# Patient Record
Sex: FEMALE | Race: WHITE | NOT HISPANIC OR LATINO | Employment: OTHER | URBAN - METROPOLITAN AREA
[De-identification: names, ages, dates, MRNs, and addresses within clinical notes are randomized per-mention and may not be internally consistent; named-entity substitution may affect disease eponyms.]

---

## 2017-01-01 ENCOUNTER — HOSPITAL ENCOUNTER (EMERGENCY)
Facility: HOSPITAL | Age: 67
Discharge: HOME | End: 2017-01-01
Attending: EMERGENCY MEDICINE
Payer: MEDICARE

## 2017-01-01 VITALS
TEMPERATURE: 98 F | RESPIRATION RATE: 16 BRPM | SYSTOLIC BLOOD PRESSURE: 121 MMHG | HEIGHT: 64 IN | OXYGEN SATURATION: 98 % | BODY MASS INDEX: 34.15 KG/M2 | HEART RATE: 58 BPM | DIASTOLIC BLOOD PRESSURE: 59 MMHG | WEIGHT: 200 LBS

## 2017-01-01 DIAGNOSIS — R05.8 PRODUCTIVE COUGH: Primary | ICD-10-CM

## 2017-01-01 PROCEDURE — 25000242 PHARM REV CODE 250 ALT 637 W/ HCPCS: Performed by: EMERGENCY MEDICINE

## 2017-01-01 PROCEDURE — 94640 AIRWAY INHALATION TREATMENT: CPT

## 2017-01-01 PROCEDURE — 99284 EMERGENCY DEPT VISIT MOD MDM: CPT

## 2017-01-01 PROCEDURE — 25000003 PHARM REV CODE 250: Performed by: EMERGENCY MEDICINE

## 2017-01-01 RX ORDER — CODEINE PHOSPHATE AND GUAIFENESIN 10; 100 MG/5ML; MG/5ML
5 SOLUTION ORAL
Status: DISCONTINUED | OUTPATIENT
Start: 2017-01-01 | End: 2017-01-01 | Stop reason: ALTCHOICE

## 2017-01-01 RX ORDER — BENZONATATE 100 MG/1
100 CAPSULE ORAL ONCE
Status: COMPLETED | OUTPATIENT
Start: 2017-01-01 | End: 2017-01-01

## 2017-01-01 RX ORDER — BENZONATATE 100 MG/1
100 CAPSULE ORAL 3 TIMES DAILY PRN
Qty: 20 CAPSULE | Refills: 0 | Status: SHIPPED | OUTPATIENT
Start: 2017-01-01 | End: 2017-01-11

## 2017-01-01 RX ORDER — DOXYCYCLINE 100 MG/1
100 CAPSULE ORAL 2 TIMES DAILY
Qty: 20 CAPSULE | Refills: 0 | Status: SHIPPED | OUTPATIENT
Start: 2017-01-01 | End: 2017-01-06

## 2017-01-01 RX ORDER — HYDROCODONE POLISTIREX AND CHLORPHENIRAMINE POLISTIREX 10; 8 MG/5ML; MG/5ML
5 SUSPENSION, EXTENDED RELEASE ORAL
Status: COMPLETED | OUTPATIENT
Start: 2017-01-01 | End: 2017-01-01

## 2017-01-01 RX ORDER — HYDROCODONE POLISTIREX AND CHLORPHENIRAMINE POLISTIREX 10; 8 MG/5ML; MG/5ML
5 SUSPENSION, EXTENDED RELEASE ORAL EVERY 12 HOURS PRN
Qty: 115 ML | Refills: 0 | Status: SHIPPED | OUTPATIENT
Start: 2017-01-01 | End: 2017-01-06

## 2017-01-01 RX ORDER — IPRATROPIUM BROMIDE AND ALBUTEROL SULFATE 2.5; .5 MG/3ML; MG/3ML
3 SOLUTION RESPIRATORY (INHALATION)
Status: COMPLETED | OUTPATIENT
Start: 2017-01-01 | End: 2017-01-01

## 2017-01-01 RX ORDER — ALBUTEROL SULFATE 90 UG/1
1-2 AEROSOL, METERED RESPIRATORY (INHALATION) EVERY 6 HOURS PRN
Qty: 1 INHALER | Refills: 0 | Status: SHIPPED | OUTPATIENT
Start: 2017-01-01 | End: 2017-01-27

## 2017-01-01 RX ADMIN — IPRATROPIUM BROMIDE AND ALBUTEROL SULFATE 3 ML: .5; 3 SOLUTION RESPIRATORY (INHALATION) at 10:01

## 2017-01-01 RX ADMIN — HYDROCODONE POLISTIREX AND CHLORPHENIRAMINE POLISITREX 5 ML: 10; 8 SUSPENSION, EXTENDED RELEASE ORAL at 11:01

## 2017-01-01 RX ADMIN — BENZONATATE 100 MG: 100 CAPSULE ORAL at 11:01

## 2017-01-01 NOTE — ED PROVIDER NOTES
Encounter Date: 2017       History     Chief Complaint   Patient presents with    Chest Congestion     x 2 days  , no fever / green mucus     Review of patient's allergies indicates:   Allergen Reactions    Nsaids (non-steroidal anti-inflammatory drug) Anaphylaxis     HPI Comments: 66-year-old female presents to the emergency room with cough since .  Symptoms improved several days ago but have since worsened.  No runny nose no sneezing or sore throat.  She is only complaining of a cough and chest congestion.  No chest pain chest tightness or shortness of breath.  No leg swelling or edema.  No fevers or chills no myalgias.    The history is provided by the patient and the spouse.     Past Medical History   Diagnosis Date    Anxiety     Depression     Diabetes mellitus, type 2     Fatty liver disease, nonalcoholic     GERD (gastroesophageal reflux disease)     Hyperlipidemia     Hypertension     Plantar fasciitis of right foot      No past medical history pertinent negatives.  Past Surgical History   Procedure Laterality Date    Cholecystectomy      Tonsillectomy       section       x 2    Foot surgery       left bunionectomy    Liposuction      Breast reduction      Hysterectomy       one ovary intact, adhesions     Family History   Problem Relation Age of Onset    Hypertension Mother     Heart disease Mother      pacemaker    Heart disease Father     Psoriasis Father     Cancer Neg Hx      Social History   Substance Use Topics    Smoking status: Former Smoker    Smokeless tobacco: Never Used      Comment: quit 1993    Alcohol use No     Review of Systems   Constitutional: Negative for chills and fever.   Respiratory: Positive for cough. Negative for shortness of breath.         Chest congestion and a productive cough   Gastrointestinal: Negative for abdominal pain.   Genitourinary: Negative for flank pain.   Musculoskeletal: Negative for myalgias.   Neurological:  Negative for headaches.   Psychiatric/Behavioral: Negative for confusion.   All other systems reviewed and are negative.      Physical Exam   Initial Vitals   BP Pulse Resp Temp SpO2   01/01/17 1000 01/01/17 1000 01/01/17 1000 01/01/17 1000 01/01/17 1000   121/59 62 20 97.9 °F (36.6 °C) 97 %     Physical Exam    Nursing note and vitals reviewed.  Constitutional: She appears well-developed and well-nourished. She is not diaphoretic.  Non-toxic appearance. She does not have a sickly appearance. She does not appear ill. No distress.   HENT:   Head: Normocephalic and atraumatic.   Eyes: EOM are normal.   Neck: Normal range of motion. Neck supple.   Cardiovascular: Normal rate, regular rhythm and normal heart sounds.   No murmur heard.  Pulmonary/Chest: Breath sounds normal. No respiratory distress. She has no wheezes. She has no rales.   Abdominal: Soft. She exhibits no distension. There is no tenderness. There is no rebound.   Musculoskeletal: Normal range of motion.   Neurological: She is alert and oriented to person, place, and time.   Skin: Skin is warm and dry.   Psychiatric: She has a normal mood and affect. Her behavior is normal. Judgment and thought content normal.         ED Course   Procedures  Labs Reviewed - No data to display          Medical Decision Making:   Clinical Tests:   Radiological Study: Ordered and Reviewed                   ED Course     Clinical Impression:   The encounter diagnosis was Productive cough.          66-year-old female presents with about 10 days of productive cough.  Briefly improved but has since returned.  Feels much better after breathing treatment with Tussionex and Tessalon Perles.  These medications will be prescribed on discharge and I will also give her a prescription for doxycycline along with her to wait several days before taking it.  Advised her that the symptoms are likely viral and that her symptoms should improve with symptomatic treatment.  She is amenable to  this plan.  No sign at this time of respiratory distress.  I do not suspect she has a pulmonary embolus or myocardial infarction or dissection.  Well-appearing on discharge patient feels much better.     Neo Rubalcava MD  01/01/17 5657

## 2017-01-01 NOTE — ED NOTES
C/o cough that started before kelly but has gotten worse in the past 2 days, even non labored respirations, alert calm spouse at bedside aware to notify nurse of needs or concerns.

## 2017-01-01 NOTE — ED AVS SNAPSHOT
[facility] [address] [place] [address]               Marce Hickey   2017 10:02 AM   ED    Description:  Female : 1950   Department:  [facility]           Your Care was Coordinated By:     Provider Role From To    Neo Rubalcava MD Attending Provider 17 1003 --      Reason for Visit     Chest Congestion           Diagnoses this Visit        Comments    Productive cough    -  Primary       ED Disposition     None           To Do List           Follow-up Information     Follow up with Savannah Garvey MD.    Specialty:  Family Medicine    Why:  As needed, If symptoms worsen    Contact information:    Frank ANDREW  [facility] [id number] [phone]          Follow up with [facility].    Specialty:  Emergency Medicine    Why:  As needed, If symptoms worsen    Contact information:    [address] [place] [id number] [phone]       These Medications        Disp Refills Start End    albuterol 90 mcg/actuation inhaler 1 Inhaler 0 2017     Inhale 1-2 puffs into the lungs every 6 (six) hours as needed for Wheezing. - Inhalation    Pharmacy: [facility] [place] [facility] Ph #: [phone]       doxycycline (VIBRAMYCIN) 100 MG Cap 20 capsule 0 2017    Take 1 capsule (100 mg total) by mouth 2 (two) times daily. - Oral    Pharmacy: [facility] [place] [facility] Ph #: [phone]       Notes to Pharmacy: Fill his prescription only after several days if still symptomatic    benzonatate (TESSALON) 100 MG capsule 20 capsule 0 2017    Take 1 capsule (100 mg total) by mouth 3 (three) times daily as needed for Cough. - Oral    Pharmacy: [facility] [place] [facility] Ph #: [phone]       hydrocodone-chlorpheniramine  (TUSSIONEX) 10-8 mg/5 mL suspension 115 mL 0 1/1/2017     Take 5 mLs by mouth every 12 (twelve) hours as needed for Cough. - Oral    Pharmacy: [facility] [address] DANIELA Salazar  [facility] #: [phone]         [facility] On Call     [facility] On Call Nurse Care Line - 24/7 Assistance  Registered nurses in the [facility] On Call Center provide clinical advisement, health education, appointment booking, and other advisory services.  Call for this free service at [phone].             Medications           Message regarding Medications     Verify the changes and/or additions to your medication regime listed below are the same as discussed with your clinician today.  If any of these changes or additions are incorrect, please notify your healthcare provider.        START taking these NEW medications        Refills    albuterol 90 mcg/actuation inhaler 0    Sig: Inhale 1-2 puffs into the lungs every 6 (six) hours as needed for Wheezing.    Class: Print    Route: Inhalation    doxycycline (VIBRAMYCIN) 100 MG Cap 0    Sig: Take 1 capsule (100 mg total) by mouth 2 (two) times daily.    Class: Print    Route: Oral    benzonatate (TESSALON) 100 MG capsule 0    Sig: Take 1 capsule (100 mg total) by mouth 3 (three) times daily as needed for Cough.    Class: Print    Route: Oral    hydrocodone-chlorpheniramine (TUSSIONEX) 10-8 mg/5 mL suspension 0    Sig: Take 5 mLs by mouth every 12 (twelve) hours as needed for Cough.    Class: Print    Route: Oral      These medications were administered today        Dose Freq    albuterol-ipratropium 2.5mg-0.5mg/3mL nebulizer solution 3 mL 3 mL ED 1 Time    Sig: Take 3 mLs by nebulization ED 1 Time.    Class: Normal    Route: Nebulization    benzonatate capsule 100 mg 100 mg Once    Sig: Take 1 capsule (100 mg total) by mouth once.    Class: Normal    Route: Oral    hydrocodone-chlorpheniramine 10-8 mg/5 mL suspension 5 mL 5 mL ED 1 Time    Sig: Take 5 mLs by  "mouth ED 1 Time.    Class: Normal    Route: Oral           Verify that the below list of medications is an accurate representation of the medications you are currently taking.  If none reported, the list may be blank. If incorrect, please contact your healthcare provider. Carry this list with you in case of emergency.           Current Medications     albuterol 90 mcg/actuation inhaler Inhale 1-2 puffs into the lungs every 6 (six) hours as needed for Wheezing.    atenolol-chlorthalidone (TENORETIC) 50-25 mg Tab Take 1 tablet by mouth once daily.    atorvastatin (LIPITOR) 40 MG tablet Take 1 tablet (40 mg total) by mouth once daily.    benzonatate (TESSALON) 100 MG capsule Take 1 capsule (100 mg total) by mouth 3 (three) times daily as needed for Cough.    doxycycline (VIBRAMYCIN) 100 MG Cap Take 1 capsule (100 mg total) by mouth 2 (two) times daily.    hydrocodone-chlorpheniramine (TUSSIONEX) 10-8 mg/5 mL suspension Take 5 mLs by mouth every 12 (twelve) hours as needed for Cough.    omeprazole-sodium bicarbonate (ZEGERID) 40-1.1 mg-gram per capsule Take 1 capsule by mouth before breakfast.    venlafaxine (EFFEXOR) 75 MG tablet Take 1 tablet (75 mg total) by mouth 2 (two) times daily.    vitamin D 1000 units Tab Take 185 mg by mouth once daily.           Clinical Reference Information           Your Vitals Were     BP Pulse Temp Resp Height Weight    121/59 58 97.9 °F (36.6 °C) (Oral) 16 5' 4" (1.626 m) 90.7 kg (200 lb)    SpO2 BMI             98% 34.33 kg/m2         Allergies as of 1/1/2017        Reactions    Nsaids (Non-steroidal Anti-inflammatory Drug) Anaphylaxis      Immunizations Administered on Date of Encounter - 1/1/2017     None      ED Micro, Lab, POCT     None      ED Imaging Orders     Start Ordered       Status Ordering Provider    01/01/17 1015 01/01/17 1014  X-Ray Chest PA And Lateral  1 time imaging      Final result         Discharge Instructions         What Is Acute Bronchitis?  Acute or " short-term bronchitis last for days or weeks. It occurs when the bronchial tubes (airways in the lungs) are irritated by a virus, bacteria, or allergen. This causes a cough that produces yellow or greenish mucus.  Inside healthy lungs    Air travels in and out of the lungs through the airways. The linings of these airways produce sticky mucus. This mucus traps particles that enter the lungs. Tiny structures called cilia then sweep the particles out of the airways.     Healthy airway: Airways are normally open. Air moves in and out easily.      Healthy cilia: Tiny, hairlike cilia sweep mucus and particles up and out of the airways.   Lungs with bronchitis  Bronchitis often occurs with a cold or the flu virus. The airways become inflamed (red and swollen). There is a deep hacking cough from the extra mucus. Other symptoms may include:  · Wheezing  · Chest discomfort  · Shortness of breath  · Mild fever  A second infection, this time due to bacteria, may then occur. And airways irritated by allergens or smoke are more likely to get infected.        Inflamed airway: Inflammation and extra mucus narrow the airway, causing shortness of breath.      Impaired cilia: Extra mucus impairs cilia, causing congestion and wheezing. Smoking makes the problem worse.   Making a diagnosis  A physical exam, health history, and certain tests help your healthcare provider make the diagnosis.  Health history  Your healthcare provider will ask you about your symptoms.  The exam  Your provider listens to your chest for signs of congestion. He or she may also check your ears, nose, and throat.  Possible tests  · A sputum test for bacteria. This requires a sample of mucus from the lungs.  · A nasal or throat swab for bacterial infection.  · A chest X-ray if your healthcare provider thinks you have pneumonia.  · Tests to check for an underlying condition, such as allergies, asthma, or COPD. You may need to see a specialist for more lung  function testing.  Treating a cough  The main treatment for bronchitis is easing symptoms. Avoiding smoke, allergens, and other things that trigger coughing can often help. If the infection is bacterial, you may be given antibiotics. During the illness, it's important to get plenty of sleep. To ease symptoms:  · Dont smoke, and avoid secondhand smoke.  · Use a humidifier, or breathe in steam from a hot shower. This may help loosen mucus.  · Drink a lot of water and juice. They can soothe the throat and may help thin mucus.  · Sit up or use extra pillows when in bed to help lessen coughing and congestion.  · Ask your provider about using cough medicine, pain and fever medicine, or a decongestant.  Antibiotics  Most cases of bronchitis are caused by cold or flu viruses. Antibiotics dont treat viral illness. Taking antibiotics when they are not needed increases your risk of getting an infection later that is antibiotic-resistant. Your provider will prescribe antibiotics if the infection is caused by bacteria. If they are prescribed:  · Take the medicine until it is used up, even if symptoms have improved. If you dont, the bronchitis may come back.  · Take them as directed. For instance, some medicines should be taken with food.  · Ask your provider or pharmacist what side effects are common, and what to do about them.  Follow-up care  You should see your provider again in 2 to 3 weeks. By this time, symptoms should have improved. An infection that lasts longer may mean you have a more serious problem.  Prevention  · Avoid tobacco smoke. If you smoke, quit. Stay away from smoky places. Ask friends and family not to smoke around you, or in your home or car.  · Get checked for allergies.  · Ask your provider about getting a yearly flu shot, and pneumococcal or pneumonia shots.  · Wash your hands often. This helps reduce the chance of picking up viruses that cause colds and flu.  Call your healthcare provider  if:  · Symptoms worsen, or new symptoms develop.  · Breathing problems worsen or  become severe.  · Symptoms dont get better within a week, or within 3 days of taking antibiotics.   © 5212-9956 [employer]. [address] [place] [address], [place] [address]. All rights reserved. This information is not intended as a substitute for professional medical care. Always follow your healthcare professional's instructions.          Your Scheduled Appointments     Jan 06, 2017  2:15 PM CST   Follow Up - Bariatric with MD [facility] - General [facility] ([place] [facility])    [address]  [place] [address]   [phone]            Feb 10, 2017  8:40 AM CST   Established Patient Visit with MD [facility] - Family Medicine ([place])    [address]  [place] [address]   [phone]            Mar 28, 2017 10:00 AM CDT   Established Patient Visit with MD [facility] - Family Medicine ([place])    [address]  [facility] [address]   [phone]              Smoking Cessation     If you would like to quit smoking:   You may be eligible for free services if you are a [place] resident and started smoking cigarettes before September 1, 1988.  Call the Smoking Cessation Trust (SCT) toll free at (324) 067-2808 or (274) 789-8389.   Call 1-800-QUIT-NOW if you do not meet the above criteria.             [facility] complies with applicable Federal civil rights laws and does not discriminate on the basis of race, color, national origin, age, disability, or sex.        Language Assistance Services     ATTENTION: Language assistance services are available, free of charge. Please call [phone].      ATENCIÓN: Si habla malinda, tiene a piper disposición servicios gratuitos de asistencia lingüística. Llame al [phone].     CHÚ Ý: N?u b?n nói Ti?ng Vi?t, có các d?ch v? h? tr? ngôn ng? mi?n phí dành cho b?n. G?i s?  [phone].

## 2017-01-01 NOTE — DISCHARGE INSTRUCTIONS
What Is Acute Bronchitis?  Acute or short-term bronchitis last for days or weeks. It occurs when the bronchial tubes (airways in the lungs) are irritated by a virus, bacteria, or allergen. This causes a cough that produces yellow or greenish mucus.  Inside healthy lungs    Air travels in and out of the lungs through the airways. The linings of these airways produce sticky mucus. This mucus traps particles that enter the lungs. Tiny structures called cilia then sweep the particles out of the airways.     Healthy airway: Airways are normally open. Air moves in and out easily.      Healthy cilia: Tiny, hairlike cilia sweep mucus and particles up and out of the airways.   Lungs with bronchitis  Bronchitis often occurs with a cold or the flu virus. The airways become inflamed (red and swollen). There is a deep hacking cough from the extra mucus. Other symptoms may include:  · Wheezing  · Chest discomfort  · Shortness of breath  · Mild fever  A second infection, this time due to bacteria, may then occur. And airways irritated by allergens or smoke are more likely to get infected.        Inflamed airway: Inflammation and extra mucus narrow the airway, causing shortness of breath.      Impaired cilia: Extra mucus impairs cilia, causing congestion and wheezing. Smoking makes the problem worse.   Making a diagnosis  A physical exam, health history, and certain tests help your healthcare provider make the diagnosis.  Health history  Your healthcare provider will ask you about your symptoms.  The exam  Your provider listens to your chest for signs of congestion. He or she may also check your ears, nose, and throat.  Possible tests  · A sputum test for bacteria. This requires a sample of mucus from the lungs.  · A nasal or throat swab for bacterial infection.  · A chest X-ray if your healthcare provider thinks you have pneumonia.  · Tests to check for an underlying condition, such as allergies, asthma, or COPD. You may need  to see a specialist for more lung function testing.  Treating a cough  The main treatment for bronchitis is easing symptoms. Avoiding smoke, allergens, and other things that trigger coughing can often help. If the infection is bacterial, you may be given antibiotics. During the illness, it's important to get plenty of sleep. To ease symptoms:  · Dont smoke, and avoid secondhand smoke.  · Use a humidifier, or breathe in steam from a hot shower. This may help loosen mucus.  · Drink a lot of water and juice. They can soothe the throat and may help thin mucus.  · Sit up or use extra pillows when in bed to help lessen coughing and congestion.  · Ask your provider about using cough medicine, pain and fever medicine, or a decongestant.  Antibiotics  Most cases of bronchitis are caused by cold or flu viruses. Antibiotics dont treat viral illness. Taking antibiotics when they are not needed increases your risk of getting an infection later that is antibiotic-resistant. Your provider will prescribe antibiotics if the infection is caused by bacteria. If they are prescribed:  · Take the medicine until it is used up, even if symptoms have improved. If you dont, the bronchitis may come back.  · Take them as directed. For instance, some medicines should be taken with food.  · Ask your provider or pharmacist what side effects are common, and what to do about them.  Follow-up care  You should see your provider again in 2 to 3 weeks. By this time, symptoms should have improved. An infection that lasts longer may mean you have a more serious problem.  Prevention  · Avoid tobacco smoke. If you smoke, quit. Stay away from smoky places. Ask friends and family not to smoke around you, or in your home or car.  · Get checked for allergies.  · Ask your provider about getting a yearly flu shot, and pneumococcal or pneumonia shots.  · Wash your hands often. This helps reduce the chance of picking up viruses that cause colds and flu.  Call  your healthcare provider if:  · Symptoms worsen, or new symptoms develop.  · Breathing problems worsen or  become severe.  · Symptoms dont get better within a week, or within 3 days of taking antibiotics.   © 3334-7933 [employer]. [address] [place] [address], [place] [address]. All rights reserved. This information is not intended as a substitute for professional medical care. Always follow your healthcare professional's instructions.

## 2017-01-01 NOTE — ED NOTES
Given written and verbal DC instructions questions answered per MD aware to follow up with PCP encouraged to return if needed. Given RX with teaching.

## 2017-01-06 ENCOUNTER — OFFICE VISIT (OUTPATIENT)
Dept: SURGERY | Facility: CLINIC | Age: 67
End: 2017-01-06
Payer: MEDICARE

## 2017-01-06 ENCOUNTER — TELEPHONE (OUTPATIENT)
Dept: SURGERY | Facility: CLINIC | Age: 67
End: 2017-01-06

## 2017-01-06 VITALS
DIASTOLIC BLOOD PRESSURE: 84 MMHG | SYSTOLIC BLOOD PRESSURE: 171 MMHG | WEIGHT: 215.38 LBS | HEART RATE: 93 BPM | BODY MASS INDEX: 36.77 KG/M2 | RESPIRATION RATE: 16 BRPM | HEIGHT: 64 IN | TEMPERATURE: 98 F

## 2017-01-06 DIAGNOSIS — I10 ESSENTIAL HYPERTENSION: ICD-10-CM

## 2017-01-06 DIAGNOSIS — R74.8 ELEVATED LIVER ENZYMES: ICD-10-CM

## 2017-01-06 DIAGNOSIS — E66.01 MORBID OBESITY DUE TO EXCESS CALORIES: Primary | ICD-10-CM

## 2017-01-06 DIAGNOSIS — E11.9 CONTROLLED TYPE 2 DIABETES MELLITUS WITHOUT COMPLICATION, WITHOUT LONG-TERM CURRENT USE OF INSULIN: ICD-10-CM

## 2017-01-06 PROCEDURE — 99214 OFFICE O/P EST MOD 30 MIN: CPT | Mod: S$PBB,,, | Performed by: SURGERY

## 2017-01-06 PROCEDURE — 99999 PR PBB SHADOW E&M-EST. PATIENT-LVL IV: CPT | Mod: PBBFAC,,, | Performed by: SURGERY

## 2017-01-06 PROCEDURE — 99214 OFFICE O/P EST MOD 30 MIN: CPT | Mod: PBBFAC,PO | Performed by: SURGERY

## 2017-01-06 RX ORDER — HEPARIN SODIUM 5000 [USP'U]/ML
5000 INJECTION, SOLUTION INTRAVENOUS; SUBCUTANEOUS
Status: CANCELLED | OUTPATIENT
Start: 2017-01-06

## 2017-01-06 RX ORDER — FAMOTIDINE 10 MG/ML
20 INJECTION INTRAVENOUS
Status: CANCELLED | OUTPATIENT
Start: 2017-01-06

## 2017-01-06 RX ORDER — MULTIVITAMIN
1 TABLET ORAL DAILY
COMMUNITY
End: 2017-11-01 | Stop reason: SDUPTHER

## 2017-01-06 NOTE — MR AVS SNAPSHOT
[facility]   [address]  [facility] [address]  Phone: [phone]                  Marce Hickey   2017 2:15 PM   Office Visit    Description:  Female : 1950   Provider:  Nelson Panchal MD   Department:  [facility]           Reason for Visit     Obesity           Diagnoses this Visit        Comments    Morbid obesity due to excess calories    -  Primary     Elevated liver enzymes         Obesity, Class II, BMI 35-39.9, with comorbidity         Controlled type 2 diabetes mellitus without complication, without long-term current use of insulin         Essential hypertension                To Do List           Future Appointments        Provider Department Dept Phone    2017 10:00 AM BARIATRIC, NURSE SCHEDULE [facility] [phone]    2/10/2017 8:40 AM Savannah Garvey MD [facility] [phone]    3/28/2017 10:00 AM Savannah Garvey MD [facility] [phone]      Your Future Surgeries/Procedures     2017   Surgery with Nelson Panchal MD   [facility] [address]  [facility] [id number] [phone]              Goals (5 Years of Data)     None      [facility]     [facility] - 24/7 Assistance  Registered nurses in the [facility] provide clinical advisement, health education, appointment booking, and other advisory services.  Call for this free service at [phone].             Medications           Message regarding Medications     Verify the changes and/or additions to your medication regime listed below are the same as discussed with your clinician today.  If any of these changes or additions are incorrect, please notify your healthcare provider.        STOP taking these medications     hydrocodone-chlorpheniramine (TUSSIONEX) 10-8 mg/5 mL suspension Take 5 mLs by mouth every  "12 (twelve) hours as needed for Cough.    doxycycline (VIBRAMYCIN) 100 MG Cap Take 1 capsule (100 mg total) by mouth 2 (two) times daily.           Verify that the below list of medications is an accurate representation of the medications you are currently taking.  If none reported, the list may be blank. If incorrect, please contact your healthcare provider. Carry this list with you in case of emergency.           Current Medications     multivitamin (THERAGRAN) per tablet Take 1 tablet by mouth once daily.    vitamin D 1000 units Tab Take 185 mg by mouth once daily.    albuterol 90 mcg/actuation inhaler Inhale 1-2 puffs into the lungs every 6 (six) hours as needed for Wheezing.    atenolol-chlorthalidone (TENORETIC) 50-25 mg Tab Take 1 tablet by mouth once daily.    atorvastatin (LIPITOR) 40 MG tablet Take 1 tablet (40 mg total) by mouth once daily.    benzonatate (TESSALON) 100 MG capsule Take 1 capsule (100 mg total) by mouth 3 (three) times daily as needed for Cough.    omeprazole-sodium bicarbonate (ZEGERID) 40-1.1 mg-gram per capsule Take 1 capsule by mouth before breakfast.    venlafaxine (EFFEXOR) 75 MG tablet Take 1 tablet (75 mg total) by mouth 2 (two) times daily.           Clinical Reference Information           Vital Signs - Last Recorded  Most recent update: 1/6/2017  3:17 PM by Maricel Camacho LPN    BP Pulse Temp Resp Ht Wt    (!) 171/84 93 97.8 °F (36.6 °C) (Oral) 16 5' 4" (1.626 m) 97.7 kg (215 lb 6.4 oz)    BMI                36.97 kg/m2          Blood Pressure          Most Recent Value    BP  (!)  171/84      Allergies as of 1/6/2017     Nsaids (Non-steroidal Anti-inflammatory Drug)      Immunizations Administered on Date of Encounter - 1/6/2017     None      Orders Placed During Today's Visit      Normal Orders This Visit    Case Request Operating Room: GASTRECTOMY-SLEEVE-LAPAROSCOPIC       Instructions    Pre op Diet January 16    Breakfast- protein shake  Lunch- protein shake  Dinner- " 3 ounces of meat and vegetables ( from list)    NO SNACKING  Only water to drink

## 2017-01-06 NOTE — H&P
Follow up    SUBJECTIVE:     Chief Complaint   Patient presents with    Obesity       History of Present Illness:    Patient is a 66 y.o. female who is referred for evaluation of surgical treatment of morbid obesity. Her Body mass index is 36.97 kg/(m^2).  She has known comorbidities of depression, diabetes mellitus, dyslipidemia, hypertension, osteoarthritis and urinary incontinence. She has attended the bariatric seminar and is most interested in gastric sleeve surgery.     Review of patient's allergies indicates:   Allergen Reactions    Nsaids (non-steroidal anti-inflammatory drug) Anaphylaxis       Current Outpatient Prescriptions   Medication Sig Dispense Refill    multivitamin (THERAGRAN) per tablet Take 1 tablet by mouth once daily.      vitamin D 1000 units Tab Take 185 mg by mouth once daily.      albuterol 90 mcg/actuation inhaler Inhale 1-2 puffs into the lungs every 6 (six) hours as needed for Wheezing. 1 Inhaler 0    atenolol-chlorthalidone (TENORETIC) 50-25 mg Tab Take 1 tablet by mouth once daily. 90 tablet 3    atorvastatin (LIPITOR) 40 MG tablet Take 1 tablet (40 mg total) by mouth once daily. 90 tablet 3    benzonatate (TESSALON) 100 MG capsule Take 1 capsule (100 mg total) by mouth 3 (three) times daily as needed for Cough. 20 capsule 0    omeprazole-sodium bicarbonate (ZEGERID) 40-1.1 mg-gram per capsule Take 1 capsule by mouth before breakfast. 90 capsule 3    venlafaxine (EFFEXOR) 75 MG tablet Take 1 tablet (75 mg total) by mouth 2 (two) times daily. 180 tablet 3     No current facility-administered medications for this visit.        Past Medical History   Diagnosis Date    Anxiety     Depression     Diabetes mellitus, type 2     Fatty liver disease, nonalcoholic     GERD (gastroesophageal reflux disease)     Hyperlipidemia     Hypertension     Plantar fasciitis of right foot      Past Surgical History   Procedure Laterality Date    Cholecystectomy      Tonsillectomy        section       x 2    Foot surgery       left bunionectomy    Liposuction      Breast reduction      Hysterectomy       one ovary intact, adhesions     Family History   Problem Relation Age of Onset    Hypertension Mother     Heart disease Mother      pacemaker    Heart disease Father     Psoriasis Father     Cancer Neg Hx      Social History   Substance Use Topics    Smoking status: Former Smoker    Smokeless tobacco: Never Used      Comment: quit 1993    Alcohol use No        Review of Systems:  Review of Systems   Constitutional: Negative for activity change, appetite change, fever and unexpected weight change.   HENT: Positive for sinus pressure. Negative for congestion, sneezing, sore throat, tinnitus, trouble swallowing and voice change.    Eyes: Negative for redness and visual disturbance.   Respiratory: Negative for apnea, cough, choking, chest tightness, shortness of breath, wheezing and stridor.    Cardiovascular: Positive for leg swelling. Negative for chest pain and palpitations.   Gastrointestinal: Positive for diarrhea. Negative for abdominal distention, abdominal pain, anal bleeding, blood in stool, constipation, nausea, rectal pain and vomiting.   Endocrine: Negative for cold intolerance and heat intolerance.   Genitourinary: Negative for difficulty urinating and dysuria.   Musculoskeletal: Positive for arthralgias. Negative for back pain, gait problem, joint swelling, myalgias, neck pain and neck stiffness.   Skin: Negative for rash and wound.   Allergic/Immunologic: Negative for environmental allergies and food allergies.   Neurological: Negative for dizziness, facial asymmetry, light-headedness and headaches.   Hematological: Negative for adenopathy. Does not bruise/bleed easily.   Psychiatric/Behavioral: Negative for agitation, confusion and decreased concentration.       OBJECTIVE:     Vital Signs (Most Recent)  Temp: 97.8 °F (36.6 °C) (17 1513)  Pulse: 93 (17  "1513)  Resp: 16 (01/06/17 1513)  BP: (!) 171/84 (01/06/17 1513)  5' 4" (1.626 m)  97.7 kg (215 lb 6.4 oz)     Body comp:  Fat Percent:  49 %  Fat Mass:  105.6 lb  FFM:  109.8 lb  TBW: 78.8 lb  TBW %:  36.6 %  BMR: 1558 kcal        Physical Exam:  Physical Exam   Constitutional: She is oriented to person, place, and time. She appears well-developed and well-nourished. No distress.   HENT:   Head: Normocephalic and atraumatic.   Mouth/Throat: No oropharyngeal exudate.   Eyes: Conjunctivae and EOM are normal. Pupils are equal, round, and reactive to light. No scleral icterus.   Neck: Normal range of motion. Neck supple. No JVD present. No tracheal deviation present. No thyromegaly present.   Cardiovascular: Normal rate, regular rhythm and normal heart sounds.  Exam reveals no gallop and no friction rub.    No murmur heard.  Pulmonary/Chest: Effort normal and breath sounds normal. No stridor. No respiratory distress. She has no wheezes. She has no rales. She exhibits no tenderness.   Abdominal: Soft. Bowel sounds are normal. She exhibits no distension and no mass. There is no tenderness. There is no rebound and no guarding.   Lower transverse scar   Musculoskeletal: Normal range of motion. She exhibits no edema or tenderness.   Lymphadenopathy:     She has no cervical adenopathy.   Neurological: She is alert and oriented to person, place, and time. No cranial nerve deficit.   Skin: Skin is warm and dry. No rash noted. She is not diaphoretic. No erythema.   Psychiatric: She has a normal mood and affect. Her behavior is normal.   Nursing note and vitals reviewed.      ASSESSMENT/PLAN:        Labs: elevated hgba1c, elevated fasting glucose, elevated liver enzymes, elevated triglycerides  US: fatty liver done in the past  Endoscopy: Dr. Ro  Psych: clear Dr. Mukherjee  Dietician: done  UGI: normal    Clearances: cardiology clear     1. Elevated liver enzymes  CANCELED: US Abdomen Limited   2. Morbid obesity due to excess " calories     3. Obesity, Class II, BMI 35-39.9, with comorbidity     4. Controlled type 2 diabetes mellitus without complication, without long-term current use of insulin     5. Essential hypertension         Plan:  Marce Hickey has morbid obesity as their Body mass index is 36.97 kg/(m^2). She would benefit from weight loss surgery and has chosen gastric sleeve surgery as the preferred procedure. She understands that this is a tool and lifestyle change will be necessary to keep weight off. I went over possible complications of the chosen surgery with the patient and she is agreeable to surgery.    She has been instructed to start the pre operative diet - January 16.    She surgical date is January 30.      The patient has been taught the post operative diet and understands that she will need to stay on this diet to prevent complication.  They are aware of the post operative follow up and return to see me after surgery to treat/prevent/identify any complications.

## 2017-01-06 NOTE — PATIENT INSTRUCTIONS
Pre op Diet January 16    Breakfast- protein shake  Lunch- protein shake  Dinner- 3 ounces of meat and vegetables ( from list)    NO SNACKING  Only water to drink

## 2017-01-06 NOTE — PROGRESS NOTES
Medically Supervised Weight Loss Documentation    Date of Visit: 01/06/2017    Patient: Marce Hickey    Current Weight:  215  Current BMI: Body mass index is 36.97 kg/(m^2).  Weight Change: -10 pounds    Last Weight: 225    Beginning Weight: 225      Vitals:   Vitals:    01/06/17 1513   BP: (!) 171/84   Pulse: 93   Resp: 16   Temp: 97.8 °F (36.6 °C)       Comorbidities:   Past Medical History   Diagnosis Date    Anxiety     Depression     Diabetes mellitus, type 2     Fatty liver disease, nonalcoholic     GERD (gastroesophageal reflux disease)     Hyperlipidemia     Hypertension     Plantar fasciitis of right foot        Medications:  Current Outpatient Prescriptions on File Prior to Visit   Medication Sig Dispense Refill    vitamin D 1000 units Tab Take 185 mg by mouth once daily.      albuterol 90 mcg/actuation inhaler Inhale 1-2 puffs into the lungs every 6 (six) hours as needed for Wheezing. 1 Inhaler 0    atenolol-chlorthalidone (TENORETIC) 50-25 mg Tab Take 1 tablet by mouth once daily. 90 tablet 3    atorvastatin (LIPITOR) 40 MG tablet Take 1 tablet (40 mg total) by mouth once daily. 90 tablet 3    benzonatate (TESSALON) 100 MG capsule Take 1 capsule (100 mg total) by mouth 3 (three) times daily as needed for Cough. 20 capsule 0    omeprazole-sodium bicarbonate (ZEGERID) 40-1.1 mg-gram per capsule Take 1 capsule by mouth before breakfast. 90 capsule 3    venlafaxine (EFFEXOR) 75 MG tablet Take 1 tablet (75 mg total) by mouth 2 (two) times daily. 180 tablet 3    [DISCONTINUED] doxycycline (VIBRAMYCIN) 100 MG Cap Take 1 capsule (100 mg total) by mouth 2 (two) times daily. 20 capsule 0    [DISCONTINUED] hydrocodone-chlorpheniramine (TUSSIONEX) 10-8 mg/5 mL suspension Take 5 mLs by mouth every 12 (twelve) hours as needed for Cough. 115 mL 0     No current facility-administered medications on file prior to visit.          Body comp:  Fat Percent:  49 %  Fat Mass:  105.6 lb  FFM:  109.8  lb  TBW: 78.8 lb  TBW %:  36.6 %  BMR: 1558 kcal      Diet Education Discussed:    Breakfast: protein shake  Lunch: salad piece of meat like chicken or deli chicken or roast beef with cheese  Snack: apple and string cheese  Dinner:  Meat and steamed vegetables or salad    Exercise/Activity Discussed:    none    Behavior or Diet Goals for this patient:    Needs to start exercising 20 minutes 3 times per week    Diet is perfect.    She has stopped her effexor and other meds- advised to restart    Endoscopy- request endoscopy from Dr. Jorgensen office  dietary consult- done  psych consult - Clear by Dr. Mukherjee  Seminar- done online  Labs- reviewed  UGI- normal    I will obtain the following clearances prior to surgery: cardiology- Dr. Zamorano- stephanie    : I met with the patient for 15 minutes and counseled her for over 50% of that time

## 2017-01-19 ENCOUNTER — TELEPHONE (OUTPATIENT)
Dept: BARIATRICS | Facility: CLINIC | Age: 67
End: 2017-01-19

## 2017-01-19 NOTE — TELEPHONE ENCOUNTER
----- Message from Anu Ordoñez sent at 1/19/2017  2:11 PM CST -----  Please call patient in reference to her medications.  Call [phone].

## 2017-01-26 ENCOUNTER — TELEPHONE (OUTPATIENT)
Dept: FAMILY MEDICINE | Facility: CLINIC | Age: 67
End: 2017-01-26

## 2017-01-26 NOTE — TELEPHONE ENCOUNTER
Called pt regarding if she has recently had an eye exam done. Pt stated that she will be having one done in mid Feb, and that when its done she will have them send it over.    Pt also wanted to let Dr. Garvey know that she will be going in for surgery Monday morning and that with the diet she was put on leading up to the surgery she has lost 20lbs.    I told pt that I would relay the message. Pt understanding verified

## 2017-01-27 ENCOUNTER — DOCUMENTATION ONLY (OUTPATIENT)
Dept: BARIATRICS | Facility: CLINIC | Age: 67
End: 2017-01-27

## 2017-01-27 ENCOUNTER — HOSPITAL ENCOUNTER (OUTPATIENT)
Dept: PREADMISSION TESTING | Facility: HOSPITAL | Age: 67
Discharge: HOME | End: 2017-01-27
Attending: SURGERY
Payer: MEDICARE

## 2017-01-27 VITALS — BODY MASS INDEX: 36.37 KG/M2 | WEIGHT: 213 LBS | HEIGHT: 64 IN

## 2017-01-27 PROCEDURE — 99900103 DSU ONLY-NO CHARGE-INITIAL HR (STAT)

## 2017-01-27 PROCEDURE — 99900104 DSU ONLY-NO CHARGE-EA ADD'L HR (STAT)

## 2017-01-27 NOTE — NURSING
Pt weigh in before surgery  tanita scale: 205.4lb wt           35.3bmi           48.9% fat           100.4lb fat mass           105lb ffm           75lb tbw    Pt successfully return demonstrated use of incentive spirometer.

## 2017-01-28 ENCOUNTER — ANESTHESIA EVENT (OUTPATIENT)
Dept: SURGERY | Facility: HOSPITAL | Age: 67
DRG: 621 | End: 2017-01-28
Payer: MEDICARE

## 2017-01-30 ENCOUNTER — SURGERY (OUTPATIENT)
Age: 67
End: 2017-01-30

## 2017-01-30 ENCOUNTER — ANESTHESIA (OUTPATIENT)
Dept: SURGERY | Facility: HOSPITAL | Age: 67
DRG: 621 | End: 2017-01-30
Payer: MEDICARE

## 2017-01-30 ENCOUNTER — HOSPITAL ENCOUNTER (INPATIENT)
Facility: HOSPITAL | Age: 67
LOS: 2 days | Discharge: HOME | DRG: 621 | End: 2017-02-01
Attending: SURGERY | Admitting: SURGERY
Payer: MEDICARE

## 2017-01-30 DIAGNOSIS — E66.01 MORBID OBESITY DUE TO EXCESS CALORIES: ICD-10-CM

## 2017-01-30 LAB
POCT GLUCOSE: 147 MG/DL (ref 70–110)
POCT GLUCOSE: 155 MG/DL (ref 70–110)
POCT GLUCOSE: 167 MG/DL (ref 70–110)

## 2017-01-30 PROCEDURE — 25000003 PHARM REV CODE 250: Performed by: SURGERY

## 2017-01-30 PROCEDURE — 27000221 HC OXYGEN, UP TO 24 HOURS

## 2017-01-30 PROCEDURE — 37000009 HC ANESTHESIA EA ADD 15 MINS: Performed by: SURGERY

## 2017-01-30 PROCEDURE — 94761 N-INVAS EAR/PLS OXIMETRY MLT: CPT

## 2017-01-30 PROCEDURE — D9220A PRA ANESTHESIA: Mod: CRNA,,, | Performed by: NURSE ANESTHETIST, CERTIFIED REGISTERED

## 2017-01-30 PROCEDURE — 71000039 HC RECOVERY, EACH ADD'L HOUR: Performed by: SURGERY

## 2017-01-30 PROCEDURE — 71000033 HC RECOVERY, INTIAL HOUR: Performed by: SURGERY

## 2017-01-30 PROCEDURE — 43775 LAP SLEEVE GASTRECTOMY: CPT | Mod: ,,, | Performed by: SURGERY

## 2017-01-30 PROCEDURE — 63600175 PHARM REV CODE 636 W HCPCS: Performed by: SURGERY

## 2017-01-30 PROCEDURE — 37000008 HC ANESTHESIA 1ST 15 MINUTES: Performed by: SURGERY

## 2017-01-30 PROCEDURE — 25000003 PHARM REV CODE 250

## 2017-01-30 PROCEDURE — 36000711: Performed by: SURGERY

## 2017-01-30 PROCEDURE — 25000003 PHARM REV CODE 250: Performed by: NURSE ANESTHETIST, CERTIFIED REGISTERED

## 2017-01-30 PROCEDURE — 88307 TISSUE EXAM BY PATHOLOGIST: CPT | Mod: 26,,, | Performed by: PATHOLOGY

## 2017-01-30 PROCEDURE — 43775 LAP SLEEVE GASTRECTOMY: CPT | Mod: 80,,, | Performed by: SURGERY

## 2017-01-30 PROCEDURE — 11000001 HC ACUTE MED/SURG PRIVATE ROOM

## 2017-01-30 PROCEDURE — 25000003 PHARM REV CODE 250: Performed by: ANESTHESIOLOGY

## 2017-01-30 PROCEDURE — 99900103 DSU ONLY-NO CHARGE-INITIAL HR (STAT): Performed by: SURGERY

## 2017-01-30 PROCEDURE — 27201423 OPTIME MED/SURG SUP & DEVICES STERILE SUPPLY: Performed by: SURGERY

## 2017-01-30 PROCEDURE — 94770 HC EXHALED C02 TEST: CPT

## 2017-01-30 PROCEDURE — 0DB64Z3 EXCISION OF STOMACH, PERCUTANEOUS ENDOSCOPIC APPROACH, VERTICAL: ICD-10-PCS | Performed by: SURGERY

## 2017-01-30 PROCEDURE — D9220A PRA ANESTHESIA: Mod: ANES,,, | Performed by: ANESTHESIOLOGY

## 2017-01-30 PROCEDURE — 36000710: Performed by: SURGERY

## 2017-01-30 PROCEDURE — 63600175 PHARM REV CODE 636 W HCPCS: Performed by: NURSE ANESTHETIST, CERTIFIED REGISTERED

## 2017-01-30 PROCEDURE — 88307 TISSUE EXAM BY PATHOLOGIST: CPT | Performed by: PATHOLOGY

## 2017-01-30 PROCEDURE — 94799 UNLISTED PULMONARY SVC/PX: CPT

## 2017-01-30 PROCEDURE — 99900104 DSU ONLY-NO CHARGE-EA ADD'L HR (STAT): Performed by: SURGERY

## 2017-01-30 DEVICE — PERISTRIPS DRY STAPLE LINE: Type: IMPLANTABLE DEVICE | Site: ABDOMEN | Status: FUNCTIONAL

## 2017-01-30 RX ORDER — ONDANSETRON 2 MG/ML
4 INJECTION INTRAMUSCULAR; INTRAVENOUS EVERY 8 HOURS PRN
Status: DISCONTINUED | OUTPATIENT
Start: 2017-01-30 | End: 2017-02-01 | Stop reason: HOSPADM

## 2017-01-30 RX ORDER — LIDOCAINE HCL/PF 100 MG/5ML
SYRINGE (ML) INTRAVENOUS
Status: DISCONTINUED | OUTPATIENT
Start: 2017-01-30 | End: 2017-01-30

## 2017-01-30 RX ORDER — CEFAZOLIN SODIUM 2 G/50ML
2 SOLUTION INTRAVENOUS
Status: COMPLETED | OUTPATIENT
Start: 2017-01-30 | End: 2017-01-30

## 2017-01-30 RX ORDER — VENLAFAXINE 37.5 MG/1
75 TABLET ORAL 2 TIMES DAILY
Status: DISCONTINUED | OUTPATIENT
Start: 2017-01-30 | End: 2017-02-01 | Stop reason: HOSPADM

## 2017-01-30 RX ORDER — ONDANSETRON HYDROCHLORIDE 2 MG/ML
INJECTION, SOLUTION INTRAMUSCULAR; INTRAVENOUS
Status: DISCONTINUED | OUTPATIENT
Start: 2017-01-30 | End: 2017-01-30

## 2017-01-30 RX ORDER — DIPHENHYDRAMINE HYDROCHLORIDE 50 MG/ML
12.5 INJECTION INTRAMUSCULAR; INTRAVENOUS EVERY 4 HOURS PRN
Status: DISCONTINUED | OUTPATIENT
Start: 2017-01-30 | End: 2017-02-01 | Stop reason: HOSPADM

## 2017-01-30 RX ORDER — HYDROMORPHONE HYDROCHLORIDE 2 MG/ML
0.2 INJECTION, SOLUTION INTRAMUSCULAR; INTRAVENOUS; SUBCUTANEOUS EVERY 5 MIN PRN
Status: DISCONTINUED | OUTPATIENT
Start: 2017-01-30 | End: 2017-01-30 | Stop reason: HOSPADM

## 2017-01-30 RX ORDER — GLUCAGON 1 MG
1 KIT INJECTION
Status: DISCONTINUED | OUTPATIENT
Start: 2017-01-30 | End: 2017-02-01 | Stop reason: HOSPADM

## 2017-01-30 RX ORDER — SCOLOPAMINE TRANSDERMAL SYSTEM 1 MG/1
1 PATCH, EXTENDED RELEASE TRANSDERMAL
Status: COMPLETED | OUTPATIENT
Start: 2017-01-30 | End: 2017-01-30

## 2017-01-30 RX ORDER — FAMOTIDINE 10 MG/ML
20 INJECTION INTRAVENOUS
Status: COMPLETED | OUTPATIENT
Start: 2017-01-30 | End: 2017-01-30

## 2017-01-30 RX ORDER — PROPOFOL 10 MG/ML
VIAL (ML) INTRAVENOUS
Status: DISCONTINUED | OUTPATIENT
Start: 2017-01-30 | End: 2017-01-30

## 2017-01-30 RX ORDER — SCOLOPAMINE TRANSDERMAL SYSTEM 1 MG/1
PATCH, EXTENDED RELEASE TRANSDERMAL
Status: COMPLETED
Start: 2017-01-30 | End: 2017-01-30

## 2017-01-30 RX ORDER — LORAZEPAM 2 MG/ML
0.25 INJECTION INTRAMUSCULAR
Status: DISCONTINUED | OUTPATIENT
Start: 2017-01-30 | End: 2017-01-30 | Stop reason: HOSPADM

## 2017-01-30 RX ORDER — HYDROCODONE BITARTRATE AND ACETAMINOPHEN 7.5; 325 MG/15ML; MG/15ML
15 SOLUTION ORAL EVERY 4 HOURS PRN
Status: DISCONTINUED | OUTPATIENT
Start: 2017-01-30 | End: 2017-01-31

## 2017-01-30 RX ORDER — MIDAZOLAM HYDROCHLORIDE 1 MG/ML
INJECTION, SOLUTION INTRAMUSCULAR; INTRAVENOUS
Status: DISCONTINUED | OUTPATIENT
Start: 2017-01-30 | End: 2017-01-30

## 2017-01-30 RX ORDER — FENTANYL CITRATE 50 UG/ML
INJECTION, SOLUTION INTRAMUSCULAR; INTRAVENOUS
Status: DISCONTINUED | OUTPATIENT
Start: 2017-01-30 | End: 2017-01-30

## 2017-01-30 RX ORDER — GLYCOPYRROLATE 0.2 MG/ML
INJECTION INTRAMUSCULAR; INTRAVENOUS
Status: DISCONTINUED | OUTPATIENT
Start: 2017-01-30 | End: 2017-01-30

## 2017-01-30 RX ORDER — KETAMINE HYDROCHLORIDE 100 MG/ML
INJECTION, SOLUTION INTRAMUSCULAR; INTRAVENOUS
Status: DISCONTINUED | OUTPATIENT
Start: 2017-01-30 | End: 2017-01-30

## 2017-01-30 RX ORDER — DIAZEPAM 10 MG/2ML
2 INJECTION INTRAMUSCULAR EVERY 4 HOURS PRN
Status: DISCONTINUED | OUTPATIENT
Start: 2017-01-30 | End: 2017-02-01 | Stop reason: HOSPADM

## 2017-01-30 RX ORDER — ACETAMINOPHEN 10 MG/ML
INJECTION, SOLUTION INTRAVENOUS
Status: DISCONTINUED | OUTPATIENT
Start: 2017-01-30 | End: 2017-01-30

## 2017-01-30 RX ORDER — SODIUM CHLORIDE, SODIUM LACTATE, POTASSIUM CHLORIDE, CALCIUM CHLORIDE 600; 310; 30; 20 MG/100ML; MG/100ML; MG/100ML; MG/100ML
INJECTION, SOLUTION INTRAVENOUS CONTINUOUS
Status: DISCONTINUED | OUTPATIENT
Start: 2017-01-30 | End: 2017-01-30

## 2017-01-30 RX ORDER — ROCURONIUM BROMIDE 10 MG/ML
INJECTION, SOLUTION INTRAVENOUS
Status: DISCONTINUED | OUTPATIENT
Start: 2017-01-30 | End: 2017-01-30

## 2017-01-30 RX ORDER — SODIUM CHLORIDE, SODIUM LACTATE, POTASSIUM CHLORIDE, CALCIUM CHLORIDE 600; 310; 30; 20 MG/100ML; MG/100ML; MG/100ML; MG/100ML
INJECTION, SOLUTION INTRAVENOUS CONTINUOUS
Status: DISCONTINUED | OUTPATIENT
Start: 2017-01-30 | End: 2017-01-31

## 2017-01-30 RX ORDER — HYDROMORPHONE HCL IN 0.9% NACL 6 MG/30 ML
PATIENT CONTROLLED ANALGESIA SYRINGE INTRAVENOUS CONTINUOUS
Status: DISCONTINUED | OUTPATIENT
Start: 2017-01-30 | End: 2017-01-31

## 2017-01-30 RX ORDER — CEFAZOLIN SODIUM 2 G/50ML
2 SOLUTION INTRAVENOUS
Status: COMPLETED | OUTPATIENT
Start: 2017-01-30 | End: 2017-01-31

## 2017-01-30 RX ORDER — SUCCINYLCHOLINE CHLORIDE 20 MG/ML
INJECTION INTRAMUSCULAR; INTRAVENOUS
Status: DISCONTINUED | OUTPATIENT
Start: 2017-01-30 | End: 2017-01-30

## 2017-01-30 RX ORDER — MEPERIDINE HYDROCHLORIDE 25 MG/ML
12.5 INJECTION INTRAMUSCULAR; INTRAVENOUS; SUBCUTANEOUS ONCE AS NEEDED
Status: DISCONTINUED | OUTPATIENT
Start: 2017-01-30 | End: 2017-01-30 | Stop reason: HOSPADM

## 2017-01-30 RX ORDER — LIDOCAINE HYDROCHLORIDE 10 MG/ML
1 INJECTION, SOLUTION EPIDURAL; INFILTRATION; INTRACAUDAL; PERINEURAL ONCE
Status: COMPLETED | OUTPATIENT
Start: 2017-01-30 | End: 2017-01-30

## 2017-01-30 RX ORDER — NALOXONE HCL 0.4 MG/ML
0.02 VIAL (ML) INJECTION
Status: DISCONTINUED | OUTPATIENT
Start: 2017-01-30 | End: 2017-01-31

## 2017-01-30 RX ORDER — HEPARIN SODIUM 5000 [USP'U]/ML
5000 INJECTION, SOLUTION INTRAVENOUS; SUBCUTANEOUS
Status: COMPLETED | OUTPATIENT
Start: 2017-01-30 | End: 2017-01-30

## 2017-01-30 RX ORDER — DEXAMETHASONE SODIUM PHOSPHATE 4 MG/ML
INJECTION, SOLUTION INTRA-ARTICULAR; INTRALESIONAL; INTRAMUSCULAR; INTRAVENOUS; SOFT TISSUE
Status: DISCONTINUED | OUTPATIENT
Start: 2017-01-30 | End: 2017-01-30

## 2017-01-30 RX ORDER — FAMOTIDINE 10 MG/ML
20 INJECTION INTRAVENOUS EVERY 12 HOURS
Status: DISCONTINUED | OUTPATIENT
Start: 2017-01-30 | End: 2017-02-01 | Stop reason: HOSPADM

## 2017-01-30 RX ORDER — NEOSTIGMINE METHYLSULFATE 1 MG/ML
INJECTION, SOLUTION INTRAVENOUS
Status: DISCONTINUED | OUTPATIENT
Start: 2017-01-30 | End: 2017-01-30

## 2017-01-30 RX ORDER — ONDANSETRON 2 MG/ML
4 INJECTION INTRAMUSCULAR; INTRAVENOUS DAILY PRN
Status: DISCONTINUED | OUTPATIENT
Start: 2017-01-30 | End: 2017-01-30 | Stop reason: HOSPADM

## 2017-01-30 RX ADMIN — VENLAFAXINE 75 MG: 37.5 TABLET ORAL at 08:01

## 2017-01-30 RX ADMIN — ONDANSETRON 4 MG: 2 INJECTION INTRAMUSCULAR; INTRAVENOUS at 05:01

## 2017-01-30 RX ADMIN — SUCCINYLCHOLINE CHLORIDE 120 MG: 20 INJECTION, SOLUTION INTRAMUSCULAR; INTRAVENOUS at 07:01

## 2017-01-30 RX ADMIN — GLYCOPYRROLATE 0.4 MG: 0.2 INJECTION, SOLUTION INTRAMUSCULAR; INTRAVENOUS at 08:01

## 2017-01-30 RX ADMIN — SODIUM CHLORIDE, SODIUM LACTATE, POTASSIUM CHLORIDE, AND CALCIUM CHLORIDE: .6; .31; .03; .02 INJECTION, SOLUTION INTRAVENOUS at 07:01

## 2017-01-30 RX ADMIN — HEPARIN SODIUM 5000 UNITS: 5000 INJECTION, SOLUTION INTRAVENOUS; SUBCUTANEOUS at 06:01

## 2017-01-30 RX ADMIN — EPHEDRINE SULFATE 10 MG: 50 INJECTION, SOLUTION INTRAMUSCULAR; INTRAVENOUS; SUBCUTANEOUS at 08:01

## 2017-01-30 RX ADMIN — DIAZEPAM 2 MG: 5 INJECTION, SOLUTION INTRAMUSCULAR; INTRAVENOUS at 10:01

## 2017-01-30 RX ADMIN — FENTANYL CITRATE 50 MCG: 50 INJECTION INTRAMUSCULAR; INTRAVENOUS at 08:01

## 2017-01-30 RX ADMIN — ROCURONIUM BROMIDE 5 MG: 10 INJECTION, SOLUTION INTRAVENOUS at 07:01

## 2017-01-30 RX ADMIN — ONDANSETRON 4 MG: 2 INJECTION, SOLUTION INTRAMUSCULAR; INTRAVENOUS at 07:01

## 2017-01-30 RX ADMIN — DEXAMETHASONE SODIUM PHOSPHATE 8 MG: 4 INJECTION, SOLUTION INTRAMUSCULAR; INTRAVENOUS at 07:01

## 2017-01-30 RX ADMIN — LIDOCAINE HYDROCHLORIDE 10 MG: 10 INJECTION, SOLUTION EPIDURAL; INFILTRATION; INTRACAUDAL; PERINEURAL at 06:01

## 2017-01-30 RX ADMIN — BUPIVACAINE 20 ML: 13.3 INJECTION, SUSPENSION, LIPOSOMAL INFILTRATION at 07:01

## 2017-01-30 RX ADMIN — SCOLOPAMINE TRANSDERMAL SYSTEM 1.5 MG: 1 PATCH, EXTENDED RELEASE TRANSDERMAL at 06:01

## 2017-01-30 RX ADMIN — FAMOTIDINE 20 MG: 10 INJECTION, SOLUTION INTRAVENOUS at 08:01

## 2017-01-30 RX ADMIN — FAMOTIDINE 20 MG: 10 INJECTION INTRAVENOUS at 06:01

## 2017-01-30 RX ADMIN — Medication: at 09:01

## 2017-01-30 RX ADMIN — NEOSTIGMINE METHYLSULFATE 3 MG: 1 INJECTION INTRAVENOUS at 08:01

## 2017-01-30 RX ADMIN — KETAMINE HYDROCHLORIDE 25 MG: 100 INJECTION, SOLUTION, CONCENTRATE INTRAMUSCULAR; INTRAVENOUS at 07:01

## 2017-01-30 RX ADMIN — LIDOCAINE HYDROCHLORIDE 100 MG: 20 INJECTION, SOLUTION INTRAVENOUS at 07:01

## 2017-01-30 RX ADMIN — EPHEDRINE SULFATE 10 MG: 50 INJECTION, SOLUTION INTRAMUSCULAR; INTRAVENOUS; SUBCUTANEOUS at 07:01

## 2017-01-30 RX ADMIN — PROPOFOL 150 MG: 10 INJECTION, EMULSION INTRAVENOUS at 07:01

## 2017-01-30 RX ADMIN — ACETAMINOPHEN 1000 MG: 10 INJECTION, SOLUTION INTRAVENOUS at 08:01

## 2017-01-30 RX ADMIN — SODIUM CHLORIDE, SODIUM LACTATE, POTASSIUM CHLORIDE, AND CALCIUM CHLORIDE: .6; .31; .03; .02 INJECTION, SOLUTION INTRAVENOUS at 09:01

## 2017-01-30 RX ADMIN — CEFAZOLIN SODIUM 2 G: 2 SOLUTION INTRAVENOUS at 08:01

## 2017-01-30 RX ADMIN — ROCURONIUM BROMIDE 45 MG: 10 INJECTION, SOLUTION INTRAVENOUS at 07:01

## 2017-01-30 RX ADMIN — CEFAZOLIN SODIUM 2 G: 2 SOLUTION INTRAVENOUS at 05:01

## 2017-01-30 RX ADMIN — CEFAZOLIN SODIUM 2 G: 2 SOLUTION INTRAVENOUS at 07:01

## 2017-01-30 RX ADMIN — MIDAZOLAM 2 MG: 1 INJECTION INTRAMUSCULAR; INTRAVENOUS at 07:01

## 2017-01-30 RX ADMIN — FENTANYL CITRATE 50 MCG: 50 INJECTION INTRAMUSCULAR; INTRAVENOUS at 07:01

## 2017-01-30 RX ADMIN — SCOPALAMINE 1.5 MG: 1 PATCH, EXTENDED RELEASE TRANSDERMAL at 06:01

## 2017-01-30 RX ADMIN — SODIUM CHLORIDE, SODIUM LACTATE, POTASSIUM CHLORIDE, AND CALCIUM CHLORIDE: .6; .31; .03; .02 INJECTION, SOLUTION INTRAVENOUS at 05:01

## 2017-01-30 NOTE — ANESTHESIA PREPROCEDURE EVALUATION
01/30/2017  Marce Hickey is a 66 y.o., female.    [facility] Anesthesia Evaluation    I have reviewed the Patient Summary Reports.    I have reviewed the Nursing Notes.      Review of Systems  Anesthesia Hx:  No problems with previous Anesthesia    Cardiovascular:   Hypertension, well controlled    Hepatic/GI:   GERD, well controlled    Endocrine:   Diabetes, well controlled, type 2        Physical Exam  General:  Obesity                 Anesthesia Plan  Type of Anesthesia, risks & benefits discussed:  Anesthesia Type:  general  Patient's Preference:   Intra-op Monitoring Plan:   Intra-op Monitoring Plan Comments:   Post Op Pain Control Plan:   Post Op Pain Control Plan Comments:   Induction:   IV  Beta Blocker:  Patient is not currently on a Beta-Blocker (No further documentation required).       Informed Consent: Patient understands risks and agrees with Anesthesia plan.  Questions answered. Anesthesia consent signed with patient.  ASA Score: 2     Day of Surgery Review of History & Physical:    H&P update referred to the surgeon.         Ready For Surgery From Anesthesia Perspective.

## 2017-01-30 NOTE — IP AVS SNAPSHOT
[address] [place] [address]  Phone: [phone]           Patient Discharge Instructions     Our goal is to set you up for success. This packet includes information on your condition, medications, and your home care. It will help you to care for yourself so you don't get sicker and need to go back to the hospital.     Please ask your nurse if you have any questions.        There are many details to remember when preparing to leave the hospital. Here is what you will need to do:    1. Take your medicine. If you are prescribed medications, review your Medication List in the following pages. You may have new medications to  at the pharmacy and others that you'll need to stop taking. Review the instructions for how and when to take your medications. Talk with your doctor or nurses if you are unsure of what to do.     2. Go to your follow-up appointments. Specific follow-up information is listed in the following pages. Your may be contacted by a transition nurse or clinical provider about future appointments. Be sure we have all of the phone numbers to reach you, if needed. Please contact your provider's office if you are unable to make an appointment.     3. Watch for warning signs. Your doctor or nurse will give you detailed warning signs to watch for and when to call for assistance. These instructions may also include educational information about your condition. If you experience any of warning signs to your health, call your doctor.               [facility]  Unless otherwise directed by your provider, please contact [facility], our nurse care line that is available for 24/7 assistance.     [phone] (toll-free)    Registered nurses in the [facility] provide clinical advisement, health education, appointment booking, and other advisory services.                    ** Verify the list of medication(s) below is accurate and up to date.  Carry this with you in case of emergency. If your medications have changed, please notify your healthcare provider.             Medication List      START taking these medications        Additional Info                      diazePAM 2 MG tablet   Commonly known as:  VALIUM   Quantity:  20 tablet   Refills:  0   Dose:  2 mg    Instructions:  Take 1 tablet (2 mg total) by mouth every 6 (six) hours as needed for Anxiety (muscle spasm).     Begin Date    AM    Noon    PM    Bedtime       ondansetron 4 MG Tbdl   Commonly known as:  ZOFRAN-ODT   Quantity:  30 tablet   Refills:  0   Dose:  4 mg    Instructions:  Take 1 tablet (4 mg total) by mouth every 6 (six) hours as needed.     Begin Date    AM    Noon    PM    Bedtime       oxycodone-acetaminophen 5-325 mg per tablet   Commonly known as:  PERCOCET   Quantity:  40 tablet   Refills:  0   Dose:  1 tablet    Instructions:  Take 1 tablet by mouth every 4 (four) hours as needed for Pain.     Begin Date    AM    Noon    PM    Bedtime       ursodiol 500 MG tablet   Commonly known as:  ACTIGALL   Quantity:  30 tablet   Refills:  3   Dose:  500 mg    Instructions:  Take 1 tablet (500 mg total) by mouth once daily.     Begin Date    AM    Noon    PM    Bedtime         CONTINUE taking these medications        Additional Info                      multivitamin per tablet   Commonly known as:  THERAGRAN   Refills:  0   Dose:  1 tablet    Instructions:  Take 1 tablet by mouth once daily.     Begin Date    AM    Noon    PM    Bedtime       omeprazole-sodium bicarbonate 40-1.1 mg-gram per capsule   Commonly known as:  ZEGERID   Quantity:  90 capsule   Refills:  3   Dose:  1 capsule    Instructions:  Take 1 capsule by mouth before breakfast.     Begin Date    AM    Noon    PM    Bedtime       venlafaxine 75 MG tablet   Commonly known as:  EFFEXOR   Quantity:  180 tablet   Refills:  3   Dose:  75 mg    Last time this was given:  75 mg on 2/1/2017  8:07 AM   Instructions:  Take 1 tablet  (75 mg total) by mouth 2 (two) times daily.     Begin Date    AM    Noon    PM    Bedtime       vitamin D 1000 units Tab   Refills:  0   Dose:  185 mg    Instructions:  Take 185 mg by mouth once daily.     Begin Date    AM    Noon    PM    Bedtime            Where to Get Your Medications      You can get these medications from any pharmacy     Bring a paper prescription for each of these medications     diazePAM 2 MG tablet    ondansetron 4 MG Tbdl    oxycodone-acetaminophen 5-325 mg per tablet    ursodiol 500 MG tablet                  Please bring to all follow up appointments:    1. A copy of your discharge instructions.  2. All medicines you are currently taking in their original bottles.  3. Identification and insurance card.    Please arrive 15 minutes ahead of scheduled appointment time.    Please call 24 hours in advance if you must reschedule your appointment and/or time.        Your Scheduled Appointments     Feb 10, 2017  8:40 AM CST   [facility]    [address] [facility] [address]   [phone]            Mar 28, 2017 10:00 AM CDT   [facility]    [address]  [facility] [address]   [phone]              Follow-up Information     Follow up with Nelson Panchal MD. Call in 2 weeks.    Specialties:  General Surgery, Bariatrics, Surgery    Why:  As needed, If symptoms worsen, For wound re-check    Contact information:    [address] [place] [address] [phone]          Discharge Instructions     Future Orders    Activity as tolerated     Call MD for:  difficulty breathing, headache or visual disturbances     Call MD for:  persistent nausea and vomiting     Call MD for:  redness, tenderness, or signs of infection (pain, swelling, redness, odor or green/yellow discharge around incision site)     Call MD for:  severe  "uncontrolled pain     Call MD for:  temperature >100.4     Diet general     Questions:    Total calories:      Fat restriction, if any:      Protein restriction, if any:      Na restriction, if any:      Fluid restriction:      Additional restrictions:      No dressing needed         Discharge Instructions       Remove band aids tonight  Keep white strips until they fall off  Shower over incisions daily with soap and water  Do not bath or get into a pool  Stage 1a of diet packet starts today  Use Incentive Spirometer at Home        Primary Diagnosis     Your primary diagnosis was:  Severe Obesity      Admission Information     Date & Time Provider Department CSN    1/30/2017  6:00 AM Nelson Panchal MD [facility] [id number]      Care Providers     Provider Role Specialty Primary office phone    Nelson Panchal MD Attending Provider General Surgery [phone]    Nelson Panchal MD Surgeon  General Surgery [phone]      Your Vitals Were     BP Pulse Temp Resp Height Weight    143/88 (BP Location: Left arm, Patient Position: Lying, BP Method: Automatic) 88 99.3 °F (37.4 °C) (Oral) 18 5' 4" (1.626 m) 92.1 kg (203 lb)    SpO2 BMI             94% 34.84 kg/m2         Recent Lab Values        5/4/2015 7/27/2015 9/28/2015 3/31/2016 9/27/2016 12/9/2016 1/31/2017         9:12 AM  3:20 PM  4:45 PM  8:34 AM  9:23 AM  9:22 AM  4:36 AM     A1C 6.0 6.0 5.7 5.9 6.2 7.0 (H) 5.7     Comment for A1C at  9:23 AM on 9/27/2016:  According to ADA guidelines, hemoglobin A1C <7.0% represents  optimal control in non-pregnant diabetic patients.  Different  metrics may apply to specific populations.   Standards of Medical Care in Diabetes - 2016.  For the purpose of screening for the presence of diabetes:  <5.7%     Consistent with the absence of diabetes  5.7-6.4%  Consistent with increasing risk for diabetes   (prediabetes)  >or=6.5%  Consistent with diabetes  Currently no consensus exists for use of " hemoglobin A1C  for diagnosis of diabetes for children.      Comment for A1C at  9:22 AM on 12/9/2016:  According to ADA guidelines, hemoglobin A1C <7.0% represents  optimal control in non-pregnant diabetic patients.  Different  metrics may apply to specific populations.   Standards of Medical Care in Diabetes - 2016.  For the purpose of screening for the presence of diabetes:  <5.7%     Consistent with the absence of diabetes  5.7-6.4%  Consistent with increasing risk for diabetes   (prediabetes)  >or=6.5%  Consistent with diabetes  Currently no consensus exists for use of hemoglobin A1C  for diagnosis of diabetes for children.      Comment for A1C at  4:36 AM on 1/31/2017:  According to ADA guidelines, hemoglobin A1C <7.0% represents  optimal control in non-pregnant diabetic patients.  Different  metrics may apply to specific populations.   Standards of Medical Care in Diabetes - 2016.  For the purpose of screening for the presence of diabetes:  <5.7%     Consistent with the absence of diabetes  5.7-6.4%  Consistent with increasing risk for diabetes   (prediabetes)  >or=6.5%  Consistent with diabetes  Currently no consensus exists for use of hemoglobin A1C  for diagnosis of diabetes for children.        Pending Labs     Order Current Status    Specimen to Pathology - Surgery In process      Allergies as of 2/1/2017        Reactions    Nsaids (Non-steroidal Anti-inflammatory Drug) Anaphylaxis      Advance Directives     An advance directive is a document which, in the event you are no longer able to make decisions for yourself, tells your healthcare team what kind of treatment you do or do not want to receive, or who you would like to make those decisions for you.  If you do not currently have an advance directive, [facility] encourages you to create one.  For more information call:  [phone]), [phone] ([phone]),  or log on to [email or web address].        Smoking Cessation     If you  would like to quit smoking:   You may be eligible for free services if you are a [place] resident and started smoking cigarettes before September 1, 1988.  Call the Smoking Cessation Trust (SCT) toll free at (664) 774-3953 or (295) 580-8972.   Call 1-800-QUIT-NOW if you do not meet the above criteria.            Language Assistance Services     ATTENTION: Language assistance services are available, free of charge. Please call [phone].      ATENCIÓN: Si habla español, tiene a piper disposición servicios gratuitos de asistencia lingüística. Llame al [phone].     CHÚ Ý: N?u b?n nói Ti?ng Vi?t, có các d?ch v? h? tr? ngôn ng? mi?n phí dành cho b?n. G?i s? [phone].        Diabetes Discharge Instructions                                    [facility] complies with applicable Federal civil rights laws and does not discriminate on the basis of race, color, national origin, age, disability, or sex.

## 2017-01-30 NOTE — ANESTHESIA POSTPROCEDURE EVALUATION
"Anesthesia Post Evaluation    Patient: Marce Hickey    Procedure(s) Performed: Procedure(s) (LRB):  GASTRECTOMY-SLEEVE-LAPAROSCOPIC (N/A)    Final Anesthesia Type: general  Patient location during evaluation: PACU  Patient participation: Yes- Able to Participate  Level of consciousness: awake and alert  Post-procedure vital signs: reviewed and stable  Pain management: adequate  Airway patency: patent  PONV status at discharge: No PONV  Anesthetic complications: no      Cardiovascular status: blood pressure returned to baseline and hemodynamically stable  Respiratory status: unassisted  Hydration status: euvolemic  Follow-up not needed.        Visit Vitals    BP (!) 144/70    Pulse 64    Temp 36.3 °C (97.3 °F) (Axillary)    Resp 19    Ht 5' 4" (1.626 m)    Wt 92.1 kg (203 lb)    SpO2 99%    Breastfeeding No    BMI 34.84 kg/m2       Pain/Shaina Score: Pain Assessment Performed: Yes (1/30/2017  8:58 AM)  Presence of Pain: non-verbal indicators absent (pt sedated, vss, no indicators of pain noted) (1/30/2017  8:58 AM)  Shaina Score: 4 (1/30/2017  8:58 AM)      "

## 2017-01-30 NOTE — OP NOTE
Laparoscopic sleeve gastrectomy Procedure Note    Date of procedure:   01/30/2017    Indications: Morbid obesity unresponsive to medical treatment.    Pre-operative Diagnosis:   1. Morbid obesity due to excess calories    2. Obesity, Class II, BMI 35-39.9, with comorbidity    3. Controlled type 2 diabetes mellitus without complication, without long-term current use of insulin    4. Hypertension associated with diabetes    5. Back pain, unspecified back location, unspecified back pain laterality, unspecified chronicity     Post-operative Diagnosis: Same    Surgeon: Nelson Panchal MD    Assistants: Felix Hernandes MD, General Surgery    No qualified resident was available to assist in this case    Anesthesia: General endotracheal anesthesia    ASA Class: 3    Procedure Details   The patient was seen in the Holding Room. The risks, benefits, complications, treatment options, and expected outcomes were discussed with the patient. The possibilities of reaction to medication, pulmonary aspiration, perforation of viscus, bleeding, recurrent infection, the need for additional procedures, failure to diagnose a condition, and creating a complication requiring transfusion or operation were discussed with the patient. The patient concurred with the proposed plan, giving informed consent. The site of surgery properly noted/marked. The patient was taken to Operating Room 6 identified as Marce Hickey and the procedure verified as Sleeve Gastrectomy. A Time Out was held and the above information confirmed.    Full general anesthesia was induced with orotracheal intubation. The patient was prepped and draped in a supine position. Appropriate antibiotics were given intravenously.    The optiview was used to enter into the abdominal cavity under direct vision in the right upper quadrant. The abdomen was insufflated.    There were no untoward findings on diagnostic laparoscopy. Trocars were placed under direct vision in the  following fashion: a 5mm trocar in the lateral right and left upper quadrant, a 15mm trocar in the left upper quadrant, a 12 mm trocar in the flakito umbilical area. The Fabiana liver retractor was then placed through a high epigastric incision site and positioned to hold the liver.    An EGD did not show any retained food in the stomach.    The procedure was started by identifying an area approx. 5 cm proximal to the pylorus. The Harmonic was then used to take down the short gastrics up to the left maurice which was identified and cleared.    The sleeve was started by using a green load without reinforcement to reduce the size of the antrum. The sleeve was then shaped using green loads with flakito strips up the the left maurice using the endoscope as sizing device.  The distal sleeve was stapled slightly further away from the scope than the proximal sleeve. Bleeding was controlled with clips.     A provacative leak test, looking for air bubbles while the sleeve is insufflated and clamped, was preformed and did not reveal any leaks, then evicell was placed on the staple line. The EGD done during the leak test revealed an appropriately sized sleeve without any narrowings or kinking.     Hemostasis was achieved.    The specimen was removed out of the left upper quadrant port and the site was closed with zero vicryls.     Exparel was injected at each port site.    The wounds were heavily irrigated. The skin was closed with 4-0 suture. Sterile dressings were applied.    Instrument, sponge, and needle counts were correct prior to wound closure and at the conclusion of the case.     Findings:  Normal anatomy    Estimated Blood Loss: 10 cc    Drains: none    Total IV Fluids: see anesthesia    Specimens: gastrectomy    Implants: none    Complications:  None; patient tolerated the procedure well.    Disposition: PACU - hemodynamically stable.    Condition: stable    Attending Attestation: I was present and scrubbed for the entire  procedure.

## 2017-01-30 NOTE — INTERVAL H&P NOTE
The patient has been examined and the H&P has been reviewed:    I concur with the findings and no changes have occurred since H&P was written.    Anesthesia/Surgery risks, benefits and alternative options discussed and understood by patient/family.          Active Hospital Problems    Diagnosis  POA    Morbid obesity due to excess calories [E66.01]  Yes      Resolved Hospital Problems    Diagnosis Date Resolved POA   No resolved problems to display.

## 2017-01-30 NOTE — TRANSFER OF CARE
"Anesthesia Transfer of Care Note    Patient: Marce Hickey    Procedure(s) Performed: Procedure(s) (LRB):  GASTRECTOMY-SLEEVE-LAPAROSCOPIC (N/A)    Patient location: PACU    Anesthesia Type: general    Transport from OR: Transported from OR on 2-3 L/min O2 by NC with adequate spontaneous ventilation    Post pain: adequate analgesia    Post assessment: no apparent anesthetic complications and tolerated procedure well    Post vital signs: stable    Level of consciousness: sedated    Nausea/Vomiting: no nausea/vomiting    Complications: none          Last vitals:   Visit Vitals    BP (!) 168/72    Pulse 87    Temp 36.9 °C (98.4 °F) (Oral)    Resp 20    Ht 5' 4" (1.626 m)    Wt 92.1 kg (203 lb)    SpO2 96%    Breastfeeding No    BMI 34.84 kg/m2     "

## 2017-01-30 NOTE — BRIEF OP NOTE
[facility]  Brief Operative Note    SUMMARY     Surgery Date: 1/30/2017     Surgeon(s) and Role:     * Felix Hernandes MD - Assisting     * Nelson Panchal MD - Primary        Pre-op Diagnosis:  Morbid obesity due to excess calories [E66.01]    Post-op Diagnosis:  Post-Op Diagnosis Codes:     * Morbid obesity due to excess calories [E66.01]    Procedure(s) (LRB):  GASTRECTOMY-SLEEVE-LAPAROSCOPIC (N/A)    Anesthesia: General    Description of Procedure: sleeve gastrectomy    Description of the findings of the procedure: normal anatomy    Estimated Blood Loss: 10 cc    Specimens:   Specimen (12h ago through future)    Start     Ordered    01/30/17 0803  Specimen to Pathology - Surgery  Once     Comments:  Pre-op Diagnosis: Morbid obesity due to excess calories [E66.01]    Post-op Diagnosis: SAME    Procedure(s):  GASTRECTOMY-SLEEVE-LAPAROSCOPIC     Number of specimens: 1    Name of specimens: GASTRIC RESECTION    01/30/17 0803

## 2017-01-30 NOTE — PLAN OF CARE
Pt oriented and easily aroused, vs stable, PCA pump in use and prn valium given for pain, LR infusing per order, SCDs in use and TEDs on, tele monitor [id number] on pt. Ok per Dr. Cadet for pt to be transferred to the floor. Pt transported from PACU to room 220 via bed. Pt's  at the bedside. Report given to JOSEPH Weiss.

## 2017-01-30 NOTE — PLAN OF CARE
Problem: Patient Care Overview  Goal: Plan of Care Review  Outcome: Ongoing (interventions implemented as appropriate)    01/30/17 1477   Coping/Psychosocial   Plan Of Care Reviewed With patient   S/p gastric sleeve.  Pain controlled with use of PCA dilaudid.  Has been ambulatory to bathroom with min assist.  Tolerating ice chips.  No nausea reported.  Vs stable.

## 2017-01-30 NOTE — PROGRESS NOTES
01/30/17 1206   Vitals   Pulse 60   Resp (!) 22   SpO2 97 %   Pulse Oximetry Type Intermittent   All Fields Breath Sounds clear   Positioning HOB elevated 30 degrees   Incentive Spirometer   Predicted Level (mL) (Incentive Spirometer) 2050   $ Administration (Incentive Spirometer) unable to perform  (patient sleeping at this time, will check on patient at a later time)       Patient resting comfortably at this time on 2 liter nasal cannula in place. End tidal CO2 monitor in use at this time.

## 2017-01-31 LAB
ANION GAP SERPL CALC-SCNC: 11 MMOL/L
BASOPHILS # BLD AUTO: 0 K/UL
BASOPHILS NFR BLD: 0.1 %
BUN SERPL-MCNC: 13 MG/DL
CALCIUM SERPL-MCNC: 9.5 MG/DL
CHLORIDE SERPL-SCNC: 106 MMOL/L
CO2 SERPL-SCNC: 23 MMOL/L
CREAT SERPL-MCNC: 0.8 MG/DL
DIFFERENTIAL METHOD: ABNORMAL
EOSINOPHIL # BLD AUTO: 0 K/UL
EOSINOPHIL NFR BLD: 0 %
ERYTHROCYTE [DISTWIDTH] IN BLOOD BY AUTOMATED COUNT: 14.1 %
EST. GFR  (AFRICAN AMERICAN): >60 ML/MIN/1.73 M^2
EST. GFR  (NON AFRICAN AMERICAN): >60 ML/MIN/1.73 M^2
ESTIMATED AVG GLUCOSE: 117 MG/DL
GLUCOSE SERPL-MCNC: 127 MG/DL
HBA1C MFR BLD HPLC: 5.7 %
HCT VFR BLD AUTO: 41.4 %
HGB BLD-MCNC: 13.6 G/DL
LYMPHOCYTES # BLD AUTO: 1.2 K/UL
LYMPHOCYTES NFR BLD: 10.3 %
MAGNESIUM SERPL-MCNC: 1.9 MG/DL
MCH RBC QN AUTO: 29.2 PG
MCHC RBC AUTO-ENTMCNC: 33 %
MCV RBC AUTO: 89 FL
MONOCYTES # BLD AUTO: 1 K/UL
MONOCYTES NFR BLD: 9 %
NEUTROPHILS # BLD AUTO: 9.1 K/UL
NEUTROPHILS NFR BLD: 80.6 %
PHOSPHATE SERPL-MCNC: 2.7 MG/DL
PLATELET # BLD AUTO: 169 K/UL
PMV BLD AUTO: 11.8 FL
POTASSIUM SERPL-SCNC: 4.6 MMOL/L
RBC # BLD AUTO: 4.66 M/UL
SODIUM SERPL-SCNC: 140 MMOL/L
WBC # BLD AUTO: 11.3 K/UL

## 2017-01-31 PROCEDURE — 27000221 HC OXYGEN, UP TO 24 HOURS

## 2017-01-31 PROCEDURE — 85025 COMPLETE CBC W/AUTO DIFF WBC: CPT

## 2017-01-31 PROCEDURE — 84100 ASSAY OF PHOSPHORUS: CPT

## 2017-01-31 PROCEDURE — 83036 HEMOGLOBIN GLYCOSYLATED A1C: CPT

## 2017-01-31 PROCEDURE — 83735 ASSAY OF MAGNESIUM: CPT

## 2017-01-31 PROCEDURE — 94770 HC EXHALED C02 TEST: CPT

## 2017-01-31 PROCEDURE — 63600175 PHARM REV CODE 636 W HCPCS: Performed by: SURGERY

## 2017-01-31 PROCEDURE — 11000001 HC ACUTE MED/SURG PRIVATE ROOM

## 2017-01-31 PROCEDURE — 80048 BASIC METABOLIC PNL TOTAL CA: CPT

## 2017-01-31 PROCEDURE — 36415 COLL VENOUS BLD VENIPUNCTURE: CPT

## 2017-01-31 PROCEDURE — 25000003 PHARM REV CODE 250: Performed by: SURGERY

## 2017-01-31 PROCEDURE — 97161 PT EVAL LOW COMPLEX 20 MIN: CPT

## 2017-01-31 PROCEDURE — 94799 UNLISTED PULMONARY SVC/PX: CPT

## 2017-01-31 PROCEDURE — 94761 N-INVAS EAR/PLS OXIMETRY MLT: CPT

## 2017-01-31 RX ORDER — HYDROMORPHONE HYDROCHLORIDE 2 MG/ML
1 INJECTION, SOLUTION INTRAMUSCULAR; INTRAVENOUS; SUBCUTANEOUS EVERY 6 HOURS PRN
Status: DISCONTINUED | OUTPATIENT
Start: 2017-01-31 | End: 2017-02-01 | Stop reason: HOSPADM

## 2017-01-31 RX ORDER — SODIUM CHLORIDE 450 MG/100ML
INJECTION, SOLUTION INTRAVENOUS CONTINUOUS
Status: DISCONTINUED | OUTPATIENT
Start: 2017-01-31 | End: 2017-02-01 | Stop reason: HOSPADM

## 2017-01-31 RX ORDER — HYDROCODONE BITARTRATE AND ACETAMINOPHEN 7.5; 325 MG/15ML; MG/15ML
15 SOLUTION ORAL EVERY 4 HOURS PRN
Status: DISCONTINUED | OUTPATIENT
Start: 2017-01-31 | End: 2017-02-01 | Stop reason: HOSPADM

## 2017-01-31 RX ADMIN — FAMOTIDINE 20 MG: 10 INJECTION, SOLUTION INTRAVENOUS at 08:01

## 2017-01-31 RX ADMIN — VENLAFAXINE 75 MG: 37.5 TABLET ORAL at 08:01

## 2017-01-31 RX ADMIN — SODIUM CHLORIDE: 0.45 INJECTION, SOLUTION INTRAVENOUS at 11:01

## 2017-01-31 RX ADMIN — CEFAZOLIN SODIUM 2 G: 2 SOLUTION INTRAVENOUS at 03:01

## 2017-01-31 RX ADMIN — ONDANSETRON 4 MG: 2 INJECTION INTRAMUSCULAR; INTRAVENOUS at 12:01

## 2017-01-31 NOTE — PROGRESS NOTES
01/31/17 0821   Vitals   Pulse 63   Resp 16   SpO2 97 %   Pulse Oximetry Type Intermittent   All Fields Breath Sounds clear   Positioning HOB elevated 45 degrees   Incentive Spirometer   Predicted Level (mL) (Incentive Spirometer) 2050   $ Administration (Incentive Spirometer) done with encouragement   Number of Repetitions (Incentive Spirometer) 10   Level (mL) (Incentive Spirometer) 1500   Post-Treatment   Post-treatment Heart Rate (beats/min) 66   Post-treatment Resp Rate (breaths/min) 16   All Fields Breath Sounds aeration increased       ETCO2 monitor still in use. Patient encouraged to do deep breathing exercises.

## 2017-01-31 NOTE — PROGRESS NOTES
01/30/17 1934   Vital Signs   SpO2 98 %   Pulse Oximetry Type Intermittent   Pulse (!) 58   Resp 16   All Fields Breath Sounds clear   Positioning Left side   O2 Device (Oxygen Therapy) nasal cannula   Flow (L/min) 2   Oxygen Concentration (%) 28   Probe Placed On (Pulse Ox) finger   ETCO2 (mmHg) 36 mmHg

## 2017-01-31 NOTE — PROGRESS NOTES
Progress Note  Gen Surg    Admit Date: 1/30/2017  Attending: Ansley  S/P: Procedure(s) (LRB):  GASTRECTOMY-SLEEVE-LAPAROSCOPIC (N/A)    Post-operative Day: 1 Day Post-Op    Hospital Day: 2    SUBJECTIVE:     Doing well, no pain, slight nausea, tolerating liquids     OBJECTIVE:     Vital Signs (Most Recent)  Temp: 98.5 °F (36.9 °C) (01/31/17 0740)  Pulse: 84 (01/31/17 1200)  Resp: 16 (01/31/17 0821)  BP: (!) 152/92 (01/31/17 0740)  SpO2: 97 % (01/31/17 0821)    Vital Signs Range (Last 24H):  Temp:  [97.9 °F (36.6 °C)-98.5 °F (36.9 °C)]   Pulse:  [58-84]   Resp:  [16-18]   BP: (137-163)/()   SpO2:  [97 %-100 %]     I & O (Last 24H):  Intake/Output Summary (Last 24 hours) at 01/31/17 1246  Last data filed at 01/31/17 1000   Gross per 24 hour   Intake           860.42 ml   Output              250 ml   Net           610.42 ml       Scheduled medications:   famotidine (PF)  20 mg Intravenous Q12H    venlafaxine  75 mg Oral BID       Physical Exam:  General: no distress  Lungs:  clear to auscultation bilaterally and normal respiratory effort  Heart: regular rate and rhythm, S1, S2 normal, no murmur, rub or gallop  Abdomen: soft, non-tender non-distented; bowel sounds normal; no masses,  no organomegaly    Wound/Incision:  clean, dry, intact    Laboratory:  Labs within the past 24 hours have been reviewed.    ASSESSMENT/PLAN:     Transition to oral medications  Plan discharge tomorrow  Ambulate  Incentive spirometer  Cl sugar free diet    Nelson Panchal MD

## 2017-01-31 NOTE — PLAN OF CARE
Problem: Patient Care Overview  Goal: Plan of Care Review  Outcome: Ongoing (interventions implemented as appropriate)  Patient remains free of injury/fall. Ambulates well independently. Denies pain and nausea. Incisions x5 on abd remains covered with band-aids. Sites are clean, dry, and intact. VSS. NAD noted.

## 2017-01-31 NOTE — PLAN OF CARE
PCP is Dr Garvey.  Pharmacy is [facility] on [address].       01/31/17 1152   Discharge Assessment   Assessment Type Discharge Planning Assessment   Confirmed/corrected address and phone number on facesheet? Yes   Assessment information obtained from? Patient   Prior to hospitilization cognitive status: Alert/Oriented   Prior to hospitalization functional status: Independent   Current cognitive status: Alert/Oriented   Current Functional Status: Independent   Arrived From home or self-care   Lives With spouse   Able to Return to Prior Arrangements yes   Is patient able to care for self after discharge? Yes   How many people do you have in your home that can help with your care after discharge? 1   Patient's perception of discharge disposition home or selfcare   Readmission Within The Last 30 Days no previous admission in last 30 days   Patient currently being followed by outpatient case management? No   Patient currently receives home health services? No   Does the patient currently use HME? Yes   Patient currently receives private duty nursing? No   Patient currently receives any other outside agency services? No   Equipment Currently Used at Home other (see comments)  (BP monitor)   Do you have any problems affording any of your prescribed medications? No   Is the patient taking medications as prescribed? yes   Do you have any financial concerns preventing you from receiving the healthcare you need? No   Does the patient have transportation to healthcare appointments? Yes   Transportation Available car;family or friend will provide   On Dialysis? No   Does the patient receive services at the Coumadin Clinic? No   Discharge Plan A Home   Patient/Family In Agreement With Plan yes

## 2017-01-31 NOTE — PT/OT/SLP EVAL
Physical Therapy  Evaluation    Marce Hickey   MRN: [id number]   Admitting Diagnosis: <principal problem not specified>    PT Received On: 17  PT Start Time: 0740     PT Stop Time: 0750    PT Total Time (min): 10 min       Billable Minutes:  Evaluation 10    Diagnosis: <principal problem not specified>      Past Medical History   Diagnosis Date    Anxiety     Depression     Diabetes mellitus, type 2      Borderline    Fatty liver disease, nonalcoholic     GERD (gastroesophageal reflux disease)     Hyperlipidemia     Hypertension     Plantar fasciitis of right foot       Past Surgical History   Procedure Laterality Date    Cholecystectomy      Tonsillectomy       section       x 2    Foot surgery       left bunionectomy    Liposuction      Breast reduction      Hysterectomy       one ovary intact, adhesions       Referring physician: Ansley  Date referred to PT: 2017    General Precautions: Standard, fall  Orthopedic Precautions: N/A   Braces:              Patient History:  Lives With: spouse  Equipment Currently Used at Home: none  DME owned (not currently used): none    Previous Level of Function:  Ambulation Skills: independent  Transfer Skills: independent  ADL Skills: independent  Work/Leisure Activity: independent    Subjective:  Communicated with nursePower prior to session.    Chief Complaint: lack of sleep      Pain Ratin/10         Location: abdomen  Pain Addressed: Pre-medicate for activity, Reposition, Distraction  Pain Rating Post-Intervention: 4/10    Objective:   Patient found with: peripheral IV, PCA     Cognitive Exam:  Oriented to: Person, Place, Time and Situation    Follows Commands/attention: Follows one-step commands  Communication: clear/fluent  Safety awareness/insight to disability: intact    Physical Exam:  Postural examination/scapula alignment: Rounded shoulder    Skin integrity: Visible skin intact  Edema: None noted     Sensation:    Intact      Lower Extremity Range of Motion:  Right Lower Extremity: WNL  Left Lower Extremity: WNL    Lower Extremity Strength:  Right Lower Extremity: WNL  Left Lower Extremity: WNL       Gross motor coordination: WFL    Functional Mobility:  Bed Mobility:  Sit to Supine: Independent    Transfers:  Sit <> Stand Assistance: Activity did not occur    Gait:   Gait Distance: 240'  Assistance 1: Stand by Assistance  Gait Assistive Device:  (pt pushed IV pole)  Gait Deviation(s): decreased dominick, decreased velocity of limb motion        Balance:   Static Sit: GOOD: Takes MODERATE challenges from all directions  Dynamic Sit: GOOD: Maintains balance through MODERATE excursions of active trunk movement  Static Stand: FAIR+: Takes MINIMAL challenges from all directions  Dynamic stand: FAIR+: Needs CLOSE SUPERVISION during gait and is able to right self with minor LOB    Patient left HOB elevated with all lines intact, call button in reach and nurse present.    Assessment:   Marce Hickey is a 66 y.o. female with a medical diagnosis of <principal problem not specified> and presents with no PT needs. Pt ambulating on her own when PT entered room. Will discharge PT.    Rehab identified problem list/impairments:      Rehab potential is good.    Activity tolerance: Good    Discharge recommendations: Discharge Facility/Level Of Care Needs: home     Barriers to discharge: Barriers to Discharge: None    Equipment recommendations: Equipment Needed After Discharge: none         PLAN:    Discharge PT  Plan of Care reviewed with: patient          Cici Lizarraga, PT  01/31/2017

## 2017-02-01 VITALS
DIASTOLIC BLOOD PRESSURE: 77 MMHG | OXYGEN SATURATION: 95 % | WEIGHT: 203 LBS | RESPIRATION RATE: 20 BRPM | SYSTOLIC BLOOD PRESSURE: 147 MMHG | BODY MASS INDEX: 34.66 KG/M2 | HEIGHT: 64 IN | HEART RATE: 75 BPM | TEMPERATURE: 98 F

## 2017-02-01 PROCEDURE — 94799 UNLISTED PULMONARY SVC/PX: CPT

## 2017-02-01 PROCEDURE — 25000003 PHARM REV CODE 250: Performed by: SURGERY

## 2017-02-01 PROCEDURE — 94761 N-INVAS EAR/PLS OXIMETRY MLT: CPT

## 2017-02-01 RX ORDER — DIAZEPAM 2 MG/1
2 TABLET ORAL EVERY 6 HOURS PRN
Qty: 20 TABLET | Refills: 0 | Status: SHIPPED | OUTPATIENT
Start: 2017-02-01 | End: 2017-02-10

## 2017-02-01 RX ORDER — ONDANSETRON 4 MG/1
4 TABLET, ORALLY DISINTEGRATING ORAL EVERY 6 HOURS PRN
Qty: 30 TABLET | Refills: 0 | Status: SHIPPED | OUTPATIENT
Start: 2017-02-01 | End: 2017-02-10

## 2017-02-01 RX ORDER — OXYCODONE AND ACETAMINOPHEN 5; 325 MG/1; MG/1
1 TABLET ORAL EVERY 4 HOURS PRN
Qty: 40 TABLET | Refills: 0 | Status: SHIPPED | OUTPATIENT
Start: 2017-02-01 | End: 2017-02-10

## 2017-02-01 RX ORDER — URSODIOL 500 MG/1
500 TABLET, FILM COATED ORAL DAILY
Qty: 30 TABLET | Refills: 3 | Status: SHIPPED | OUTPATIENT
Start: 2017-02-01 | End: 2017-02-10

## 2017-02-01 RX ADMIN — VENLAFAXINE 75 MG: 37.5 TABLET ORAL at 08:02

## 2017-02-01 RX ADMIN — FAMOTIDINE 20 MG: 10 INJECTION, SOLUTION INTRAVENOUS at 08:02

## 2017-02-01 NOTE — DISCHARGE INSTRUCTIONS
Remove band aids tonight  Keep white strips until they fall off  Shower over incisions daily with soap and water  Do not bath or get into a pool  Stage 1a of diet packet starts today  Use Incentive Spirometer at Home

## 2017-02-01 NOTE — PLAN OF CARE
Problem: Patient Care Overview  Goal: Plan of Care Review  Is at bedside, encouraged patient to use Q1 hrs

## 2017-02-01 NOTE — PROGRESS NOTES
01/31/17 1915   Vitals   Pulse 67   Resp 18   SpO2 96 %   Incentive Spirometer   $ Administration (Incentive Spirometer) done with encouragement   Number of Repetitions (Incentive Spirometer) 10   Level (mL) (Incentive Spirometer) 1500

## 2017-02-01 NOTE — DISCHARGE SUMMARY
[facility]  Discharge Summary  General Surgery      Admit Date: 1/30/2017    Discharge Date and Time: 2/1/2017 8:24 AM    Attending Physician: Nelson Panchal MD     Discharge Provider: Nelson Panchal    Reason for Admission: Surgery    Procedures Performed: Procedure(s) (LRB):  GASTRECTOMY-SLEEVE-LAPAROSCOPIC (N/A)    Hospital Course (synopsis of major diagnoses, care, treatment, and services provided during the course of the hospital stay):  Admitted after surgery for pain and nausea control.  Transitioned from IV to oral medications and tolerated well.       Consults: none    Significant Diagnostic Studies: Labs:   BMP:   Recent Labs  Lab 01/31/17  0436   *      K 4.6      CO2 23   BUN 13   CREATININE 0.8   CALCIUM 9.5   MG 1.9       Final Diagnoses:   Principal Problem: Morbid obesity due to excess calories   Secondary Diagnoses:   Active Hospital Problems    Diagnosis  POA    *Morbid obesity due to excess calories [E66.01]  Yes    Dyslipidemia [E78.5]  Yes    Type 2 diabetes mellitus, controlled [E11.9]  Yes    Essential hypertension [I10]  Yes    Fatty liver disease, nonalcoholic [K76.0]  Yes      Resolved Hospital Problems    Diagnosis Date Resolved POA   No resolved problems to display.       Discharged Condition: good    Disposition: Home or Self Care    Follow Up/Patient Instructions:     Medications:  Reconciled Home Medications:   Current Discharge Medication List      START taking these medications    Details   diazePAM (VALIUM) 2 MG tablet Take 1 tablet (2 mg total) by mouth every 6 (six) hours as needed for Anxiety (muscle spasm).  Qty: 20 tablet, Refills: 0      ondansetron (ZOFRAN-ODT) 4 MG TbDL Take 1 tablet (4 mg total) by mouth every 6 (six) hours as needed.  Qty: 30 tablet, Refills: 0      oxycodone-acetaminophen (PERCOCET) 5-325 mg per tablet Take 1 tablet by mouth every 4 (four) hours as needed for Pain.  Qty: 40 tablet, Refills: 0      ursodiol  (ACTIGALL) 500 MG tablet Take 1 tablet (500 mg total) by mouth once daily.  Qty: 30 tablet, Refills: 3         CONTINUE these medications which have NOT CHANGED    Details   multivitamin (THERAGRAN) per tablet Take 1 tablet by mouth once daily.      omeprazole-sodium bicarbonate (ZEGERID) 40-1.1 mg-gram per capsule Take 1 capsule by mouth before breakfast.  Qty: 90 capsule, Refills: 3      venlafaxine (EFFEXOR) 75 MG tablet Take 1 tablet (75 mg total) by mouth 2 (two) times daily.  Qty: 180 tablet, Refills: 3      vitamin D 1000 units Tab Take 185 mg by mouth once daily.             Discharge Procedure Orders  Diet general     Activity as tolerated     Call MD for:  temperature >100.4     Call MD for:  persistent nausea and vomiting     Call MD for:  severe uncontrolled pain     Call MD for:  difficulty breathing, headache or visual disturbances     Call MD for:  redness, tenderness, or signs of infection (pain, swelling, redness, odor or green/yellow discharge around incision site)     No dressing needed       Follow-up Information     Follow up with Nelson Panchal MD. Call in 2 weeks.    Specialties:  General Surgery, Bariatrics, Surgery    Why:  As needed, If symptoms worsen, For wound re-check    Contact information:    [address] [place] [address] [phone]

## 2017-02-01 NOTE — CONSULTS
Nutrition education provided s/p gastric sleeve.  Written materials given.  Pt reports she has the items needed for home care ready.  Provided contact info in case pt has questions.

## 2017-02-01 NOTE — NURSING
Discharge instructions given to patient.  Instructed on med regimen and f/u appoint.  She v/u. Discharged to home.  Escorted to car.  Family in attendance.

## 2017-02-02 ENCOUNTER — TELEPHONE (OUTPATIENT)
Dept: BARIATRICS | Facility: CLINIC | Age: 67
End: 2017-02-02

## 2017-02-02 ENCOUNTER — PATIENT OUTREACH (OUTPATIENT)
Dept: ADMINISTRATIVE | Facility: CLINIC | Age: 67
End: 2017-02-02
Payer: MEDICARE

## 2017-02-02 NOTE — PATIENT INSTRUCTIONS
Bariatric Surgery: Laparoscopic Adjustable Banding  Weight-loss (bariatric) surgery changes the size of your stomach to help you lose weight. The surgery may also keep your body from absorbing nutrients. The goal is to limit how much food can be eaten or absorbed at one time, or both. There are several types of weight-loss surgery. You are having laparoscopic adjustable gastric banding. This surgery closes off part of your stomach to make a very small pouch. This is the most common type of weight-loss surgery that restricts how much food you can eat.  The procedure  The surgeon puts an adjustable band around the top part of your stomach. The band is like a ring. It makes a small pouch in your upper stomach. This pouch holds only a few tablespoons of food. Food passes slowly through a narrow opening at the bottom of the pouch. This lets you feel full longer. The size of the band can be changed by using a port placed under the skin. The port gives your healthcare provider a way to put in a needle. He or she uses the needle to add or remove fluid from the band. This is done to make the size of the opening bigger or smaller. Changing the band changes how quickly food leaves the new pouch.       Special note: the gallbladder  Weight-loss surgery is meant to cause a large amount of weight loss. Weight loss can cause gallstones. These are collections of solid material in the gallbladder. These collections could potentially cause blockage, inflammation, and pain. To prevent this, the surgeon may remove the gallbladder during your surgery if you already have gallstones. Or you may need your gallbladder removed at a later date.  All types of weight-loss surgery have different advantages and disadvantages. Be sure to discuss the risks and complications of this surgery with your healthcare provider.   © 1642-8248 [employer]. [address] [place] [address], [place] [address]. All rights reserved. This information is not  intended as a substitute for professional medical care. Always follow your healthcare professional's instructions.

## 2017-02-03 ENCOUNTER — DOCUMENTATION ONLY (OUTPATIENT)
Dept: FAMILY MEDICINE | Facility: CLINIC | Age: 67
End: 2017-02-03

## 2017-02-03 NOTE — PROGRESS NOTES
Pre-Visit Chart Review  For Appointment Scheduled on 2/10/17.    Health Maintenance Due   Topic Date Due    Foot Exam  05/15/1961    Eye Exam  05/15/1961    Mammogram  05/15/1991    DEXA SCAN  05/15/1991    Urine Microalbumin  05/25/2016

## 2017-02-09 ENCOUNTER — TELEPHONE (OUTPATIENT)
Dept: SURGERY | Facility: CLINIC | Age: 67
End: 2017-02-09

## 2017-02-09 NOTE — TELEPHONE ENCOUNTER
----- Message from John Butler sent at 2/9/2017  8:52 AM CST -----  Contact: Patient  Patient states that she is having some issues. She isn't sure if it is her acid reflux or something else going on. Please call the patient back at [phone] to advise.

## 2017-02-09 NOTE — TELEPHONE ENCOUNTER
Returned pt call, pt stated she had contacted MD about reflux, told to start omeprozole, started yesterday afternoon. Is having some improvement, holding liquids down today. Advised pt that if it doesn't continue to improve or pain/relux increases, call back. Pt acknowledged.

## 2017-02-10 ENCOUNTER — OFFICE VISIT (OUTPATIENT)
Dept: FAMILY MEDICINE | Facility: CLINIC | Age: 67
End: 2017-02-10
Payer: MEDICARE

## 2017-02-10 VITALS
WEIGHT: 198.63 LBS | DIASTOLIC BLOOD PRESSURE: 88 MMHG | TEMPERATURE: 98 F | SYSTOLIC BLOOD PRESSURE: 130 MMHG | HEIGHT: 64 IN | BODY MASS INDEX: 33.91 KG/M2 | HEART RATE: 69 BPM

## 2017-02-10 DIAGNOSIS — Z86.39 HISTORY OF TYPE 2 DIABETES MELLITUS: ICD-10-CM

## 2017-02-10 DIAGNOSIS — Z98.890 STATUS POST GASTRIC SURGERY: Primary | ICD-10-CM

## 2017-02-10 DIAGNOSIS — E66.9 OBESITY, CLASS I, BMI 30-34.9: ICD-10-CM

## 2017-02-10 DIAGNOSIS — Z86.79 HISTORY OF HYPERTENSION: ICD-10-CM

## 2017-02-10 PROBLEM — Z98.84 HISTORY OF GASTRIC BYPASS: Status: ACTIVE | Noted: 2017-02-10

## 2017-02-10 PROBLEM — Z98.84 HISTORY OF GASTRIC BYPASS: Status: RESOLVED | Noted: 2017-02-10 | Resolved: 2017-02-10

## 2017-02-10 PROCEDURE — 99999 PR PBB SHADOW E&M-EST. PATIENT-LVL III: CPT | Mod: PBBFAC,,, | Performed by: FAMILY MEDICINE

## 2017-02-10 PROCEDURE — 99213 OFFICE O/P EST LOW 20 MIN: CPT | Mod: PBBFAC,PO | Performed by: FAMILY MEDICINE

## 2017-02-10 PROCEDURE — 99214 OFFICE O/P EST MOD 30 MIN: CPT | Mod: S$PBB,,, | Performed by: FAMILY MEDICINE

## 2017-02-10 NOTE — MR AVS SNAPSHOT
[facility]  [address]  [place] [address]  Phone: [phone]  Fax: [phone]                  Marce Hickey   2/10/2017 8:40 AM   Office Visit    Description:  Female : 1950   Provider:  Savannah Garvey MD   Department:  [facility]           Reason for Visit     Follow-up           Diagnoses this Visit        Comments    Status post gastric surgery    -  Primary     History of type 2 diabetes mellitus         History of hypertension         Obesity, Class I, BMI 30-34.9                To Do List           Future Appointments        Provider Department Dept Phone    2/15/2017 4:15 PM Nelson Panchal MD [facility] - [facility] [phone]    2017 9:40 AM Savannah Garvey MD [facility] [phone]      Goals (5 Years of Data)     None      Follow-Up and Disposition     Return in about 6 months (around 8/10/2017) for follow up.      [facility] On Call     [facility] On Call Nurse Care Line -  Assistance  Registered nurses in the [facility] On Call Center provide clinical advisement, health education, appointment booking, and other advisory services.  Call for this free service at [phone].             Medications           Message regarding Medications     Verify the changes and/or additions to your medication regime listed below are the same as discussed with your clinician today.  If any of these changes or additions are incorrect, please notify your healthcare provider.        STOP taking these medications     diazePAM (VALIUM) 2 MG tablet Take 1 tablet (2 mg total) by mouth every 6 (six) hours as needed for Anxiety (muscle spasm).    ondansetron (ZOFRAN-ODT) 4 MG TbDL Take 1 tablet (4 mg total) by mouth every 6 (six) hours as needed.    oxycodone-acetaminophen (PERCOCET) 5-325 mg per tablet Take 1 tablet by mouth every 4 (four) hours as needed for Pain.    ursodiol (ACTIGALL) 500 MG tablet Take 1 tablet (500 mg total) by  "mouth once daily.    venlafaxine (EFFEXOR) 75 MG tablet Take 1 tablet (75 mg total) by mouth 2 (two) times daily.           Verify that the below list of medications is an accurate representation of the medications you are currently taking.  If none reported, the list may be blank. If incorrect, please contact your healthcare provider. Carry this list with you in case of emergency.           Current Medications     multivitamin (THERAGRAN) per tablet Take 1 tablet by mouth once daily.    omeprazole-sodium bicarbonate (ZEGERID) 40-1.1 mg-gram per capsule Take 1 capsule by mouth before breakfast.    vitamin D 1000 units Tab Take 185 mg by mouth once daily.           Clinical Reference Information           Your Vitals Were     BP Pulse Temp Height Weight BMI    130/88 (BP Location: Left arm, Patient Position: Sitting, BP Method: Manual) 69 97.8 °F (36.6 °C) (Oral) 5' 4" (1.626 m) 90.1 kg (198 lb 10.2 oz) 34.1 kg/m2      Blood Pressure          Most Recent Value    BP  130/88      Allergies as of 2/10/2017     Nsaids (Non-steroidal Anti-inflammatory Drug)      Immunizations Administered on Date of Encounter - 2/10/2017     None      Language Assistance Services     ATTENTION: Language assistance services are available, free of charge. Please call [phone].      ATENCIÓN: Si ino petty, tiene a piper disposición servicios gratuitos de asistencia lingüística. Llame al [phone].     YEVGENIY Ý: N?u b?n nói Ti?ng Vi?t, có các d?ch v? h? tr? ngôn ng? mi?n phí dành cho b?n. G?i s? [phone].         [facility] complies with applicable Federal civil rights laws and does not discriminate on the basis of race, color, national origin, age, disability, or sex.        "

## 2017-02-10 NOTE — PROGRESS NOTES
CHIEF COMPLAINT: follow up     HISTORY OF PRESENT ILLNESS:  Marce Hickey is a 66 y.o. female who presents to clinic for follow up. She underwent gastric sleeve surgery last week with Dr. Panchal.  She states that she has discontinued all of her medications.  She stats that she tapered the effexor without any withdrawal symptoms.  She states that she does occasionally cry quicker but denies any symptoms of depression or anxiety.  She will be joining [employer] and hired a . She has a follow up appointment with Dr. Panchal next week      REVIEW OF SYSTEMS:  The patient denies any fever, chills, night sweats, headaches, vision changes, difficulty speaking or swallowing, decreased hearing,  chest pain, palpitations, shortness of breath, cough, nausea, vomiting, abdominal pain, dysuria, diarrhea, constipation, hematuria, hematochezia, melena, changes in her hair, skin, nails, numbness or weakness in her extremities, erythema,  swelling over any of her joints, myalgia, swollen glands, easy bruising, fatigue, edema,    MEDICATIONS:   Reviewed and/or reconciled in EPIC    ALLERGIES:  Reviewed and/or reconciled in [facility]    PAST MEDICAL/SURGICAL HISTORY:   Past Medical History   Diagnosis Date    Anxiety     Depression     Diabetes mellitus, type 2      Borderline    Fatty liver disease, nonalcoholic     GERD (gastroesophageal reflux disease)     Hyperlipidemia     Hypertension     Plantar fasciitis of right foot       Past Surgical History   Procedure Laterality Date    Cholecystectomy      Tonsillectomy       section       x 2    Foot surgery       left bunionectomy    Liposuction      Breast reduction      Hysterectomy       one ovary intact, adhesions       FAMILY HISTORY:    Family History   Problem Relation Age of Onset    Hypertension Mother     Heart disease Mother      pacemaker    Heart disease Father     Psoriasis Father     Cancer Neg Hx        SOCIAL HISTORY:     Social History     Social History    Marital status:      Spouse name: N/A    Number of children: N/A    Years of education: N/A     Occupational History    Not on file.     Social History Main Topics    Smoking status: Former Smoker     Quit date: 11/27/1993    Smokeless tobacco: Never Used      Comment: quit 1993    Alcohol use No    Drug use: No    Sexual activity: Yes     Birth control/ protection: Surgical     Other Topics Concern    Not on file     Social History Narrative       PHYSICAL EXAM:  VITAL SIGNS:   There were no vitals filed for this visit.  GENERAL:  Patient appears well nourished, sitting on exam table, in no acute distress.  HEENT:  Atraumatic, normocephalic, PERRLA, EOMI, no conjunctival injection, sclerae are anicteric, normal external auditory canals,TMs clear b/l, gross hearing intact to whisper, MMM, no oropharygneal erythema or exudate.  NECK:  Supple, normal ROM, trachea is midline , no supraclavicular or cervical LAD or masses palpated.    CARDIOVASCULAR:  RRR, normal S1 and S2, no m/r/g.  RESPIRATORY:  CTA b/l, no wheezes, rhonchi, rales.  No increased work of breathing, no  use of accessory muscles.  ABDOMEN:  Soft, nontender, nondistended, normoactive bowel sounds in all four quadrants, no rebound or guarding, no HSM or masses palpated.  Normal percussion.  EXTREMITIES:  2+ DP pulses b/l, no edema.  SKIN:  Warm, no lesions on exposed skin.  NEUROMUSCULAR:  Cranial nerves II-XII grossly intact.   No clubbing or cyanosis of digits/nails.  Steady gait.  PSYCH:  Patient is alert and oriented to person, time, place. They are appropriately dressed and groomed. There is normal eye contact. Rate and tone of speech is normal. Normal insight, judgement. Normal thought content and process. Patient describes her mood as good, affect is depressed, she is tearful throughout the encoutner    ASSESSMENT/PLAN: This is a 66 y.o. female who presents to clinic for evaluation of the  following concerns    1. S/p gastric surgery, obesity: see below  2. History of type 2 DM: resolved,continue to monitor  3. History of HTN: resolved, continue to monitor    Patient readiness: acceptance and barriers:none    During the course of the visit the patient was educated and counseled about the following:     Obesity:   Follow up with Dr. Panchal as scheduled.    Goals: Obesity: Reduce calorie intake and BMI    Did patient meet goals/outcomes: No    The following self management tools provided: declined    Patient Instructions (the written plan) was given to the patient/family.     Time spent with patient: 30 minutes    Savannah Garvey MD

## 2017-02-15 ENCOUNTER — OFFICE VISIT (OUTPATIENT)
Dept: BARIATRICS | Facility: CLINIC | Age: 67
End: 2017-02-15
Payer: MEDICARE

## 2017-02-15 VITALS
HEART RATE: 62 BPM | SYSTOLIC BLOOD PRESSURE: 148 MMHG | RESPIRATION RATE: 16 BRPM | DIASTOLIC BLOOD PRESSURE: 72 MMHG | WEIGHT: 194 LBS | HEIGHT: 64 IN | BODY MASS INDEX: 33.12 KG/M2 | TEMPERATURE: 98 F

## 2017-02-15 DIAGNOSIS — E11.9 TYPE II OR UNSPECIFIED TYPE DIABETES MELLITUS WITHOUT MENTION OF COMPLICATION, NOT STATED AS UNCONTROLLED: ICD-10-CM

## 2017-02-15 DIAGNOSIS — E78.5 DYSLIPIDEMIA: ICD-10-CM

## 2017-02-15 DIAGNOSIS — E66.01 MORBID OBESITY DUE TO EXCESS CALORIES: Primary | ICD-10-CM

## 2017-02-15 DIAGNOSIS — K21.9 GASTROESOPHAGEAL REFLUX DISEASE WITHOUT ESOPHAGITIS: ICD-10-CM

## 2017-02-15 PROCEDURE — 99213 OFFICE O/P EST LOW 20 MIN: CPT | Mod: PBBFAC,PN | Performed by: SURGERY

## 2017-02-15 PROCEDURE — 99024 POSTOP FOLLOW-UP VISIT: CPT | Mod: ,,, | Performed by: SURGERY

## 2017-02-15 PROCEDURE — 99999 PR PBB SHADOW E&M-EST. PATIENT-LVL III: CPT | Mod: PBBFAC,,, | Performed by: SURGERY

## 2017-02-15 RX ORDER — CIPROFLOXACIN 500 MG/1
TABLET ORAL
COMMUNITY
Start: 2017-02-12 | End: 2017-03-15

## 2017-02-15 NOTE — PROGRESS NOTES
Post Op Note    Surgery: gastric sleeve surgery  Date: 1/30/17  Initial weight: 225  Last weight: 215  Current weight: 194    Constipation: none  Reflux: heartburn   Vomiting: none    Diet:    Trying to get in 2, but having a hard time getting them down  Soups: bone broth, chicken and beef; sugar free jello and pudding  egg drop soup    Exercise:    - 12 visits worth- elliptical- weights for legs and upper body    MVI: centrum for women over 50- only 1    Vitals:    02/15/17 1609   BP: (!) 148/72   Pulse: 62   Resp: 16   Temp: 97.5 °F (36.4 °C)       Body comp:  Fat Percent:  44.5 %  Fat Mass:  86.4 lb  FFM:  104.6 lb  TBW: 76.4 lb  TBW %:  39.4 %  BMR: 1499 kcal    PE:  NAD  RRR  Soft/nt/nd  Incisions: well healed    Labs: none    A/P: s/p sleeve     Counseling for patient:    Diet: try to get at least 2 protein shakes per day  Exercise: ok for cardio, no heavy lifting for another month (nothing over 30 months)  Vitamins: 2 mvi daily, b complex, calcium    Co morbidities:     1. Morbid obesity due to excess calories     2. Type II or unspecified type diabetes mellitus without mention of complication, not stated as uncontrolled     3. Gastroesophageal reflux disease without esophagitis     4. Dyslipidemia         : I met with the patient for 15 minutes and counseled her for over 50% of that time

## 2017-02-15 NOTE — MR AVS SNAPSHOT
[facility]  [address]  [address]  [place] [address]  Phone: [phone]  Fax: [phone]                  Marce Hickey   2/15/2017 4:15 PM   Office Visit    Description:  Female : 1950   Provider:  Nelson Panchal MD   Department:  [place] [facility]           Reason for Visit     Obesity           Diagnoses this Visit        Comments    Morbid obesity due to excess calories    -  Primary     Type II or unspecified type diabetes mellitus without mention of complication, not stated as uncontrolled         Gastroesophageal reflux disease without esophagitis         Dyslipidemia                To Do List           Future Appointments        Provider Department Dept Phone    3/15/2017 3:30 PM MD [facility] [phone]    2017 9:40 AM MD [facility] [phone]      Goals (5 Years of Data)     None      Follow-Up and Disposition     Return in about 1 month (around 3/15/2017).    Follow-up and Disposition History      [facility]     [facility] - 24/7 Assistance  Registered nurses in the [facility] Center provide clinical advisement, health education, appointment booking, and other advisory services.  Call for this free service at [phone].             Medications           Message regarding Medications     Verify the changes and/or additions to your medication regime listed below are the same as discussed with your clinician today.  If any of these changes or additions are incorrect, please notify your healthcare provider.             Verify that the below list of medications is an accurate representation of the medications you are currently taking.  If none reported, the list may be blank. If incorrect, please contact your healthcare provider. Carry this list with you in case of emergency.           Current Medications     ciprofloxacin HCl (CIPRO) 500 MG  "tablet     multivitamin (THERAGRAN) per tablet Take 1 tablet by mouth once daily.    omeprazole-sodium bicarbonate (ZEGERID) 40-1.1 mg-gram per capsule Take 1 capsule by mouth before breakfast.    vitamin D 1000 units Tab Take 185 mg by mouth once daily.           Clinical Reference Information           Your Vitals Were     BP Pulse Temp Resp Height Weight    148/72 62 97.5 °F (36.4 °C) (Oral) 16 5' 4" (1.626 m) 88 kg (194 lb)    BMI                33.3 kg/m2          Blood Pressure          Most Recent Value    BP  (!)  148/72      Allergies as of 2/15/2017     Nsaids (Non-steroidal Anti-inflammatory Drug)      Immunizations Administered on Date of Encounter - 2/15/2017     None      Instructions    2 mvi, b complex, and calcium- tums       Language Assistance Services     ATTENTION: Language assistance services are available, free of charge. Please call [phone].      ATENCIÓN: Si habla malinda, tiene a piper disposición servicios gratuitos de asistencia lingüística. Llame al [phone].     YEVGENIY Ý: N?u b?n nói Ti?ng Vi?t, có các d?ch v? h? tr? ngôn ng? mi?n phí dành cho b?n. G?i s? [phone].         [facility] complies with applicable Federal civil rights laws and does not discriminate on the basis of race, color, national origin, age, disability, or sex.        "

## 2017-03-15 ENCOUNTER — OFFICE VISIT (OUTPATIENT)
Dept: BARIATRICS | Facility: CLINIC | Age: 67
End: 2017-03-15
Payer: MEDICARE

## 2017-03-15 VITALS
DIASTOLIC BLOOD PRESSURE: 74 MMHG | RESPIRATION RATE: 16 BRPM | WEIGHT: 182.81 LBS | HEART RATE: 67 BPM | HEIGHT: 64 IN | TEMPERATURE: 98 F | SYSTOLIC BLOOD PRESSURE: 156 MMHG | BODY MASS INDEX: 31.21 KG/M2

## 2017-03-15 DIAGNOSIS — E66.01 MORBID OBESITY DUE TO EXCESS CALORIES: Primary | ICD-10-CM

## 2017-03-15 DIAGNOSIS — E78.5 DYSLIPIDEMIA: ICD-10-CM

## 2017-03-15 DIAGNOSIS — E11.9 TYPE 2 DIABETES MELLITUS WITHOUT COMPLICATION, WITHOUT LONG-TERM CURRENT USE OF INSULIN: ICD-10-CM

## 2017-03-15 DIAGNOSIS — E46 PROTEIN CALORIE MALNUTRITION: ICD-10-CM

## 2017-03-15 PROCEDURE — 99024 POSTOP FOLLOW-UP VISIT: CPT | Mod: ,,, | Performed by: SURGERY

## 2017-03-15 PROCEDURE — 99213 OFFICE O/P EST LOW 20 MIN: CPT | Mod: PBBFAC,PN | Performed by: SURGERY

## 2017-03-15 PROCEDURE — 99999 PR PBB SHADOW E&M-EST. PATIENT-LVL III: CPT | Mod: PBBFAC,,, | Performed by: SURGERY

## 2017-03-15 NOTE — PATIENT INSTRUCTIONS
Constipation- colace or stool softener daily then every 3 -4 days take stimulant      Counseling for patient:     Diet: doing well, keep following low carb lifestyle and using protein shake  Exercise: clear to do any exercising including weight lifting  Vitamins: 2 mvi daily, calcium and b complex    [email or web address]

## 2017-03-15 NOTE — MR AVS SNAPSHOT
[facility]  [address]  [address]  [place] [address]  Phone: [phone]  Fax: [phone]                  Marce Hickey   3/15/2017 3:30 PM   Office Visit    Description:  Female : 1950   Provider:  Nelson Panchal MD   Department:  [facility]           Reason for Visit     Obesity           Diagnoses this Visit        Comments    Morbid obesity due to excess calories    -  Primary     Obesity, Class II, BMI 35-39.9, with comorbidity         Dyslipidemia         Type 2 diabetes mellitus without complication, without long-term current use of insulin         Protein calorie malnutrition                To Do List           Future Appointments        Provider Department Dept Phone    2017 8:40 AM [facility] [phone]    5/3/2017 3:30 PM MD [facility] [phone]    2017 9:40 AM Savannah Garvey MD [facility] [phone]      Goals (5 Years of Data)     None      Follow-Up and Disposition     Return in about 6 weeks (around 2017).    Follow-up and Disposition History      [facility]     [facility] -  Assistance  Registered nurses in the [facility] On Call Center provide clinical advisement, health education, appointment booking, and other advisory services.  Call for this free service at [phone].             Medications           Message regarding Medications     Verify the changes and/or additions to your medication regime listed below are the same as discussed with your clinician today.  If any of these changes or additions are incorrect, please notify your healthcare provider.        STOP taking these medications     ciprofloxacin HCl (CIPRO) 500 MG tablet            Verify that the below list of medications is an accurate representation of the medications you are currently taking.  If none reported, the list may be  "blank. If incorrect, please contact your healthcare provider. Carry this list with you in case of emergency.           Current Medications     multivitamin (THERAGRAN) per tablet Take 1 tablet by mouth once daily.    omeprazole-sodium bicarbonate (ZEGERID) 40-1.1 mg-gram per capsule Take 1 capsule by mouth before breakfast.    vitamin D 1000 units Tab Take 185 mg by mouth once daily.           Clinical Reference Information           Your Vitals Were     BP Pulse Temp Resp Height Weight    156/74 67 97.7 °F (36.5 °C) (Oral) 16 5' 4" (1.626 m) 82.9 kg (182 lb 12.8 oz)    BMI                31.38 kg/m2          Blood Pressure          Most Recent Value    BP  (!)  156/74      Allergies as of 3/15/2017     Nsaids (Non-steroidal Anti-inflammatory Drug)      Immunizations Administered on Date of Encounter - 3/15/2017     None      Orders Placed During Today's Visit     Future Labs/Procedures Expected by Expires    B12  3/15/2017 5/14/2018    B1  3/15/2017 5/14/2018    CBC w/ Auto Differential  3/15/2017 5/14/2018    CMP  3/15/2017 5/14/2018    Iron Studies  3/15/2017 5/14/2018    Lipid Panel  3/15/2017 5/14/2018      Instructions    Constipation- colace or stool softener daily then every 3 -4 days take stimulant      Counseling for patient:     Diet: doing well, keep following low carb lifestyle and using protein shake  Exercise: clear to do any exercising including weight lifting  Vitamins: 2 mvi daily, calcium and b complex    [email or web address]         Language Assistance Services     ATTENTION: Language assistance services are available, free of charge. Please call [phone].      ATENCIÓN: Si habla malinda, tiene a piper disposición servicios gratuitos de asistencia lingüística. Llame al [phone].     CHÚ Ý: N?u b?n nói Ti?ng Vi?t, có các d?ch v? h? tr? ngôn ng? mi?n phí dành cho b?n. G?i s? [phone].         [facility] complies with applicable Federal civil rights laws and " does not discriminate on the basis of race, color, national origin, age, disability, or sex.

## 2017-03-15 NOTE — PROGRESS NOTES
Post Op Note    Surgery: gastric sleeve surgery  Date: 1/30/17  Initial weight: 225  Last weight: 194  Current weight: 182.5    Constipation: takes laxative (dulcolax every 3-4 days)  Reflux: after foods  Vomiting: none    Diet:  Breakfast: protein shake, 2 eggs (boiled or scrambled), cottage cheese with blue berries  Lunch: protein shake, cheese stick (sharp cheddar) with meat wrap  Dinner: boiled shrimp (could only have 4), fish, chicken (albasha)  Snack: cheese  Protein shake: premier    Exercise:  Gym two times per week with ; walking at other times during the weight- doing 5 pound weights at home     MVI: 1 per day centrum for women, tums occasionally, vitamin d    Vitals:    03/15/17 1451   BP: (!) 156/74   Pulse: 67   Resp: 16   Temp: 97.7 °F (36.5 °C)       Body comp:  Fat Percent:  43 %  Fat Mass:  78.6 lb  FFM:  104.2 lb  TBW: 73.4 lb  TBW %:  40.2 %  BMR: 1445 kcal    PE:  NAD  RRR  Soft/nt/nd  Incisions: well healed     Labs: none- next visit    A/P: s/p sleeve    Constipation- colace or stool softener daily then every 3 -4 days take stimulant      Counseling for patient:     Diet: doing well, keep following low carb lifestyle and using protein shake  Exercise: clear to do any exercising including weight lifting  Vitamins: 2 mvi daily, calcium and b complex    Co morbidities:     1. Morbid obesity due to excess calories  CBC w/ Auto Differential    CMP    B12    B1    Lipid Panel    Iron Studies   2. Obesity, Class II, BMI 35-39.9, with comorbidity     3. Dyslipidemia     4. Type 2 diabetes mellitus without complication, without long-term current use of insulin     5. Protein calorie malnutrition  B12    B1    Iron Studies       : I met with the patient for 15 minutes and counseled her for over 50% of that time

## 2017-03-27 ENCOUNTER — TELEPHONE (OUTPATIENT)
Dept: SURGERY | Facility: CLINIC | Age: 67
End: 2017-03-27

## 2017-03-27 NOTE — TELEPHONE ENCOUNTER
----- Message from Zo Covarrubias RT sent at 3/27/2017  9:01 AM CDT -----  Contact: pt    pt , requesting to schedule a biopsy due to her abnormal US and Mammogram, Dr. Delacruz will be sending the referral today, thanks.

## 2017-03-29 ENCOUNTER — OFFICE VISIT (OUTPATIENT)
Dept: SURGERY | Facility: CLINIC | Age: 67
End: 2017-03-29
Payer: MEDICARE

## 2017-03-29 VITALS — WEIGHT: 179 LBS | BODY MASS INDEX: 30.73 KG/M2 | TEMPERATURE: 98 F

## 2017-03-29 DIAGNOSIS — R92.8 ABNORMAL MAMMOGRAM: Primary | ICD-10-CM

## 2017-03-29 PROCEDURE — 99213 OFFICE O/P EST LOW 20 MIN: CPT | Mod: PBBFAC,PO | Performed by: SURGERY

## 2017-03-29 PROCEDURE — 99999 PR PBB SHADOW E&M-EST. PATIENT-LVL III: CPT | Mod: PBBFAC,,, | Performed by: SURGERY

## 2017-03-29 PROCEDURE — 99214 OFFICE O/P EST MOD 30 MIN: CPT | Mod: 24,S$PBB,, | Performed by: SURGERY

## 2017-03-29 NOTE — PROGRESS NOTES
Subjective:       Patient ID: Marce Hickey is a 66 y.o. female.    Chief Complaint: Consult (abnormal  mammogram left)    HPI 66-year-old female presents with a new finding of a left breast mass on mammogram and ultrasound.  There are actually 2 lesions identified one at 12:00 and 1 at 1:00.  Biopsy was recommended by radiology for both lesions.  The patient has no previous history of breast problems.  She did have a bilateral reduction mammoplasty many years ago.  Family history is negative for breast or ovarian cancer.  Her last mammogram was in 2014.  She denies any pain or other symptoms.  There is been no nipple discharge.  There are no overlying skin changes.  Age of menarche was 14.  She had a hysterectomy in her early 30s.  She has never taken hormone replacement.  The upper breast has been sore since the mammogram and diagnostic mammogram.    Review of Systems   Constitutional: Negative for activity change, chills, fever and unexpected weight change.   HENT: Negative for congestion, hearing loss, sore throat and voice change.    Eyes: Negative.    Respiratory: Negative for cough, shortness of breath and wheezing.    Cardiovascular: Negative for chest pain and palpitations.   Gastrointestinal: Negative for abdominal pain, blood in stool, nausea and vomiting.   Genitourinary: Negative for dysuria, frequency and hematuria.   Musculoskeletal: Negative for arthralgias, back pain and neck pain.   Skin: Negative for color change and wound.   Allergic/Immunologic: Negative.    Neurological: Negative for dizziness, weakness and headaches.   Hematological: Negative for adenopathy. Does not bruise/bleed easily.   Psychiatric/Behavioral: Negative for confusion and dysphoric mood. The patient is not nervous/anxious.      Objective:     Physical Exam   Constitutional: She is oriented to person, place, and time. She appears well-developed and well-nourished. No distress.   HENT:   Head: Atraumatic.   Mouth/Throat:  No oropharyngeal exudate.   Eyes: Conjunctivae and EOM are normal. Pupils are equal, round, and reactive to light. No scleral icterus.   Neck: Normal range of motion. Neck supple.   Cardiovascular: Normal rate, regular rhythm, normal heart sounds and intact distal pulses.    No murmur heard.  Pulmonary/Chest: No respiratory distress. She has no wheezes. She has no rales.   Breast exam: There are well healed reduction mammoplasty scars.  There is typical nodularity beneath the scars.  At about 11:00 in the left breast there is a palpable nodule.  This roughly coincided with the site of the lesion on ultrasound.  It is minimally tender.  There is no nipple discharge.  Lesion is firm and movable.  There is no palpable axillary adenopathy.   Abdominal: Soft. Bowel sounds are normal. She exhibits no distension and no mass. There is no tenderness.   Musculoskeletal: Normal range of motion. She exhibits no edema.   Lymphadenopathy:     She has no cervical adenopathy.   Neurological: She is alert and oriented to person, place, and time. No cranial nerve deficit. She exhibits normal muscle tone.   Skin: Skin is warm and dry. No rash noted. No erythema.   Psychiatric: She has a normal mood and affect. Her behavior is normal. Judgment normal.       Mammogram reports and images were and ultrasound report and images from [facility] were reviewed.    Assessment:     Encounter Diagnosis   Name Primary?    Abnormal mammogram Yes       Plan:      1.  Plan ultrasound-guided biopsy of 2 lesions in the left breast.  2.  Patient will bring her films to the study.  3. Risks and benefits of the planned procedure were discussed at length with the patient.  Risks and benefits of not proceeding with the procedure were discussed as well. All questions were answered. The patient expressed clear understanding and would like to proceed with the procedure as discussed.

## 2017-03-30 ENCOUNTER — HOSPITAL ENCOUNTER (OUTPATIENT)
Dept: RADIOLOGY | Facility: HOSPITAL | Age: 67
Discharge: HOME | End: 2017-03-30
Attending: SURGERY
Payer: MEDICARE

## 2017-03-30 DIAGNOSIS — R92.8 ABNORMAL MAMMOGRAM: ICD-10-CM

## 2017-03-30 PROCEDURE — 88342 IMHCHEM/IMCYTCHM 1ST ANTB: CPT | Mod: 26,59,, | Performed by: PATHOLOGY

## 2017-03-30 PROCEDURE — 19083 BX BREAST 1ST LESION US IMAG: CPT | Mod: 79,LT,, | Performed by: SURGERY

## 2017-03-30 PROCEDURE — 88305 TISSUE EXAM BY PATHOLOGIST: CPT | Mod: 26,,, | Performed by: PATHOLOGY

## 2017-03-30 PROCEDURE — 19084 BX BREAST ADD LESION US IMAG: CPT | Mod: LT,,, | Performed by: SURGERY

## 2017-03-30 PROCEDURE — 88360 TUMOR IMMUNOHISTOCHEM/MANUAL: CPT | Mod: 26,,, | Performed by: PATHOLOGY

## 2017-03-30 PROCEDURE — A4648 IMPLANTABLE TISSUE MARKER: HCPCS

## 2017-03-30 PROCEDURE — 88305 TISSUE EXAM BY PATHOLOGIST: CPT | Performed by: PATHOLOGY

## 2017-03-30 RX ORDER — LIDOCAINE HYDROCHLORIDE AND EPINEPHRINE 10; 10 MG/ML; UG/ML
5 INJECTION, SOLUTION INFILTRATION; PERINEURAL ONCE
Status: DISCONTINUED | OUTPATIENT
Start: 2017-03-30 | End: 2017-03-31 | Stop reason: HOSPADM

## 2017-03-30 RX ORDER — LIDOCAINE HYDROCHLORIDE AND EPINEPHRINE 10; 10 MG/ML; UG/ML
INJECTION, SOLUTION INFILTRATION; PERINEURAL
Status: DISPENSED
Start: 2017-03-30 | End: 2017-03-30

## 2017-03-30 NOTE — DISCHARGE SUMMARY
Discharge Summary  General Surgery      Admit Date: 3/30/2017    Discharge Date :3/30/2017    Attending Physician: Damon Luis     Discharge Physician: Damon Luis    Discharged Condition: good    Discharge Diagnosis: Left breast mass X2    Treatments/Procedures: US guided left breast biopsy    Hospital Course: Uneventful; Discharged home.    Significant Diagnostic Studies: none    Disposition: Home or Self Care    Diet: Regular    Follow up: Office 10-14 days    Activity: No heavy lifting till seen in office.    Patient Instructions:   Patient's Medications   New Prescriptions    No medications on file   Previous Medications    MULTIVITAMIN (THERAGRAN) PER TABLET    Take 1 tablet by mouth once daily.    OMEPRAZOLE-SODIUM BICARBONATE (ZEGERID) 40-1.1 MG-GRAM PER CAPSULE    Take 1 capsule by mouth before breakfast.    VITAMIN D 1000 UNITS TAB    Take 185 mg by mouth once daily.   Modified Medications    No medications on file   Discontinued Medications    No medications on file       No discharge procedures on file.

## 2017-04-04 ENCOUNTER — TELEPHONE (OUTPATIENT)
Dept: SURGERY | Facility: CLINIC | Age: 67
End: 2017-04-04

## 2017-04-04 NOTE — TELEPHONE ENCOUNTER
----- Message from Sherrie Jolly sent at 4/4/2017  1:27 PM CDT -----  Contact: self  Would like results from biopsy.  Please call back at .

## 2017-04-05 NOTE — TELEPHONE ENCOUNTER
..Patient notified of breast biopsy results   FINAL PATHOLOGIC DIAGNOSIS  1. LEFT BREAST, 1:00, 6 CM FROM NIPPLE, BIOPSY:  -Invasive ductal carcinoma, combined grade1:  Tubule formation: score 1  Nuclear pleomorphism: score 1.  Mitotic rate: Score 1.  -Longest continuous tumor focus measures 4 mm on this biopsy.  -Immunohistochemical stains performed with adequate controls showed the following results:  Estrogen receptor: Positive, strong staining in more than 95% of tumor cell nuclei.  Progesterone receptor: Positive, strong staining in more than 95% of tumor cell nuclei.  Her2: Negative, score 1+.  -Immunohistochemical stain for P63 supports the diagnosis of invasive carcinoma. Controls stained appropriately.  2. LEFT BREAST, 12:00, 4 CM FROM NIPPLE, BIOPSY:  -Invasive ductal carcinoma, combined grade1:  Tubule formation: score 1  Nuclear pleomorphism: score 2.  Mitotic rate: Score 1.  -Longest continuous tumor focus measures 9 mm on this biopsy.  -Ductal carcinoma in situ, intermediate nuclear grade.  -Immunohistochemical stains performed with adequate controls showed the following results:  Estrogen receptor: Positive, strong staining in more than 95% of tumor cell nuclei.  Progesterone receptor: Positive, strong staining in more than 95% of tumor cell nuclei.  Her2: Negative, score 1+.  [id number],ER,PGR,[id number],ER,PGR  Report    Appointment scheduled on 4/6/2017

## 2017-04-06 ENCOUNTER — OFFICE VISIT (OUTPATIENT)
Dept: SURGERY | Facility: CLINIC | Age: 67
End: 2017-04-06
Payer: MEDICARE

## 2017-04-06 VITALS — TEMPERATURE: 98 F

## 2017-04-06 DIAGNOSIS — C50.112 MALIGNANT NEOPLASM OF CENTRAL PORTION OF LEFT FEMALE BREAST: Primary | ICD-10-CM

## 2017-04-06 PROCEDURE — 99024 POSTOP FOLLOW-UP VISIT: CPT | Mod: ,,, | Performed by: SURGERY

## 2017-04-06 PROCEDURE — 99213 OFFICE O/P EST LOW 20 MIN: CPT | Mod: PBBFAC,PO | Performed by: SURGERY

## 2017-04-06 PROCEDURE — 99999 PR PBB SHADOW E&M-EST. PATIENT-LVL III: CPT | Mod: PBBFAC,,, | Performed by: SURGERY

## 2017-04-09 NOTE — PROGRESS NOTES
Patient ID: Marce Hickey is a 66 y.o. female.     Chief Complaint: Consult (abnormal mammogram left)     HPI 66-year-old female presents with a new finding of a left breast mass on mammogram and ultrasound. There are actually 2 lesions identified one at 12:00 and 1 at 1:00. Biopsy was recommended by radiology for both lesions. The patient has no previous history of breast problems. She did have a bilateral reduction mammoplasty many years ago. Family history is negative for breast or ovarian cancer. Her last mammogram was in 2014. She denies any pain or other symptoms. There is been no nipple discharge. There are no overlying skin changes. Age of menarche was 14. She had a hysterectomy in her early 30s. She has never taken hormone replacement. The upper breast has been sore since the mammogram and diagnostic mammogram.  Biopsy of both lesions indicates invasive ductal carcinoma.  This is ER/OR positive and HER-2 negative.  Patient is 10 well since the biopsy.  She will need a mastectomy due to the multifocal nature of her cancer.     Review of Systems   Constitutional: Negative for activity change, chills, fever and unexpected weight change.   HENT: Negative for congestion, hearing loss, sore throat and voice change.   Eyes: Negative.   Respiratory: Negative for cough, shortness of breath and wheezing.   Cardiovascular: Negative for chest pain and palpitations.   Gastrointestinal: Negative for abdominal pain, blood in stool, nausea and vomiting.   Genitourinary: Negative for dysuria, frequency and hematuria.   Musculoskeletal: Negative for arthralgias, back pain and neck pain.   Skin: Negative for color change and wound.   Allergic/Immunologic: Negative.   Neurological: Negative for dizziness, weakness and headaches.   Hematological: Negative for adenopathy. Does not bruise/bleed easily.   Psychiatric/Behavioral: Negative for confusion and dysphoric mood. The patient is not nervous/anxious.      Objective:       Physical Exam   Constitutional: She is oriented to person, place, and time. She appears well-developed and well-nourished. No distress.   HENT:   Head: Atraumatic.   Mouth/Throat: No oropharyngeal exudate.   Eyes: Conjunctivae and EOM are normal. Pupils are equal, round, and reactive to light. No scleral icterus.   Neck: Normal range of motion. Neck supple.   Cardiovascular: Normal rate, regular rhythm, normal heart sounds and intact distal pulses.   No murmur heard.  Pulmonary/Chest: No respiratory distress. She has no wheezes. She has no rales.   Breast exam: There are well healed reduction mammoplasty scars. There is typical nodularity beneath the scars. At about 11:00 in the left breast there is a palpable nodule. This roughly coincided with the site of the lesion on ultrasound. It is minimally tender. There is no nipple discharge. Lesion is firm and movable. There is no palpable axillary adenopathy.  Biopsy site is healing well without evidence of infection.  Abdominal: Soft. Bowel sounds are normal. She exhibits no distension and no mass. There is no tenderness.   Musculoskeletal: Normal range of motion. She exhibits no edema.   Lymphadenopathy:   She has no cervical adenopathy.   Neurological: She is alert and oriented to person, place, and time. No cranial nerve deficit. She exhibits normal muscle tone.   Skin: Skin is warm and dry. No rash noted. No erythema.   Psychiatric: She has a normal mood and affect. Her behavior is normal. Judgment normal.         Pathology report was reviewed and discussed with the patient and her .    FINAL PATHOLOGIC DIAGNOSIS  1. LEFT BREAST, 1:00, 6 CM FROM NIPPLE, BIOPSY:  -Invasive ductal carcinoma, combined grade1:  Tubule formation: score 1  Nuclear pleomorphism: score 1.  Mitotic rate: Score 1.  -Longest continuous tumor focus measures 4 mm on this biopsy.  -Immunohistochemical stains performed with adequate controls showed the following results:  Estrogen  receptor: Positive, strong staining in more than 95% of tumor cell nuclei.  Progesterone receptor: Positive, strong staining in more than 95% of tumor cell nuclei.  Her2: Negative, score 1+.  -Immunohistochemical stain for P63 supports the diagnosis of invasive carcinoma. Controls stained appropriately.  2. LEFT BREAST, 12:00, 4 CM FROM NIPPLE, BIOPSY:  -Invasive ductal carcinoma, combined grade1:  Tubule formation: score 1  Nuclear pleomorphism: score 2.  Mitotic rate: Score 1.  -Longest continuous tumor focus measures 9 mm on this biopsy.  -Ductal carcinoma in situ, intermediate nuclear grade.  -Immunohistochemical stains performed with adequate controls showed the following results:  Estrogen receptor: Positive, strong staining in more than 95% of tumor cell nuclei.  Progesterone receptor: Positive, strong staining in more than 95% of tumor cell nuclei.  Her2: Negative, score 1+.     Assessment:           Encounter Diagnosis   Name Primary?    Abnormal mammogram Yes    Invasive multifocal left breast cancer.     Plan:       1.  Patient will need a mastectomy of the left breast.  She is interested in reconstruction.  2.  We'll refer to plastic surgery prior to proceeding with mastectomy.  3. Risks and benefits of the planned procedure were discussed at length with the patient.  Risks and benefits of not proceeding with the procedure were discussed as well. All questions were answered. The patient expressed clear understanding and would like to proceed with the procedure as discussed.

## 2017-04-19 ENCOUNTER — OFFICE VISIT (OUTPATIENT)
Dept: PLASTIC SURGERY | Facility: CLINIC | Age: 67
End: 2017-04-19
Payer: MEDICARE

## 2017-04-19 ENCOUNTER — TELEPHONE (OUTPATIENT)
Dept: SURGERY | Facility: CLINIC | Age: 67
End: 2017-04-19

## 2017-04-19 VITALS
SYSTOLIC BLOOD PRESSURE: 179 MMHG | HEART RATE: 70 BPM | HEIGHT: 64 IN | WEIGHT: 176.25 LBS | TEMPERATURE: 98 F | BODY MASS INDEX: 30.09 KG/M2 | DIASTOLIC BLOOD PRESSURE: 89 MMHG

## 2017-04-19 DIAGNOSIS — C50.912 MALIGNANT NEOPLASM OF LEFT FEMALE BREAST, UNSPECIFIED SITE OF BREAST: Primary | ICD-10-CM

## 2017-04-19 PROCEDURE — 99204 OFFICE O/P NEW MOD 45 MIN: CPT | Mod: S$PBB,,, | Performed by: SURGERY

## 2017-04-19 PROCEDURE — 99213 OFFICE O/P EST LOW 20 MIN: CPT | Mod: PBBFAC,PO | Performed by: SURGERY

## 2017-04-19 PROCEDURE — 99999 PR PBB SHADOW E&M-EST. PATIENT-LVL III: CPT | Mod: PBBFAC,,, | Performed by: SURGERY

## 2017-04-19 NOTE — PROGRESS NOTES
PLASTIC SURGERY CLINIC NOTE    Marce Hickey is a 66 y.o.  female with recntly diagnosed left breast cancer who presents to clinic for information on reconstruction. She has previously seen Dr. Rausch. She had some confussion regarding her reason for seeing us today and why she had not seen an oncologist yet. These concerns were addressed. She does have a history of bilateral breast reduction in  and a recent bariatric operation in January.      Past Medical History:   Diagnosis Date    Anxiety     Depression     Diabetes mellitus, type 2     Borderline    Fatty liver disease, nonalcoholic     GERD (gastroesophageal reflux disease)     Hyperlipidemia     Hypertension     Plantar fasciitis of right foot        Past Surgical History:   Procedure Laterality Date    breast reduction       SECTION      x 2    CHOLECYSTECTOMY      FOOT SURGERY      left bunionectomy    HYSTERECTOMY      one ovary intact, adhesions    LIPOSUCTION      TONSILLECTOMY         Social History     Social History    Marital status:      Spouse name: N/A    Number of children: N/A    Years of education: N/A     Occupational History    Not on file.     Social History Main Topics    Smoking status: Former Smoker     Quit date: 1993    Smokeless tobacco: Never Used      Comment: quit     Alcohol use No    Drug use: No    Sexual activity: Yes     Birth control/ protection: Surgical     Other Topics Concern    Not on file     Social History Narrative       Review of patient's allergies indicates:   Allergen Reactions    Nsaids (non-steroidal anti-inflammatory drug) Anaphylaxis         PHYSICAL EXAM:  Vitals:    17 1339   BP: (!) 179/89   Pulse: 70   Temp: 98 °F (36.7 °C)       Breast exam: There are well healed reduction mammoplasty scars.    Plan:  67 y/o female with recently diagnosed left breast cancer with plans for unilateral mastectomy.  Discussed options for  reconstruction including tissue expander. Risks benefits and alternatives of the procedure were explained to the patient. Specific risks including but not limited to bleeding, infection, asymmetry, skin necrosis, skin loss, wound healing problems, seroma, hematoma, need for drain placement, scarring, pain, and PE/DVT.

## 2017-04-19 NOTE — TELEPHONE ENCOUNTER
----- Message from Emi Murcia sent at 4/19/2017  2:57 PM CDT -----  Contact: self  Patient saw Dr Thomas and his nurse will be calling to set up surgery for double mastectomy. She need to know Dr Luis is ok with the double mastectomy. Please call patient at [phone]. Thanks!

## 2017-04-24 ENCOUNTER — TELEPHONE (OUTPATIENT)
Dept: FAMILY MEDICINE | Facility: CLINIC | Age: 67
End: 2017-04-24

## 2017-04-24 ENCOUNTER — TELEPHONE (OUTPATIENT)
Dept: SURGERY | Facility: CLINIC | Age: 67
End: 2017-04-24

## 2017-04-24 ENCOUNTER — TELEPHONE (OUTPATIENT)
Dept: HEMATOLOGY/ONCOLOGY | Facility: CLINIC | Age: 67
End: 2017-04-24

## 2017-04-24 NOTE — TELEPHONE ENCOUNTER
----- Message from Harriet Cm sent at 4/24/2017  3:38 PM CDT -----  Patient is requesting to cancel her appointment on 05/15/17, she states she is having surgery on this date, she states she does not want to reschedule. Contact patient at [phone] with any questions.     Thank you

## 2017-04-24 NOTE — TELEPHONE ENCOUNTER
----- Message from Porsche Michaud sent at 4/21/2017 10:12 AM CDT -----  Contact: self [phone]  She is still waiting to hear when her surgery will be.  Please call her.  Thank you!

## 2017-04-24 NOTE — TELEPHONE ENCOUNTER
----- Message from Harriet Cm sent at 4/24/2017  3:40 PM CDT -----  Patient would like doctor to know she is having  Double mastectomy and reconstruction on 05/15/17, contact patient at [phone] with any question.       Thank you .

## 2017-04-25 ENCOUNTER — TELEPHONE (OUTPATIENT)
Dept: HEMATOLOGY/ONCOLOGY | Facility: CLINIC | Age: 67
End: 2017-04-25

## 2017-04-25 ENCOUNTER — LAB VISIT (OUTPATIENT)
Dept: LAB | Facility: HOSPITAL | Age: 67
End: 2017-04-25
Attending: SURGERY
Payer: MEDICARE

## 2017-04-25 ENCOUNTER — PATIENT MESSAGE (OUTPATIENT)
Dept: FAMILY MEDICINE | Facility: CLINIC | Age: 67
End: 2017-04-25

## 2017-04-25 ENCOUNTER — PATIENT MESSAGE (OUTPATIENT)
Dept: PLASTIC SURGERY | Facility: CLINIC | Age: 67
End: 2017-04-25

## 2017-04-25 DIAGNOSIS — E46 PROTEIN CALORIE MALNUTRITION: ICD-10-CM

## 2017-04-25 DIAGNOSIS — E66.01 MORBID OBESITY DUE TO EXCESS CALORIES: ICD-10-CM

## 2017-04-25 LAB
ALBUMIN SERPL BCP-MCNC: 3.9 G/DL
ALP SERPL-CCNC: 102 U/L
ALT SERPL W/O P-5'-P-CCNC: 26 U/L
ANION GAP SERPL CALC-SCNC: 9 MMOL/L
ANISOCYTOSIS BLD QL SMEAR: SLIGHT
AST SERPL-CCNC: 25 U/L
BASOPHILS # BLD AUTO: 0.01 K/UL
BASOPHILS NFR BLD: 0.2 %
BILIRUB SERPL-MCNC: 0.5 MG/DL
BUN SERPL-MCNC: 19 MG/DL
CALCIUM SERPL-MCNC: 10.2 MG/DL
CHLORIDE SERPL-SCNC: 109 MMOL/L
CHOLEST/HDLC SERPL: 4.7 {RATIO}
CO2 SERPL-SCNC: 25 MMOL/L
CREAT SERPL-MCNC: 0.8 MG/DL
DIFFERENTIAL METHOD: ABNORMAL
EOSINOPHIL # BLD AUTO: 0.1 K/UL
EOSINOPHIL NFR BLD: 1.4 %
ERYTHROCYTE [DISTWIDTH] IN BLOOD BY AUTOMATED COUNT: 14.1 %
EST. GFR  (AFRICAN AMERICAN): >60 ML/MIN/1.73 M^2
EST. GFR  (NON AFRICAN AMERICAN): >60 ML/MIN/1.73 M^2
GLUCOSE SERPL-MCNC: 94 MG/DL
HCT VFR BLD AUTO: 41.5 %
HDL/CHOLESTEROL RATIO: 21.3 %
HDLC SERPL-MCNC: 244 MG/DL
HDLC SERPL-MCNC: 52 MG/DL
HGB BLD-MCNC: 13.4 G/DL
IRON SERPL-MCNC: 73 UG/DL
LDLC SERPL CALC-MCNC: 163.6 MG/DL
LYMPHOCYTES # BLD AUTO: 1.2 K/UL
LYMPHOCYTES NFR BLD: 26.5 %
MCH RBC QN AUTO: 28.8 PG
MCHC RBC AUTO-ENTMCNC: 32.3 %
MCV RBC AUTO: 89 FL
MONOCYTES # BLD AUTO: 0.5 K/UL
MONOCYTES NFR BLD: 10.8 %
NEUTROPHILS # BLD AUTO: 2.7 K/UL
NEUTROPHILS NFR BLD: 61.1 %
NONHDLC SERPL-MCNC: 192 MG/DL
PLATELET # BLD AUTO: 130 K/UL
PLATELET BLD QL SMEAR: ABNORMAL
PMV BLD AUTO: 13.1 FL
POTASSIUM SERPL-SCNC: 4 MMOL/L
PROT SERPL-MCNC: 7.1 G/DL
RBC # BLD AUTO: 4.65 M/UL
SATURATED IRON: 23 %
SODIUM SERPL-SCNC: 143 MMOL/L
TOTAL IRON BINDING CAPACITY: 323 UG/DL
TRANSFERRIN SERPL-MCNC: 218 MG/DL
TRIGL SERPL-MCNC: 142 MG/DL
VIT B12 SERPL-MCNC: 410 PG/ML
WBC # BLD AUTO: 4.37 K/UL

## 2017-04-25 PROCEDURE — 83540 ASSAY OF IRON: CPT

## 2017-04-25 PROCEDURE — 84425 ASSAY OF VITAMIN B-1: CPT

## 2017-04-25 PROCEDURE — 80061 LIPID PANEL: CPT

## 2017-04-25 PROCEDURE — 80053 COMPREHEN METABOLIC PANEL: CPT

## 2017-04-25 PROCEDURE — 85025 COMPLETE CBC W/AUTO DIFF WBC: CPT

## 2017-04-25 PROCEDURE — 36415 COLL VENOUS BLD VENIPUNCTURE: CPT | Mod: PO

## 2017-04-25 PROCEDURE — 82607 VITAMIN B-12: CPT

## 2017-04-25 NOTE — TELEPHONE ENCOUNTER
Returned pts call and scheduled her an appt with Dr. Ding in the [place] office.  Pt verbalizes understanding and appreciation.

## 2017-04-25 NOTE — TELEPHONE ENCOUNTER
----- Message from Bethany Mahoney sent at 4/24/2017  4:43 PM CDT -----  Patient is returning office call (or she thinks it came from office). Please call back with details at [phone].

## 2017-04-25 NOTE — TELEPHONE ENCOUNTER
Message left for patient to call back [phone] or [phone] to schedule a referral appointment.

## 2017-04-26 ENCOUNTER — PATIENT MESSAGE (OUTPATIENT)
Dept: PLASTIC SURGERY | Facility: CLINIC | Age: 67
End: 2017-04-26

## 2017-04-27 ENCOUNTER — OFFICE VISIT (OUTPATIENT)
Dept: HEMATOLOGY/ONCOLOGY | Facility: CLINIC | Age: 67
End: 2017-04-27
Payer: MEDICARE

## 2017-04-27 VITALS
DIASTOLIC BLOOD PRESSURE: 90 MMHG | RESPIRATION RATE: 17 BRPM | HEIGHT: 64 IN | WEIGHT: 169 LBS | SYSTOLIC BLOOD PRESSURE: 149 MMHG | BODY MASS INDEX: 28.85 KG/M2 | HEART RATE: 76 BPM

## 2017-04-27 DIAGNOSIS — C50.012 MALIGNANT NEOPLASM OF AREOLA OF LEFT BREAST IN FEMALE, UNSPECIFIED ESTROGEN RECEPTOR STATUS: Primary | ICD-10-CM

## 2017-04-27 DIAGNOSIS — Z86.39 HISTORY OF TYPE 2 DIABETES MELLITUS: ICD-10-CM

## 2017-04-27 DIAGNOSIS — Z86.79 HISTORY OF HYPERTENSION: ICD-10-CM

## 2017-04-27 PROCEDURE — 99213 OFFICE O/P EST LOW 20 MIN: CPT | Mod: PBBFAC,PN | Performed by: INTERNAL MEDICINE

## 2017-04-27 PROCEDURE — 99999 PR PBB SHADOW E&M-EST. PATIENT-LVL III: CPT | Mod: PBBFAC,,, | Performed by: INTERNAL MEDICINE

## 2017-04-27 PROCEDURE — 99204 OFFICE O/P NEW MOD 45 MIN: CPT | Mod: S$PBB,,, | Performed by: INTERNAL MEDICINE

## 2017-04-27 NOTE — LETTER
April 27, 2017      Damon Luis MD  [address]  [address]  [place] [address]           [facility]  [address]  [facility] [address]  Phone: [phone]  Fax: [phone]          Patient: Marce Hickey   MR Number: [id number]   YOB: 1950   Date of Visit: 4/27/2017       Dear Dr. Damon Luis:    Thank you for referring Marce Hickey to me for evaluation. Attached you will find relevant portions of my assessment and plan of care.    If you have questions, please do not hesitate to call me. I look forward to following Marce Hickey along with you.    Sincerely,    Jessica Ding MD    Enclosure  CC:  No Recipients    If you would like to receive this communication electronically, please contact [email or web address] or [phone] to request more information on [facility] access.    For providers and/or their staff who would like to refer a patient to [facility], please contact us through our one-stop-shop provider referral line, [facility], at [phone].    If you feel you have received this communication in error or would no longer like to receive these types of communications, please e-mail [email or web address]

## 2017-04-27 NOTE — PROGRESS NOTES
A 66-year-old woman coming in consultation for breast cancer.  Significant to   the patient's history, she has had biopsy and double mastectomy scheduled by Dr. Luis for May 15th.  The patient had left breast mass found on a mammogram   and ultrasound, two lesions at 12 o'clock and 1 o'clock position.  These were   both biopsied in March by Dr. Luis.  Several years ago, the patient had   reduction mammoplasty.  The patient's age of menarche was 14.  She actually has   had no other issues.  She was being worked up by Dr. Panchal for a gastric   sleeve procedure to help with weight loss.  Both sides of the masses in the left   breast both at 11 o'clock and 1 o'clock positions were biopsied.  She had   invasive ductal carcinoma in both locations and they were both ER positive, AK   positive and HER-2 negative.  Bilateral mastectomies with reconstructions at the   same time has been scheduled for May 15th.  The patient is here for discussion   of further therapy.  The patient otherwise has really no significant medical   issues.  She has no significant family history either of multiple malignancies   or history suggestive of mutational issues.  She takes omeprazole and   multivitamins.  She does have morbid obesity with the fatty liver disease   because of it.  She is working on getting this controlled by a gastric sleeve   and doing better with diet.  She does have a diet-controlled history of   diabetes, fatty liver disease, and hypertension, all diet-controlled based on   probably her obesity.    PHYSICAL EXAMINATION:  GENERAL:  Reveals a well-built, well-nourished woman, comfortable, obese, well   groomed, ambulating to clinic without any issues.  HEENT:  Showed no congestion.  NECK:  Supple, without JVD.  Trachea is central.  No masses or thyromegaly felt.  HEART:  S1 is normally heard without murmurs or gallops.  ABDOMEN:  Soft.  No rebound, guarding or rigidity.  BREASTS.  Biopsy sites appear well-healed.   She has a distant scar from the   previous mammoplasties and a little bit of nodularity underneath these.  No   obvious palpable axillary adenopathy on the right side or the left.  EXTREMITIES:  The patient has no generalized lymphadenopathy or supraclavicular   adenopathy.  MUSCULOSKELETAL:  Good range of motion.  NEUROLOGIC:  She seems appropriate.  SKIN:  Within normal range.  PSYCHIATRIC:  Within normal range.    DIAGNOSTIC IMPRESSION:  Breast cancer two separate locations on the breasts,   left, both ER/NH positive, HER-2 negative.    Planning on bilateral mastectomies with reconstruction at the same time.    Recommend the patient to proceed with the mastectomies as scheduled.  I would   like to see her after pathology is available, so that we could proceed with   further treatment planning.  At this point, she has an ER/NH positive, HER-2   negative tumor of the left breast.  We will follow.    Thank you for this consult.      SSS/PN  dd: 04/27/2017 10:08:34 (CDT)  td: 04/27/2017 11:06:55 (CDT)  Doc ID   #[id number]  Job ID #[id number]    CC:

## 2017-04-27 NOTE — MR AVS SNAPSHOT
[facility]  [address]  [place] [address]  Phone: [phone]  Fax: [phone]                  Marce Hickey   2017 10:00 AM   Office Visit    Description:  Female : 1950   Provider:  Jessica Ding MD   Department:  [facility]                To Do List           Future Appointments        Provider Department Dept Phone    5/3/2017 3:30 PM Nelson Panchal MD [facility] [phone]    2017 9:40 AM MD [facility] - Family Medicine [phone]      Goals (5 Years of Data)     None      [facility]     [facility] On Call Nurse Care Line -  Assistance  Unless otherwise directed by your provider, please contact [facility], our nurse care line that is available for  assistance.     Registered nurses in the [facility] On Call Center provide: appointment scheduling, clinical advisement, health education, and other advisory services.  Call: [phone] (toll free)               Medications           Message regarding Medications     Verify the changes and/or additions to your medication regime listed below are the same as discussed with your clinician today.  If any of these changes or additions are incorrect, please notify your healthcare provider.             Verify that the below list of medications is an accurate representation of the medications you are currently taking.  If none reported, the list may be blank. If incorrect, please contact your healthcare provider. Carry this list with you in case of emergency.           Current Medications     multivitamin (THERAGRAN) per tablet Take 1 tablet by mouth once daily.    omeprazole-sodium bicarbonate (ZEGERID) 40-1.1 mg-gram per capsule Take 1 capsule by mouth before breakfast.    vitamin D 1000 units Tab Take 185 mg by mouth daily as needed.            Clinical Reference Information           Your Vitals Were     Resp Height Weight BMI       17 5'  "4" (1.626 m) 76.7 kg (169 lb) 29.01 kg/m2       Allergies as of 4/27/2017     Nsaids (Non-steroidal Anti-inflammatory Drug)      Immunizations Administered on Date of Encounter - 4/27/2017     None      Language Assistance Services     ATTENTION: Language assistance services are available, free of charge. Please call [phone].      ATENCIÓN: Si habla alexañol, tiene a piper disposición servicios gratuitos de asistencia lingüística. Llame al [phone].     YEVGENIY Ý: N?u b?n nói Ti?ng Vi?t, có các d?ch v? h? tr? ngôn ng? mi?n phí dành cho b?n. G?i s? [phone].         [facility] complies with applicable Federal civil rights laws and does not discriminate on the basis of race, color, national origin, age, disability, or sex.        "

## 2017-04-28 LAB — VIT B1 SERPL-MCNC: 59 UG/L (ref 38–122)

## 2017-05-01 ENCOUNTER — PATIENT MESSAGE (OUTPATIENT)
Dept: HEMATOLOGY/ONCOLOGY | Facility: CLINIC | Age: 67
End: 2017-05-01

## 2017-05-02 ENCOUNTER — PATIENT MESSAGE (OUTPATIENT)
Dept: PLASTIC SURGERY | Facility: CLINIC | Age: 67
End: 2017-05-02

## 2017-05-02 DIAGNOSIS — C50.912 MALIGNANT NEOPLASM OF LEFT FEMALE BREAST, UNSPECIFIED SITE OF BREAST: Primary | ICD-10-CM

## 2017-05-02 DIAGNOSIS — C50.919 BREAST CANCER: ICD-10-CM

## 2017-05-02 RX ORDER — LORAZEPAM 0.5 MG/1
0.5 TABLET ORAL EVERY 6 HOURS PRN
Qty: 5 TABLET | Refills: 0 | Status: CANCELLED | OUTPATIENT
Start: 2017-05-02 | End: 2017-06-01

## 2017-05-02 RX ORDER — SODIUM CHLORIDE 9 MG/ML
INJECTION, SOLUTION INTRAVENOUS CONTINUOUS
Status: CANCELLED | OUTPATIENT
Start: 2017-05-02

## 2017-05-03 ENCOUNTER — ANESTHESIA EVENT (OUTPATIENT)
Dept: SURGERY | Facility: HOSPITAL | Age: 67
DRG: 583 | End: 2017-05-03
Payer: MEDICARE

## 2017-05-03 ENCOUNTER — OFFICE VISIT (OUTPATIENT)
Dept: BARIATRICS | Facility: CLINIC | Age: 67
End: 2017-05-03
Payer: MEDICARE

## 2017-05-03 VITALS
BODY MASS INDEX: 28.92 KG/M2 | TEMPERATURE: 98 F | WEIGHT: 169.38 LBS | HEIGHT: 64 IN | HEART RATE: 72 BPM | DIASTOLIC BLOOD PRESSURE: 100 MMHG | SYSTOLIC BLOOD PRESSURE: 185 MMHG | RESPIRATION RATE: 16 BRPM

## 2017-05-03 DIAGNOSIS — K76.0 FATTY LIVER DISEASE, NONALCOHOLIC: ICD-10-CM

## 2017-05-03 DIAGNOSIS — C50.912 MALIGNANT NEOPLASM OF LEFT FEMALE BREAST, UNSPECIFIED SITE OF BREAST: Primary | ICD-10-CM

## 2017-05-03 DIAGNOSIS — E66.01 MORBID OBESITY DUE TO EXCESS CALORIES: Primary | ICD-10-CM

## 2017-05-03 DIAGNOSIS — E11.9 TYPE 2 DIABETES MELLITUS WITHOUT COMPLICATION, WITHOUT LONG-TERM CURRENT USE OF INSULIN: ICD-10-CM

## 2017-05-03 DIAGNOSIS — E66.9 OBESITY (BMI 30-39.9): ICD-10-CM

## 2017-05-03 DIAGNOSIS — E78.5 DYSLIPIDEMIA: ICD-10-CM

## 2017-05-03 PROCEDURE — 99213 OFFICE O/P EST LOW 20 MIN: CPT | Mod: PBBFAC,PN | Performed by: SURGERY

## 2017-05-03 PROCEDURE — 99999 PR PBB SHADOW E&M-EST. PATIENT-LVL III: CPT | Mod: PBBFAC,,, | Performed by: SURGERY

## 2017-05-03 PROCEDURE — 99213 OFFICE O/P EST LOW 20 MIN: CPT | Mod: S$PBB,,, | Performed by: SURGERY

## 2017-05-03 RX ORDER — LORAZEPAM 0.5 MG/1
0.5 TABLET ORAL EVERY 6 HOURS PRN
Qty: 5 TABLET | Refills: 0 | Status: SHIPPED | OUTPATIENT
Start: 2017-05-03 | End: 2017-11-01

## 2017-05-03 NOTE — PROGRESS NOTES
Post Op Note    Surgery: gastric sleeve surgery  Date: 1/30/17  Initial weight: 225  Last weight: 182  Current weight: 169     Constipation: stool softener daily- not bothering her  Reflux: omeprazole daily prevent reflux  Vomiting: none    Diet:  Breakfast: protein shake  Lunch: meat and cheese  Dinner: meat - fish,shrimp,crawfish- veggies- broccoli, mushroom, onion, zucchini, peppers  Snack: string cheese, meat, sugar free ice pops  Protein shake: premier    Exercise:  Stopped since cancer diagnosis  Taking walks    MVI: none    Vitals:    05/03/17 1449   BP: (!) 185/100   Pulse: 72   Resp: 16   Temp: 97.6 °F (36.4 °C)       Body comp:  Fat Percent:  41.2 %  Fat Mass:  69.8 lb  FFM:  99.6 lb  TBW: 69.8 lb  TBW %:  41.2 %  BMR: 1375 kcal    PE:  NAD  RRR  Soft/nt/nd  Incisions: well heal    Labs: high cholesterol and high triglycerides    A/P: s/p sleeve    Recent diagnosis of left breast cancer, undergoing bilateral mastectomy with reconstruction tomorrow   Got pet scan today- wants me to look at it- I will oblige    Counseling for patient:    Diet: doing well with the diet, keep following the low carb diet, keep portion sizes small  Exercise: start exercising  Vitamins: 2 per day, calcium, and b complex  Co morbidities:     1. Morbid obesity due to excess calories     2. Obesity (BMI 30-39.9)     3. Dyslipidemia     4. Type 2 diabetes mellitus without complication, without long-term current use of insulin     5. Fatty liver disease, nonalcoholic         : I met with the patient for 15 minutes and counseled her for over 50% of that time

## 2017-05-03 NOTE — MR AVS SNAPSHOT
[facility]  [address], [address]  [place] [address]  Phone: [phone]  Fax: [phone]                  Marce Hickey   5/3/2017 2:45 PM   Office Visit    Description:  Female : 1950   Provider:  Nelson Panchal MD   Department:  [facility]           Reason for Visit     Obesity           Diagnoses this Visit        Comments    Morbid obesity due to excess calories    -  Primary     Obesity (BMI 30-39.9)         Dyslipidemia         Type 2 diabetes mellitus without complication, without long-term current use of insulin         Fatty liver disease, nonalcoholic                To Do List           Future Appointments        Provider Department Dept Phone    2017 9:00 AM [facility] [phone]    2017 11:00 AM Jessica Ding MD [facility] - H[facility]ology Oncology [phone]    2017 8:00 AM [facility] [phone]    8/3/2017 3:30 PM Nelson Panchal MD [facility] - Weight Loss [phone]    2017 9:40 AM Savannah Garvey MD [facility] [phone]      Your Future Surgeries/Procedures     May 04, 2017   Surgery with Damon Luis MD   [facility] ([facility])    [address]  [place] [facility] [address] [phone]              Goals (5 Years of Data)     None      Follow-Up and Disposition     Return in about 3 months (around 8/3/2017).    Follow-up and Disposition History      [facility]     [facility] Nurse Care Line -  Assistance  Unless otherwise directed by your provider, please contact [facility], our nurse care line that is available for  assistance.     Registered nurses in the [facility] Center provide: appointment scheduling, clinical advisement, health education, and other advisory services.  Call: [phone] (toll free)              "  Medications           Message regarding Medications     Verify the changes and/or additions to your medication regime listed below are the same as discussed with your clinician today.  If any of these changes or additions are incorrect, please notify your healthcare provider.             Verify that the below list of medications is an accurate representation of the medications you are currently taking.  If none reported, the list may be blank. If incorrect, please contact your healthcare provider. Carry this list with you in case of emergency.           Current Medications     docusate sodium (COLACE) 50 MG capsule Take by mouth 2 (two) times daily.    lorazepam (ATIVAN) 0.5 MG tablet Take 1 tablet (0.5 mg total) by mouth every 6 (six) hours as needed for Anxiety.    multivitamin (THERAGRAN) per tablet Take 1 tablet by mouth once daily.    omeprazole-sodium bicarbonate (ZEGERID) 40-1.1 mg-gram per capsule Take 1 capsule by mouth before breakfast.           Clinical Reference Information           Your Vitals Were     BP Pulse Temp Resp Height Weight    185/100 72 97.6 °F (36.4 °C) (Oral) 16 5' 4" (1.626 m) 76.8 kg (169 lb 6.4 oz)    BMI                29.08 kg/m2          Blood Pressure          Most Recent Value    BP  (!)  185/100      Allergies as of 5/3/2017     Nsaids (Non-steroidal Anti-inflammatory Drug)      Immunizations Administered on Date of Encounter - 5/3/2017     None      Orders Placed During Today's Visit     Future Labs/Procedures Expected by Expires    B1  5/3/2017 7/2/2018    CBC w/ Auto Differential  5/3/2017 7/2/2018    CMP  5/3/2017 7/2/2018    Lipid Panel  5/3/2017 7/2/2018      Language Assistance Services     ATTENTION: Language assistance services are available, free of charge. Please call [phone].      ATENCIÓN: Si habla malinda, tiene a piper disposición servicios gratuitos de asistencia lingüística. Llame al [phone].     CHÚ Ý: N?u b?n nói Ti?ng Vi?t, có các d?ch v? h? tr? " juanpablo solomon? mi?n phí dành cho b?n. G?i s? [phone].         [facility] complies with applicable Federal civil rights laws and does not discriminate on the basis of race, color, national origin, age, disability, or sex.

## 2017-05-04 ENCOUNTER — HOSPITAL ENCOUNTER (OUTPATIENT)
Dept: RADIOLOGY | Facility: HOSPITAL | Age: 67
Discharge: HOME | DRG: 583 | End: 2017-05-04
Attending: SURGERY | Admitting: SURGERY
Payer: MEDICARE

## 2017-05-04 ENCOUNTER — SURGERY (OUTPATIENT)
Age: 67
End: 2017-05-04

## 2017-05-04 ENCOUNTER — HOSPITAL ENCOUNTER (INPATIENT)
Facility: HOSPITAL | Age: 67
LOS: 1 days | Discharge: HOME | DRG: 583 | End: 2017-05-07
Attending: SURGERY | Admitting: SURGERY
Payer: MEDICARE

## 2017-05-04 ENCOUNTER — ANESTHESIA (OUTPATIENT)
Dept: SURGERY | Facility: HOSPITAL | Age: 67
DRG: 583 | End: 2017-05-04
Payer: MEDICARE

## 2017-05-04 DIAGNOSIS — Z86.39 HISTORY OF TYPE 2 DIABETES MELLITUS: ICD-10-CM

## 2017-05-04 DIAGNOSIS — E78.5 DYSLIPIDEMIA: ICD-10-CM

## 2017-05-04 DIAGNOSIS — M54.9 BACK PAIN, UNSPECIFIED BACK LOCATION, UNSPECIFIED BACK PAIN LATERALITY, UNSPECIFIED CHRONICITY: ICD-10-CM

## 2017-05-04 DIAGNOSIS — Z98.890 STATUS POST GASTRIC SURGERY: ICD-10-CM

## 2017-05-04 DIAGNOSIS — C50.919 BREAST CANCER: ICD-10-CM

## 2017-05-04 DIAGNOSIS — E66.01 MORBID OBESITY DUE TO EXCESS CALORIES: ICD-10-CM

## 2017-05-04 DIAGNOSIS — C50.912 MALIGNANT NEOPLASM OF LEFT FEMALE BREAST, UNSPECIFIED SITE OF BREAST: ICD-10-CM

## 2017-05-04 DIAGNOSIS — E88.810 DYSMETABOLIC SYNDROME: ICD-10-CM

## 2017-05-04 DIAGNOSIS — E66.9 OBESITY (BMI 30-39.9): ICD-10-CM

## 2017-05-04 DIAGNOSIS — K76.0 FATTY LIVER DISEASE, NONALCOHOLIC: ICD-10-CM

## 2017-05-04 DIAGNOSIS — K21.9 GASTROESOPHAGEAL REFLUX DISEASE WITHOUT ESOPHAGITIS: Primary | ICD-10-CM

## 2017-05-04 DIAGNOSIS — F41.1 GAD (GENERALIZED ANXIETY DISORDER): ICD-10-CM

## 2017-05-04 DIAGNOSIS — M72.2 PLANTAR FASCIITIS OF RIGHT FOOT: ICD-10-CM

## 2017-05-04 DIAGNOSIS — E55.9 HYPOVITAMINOSIS D: ICD-10-CM

## 2017-05-04 DIAGNOSIS — F33.1 MODERATE EPISODE OF RECURRENT MAJOR DEPRESSIVE DISORDER: ICD-10-CM

## 2017-05-04 DIAGNOSIS — Z86.79 HISTORY OF HYPERTENSION: ICD-10-CM

## 2017-05-04 PROCEDURE — 36000707: Performed by: SURGERY

## 2017-05-04 PROCEDURE — 27201423 OPTIME MED/SURG SUP & DEVICES STERILE SUPPLY: Performed by: SURGERY

## 2017-05-04 PROCEDURE — 19303 MAST SIMPLE COMPLETE: CPT | Mod: 50,,, | Performed by: SURGERY

## 2017-05-04 PROCEDURE — 25000003 PHARM REV CODE 250: Performed by: NURSE ANESTHETIST, CERTIFIED REGISTERED

## 2017-05-04 PROCEDURE — 88307 TISSUE EXAM BY PATHOLOGIST: CPT | Mod: 26,,, | Performed by: PATHOLOGY

## 2017-05-04 PROCEDURE — 37000009 HC ANESTHESIA EA ADD 15 MINS: Performed by: SURGERY

## 2017-05-04 PROCEDURE — 15777 ACELLULAR DERM MATRIX IMPLT: CPT | Mod: 50,,, | Performed by: SURGERY

## 2017-05-04 PROCEDURE — 38525 BIOPSY/REMOVAL LYMPH NODES: CPT | Mod: 51,LT,, | Performed by: SURGERY

## 2017-05-04 PROCEDURE — 99900103 DSU ONLY-NO CHARGE-INITIAL HR (STAT): Performed by: SURGERY

## 2017-05-04 PROCEDURE — 38792 RA TRACER ID OF SENTINL NODE: CPT | Mod: TC

## 2017-05-04 PROCEDURE — 63600175 PHARM REV CODE 636 W HCPCS: Performed by: SURGERY

## 2017-05-04 PROCEDURE — 71000039 HC RECOVERY, EACH ADD'L HOUR: Performed by: SURGERY

## 2017-05-04 PROCEDURE — 63600175 PHARM REV CODE 636 W HCPCS

## 2017-05-04 PROCEDURE — 19357 TISS XPNDR PLMT BRST RCNSTJ: CPT | Mod: 50,,, | Performed by: SURGERY

## 2017-05-04 PROCEDURE — 63600175 PHARM REV CODE 636 W HCPCS: Performed by: NURSE ANESTHETIST, CERTIFIED REGISTERED

## 2017-05-04 PROCEDURE — 99900104 DSU ONLY-NO CHARGE-EA ADD'L HR (STAT): Performed by: SURGERY

## 2017-05-04 PROCEDURE — 25000003 PHARM REV CODE 250: Performed by: SURGERY

## 2017-05-04 PROCEDURE — 19357 TISS XPNDR PLMT BRST RCNSTJ: CPT | Mod: 50,AS,, | Performed by: PHYSICIAN ASSISTANT

## 2017-05-04 PROCEDURE — C1729 CATH, DRAINAGE: HCPCS | Performed by: SURGERY

## 2017-05-04 PROCEDURE — 71000033 HC RECOVERY, INTIAL HOUR: Performed by: SURGERY

## 2017-05-04 PROCEDURE — D9220A PRA ANESTHESIA: Mod: ANES,,, | Performed by: ANESTHESIOLOGY

## 2017-05-04 PROCEDURE — 0HHV0NZ INSERTION OF TISSUE EXPANDER INTO BILATERAL BREAST, OPEN APPROACH: ICD-10-PCS | Performed by: SURGERY

## 2017-05-04 PROCEDURE — 36000706: Performed by: SURGERY

## 2017-05-04 PROCEDURE — 88342 IMHCHEM/IMCYTCHM 1ST ANTB: CPT | Mod: 26,,, | Performed by: PATHOLOGY

## 2017-05-04 PROCEDURE — 37000008 HC ANESTHESIA 1ST 15 MINUTES: Performed by: SURGERY

## 2017-05-04 PROCEDURE — 25000003 PHARM REV CODE 250: Performed by: PHYSICIAN ASSISTANT

## 2017-05-04 PROCEDURE — 0HTV0ZZ RESECTION OF BILATERAL BREAST, OPEN APPROACH: ICD-10-PCS | Performed by: SURGERY

## 2017-05-04 PROCEDURE — 25000003 PHARM REV CODE 250: Performed by: ANESTHESIOLOGY

## 2017-05-04 PROCEDURE — C1789 PROSTHESIS, BREAST, IMP: HCPCS | Performed by: SURGERY

## 2017-05-04 PROCEDURE — 63600175 PHARM REV CODE 636 W HCPCS: Performed by: ANESTHESIOLOGY

## 2017-05-04 PROCEDURE — D9220A PRA ANESTHESIA: Mod: CRNA,,, | Performed by: NURSE ANESTHETIST, CERTIFIED REGISTERED

## 2017-05-04 PROCEDURE — 88331 PATH CONSLTJ SURG 1 BLK 1SPC: CPT | Performed by: PATHOLOGY

## 2017-05-04 PROCEDURE — 38900 IO MAP OF SENT LYMPH NODE: CPT | Mod: LT,,, | Performed by: SURGERY

## 2017-05-04 PROCEDURE — 38792 RA TRACER ID OF SENTINL NODE: CPT | Mod: ,,, | Performed by: RADIOLOGY

## 2017-05-04 DEVICE — GRAFT PERF 9.6X19.3 THCK TISS: Type: IMPLANTABLE DEVICE | Site: BREAST | Status: FUNCTIONAL

## 2017-05-04 DEVICE — IMPLANTABLE DEVICE: Type: IMPLANTABLE DEVICE | Site: BREAST | Status: FUNCTIONAL

## 2017-05-04 RX ORDER — SODIUM CHLORIDE, SODIUM LACTATE, POTASSIUM CHLORIDE, CALCIUM CHLORIDE 600; 310; 30; 20 MG/100ML; MG/100ML; MG/100ML; MG/100ML
INJECTION, SOLUTION INTRAVENOUS CONTINUOUS
Status: DISCONTINUED | OUTPATIENT
Start: 2017-05-04 | End: 2017-05-04

## 2017-05-04 RX ORDER — FENTANYL CITRATE 50 UG/ML
INJECTION, SOLUTION INTRAMUSCULAR; INTRAVENOUS
Status: DISCONTINUED | OUTPATIENT
Start: 2017-05-04 | End: 2017-05-04

## 2017-05-04 RX ORDER — SODIUM CHLORIDE 9 MG/ML
INJECTION, SOLUTION INTRAVENOUS CONTINUOUS
Status: DISCONTINUED | OUTPATIENT
Start: 2017-05-04 | End: 2017-05-06

## 2017-05-04 RX ORDER — LIDOCAINE HCL/PF 100 MG/5ML
SYRINGE (ML) INTRAVENOUS
Status: DISCONTINUED | OUTPATIENT
Start: 2017-05-04 | End: 2017-05-04

## 2017-05-04 RX ORDER — PANTOPRAZOLE SODIUM 40 MG/10ML
40 INJECTION, POWDER, LYOPHILIZED, FOR SOLUTION INTRAVENOUS
Status: DISCONTINUED | OUTPATIENT
Start: 2017-05-05 | End: 2017-05-07

## 2017-05-04 RX ORDER — PHENYLEPHRINE HYDROCHLORIDE 10 MG/ML
INJECTION INTRAVENOUS
Status: DISCONTINUED | OUTPATIENT
Start: 2017-05-04 | End: 2017-05-04

## 2017-05-04 RX ORDER — HYDROMORPHONE HYDROCHLORIDE 2 MG/ML
1 INJECTION, SOLUTION INTRAMUSCULAR; INTRAVENOUS; SUBCUTANEOUS EVERY 4 HOURS PRN
Status: DISCONTINUED | OUTPATIENT
Start: 2017-05-04 | End: 2017-05-07 | Stop reason: HOSPADM

## 2017-05-04 RX ORDER — CEFAZOLIN SODIUM 2 G/50ML
2 SOLUTION INTRAVENOUS
Status: COMPLETED | OUTPATIENT
Start: 2017-05-04 | End: 2017-05-04

## 2017-05-04 RX ORDER — LORAZEPAM 2 MG/ML
0.25 INJECTION INTRAMUSCULAR ONCE AS NEEDED
Status: ACTIVE | OUTPATIENT
Start: 2017-05-04 | End: 2017-05-04

## 2017-05-04 RX ORDER — GLYCOPYRROLATE 0.2 MG/ML
INJECTION INTRAMUSCULAR; INTRAVENOUS
Status: DISCONTINUED | OUTPATIENT
Start: 2017-05-04 | End: 2017-05-04

## 2017-05-04 RX ORDER — LIDOCAINE HYDROCHLORIDE 10 MG/ML
1 INJECTION, SOLUTION EPIDURAL; INFILTRATION; INTRACAUDAL; PERINEURAL ONCE
Status: COMPLETED | OUTPATIENT
Start: 2017-05-04 | End: 2017-05-04

## 2017-05-04 RX ORDER — HYDROCODONE BITARTRATE AND ACETAMINOPHEN 5; 325 MG/1; MG/1
1 TABLET ORAL EVERY 4 HOURS PRN
Status: DISCONTINUED | OUTPATIENT
Start: 2017-05-04 | End: 2017-05-07 | Stop reason: HOSPADM

## 2017-05-04 RX ORDER — DEXAMETHASONE SODIUM PHOSPHATE 4 MG/ML
INJECTION, SOLUTION INTRA-ARTICULAR; INTRALESIONAL; INTRAMUSCULAR; INTRAVENOUS; SOFT TISSUE
Status: DISCONTINUED | OUTPATIENT
Start: 2017-05-04 | End: 2017-05-04

## 2017-05-04 RX ORDER — MIDAZOLAM HYDROCHLORIDE 1 MG/ML
1 INJECTION INTRAMUSCULAR; INTRAVENOUS EVERY 10 MIN PRN
Status: DISCONTINUED | OUTPATIENT
Start: 2017-05-04 | End: 2017-05-04

## 2017-05-04 RX ORDER — ONDANSETRON 2 MG/ML
4 INJECTION INTRAMUSCULAR; INTRAVENOUS DAILY PRN
Status: DISCONTINUED | OUTPATIENT
Start: 2017-05-04 | End: 2017-05-04 | Stop reason: HOSPADM

## 2017-05-04 RX ORDER — OXYCODONE HYDROCHLORIDE 5 MG/1
5 TABLET ORAL
Status: DISCONTINUED | OUTPATIENT
Start: 2017-05-04 | End: 2017-05-04 | Stop reason: HOSPADM

## 2017-05-04 RX ORDER — ONDANSETRON 2 MG/ML
4 INJECTION INTRAMUSCULAR; INTRAVENOUS EVERY 12 HOURS PRN
Status: DISCONTINUED | OUTPATIENT
Start: 2017-05-04 | End: 2017-05-07 | Stop reason: HOSPADM

## 2017-05-04 RX ORDER — MIDAZOLAM HYDROCHLORIDE 1 MG/ML
INJECTION INTRAMUSCULAR; INTRAVENOUS
Status: COMPLETED
Start: 2017-05-04 | End: 2017-05-04

## 2017-05-04 RX ORDER — NEOSTIGMINE METHYLSULFATE 1 MG/ML
INJECTION, SOLUTION INTRAVENOUS
Status: DISCONTINUED | OUTPATIENT
Start: 2017-05-04 | End: 2017-05-04

## 2017-05-04 RX ORDER — CLINDAMYCIN HYDROCHLORIDE 300 MG/1
300 CAPSULE ORAL 3 TIMES DAILY
Qty: 30 CAPSULE | Refills: 0 | OUTPATIENT
Start: 2017-05-04 | End: 2017-05-07 | Stop reason: HOSPADM

## 2017-05-04 RX ORDER — BACITRACIN 50000 [IU]/1
INJECTION, POWDER, FOR SOLUTION INTRAMUSCULAR
Status: DISCONTINUED | OUTPATIENT
Start: 2017-05-04 | End: 2017-05-04 | Stop reason: HOSPADM

## 2017-05-04 RX ORDER — ROCURONIUM BROMIDE 10 MG/ML
INJECTION, SOLUTION INTRAVENOUS
Status: DISCONTINUED | OUTPATIENT
Start: 2017-05-04 | End: 2017-05-04

## 2017-05-04 RX ORDER — ACETAMINOPHEN 10 MG/ML
INJECTION, SOLUTION INTRAVENOUS
Status: DISCONTINUED | OUTPATIENT
Start: 2017-05-04 | End: 2017-05-04

## 2017-05-04 RX ORDER — SODIUM CHLORIDE, SODIUM LACTATE, POTASSIUM CHLORIDE, CALCIUM CHLORIDE 600; 310; 30; 20 MG/100ML; MG/100ML; MG/100ML; MG/100ML
125 INJECTION, SOLUTION INTRAVENOUS CONTINUOUS
Status: DISCONTINUED | OUTPATIENT
Start: 2017-05-04 | End: 2017-05-06

## 2017-05-04 RX ORDER — SODIUM CHLORIDE 0.9 % (FLUSH) 0.9 %
3 SYRINGE (ML) INJECTION
Status: DISCONTINUED | OUTPATIENT
Start: 2017-05-04 | End: 2017-05-04

## 2017-05-04 RX ORDER — METHYLENE BLUE 10 MG/ML
INJECTION INTRAVENOUS
Status: DISCONTINUED | OUTPATIENT
Start: 2017-05-04 | End: 2017-05-04 | Stop reason: HOSPADM

## 2017-05-04 RX ORDER — SODIUM CHLORIDE 9 MG/ML
INJECTION, SOLUTION INTRAVENOUS CONTINUOUS
Status: DISCONTINUED | OUTPATIENT
Start: 2017-05-04 | End: 2017-05-04

## 2017-05-04 RX ORDER — CYCLOBENZAPRINE HCL 10 MG
10 TABLET ORAL 3 TIMES DAILY
Status: DISCONTINUED | OUTPATIENT
Start: 2017-05-04 | End: 2017-05-07 | Stop reason: HOSPADM

## 2017-05-04 RX ORDER — OXYCODONE AND ACETAMINOPHEN 5; 325 MG/1; MG/1
1 TABLET ORAL EVERY 4 HOURS PRN
Qty: 51 TABLET | Refills: 0 | Status: SHIPPED | OUTPATIENT
Start: 2017-05-04 | End: 2017-05-29

## 2017-05-04 RX ORDER — ISOSULFAN BLUE 50 MG/5ML
INJECTION, SOLUTION SUBCUTANEOUS
Status: DISCONTINUED | OUTPATIENT
Start: 2017-05-04 | End: 2017-05-04 | Stop reason: HOSPADM

## 2017-05-04 RX ORDER — CYCLOBENZAPRINE HCL 10 MG
10 TABLET ORAL 3 TIMES DAILY PRN
Qty: 41 TABLET | Refills: 2 | Status: SHIPPED | OUTPATIENT
Start: 2017-05-04 | End: 2017-05-14

## 2017-05-04 RX ORDER — CLINDAMYCIN HYDROCHLORIDE 150 MG/1
300 CAPSULE ORAL EVERY 8 HOURS
Status: DISCONTINUED | OUTPATIENT
Start: 2017-05-04 | End: 2017-05-07 | Stop reason: HOSPADM

## 2017-05-04 RX ORDER — HYDROMORPHONE HYDROCHLORIDE 2 MG/ML
0.2 INJECTION, SOLUTION INTRAMUSCULAR; INTRAVENOUS; SUBCUTANEOUS EVERY 10 MIN PRN
Status: DISCONTINUED | OUTPATIENT
Start: 2017-05-04 | End: 2017-05-04 | Stop reason: HOSPADM

## 2017-05-04 RX ORDER — LORAZEPAM 0.5 MG/1
0.5 TABLET ORAL EVERY 6 HOURS PRN
Status: DISCONTINUED | OUTPATIENT
Start: 2017-05-04 | End: 2017-05-07 | Stop reason: HOSPADM

## 2017-05-04 RX ORDER — FENTANYL CITRATE 50 UG/ML
25 INJECTION, SOLUTION INTRAMUSCULAR; INTRAVENOUS EVERY 5 MIN PRN
Status: DISCONTINUED | OUTPATIENT
Start: 2017-05-04 | End: 2017-05-04 | Stop reason: HOSPADM

## 2017-05-04 RX ORDER — ONDANSETRON 2 MG/ML
INJECTION INTRAMUSCULAR; INTRAVENOUS
Status: DISCONTINUED | OUTPATIENT
Start: 2017-05-04 | End: 2017-05-04

## 2017-05-04 RX ORDER — PROPOFOL 10 MG/ML
VIAL (ML) INTRAVENOUS
Status: DISCONTINUED | OUTPATIENT
Start: 2017-05-04 | End: 2017-05-04

## 2017-05-04 RX ORDER — ENOXAPARIN SODIUM 100 MG/ML
40 INJECTION SUBCUTANEOUS
Status: DISCONTINUED | OUTPATIENT
Start: 2017-05-05 | End: 2017-05-07 | Stop reason: HOSPADM

## 2017-05-04 RX ADMIN — ONDANSETRON 4 MG: 2 INJECTION, SOLUTION INTRAMUSCULAR; INTRAVENOUS at 05:05

## 2017-05-04 RX ADMIN — LIDOCAINE HYDROCHLORIDE 100 MG: 20 INJECTION, SOLUTION INTRAVENOUS at 02:05

## 2017-05-04 RX ADMIN — CEFAZOLIN SODIUM 2 G: 2 SOLUTION INTRAVENOUS at 02:05

## 2017-05-04 RX ADMIN — FENTANYL CITRATE 100 MCG: 50 INJECTION INTRAMUSCULAR; INTRAVENOUS at 02:05

## 2017-05-04 RX ADMIN — GLYCOPYRROLATE 0.4 MG: 0.2 INJECTION, SOLUTION INTRAMUSCULAR; INTRAVENOUS at 06:05

## 2017-05-04 RX ADMIN — LIDOCAINE HYDROCHLORIDE 10 MG: 10 INJECTION, SOLUTION EPIDURAL; INFILTRATION; INTRACAUDAL; PERINEURAL at 12:05

## 2017-05-04 RX ADMIN — METHYLENE BLUE 1 MG: 10 INJECTION, SOLUTION INTRAVENOUS at 06:05

## 2017-05-04 RX ADMIN — PHENYLEPHRINE HYDROCHLORIDE 100 MCG: 10 INJECTION INTRAVENOUS at 05:05

## 2017-05-04 RX ADMIN — ONDANSETRON 4 MG: 2 INJECTION INTRAMUSCULAR; INTRAVENOUS at 08:05

## 2017-05-04 RX ADMIN — SODIUM CHLORIDE: 0.9 INJECTION, SOLUTION INTRAVENOUS at 10:05

## 2017-05-04 RX ADMIN — ISOSULFAN BLUE 0.5 ML: 10 INJECTION, SOLUTION SUBCUTANEOUS at 06:05

## 2017-05-04 RX ADMIN — FENTANYL CITRATE 50 MCG: 50 INJECTION INTRAMUSCULAR; INTRAVENOUS at 06:05

## 2017-05-04 RX ADMIN — SODIUM CHLORIDE, SODIUM LACTATE, POTASSIUM CHLORIDE, AND CALCIUM CHLORIDE: .6; .31; .03; .02 INJECTION, SOLUTION INTRAVENOUS at 12:05

## 2017-05-04 RX ADMIN — MIDAZOLAM HYDROCHLORIDE 2 MG: 1 INJECTION, SOLUTION INTRAMUSCULAR; INTRAVENOUS at 02:05

## 2017-05-04 RX ADMIN — DEXAMETHASONE SODIUM PHOSPHATE 8 MG: 4 INJECTION, SOLUTION INTRAMUSCULAR; INTRAVENOUS at 05:05

## 2017-05-04 RX ADMIN — HYDROMORPHONE HYDROCHLORIDE 1 MG: 2 INJECTION INTRAMUSCULAR; INTRAVENOUS; SUBCUTANEOUS at 09:05

## 2017-05-04 RX ADMIN — CLINDAMYCIN HYDROCHLORIDE 300 MG: 150 CAPSULE ORAL at 09:05

## 2017-05-04 RX ADMIN — MIDAZOLAM HYDROCHLORIDE 2 MG: 1 INJECTION, SOLUTION INTRAMUSCULAR; INTRAVENOUS at 01:05

## 2017-05-04 RX ADMIN — PROPOFOL 200 MG: 10 INJECTION, EMULSION INTRAVENOUS at 02:05

## 2017-05-04 RX ADMIN — NEOSTIGMINE METHYLSULFATE 3 MG: 1 INJECTION INTRAVENOUS at 06:05

## 2017-05-04 RX ADMIN — EPHEDRINE SULFATE 15 MG: 50 INJECTION, SOLUTION INTRAMUSCULAR; INTRAVENOUS; SUBCUTANEOUS at 05:05

## 2017-05-04 RX ADMIN — HYDROMORPHONE HYDROCHLORIDE 0.2 MG: 2 INJECTION INTRAMUSCULAR; INTRAVENOUS; SUBCUTANEOUS at 08:05

## 2017-05-04 RX ADMIN — ACETAMINOPHEN 1000 MG: 10 INJECTION, SOLUTION INTRAVENOUS at 05:05

## 2017-05-04 RX ADMIN — CLINDAMYCIN PHOSPHATE 900 MG: 150 INJECTION, SOLUTION, CONCENTRATE INTRAVENOUS at 06:05

## 2017-05-04 RX ADMIN — CYCLOBENZAPRINE HYDROCHLORIDE 10 MG: 10 TABLET, FILM COATED ORAL at 09:05

## 2017-05-04 RX ADMIN — BACITRACIN 50000 UNITS: 5000 INJECTION, POWDER, FOR SOLUTION INTRAMUSCULAR at 06:05

## 2017-05-04 RX ADMIN — ROCURONIUM BROMIDE 40 MG: 10 INJECTION, SOLUTION INTRAVENOUS at 02:05

## 2017-05-04 RX ADMIN — FENTANYL CITRATE 50 MCG: 50 INJECTION INTRAMUSCULAR; INTRAVENOUS at 03:05

## 2017-05-04 RX ADMIN — ROCURONIUM BROMIDE 20 MG: 10 INJECTION, SOLUTION INTRAVENOUS at 04:05

## 2017-05-04 NOTE — BRIEF OP NOTE
Patient: Marce Hickey     Date of Procedure: 5/4/2017    Procedure: Left total mastectomy  Left sentinel node injection  Excision of deep left axillary sentinel node.  Right total mastectomy    Surgeon: Damon Matos MD    Assistant: None    Pre-op Diagnosis: Malignant neoplasm of left female breast, unspecified site of breast [C50.912]     Post-op Diagnosis: Malignant neoplasm of left female breast, unspecified site of breast [C50.912]    Findings: breast cancer    Specimen: left breast  Left axillary sentinel node  Right breast    EBL: 100 cc    Complications: None

## 2017-05-04 NOTE — INTERVAL H&P NOTE
The patient has been examined and the H&P has been reviewed:    I concur with the findings and no changes have occurred since H&P was written.    Anesthesia/Surgery risks, benefits and alternative options discussed and understood by patient/family.    Will also perform contralateral prophylactic mastectomy.      Active Hospital Problems    Diagnosis  POA    Breast cancer [C50.919]  Yes      Resolved Hospital Problems    Diagnosis Date Resolved POA   No resolved problems to display.

## 2017-05-04 NOTE — ANESTHESIA PREPROCEDURE EVALUATION
05/04/2017  Marce Hickey is a 66 y.o., female.    Anesthesia Evaluation    I have reviewed the Patient Summary Reports.    I have reviewed the Nursing Notes.   I have reviewed the Medications.     Review of Systems  Anesthesia Hx:  No problems with previous Anesthesia   Denies Personal Hx of Anesthesia complications.   Social:  Former Smoker    Hematology/Oncology:        Current/Recent Cancer. Breast bilateral   Cardiovascular:   Exercise tolerance: good Hypertension Denies CAD.    Denies CABG/stent.  Denies Dysrhythmias.   Denies Angina. hyperlipidemia ECG has been reviewed.    Pulmonary:  Pulmonary Normal    Hepatic/GI:   GERD, well controlled    Neurological:  Neurology Normal    Endocrine:  Endocrine Normal    Psych:   anxiety          Physical Exam  General:  Well nourished    Airway/Jaw/Neck:  Airway Findings:      Chest/Lungs:  Chest/Lungs Findings: Clear to auscultation, Normal Respiratory Rate     Heart/Vascular:  Heart Findings: Rate: Normal  Rhythm: Regular Rhythm  Sounds: Normal        Mental Status:  Mental Status Findings:  Cooperative, Alert and Oriented         Anesthesia Plan  Type of Anesthesia, risks & benefits discussed:  Anesthesia Type:  general  Patient's Preference:   Intra-op Monitoring Plan: standard ASA monitors  Intra-op Monitoring Plan Comments:   Post Op Pain Control Plan:   Post Op Pain Control Plan Comments:   Induction:   IV  Beta Blocker:  Patient is not currently on a Beta-Blocker (No further documentation required).       Informed Consent: Patient understands risks and agrees with Anesthesia plan.  Questions answered. Anesthesia consent signed with patient.  ASA Score: 2     Day of Surgery Review of History & Physical: I have interviewed and examined the patient. I have reviewed the patient's H&P dated:  There are no significant changes.          Ready For Surgery  From Anesthesia Perspective.

## 2017-05-04 NOTE — H&P (VIEW-ONLY)
Patient ID: Marce Hickey is a 66 y.o. female.     Chief Complaint: Consult (abnormal mammogram left)     HPI 66-year-old female presents with a new finding of a left breast mass on mammogram and ultrasound. There are actually 2 lesions identified one at 12:00 and 1 at 1:00. Biopsy was recommended by radiology for both lesions. The patient has no previous history of breast problems. She did have a bilateral reduction mammoplasty many years ago. Family history is negative for breast or ovarian cancer. Her last mammogram was in 2014. She denies any pain or other symptoms. There is been no nipple discharge. There are no overlying skin changes. Age of menarche was 14. She had a hysterectomy in her early 30s. She has never taken hormone replacement. The upper breast has been sore since the mammogram and diagnostic mammogram.  Biopsy of both lesions indicates invasive ductal carcinoma.  This is ER/TX positive and HER-2 negative.  Patient is 10 well since the biopsy.  She will need a mastectomy due to the multifocal nature of her cancer.     Review of Systems   Constitutional: Negative for activity change, chills, fever and unexpected weight change.   HENT: Negative for congestion, hearing loss, sore throat and voice change.   Eyes: Negative.   Respiratory: Negative for cough, shortness of breath and wheezing.   Cardiovascular: Negative for chest pain and palpitations.   Gastrointestinal: Negative for abdominal pain, blood in stool, nausea and vomiting.   Genitourinary: Negative for dysuria, frequency and hematuria.   Musculoskeletal: Negative for arthralgias, back pain and neck pain.   Skin: Negative for color change and wound.   Allergic/Immunologic: Negative.   Neurological: Negative for dizziness, weakness and headaches.   Hematological: Negative for adenopathy. Does not bruise/bleed easily.   Psychiatric/Behavioral: Negative for confusion and dysphoric mood. The patient is not nervous/anxious.      Objective:       Physical Exam   Constitutional: She is oriented to person, place, and time. She appears well-developed and well-nourished. No distress.   HENT:   Head: Atraumatic.   Mouth/Throat: No oropharyngeal exudate.   Eyes: Conjunctivae and EOM are normal. Pupils are equal, round, and reactive to light. No scleral icterus.   Neck: Normal range of motion. Neck supple.   Cardiovascular: Normal rate, regular rhythm, normal heart sounds and intact distal pulses.   No murmur heard.  Pulmonary/Chest: No respiratory distress. She has no wheezes. She has no rales.   Breast exam: There are well healed reduction mammoplasty scars. There is typical nodularity beneath the scars. At about 11:00 in the left breast there is a palpable nodule. This roughly coincided with the site of the lesion on ultrasound. It is minimally tender. There is no nipple discharge. Lesion is firm and movable. There is no palpable axillary adenopathy.  Biopsy site is healing well without evidence of infection.  Abdominal: Soft. Bowel sounds are normal. She exhibits no distension and no mass. There is no tenderness.   Musculoskeletal: Normal range of motion. She exhibits no edema.   Lymphadenopathy:   She has no cervical adenopathy.   Neurological: She is alert and oriented to person, place, and time. No cranial nerve deficit. She exhibits normal muscle tone.   Skin: Skin is warm and dry. No rash noted. No erythema.   Psychiatric: She has a normal mood and affect. Her behavior is normal. Judgment normal.         Pathology report was reviewed and discussed with the patient and her .    FINAL PATHOLOGIC DIAGNOSIS  1. LEFT BREAST, 1:00, 6 CM FROM NIPPLE, BIOPSY:  -Invasive ductal carcinoma, combined grade1:  Tubule formation: score 1  Nuclear pleomorphism: score 1.  Mitotic rate: Score 1.  -Longest continuous tumor focus measures 4 mm on this biopsy.  -Immunohistochemical stains performed with adequate controls showed the following results:  Estrogen  receptor: Positive, strong staining in more than 95% of tumor cell nuclei.  Progesterone receptor: Positive, strong staining in more than 95% of tumor cell nuclei.  Her2: Negative, score 1+.  -Immunohistochemical stain for P63 supports the diagnosis of invasive carcinoma. Controls stained appropriately.  2. LEFT BREAST, 12:00, 4 CM FROM NIPPLE, BIOPSY:  -Invasive ductal carcinoma, combined grade1:  Tubule formation: score 1  Nuclear pleomorphism: score 2.  Mitotic rate: Score 1.  -Longest continuous tumor focus measures 9 mm on this biopsy.  -Ductal carcinoma in situ, intermediate nuclear grade.  -Immunohistochemical stains performed with adequate controls showed the following results:  Estrogen receptor: Positive, strong staining in more than 95% of tumor cell nuclei.  Progesterone receptor: Positive, strong staining in more than 95% of tumor cell nuclei.  Her2: Negative, score 1+.     Assessment:           Encounter Diagnosis   Name Primary?    Abnormal mammogram Yes    Invasive multifocal left breast cancer.     Plan:       1.  Patient will need a mastectomy of the left breast.  She is interested in reconstruction.  2.  We'll refer to plastic surgery prior to proceeding with mastectomy.  3. Risks and benefits of the planned procedure were discussed at length with the patient.  Risks and benefits of not proceeding with the procedure were discussed as well. All questions were answered. The patient expressed clear understanding and would like to proceed with the procedure as discussed.

## 2017-05-04 NOTE — OP NOTE
Certification of Assistant at Surgery       Surgery Date: 5/4/2017     Participating Surgeons:  Surgeon(s) and Role:  Panel 1:     * Damon Luis MD - Primary    Panel 2:     * Montrell Thomas MD - Primary    Procedures:  Procedure(s) (LRB):  MASTECTOMY (Bilateral)  BIOPSY-SENTINEL NODE (Left)  DISSECTION-AXILLARY LYMPH NODE (Left)  RECONSTRUCTION-BREAST (Bilateral)    Assistant Surgeon's Certification of Necessity:  I understand that section 1842 (b) (6) (d) of the Social Security Act generally prohibits Medicare Part B reasonable charge payment for the services of assistants at surgery in teaching hospitals when qualified residents are available to furnish such services. I certify that the services for which payment is claimed were medically necessary, and that no qualified resident was available to perform the services. I further understand that these services are subject to post-payment review by the Medicare carrier.      OSVALDO Yeboah    05/04/2017  6:59 PM

## 2017-05-04 NOTE — IP AVS SNAPSHOT
[address] [place] [address]  Phone: [phone]           Patient Discharge Instructions   Our goal is to set you up for success. This packet includes information on your condition, medications, and your home care.  It will help you care for yourself to prevent having to return to the hospital.     Please ask your nurse if you have any questions.      There are many details to remember when preparing to leave the hospital. Here is what you will need to do:    1. Take your medicine. If you are prescribed medications, review your Medication List on the following pages. You may have new medications to  at the pharmacy and others that you'll need to stop taking. Review the instructions for how and when to take your medications. Talk with your doctor or nurses if you are unsure of what to do.     2. Go to your follow-up appointments. Specific follow-up information is listed in the following pages. Your may be contacted by a nurse or clinical provider about future appointments. Be sure we have all of the phone numbers to reach you. Please contact your provider's office if you are unable to make an appointment.     3. Watch for warning signs. Your doctor or nurse will give you detailed warning signs to watch for and when to call for assistance. These instructions may also include educational information about your condition. If you experience any of warning signs to your health, call your doctor.           [facility]  Unless otherwise directed by your provider, please   contact [facility], our nurse care line   that is available for 24/7 assistance.     [phone] (toll-free)     Registered nurses in the [facility]   provide: appointment scheduling, clinical advisement, health education, and other advisory services.                  ** Verify the list of medication(s) below is accurate and up to date. Carry this with you in case of  emergency. If your medications have changed, please notify your healthcare provider.             Medication List      START taking these medications        Additional Info    Begin Date AM Noon PM Bedtime    cyclobenzaprine 10 MG tablet   Commonly known as:  FLEXERIL   Quantity:  41 tablet   Refills:  2   Dose:  10 mg    Last time this was given:  10 mg on 5/7/2017  6:51 AM   Instructions:  Take 1 tablet (10 mg total) by mouth 3 (three) times daily as needed for Muscle spasms.                                     hydrocodone-acetaminophen 5-325mg 5-325 mg per tablet   Commonly known as:  NORCO   Quantity:  45 tablet   Refills:  0   Dose:  1 tablet    Last time this was given:  1 tablet on 5/7/2017 11:28 AM   Instructions:  Take 1 tablet by mouth every 4 (four) hours as needed.                               oxycodone-acetaminophen 5-325 mg per tablet   Commonly known as:  PERCOCET   Quantity:  51 tablet   Refills:  0   Dose:  1 tablet    Instructions:  Take 1 tablet by mouth every 4 (four) hours as needed for Pain.                              CHANGE how you take these medications        Additional Info    Begin Date AM Noon PM Bedtime    omeprazole-sodium bicarbonate 40-1.1 mg-gram per capsule   Commonly known as:  ZEGERID   Quantity:  90 capsule   Refills:  3   Dose:  1 capsule   What changed:  when to take this    Instructions:  Take 1 capsule by mouth before breakfast.                              CONTINUE taking these medications        Additional Info    Begin Date AM Noon PM Bedtime    docusate sodium 50 MG capsule   Commonly known as:  COLACE   Refills:  0   Dose:  50     Instructions:  Take by mouth 2 (two) times daily.                            lorazepam 0.5 MG tablet   Commonly known as:  ATIVAN   Quantity:  5 tablet   Refills:  0   Dose:  0.5 mg    Instructions:  Take 1 tablet (0.5 mg total) by mouth every 6 (six) hours as needed for Anxiety.                            multivitamin per tablet   Commonly  known as:  THERAGRAN   Refills:  0   Dose:  1 tablet    Instructions:  Take 1 tablet by mouth once daily.                                 Where to Get Your Medications      You can get these medications from any pharmacy     Bring a paper prescription for each of these medications     cyclobenzaprine 10 MG tablet    hydrocodone-acetaminophen 5-325mg 5-325 mg per tablet    oxycodone-acetaminophen 5-325 mg per tablet                  Please bring to all follow up appointments:    1. A copy of your discharge instructions.  2. All medicines you are currently taking in their original bottles.  3. Identification and insurance card.    Please arrive 15 minutes ahead of scheduled appointment time.    Please call 24 hours in advance if you must reschedule your appointment and/or time.        Your Scheduled Appointments     May 29, 2017 11:00 AM CDT   Established Patient Visit with Jessica Ding MD   [facility] ([facility])    [address], [address]  [place] [address]   [phone]            Jul 31, 2017  8:00 AM CDT   Fasting Lab with [facility] ([facility]) [address]  [place] [facility] [address]   [phone]            Aug 03, 2017  3:30 PM CDT   Follow Up - Bariatric with Nelson Panchal MD   [facility] - [facility] ([facility])    [address], [address]  [place] [address]   [phone]            Aug 18, 2017  9:40 AM CDT   Established Patient Visit with Savannah Garvey MD   [facility] ([facility])    [address]  [place] [address]   [phone]              Follow-up Information     Follow up with Damon Luis MD In 1 week.    Specialties:  General Surgery, Surgery    Contact information:    [address]  [address]  [place] [address]  [phone]          Discharge Instructions     Future Orders    Call MD for:   "difficulty breathing or increased cough     Call MD for:  increased confusion or weakness     Call MD for:  persistent dizziness, light-headedness, or visual disturbances     Call MD for:  persistent nausea and vomiting or diarrhea     Call MD for:  redness, tenderness, or signs of infection (pain, swelling, redness, odor or green/yellow discharge around incision site)     Call MD for:  severe persistent headache     Call MD for:  severe uncontrolled pain     Call MD for:  temperature >100.4     Call MD for:  worsening rash     Diet general     Questions:    Total calories:      Fat restriction, if any:      Protein restriction, if any:      Na restriction, if any:      Fluid restriction:      Additional restrictions:      Lifting restrictions       Discharge References/Attachments     BREAST CANCER, TYPES OF SURGERY FOR (ENGLISH)    BREAST RECONSTRUCTION WITH FLAP PROCEDURES (ENGLISH)    BREAST SURGERY, EXERCISES AFTER: BALL SQUEEZE, ARM CROSS, BROOM STRETCH   (ENGLISH)    BREAST SURGERY, EXERCISES AFTER: WALL CLIMB, CHICKEN WING   (ENGLISH)    MASTECTOMY (ENGLISH)    MASTECTOMY OR BREAST LUMPECTOMY, DISCHARGE INSTRUCTIONS FOR (ENGLISH)        Primary Diagnosis     Your primary diagnosis was:  Breast Cancer      Admission Information     Date & Time Provider Department CSN    5/4/2017  9:02 AM Damon Luis MD [facility] [id number]      Care Providers     Provider Role Specialty Primary office phone    Damon Luis MD Attending Provider General Surgery [phone]    Montrell Thomas MD Surgeon  Plastic Surgery [phone]    Damon Luis MD Surgeon  General Surgery [phone]      Your Vitals Were     BP Pulse Temp Resp Height Weight    151/86 76 98.6 °F (37 °C) (Oral) 16 5' 4" (1.626 m) 76.2 kg (168 lb)    SpO2 BMI             99% 28.84 kg/m2         Recent Lab Values        5/4/2015 7/27/2015 9/28/2015 3/31/2016 9/27/2016 12/9/2016 1/31/2017         9:12 AM  3:20 " PM  4:45 PM  8:34 AM  9:23 AM  9:22 AM  4:36 AM     A1C 6.0 6.0 5.7 5.9 6.2 7.0 (H) 5.7     Comment for A1C at  9:23 AM on 9/27/2016:  According to ADA guidelines, hemoglobin A1C <7.0% represents  optimal control in non-pregnant diabetic patients.  Different  metrics may apply to specific populations.   Standards of Medical Care in Diabetes - 2016.  For the purpose of screening for the presence of diabetes:  <5.7%     Consistent with the absence of diabetes  5.7-6.4%  Consistent with increasing risk for diabetes   (prediabetes)  >or=6.5%  Consistent with diabetes  Currently no consensus exists for use of hemoglobin A1C  for diagnosis of diabetes for children.      Comment for A1C at  9:22 AM on 12/9/2016:  According to ADA guidelines, hemoglobin A1C <7.0% represents  optimal control in non-pregnant diabetic patients.  Different  metrics may apply to specific populations.   Standards of Medical Care in Diabetes - 2016.  For the purpose of screening for the presence of diabetes:  <5.7%     Consistent with the absence of diabetes  5.7-6.4%  Consistent with increasing risk for diabetes   (prediabetes)  >or=6.5%  Consistent with diabetes  Currently no consensus exists for use of hemoglobin A1C  for diagnosis of diabetes for children.      Comment for A1C at  4:36 AM on 1/31/2017:  According to ADA guidelines, hemoglobin A1C <7.0% represents  optimal control in non-pregnant diabetic patients.  Different  metrics may apply to specific populations.   Standards of Medical Care in Diabetes - 2016.  For the purpose of screening for the presence of diabetes:  <5.7%     Consistent with the absence of diabetes  5.7-6.4%  Consistent with increasing risk for diabetes   (prediabetes)  >or=6.5%  Consistent with diabetes  Currently no consensus exists for use of hemoglobin A1C  for diagnosis of diabetes for children.        Pending Labs     Order Current Status    Specimen to Pathology - Surgery In process      Allergies as of  5/7/2017        Reactions    Nsaids (Non-steroidal Anti-inflammatory Drug) Anaphylaxis      Advance Directives     An advance directive is a document which, in the event you are no longer able to make decisions for yourself, tells your healthcare team what kind of treatment you do or do not want to receive, or who you would like to make those decisions for you.  If you do not currently have an advance directive, [facility] encourages you to create one.  For more information call:  [phone]), [phone] ([phone]),  or log on to [email or web address].        Smoking Cessation     If you would like to quit smoking:   You may be eligible for free services if you are a [place] resident and started smoking cigarettes before September 1, 1988.  Call the Smoking Cessation Trust (SCT) toll free at (816) 551-4207 or (196) 573-3306.   Call 8-228-QUIT-NOW if you do not meet the above criteria.   Contact us via email: [email or web address]   View our website for more information: [email or web address]        Language Assistance Services     ATTENTION: Language assistance services are available, free of charge. Please call [phone].      ATENCIÓN: Si ino petty, tiene a piper disposición servicios gratuitos de asistencia lingüística. Llame al [phone].     CHÚ Ý: N?u b?n nói Ti?ng Vi?t, có các d?ch v? h? tr? ngôn ng? mi?n phí dành cho b?n. G?i s? [phone].         [facility] complies with applicable Federal civil rights laws and does not discriminate on the basis of race, color, national origin, age, disability, or sex.

## 2017-05-05 LAB
ANION GAP SERPL CALC-SCNC: 10 MMOL/L
BASOPHILS # BLD AUTO: 0 K/UL
BASOPHILS NFR BLD: 0.1 %
BUN SERPL-MCNC: 14 MG/DL
CALCIUM SERPL-MCNC: 9.1 MG/DL
CHLORIDE SERPL-SCNC: 109 MMOL/L
CO2 SERPL-SCNC: 20 MMOL/L
CREAT SERPL-MCNC: 0.8 MG/DL
DIFFERENTIAL METHOD: ABNORMAL
EOSINOPHIL # BLD AUTO: 0 K/UL
EOSINOPHIL NFR BLD: 0 %
ERYTHROCYTE [DISTWIDTH] IN BLOOD BY AUTOMATED COUNT: 14.5 %
EST. GFR  (AFRICAN AMERICAN): >60 ML/MIN/1.73 M^2
EST. GFR  (NON AFRICAN AMERICAN): >60 ML/MIN/1.73 M^2
GLUCOSE SERPL-MCNC: 141 MG/DL
HCT VFR BLD AUTO: 36 %
HGB BLD-MCNC: 11.7 G/DL
LYMPHOCYTES # BLD AUTO: 0.7 K/UL
LYMPHOCYTES NFR BLD: 8.2 %
MCH RBC QN AUTO: 28.6 PG
MCHC RBC AUTO-ENTMCNC: 32.6 %
MCV RBC AUTO: 88 FL
MONOCYTES # BLD AUTO: 0.7 K/UL
MONOCYTES NFR BLD: 7.9 %
NEUTROPHILS # BLD AUTO: 7.6 K/UL
NEUTROPHILS NFR BLD: 83.8 %
PLATELET # BLD AUTO: 117 K/UL
PMV BLD AUTO: 10.9 FL
POTASSIUM SERPL-SCNC: 4.7 MMOL/L
RBC # BLD AUTO: 4.11 M/UL
SODIUM SERPL-SCNC: 139 MMOL/L
WBC # BLD AUTO: 9.1 K/UL

## 2017-05-05 PROCEDURE — 63600175 PHARM REV CODE 636 W HCPCS: Performed by: SURGERY

## 2017-05-05 PROCEDURE — 25000003 PHARM REV CODE 250: Performed by: SURGERY

## 2017-05-05 PROCEDURE — 85025 COMPLETE CBC W/AUTO DIFF WBC: CPT

## 2017-05-05 PROCEDURE — 25000003 PHARM REV CODE 250: Performed by: PHYSICIAN ASSISTANT

## 2017-05-05 PROCEDURE — 99900035 HC TECH TIME PER 15 MIN (STAT)

## 2017-05-05 PROCEDURE — 80048 BASIC METABOLIC PNL TOTAL CA: CPT

## 2017-05-05 PROCEDURE — 94761 N-INVAS EAR/PLS OXIMETRY MLT: CPT

## 2017-05-05 PROCEDURE — 94799 UNLISTED PULMONARY SVC/PX: CPT

## 2017-05-05 PROCEDURE — C9113 INJ PANTOPRAZOLE SODIUM, VIA: HCPCS | Performed by: SURGERY

## 2017-05-05 PROCEDURE — 36415 COLL VENOUS BLD VENIPUNCTURE: CPT

## 2017-05-05 RX ORDER — BACITRACIN ZINC 500 UNIT/G
OINTMENT (GRAM) TOPICAL 2 TIMES DAILY
Status: DISCONTINUED | OUTPATIENT
Start: 2017-05-05 | End: 2017-05-07 | Stop reason: HOSPADM

## 2017-05-05 RX ADMIN — HYDROCODONE BITARTRATE AND ACETAMINOPHEN 1 TABLET: 5; 325 TABLET ORAL at 12:05

## 2017-05-05 RX ADMIN — CLINDAMYCIN HYDROCHLORIDE 300 MG: 150 CAPSULE ORAL at 10:05

## 2017-05-05 RX ADMIN — CYCLOBENZAPRINE HYDROCHLORIDE 10 MG: 10 TABLET, FILM COATED ORAL at 05:05

## 2017-05-05 RX ADMIN — HYDROMORPHONE HYDROCHLORIDE 1 MG: 2 INJECTION INTRAMUSCULAR; INTRAVENOUS; SUBCUTANEOUS at 09:05

## 2017-05-05 RX ADMIN — PANTOPRAZOLE SODIUM 40 MG: 40 INJECTION, POWDER, FOR SOLUTION INTRAVENOUS at 05:05

## 2017-05-05 RX ADMIN — SODIUM CHLORIDE: 0.9 INJECTION, SOLUTION INTRAVENOUS at 11:05

## 2017-05-05 RX ADMIN — HYDROMORPHONE HYDROCHLORIDE 1 MG: 2 INJECTION INTRAMUSCULAR; INTRAVENOUS; SUBCUTANEOUS at 03:05

## 2017-05-05 RX ADMIN — SODIUM CHLORIDE: 0.9 INJECTION, SOLUTION INTRAVENOUS at 12:05

## 2017-05-05 RX ADMIN — HYDROCODONE BITARTRATE AND ACETAMINOPHEN 1 TABLET: 5; 325 TABLET ORAL at 07:05

## 2017-05-05 RX ADMIN — ENOXAPARIN SODIUM 40 MG: 100 INJECTION SUBCUTANEOUS at 12:05

## 2017-05-05 RX ADMIN — BACITRACIN ZINC: 500 OINTMENT TOPICAL at 10:05

## 2017-05-05 RX ADMIN — CYCLOBENZAPRINE HYDROCHLORIDE 10 MG: 10 TABLET, FILM COATED ORAL at 10:05

## 2017-05-05 RX ADMIN — CYCLOBENZAPRINE HYDROCHLORIDE 10 MG: 10 TABLET, FILM COATED ORAL at 02:05

## 2017-05-05 RX ADMIN — CLINDAMYCIN HYDROCHLORIDE 300 MG: 150 CAPSULE ORAL at 02:05

## 2017-05-05 RX ADMIN — CLINDAMYCIN HYDROCHLORIDE 300 MG: 150 CAPSULE ORAL at 05:05

## 2017-05-05 RX ADMIN — SODIUM CHLORIDE: 0.9 INJECTION, SOLUTION INTRAVENOUS at 03:05

## 2017-05-05 NOTE — PLAN OF CARE
Patient is stable at this time.  VSS. Anesthesiologist at patient's bedside and is ok for patient to transfer to the floor.  Dressing to chest remains  clean, dry and intact with drains intact with no problems noted.  Pain is a 0/10.  No complaints of nausea or vomiting.  Patient tolerating po intake well.

## 2017-05-05 NOTE — TRANSFER OF CARE
"Anesthesia Transfer of Care Note    Patient: Marce Hickey    Procedure(s) Performed: Procedure(s) (LRB):  MASTECTOMY (Bilateral)  BIOPSY-SENTINEL NODE (Left)  DISSECTION-AXILLARY LYMPH NODE (Left)  RECONSTRUCTION-BREAST (Bilateral)    Patient location: PACU    Anesthesia Type: general    Transport from OR: Continuous ECG monitoring in transport. Transported from OR on 2-3 L/min O2 by NC with adequate spontaneous ventilation    Post pain: adequate analgesia    Post assessment: no apparent anesthetic complications    Post vital signs: stable    Level of consciousness: responds to stimulation and sedated    Nausea/Vomiting: no nausea/vomiting    Complications: none    Transfer of care protocol was followed      Last vitals:   Visit Vitals    BP (!) 189/95    Pulse 62    Temp 36.8 °C (98.3 °F) (Oral)    Resp 18    Ht 5' 4" (1.626 m)    Wt 76.2 kg (168 lb)    SpO2 100%    Breastfeeding No    BMI 28.84 kg/m2     "

## 2017-05-05 NOTE — PLAN OF CARE
Problem: Patient Care Overview  Goal: Plan of Care Review  Outcome: Ongoing (interventions implemented as appropriate)  A&Ox4. Spring draining light blue urine. Up with one-person assist. IVF infusing per order. Drains draining without complication. VSS. Remains afebrile throughout shift. Remains fall-free throughout shift. Comfort level established. Good pain control with prn medications. Bed low, brakes locked, SR up x2, call light within reach. Verbalized understanding of poc. Open communication facilitated. Will continue to monitor.

## 2017-05-05 NOTE — PLAN OF CARE
05/05/17 0810   Patient Assessment/Suction   Level of Consciousness (AVPU) alert   Respiratory Effort Normal;Unlabored   All Lung Fields Breath Sounds clear;equal bilaterally   PRE-TX-O2-ETCO2   O2 Device (Oxygen Therapy) room air   SpO2 (!) 94 %   Pulse Oximetry Type Intermittent   $ Pulse Oximetry - Multiple Charge Pulse Oximetry - Multiple   Pulse 68   Resp 16   Incentive Spirometer   $ Incentive Spirometer Charges done with encouragement;postop instruction;proper technique demonstrated   Predicted Level (mL) (Incentive Spirometer) 2100   Number of Repetitions (Incentive Spirometer) 10   Level (mL) (Incentive Spirometer) 1250   Patient Tolerance good   Pt assessed, no distress noted, performs IS well.

## 2017-05-05 NOTE — ANESTHESIA POSTPROCEDURE EVALUATION
"Anesthesia Post Evaluation    Patient: Marce Hickey    Procedure(s) Performed: Procedure(s) (LRB):  MASTECTOMY (Bilateral)  BIOPSY-SENTINEL NODE (Left)  DISSECTION-AXILLARY LYMPH NODE (Left)  RECONSTRUCTION-BREAST (Bilateral)    Final Anesthesia Type: general  Patient location during evaluation: PACU  Patient participation: Yes- Able to Participate  Level of consciousness: awake and alert and oriented  Post-procedure vital signs: reviewed and stable  Pain management: adequate  Airway patency: patent  PONV status at discharge: No PONV  Anesthetic complications: no      Cardiovascular status: hemodynamically stable  Respiratory status: unassisted, spontaneous ventilation and room air  Hydration status: euvolemic  Follow-up not needed.        Visit Vitals    /67    Pulse 68    Temp 36.5 °C (97.7 °F) (Temporal)    Resp 18    Ht 5' 4" (1.626 m)    Wt 76.2 kg (168 lb)    SpO2 100%    Breastfeeding No    BMI 28.84 kg/m2       Pain/Shaina Score: Pain Assessment Performed: Yes (5/4/2017 12:13 PM)  Presence of Pain: denies (5/4/2017 12:13 PM)      "

## 2017-05-05 NOTE — OP NOTE
DATE OF PROCEDURE:  05/04/2017    PROCEDURES:  1.  Left mastectomy.  2.  Left sentinel node injection.  3.  Excision of deep left axillary sentinel node.  4.  Right total mastectomy.    SURGEON:  Damon Luis Jr., M.D.    ASSISTANT:  None.    SPECIMEN:  Left axillary sentinel node and right breast.    PROCEDURE IN DETAIL:  After appropriate consent was obtained, consent forms   signed and questions answered, the patient was taken to the Operating Room and   placed on the operating room table where general endotracheal anesthesia was   induced without difficulty.  Preop antibiotics were administered.  SCDs were in   place.  Time-out procedure was performed.  The patient previously had   radiolabeled colloid injected in the left breast in Radiology for identification   of the sentinel node and I injected 2 mL of Lymphazurin into the subareolar   tissue to aid in identifying the sentinel node.  Skin incisions were made in   elliptical fashion around the nipple and superior flap was raised up to the   level of the clavicle with electrocautery.  Dissection then performed medially   to the midline and laterally to the latissimus.  I dissected the axillary tissue   and a Neoprobe was used to identify a site of strong signal.  [place] node was   identified.  This was then removed.  Frozen section showed no evidence of   metastatic disease within that node.  The inferior flap was raised down to the   level of the inframammary ridge and the breast was removed from the chest wall   from medial to lateral with electrocautery.  Attention was then turned to the   right breast.  Elliptical incisions were made around the nipple.  The skin flap   was raised superiorly to the level of the clavicle, medially to the midline and   inferiorly to the inframammary ridge and then laterally to the latissimus.  The   breast was then removed from the chest wall including the pectoralis fascia from   medial to lateral.  Adequate hemostasis  was achieved with electrocautery.  The   area was thoroughly irrigated and suctioned dry.  Dr. Thomas performed the   reconstruction from that point and that portion of the procedure will be   dictated by him.  Estimated blood loss for my portion was approximately 100 mL.    Counts were correct at the end of the procedure.      ROSALIO/  dd: 05/04/2017 17:27:02 (CDT)  td: 05/04/2017 23:02:45 (CDT)  Doc ID   #[id number]  Job ID #[id number]    CC:

## 2017-05-05 NOTE — PROGRESS NOTES
Patient arrived on floor via bed with assist from JOSEPH Salinas. O2 intact. IV fluids infusing. Dressing clean, dry, and intact. Drains intact. Spring draining blue urine.  at bedside. No complaints of pain at this time. Bed low, brakes locked, SR up x2, call light within reach.

## 2017-05-05 NOTE — PLAN OF CARE
Problem: Patient Care Overview  Goal: Plan of Care Review  Outcome: Ongoing (interventions implemented as appropriate)  POC reviewed with pt, pt verbalized understanding. Safety maintained throughout shift, bed locked and in lowest position, call light in reach, Side rails up X 2. Non skid sock on when OOB. Pt remained free of fall/trauma. Pt self reports of pain treated with PO and IV pain medication. VSS, pt remained afebrile this shift. Pt walked around unit X 2 with assistance of a walker. Pt tolerating meals wells, no reports of nausea and vomiting.Diet modified due to diet restriction after pt had gastric sleeve in January of 2017. 4 drains in place, Dressing clean, dry, and intact.   IVF infused. PO antibiotic administered. Spring D/C. Pt voided 1X at 1800.   Will continue to monitor.

## 2017-05-05 NOTE — BRIEF OP NOTE
[facility]  Brief Operative Note    SUMMARY     Surgery Date: 5/4/2017     Surgeon(s) and Role:  Panel 1:     * Damon Luis MD - Primary    Panel 2:     * Montrell Thomas MD - Primary    Assisting Surgeon: None    Pre-op Diagnosis:  Malignant neoplasm of left female breast, unspecified site of breast [C50.912]    Post-op Diagnosis:  Post-Op Diagnosis Codes:     * Malignant neoplasm of left female breast, unspecified site of breast [C50.912]    Procedure(s) (LRB):  MASTECTOMY (Bilateral)  BIOPSY-SENTINEL NODE (Left)  DISSECTION-AXILLARY LYMPH NODE (Left)  RECONSTRUCTION-BREAST (Bilateral)    Anesthesia: General    Description of Procedure: Bilateral breast reconstruction with Tissue expander placement    Description of the findings of the procedure: No complications, please see dictated op note for detail    Estimated Blood Loss: * No values recorded between 5/4/2017  3:07 PM and 5/4/2017  7:03 PM *         Specimens:   Specimen (12h ago through future)    Start     Ordered    05/04/17 1855  Specimen to Pathology - Surgery  Once     Comments:  Pre-op Diagnosis: Malignant neoplasm of left female breast, unspecified site of breast [C50.912]    Post-op Diagnosis: SAME    Procedure(s):  MASTECTOMY  BIOPSY-SENTINEL NODE  DISSECTION-AXILLARY LYMPH NODE  RECONSTRUCTION-BREAST     Number of specimens: 2    Name of specimens: #3 MORE RIGHT BREAST TISSUE  4. MORE LEFT BREAST TISSUE    05/04/17 1856    05/04/17 1624  Specimen to Pathology - Surgery  Once     Comments:  Pre-op Diagnosis: Malignant neoplasm of left female breast, unspecified site of breast [C50.912]    Post-op Diagnosis: SAME    Procedure(s):  MASTECTOMY  BIOPSY-SENTINEL NODE  DISSECTION-AXILLARY LYMPH NODE  RECONSTRUCTION-BREAST     Number of specimens: 2    Name of specimens: 1. LEFT BREAST: STITCH TO AXILLARY TAIL--FRESH STATE  2.RIGHT BREAST-FRESH STATE    05/04/17 1627    05/04/17 1530  Specimen to Pathology - Surgery   Once     Comments:  Pre-op Diagnosis: Malignant neoplasm of left female breast, unspecified site of breast [C50.912]    Post-op Diagnosis: breast cancer left    Procedure(s):  MASTECTOMY  BIOPSY-SENTINEL NODE  DISSECTION-AXILLARY LYMPH NODE  RECONSTRUCTION-BREAST     Number of specimens: 1    Name of specimens: LEFT AXILLARY NODE: FROZEN SECTION    05/04/17 1531

## 2017-05-06 PROCEDURE — 12000002 HC ACUTE/MED SURGE SEMI-PRIVATE ROOM

## 2017-05-06 PROCEDURE — C9113 INJ PANTOPRAZOLE SODIUM, VIA: HCPCS | Performed by: SURGERY

## 2017-05-06 PROCEDURE — 25000003 PHARM REV CODE 250: Performed by: PHYSICIAN ASSISTANT

## 2017-05-06 PROCEDURE — 94761 N-INVAS EAR/PLS OXIMETRY MLT: CPT

## 2017-05-06 PROCEDURE — 25000003 PHARM REV CODE 250: Performed by: SURGERY

## 2017-05-06 PROCEDURE — 63600175 PHARM REV CODE 636 W HCPCS: Performed by: SURGERY

## 2017-05-06 PROCEDURE — 99900035 HC TECH TIME PER 15 MIN (STAT)

## 2017-05-06 PROCEDURE — 94799 UNLISTED PULMONARY SVC/PX: CPT

## 2017-05-06 RX ADMIN — HYDROCODONE BITARTRATE AND ACETAMINOPHEN 1 TABLET: 5; 325 TABLET ORAL at 03:05

## 2017-05-06 RX ADMIN — CLINDAMYCIN HYDROCHLORIDE 300 MG: 150 CAPSULE ORAL at 10:05

## 2017-05-06 RX ADMIN — BACITRACIN ZINC: 500 OINTMENT TOPICAL at 11:05

## 2017-05-06 RX ADMIN — CLINDAMYCIN HYDROCHLORIDE 300 MG: 150 CAPSULE ORAL at 06:05

## 2017-05-06 RX ADMIN — CYCLOBENZAPRINE HYDROCHLORIDE 10 MG: 10 TABLET, FILM COATED ORAL at 06:05

## 2017-05-06 RX ADMIN — HYDROMORPHONE HYDROCHLORIDE 1 MG: 2 INJECTION INTRAMUSCULAR; INTRAVENOUS; SUBCUTANEOUS at 02:05

## 2017-05-06 RX ADMIN — SODIUM CHLORIDE: 0.9 INJECTION, SOLUTION INTRAVENOUS at 06:05

## 2017-05-06 RX ADMIN — HYDROMORPHONE HYDROCHLORIDE 1 MG: 2 INJECTION INTRAMUSCULAR; INTRAVENOUS; SUBCUTANEOUS at 10:05

## 2017-05-06 RX ADMIN — PANTOPRAZOLE SODIUM 40 MG: 40 INJECTION, POWDER, FOR SOLUTION INTRAVENOUS at 06:05

## 2017-05-06 RX ADMIN — CYCLOBENZAPRINE HYDROCHLORIDE 10 MG: 10 TABLET, FILM COATED ORAL at 10:05

## 2017-05-06 RX ADMIN — ENOXAPARIN SODIUM 40 MG: 100 INJECTION SUBCUTANEOUS at 01:05

## 2017-05-06 RX ADMIN — CLINDAMYCIN HYDROCHLORIDE 300 MG: 150 CAPSULE ORAL at 03:05

## 2017-05-06 RX ADMIN — CYCLOBENZAPRINE HYDROCHLORIDE 10 MG: 10 TABLET, FILM COATED ORAL at 03:05

## 2017-05-06 RX ADMIN — BACITRACIN ZINC: 500 OINTMENT TOPICAL at 10:05

## 2017-05-06 NOTE — PROGRESS NOTES
Pt seen and examined.  POD 3 s/p bilateral mastectomy with reconstruction  Cont pain.  Poorly controlled.  Does not want to go home      Wt Readings from Last 3 Encounters:   05/04/17 76.2 kg (168 lb)   05/03/17 76.8 kg (169 lb 6.4 oz)   04/27/17 76.7 kg (169 lb)     Temp Readings from Last 3 Encounters:   05/06/17 98.9 °F (37.2 °C) (Oral)   05/03/17 97.6 °F (36.4 °C) (Oral)   04/19/17 98 °F (36.7 °C) (Oral)     BP Readings from Last 3 Encounters:   05/06/17 (!) 143/70   05/03/17 (!) 185/100   04/27/17 (!) 149/90     Pulse Readings from Last 3 Encounters:   05/06/17 77   05/03/17 72   04/27/17 76     AAOx3  CTA B  Sinus  Soft/nt/nd    A/P: s/p B mastectomy with reconstruction.  Cont current care h  Hopefully home tomorrow

## 2017-05-06 NOTE — PLAN OF CARE
05/06/17 0757   Patient Assessment/Suction   Level of Consciousness (AVPU) alert   Respiratory Effort Normal;Unlabored   Expansion/Accessory Muscles/Retractions no use of accessory muscles   All Lung Fields Breath Sounds clear   Rhythm/Pattern, Respiratory depth regular;pattern regular;unlabored   Cough Type good;nonproductive   PRE-TX-O2-ETCO2   O2 Device (Oxygen Therapy) room air   SpO2 98 %   Pulse Oximetry Type Intermittent   $ Pulse Oximetry - Multiple Charge Pulse Oximetry - Multiple   Pulse 74   Resp 16   Positioning HOB elevated 45 degrees   Incentive Spirometer   $ Incentive Spirometer Charges done independently per patient;done with encouragement   Predicted Level (mL) (Incentive Spirometer) 2100   Administration (Incentive Spirometer) done with encouragement   Number of Repetitions (Incentive Spirometer) 10   Level (mL) (Incentive Spirometer) 1750   Patient Tolerance good

## 2017-05-06 NOTE — PLAN OF CARE
Problem: Patient Care Overview  Goal: Plan of Care Review  Outcome: Ongoing (interventions implemented as appropriate)  Pt remains free from fall or injury. Ambulated the roberto several times today. Pain controlled with PO and one dose of IV meds. Wound care provided to sheron. chest. Pt resting well between care. Afebrile and VS stable. Call light in reach. Will continue to monitor.

## 2017-05-06 NOTE — PLAN OF CARE
05/05/17 2000   Patient Assessment/Suction   Level of Consciousness (AVPU) alert   Respiratory Effort Normal;Unlabored   Expansion/Accessory Muscles/Retractions expansion symmetric   All Lung Fields Breath Sounds clear   Rhythm/Pattern, Respiratory pattern regular   PRE-TX-O2-ETCO2   O2 Device (Oxygen Therapy) room air   SpO2 98 %   Pulse Oximetry Type Intermittent   $ Pulse Oximetry - Multiple Charge Pulse Oximetry - Multiple   Pulse 71   Resp 16   Positioning HOB elevated 45 degrees   Incentive Spirometer   $ Incentive Spirometer Charges done independently per patient   Administration (Incentive Spirometer) done independently per patient;proper technique demonstrated   Patient Tolerance good

## 2017-05-06 NOTE — PROGRESS NOTES
Mrs. Grissom requested not to be woken up during the night. Took vitals when she called to use the bathroom. Pain medication given at that time.

## 2017-05-06 NOTE — PLAN OF CARE
Problem: Patient Care Overview  Goal: Plan of Care Review  Outcome: Ongoing (interventions implemented as appropriate)  Resting quietly during two hour rounds. Resistant to taking pain medication. Did agree to take some early this AM. Pain controlled with PRN pain medication. BiLat mastectomy. NO needle sticks L arm.  Dsg. Changed. Ordered BID, 9AM & 9PM. ABX ointment in Ms. Burk room. Oral ABX therapy in progress. Escorted to rest room with stand-by assist. No falls or trauma this shift.Pt. Verbalizes understanding of their plan of care.

## 2017-05-06 NOTE — PROGRESS NOTES
Dr. Dale on pt rounds, new orders given for bacitracin topical ointment 2 times daily and dressing changes ordered 2 times daily, cleanse with cleanser, apply bacitracin ointment, xeroform and abd pain to bilateral breast.

## 2017-05-06 NOTE — UM SECONDARY REVIEW
Physician Advisor External    Level of Care Issue    Approved Inpatient for date of surgery 5/4/17 per dr weeks at [facility]..... Will contact attending...

## 2017-05-07 VITALS
SYSTOLIC BLOOD PRESSURE: 146 MMHG | RESPIRATION RATE: 16 BRPM | HEIGHT: 64 IN | DIASTOLIC BLOOD PRESSURE: 85 MMHG | BODY MASS INDEX: 28.68 KG/M2 | WEIGHT: 168 LBS | TEMPERATURE: 99 F | OXYGEN SATURATION: 98 % | HEART RATE: 69 BPM

## 2017-05-07 PROCEDURE — 25000003 PHARM REV CODE 250: Performed by: PHYSICIAN ASSISTANT

## 2017-05-07 PROCEDURE — 25000003 PHARM REV CODE 250: Performed by: SURGERY

## 2017-05-07 PROCEDURE — 94799 UNLISTED PULMONARY SVC/PX: CPT

## 2017-05-07 PROCEDURE — 63600175 PHARM REV CODE 636 W HCPCS: Performed by: SURGERY

## 2017-05-07 RX ORDER — PANTOPRAZOLE SODIUM 40 MG/1
40 TABLET, DELAYED RELEASE ORAL DAILY
Status: DISCONTINUED | OUTPATIENT
Start: 2017-05-07 | End: 2017-05-07 | Stop reason: HOSPADM

## 2017-05-07 RX ORDER — HYDROCODONE BITARTRATE AND ACETAMINOPHEN 5; 325 MG/1; MG/1
1 TABLET ORAL EVERY 4 HOURS PRN
Qty: 45 TABLET | Refills: 0 | Status: SHIPPED | OUTPATIENT
Start: 2017-05-07 | End: 2017-05-29

## 2017-05-07 RX ADMIN — HYDROCODONE BITARTRATE AND ACETAMINOPHEN 1 TABLET: 5; 325 TABLET ORAL at 02:05

## 2017-05-07 RX ADMIN — CLINDAMYCIN HYDROCHLORIDE 300 MG: 150 CAPSULE ORAL at 06:05

## 2017-05-07 RX ADMIN — PANTOPRAZOLE SODIUM 40 MG: 40 TABLET, DELAYED RELEASE ORAL at 10:05

## 2017-05-07 RX ADMIN — CYCLOBENZAPRINE HYDROCHLORIDE 10 MG: 10 TABLET, FILM COATED ORAL at 02:05

## 2017-05-07 RX ADMIN — HYDROCODONE BITARTRATE AND ACETAMINOPHEN 1 TABLET: 5; 325 TABLET ORAL at 11:05

## 2017-05-07 RX ADMIN — CLINDAMYCIN HYDROCHLORIDE 300 MG: 150 CAPSULE ORAL at 02:05

## 2017-05-07 RX ADMIN — HYDROCODONE BITARTRATE AND ACETAMINOPHEN 1 TABLET: 5; 325 TABLET ORAL at 07:05

## 2017-05-07 RX ADMIN — CYCLOBENZAPRINE HYDROCHLORIDE 10 MG: 10 TABLET, FILM COATED ORAL at 06:05

## 2017-05-07 RX ADMIN — BACITRACIN ZINC: 500 OINTMENT TOPICAL at 10:05

## 2017-05-07 RX ADMIN — ENOXAPARIN SODIUM 40 MG: 100 INJECTION SUBCUTANEOUS at 11:05

## 2017-05-07 NOTE — PROGRESS NOTES
Discharge instructions given. Pt verbalized understanding. New scripts given. Afebrile and VS stable. Pt ambulated to vehicle with nurse. All belongings sent.

## 2017-05-07 NOTE — PROGRESS NOTES
05/06/17 2123   Patient Assessment/Suction   Level of Consciousness (AVPU) alert   All Lung Fields Breath Sounds clear   PRE-TX-O2-ETCO2   O2 Device (Oxygen Therapy) room air   SpO2 99 %   Pulse Oximetry Type Intermittent   $ Pulse Oximetry - Multiple Charge Pulse Oximetry - Multiple   Incentive Spirometer   $ Incentive Spirometer Charges done independently per patient   Administration (Incentive Spirometer) ready for self-administration

## 2017-05-07 NOTE — PROGRESS NOTES
Pt resting comfortabl.  Pain better controlled.   Wants to go home    AFVSS  AAox3  CTA   Sinus  C/d/i    A/P: s/p bilateral mastectomy with implant    D/c home

## 2017-05-07 NOTE — DISCHARGE SUMMARY
[facility]  Discharge Summary      Admit Date: 5/4/2017    Discharge Date and Time:  05/07/2017 2:21 PM    Attending Physician: Damon Luis MD     Reason for Admission: s/p bilateral mastectomy with recnostruction    Procedures Performed: Procedure(s) (LRB):  MASTECTOMY (Bilateral)  BIOPSY-SENTINEL NODE (Left)  DISSECTION-AXILLARY LYMPH NODE (Left)  RECONSTRUCTION-BREAST (Bilateral)    Hospital Course (synopsis of major diagnoses, care, treatment, and services provided during the course of the hospital stay): Pt admitted following in surgery.  Pt remained in hospital until POD 3 secondar to pain control.  Pain controlled on POD 3 and pt d/c home.      Consults: none    Significant Diagnostic Studies: Labs: BMP: No results for input(s): GLU, NA, K, CL, CO2, BUN, CREATININE, CALCIUM, MG in the last 48 hours. and CBC No results for input(s): WBC, HGB, HCT, PLT in the last 48 hours.    Final Diagnoses:    Principal Problem: Breast cancer   Secondary Diagnoses:   Active Hospital Problems    Diagnosis  POA    *Breast cancer [C50.919]  Yes      Resolved Hospital Problems    Diagnosis Date Resolved POA   No resolved problems to display.       Discharged Condition: good    Disposition: Home or Self Care    Follow Up/Patient Instructions:     Medications:  Reconciled Home Medications:   Current Discharge Medication List      START taking these medications    Details   hydrocodone-acetaminophen 5-325mg (NORCO) 5-325 mg per tablet Take 1 tablet by mouth every 4 (four) hours as needed.  Qty: 45 tablet, Refills: 0         CONTINUE these medications which have NOT CHANGED    Details   docusate sodium (COLACE) 50 MG capsule Take by mouth 2 (two) times daily.      lorazepam (ATIVAN) 0.5 MG tablet Take 1 tablet (0.5 mg total) by mouth every 6 (six) hours as needed for Anxiety.  Qty: 5 tablet, Refills: 0      multivitamin (THERAGRAN) per tablet Take 1 tablet by mouth once daily.      omeprazole-sodium  bicarbonate (ZEGERID) 40-1.1 mg-gram per capsule Take 1 capsule by mouth before breakfast.  Qty: 90 capsule, Refills: 3      cyclobenzaprine (FLEXERIL) 10 MG tablet Take 1 tablet (10 mg total) by mouth 3 (three) times daily as needed for Muscle spasms.  Qty: 41 tablet, Refills: 2      oxycodone-acetaminophen (PERCOCET) 5-325 mg per tablet Take 1 tablet by mouth every 4 (four) hours as needed for Pain.  Qty: 51 tablet, Refills: 0             Discharge Procedure Orders  Diet general     Lifting restrictions     Call MD for:  increased confusion or weakness     Call MD for:  persistent dizziness, light-headedness, or visual disturbances     Call MD for:  worsening rash     Call MD for:  severe persistent headache     Call MD for:  difficulty breathing or increased cough     Call MD for:  redness, tenderness, or signs of infection (pain, swelling, redness, odor or green/yellow discharge around incision site)     Call MD for:  severe uncontrolled pain     Call MD for:  persistent nausea and vomiting or diarrhea     Call MD for:  temperature >100.4       Follow-up Information     Follow up with Damon Luis MD In 1 week.    Specialties:  General Surgery, Surgery    Contact information:    [address]  [address]  [facility] [id number] [phone]

## 2017-05-07 NOTE — PLAN OF CARE
Problem: Patient Care Overview  Goal: Plan of Care Review  Outcome: Ongoing (interventions implemented as appropriate)  Resting quietly during two hour rounds. No complaints of pain this shift. Dressing change performed to bilateral mastectomy. 4 drains intact and patent. States she is ready to go home today. Assisted to BR PRN.  socks in use. No falls or trauma this shift.Pt. Verbalizes understanding of their plan of care.

## 2017-05-07 NOTE — PLAN OF CARE
Problem: Patient Care Overview  Goal: Plan of Care Review  Outcome: Ongoing (interventions implemented as appropriate)  Encouraged patient to use IS Q1 hrs

## 2017-05-07 NOTE — DISCHARGE SUMMARY
[facility]  Discharge Summary      Patient Name: Marce Hickey  MRN: [id number]  Admission Date: 5/4/2017  Hospital Length of Stay: 1 days  Discharge Date and Time:  05/07/2017 2:20 PM  Attending Physician: Damon uLis MD   Discharging Provider: Shalom Batista MD  Primary Care Provider: Savannah Garvey MD    HPI:        Procedure(s) (LRB):  MASTECTOMY (Bilateral)  BIOPSY-SENTINEL NODE (Left)  DISSECTION-AXILLARY LYMPH NODE (Left)  RECONSTRUCTION-BREAST (Bilateral)      Indwelling Lines/Drains at time of discharge:   Lines/Drains/Airways     Drain                 Drain/Device  05/04/17 1732 Left breast  2 days         Drain/Device  05/04/17 1732 Left chest collapsible closed device 2 days         Drain/Device  05/04/17 1734 Right breast other (see comments) 2 days         Drain/Device  05/04/17 1734 Right chest collapsible closed device 2 days              Hospital Course: Pt admitted on 5/4/7 s/p bilateral mastectomy with implant.  Pt remained in hospital until adequate pain control which was on POD 3.  D/c home at that time    Consults:     Significant Diagnostic Studies: Labs: BMP: No results for input(s): GLU, NA, K, CL, CO2, BUN, CREATININE, CALCIUM, MG in the last 48 hours. and CBC No results for input(s): WBC, HGB, HCT, PLT in the last 48 hours.    Pending Diagnostic Studies:     None        Final Active Diagnoses:    Diagnosis Date Noted POA    PRINCIPAL PROBLEM:  Breast cancer [C50.919] 05/04/2017 Yes      Problems Resolved During this Admission:    Diagnosis Date Noted Date Resolved POA      Discharged Condition: good    Disposition: Home or Self Care    Follow Up:  Follow-up Information     Follow up with Damon Luis MD In 1 week.    Specialties:  General Surgery, Surgery    Contact information:    [facility]  [address]  [facility] [id number] [phone]          Patient Instructions:     Diet general     Lifting restrictions     Call MD for:   increased confusion or weakness     Call MD for:  persistent dizziness, light-headedness, or visual disturbances     Call MD for:  worsening rash     Call MD for:  severe persistent headache     Call MD for:  difficulty breathing or increased cough     Call MD for:  redness, tenderness, or signs of infection (pain, swelling, redness, odor or green/yellow discharge around incision site)     Call MD for:  severe uncontrolled pain     Call MD for:  persistent nausea and vomiting or diarrhea     Call MD for:  temperature >100.4       Medications:  Reconciled Home Medications:   Current Discharge Medication List      START taking these medications    Details   hydrocodone-acetaminophen 5-325mg (NORCO) 5-325 mg per tablet Take 1 tablet by mouth every 4 (four) hours as needed.  Qty: 45 tablet, Refills: 0         CONTINUE these medications which have NOT CHANGED    Details   docusate sodium (COLACE) 50 MG capsule Take by mouth 2 (two) times daily.      lorazepam (ATIVAN) 0.5 MG tablet Take 1 tablet (0.5 mg total) by mouth every 6 (six) hours as needed for Anxiety.  Qty: 5 tablet, Refills: 0      multivitamin (THERAGRAN) per tablet Take 1 tablet by mouth once daily.      omeprazole-sodium bicarbonate (ZEGERID) 40-1.1 mg-gram per capsule Take 1 capsule by mouth before breakfast.  Qty: 90 capsule, Refills: 3      cyclobenzaprine (FLEXERIL) 10 MG tablet Take 1 tablet (10 mg total) by mouth 3 (three) times daily as needed for Muscle spasms.  Qty: 41 tablet, Refills: 2      oxycodone-acetaminophen (PERCOCET) 5-325 mg per tablet Take 1 tablet by mouth every 4 (four) hours as needed for Pain.  Qty: 51 tablet, Refills: 0           Time spent on the discharge of patient: 15 minutes    Shalom Batista MD  General Surgery  [facility]

## 2017-05-08 NOTE — PLAN OF CARE
05/08/17 1152   Final Note   Assessment Type Final Discharge Note   Discharge Disposition Home   Discharge planning education complete? Yes

## 2017-05-09 ENCOUNTER — PROCEDURE VISIT (OUTPATIENT)
Dept: PLASTIC SURGERY | Facility: CLINIC | Age: 67
End: 2017-05-09
Payer: MEDICARE

## 2017-05-09 VITALS — HEART RATE: 83 BPM | TEMPERATURE: 98 F | DIASTOLIC BLOOD PRESSURE: 84 MMHG | SYSTOLIC BLOOD PRESSURE: 184 MMHG

## 2017-05-09 DIAGNOSIS — Z09 SURGERY FOLLOW-UP EXAMINATION: Primary | ICD-10-CM

## 2017-05-09 NOTE — PROCEDURES
DATE OF PROCEDURE:  05/09/2017    PREOPERATIVE DIAGNOSIS:  History of breast cancer.    SECONDARY DIAGNOSIS:  Nonviable mastectomy flap skin measuring approximately 12   cm x 5 mm.    POSTOPERATIVE DIAGNOSIS:  Nonviable mastectomy flap skin measuring approximately   12 cm x 5 mm.    PROCEDURES PERFORMED:  Resection of nonviable skin with primary closure.  Final   incision length is approximately 16 cm.    SURGEON:  Montrell Thomas M.D., [facility]    ANESTHESIA:  Local.    PROCEDURE IN DETAIL:  The patient recently had a mastectomy.  We evaluated in   clinic today.  She has some nonviable skin on the mastectomy flap at the suture   line.  This was excised.  This was approximately 5 mm along the whole edge for   about 16 cm.  This was excised.  The incision was then closed primarily using   interrupted 3-0 nylon sutures.  There were no complications with this procedure.      RAZ/CATRACHITO  dd: 05/09/2017 17:09:52 (CDT)  td: 05/09/2017 21:05:07 (CDT)  Doc ID   #[id number]  Job ID #[id number]    CC:

## 2017-05-12 ENCOUNTER — OFFICE VISIT (OUTPATIENT)
Dept: PLASTIC SURGERY | Facility: CLINIC | Age: 67
End: 2017-05-12
Payer: MEDICARE

## 2017-05-12 ENCOUNTER — TELEPHONE (OUTPATIENT)
Dept: PLASTIC SURGERY | Facility: CLINIC | Age: 67
End: 2017-05-12

## 2017-05-12 DIAGNOSIS — Z09 SURGERY FOLLOW-UP EXAMINATION: Primary | ICD-10-CM

## 2017-05-12 PROCEDURE — 99024 POSTOP FOLLOW-UP VISIT: CPT | Mod: ,,, | Performed by: SURGERY

## 2017-05-12 NOTE — TELEPHONE ENCOUNTER
----- Message from Jacob Gonzalez sent at 5/12/2017  1:53 PM CDT -----  Contact: pt  Pt is calling about on of the medications that she is taking is tearing her throat up and needs to know what to do  Call Back#[phone]  Thanks

## 2017-05-12 NOTE — PROGRESS NOTES
Ms. Hickey is status post mastectomy with reconstruction using tissue expander.    She has done great.  Two drains were removed.  She will return next week.      CRB/PN  dd: 05/12/2017 11:30:31 (CDT)  td: 05/12/2017 15:53:11 (CDT)  Doc ID   #[id number]  Job ID #[id number]    CC:

## 2017-05-12 NOTE — TELEPHONE ENCOUNTER
Returned pt's call re: c/o intolerable acid reflux and a burning sensation at her esophagus caused by Clindamycin. Pt instructed to stop taking the medication per .

## 2017-05-15 ENCOUNTER — OFFICE VISIT (OUTPATIENT)
Dept: SURGERY | Facility: CLINIC | Age: 67
End: 2017-05-15
Payer: MEDICARE

## 2017-05-15 ENCOUNTER — PATIENT MESSAGE (OUTPATIENT)
Dept: PLASTIC SURGERY | Facility: CLINIC | Age: 67
End: 2017-05-15

## 2017-05-15 DIAGNOSIS — C50.912 MALIGNANT NEOPLASM OF LEFT FEMALE BREAST, UNSPECIFIED SITE OF BREAST: Primary | ICD-10-CM

## 2017-05-15 PROCEDURE — 99024 POSTOP FOLLOW-UP VISIT: CPT | Mod: ,,, | Performed by: SURGERY

## 2017-05-15 PROCEDURE — 99211 OFF/OP EST MAY X REQ PHY/QHP: CPT | Mod: PBBFAC,PO | Performed by: SURGERY

## 2017-05-15 PROCEDURE — 99999 PR PBB SHADOW E&M-EST. PATIENT-LVL I: CPT | Mod: PBBFAC,,, | Performed by: SURGERY

## 2017-05-15 NOTE — OP NOTE
DATE OF PROCEDURE:  05/04/2017    PREOPERATIVE DIAGNOSIS:  Breast cancer.    POSTOPERATIVE DIAGNOSIS:  Breast cancer.    PROCEDURES PERFORMED:  Bilateral breast reconstruction using tissue expanders;   placement of AlloDerm, left breast; and place of AlloDerm, right breast.    SURGEON:  Montrell Thomas M.D., F.A.C.S.    ANESTHESIA:  General.    DESCRIPTION OF PROCEDURE:  After completion of the mastectomy, I entered the   room.  Looking at the incision, it was an oblique incision.  There was a   transverse incision across the center of the breast.  The patient did have   previous inferior pedicle breast reduction incisions as well which were not   opened.  Pectoralis major muscle was divided from its insertion site and was   elevated from the chest wall.  Next, a piece of perforated AlloDerm was soaked   in triple antibiotic solution.  It was sutured to the previous insertion site of   the pectoralis major muscle using interrupted 2-0 Vicryl, followed by a running   2-0 PDS mattress suture.  The AlloDerm was then sutured to the free edge of the   pectoralis major muscle using interrupted Vicryl, followed by a running 2-0 PDS   horizontal mattress suture.  Similar procedure was performed on the opposite   side.  Gloves were then changed.  Chest wall was then reprepped and draped.  Two   600 mL tissue expanders were opened on the back table, soaked in antibiotic   solution, and placed in the pockets.  They were not inflated at all.  Inspecting   the skin, the portion between the transverse incision of the mastectomy and the   inframammary fold was noted to be very ischemic.  This was completely excised.    Thus, it was necessary to elevate large superior flaps to bring it down to the   inframammary fold.  This was done bilaterally.  A similar procedure was   performed on the opposite side.  Thus, all the previous inferior pedicle breast   reduction incisions were excised.  The incisions on both sides were then  closed   using 3-0 Monocryl, followed by a running 4-0 Monocryl subcuticular suture.  A   drain had been placed on each side beneath the implant and one in the   subcutaneous space.  There were no complications.      RAZ/CATRACHITO  dd: 05/15/2017 12:31:52 (CDT)  td: 05/15/2017 17:30:25 (CDT)  Doc ID   #[id number]  Job ID #[id number]    CC:

## 2017-05-15 NOTE — PROGRESS NOTES
POST-OP NOTE    DATE OF PROCEDURE: 5/4/17    PROCEDURE: Left mastectomy with sentinel node biopsy and contralateral mastectomy.    COMPLAINTS: Still having some pain especially at the drain site.    EXAM: Incision well approximated. No drainage. No infection.          IMPRESSION: FINAL PATHOLOGIC DIAGNOSIS  1. [place] lymph node, left axillary, excision:  One lymph node NEGATIVE for metastatic carcinoma (0/1).  2. Breast, left, mastectomy:  Residual invasive ductal carcinoma at 1:00 position, low grade, 1.64 mm (pathologic staging: pT1a N0). SEE  SYNOPTIC REPORT.  Residual invasive ductal carcinoma at 12:00 position, low grade,10 mm (pathologic staging: pT1b N0). SEE  SYNOPTIC REPORT.  Negative surgical margins.  Previous biopsy sites.  3. Breast, right, mastectomy:  Benign nipple, skin, and breast parenchyma.  Negative for malignancy.    PLAN: FU as needed.  FU with Dr. Thomas.  Patient is seeing Dr. Ding.

## 2017-05-16 ENCOUNTER — TELEPHONE (OUTPATIENT)
Dept: PLASTIC SURGERY | Facility: CLINIC | Age: 67
End: 2017-05-16

## 2017-05-16 ENCOUNTER — PATIENT MESSAGE (OUTPATIENT)
Dept: PLASTIC SURGERY | Facility: CLINIC | Age: 67
End: 2017-05-16

## 2017-05-18 ENCOUNTER — PATIENT MESSAGE (OUTPATIENT)
Dept: PLASTIC SURGERY | Facility: CLINIC | Age: 67
End: 2017-05-18

## 2017-05-18 ENCOUNTER — CLINICAL SUPPORT (OUTPATIENT)
Dept: PLASTIC SURGERY | Facility: CLINIC | Age: 67
End: 2017-05-18
Payer: MEDICARE

## 2017-05-18 ENCOUNTER — TELEPHONE (OUTPATIENT)
Dept: PLASTIC SURGERY | Facility: CLINIC | Age: 67
End: 2017-05-18

## 2017-05-18 DIAGNOSIS — G89.18 POST-OPERATIVE PAIN: ICD-10-CM

## 2017-05-18 NOTE — PROGRESS NOTES
PLASTIC SURGERY CLINIC NOTE    Marce Hickey is a 67 y.o. female   DATE OF PROCEDURE: 05/04/2017     POSTOPERATIVE DIAGNOSIS: Breast cancer.     PROCEDURES PERFORMED: Bilateral breast reconstruction using tissue expanders;   placement of AlloDerm, left breast; and place of AlloDerm, right breast.    DATE OF PROCEDURE: 05/09/2017     POSTOPERATIVE DIAGNOSIS: Nonviable mastectomy flap skin measuring approximately  12 cm x 5 mm.     PROCEDURES PERFORMED: Resection of nonviable skin with primary closure. Final   incision length is approximately 16 cm.    Presents to clinic due to burning pain at right LUIS drain site. Pain is difficult to control. She would like this removed. Output in <5cc/day and will be able to remove.     PHYSICAL EXAM:  There were no vitals filed for this visit.    General: NAD, pain 2/2 right drain  Neuro: AAOx3  Resp: breathing even and unlabored  Breast: Left breast s/p incisional revision of nonviable inferior skin flap with nylon sutures in place, cdi, no erythema or drainage or necrosis, LUIS drain in place with SS output, 20-25cc/day  Right breast incision healing well, LUIS intact <5cc/day, ttp at drain site   Ext: Warm and well perfused      ASSESSMENT/PLAN:  67 y.o. female with bilateral breast reconstruction using tissue expanders; placement of AlloDerm, left breast; and place of AlloDerm, right breast 5/4/17 with inferior flap necrosis with resection of nonviable skin 5/9/17.    - removed right LUIS  - RTC in 1 week for left LUIS drain removal    Edilma Miles MD  General Surgery, PGY-I  Pager: [phone]

## 2017-05-23 ENCOUNTER — TELEPHONE (OUTPATIENT)
Dept: PLASTIC SURGERY | Facility: CLINIC | Age: 67
End: 2017-05-23

## 2017-05-23 NOTE — TELEPHONE ENCOUNTER
----- Message from Stephanie Young sent at 5/23/2017 10:50 AM CDT -----  Contact: self  Patient wants to speak with a nurse, states the liquid in the last drain is green in color. Please call back at [phone] (home)

## 2017-05-23 NOTE — TELEPHONE ENCOUNTER
Returned pt's call re: c/o small a small amount of green fluid noted in her LUIS drain.  Informed pt that it may be caused by the marking pen used during surgery and is nothing to be alarmed about per VIVI Clancy. Pt denies c/o redness or swelling. Pt  Is scheduled to be seen in clinic tomorrow for assessment.

## 2017-05-24 ENCOUNTER — OFFICE VISIT (OUTPATIENT)
Dept: PLASTIC SURGERY | Facility: CLINIC | Age: 67
End: 2017-05-24
Payer: MEDICARE

## 2017-05-24 VITALS
HEART RATE: 78 BPM | BODY MASS INDEX: 27.49 KG/M2 | DIASTOLIC BLOOD PRESSURE: 89 MMHG | WEIGHT: 160.19 LBS | TEMPERATURE: 98 F | SYSTOLIC BLOOD PRESSURE: 132 MMHG

## 2017-05-24 DIAGNOSIS — Z09 SURGERY FOLLOW-UP EXAMINATION: Primary | ICD-10-CM

## 2017-05-24 PROCEDURE — 99213 OFFICE O/P EST LOW 20 MIN: CPT | Mod: PBBFAC,PO | Performed by: PHYSICIAN ASSISTANT

## 2017-05-24 PROCEDURE — 99999 PR PBB SHADOW E&M-EST. PATIENT-LVL III: CPT | Mod: PBBFAC,,, | Performed by: PHYSICIAN ASSISTANT

## 2017-05-24 PROCEDURE — 99024 POSTOP FOLLOW-UP VISIT: CPT | Mod: ,,, | Performed by: PHYSICIAN ASSISTANT

## 2017-05-24 NOTE — PROGRESS NOTES
Marce Hickey presents to Plastic Surgery Clinic on 5/24/2017 for a follow up visit status post immediate B breast reconstruction with tissue expander placement on 05/04/2017 with subsequent excision of nonviable skin of L breast with closure. Doing well today, no issues since her last visit.     Review of patient's allergies indicates:   Allergen Reactions    Nsaids (non-steroidal anti-inflammatory drug) Anaphylaxis     Current Outpatient Prescriptions on File Prior to Visit   Medication Sig Dispense Refill    docusate sodium (COLACE) 50 MG capsule Take by mouth 2 (two) times daily.      hydrocodone-acetaminophen 5-325mg (NORCO) 5-325 mg per tablet Take 1 tablet by mouth every 4 (four) hours as needed. 45 tablet 0    lorazepam (ATIVAN) 0.5 MG tablet Take 1 tablet (0.5 mg total) by mouth every 6 (six) hours as needed for Anxiety. 5 tablet 0    multivitamin (THERAGRAN) per tablet Take 1 tablet by mouth once daily.      omeprazole-sodium bicarbonate (ZEGERID) 40-1.1 mg-gram per capsule Take 1 capsule by mouth before breakfast. (Patient taking differently: Take 1 capsule by mouth every evening. ) 90 capsule 3    oxycodone-acetaminophen (PERCOCET) 5-325 mg per tablet Take 1 tablet by mouth every 4 (four) hours as needed for Pain. 51 tablet 0     No current facility-administered medications on file prior to visit.      Patient Active Problem List   Diagnosis    GERD (gastroesophageal reflux disease)    Plantar fasciitis of right foot    Fatty liver disease, nonalcoholic    Dysmetabolic syndrome    Hypovitaminosis D    Moderate episode of recurrent major depressive disorder    CEASAR (generalized anxiety disorder)    Back pain    Dyslipidemia    Status post gastric surgery    History of type 2 diabetes mellitus    History of hypertension    History of hypertension    Morbid obesity due to excess calories    Obesity, Class II, BMI 35-39.9, with comorbidity    Obesity (BMI 30-39.9)    Breast  cancer    Post-operative pain     Past Surgical History:   Procedure Laterality Date    breast reduction       SECTION      x 2    CHOLECYSTECTOMY      FOOT SURGERY      left bunionectomy    gastric sleve      HYSTERECTOMY      one ovary intact, adhesions    LIPOSUCTION      TONSILLECTOMY       Vitals:    17 0939   BP: 132/89   Pulse: 78   Temp: 97.8 °F (36.6 °C)     PHYSICAL    WD WN NAD  VSS  Breast - incisions of B breast CDI, no erythema, mastectomy flaps viable, L breast drain to suction    A/P    67 y.o. F s/p B breast recon with TE placement on 2017  - doing well, no issues  - L breast drain removed as she reports <5cc/24 hours over past several days  - will not expand her today as to avoid tension on incisions  - will RTC next week with plans to expand    All questions were answered. The patient was advised to call the clinic with any questions or concerns prior to their next visit.

## 2017-05-29 ENCOUNTER — OFFICE VISIT (OUTPATIENT)
Dept: HEMATOLOGY/ONCOLOGY | Facility: CLINIC | Age: 67
End: 2017-05-29
Payer: MEDICARE

## 2017-05-29 VITALS
BODY MASS INDEX: 27.03 KG/M2 | RESPIRATION RATE: 18 BRPM | WEIGHT: 158.31 LBS | SYSTOLIC BLOOD PRESSURE: 134 MMHG | TEMPERATURE: 99 F | DIASTOLIC BLOOD PRESSURE: 74 MMHG | HEART RATE: 73 BPM | HEIGHT: 64 IN

## 2017-05-29 DIAGNOSIS — C50.412 MALIGNANT NEOPLASM OF UPPER-OUTER QUADRANT OF LEFT FEMALE BREAST, UNSPECIFIED ESTROGEN RECEPTOR STATUS: Primary | ICD-10-CM

## 2017-05-29 DIAGNOSIS — E78.5 DYSLIPIDEMIA: ICD-10-CM

## 2017-05-29 DIAGNOSIS — Z86.39 HISTORY OF TYPE 2 DIABETES MELLITUS: ICD-10-CM

## 2017-05-29 DIAGNOSIS — Z98.890 STATUS POST GASTRIC SURGERY: ICD-10-CM

## 2017-05-29 DIAGNOSIS — K76.0 FATTY LIVER DISEASE, NONALCOHOLIC: ICD-10-CM

## 2017-05-29 PROCEDURE — 99213 OFFICE O/P EST LOW 20 MIN: CPT | Mod: PBBFAC,PO | Performed by: INTERNAL MEDICINE

## 2017-05-29 PROCEDURE — 99999 PR PBB SHADOW E&M-EST. PATIENT-LVL III: CPT | Mod: PBBFAC,,, | Performed by: INTERNAL MEDICINE

## 2017-05-29 PROCEDURE — 99214 OFFICE O/P EST MOD 30 MIN: CPT | Mod: S$PBB,,, | Performed by: INTERNAL MEDICINE

## 2017-05-29 NOTE — PROGRESS NOTES
A 66-year-old woman coming in consultation for breast cancer. S/p double mastectomy. 5/15/2015  2 separate location on left breast both biopsied in March by Dr. Luis.  reduction mammoplasty.  The patient's age of menarche was 14.  She actually has .  She had invasive ductal carcinoma in both locations and they were both ER positive, [place]   positive and HER-2 negative.    The patient is here for discussion   of further therapy.  Significantly sore to site of sx, getting better slowly,. .  She takes omeprazole and   multivitamins.     .  She does have a diet-controlled history of   diabetes,and hypertension, all diet-controlled   PHYSICAL EXAMINATION:  Wt Readings from Last 3 Encounters:   05/29/17 71.8 kg (158 lb 4.6 oz)   05/24/17 72.7 kg (160 lb 2.6 oz)   05/04/17 76.2 kg (168 lb)     Temp Readings from Last 3 Encounters:   05/29/17 98.5 °F (36.9 °C)   05/24/17 97.8 °F (36.6 °C) (Oral)   05/09/17 97.8 °F (36.6 °C)     BP Readings from Last 3 Encounters:   05/29/17 134/74   05/24/17 132/89   05/09/17 (!) 184/84     Pulse Readings from Last 3 Encounters:   05/29/17 73   05/24/17 78   05/09/17 83     GENERAL:  Reveals a well-built, well-nourished woman, comfortable, obese, well   groomed, ambulating to clinic without any issues.  HEENT:  Showed no congestion.  NECK:  Supple, without JVD.  Trachea is central.  No masses or thyromegaly felt.  HEART:  S1 is normally heard without murmurs or gallops.  ABDOMEN:  Soft.  No rebound, guarding or rigidity.  BREASTS.  Stitches still in place . , going for saline this week  EXTREMITIES:  The patient has no generalized lymphadenopathy or supraclavicular   adenopathy.  MUSCULOSKELETAL:  Good range of motion.  NEUROLOGIC:  She seems appropriate.  SKIN:  Within normal range.  PSYCHIATRIC:  Within normal range.pt is anxious about results of path and need for chemo    PATHOLOGY  SPECIMEN  1) [place] Lymph Node, Breast Lt  2) Breast mastectomy/Lt  3) Breast mastectomy  /Rt  4) Breast lumpectomy More Rt  5) Breast lumpectomy, more Lt  FINAL PATHOLOGIC DIAGNOSIS  1. [place] lymph node, left axillary, excision:  One lymph node NEGATIVE for metastatic carcinoma (0/1).  2. Breast, left, mastectomy:  Residual invasive ductal carcinoma at 1:00 position, low grade, 1.64 mm (pathologic staging: pT1a N0). SEE  SYNOPTIC REPORT.  Residual invasive ductal carcinoma at 12:00 position, low grade,10 mm (pathologic staging: pT1b N0). SEE  SYNOPTIC REPORT.  Negative surgical margins.  Previous biopsy sites.  3. Breast, right, mastectomy:  Benign nipple, skin, and breast parenchyma.  DIAGNOSTIC IMPRESSION:    Breast cancer two separate locations on the breasts,   left, both ER/[place] positive, HER-2 negative. S/p double mastectomy, with sentinel LN x1 on left apth as above, will get oncoltype dx and make plans accordingly , no need for aromatase inhibitor due to double mastectomy , need to rule out need for chemo  rtc with above

## 2017-05-30 RX ORDER — OMEPRAZOLE AND SODIUM BICARBONATE 40; 1100 MG/1; MG/1
CAPSULE ORAL
Qty: 90 CAPSULE | Refills: 2 | Status: SHIPPED | OUTPATIENT
Start: 2017-05-30 | End: 2019-04-23 | Stop reason: SDUPTHER

## 2017-06-02 ENCOUNTER — PATIENT MESSAGE (OUTPATIENT)
Dept: PLASTIC SURGERY | Facility: CLINIC | Age: 67
End: 2017-06-02

## 2017-06-02 ENCOUNTER — OFFICE VISIT (OUTPATIENT)
Dept: PLASTIC SURGERY | Facility: CLINIC | Age: 67
End: 2017-06-02
Payer: MEDICARE

## 2017-06-02 VITALS — WEIGHT: 159.38 LBS | BODY MASS INDEX: 27.36 KG/M2

## 2017-06-02 DIAGNOSIS — Z09 SURGERY FOLLOW-UP EXAMINATION: Primary | ICD-10-CM

## 2017-06-02 PROCEDURE — 99211 OFF/OP EST MAY X REQ PHY/QHP: CPT | Mod: PBBFAC,PO | Performed by: PHYSICIAN ASSISTANT

## 2017-06-02 PROCEDURE — 99024 POSTOP FOLLOW-UP VISIT: CPT | Mod: ,,, | Performed by: PHYSICIAN ASSISTANT

## 2017-06-02 PROCEDURE — 99999 PR PBB SHADOW E&M-EST. PATIENT-LVL I: CPT | Mod: PBBFAC,,, | Performed by: PHYSICIAN ASSISTANT

## 2017-06-02 NOTE — PROGRESS NOTES
Marce Hickey presents to Plastic Surgery Clinic on 2017 for a follow up visit status post B breast recon with TE placement on 2017    Review of patient's allergies indicates:   Allergen Reactions    Nsaids (non-steroidal anti-inflammatory drug) Anaphylaxis     Current Outpatient Prescriptions on File Prior to Visit   Medication Sig Dispense Refill    lorazepam (ATIVAN) 0.5 MG tablet Take 1 tablet (0.5 mg total) by mouth every 6 (six) hours as needed for Anxiety. 5 tablet 0    multivitamin (THERAGRAN) per tablet Take 1 tablet by mouth once daily.      ZEGERID 40-1.1 mg-gram per capsule TAKE 1 CAPSULE BEFORE BREAKFAST 90 capsule 2     No current facility-administered medications on file prior to visit.      Patient Active Problem List   Diagnosis    GERD (gastroesophageal reflux disease)    Plantar fasciitis of right foot    Fatty liver disease, nonalcoholic    Dysmetabolic syndrome    Hypovitaminosis D    Moderate episode of recurrent major depressive disorder    CEASAR (generalized anxiety disorder)    Back pain    Dyslipidemia    Status post gastric surgery    History of type 2 diabetes mellitus    History of hypertension    History of hypertension    Morbid obesity due to excess calories    Obesity, Class II, BMI 35-39.9, with comorbidity    Obesity (BMI 30-39.9)    Breast cancer    Post-operative pain     Past Surgical History:   Procedure Laterality Date    breast reduction       SECTION      x 2    CHOLECYSTECTOMY      FOOT SURGERY      left bunionectomy    gastric sleve      HYSTERECTOMY      one ovary intact, adhesions    LIPOSUCTION      TONSILLECTOMY       PHYSICAL EXAMINATION    WD WN NAD  Lungs - normal resp effort  Breast - Incisions CDI, skin viable, sutures intact  Skin - warm/dry, no rash    ASSESSMENT/PLAN    67 y.o. F s/p B breast recon with TE placement.   - doing well, no issues  - 100ccNS to B TE for 100/100cc total. Tolerated well  - Will  return to clinic in 2 weeks for her next expansion  - Will return to clinic in 2 weeks for expansion    All questions were answered. The patient was advised to call the clinic with any questions or concerns prior to their next visit.

## 2017-06-05 ENCOUNTER — TELEPHONE (OUTPATIENT)
Dept: HEMATOLOGY/ONCOLOGY | Facility: CLINIC | Age: 67
End: 2017-06-05

## 2017-06-05 ENCOUNTER — PATIENT MESSAGE (OUTPATIENT)
Dept: PLASTIC SURGERY | Facility: CLINIC | Age: 67
End: 2017-06-05

## 2017-06-05 NOTE — TELEPHONE ENCOUNTER
Called [facility] and confirmed with Diana that she did receive my orders that I faxed on this pt for the oncotype dx.

## 2017-06-05 NOTE — TELEPHONE ENCOUNTER
Called pt and scheduled her a Dr. Ding appt for 6/26/17 @ 3689 to go over oncotype dx results.  Pt verbalizes understanding and appreciation.

## 2017-06-06 ENCOUNTER — PATIENT MESSAGE (OUTPATIENT)
Dept: PLASTIC SURGERY | Facility: CLINIC | Age: 67
End: 2017-06-06

## 2017-06-16 ENCOUNTER — OFFICE VISIT (OUTPATIENT)
Dept: PLASTIC SURGERY | Facility: CLINIC | Age: 67
End: 2017-06-16
Payer: MEDICARE

## 2017-06-16 VITALS — BODY MASS INDEX: 26.33 KG/M2 | WEIGHT: 158.06 LBS | HEIGHT: 65 IN

## 2017-06-16 DIAGNOSIS — Z09 SURGERY FOLLOW-UP EXAMINATION: Primary | ICD-10-CM

## 2017-06-16 PROCEDURE — 99999 PR PBB SHADOW E&M-EST. PATIENT-LVL II: CPT | Mod: PBBFAC,,, | Performed by: SURGERY

## 2017-06-16 PROCEDURE — 99212 OFFICE O/P EST SF 10 MIN: CPT | Mod: PBBFAC,PO | Performed by: SURGERY

## 2017-06-16 PROCEDURE — 99024 POSTOP FOLLOW-UP VISIT: CPT | Mod: ,,, | Performed by: SURGERY

## 2017-06-21 NOTE — PROGRESS NOTES
Ms. Hickey presents to the Plastic Surgery Clinic.  After having bilateral tissue   expanders placed, she was expanded today with 100 mL of saline into each   expander.  She did well with the expansion.  She will return in two weeks.      RAZ/MAKENZIE  dd: 06/21/2017 16:12:49 (CDT)  td: 06/22/2017 15:58:04 (CDT)  Doc ID   #[id number]  Job ID #[id number]    CC:

## 2017-06-22 ENCOUNTER — PATIENT MESSAGE (OUTPATIENT)
Dept: PLASTIC SURGERY | Facility: CLINIC | Age: 67
End: 2017-06-22

## 2017-06-26 ENCOUNTER — OFFICE VISIT (OUTPATIENT)
Dept: HEMATOLOGY/ONCOLOGY | Facility: CLINIC | Age: 67
End: 2017-06-26
Payer: MEDICARE

## 2017-06-26 VITALS
SYSTOLIC BLOOD PRESSURE: 134 MMHG | HEART RATE: 78 BPM | WEIGHT: 157.19 LBS | DIASTOLIC BLOOD PRESSURE: 75 MMHG | TEMPERATURE: 98 F | BODY MASS INDEX: 26.19 KG/M2 | RESPIRATION RATE: 19 BRPM | HEIGHT: 65 IN

## 2017-06-26 DIAGNOSIS — C50.512 MALIGNANT NEOPLASM OF LOWER-OUTER QUADRANT OF LEFT FEMALE BREAST, UNSPECIFIED ESTROGEN RECEPTOR STATUS: Primary | ICD-10-CM

## 2017-06-26 DIAGNOSIS — Z98.890 STATUS POST GASTRIC SURGERY: ICD-10-CM

## 2017-06-26 DIAGNOSIS — Z86.39 HISTORY OF TYPE 2 DIABETES MELLITUS: ICD-10-CM

## 2017-06-26 DIAGNOSIS — E78.5 DYSLIPIDEMIA: ICD-10-CM

## 2017-06-26 DIAGNOSIS — K76.0 FATTY LIVER DISEASE, NONALCOHOLIC: ICD-10-CM

## 2017-06-26 DIAGNOSIS — M80.00XA AGE-RELATED OSTEOPOROSIS WITH CURRENT PATHOLOGICAL FRACTURE, INITIAL ENCOUNTER: ICD-10-CM

## 2017-06-26 PROCEDURE — 1159F MED LIST DOCD IN RCRD: CPT | Mod: ,,, | Performed by: INTERNAL MEDICINE

## 2017-06-26 PROCEDURE — 99999 PR PBB SHADOW E&M-EST. PATIENT-LVL III: CPT | Mod: PBBFAC,,, | Performed by: INTERNAL MEDICINE

## 2017-06-26 PROCEDURE — 99213 OFFICE O/P EST LOW 20 MIN: CPT | Mod: PBBFAC,PO | Performed by: INTERNAL MEDICINE

## 2017-06-26 PROCEDURE — 1125F AMNT PAIN NOTED PAIN PRSNT: CPT | Mod: ,,, | Performed by: INTERNAL MEDICINE

## 2017-06-26 PROCEDURE — 99214 OFFICE O/P EST MOD 30 MIN: CPT | Mod: S$PBB,,, | Performed by: INTERNAL MEDICINE

## 2017-06-26 RX ORDER — ANASTROZOLE 1 MG/1
1 TABLET ORAL DAILY
Qty: 30 TABLET | Refills: 2 | Status: SHIPPED | OUTPATIENT
Start: 2017-06-26 | End: 2017-07-14 | Stop reason: SDUPTHER

## 2017-06-26 NOTE — PROGRESS NOTES
A 66-year-old woman coming in consultation for breast cancer. S/p double mastectomy. 5/15/2015  2 separate location on left breast both biopsied in March by Dr. Luis.  reduction mammoplasty.  The patient's age of menarche was 14.  She actually has .  She had invasive ductal carcinoma in both locations and they were both ER positive, [place]   positive and HER-2 negative.    The patient is here for discussion of oncotype dx, currently undergoing expanders and has extreme pain  of further therapy.  Significantly sore to site of sx, getting better slowly,. .  She takes omeprazole and   multivitamins.     .  She does have a diet-controlled history of   diabetes,and hypertension, all diet-controlled   PHYSICAL EXAMINATION:  Wt Readings from Last 3 Encounters:   06/26/17 71.3 kg (157 lb 3 oz)   06/16/17 71.7 kg (158 lb 1.1 oz)   06/02/17 72.3 kg (159 lb 6.3 oz)     Temp Readings from Last 3 Encounters:   06/26/17 98.2 °F (36.8 °C) (Oral)   05/29/17 98.5 °F (36.9 °C)   05/24/17 97.8 °F (36.6 °C) (Oral)     BP Readings from Last 3 Encounters:   06/26/17 134/75   05/29/17 134/74   05/24/17 132/89     Pulse Readings from Last 3 Encounters:   06/26/17 78   05/29/17 73   05/24/17 78     GENERAL:  Reveals a well-built, well-nourished woman, comfortable, obese, well   groomed, ambulating to clinic without any issues.  HEENT:  Showed no congestion.  NECK:  Supple, without JVD.  Trachea is central.  No masses or thyromegaly felt.  HEART:  S1 is normally heard without murmurs or gallops.  ABDOMEN:  Soft.  No rebound, guarding or rigidity.  BREASTS.  Stitches still in place . , going for saline this week  EXTREMITIES:  The patient has no generalized lymphadenopathy or supraclavicular   adenopathy.  MUSCULOSKELETAL:  Good range of motion.  NEUROLOGIC:  She seems appropriate.  SKIN:  Within normal range.  PSYCHIATRIC:  Within normal range.pt is anxious about results of path and need for chemo    PATHOLOGY  SPECIMEN  1)  [place] Lymph Node, Breast Lt  2) Breast mastectomy/Lt  3) Breast mastectomy /Rt  4) Breast lumpectomy More Rt  5) Breast lumpectomy, more Lt  FINAL PATHOLOGIC DIAGNOSIS  1. [place] lymph node, left axillary, excision:  One lymph node NEGATIVE for metastatic carcinoma (0/1).  2. Breast, left, mastectomy:  Residual invasive ductal carcinoma at 1:00 position, low grade, 1.64 mm (pathologic staging: pT1a N0). SEE  SYNOPTIC REPORT.  Residual invasive ductal carcinoma at 12:00 position, low grade,10 mm (pathologic staging: pT1b N0). SEE  SYNOPTIC REPORT.  Negative surgical margins.  Previous biopsy sites.  3. Breast, right, mastectomy:  Benign nipple, skin, and breast parenchyma.  DIAGNOSTIC IMPRESSION:    Breast cancer two separate locations on the breasts,   left, both ER/[place] positive, HER-2 negative. S/p double mastectomy, with sentinel LN x1 on left apth as above,   oncolty dx rec score of 18,   We have agreed to Prevention of distant recurrences with arimidex alone. rx sent to pharmacy   needs bone density   rtc 6 weeks on arimidex and with bone density

## 2017-06-28 ENCOUNTER — TELEPHONE (OUTPATIENT)
Dept: HEMATOLOGY/ONCOLOGY | Facility: CLINIC | Age: 67
End: 2017-06-28

## 2017-06-28 ENCOUNTER — PATIENT MESSAGE (OUTPATIENT)
Dept: HEMATOLOGY/ONCOLOGY | Facility: CLINIC | Age: 67
End: 2017-06-28

## 2017-06-28 DIAGNOSIS — M81.0 AGE-RELATED OSTEOPOROSIS WITHOUT CURRENT PATHOLOGICAL FRACTURE: ICD-10-CM

## 2017-06-28 DIAGNOSIS — Z79.811 USE OF ANASTROZOLE (ARIMIDEX): ICD-10-CM

## 2017-06-28 DIAGNOSIS — C50.919 HER2-NEGATIVE CARCINOMA OF BREAST: Primary | ICD-10-CM

## 2017-06-28 DIAGNOSIS — T50.905A MEDICATION ADVERSE EFFECT, INITIAL ENCOUNTER: ICD-10-CM

## 2017-06-28 DIAGNOSIS — Z86.39 HX OF TYPE 2 DIABETES MELLITUS: ICD-10-CM

## 2017-06-29 DIAGNOSIS — C50.011 PAGET'S DISEASE OF THE BREAST, RIGHT: Primary | ICD-10-CM

## 2017-06-30 ENCOUNTER — OFFICE VISIT (OUTPATIENT)
Dept: PLASTIC SURGERY | Facility: CLINIC | Age: 67
End: 2017-06-30
Payer: MEDICARE

## 2017-06-30 VITALS — BODY MASS INDEX: 26.75 KG/M2 | WEIGHT: 158.31 LBS

## 2017-06-30 DIAGNOSIS — Z09 SURGERY FOLLOW-UP EXAMINATION: Primary | ICD-10-CM

## 2017-06-30 PROCEDURE — 99212 OFFICE O/P EST SF 10 MIN: CPT | Mod: PBBFAC,PO | Performed by: PHYSICIAN ASSISTANT

## 2017-06-30 PROCEDURE — 99999 PR PBB SHADOW E&M-EST. PATIENT-LVL II: CPT | Mod: PBBFAC,,, | Performed by: PHYSICIAN ASSISTANT

## 2017-06-30 PROCEDURE — 99024 POSTOP FOLLOW-UP VISIT: CPT | Mod: ,,, | Performed by: PHYSICIAN ASSISTANT

## 2017-06-30 NOTE — PROGRESS NOTES
Marce Hickey presents to Plastic Surgery Clinic on 2017 for a follow up visit status post B breast recon with TE placement. Here today for expansion    Review of patient's allergies indicates:   Allergen Reactions    Nsaids (non-steroidal anti-inflammatory drug) Anaphylaxis     Current Outpatient Prescriptions on File Prior to Visit   Medication Sig Dispense Refill    anastrozole (ARIMIDEX) 1 mg Tab Take 1 tablet (1 mg total) by mouth once daily. 30 tablet 2    multivitamin (THERAGRAN) per tablet Take 1 tablet by mouth once daily.      ZEGERID 40-1.1 mg-gram per capsule TAKE 1 CAPSULE BEFORE BREAKFAST 90 capsule 2    lorazepam (ATIVAN) 0.5 MG tablet Take 1 tablet (0.5 mg total) by mouth every 6 (six) hours as needed for Anxiety. 5 tablet 0     No current facility-administered medications on file prior to visit.      Patient Active Problem List   Diagnosis    GERD (gastroesophageal reflux disease)    Plantar fasciitis of right foot    Fatty liver disease, nonalcoholic    Dysmetabolic syndrome    Hypovitaminosis D    Moderate episode of recurrent major depressive disorder    CEASAR (generalized anxiety disorder)    Back pain    Dyslipidemia    Status post gastric surgery    History of type 2 diabetes mellitus    History of hypertension    History of hypertension    Morbid obesity due to excess calories    Obesity, Class II, BMI 35-39.9, with comorbidity    Obesity (BMI 30-39.9)    Breast cancer    Post-operative pain       Past Surgical History:   Procedure Laterality Date    breast reduction       SECTION      x 2    CHOLECYSTECTOMY      FOOT SURGERY      left bunionectomy    gastric sleve      HYSTERECTOMY      one ovary intact, adhesions    LIPOSUCTION      TONSILLECTOMY       PHYSICAL EXAMINATION    WD WN NAD  Lungs - normal resp effort  Breast - incisions healed, [place] TE intact  Skin - warm, dry, no rash    ASSESSMENT/PLAN    67 y.o. F s/p B breast recon, here for  expansion  - 100ccNS to B TE for 100/300cc NS total  - Tolerated expansion well  - RTC x 2 weeks, appt scheduled    All questions were answered. The patient was advised to call the clinic with any questions or concerns prior to their next visit.

## 2017-07-10 ENCOUNTER — PATIENT MESSAGE (OUTPATIENT)
Dept: PLASTIC SURGERY | Facility: CLINIC | Age: 67
End: 2017-07-10

## 2017-07-11 ENCOUNTER — TELEPHONE (OUTPATIENT)
Dept: PLASTIC SURGERY | Facility: CLINIC | Age: 67
End: 2017-07-11

## 2017-07-11 NOTE — TELEPHONE ENCOUNTER
Pt states her employer is request her anticipated date of completion for all procedures. Pt asked to have the information sent to her via e-mail.

## 2017-07-11 NOTE — TELEPHONE ENCOUNTER
----- Message from Bry Cuevas sent at 7/11/2017  8:31 AM CDT -----  Contact: pt  She's calling in regards to the duration of her procedures, pt states she is needing an estiamated date of completion (present and future) with all procedures per her employer ([employer]) her employer is needing this information today Tuesday 7/11/17, please advise, [phone] (home)

## 2017-07-13 ENCOUNTER — PATIENT MESSAGE (OUTPATIENT)
Dept: HEMATOLOGY/ONCOLOGY | Facility: CLINIC | Age: 67
End: 2017-07-13

## 2017-07-14 ENCOUNTER — OFFICE VISIT (OUTPATIENT)
Dept: PLASTIC SURGERY | Facility: CLINIC | Age: 67
End: 2017-07-14
Payer: MEDICARE

## 2017-07-14 ENCOUNTER — PATIENT MESSAGE (OUTPATIENT)
Dept: PLASTIC SURGERY | Facility: CLINIC | Age: 67
End: 2017-07-14

## 2017-07-14 DIAGNOSIS — C50.011 MALIGNANT NEOPLASM INVOLVING BOTH NIPPLE AND AREOLA OF RIGHT BREAST IN FEMALE, UNSPECIFIED ESTROGEN RECEPTOR STATUS: ICD-10-CM

## 2017-07-14 DIAGNOSIS — Z09 SURGERY FOLLOW-UP EXAMINATION: Primary | ICD-10-CM

## 2017-07-14 PROCEDURE — 99024 POSTOP FOLLOW-UP VISIT: CPT | Mod: ,,, | Performed by: PHYSICIAN ASSISTANT

## 2017-07-14 PROCEDURE — 99999 PR PBB SHADOW E&M-EST. PATIENT-LVL II: CPT | Mod: PBBFAC,,, | Performed by: PHYSICIAN ASSISTANT

## 2017-07-14 PROCEDURE — 99212 OFFICE O/P EST SF 10 MIN: CPT | Mod: PBBFAC,PO | Performed by: PHYSICIAN ASSISTANT

## 2017-07-14 RX ORDER — CYCLOBENZAPRINE HCL 10 MG
10 TABLET ORAL 3 TIMES DAILY PRN
Qty: 21 TABLET | Refills: 0 | Status: SHIPPED | OUTPATIENT
Start: 2017-07-14 | End: 2017-07-24

## 2017-07-14 RX ORDER — ANASTROZOLE 1 MG/1
1 TABLET ORAL DAILY
Qty: 30 TABLET | Refills: 2 | Status: SHIPPED | OUTPATIENT
Start: 2017-07-14 | End: 2017-08-08 | Stop reason: SDUPTHER

## 2017-07-14 NOTE — PROGRESS NOTES
Marce Hickey presents to Plastic Surgery Clinic on 2017 for a follow up for B breast expansion. Doing well today with no issues since her last visit.     Review of patient's allergies indicates:   Allergen Reactions    Nsaids (non-steroidal anti-inflammatory drug) Anaphylaxis     Current Outpatient Prescriptions on File Prior to Visit   Medication Sig Dispense Refill    anastrozole (ARIMIDEX) 1 mg Tab Take 1 tablet (1 mg total) by mouth once daily. 30 tablet 2    lorazepam (ATIVAN) 0.5 MG tablet Take 1 tablet (0.5 mg total) by mouth every 6 (six) hours as needed for Anxiety. 5 tablet 0    multivitamin (THERAGRAN) per tablet Take 1 tablet by mouth once daily.      ZEGERID 40-1.1 mg-gram per capsule TAKE 1 CAPSULE BEFORE BREAKFAST 90 capsule 2     No current facility-administered medications on file prior to visit.      Patient Active Problem List   Diagnosis    GERD (gastroesophageal reflux disease)    Plantar fasciitis of right foot    Fatty liver disease, nonalcoholic    Dysmetabolic syndrome    Hypovitaminosis D    Moderate episode of recurrent major depressive disorder    CEASAR (generalized anxiety disorder)    Back pain    Dyslipidemia    Status post gastric surgery    History of type 2 diabetes mellitus    History of hypertension    History of hypertension    Morbid obesity due to excess calories    Obesity, Class II, BMI 35-39.9, with comorbidity    Obesity (BMI 30-39.9)    Breast cancer    Post-operative pain     Past Surgical History:   Procedure Laterality Date    breast reduction       SECTION      x 2    CHOLECYSTECTOMY      FOOT SURGERY      left bunionectomy    gastric sleve      HYSTERECTOMY      one ovary intact, adhesions    LIPOSUCTION      TONSILLECTOMY       PHYSICAL EXAMINATION  WD WN NAD  Lungs - normal resp effort  Breast - incisions well healed, B expanders intact    ASSESSMENT/PLAN  67 y.o. F s/p B breast recon  - doing well, no issues  -  100ccNS To B TE. 100/400cc total. Tolerated well  - RTC x 2 weeks    The patient was advised to call the clinic with any questions or concerns prior to their next visit.

## 2017-07-14 NOTE — TELEPHONE ENCOUNTER
----- Message from Chasity Coy sent at 7/13/2017  4:46 PM CDT -----  Contact: Patient  Patient called to speak with the nurse; she stated her original prescription for the Anastrovole was sent to her local pharmacy. She wants it to go to [facility]. [facility] sent a request about 2 weeks ago for some information; but they never received the information back. She stated it takes about 2 weeks for her to get her medication from [facility] and she only has about 1 week of her meds left.     She can be contacted at [phone].    Thanks,  Chasity

## 2017-07-18 ENCOUNTER — PATIENT MESSAGE (OUTPATIENT)
Dept: HEMATOLOGY/ONCOLOGY | Facility: CLINIC | Age: 67
End: 2017-07-18

## 2017-07-28 ENCOUNTER — OFFICE VISIT (OUTPATIENT)
Dept: PLASTIC SURGERY | Facility: CLINIC | Age: 67
End: 2017-07-28
Payer: MEDICARE

## 2017-07-28 VITALS — HEIGHT: 64 IN | BODY MASS INDEX: 26.63 KG/M2 | WEIGHT: 156 LBS

## 2017-07-28 DIAGNOSIS — Z09 SURGERY FOLLOW-UP EXAMINATION: Primary | ICD-10-CM

## 2017-07-28 PROCEDURE — 99999 PR PBB SHADOW E&M-EST. PATIENT-LVL II: CPT | Mod: PBBFAC,,, | Performed by: PHYSICIAN ASSISTANT

## 2017-07-28 PROCEDURE — 99024 POSTOP FOLLOW-UP VISIT: CPT | Mod: ,,, | Performed by: PHYSICIAN ASSISTANT

## 2017-07-28 PROCEDURE — 99212 OFFICE O/P EST SF 10 MIN: CPT | Mod: PBBFAC,PO | Performed by: PHYSICIAN ASSISTANT

## 2017-07-28 NOTE — PROGRESS NOTES
Marce Hickey presents to Plastic Surgery Clinic on 2017 for a follow up visit status post B breast recon with TE placement on 2017. Doing well today with no issues    Review of patient's allergies indicates:   Allergen Reactions    Nsaids (non-steroidal anti-inflammatory drug) Anaphylaxis     Current Outpatient Prescriptions on File Prior to Visit   Medication Sig Dispense Refill    anastrozole (ARIMIDEX) 1 mg Tab Take 1 tablet (1 mg total) by mouth once daily. 30 tablet 2    multivitamin (THERAGRAN) per tablet Take 1 tablet by mouth once daily.      ZEGERID 40-1.1 mg-gram per capsule TAKE 1 CAPSULE BEFORE BREAKFAST 90 capsule 2    lorazepam (ATIVAN) 0.5 MG tablet Take 1 tablet (0.5 mg total) by mouth every 6 (six) hours as needed for Anxiety. 5 tablet 0     No current facility-administered medications on file prior to visit.      Patient Active Problem List   Diagnosis    GERD (gastroesophageal reflux disease)    Plantar fasciitis of right foot    Fatty liver disease, nonalcoholic    Dysmetabolic syndrome    Hypovitaminosis D    Moderate episode of recurrent major depressive disorder    CEASAR (generalized anxiety disorder)    Back pain    Dyslipidemia    Status post gastric surgery    History of type 2 diabetes mellitus    History of hypertension    History of hypertension    Morbid obesity due to excess calories    Obesity, Class II, BMI 35-39.9, with comorbidity    Obesity (BMI 30-39.9)    Breast cancer    Post-operative pain     Past Surgical History:   Procedure Laterality Date    breast reduction       SECTION      x 2    CHOLECYSTECTOMY      FOOT SURGERY      left bunionectomy    gastric sleve      HYSTERECTOMY      one ovary intact, adhesions    LIPOSUCTION      TONSILLECTOMY       PHYSICAL EXAMINATION  WD WN NAD  VSS  Lungs - normal resp effort  Breast - incisions well heaeld, expanders intact  Skin - warm/dry, no rash    ASSESSMENT/PLAN  67 y.o. F s/p  B breast recon  - doing well, no issues  - 100ccNS to B TE for 100/500cc total, tolerated well  - will RTC x 2 weeks    All questions were answered. The patient was advised to call the clinic with any questions or concerns prior to their next visit.

## 2017-07-31 ENCOUNTER — PATIENT MESSAGE (OUTPATIENT)
Dept: PLASTIC SURGERY | Facility: CLINIC | Age: 67
End: 2017-07-31

## 2017-07-31 ENCOUNTER — HOSPITAL ENCOUNTER (OUTPATIENT)
Dept: RADIOLOGY | Facility: HOSPITAL | Age: 67
Discharge: HOME | End: 2017-07-31
Attending: INTERNAL MEDICINE
Payer: MEDICARE

## 2017-07-31 DIAGNOSIS — C50.919 HER2-NEGATIVE CARCINOMA OF BREAST: ICD-10-CM

## 2017-07-31 DIAGNOSIS — Z79.811 USE OF ANASTROZOLE (ARIMIDEX): ICD-10-CM

## 2017-07-31 DIAGNOSIS — Z86.39 HX OF TYPE 2 DIABETES MELLITUS: ICD-10-CM

## 2017-07-31 DIAGNOSIS — M81.0 AGE-RELATED OSTEOPOROSIS WITHOUT CURRENT PATHOLOGICAL FRACTURE: ICD-10-CM

## 2017-07-31 PROCEDURE — 77080 DXA BONE DENSITY AXIAL: CPT | Mod: 26,,, | Performed by: RADIOLOGY

## 2017-07-31 PROCEDURE — 77080 DXA BONE DENSITY AXIAL: CPT | Mod: TC

## 2017-08-03 ENCOUNTER — OFFICE VISIT (OUTPATIENT)
Dept: BARIATRICS | Facility: CLINIC | Age: 67
End: 2017-08-03
Payer: MEDICARE

## 2017-08-03 VITALS
SYSTOLIC BLOOD PRESSURE: 129 MMHG | DIASTOLIC BLOOD PRESSURE: 80 MMHG | RESPIRATION RATE: 16 BRPM | HEIGHT: 64 IN | HEART RATE: 75 BPM | BODY MASS INDEX: 25.67 KG/M2 | WEIGHT: 150.38 LBS | TEMPERATURE: 98 F

## 2017-08-03 DIAGNOSIS — E66.01 MORBID OBESITY DUE TO EXCESS CALORIES: Primary | ICD-10-CM

## 2017-08-03 DIAGNOSIS — E51.9 THIAMINE DEFICIENCY: ICD-10-CM

## 2017-08-03 DIAGNOSIS — E53.8 B12 DEFICIENCY: ICD-10-CM

## 2017-08-03 DIAGNOSIS — E55.9 VITAMIN D DEFICIENCY: ICD-10-CM

## 2017-08-03 DIAGNOSIS — E11.9 TYPE 2 DIABETES MELLITUS WITHOUT COMPLICATION, WITHOUT LONG-TERM CURRENT USE OF INSULIN: ICD-10-CM

## 2017-08-03 PROCEDURE — 3044F HG A1C LEVEL LT 7.0%: CPT | Mod: ,,, | Performed by: SURGERY

## 2017-08-03 PROCEDURE — 99213 OFFICE O/P EST LOW 20 MIN: CPT | Mod: S$PBB,,, | Performed by: SURGERY

## 2017-08-03 PROCEDURE — 99999 PR PBB SHADOW E&M-EST. PATIENT-LVL III: CPT | Mod: PBBFAC,,, | Performed by: SURGERY

## 2017-08-03 PROCEDURE — 1125F AMNT PAIN NOTED PAIN PRSNT: CPT | Mod: ,,, | Performed by: SURGERY

## 2017-08-03 PROCEDURE — 1159F MED LIST DOCD IN RCRD: CPT | Mod: ,,, | Performed by: SURGERY

## 2017-08-03 PROCEDURE — 3008F BODY MASS INDEX DOCD: CPT | Mod: ,,, | Performed by: SURGERY

## 2017-08-03 PROCEDURE — 99213 OFFICE O/P EST LOW 20 MIN: CPT | Mod: PBBFAC,PN | Performed by: SURGERY

## 2017-08-03 NOTE — PROGRESS NOTES
Post Op Note    Surgery: gastric sleeve surgery  Date: 1/30/17  Initial weight: 225  Last weight:169  Current weight: 150    Constipation: takes metamucial  Reflux: omeprazole helps  Vomiting: none    Diet:  Breakfast:  elsie breakfast burrito- whole burrito  Lunch: protein shake  Dinner: meat vegetable  Snack: cashews  Protein shake: premier    Exercise:  Monday, Tuesday, Friday- exercise bike for 1 hour  Doing weights at desk    MVI: patch last month but starting back on centrum    Vitals:    08/03/17 1524   BP: 129/80   Pulse: 75   Resp: 16   Temp: 97.8 °F (36.6 °C)       Body comp:  Fat Percent:  36.2 %  Fat Mass:  54.4 lb  FFM:  96 lb  TBW: 66.4 lb  TBW %:  44.1 %  BMR: 1307 kcal    PE:  NAD  RRR  Soft/nt/nd  Incisions: well healed    Labs: high total cholesterol    A/P: s/p sleeve     Counseling for patient:    Diet: limit how many cashews eating, we talked limiting to around 12-24 pieces per snack  Exercise: increase intensity over the next few weeks  Vitamins: add thiamine to patch  Co morbidities:     1. Morbid obesity due to excess calories  CBC w/ Auto Differential    CMP    Lipid Panel   2. B12 deficiency  B12   3. Thiamine deficiency  B1   4. Vitamin D deficiency  Vitamin D 25 Hydroxy   5. Type 2 diabetes mellitus without complication, without long-term current use of insulin  Hemoglobin A1c       : I met with the patient for 15 minutes and counseled her for over 50% of that time

## 2017-08-08 DIAGNOSIS — C50.011 MALIGNANT NEOPLASM INVOLVING BOTH NIPPLE AND AREOLA OF RIGHT BREAST IN FEMALE, UNSPECIFIED ESTROGEN RECEPTOR STATUS: ICD-10-CM

## 2017-08-09 ENCOUNTER — OFFICE VISIT (OUTPATIENT)
Dept: HEMATOLOGY/ONCOLOGY | Facility: CLINIC | Age: 67
End: 2017-08-09
Payer: MEDICARE

## 2017-08-09 ENCOUNTER — TELEPHONE (OUTPATIENT)
Dept: FAMILY MEDICINE | Facility: CLINIC | Age: 67
End: 2017-08-09

## 2017-08-09 VITALS
DIASTOLIC BLOOD PRESSURE: 73 MMHG | SYSTOLIC BLOOD PRESSURE: 140 MMHG | BODY MASS INDEX: 26.05 KG/M2 | TEMPERATURE: 99 F | HEIGHT: 64 IN | RESPIRATION RATE: 20 BRPM | HEART RATE: 86 BPM | WEIGHT: 152.56 LBS

## 2017-08-09 DIAGNOSIS — E78.5 DYSLIPIDEMIA: Primary | ICD-10-CM

## 2017-08-09 DIAGNOSIS — Z98.890 STATUS POST GASTRIC SURGERY: ICD-10-CM

## 2017-08-09 DIAGNOSIS — Z86.79 HISTORY OF HYPERTENSION: ICD-10-CM

## 2017-08-09 DIAGNOSIS — C50.011 MALIGNANT NEOPLASM OF NIPPLE OF RIGHT BREAST IN FEMALE, UNSPECIFIED ESTROGEN RECEPTOR STATUS: ICD-10-CM

## 2017-08-09 DIAGNOSIS — K76.0 FATTY LIVER DISEASE, NONALCOHOLIC: ICD-10-CM

## 2017-08-09 PROCEDURE — 99214 OFFICE O/P EST MOD 30 MIN: CPT | Mod: S$PBB,,, | Performed by: INTERNAL MEDICINE

## 2017-08-09 PROCEDURE — 3077F SYST BP >= 140 MM HG: CPT | Mod: ,,, | Performed by: INTERNAL MEDICINE

## 2017-08-09 PROCEDURE — 1159F MED LIST DOCD IN RCRD: CPT | Mod: ,,, | Performed by: INTERNAL MEDICINE

## 2017-08-09 PROCEDURE — 99999 PR PBB SHADOW E&M-EST. PATIENT-LVL III: CPT | Mod: PBBFAC,,, | Performed by: INTERNAL MEDICINE

## 2017-08-09 PROCEDURE — 99213 OFFICE O/P EST LOW 20 MIN: CPT | Mod: PBBFAC,PO | Performed by: INTERNAL MEDICINE

## 2017-08-09 PROCEDURE — 3078F DIAST BP <80 MM HG: CPT | Mod: ,,, | Performed by: INTERNAL MEDICINE

## 2017-08-09 PROCEDURE — 1125F AMNT PAIN NOTED PAIN PRSNT: CPT | Mod: ,,, | Performed by: INTERNAL MEDICINE

## 2017-08-09 RX ORDER — ANASTROZOLE 1 MG/1
1 TABLET ORAL DAILY
Qty: 30 TABLET | Refills: 2 | Status: SHIPPED | OUTPATIENT
Start: 2017-08-09 | End: 2017-11-07 | Stop reason: SDUPTHER

## 2017-08-09 RX ORDER — TRAMADOL HYDROCHLORIDE 50 MG/1
50 TABLET ORAL EVERY 6 HOURS PRN
Qty: 20 TABLET | Refills: 0 | Status: ON HOLD | OUTPATIENT
Start: 2017-08-09 | End: 2018-03-06

## 2017-08-09 NOTE — TELEPHONE ENCOUNTER
Left message on machine for pt to call back. We need to reschedule appt with Dr. Garvey on 8/18/17. Dr. Garvey is not in clinic that day. See if pt can come on 8/16/17 at 4 pm for 40 mins.  Thanks, Anum

## 2017-08-09 NOTE — PROGRESS NOTES
A 66-year-old woman coming in consultation for breast cancer. S/p double mastectomy. 5/15/2017  2 separate location on left breast both biopsied in March by Dr. Luis.  reduction mammoplasty.  The patient's age of menarche was 14.  She actually has .  She had invasive ductal carcinoma in both locations and they were both ER positive, ID   positive and HER-2 negative.    The patient is here for discussion of oncotype dx, currently undergoing expanders and has extreme pain  of further therapy.  Significantly painful expanders pt is tearful , she doesn't want pain meds because she has to work , she cant take days off. .  She takes omeprazole and   multivitamins.  Here for 6 week f/u post arimidx start   .  She does have a diet-controlled history of   diabetes,and hypertension, all diet-controlled hx of gastric by pass sx  PHYSICAL EXAMINATION:  Wt Readings from Last 3 Encounters:   08/09/17 69.2 kg (152 lb 8.9 oz)   08/03/17 68.2 kg (150 lb 6.4 oz)   07/28/17 70.8 kg (156 lb)     Temp Readings from Last 3 Encounters:   08/09/17 99.4 °F (37.4 °C) (Oral)   08/03/17 97.8 °F (36.6 °C) (Oral)   06/26/17 98.2 °F (36.8 °C) (Oral)     BP Readings from Last 3 Encounters:   08/09/17 (!) 140/73   08/03/17 129/80   06/26/17 134/75     Pulse Readings from Last 3 Encounters:   08/09/17 86   08/03/17 75   06/26/17 78     GENERAL:  Reveals a well-built, well-nourished woman, comfortable, obese, well   groomed, ambulating to clinic without any issues.  HEENT:  Showed no congestion.  NECK:  Supple, without JVD.  Trachea is central.  No masses or thyromegaly felt.  HEART:  S1 is normally heard without murmurs or gallops.  ABDOMEN:  Soft.  No rebound, guarding or rigidity.  BREASTS.  Expanders bilat+  EXTREMITIES:  The patient has no generalized lymphadenopathy or supraclavicular   adenopathy.  MUSCULOSKELETAL:  Good range of motion.  NEUROLOGIC:  She seems appropriate.  SKIN:  Within normal range.  PSYCHIATRIC:  Within normal range.pt  is anxious about results of path and need for chemo    PATHOLOGY  SPECIMEN  1) [place] Lymph Node, Breast Lt  2) Breast mastectomy/Lt  3) Breast mastectomy /Rt  4) Breast lumpectomy More Rt  5) Breast lumpectomy, more Lt  FINAL PATHOLOGIC DIAGNOSIS  1. [place] lymph node, left axillary, excision:  One lymph node NEGATIVE for metastatic carcinoma (0/1).  2. Breast, left, mastectomy:  Residual invasive ductal carcinoma at 1:00 position, low grade, 1.64 mm (pathologic staging: pT1a N0). SEE  SYNOPTIC REPORT.  Residual invasive ductal carcinoma at 12:00 position, low grade,10 mm (pathologic staging: pT1b N0). SEE  SYNOPTIC REPORT.  Negative surgical margins.  Previous biopsy sites.  3. Breast, right, mastectomy:  Benign nipple, skin, and breast parenchyma.    Bone density is normal.  DIAGNOSTIC IMPRESSION:    Breast cancer two separate locations on the breasts,   left, both ER/OH positive, HER-2 negative. S/p double mastectomy, with sentinel LN x1 on left apth as above,   oncolty dx rec score of 18, started arimidex, tolerating it well. continue  No need for prolia due to normal density   sent in ultram for better pain control

## 2017-08-10 ENCOUNTER — DOCUMENTATION ONLY (OUTPATIENT)
Dept: FAMILY MEDICINE | Facility: CLINIC | Age: 67
End: 2017-08-10

## 2017-08-10 NOTE — TELEPHONE ENCOUNTER
----- Message from Mckenzie Joyner sent at 8/9/2017  5:03 PM CDT -----  Contact: Pt can be reached at [phone]  Pt is returning the  Nurse call.      Thank you1

## 2017-08-10 NOTE — PROGRESS NOTES
Pre-Visit Chart Review  For Appointment Scheduled on 8/16/17.    Health Maintenance Due   Topic Date Due    Foot Exam  05/15/1960    Urine Microalbumin  05/25/2016    Hemoglobin A1c  07/31/2017    Influenza Vaccine  08/01/2017

## 2017-08-16 ENCOUNTER — OFFICE VISIT (OUTPATIENT)
Dept: PLASTIC SURGERY | Facility: CLINIC | Age: 67
End: 2017-08-16
Payer: MEDICARE

## 2017-08-16 ENCOUNTER — OFFICE VISIT (OUTPATIENT)
Dept: FAMILY MEDICINE | Facility: CLINIC | Age: 67
End: 2017-08-16
Payer: MEDICARE

## 2017-08-16 VITALS
WEIGHT: 150.56 LBS | HEIGHT: 64 IN | TEMPERATURE: 98 F | DIASTOLIC BLOOD PRESSURE: 73 MMHG | BODY MASS INDEX: 25.7 KG/M2 | SYSTOLIC BLOOD PRESSURE: 132 MMHG | HEART RATE: 67 BPM

## 2017-08-16 VITALS
WEIGHT: 151.25 LBS | BODY MASS INDEX: 25.82 KG/M2 | DIASTOLIC BLOOD PRESSURE: 73 MMHG | SYSTOLIC BLOOD PRESSURE: 132 MMHG | TEMPERATURE: 98 F | HEART RATE: 62 BPM | HEIGHT: 64 IN

## 2017-08-16 DIAGNOSIS — Z09 SURGERY FOLLOW-UP EXAMINATION: Primary | ICD-10-CM

## 2017-08-16 DIAGNOSIS — Z86.79 HISTORY OF HYPERTENSION: ICD-10-CM

## 2017-08-16 DIAGNOSIS — Z86.39 HISTORY OF TYPE 2 DIABETES MELLITUS: ICD-10-CM

## 2017-08-16 DIAGNOSIS — E78.5 DYSLIPIDEMIA: ICD-10-CM

## 2017-08-16 DIAGNOSIS — E55.9 HYPOVITAMINOSIS D: Primary | ICD-10-CM

## 2017-08-16 PROCEDURE — 99999 PR PBB SHADOW E&M-EST. PATIENT-LVL III: CPT | Mod: PBBFAC,,, | Performed by: SURGERY

## 2017-08-16 PROCEDURE — 99214 OFFICE O/P EST MOD 30 MIN: CPT | Mod: S$PBB,,, | Performed by: FAMILY MEDICINE

## 2017-08-16 PROCEDURE — 1125F AMNT PAIN NOTED PAIN PRSNT: CPT | Mod: ,,, | Performed by: FAMILY MEDICINE

## 2017-08-16 PROCEDURE — 99213 OFFICE O/P EST LOW 20 MIN: CPT | Mod: PBBFAC,PO | Performed by: SURGERY

## 2017-08-16 PROCEDURE — 99999 PR PBB SHADOW E&M-EST. PATIENT-LVL III: CPT | Mod: PBBFAC,,, | Performed by: FAMILY MEDICINE

## 2017-08-16 PROCEDURE — 3075F SYST BP GE 130 - 139MM HG: CPT | Mod: ,,, | Performed by: FAMILY MEDICINE

## 2017-08-16 PROCEDURE — 1159F MED LIST DOCD IN RCRD: CPT | Mod: ,,, | Performed by: FAMILY MEDICINE

## 2017-08-16 PROCEDURE — 3078F DIAST BP <80 MM HG: CPT | Mod: ,,, | Performed by: FAMILY MEDICINE

## 2017-08-16 PROCEDURE — 99213 OFFICE O/P EST LOW 20 MIN: CPT | Mod: PBBFAC,27,PO | Performed by: FAMILY MEDICINE

## 2017-08-16 PROCEDURE — 99024 POSTOP FOLLOW-UP VISIT: CPT | Mod: ,,, | Performed by: SURGERY

## 2017-08-16 NOTE — PROGRESS NOTES
CHIEF COMPLAINT: follow up     HISTORY OF PRESENT ILLNESS:  Marce Hickey is a 67 y.o. female who presents to clinic for follow up. She underwent gastric sleeve surgery  with Dr. Panchal.  She states that she has discontinued all of her medications. She has repeat lab work ordered by Dr. Panchal.   She was also recently diagnosed with breast cancer and underwent b/l mastectomy. She is on arimidex and she will be undergoing breast reconstruction.        REVIEW OF SYSTEMS:  The patient denies any fever, chills, night sweats, headaches, vision changes, difficulty speaking or swallowing, decreased hearing,  chest pain, palpitations, shortness of breath, cough, nausea, vomiting, abdominal pain, dysuria, diarrhea, constipation, hematuria, hematochezia, melena, changes in her hair, skin, nails, numbness or weakness in her extremities, erythema,  swelling over any of her joints, myalgia, swollen glands, easy bruising, fatigue, edema,    MEDICATIONS:   Reviewed and/or reconciled in EPIC    ALLERGIES:  Reviewed and/or reconciled in [facility]    PAST MEDICAL/SURGICAL HISTORY:   Past Medical History:   Diagnosis Date    Anxiety     Depression     Diabetes mellitus, type 2     Borderline    Fatty liver disease, nonalcoholic     GERD (gastroesophageal reflux disease)     Hyperlipidemia     Hypertension     Plantar fasciitis of right foot       Past Surgical History:   Procedure Laterality Date    breast reduction       SECTION      x 2    CHOLECYSTECTOMY      FOOT SURGERY      left bunionectomy    gastric sleve      HYSTERECTOMY      one ovary intact, adhesions    LIPOSUCTION      TONSILLECTOMY         FAMILY HISTORY:    Family History   Problem Relation Age of Onset    Hypertension Mother     Heart disease Mother      pacemaker    Heart disease Father     Psoriasis Father     Cancer Neg Hx        SOCIAL HISTORY:    Social History     Social History    Marital status:      Spouse name: N/A  "   Number of children: N/A    Years of education: N/A     Occupational History    Not on file.     Social History Main Topics    Smoking status: Former Smoker     Quit date: 11/27/1993    Smokeless tobacco: Never Used      Comment: quit 1993    Alcohol use No    Drug use: No    Sexual activity: Yes     Birth control/ protection: Surgical     Other Topics Concern    Not on file     Social History Narrative    No narrative on file       PHYSICAL EXAM:  VITAL SIGNS:   Vitals:    08/16/17 1552   BP: (!) 141/78   BP Location: Left arm   Patient Position: Sitting   BP Method: Small (Automatic)   Pulse: 67   Temp: 97.6 °F (36.4 °C)   TempSrc: Oral   Weight: 68.3 kg (150 lb 9.2 oz)   Height: 5' 4" (1.626 m)     GENERAL:  Patient appears well nourished, sitting on exam table, in no acute distress.  HEENT:  Atraumatic, normocephalic, PERRLA, EOMI, no conjunctival injection, sclerae are anicteric, normal external auditory canals,TMs clear b/l, gross hearing intact to whisper, MMM, no oropharygneal erythema or exudate.  NECK:  Supple, normal ROM, trachea is midline , no supraclavicular or cervical LAD or masses palpated.    CARDIOVASCULAR:  RRR, normal S1 and S2, no m/r/g.  RESPIRATORY:  CTA b/l, no wheezes, rhonchi, rales.  No increased work of breathing, no  use of accessory muscles.  ABDOMEN:  Soft, nontender, nondistended, normoactive bowel sounds in all four quadrants, no rebound or guarding, no HSM or masses palpated.  Normal percussion.  EXTREMITIES:  2+ DP pulses b/l, no edema.  SKIN:  Warm, no lesions on exposed skin.  NEUROMUSCULAR:  Cranial nerves II-XII grossly intact.   No clubbing or cyanosis of digits/nails.  Steady gait.  PSYCH:  Patient is alert and oriented to person, time, place. They are appropriately dressed and groomed. There is normal eye contact. Rate and tone of speech is normal. Normal insight, judgement. Normal thought content and process. Patient describes her mood as good, affect is " depressed, she is tearful throughout the encoutner    ASSESSMENT/PLAN: This is a 67 y.o. female who presents to clinic for evaluation of the following concerns    1. Vitamin D deficiency: vitamin D level ordered by Dr. Panchal  2. History of type 2 DM: resolved,continue to monitor  3. History of HTN: resolved, continue to monitor  4. Dyslipidemia: continue with low fat diet, exercise. Lipid panel ordered by Dr. Panchal.        Savannah Garvey MD

## 2017-08-16 NOTE — PROGRESS NOTES
Dictation #1  MRN:[id number]  CSN:[id number]    S/p bilateral TE placement    Patient having severe pain with expansions and does not want to be expanded anymore    Vitals:    08/16/17 0849   BP: 132/73   Pulse: 62   Temp: 98.1 °F (36.7 °C)     NAD  Bilateral breast incisions wide but healed, excess tissue medially and laterally, expanders in place without evidence of infection    A/P: s/p above  Plan for implant exchange with soft tissue reconstruction to bilateral breasts in 4-6 weeks

## 2017-08-17 ENCOUNTER — PATIENT MESSAGE (OUTPATIENT)
Dept: FAMILY MEDICINE | Facility: CLINIC | Age: 67
End: 2017-08-17

## 2017-08-17 ENCOUNTER — PATIENT MESSAGE (OUTPATIENT)
Dept: PLASTIC SURGERY | Facility: CLINIC | Age: 67
End: 2017-08-17

## 2017-08-21 ENCOUNTER — PATIENT MESSAGE (OUTPATIENT)
Dept: PLASTIC SURGERY | Facility: CLINIC | Age: 67
End: 2017-08-21

## 2017-08-22 ENCOUNTER — PATIENT MESSAGE (OUTPATIENT)
Dept: FAMILY MEDICINE | Facility: CLINIC | Age: 67
End: 2017-08-22

## 2017-08-22 DIAGNOSIS — Z85.3 PERSONAL HISTORY OF MALIGNANT NEOPLASM OF BREAST: Primary | ICD-10-CM

## 2017-08-29 ENCOUNTER — PATIENT MESSAGE (OUTPATIENT)
Dept: SURGERY | Facility: HOSPITAL | Age: 67
End: 2017-08-29

## 2017-08-30 ENCOUNTER — PATIENT MESSAGE (OUTPATIENT)
Dept: BARIATRICS | Facility: CLINIC | Age: 67
End: 2017-08-30

## 2017-09-12 ENCOUNTER — PATIENT MESSAGE (OUTPATIENT)
Dept: SURGERY | Facility: HOSPITAL | Age: 67
End: 2017-09-12

## 2017-09-13 ENCOUNTER — PATIENT MESSAGE (OUTPATIENT)
Dept: SURGERY | Facility: HOSPITAL | Age: 67
End: 2017-09-13

## 2017-09-21 ENCOUNTER — ANESTHESIA EVENT (OUTPATIENT)
Dept: SURGERY | Facility: HOSPITAL | Age: 67
End: 2017-09-21
Payer: MEDICARE

## 2017-09-21 NOTE — ANESTHESIA PREPROCEDURE EVALUATION
" Anesthesia Assessment: Preoperative EQUATION     Planned Procedure: Procedure(s) (LRB):  RECONSTRUCTION-BREAST/REVISION-FLAP Soft Tissue Revision (Bilateral)  EXCHANGE IMPLANT-BREAST (Bilateral)  Requested Anesthesia Type:General  Surgeon: Montrell Thomas MD  Service: Plastics  Known or anticipated Date of Surgery:9/25/2017     Surgeon notes: reviewed     Electronic QUestionnaire Assessment completed via nurse interview with patient.      NO AQ     Triage considerations:      The patient has no apparent active cardiac condition (No unstable coronary Syndrome such as severe unstable angina or recent [<1 month] myocardial infarction, decompensated CHF, severe valvular   disease or significant arrhythmia)     Previous anesthesia records:GETA  05/04/17; Placement Time: 1447; Method of Intubation: Direct laryngoscopy; Inserted by: CRNA; Airway Device: Endotracheal Tube; Mask Ventilation: Easy; Intubated: Postinduction; Blade: Amaya #2; Airway Device Size: 7.5; Style: Cuffed; Cuff Inflation: Minimal occlusive pressure; Inflation Amount: 3; Placement Verified By: Auscultation, Capnometry; Grade: Grade I; Complicating Factors: None; Intubation Findings: Positive EtCO2, Bilateral breath sounds, Atraumatic/Condition of teeth unchanged;  Depth of Insertion: 20; Securment: Lips; Complications: None; Breath Sounds: Equal Bilateral; Insertion Attempts: 1     Last PCP note: within 3 months , within [facility]  8/16  Subspecialty notes: Bariatrics 8/3     Other important co-morbidities: "Takes a long time to wake up from anesthesia" HX Gastric Sleeve-1/2017, HX Breat Cancer-4/2017, GERD, [id number],  HTN-Takes no meds, Fatty Liver     Tests already available:  Available tests,  within 3 months , within [facility] . 7/2017 CMP, CBC, Lipid Panel    12/2016 EKG                            Instructions given. (See in Nurse's note)     Optimization:  Anesthesia Preop Clinic Assessment  Indicated-Not required for this surgery     "            Plan:    Testing:  None needed                           Patient  has previously scheduled Medical Appointment:  9/22 [facility]     Navigation: Tests Scheduled. none                        Straight Line to surgery.                          No tests, anesthesia preop clinic visit, or consult required.      Tracey Landaverde RN                                                                                                                                        09/21/2017  Marce Hickey is a 67 y.o., female c PmHx GERD, GILBERT, CEASAR, MDD, DM2, htn, presents for evaluation for below procedure:    Pre-operative evaluation for Procedure(s) (LRB):  RECONSTRUCTION-BREAST/REVISION-FLAP Soft Tissue Revision (Bilateral)  EXCHANGE IMPLANT-BREAST (Bilateral)        Last Airway:    Placement Date: 05/04/17; Placement Time: 1447; Method of Intubation: Direct laryngoscopy; Inserted by: CRNA; Airway Device: Endotracheal Tube; Mask Ventilation: Easy; Intubated: Postinduction; Blade: Amaya #2; Airway Device Size: 7.5; Style: Cuffed; Cuff Inflation: Minimal occlusive pressure; Inflation Amount: 3; Placement Verified By: Auscultation, Capnometry; Grade: Grade I; Complicating Factors: None; Intubation Findings: Positive EtCO2, Bilateral breath sounds, Atraumatic/Condition of teeth unchanged;  Depth of Insertion: 20; Securment: Lips; Complications: None; Breath Sounds: Equal Bilateral; Insertion Attempts: 1; Removal Date: 05/04/17;  Removal Time: 1858    Patient Active Problem List   Diagnosis    GERD (gastroesophageal reflux disease)    Plantar fasciitis of right foot    Fatty liver disease, nonalcoholic    Dysmetabolic syndrome    Hypovitaminosis D    Moderate episode of recurrent major depressive disorder    CEASAR (generalized anxiety disorder)    Back pain    Dyslipidemia    Status post gastric surgery    History of type 2 diabetes mellitus    History of hypertension    Breast cancer    Personal history of  breast cancer       Review of patient's allergies indicates:   Allergen Reactions    Nsaids (non-steroidal anti-inflammatory drug) Anaphylaxis       No current facility-administered medications on file prior to encounter.      Current Outpatient Prescriptions on File Prior to Encounter   Medication Sig Dispense Refill    anastrozole (ARIMIDEX) 1 mg Tab Take 1 tablet (1 mg total) by mouth once daily. 30 tablet 2    ZEGERID 40-1.1 mg-gram per capsule TAKE 1 CAPSULE BEFORE BREAKFAST (Patient taking differently: TAKE 1 CAPSULE BEFORE BREAKFAST with bio carb) 90 capsule 2    lorazepam (ATIVAN) 0.5 MG tablet Take 1 tablet (0.5 mg total) by mouth every 6 (six) hours as needed for Anxiety. 5 tablet 0    multivitamin (THERAGRAN) per tablet Take 1 tablet by mouth once daily.      tramadol (ULTRAM) 50 mg tablet Take 1 tablet (50 mg total) by mouth every 6 (six) hours as needed for Pain. 20 tablet 0       Past Surgical History:   Procedure Laterality Date    breast reduction       SECTION      x 2    CHOLECYSTECTOMY      FOOT SURGERY      left bunionectomy    gastric sleve      HYSTERECTOMY      one ovary intact, adhesions    LIPOSUCTION      TONSILLECTOMY         Social History     Social History    Marital status:      Spouse name: N/A    Number of children: N/A    Years of education: N/A     Occupational History    Not on file.     Social History Main Topics    Smoking status: Former Smoker     Quit date: 1993    Smokeless tobacco: Never Used      Comment: quit     Alcohol use No    Drug use: No    Sexual activity: Yes     Birth control/ protection: Surgical     Other Topics Concern    Not on file     Social History Narrative    No narrative on file         Vital Signs Range (Last 24H):         CBC: No results for input(s): WBC, RBC, HGB, HCT, PLT, MCV, MCH, MCHC in the last 72 hours.    CMP: No results for input(s): NA, K, CL, CO2, BUN, CREATININE, GLU, MG, PHOS, CALCIUM,  ALBUMIN, PROT, ALKPHOS, ALT, AST, BILITOT in the last 72 hours.    INR  No results for input(s): INR, PROTIME, APTT in the last 72 hours.    Invalid input(s): PT        Diagnostic Studies:      EKG:  Normal sinus rhythm  Nonspecific T wave abnormality  Prolonged QT  Abnormal ECG  No previous ECGs available  Confirmed by DONA IVAN MD ([id number]) on 12/15/2016 4:05:59 PM    Referred By: MAGGY MENDES           Confirmed By:DONA IVAN MD    2D Echo:  CONCLUSIONS     1 - Normal left ventricular systolic function (EF 60-65%).     2 - Normal left ventricular diastolic function.     3 - Normal right ventricular systolic function .             This document has been electronically    SIGNED BY: Bebeto Zamorano MD On: 12/14/2016 13:12    Anesthesia Evaluation    I have reviewed the Patient Summary Reports.    I have reviewed the Nursing Notes.   I have reviewed the Medications.     Review of Systems  Anesthesia Hx:  No problems with previous Anesthesia  Personal Hx of Anesthesia complications Slow To Awaken/Delayed Emergence   Social:  Former Smoker    Hematology/Oncology:        Current/Recent Cancer. Breast bilateral   Cardiovascular:   Exercise tolerance: good Hypertension Denies CAD.    Denies CABG/stent.  Denies Dysrhythmias.   Denies Angina. hyperlipidemia ECG has been reviewed.  Hypertension  Denies hypertension since weight loss   Pulmonary:  Pulmonary Normal    Hepatic/GI:   GERD, well controlled Liver Disease,  Esophageal / Stomach Disorders Gerd Controlled by s/p Gastric Surgery.  Liver Disease, Fatty Liver    Neurological:  Neurology Normal    Endocrine:   Diabetes  Diabetes, Type 2 Diabetes , controlled by diet.  Prediabetic.    Psych:   Psychiatric History anxiety          Physical Exam  General:  Obesity, Well nourished    Airway/Jaw/Neck:  Airway Findings: Mouth Opening: Normal Tongue: Normal  General Airway Assessment: Adult  Mallampati: II  Improves to II with phonation.  TM Distance: Normal, at least  6 cm      Dental:  Dental Findings: In tact   Chest/Lungs:  Chest/Lungs Findings: Clear to auscultation     Heart/Vascular:  Heart Findings: Rate: Normal  Rhythm: Regular Rhythm  Sounds: Normal        Mental Status:  Mental Status Findings:  Cooperative, Alert and Oriented         Anesthesia Plan  Type of Anesthesia, risks & benefits discussed:  Anesthesia Type:  general  Patient's Preference:   Intra-op Monitoring Plan:   Intra-op Monitoring Plan Comments:   Post Op Pain Control Plan:   Post Op Pain Control Plan Comments:   Induction:   IV  Beta Blocker:         Informed Consent: Patient understands risks and agrees with Anesthesia plan.  Questions answered. Anesthesia consent signed with patient.  ASA Score: 2     Day of Surgery Review of History & Physical:    H&P update referred to the surgeon.         Ready For Surgery From Anesthesia Perspective.

## 2017-09-21 NOTE — PRE ADMISSION SCREENING
"Anesthesia Assessment: Preoperative EQUATION    Planned Procedure: Procedure(s) (LRB):  RECONSTRUCTION-BREAST/REVISION-FLAP Soft Tissue Revision (Bilateral)  EXCHANGE IMPLANT-BREAST (Bilateral)  Requested Anesthesia Type:General  Surgeon: Montrell Thomas MD  Service: Plastics  Known or anticipated Date of Surgery:9/25/2017    Surgeon notes: reviewed    Electronic QUestionnaire Assessment completed via nurse interview with patient.      NO AQ    Triage considerations:     The patient has no apparent active cardiac condition (No unstable coronary Syndrome such as severe unstable angina or recent [<1 month] myocardial infarction, decompensated CHF, severe valvular   disease or significant arrhythmia)    Previous anesthesia records:GETA  05/04/17; Placement Time: 1447; Method of Intubation: Direct laryngoscopy; Inserted by: CRNA; Airway Device: Endotracheal Tube; Mask Ventilation: Easy; Intubated: Postinduction; Blade: Amaya #2; Airway Device Size: 7.5; Style: Cuffed; Cuff Inflation: Minimal occlusive pressure; Inflation Amount: 3; Placement Verified By: Auscultation, Capnometry; Grade: Grade I; Complicating Factors: None; Intubation Findings: Positive EtCO2, Bilateral breath sounds, Atraumatic/Condition of teeth unchanged;  Depth of Insertion: 20; Securment: Lips; Complications: None; Breath Sounds: Equal Bilateral; Insertion Attempts: 1    Last PCP note: within 3 months , within [facility]  8/16  Subspecialty notes: Bariatrics 8/3    Other important co-morbidities: "Takes a long time to wake up from anesthesia" HX Gastric Sleeve-1/2017, HX Breat Cancer-4/2017, GERD, [id number],  HTN-Takes no meds, Fatty Liver     Tests already available:  Available tests,  within 3 months , within [facility] . 7/2017 CMP, CBC, Lipid Panel    12/2016 EKG            Instructions given. (See in Nurse's note)    Optimization:  Anesthesia Preop Clinic Assessment  Indicated-Not required for this surgery     Plan:    Testing:  None " needed      Patient  has previously scheduled Medical Appointment:  9/22 [facility]    Navigation: Tests Scheduled. none             Straight Line to surgery.               No tests, anesthesia preop clinic visit, or consult required.     Tracey Landaverde RN

## 2017-09-22 ENCOUNTER — OFFICE VISIT (OUTPATIENT)
Dept: PLASTIC SURGERY | Facility: CLINIC | Age: 67
End: 2017-09-22
Payer: MEDICARE

## 2017-09-22 ENCOUNTER — TELEPHONE (OUTPATIENT)
Dept: PLASTIC SURGERY | Facility: CLINIC | Age: 67
End: 2017-09-22

## 2017-09-22 VITALS
DIASTOLIC BLOOD PRESSURE: 71 MMHG | SYSTOLIC BLOOD PRESSURE: 151 MMHG | BODY MASS INDEX: 25.35 KG/M2 | WEIGHT: 147.69 LBS | HEART RATE: 67 BPM

## 2017-09-22 DIAGNOSIS — Z09 SURGERY FOLLOW-UP EXAMINATION: Primary | ICD-10-CM

## 2017-09-22 PROCEDURE — 99024 POSTOP FOLLOW-UP VISIT: CPT | Mod: S$GLB,,, | Performed by: SURGERY

## 2017-09-22 NOTE — PROGRESS NOTES
Marce Hickey presents to the Plastic Surgery Clinic for evaluation prior to   her implant exchange.  The patient had breast reconstruction using tissue   expanders.  She has also had a gastric bypass, which complicates things and she   has now lost 80 pounds.  She has some hanging skin below her incisions.  We will   try to gather this up and incorporate into a long inframammary incision, when   doing so this will force us to across the midline in a V fashion.  The implant   exchange should be relatively straightforward.  Her breasts are medial enough.    We will see her on Monday.      RAZ/CATRACHITO  dd: 09/22/2017 10:15:12 (CDT)  td: 09/22/2017 17:38:54 (CDT)  Doc ID   #[id number]  Job ID #[id number]    CC:

## 2017-09-25 ENCOUNTER — HOSPITAL ENCOUNTER (OUTPATIENT)
Facility: HOSPITAL | Age: 67
Discharge: HOME | End: 2017-09-25
Attending: SURGERY | Admitting: SURGERY
Payer: MEDICARE

## 2017-09-25 ENCOUNTER — ANESTHESIA (OUTPATIENT)
Dept: SURGERY | Facility: HOSPITAL | Age: 67
End: 2017-09-25
Payer: MEDICARE

## 2017-09-25 VITALS
TEMPERATURE: 97 F | OXYGEN SATURATION: 99 % | DIASTOLIC BLOOD PRESSURE: 63 MMHG | RESPIRATION RATE: 16 BRPM | HEART RATE: 54 BPM | BODY MASS INDEX: 25.1 KG/M2 | HEIGHT: 64 IN | WEIGHT: 147 LBS | SYSTOLIC BLOOD PRESSURE: 137 MMHG

## 2017-09-25 DIAGNOSIS — Z85.3 PERSONAL HISTORY OF BREAST CANCER: Primary | ICD-10-CM

## 2017-09-25 LAB — POCT GLUCOSE: 117 MG/DL (ref 70–110)

## 2017-09-25 PROCEDURE — 27000221 HC OXYGEN, UP TO 24 HOURS

## 2017-09-25 PROCEDURE — 63600175 PHARM REV CODE 636 W HCPCS

## 2017-09-25 PROCEDURE — 25000003 PHARM REV CODE 250: Performed by: ANESTHESIOLOGY

## 2017-09-25 PROCEDURE — 63600175 PHARM REV CODE 636 W HCPCS: Performed by: STUDENT IN AN ORGANIZED HEALTH CARE EDUCATION/TRAINING PROGRAM

## 2017-09-25 PROCEDURE — 63600175 PHARM REV CODE 636 W HCPCS: Performed by: ANESTHESIOLOGY

## 2017-09-25 PROCEDURE — S0077 INJECTION, CLINDAMYCIN PHOSP: HCPCS | Performed by: SURGERY

## 2017-09-25 PROCEDURE — 36000706: Performed by: SURGERY

## 2017-09-25 PROCEDURE — 63600175 PHARM REV CODE 636 W HCPCS: Performed by: SURGERY

## 2017-09-25 PROCEDURE — S0077 INJECTION, CLINDAMYCIN PHOSP: HCPCS | Performed by: STUDENT IN AN ORGANIZED HEALTH CARE EDUCATION/TRAINING PROGRAM

## 2017-09-25 PROCEDURE — 19370 REVJ PERI-IMPLT CAPSULE BRST: CPT | Mod: 50,51,, | Performed by: SURGERY

## 2017-09-25 PROCEDURE — C1729 CATH, DRAINAGE: HCPCS | Performed by: SURGERY

## 2017-09-25 PROCEDURE — 15734 MUSCLE-SKIN GRAFT TRUNK: CPT | Mod: ,,, | Performed by: SURGERY

## 2017-09-25 PROCEDURE — 11406 EXC TR-EXT B9+MARG >4.0 CM: CPT | Mod: 51,,, | Performed by: SURGERY

## 2017-09-25 PROCEDURE — 36000707: Performed by: SURGERY

## 2017-09-25 PROCEDURE — 25000003 PHARM REV CODE 250: Performed by: STUDENT IN AN ORGANIZED HEALTH CARE EDUCATION/TRAINING PROGRAM

## 2017-09-25 PROCEDURE — 12037 INTMD RPR S/TR/EXT >30.0 CM: CPT | Mod: 51,59,, | Performed by: SURGERY

## 2017-09-25 PROCEDURE — 82962 GLUCOSE BLOOD TEST: CPT | Performed by: SURGERY

## 2017-09-25 PROCEDURE — 37000008 HC ANESTHESIA 1ST 15 MINUTES: Performed by: SURGERY

## 2017-09-25 PROCEDURE — 94760 N-INVAS EAR/PLS OXIMETRY 1: CPT | Mod: GZ

## 2017-09-25 PROCEDURE — 71000015 HC POSTOP RECOV 1ST HR: Performed by: SURGERY

## 2017-09-25 PROCEDURE — D9220A PRA ANESTHESIA: Mod: ,,, | Performed by: ANESTHESIOLOGY

## 2017-09-25 PROCEDURE — 88307 TISSUE EXAM BY PATHOLOGIST: CPT | Mod: 26,,, | Performed by: PATHOLOGY

## 2017-09-25 PROCEDURE — 64520 N BLOCK LUMBAR/THORACIC: CPT | Performed by: ANESTHESIOLOGY

## 2017-09-25 PROCEDURE — 37000009 HC ANESTHESIA EA ADD 15 MINS: Performed by: SURGERY

## 2017-09-25 PROCEDURE — 88307 TISSUE EXAM BY PATHOLOGIST: CPT | Performed by: PATHOLOGY

## 2017-09-25 PROCEDURE — 71000016 HC POSTOP RECOV ADDL HR: Performed by: SURGERY

## 2017-09-25 PROCEDURE — 71000039 HC RECOVERY, EACH ADD'L HOUR: Performed by: SURGERY

## 2017-09-25 PROCEDURE — 11970 RPLCMT TISS XPNDR PERM IMPLT: CPT | Mod: 50,59,, | Performed by: SURGERY

## 2017-09-25 PROCEDURE — C1789 PROSTHESIS, BREAST, IMP: HCPCS | Performed by: SURGERY

## 2017-09-25 PROCEDURE — 64461 PVB THORACIC SINGLE INJ SITE: CPT | Mod: 50,59,GC, | Performed by: ANESTHESIOLOGY

## 2017-09-25 PROCEDURE — 25000003 PHARM REV CODE 250: Performed by: SURGERY

## 2017-09-25 PROCEDURE — 71000033 HC RECOVERY, INTIAL HOUR: Performed by: SURGERY

## 2017-09-25 PROCEDURE — 88300 SURGICAL PATH GROSS: CPT | Mod: 26,,, | Performed by: PATHOLOGY

## 2017-09-25 DEVICE — IMPLANTABLE DEVICE: Type: IMPLANTABLE DEVICE | Site: BREAST | Status: FUNCTIONAL

## 2017-09-25 RX ORDER — SODIUM CHLORIDE 0.9 % (FLUSH) 0.9 %
3 SYRINGE (ML) INJECTION
Status: DISCONTINUED | OUTPATIENT
Start: 2017-09-25 | End: 2017-09-25 | Stop reason: HOSPADM

## 2017-09-25 RX ORDER — ROCURONIUM BROMIDE 10 MG/ML
INJECTION, SOLUTION INTRAVENOUS
Status: DISCONTINUED | OUTPATIENT
Start: 2017-09-25 | End: 2017-09-25

## 2017-09-25 RX ORDER — PROPOFOL 10 MG/ML
VIAL (ML) INTRAVENOUS
Status: DISCONTINUED | OUTPATIENT
Start: 2017-09-25 | End: 2017-09-25

## 2017-09-25 RX ORDER — SUCCINYLCHOLINE CHLORIDE 20 MG/ML
INJECTION INTRAMUSCULAR; INTRAVENOUS
Status: DISCONTINUED | OUTPATIENT
Start: 2017-09-25 | End: 2017-09-25

## 2017-09-25 RX ORDER — HYDROMORPHONE HYDROCHLORIDE 1 MG/ML
0.2 INJECTION, SOLUTION INTRAMUSCULAR; INTRAVENOUS; SUBCUTANEOUS EVERY 5 MIN PRN
Status: DISCONTINUED | OUTPATIENT
Start: 2017-09-25 | End: 2017-09-25 | Stop reason: HOSPADM

## 2017-09-25 RX ORDER — CIPROFLOXACIN 2 MG/ML
INJECTION, SOLUTION INTRAVENOUS CONTINUOUS PRN
Status: DISCONTINUED | OUTPATIENT
Start: 2017-09-25 | End: 2017-09-25 | Stop reason: HOSPADM

## 2017-09-25 RX ORDER — HEPARIN SODIUM 5000 [USP'U]/ML
5000 INJECTION, SOLUTION INTRAVENOUS; SUBCUTANEOUS ONCE
Status: COMPLETED | OUTPATIENT
Start: 2017-09-25 | End: 2017-09-25

## 2017-09-25 RX ORDER — HYDROCODONE BITARTRATE AND ACETAMINOPHEN 5; 325 MG/1; MG/1
1 TABLET ORAL EVERY 4 HOURS PRN
Status: DISCONTINUED | OUTPATIENT
Start: 2017-09-25 | End: 2017-09-25 | Stop reason: HOSPADM

## 2017-09-25 RX ORDER — PHENYLEPHRINE HYDROCHLORIDE 10 MG/ML
INJECTION INTRAVENOUS
Status: DISCONTINUED | OUTPATIENT
Start: 2017-09-25 | End: 2017-09-25

## 2017-09-25 RX ORDER — MIDAZOLAM HYDROCHLORIDE 1 MG/ML
0.5 INJECTION INTRAMUSCULAR; INTRAVENOUS EVERY 5 MIN PRN
Status: DISCONTINUED | OUTPATIENT
Start: 2017-09-25 | End: 2017-09-25 | Stop reason: HOSPADM

## 2017-09-25 RX ORDER — CLINDAMYCIN PHOSPHATE 900 MG/50ML
900 INJECTION, SOLUTION INTRAVENOUS
Status: COMPLETED | OUTPATIENT
Start: 2017-09-25 | End: 2017-09-25

## 2017-09-25 RX ORDER — HYDROCODONE BITARTRATE AND ACETAMINOPHEN 5; 325 MG/1; MG/1
TABLET ORAL
Status: DISCONTINUED
Start: 2017-09-25 | End: 2017-09-25 | Stop reason: HOSPADM

## 2017-09-25 RX ORDER — HYDROMORPHONE HYDROCHLORIDE 1 MG/ML
INJECTION, SOLUTION INTRAMUSCULAR; INTRAVENOUS; SUBCUTANEOUS
Status: COMPLETED
Start: 2017-09-25 | End: 2017-09-25

## 2017-09-25 RX ORDER — LIDOCAINE HCL/PF 100 MG/5ML
SYRINGE (ML) INTRAVENOUS
Status: DISCONTINUED | OUTPATIENT
Start: 2017-09-25 | End: 2017-09-25

## 2017-09-25 RX ORDER — ONDANSETRON 2 MG/ML
4 INJECTION INTRAMUSCULAR; INTRAVENOUS DAILY PRN
Status: DISCONTINUED | OUTPATIENT
Start: 2017-09-25 | End: 2017-09-25 | Stop reason: HOSPADM

## 2017-09-25 RX ORDER — LIDOCAINE HYDROCHLORIDE 10 MG/ML
1 INJECTION, SOLUTION EPIDURAL; INFILTRATION; INTRACAUDAL; PERINEURAL ONCE
Status: COMPLETED | OUTPATIENT
Start: 2017-09-25 | End: 2017-09-25

## 2017-09-25 RX ORDER — FENTANYL CITRATE 50 UG/ML
25 INJECTION, SOLUTION INTRAMUSCULAR; INTRAVENOUS EVERY 5 MIN PRN
Status: DISCONTINUED | OUTPATIENT
Start: 2017-09-25 | End: 2017-09-25 | Stop reason: HOSPADM

## 2017-09-25 RX ORDER — CLINDAMYCIN HYDROCHLORIDE 150 MG/1
300 CAPSULE ORAL EVERY 8 HOURS
Qty: 42 CAPSULE | Refills: 0 | Status: SHIPPED | OUTPATIENT
Start: 2017-09-25 | End: 2017-10-02

## 2017-09-25 RX ORDER — BACITRACIN 50000 [IU]/1
INJECTION, POWDER, FOR SOLUTION INTRAMUSCULAR
Status: DISCONTINUED | OUTPATIENT
Start: 2017-09-25 | End: 2017-09-25 | Stop reason: HOSPADM

## 2017-09-25 RX ORDER — CYCLOBENZAPRINE HCL 5 MG
5 TABLET ORAL 3 TIMES DAILY PRN
Qty: 31 TABLET | Refills: 0 | Status: SHIPPED | OUTPATIENT
Start: 2017-09-25 | End: 2017-10-05

## 2017-09-25 RX ORDER — SODIUM CHLORIDE 9 MG/ML
INJECTION, SOLUTION INTRAVENOUS CONTINUOUS
Status: DISCONTINUED | OUTPATIENT
Start: 2017-09-25 | End: 2017-09-25 | Stop reason: HOSPADM

## 2017-09-25 RX ORDER — OXYCODONE AND ACETAMINOPHEN 5; 325 MG/1; MG/1
1 TABLET ORAL EVERY 4 HOURS PRN
Qty: 41 TABLET | Refills: 0 | Status: SHIPPED | OUTPATIENT
Start: 2017-09-25 | End: 2017-11-01

## 2017-09-25 RX ORDER — ONDANSETRON 2 MG/ML
4 INJECTION INTRAMUSCULAR; INTRAVENOUS EVERY 12 HOURS PRN
Status: DISCONTINUED | OUTPATIENT
Start: 2017-09-25 | End: 2017-09-25 | Stop reason: HOSPADM

## 2017-09-25 RX ADMIN — PHENYLEPHRINE HYDROCHLORIDE 200 MCG: 10 INJECTION INTRAVENOUS at 12:09

## 2017-09-25 RX ADMIN — SUCCINYLCHOLINE CHLORIDE 120 MG: 20 INJECTION, SOLUTION INTRAMUSCULAR; INTRAVENOUS at 11:09

## 2017-09-25 RX ADMIN — SODIUM CHLORIDE: 0.9 INJECTION, SOLUTION INTRAVENOUS at 09:09

## 2017-09-25 RX ADMIN — ROCURONIUM BROMIDE 10 MG: 10 INJECTION, SOLUTION INTRAVENOUS at 11:09

## 2017-09-25 RX ADMIN — CLINDAMYCIN PHOSPHATE 900 MG: 18 INJECTION, SOLUTION INTRAVENOUS at 11:09

## 2017-09-25 RX ADMIN — LIDOCAINE HYDROCHLORIDE 100 MG: 20 INJECTION, SOLUTION INTRAVENOUS at 11:09

## 2017-09-25 RX ADMIN — SODIUM CHLORIDE, SODIUM GLUCONATE, SODIUM ACETATE, POTASSIUM CHLORIDE, MAGNESIUM CHLORIDE, SODIUM PHOSPHATE, DIBASIC, AND POTASSIUM PHOSPHATE: .53; .5; .37; .037; .03; .012; .00082 INJECTION, SOLUTION INTRAVENOUS at 10:09

## 2017-09-25 RX ADMIN — EPHEDRINE SULFATE 10 MG: 50 INJECTION, SOLUTION INTRAMUSCULAR; INTRAVENOUS; SUBCUTANEOUS at 12:09

## 2017-09-25 RX ADMIN — HYDROMORPHONE HYDROCHLORIDE 0.2 MG: 1 INJECTION, SOLUTION INTRAMUSCULAR; INTRAVENOUS; SUBCUTANEOUS at 02:09

## 2017-09-25 RX ADMIN — FENTANYL CITRATE 50 MCG: 50 INJECTION INTRAMUSCULAR; INTRAVENOUS at 10:09

## 2017-09-25 RX ADMIN — HYDROMORPHONE HYDROCHLORIDE 0.2 MG: 1 INJECTION, SOLUTION INTRAMUSCULAR; INTRAVENOUS; SUBCUTANEOUS at 01:09

## 2017-09-25 RX ADMIN — HEPARIN SODIUM 5000 UNITS: 5000 INJECTION, SOLUTION INTRAVENOUS; SUBCUTANEOUS at 09:09

## 2017-09-25 RX ADMIN — ONDANSETRON 4 MG: 2 INJECTION INTRAMUSCULAR; INTRAVENOUS at 04:09

## 2017-09-25 RX ADMIN — FENTANYL CITRATE 100 MCG: 50 INJECTION INTRAMUSCULAR; INTRAVENOUS at 11:09

## 2017-09-25 RX ADMIN — LIDOCAINE HYDROCHLORIDE 1 MG: 10 INJECTION, SOLUTION EPIDURAL; INFILTRATION; INTRACAUDAL; PERINEURAL at 09:09

## 2017-09-25 RX ADMIN — HYDROCODONE BITARTRATE AND ACETAMINOPHEN 1 TABLET: 5; 325 TABLET ORAL at 01:09

## 2017-09-25 RX ADMIN — MIDAZOLAM HYDROCHLORIDE 1 MG: 1 INJECTION, SOLUTION INTRAMUSCULAR; INTRAVENOUS at 10:09

## 2017-09-25 RX ADMIN — SODIUM CHLORIDE: 0.9 INJECTION, SOLUTION INTRAVENOUS at 04:09

## 2017-09-25 RX ADMIN — PROPOFOL 150 MG: 10 INJECTION, EMULSION INTRAVENOUS at 11:09

## 2017-09-25 RX ADMIN — MIDAZOLAM HYDROCHLORIDE 2 MG: 1 INJECTION, SOLUTION INTRAMUSCULAR; INTRAVENOUS at 11:09

## 2017-09-25 NOTE — BRIEF OP NOTE
[facility]  Brief Operative Note     SUMMARY     Surgery Date: 9/25/2017     Surgeon(s) and Role:     * Tayo Cain MD - Fellow     * Montrell Thomas MD - Primary    Assisting Surgeon: None    Pre-op Diagnosis:  Personal history of malignant neoplasm of breast [Z85.3]    Post-op Diagnosis:  Post-Op Diagnosis Codes:     * Personal history of malignant neoplasm of breast [Z85.3]    Procedure(s) (LRB):  RECONSTRUCTION-BREAST/REVISION-FLAP Soft Tissue Revision (Bilateral)  EXCHANGE IMPLANT-BREAST (Bilateral)    Anesthesia: General    Description of the findings of the procedure: RECONSTRUCTION-BREAST/REVISION-FLAP Soft Tissue Revision (Bilateral)  EXCHANGE IMPLANT-BREAST (Bilateral) were performed    Findings/Key Components: 480cc implants placed    Estimated Blood Loss: 20cc          Specimens:   Specimen (12h ago through future)    Start     Ordered    09/25/17 1224  Specimen to Pathology - Surgery  Once     Comments:  1.) Right axillary tissue- Permanent.2.) Left axillary tissue- Permanent.3.) Bilateral tissue expanders- Gross only.      09/25/17 1247          Discharge Note    SUMMARY     Admit Date: 9/25/2017    Discharge Date and Time:  09/25/2017 1:45 PM    Hospital Course Patient admitted, consented,  and taken to surgery. Patient underwent the above described procedures under general anesthesia. Patient was then discharged.     Final Diagnosis: Post-Op Diagnosis Codes:     * Personal history of malignant neoplasm of breast [Z85.3]    Disposition: Home or Self Care    Follow Up/Patient Instructions:     Medications:  Reconciled Home Medications:   Current Discharge Medication List      START taking these medications    Details   clindamycin (CLEOCIN) 150 MG capsule Take 2 capsules (300 mg total) by mouth every 8 (eight) hours.  Qty: 42 capsule, Refills: 0      cyclobenzaprine (FLEXERIL) 5 MG tablet Take 1 tablet (5 mg total) by mouth 3 (three) times daily as needed for Muscle  spasms.  Qty: 31 tablet, Refills: 0      oxycodone-acetaminophen (PERCOCET) 5-325 mg per tablet Take 1 tablet by mouth every 4 (four) hours as needed.  Qty: 41 tablet, Refills: 0         CONTINUE these medications which have NOT CHANGED    Details   anastrozole (ARIMIDEX) 1 mg Tab Take 1 tablet (1 mg total) by mouth once daily.  Qty: 30 tablet, Refills: 2    Associated Diagnoses: Malignant neoplasm involving both nipple and areola of right breast in female, unspecified estrogen receptor status      multivitamin with minerals tablet Take 1 tablet by mouth once daily.      ZEGERID 40-1.1 mg-gram per capsule TAKE 1 CAPSULE BEFORE BREAKFAST  Qty: 90 capsule, Refills: 2      lorazepam (ATIVAN) 0.5 MG tablet Take 1 tablet (0.5 mg total) by mouth every 6 (six) hours as needed for Anxiety.  Qty: 5 tablet, Refills: 0      multivitamin (THERAGRAN) per tablet Take 1 tablet by mouth once daily.      tramadol (ULTRAM) 50 mg tablet Take 1 tablet (50 mg total) by mouth every 6 (six) hours as needed for Pain.  Qty: 20 tablet, Refills: 0    Associated Diagnoses: Malignant neoplasm of nipple of right breast in female, unspecified estrogen receptor status             Discharge Procedure Orders  Diet general     Activity as tolerated     Shower on day dressing removed (No bath)   Order Comments: In 48 hours   Scheduling Instructions: Drain care, measure output every 24 hours, empty drain as needed when full. Strip drain daily, so it does not clog.     Call MD for:  temperature >100.4     Lifting restrictions     Weight bearing restrictions (specify)     Call MD for:  persistent nausea and vomiting     Call MD for:  severe uncontrolled pain     Call MD for:  difficulty breathing, headache or visual disturbances     Call MD for:  redness, tenderness, or signs of infection (pain, swelling, redness, odor or green/yellow discharge around incision site)     Call MD for:  hives     Call MD for:  persistent dizziness or light-headedness      Call MD for:  extreme fatigue     Remove dressing in 48 hours       Follow-up Information     Montrell Thomas MD In 1 week.    Specialty:  Plastic Surgery  Why:  Post op  Contact information:  [address] Antoine [address]ine  [facility] [id number] [phone]

## 2017-09-25 NOTE — PROGRESS NOTES
"Patient states that she is still nauseated, states that nausea is tolerable and refused the offered and ordered Phenergan. Patient states that she just wants to "go home" and get into her "own bed". Patient also states that she has "something for nausea at home" that she will take when she gets home. Patient's last set of vital signs obtained, PIV discontinued and discharge instructions compiled.  "

## 2017-09-25 NOTE — PLAN OF CARE
Patient arrived from PACU with KARLA Wei RN.  Patient stable.  Report received at this time.  Assumed care of patient at this time.

## 2017-09-25 NOTE — ANESTHESIA PROCEDURE NOTES
Paravertebral Single Injection Block(s)    Patient location during procedure: pre-op   Block not for primary anesthetic.  Reason for block: at surgeon's request and post-op pain management   Post-op Pain Location: Bilateral breast  Start time: 9/25/2017 10:29 AM  Timeout: 9/25/2017 10:28 AM   End time: 9/25/2017 10:40 AM  Staffing  Anesthesiologist: KINGS INFANTE  Resident/CRNA: EROS RICE  Performed: resident/CRNA   Preanesthetic Checklist  Completed: patient identified, site marked, surgical consent, pre-op evaluation, timeout performed, IV checked, risks and benefits discussed and monitors and equipment checked  Peripheral Block  Patient position: sitting  Prep: ChloraPrep  Patient monitoring: heart rate, cardiac monitor, continuous pulse ox, continuous capnometry and frequent blood pressure checks  Block type: paravertebral - thoracic  Laterality: bilateral  Injection technique: single shot  Needle  Needle type: Tuohy   Needle gauge: 17 G  Needle length: 3.5 in  Needle localization: anatomical landmarks     Assessment  Injection assessment: negative aspiration and negative parasthesia  Paresthesia pain: none  Heart rate change: no  Slow fractionated injection: yes  Medications:  Bolus administered: 30 mL of 0.5 ropivacaine  Epinephrine added: 3.75 mcg/mL (1/300,000)  Additional Notes    VSS.  [facility] RN monitoring vitals throughout procedure.  Patient tolerated procedure well.

## 2017-09-25 NOTE — PLAN OF CARE
Problem: Patient Care Overview  Goal: Plan of Care Review  Outcome: Ongoing (interventions implemented as appropriate)  VSS. Patient states pain is 1/10 & tolerable. No N&V. Teds/SCD's in place throughout duration in PACU. Transferred to Phase II.

## 2017-09-25 NOTE — DISCHARGE INSTRUCTIONS
"Please see attached discharged instructions titled "Post-Op Instructions Breast Reconstruction" and "Aarti (LUIS) Drain Care Instructions".  "

## 2017-09-25 NOTE — TRANSFER OF CARE
"Anesthesia Transfer of Care Note    Patient: Marce Hickey    Procedure(s) Performed: Procedure(s) (LRB):  RECONSTRUCTION-BREAST/REVISION-FLAP Soft Tissue Revision (Bilateral)  EXCHANGE IMPLANT-BREAST (Bilateral)    Anesthesia Type: general    Transport from OR: Transported from OR on room air with adequate spontaneous ventilation    Post pain: adequate analgesia    Post assessment: no apparent anesthetic complications    Post vital signs: stable    Level of consciousness: awake and alert    Nausea/Vomiting: no nausea/vomiting    Complications: none    Transfer of care protocol was followed      Last vitals:   Visit Vitals  BP (!) 142/75   Pulse (!) 56   Temp 36.5 °C (97.7 °F) (Temporal)   Resp 18   Ht 5' 4" (1.626 m)   Wt 66.7 kg (147 lb)   SpO2 98%   BMI 25.23 kg/m²     "

## 2017-09-25 NOTE — PROGRESS NOTES
"As patient was sitting in the wheelchair waiting to be wheeled down to her meet her  downstairs, patient asked nurse if I was going to give her something for nausea. I explained to the patient that I had already removed her IV and that the medication I had offered earlier was and IV medication. Patient asked if there was an oral medication I could give her and I stated that there was no oral medication ordered. Patient said "okay" and asked for more emesis bags. I provided the patient with additional emesis bags and patient was wheeled down to the front to the hospital to meet her .  "

## 2017-09-25 NOTE — H&P
Plastic Surgery History and Physical    HPI:  Marce Hickey 67 y.o. female s/p tissue expander reconstruction.  Here today for second stage.       PMH:  Past Medical History:   Diagnosis Date    Anxiety     Depression     Diabetes mellitus, type 2     Borderline    Fatty liver disease, nonalcoholic     GERD (gastroesophageal reflux disease)     Hyperlipidemia     Hypertension     Plantar fasciitis of right foot        PSH:  Past Surgical History:   Procedure Laterality Date    breast reduction       SECTION      x 2    CHOLECYSTECTOMY      FOOT SURGERY      left bunionectomy    gastric sleve      HYSTERECTOMY      one ovary intact, adhesions    LIPOSUCTION      TONSILLECTOMY         SH:  Tobacco:   History   Smoking Status    Former Smoker    Quit date: 1993   Smokeless Tobacco    Never Used     Comment: quit      ETOH:   History   Alcohol Use No     Illicit Drugs:   History   Drug Use No       Medications    Current Facility-Administered Medications:     0.9%  NaCl infusion, , Intravenous, Continuous, Montrell Thomas MD, Last Rate: 10 mL/hr at 17 0915    clindamycin 900 MG/50 ML D5W 900 mg/50 mL IVPB 900 mg, 900 mg, Intravenous, On Call Procedure, Taylor Villasenor MD    fentaNYL injection 25 mcg, 25 mcg, Intravenous, Q5 Min PRN, Tristan Epps MD, 50 mcg at 17 1033    midazolam injection 0.5 mg, 0.5 mg, Intravenous, Q5 Min PRN, Tristan Epps MD, 1 mg at 17 1033    Physical Exam  General: NAD, Resting comfortably in bed  Chest: CTA Bl  Heart: RRR  Abd: Soft, NT, ND  Breast: well healed.     Diagnostics:  Lab Results   Component Value Date    WBC 5.70 2017    WBC 5.90 2017    HGB 14.0 2017    HGB 14.0 2017    HCT 41.5 2017    HCT 41.4 2017    MCV 86 2017    MCV 86 2017     (L) 2017     (L) 2017     Lab Results   Component Value Date    CREATININE 0.8  07/31/2017    CREATININE 0.8 07/31/2017    BUN 21 07/31/2017    BUN 20 07/31/2017     07/31/2017     07/31/2017    K 4.6 07/31/2017    K 4.6 07/31/2017     07/31/2017     07/31/2017    CO2 28 07/31/2017    CO2 28 07/31/2017     Lab Results   Component Value Date    ALT 19 07/31/2017    ALT 19 07/31/2017    AST 18 07/31/2017    AST 19 07/31/2017    ALKPHOS 102 07/31/2017    ALKPHOS 102 07/31/2017    BILITOT 0.5 07/31/2017    BILITOT 0.5 07/31/2017     Lab Results   Component Value Date    ALBUMIN 3.7 07/31/2017    ALBUMIN 3.8 07/31/2017         Assessment/Plan  Marce Hickey 67 y.o. female here today for second stage.     1. Or today for second stage.

## 2017-09-25 NOTE — PLAN OF CARE
Patient and patient's spouse received discharge instructions and prescriptions.  Patient and patient's spouse verbalized understanding of all instructions given and all questions were addressed prior to patient's discharge.  Patient's vital signs are stable and within patient's baseline.  Patient tolerated clear liquids PO.  Patient voided 400 mL of clear yellow urine without difficulty in post-op.  Patient denies pain.  Patient states nausea is tolerable at this time, has something at home for nausea, wants to go home and get in her bed and refused ordered Phenergan.  Patient meets all criteria for discharge at this time.  All required consents present in patient's chart upon patient's discharge.

## 2017-09-26 NOTE — OP NOTE
"DATE OF PROCEDURE:  09/25/2017    PREOPERATIVE DIAGNOSIS:  History of breast cancer.    POSTOPERATIVE DIAGNOSIS:  History of breast cancer.    PROCEDURES PERFORMED:  1.  Bilateral implant exchange.  2.  Bilateral breast open capsulotomies.  3.  Reverse abdominal fasciocutaneous advancement flap measuring 30 x 4 cm.  4.  Wide soft tissue excision, right axilla, measuring approximately 15 x 6 cm   and wide soft tissue excision on the left breast measuring 15 x 6 cm with   layered closure.  Final incision 15 cm on the right and 15 cm on the left.    SURGEON:  Montrell Thomas M.D., [facility]    ANESTHESIA:  General.    INDICATIONS:  The patient had a very complicated history.  This patient   underwent bilateral mastectomies in which the both inferior mastectomy flaps   completely necrosed.  Also, partial of the superior flaps were also nonviable.    This gave us a very tight pocket to place the tissue expander.  The patient was   expanded fully in the clinic, but also had had a gastric bypass.  The   combination of the two with excessive weight loss caused thinning of the skin up   by the implant as well as excess skin below the inframammary fold.  Thus,   markings were made for the bilateral implant exchange and also capsulotomies.    The patient had flattening laterally in the left breast and medially in the   right breast.    DESCRIPTION OF PROCEDURE:  The patient was taken to the Operative Room, placed   in the supine position after adequate general endotracheal anesthesia and was   prepped and draped in normal sterile fashion.  Markings were made for the   complete excision of skin underneath the inframammary fold.  This would extend   to the midline in a "V" fashion."  These incisions were then made.  A   superficial fascial dissection then proceeded inferiorly onto the abdominal   wall.  This entire abdominal wall flap was then elevated superiorly.  Anchoring   sutures of 0 Vicryl were then placed from fascia to " the inframammary fold   bilaterally.  All excess skin in the center was excised.  The thin attenuated   tissue bilaterally just above the inframammary fold was also removed.  Next, the   capsules were opened bilaterally at a level higher than the skin incision.    Tissue expanders were then removed.  Capsulotomy on the right breast was   performed medially, on the left breast laterally.  The patient was then   reprepped.  Blue towels were placed, gloves were changed.  Two 500 mL smooth   silicone implants were opened on the back table, soaked in triple antibiotic   solution, placed in the pockets.  The capsule was closed using running 2-0   Vicryl sutures bilaterally.  Next, drains were placed.  Next, wide soft tissue   excision in the right and left axilla were made.  After the excision of the soft   tissue in the right and left axillae, the incisions were all closed using   interrupted 3-0 Monocryl followed by running 4-0 Monocryl subcuticular suture.    Drains were placed in both axillae.  There were no complications.      LILIANB/CATRACHITO  dd: 09/26/2017 12:50:56 (CDT)  td: 09/26/2017 13:43:24 (CDT)  Doc ID   #[id number]  Job ID #[id number]    CC:

## 2017-09-26 NOTE — ANESTHESIA POSTPROCEDURE EVALUATION
"Anesthesia Post Evaluation    Patient: Marce Hickey    Procedure(s) Performed: Procedure(s) (LRB):  RECONSTRUCTION-BREAST/REVISION-FLAP Soft Tissue Revision (Bilateral)  EXCHANGE IMPLANT-BREAST (Bilateral)    Final Anesthesia Type: general  Patient location during evaluation: PACU  Patient participation: Yes- Able to Participate  Level of consciousness: awake  Post-procedure vital signs: reviewed and stable  Pain management: adequate  Airway patency: patent  PONV status at discharge: No PONV  Anesthetic complications: no      Cardiovascular status: stable  Respiratory status: unassisted  Hydration status: euvolemic  Follow-up not needed.        Visit Vitals  /63 (BP Location: Left arm, Patient Position: Lying)   Pulse (!) 54   Temp 36.3 °C (97.3 °F) (Temporal)   Resp 16   Ht 5' 4" (1.626 m)   Wt 66.7 kg (147 lb)   SpO2 99%   BMI 25.23 kg/m²       Pain/Shaina Score: Pain Assessment Performed: Yes (9/25/2017  5:20 PM)  Presence of Pain: denies (9/25/2017  5:20 PM)  Pain Rating Prior to Med Admin: 7 (9/25/2017  2:37 PM)  Pain Rating Post Med Admin: 1 (9/25/2017  3:00 PM)  Shaina Score: 9 (9/25/2017  5:20 PM)      "

## 2017-09-29 ENCOUNTER — OFFICE VISIT (OUTPATIENT)
Dept: PLASTIC SURGERY | Facility: CLINIC | Age: 67
End: 2017-09-29
Payer: MEDICARE

## 2017-09-29 VITALS — HEART RATE: 70 BPM | DIASTOLIC BLOOD PRESSURE: 72 MMHG | SYSTOLIC BLOOD PRESSURE: 136 MMHG

## 2017-09-29 DIAGNOSIS — Z09 SURGERY FOLLOW-UP EXAMINATION: Primary | ICD-10-CM

## 2017-09-29 PROCEDURE — 99024 POSTOP FOLLOW-UP VISIT: CPT | Mod: S$GLB,,, | Performed by: SURGERY

## 2017-09-29 NOTE — PROGRESS NOTES
Ms. Hickey presents today after her stage II breast reconstruction.  She has had   a long complicated course.  She had immediate bilateral breast reconstruction   with tissue expanders.  During the operation, the entire inferior mastectomy   flaps as well as portions of the inferior flap was nonviable.  So, these were   all debrided at surgery.  She had tissue expanders placed.  They were tight.    She was expanded.  She wanted to be small.  She also had a gastric bypass which   complicated the situation.  She is most recently one week status post bilateral   implant exchange, medial capsulotomies, lateral capsulorrhaphies. She had a   reverse abdominoplasty in order to get healthy skin along the inframammary fold.    She presents today.  She thinks that she is too small.  She told me that she   wanted to be small, but she is concerned that they are too small.  I informed   her that she still swollen.  She needs to wait several months to see what the   final result is.  I think that it is a very simple thing to slip in a bigger   implant after all the swelling has gone, so we will go ahead and wait.  Her   drains were removed today.  We will see her next week.      CRB/IN  dd: 09/29/2017 09:40:30 (CDT)  td: 09/30/2017 03:17:55 (CDT)  Doc ID   #[id number]  Job ID #[id number]    CC:

## 2017-10-06 ENCOUNTER — TELEPHONE (OUTPATIENT)
Dept: BARIATRICS | Facility: CLINIC | Age: 67
End: 2017-10-06

## 2017-10-06 ENCOUNTER — OFFICE VISIT (OUTPATIENT)
Dept: PLASTIC SURGERY | Facility: CLINIC | Age: 67
End: 2017-10-06
Payer: MEDICARE

## 2017-10-06 VITALS
SYSTOLIC BLOOD PRESSURE: 126 MMHG | WEIGHT: 146.19 LBS | BODY MASS INDEX: 25.09 KG/M2 | HEART RATE: 69 BPM | DIASTOLIC BLOOD PRESSURE: 68 MMHG

## 2017-10-06 DIAGNOSIS — Z09 SURGERY FOLLOW-UP EXAMINATION: Primary | ICD-10-CM

## 2017-10-06 PROCEDURE — 99024 POSTOP FOLLOW-UP VISIT: CPT | Mod: S$GLB,,, | Performed by: SURGERY

## 2017-10-06 NOTE — TELEPHONE ENCOUNTER
----- Message from Renny Sanchez sent at 10/6/2017  2:43 PM CDT -----  Contact: Pt  X_ 1st Request  _ 2nd Request  _ 3rd Request    Who: JAIDEN ERICKSON [[id number]]    Why: Patient would like to speak with staff in regards to moving her appointment per Dr. Panchal orders    What Number to Call Back: [phone]    When to Expect a call back: (Before the end of the day)  -- if call after 3:00 call back will be tomorrow.

## 2017-10-06 NOTE — PROGRESS NOTES
Doing well.  Staples removed today.   Will definitely need some fat grafting. We will wail til January.  RTC next week.

## 2017-10-09 ENCOUNTER — LAB VISIT (OUTPATIENT)
Dept: LAB | Facility: HOSPITAL | Age: 67
End: 2017-10-09
Attending: SURGERY
Payer: MEDICARE

## 2017-10-09 DIAGNOSIS — E66.01 MORBID OBESITY DUE TO EXCESS CALORIES: ICD-10-CM

## 2017-10-09 DIAGNOSIS — E11.9 TYPE 2 DIABETES MELLITUS WITHOUT COMPLICATION, WITHOUT LONG-TERM CURRENT USE OF INSULIN: ICD-10-CM

## 2017-10-09 DIAGNOSIS — E51.9 THIAMINE DEFICIENCY: ICD-10-CM

## 2017-10-09 DIAGNOSIS — E53.8 B12 DEFICIENCY: ICD-10-CM

## 2017-10-09 DIAGNOSIS — E55.9 VITAMIN D DEFICIENCY: ICD-10-CM

## 2017-10-09 LAB
25(OH)D3+25(OH)D2 SERPL-MCNC: 21 NG/ML
ALBUMIN SERPL BCP-MCNC: 3.6 G/DL
ALP SERPL-CCNC: 98 U/L
ALT SERPL W/O P-5'-P-CCNC: 13 U/L
ANION GAP SERPL CALC-SCNC: 7 MMOL/L
AST SERPL-CCNC: 13 U/L
BASOPHILS # BLD AUTO: 0 K/UL
BASOPHILS NFR BLD: 0.5 %
BILIRUB SERPL-MCNC: 0.5 MG/DL
BUN SERPL-MCNC: 22 MG/DL
CALCIUM SERPL-MCNC: 10.3 MG/DL
CHLORIDE SERPL-SCNC: 108 MMOL/L
CHOLEST SERPL-MCNC: 238 MG/DL
CHOLEST/HDLC SERPL: 4.1 {RATIO}
CO2 SERPL-SCNC: 30 MMOL/L
CREAT SERPL-MCNC: 0.8 MG/DL
DIFFERENTIAL METHOD: NORMAL
EOSINOPHIL # BLD AUTO: 0.1 K/UL
EOSINOPHIL NFR BLD: 1.7 %
ERYTHROCYTE [DISTWIDTH] IN BLOOD BY AUTOMATED COUNT: 14 %
EST. GFR  (AFRICAN AMERICAN): >60 ML/MIN/1.73 M^2
EST. GFR  (NON AFRICAN AMERICAN): >60 ML/MIN/1.73 M^2
ESTIMATED AVG GLUCOSE: 103 MG/DL
GLUCOSE SERPL-MCNC: 86 MG/DL
HBA1C MFR BLD HPLC: 5.2 %
HCT VFR BLD AUTO: 38.6 %
HDLC SERPL-MCNC: 58 MG/DL
HDLC SERPL: 24.4 %
HGB BLD-MCNC: 12.9 G/DL
LDLC SERPL CALC-MCNC: 152.8 MG/DL
LYMPHOCYTES # BLD AUTO: 1.5 K/UL
LYMPHOCYTES NFR BLD: 30.7 %
MCH RBC QN AUTO: 28.4 PG
MCHC RBC AUTO-ENTMCNC: 33.4 G/DL
MCV RBC AUTO: 85 FL
MONOCYTES # BLD AUTO: 0.5 K/UL
MONOCYTES NFR BLD: 9.5 %
NEUTROPHILS # BLD AUTO: 2.9 K/UL
NEUTROPHILS NFR BLD: 57.6 %
NONHDLC SERPL-MCNC: 180 MG/DL
PLATELET # BLD AUTO: 158 K/UL
PMV BLD AUTO: 10.3 FL
POTASSIUM SERPL-SCNC: 4.3 MMOL/L
PROT SERPL-MCNC: 6.8 G/DL
RBC # BLD AUTO: 4.54 M/UL
SODIUM SERPL-SCNC: 145 MMOL/L
TRIGL SERPL-MCNC: 136 MG/DL
VIT B12 SERPL-MCNC: 254 PG/ML
WBC # BLD AUTO: 5 K/UL

## 2017-10-09 PROCEDURE — 84425 ASSAY OF VITAMIN B-1: CPT

## 2017-10-09 PROCEDURE — 80053 COMPREHEN METABOLIC PANEL: CPT

## 2017-10-09 PROCEDURE — 82306 VITAMIN D 25 HYDROXY: CPT

## 2017-10-09 PROCEDURE — 82607 VITAMIN B-12: CPT

## 2017-10-09 PROCEDURE — 80061 LIPID PANEL: CPT

## 2017-10-09 PROCEDURE — 85025 COMPLETE CBC W/AUTO DIFF WBC: CPT

## 2017-10-09 PROCEDURE — 83036 HEMOGLOBIN GLYCOSYLATED A1C: CPT

## 2017-10-09 PROCEDURE — 36415 COLL VENOUS BLD VENIPUNCTURE: CPT

## 2017-10-10 ENCOUNTER — OFFICE VISIT (OUTPATIENT)
Dept: BARIATRICS | Facility: CLINIC | Age: 67
End: 2017-10-10
Payer: MEDICARE

## 2017-10-10 VITALS
BODY MASS INDEX: 24.04 KG/M2 | TEMPERATURE: 99 F | HEART RATE: 67 BPM | DIASTOLIC BLOOD PRESSURE: 72 MMHG | WEIGHT: 140.81 LBS | RESPIRATION RATE: 16 BRPM | SYSTOLIC BLOOD PRESSURE: 116 MMHG | HEIGHT: 64 IN

## 2017-10-10 DIAGNOSIS — E66.01 MORBID OBESITY: Primary | ICD-10-CM

## 2017-10-10 DIAGNOSIS — Z98.890 STATUS POST GASTRIC SURGERY: ICD-10-CM

## 2017-10-10 DIAGNOSIS — E78.5 DYSLIPIDEMIA: ICD-10-CM

## 2017-10-10 DIAGNOSIS — E55.9 VITAMIN D DEFICIENCY: ICD-10-CM

## 2017-10-10 DIAGNOSIS — E55.9 HYPOVITAMINOSIS D: ICD-10-CM

## 2017-10-10 DIAGNOSIS — E88.810 DYSMETABOLIC SYNDROME: ICD-10-CM

## 2017-10-10 PROCEDURE — 99999 PR PBB SHADOW E&M-EST. PATIENT-LVL III: CPT | Mod: PBBFAC,,, | Performed by: SURGERY

## 2017-10-10 PROCEDURE — 99213 OFFICE O/P EST LOW 20 MIN: CPT | Mod: S$PBB,,, | Performed by: SURGERY

## 2017-10-10 PROCEDURE — 99213 OFFICE O/P EST LOW 20 MIN: CPT | Mod: PBBFAC,PN | Performed by: SURGERY

## 2017-10-10 NOTE — PROGRESS NOTES
"Post Op Note    Surgery: gastric sleeve surgery  Date: 1/30/17  Initial weight: 225  Last weight: 150  Current weight: 140    Constipation: none  Reflux: occasionally but normal with omeprazole  Vomiting: none    Diet:  Diet journal reviewed- between 800-1400 calories per day, some carbs    Exercise:  none    MVI: patches    Vitals:    10/10/17 0856   BP: 116/72   Pulse: 67   Resp: 16   Temp: 98.5 °F (36.9 °C)       Body comp:  Fat Percent:  34.3 %  Fat Mass:  48.2 lb  FFM:  92.6 lb  TBW: 63.6 lb  TBW %:  45.2 %  BMR: 1257 kcal    PE:  NAD  RRR  Soft/nt/nd  Incisions: well healed    Labs:   Lab Results   Component Value Date    HGBA1C 5.2 10/09/2017     Vitamin d3-low  Elevated cholesterol    A/P: s/p sleeve doing well     Counseling for patient:    Diet: doing well keep goal around 1000 calories a day, ok for healthy carbs- can start doing breads, rices, fruits and other "healthy" carbs  Exercise: start adding weights  Vitamins: start vitamin d3, continue patch  Co morbidities:     1. Morbid obesity      resolved, doing great and keeping weight off   2. Dyslipidemia      total cholesterol still elevated, we talked about going to the gym and starting weight lifting at least 20 minutes 3 times per week   3. Status post gastric surgery      doing well   4. Dysmetabolic syndrome     5. Hypovitaminosis D      start taking vitamin d3       : I met with the patient for 15 minutes and counseled her for over 50% of that time  "

## 2017-10-11 LAB — VIT B1 SERPL-MCNC: 57 UG/L (ref 38–122)

## 2017-10-20 ENCOUNTER — OFFICE VISIT (OUTPATIENT)
Dept: PLASTIC SURGERY | Facility: CLINIC | Age: 67
End: 2017-10-20
Payer: MEDICARE

## 2017-10-20 VITALS
HEART RATE: 65 BPM | DIASTOLIC BLOOD PRESSURE: 85 MMHG | BODY MASS INDEX: 24.05 KG/M2 | SYSTOLIC BLOOD PRESSURE: 130 MMHG | WEIGHT: 140.88 LBS | HEIGHT: 64 IN

## 2017-10-20 DIAGNOSIS — Z09 SURGERY FOLLOW-UP EXAMINATION: Primary | ICD-10-CM

## 2017-10-20 PROCEDURE — 99024 POSTOP FOLLOW-UP VISIT: CPT | Mod: S$GLB,,, | Performed by: SURGERY

## 2017-10-20 NOTE — PROGRESS NOTES
Ms. Hickey is status post second stage breast reconstruction.  She has done very,   very well.  She has a nice result.  She will need fat grafting and also nipple   areolar reconstruction.  We will go ahead and see her back in December and plan   for her surgery to be in January.      RAZ/CATRACHITO  dd: 10/20/2017 10:06:53 (CDT)  td: 10/20/2017 19:10:21 (CDT)  Doc ID   #[id number]  Job ID #[id number]    CC:

## 2017-10-26 ENCOUNTER — PATIENT MESSAGE (OUTPATIENT)
Dept: PLASTIC SURGERY | Facility: CLINIC | Age: 67
End: 2017-10-26

## 2017-10-27 ENCOUNTER — OFFICE VISIT (OUTPATIENT)
Dept: PLASTIC SURGERY | Facility: CLINIC | Age: 67
End: 2017-10-27
Payer: MEDICARE

## 2017-10-27 VITALS
TEMPERATURE: 98 F | SYSTOLIC BLOOD PRESSURE: 132 MMHG | BODY MASS INDEX: 24.54 KG/M2 | HEART RATE: 69 BPM | WEIGHT: 143.75 LBS | HEIGHT: 64 IN | DIASTOLIC BLOOD PRESSURE: 74 MMHG

## 2017-10-27 DIAGNOSIS — Z09 SURGERY FOLLOW-UP EXAMINATION: Primary | ICD-10-CM

## 2017-10-27 PROCEDURE — 99024 POSTOP FOLLOW-UP VISIT: CPT | Mod: S$GLB,,, | Performed by: SURGERY

## 2017-11-01 ENCOUNTER — TELEPHONE (OUTPATIENT)
Dept: ENDOCRINOLOGY | Facility: CLINIC | Age: 67
End: 2017-11-01

## 2017-11-01 ENCOUNTER — DOCUMENTATION ONLY (OUTPATIENT)
Dept: FAMILY MEDICINE | Facility: CLINIC | Age: 67
End: 2017-11-01

## 2017-11-01 ENCOUNTER — OFFICE VISIT (OUTPATIENT)
Dept: FAMILY MEDICINE | Facility: CLINIC | Age: 67
End: 2017-11-01
Payer: MEDICARE

## 2017-11-01 VITALS
TEMPERATURE: 98 F | BODY MASS INDEX: 24.69 KG/M2 | DIASTOLIC BLOOD PRESSURE: 81 MMHG | HEIGHT: 64 IN | WEIGHT: 144.63 LBS | SYSTOLIC BLOOD PRESSURE: 131 MMHG | HEART RATE: 63 BPM

## 2017-11-01 DIAGNOSIS — R23.8 SKIN IRRITATION: Primary | ICD-10-CM

## 2017-11-01 DIAGNOSIS — R23.8 SKIN BREAKDOWN: ICD-10-CM

## 2017-11-01 DIAGNOSIS — Z98.890 STATUS POST GASTRIC SURGERY: ICD-10-CM

## 2017-11-01 PROCEDURE — 99214 OFFICE O/P EST MOD 30 MIN: CPT | Mod: S$PBB,,, | Performed by: FAMILY MEDICINE

## 2017-11-01 PROCEDURE — 99213 OFFICE O/P EST LOW 20 MIN: CPT | Mod: PBBFAC,PO | Performed by: FAMILY MEDICINE

## 2017-11-01 PROCEDURE — 99999 PR PBB SHADOW E&M-EST. PATIENT-LVL III: CPT | Mod: PBBFAC,,, | Performed by: FAMILY MEDICINE

## 2017-11-01 RX ORDER — TRIAMCINOLONE ACETONIDE 1 MG/G
OINTMENT TOPICAL 2 TIMES DAILY
Qty: 30 G | Refills: 3 | Status: SHIPPED | OUTPATIENT
Start: 2017-11-01 | End: 2018-08-02

## 2017-11-01 RX ORDER — CHOLECALCIFEROL (VITAMIN D3) 25 MCG
2000 TABLET ORAL DAILY
COMMUNITY
End: 2018-09-11

## 2017-11-01 NOTE — TELEPHONE ENCOUNTER
----- Message from Porsche Michaud sent at 11/1/2017 11:42 AM CDT -----  Contact: self [phone]  Please call her regarding the skin procedure.  Thank you!

## 2017-11-01 NOTE — PROGRESS NOTES
Pre-Visit Chart Review  For Appointment Scheduled on 11/1/17.    There are no preventive care reminders to display for this patient.

## 2017-11-01 NOTE — TELEPHONE ENCOUNTER
States her insurance( medicare ) states that it is up to the doctor to determine if the procedure is medically necessary or not.

## 2017-11-01 NOTE — PROGRESS NOTES
CHIEF COMPLAINT:  Skin irritation       HISTORY OF PRESENT ILLNESS:  Marce Hickey is a 67 y.o. female who presents to clinic for an UC visit for evaluation of skin irritation.  The patient underwent laparoscopic gastric sleeve surgery in 2017.  Since then she has had an approximately 80 lb weight loss. Since she has lost such an excessive amount of weight she has noticed loose skin under her b/l upper arms.  This skin has become irritated since it is rubbing against her axillary area especially at night.  She describes b/l areas of erythema, and skin breakdown, left greater than right, over her upper arms which is causing significant pain and tenderness.  Due to the pain she is not sleeping well at night and is fatigued during the day.  She is trying to keep the area dry and is trying to keep a barrier between her arms and axillae but finds this difficult to do.       REVIEW OF SYSTEMS:  The patient denies any fever, chills, night sweats, headaches, vision changes, difficulty speaking or swallowing, decreased hearing,chest pain, palpitations, shortness of breath, cough, nausea, vomiting, abdominal pain, dysuria, diarrhea, constipation, hematuria, hematochezia, melena, changes in her hair, nails, numbness or weakness in her extremities, erythema, pain or swelling over any of her joints, myalgia, swollen glands, easy bruising, fatigue, edema, symptoms of anxiety or depression.      MEDICATIONS:   Reviewed and/or reconciled in EPIC    ALLERGIES:  Reviewed and/or reconciled in [facility]    PAST MEDICAL/SURGICAL HISTORY:   Past Medical History:   Diagnosis Date    Anxiety     Depression     Diabetes mellitus, type 2     Borderline    Fatty liver disease, nonalcoholic     GERD (gastroesophageal reflux disease)     Hyperlipidemia     Hypertension     Plantar fasciitis of right foot       Past Surgical History:   Procedure Laterality Date    breast reduction       SECTION      x 2     CHOLECYSTECTOMY      FOOT SURGERY      left bunionectomy    gastric sleve      HYSTERECTOMY      one ovary intact, adhesions    LIPOSUCTION      TONSILLECTOMY         FAMILY HISTORY:    Family History   Problem Relation Age of Onset    Hypertension Mother     Heart disease Mother      pacemaker    Heart attack Mother     Heart disease Father     Psoriasis Father     Cancer Neg Hx        SOCIAL HISTORY:    Social History     Social History    Marital status:      Spouse name: N/A    Number of children: N/A    Years of education: N/A     Occupational History    Not on file.     Social History Main Topics    Smoking status: Former Smoker     Quit date: 11/27/1993    Smokeless tobacco: Never Used      Comment: quit 1993    Alcohol use No    Drug use: No    Sexual activity: Yes     Birth control/ protection: Surgical     Other Topics Concern    Not on file     Social History Narrative    No narrative on file       PHYSICAL EXAM:  VITAL SIGNS: There were no vitals filed for this visit.  GENERAL:  Patient appears well nourished, sitting on exam table, in no acute distress.  HEENT:  Atraumatic, normocephalic, PERRLA, EOMI, no conjunctival injection, sclerae are anicteric, normal external auditory canals,TMs clear b/l, gross hearing intact to whisper, MMM, no oropharygneal erythema or exudate.  NECK:  Supple, normal ROM, trachea is midline , no supraclavicular or cervical LAD or masses palpated.  Thyroid gland not palpable.  CARDIOVASCULAR:  RRR, normal S1 and S2, no m/r/g.  RESPIRATORY:  CTA b/l, no wheezes, rhonchi, rales.  No increased work of breathing, no  use of accessory muscles.  ABDOMEN:  Soft, nontender, nondistended, normoactive bowel sounds in all four quadrants, no rebound or guarding, no HSM or masses palpated.  Normal percussion.  EXTREMITIES:  2+ DP pulses b/l, no edema.  SKIN:  Warm, 6x10 cm area of erythema and maceration over medial/dorsal surface of b/l upper arms which is  tender.   NEUROMUSCULAR:  Cranial nerves II-XII grossly intact.  . No clubbing or cyanosis of digits/nails.  Steady gait.  PSYCH:  Patient is alert and oriented to person, time, place. They are appropriately dressed and groomed. There is normal eye contact. Rate and tone of speech is normal. Normal insight, judgement. Normal thought content and process.         ASSESSMENT/PLAN: This is a 67 y.o. female who presents to clinic for evaluation of skin irritation  1. Skin irritation ,history of gastric surgery: Discussed keeping area clean, dry, and making sure that there is a barrier between the skin of her upper arm and axillae. Will also place on triamcinolone ointment to help with the irritation. She would benefit from having this excess skin removed to prevent any further pain, tenderness, skin breakdown or infection. Will refer to Dr. Thomas, plastic surgery.               Savannah Garvey MD

## 2017-11-02 ENCOUNTER — PATIENT MESSAGE (OUTPATIENT)
Dept: FAMILY MEDICINE | Facility: CLINIC | Age: 67
End: 2017-11-02

## 2017-11-02 NOTE — TELEPHONE ENCOUNTER
I received her telephone message yesterday afternoon, can we please call the medicare provider line and get the required information to find out if I have to document it in the note or write a letter.

## 2017-11-07 DIAGNOSIS — C50.011 MALIGNANT NEOPLASM INVOLVING BOTH NIPPLE AND AREOLA OF RIGHT BREAST IN FEMALE, UNSPECIFIED ESTROGEN RECEPTOR STATUS: ICD-10-CM

## 2017-11-08 RX ORDER — ANASTROZOLE 1 MG/1
1 TABLET ORAL DAILY
Qty: 30 TABLET | Refills: 2 | Status: SHIPPED | OUTPATIENT
Start: 2017-11-08 | End: 2018-02-20 | Stop reason: SDUPTHER

## 2017-11-13 ENCOUNTER — OFFICE VISIT (OUTPATIENT)
Dept: HEMATOLOGY/ONCOLOGY | Facility: CLINIC | Age: 67
End: 2017-11-13
Payer: MEDICARE

## 2017-11-13 VITALS
BODY MASS INDEX: 24.72 KG/M2 | SYSTOLIC BLOOD PRESSURE: 165 MMHG | WEIGHT: 144.81 LBS | HEIGHT: 64 IN | RESPIRATION RATE: 20 BRPM | HEART RATE: 60 BPM | DIASTOLIC BLOOD PRESSURE: 81 MMHG

## 2017-11-13 DIAGNOSIS — E53.8 B12 DEFICIENCY: ICD-10-CM

## 2017-11-13 DIAGNOSIS — C50.512 MALIGNANT NEOPLASM OF LOWER-OUTER QUADRANT OF LEFT FEMALE BREAST, UNSPECIFIED ESTROGEN RECEPTOR STATUS: Primary | ICD-10-CM

## 2017-11-13 DIAGNOSIS — K76.0 FATTY LIVER DISEASE, NONALCOHOLIC: ICD-10-CM

## 2017-11-13 DIAGNOSIS — Z98.890 STATUS POST GASTRIC SURGERY: ICD-10-CM

## 2017-11-13 DIAGNOSIS — Z86.79 HISTORY OF HYPERTENSION: ICD-10-CM

## 2017-11-13 PROCEDURE — 99999 PR PBB SHADOW E&M-EST. PATIENT-LVL III: CPT | Mod: PBBFAC,,, | Performed by: INTERNAL MEDICINE

## 2017-11-13 PROCEDURE — 99214 OFFICE O/P EST MOD 30 MIN: CPT | Mod: S$PBB,,, | Performed by: INTERNAL MEDICINE

## 2017-11-13 PROCEDURE — 99213 OFFICE O/P EST LOW 20 MIN: CPT | Mod: PBBFAC,PO | Performed by: INTERNAL MEDICINE

## 2017-11-13 NOTE — PROGRESS NOTES
A 66-year-old woman coming in consultation for breast cancer. S/p double mastectomy. 5/15/2017  2 separate location on left breast both biopsied in March by Dr. Luis.  reduction mammoplasty.  .  She had invasive ductal carcinoma in both locations and they were both ER positive, ID   positive and HER-2 negative. Recurrence score of 18 .  .  She takes omeprazole and multivitamins.  Had bariatric sleeve in 1/2017, pt had her expanders removed due to discomfort. She may have fat transplant for breast reconstruction with DR Thomas in 1/2-18 .  She does have a diet-controlled    diabetes,and hypertension, all diet-controlled   PHYSICAL EXAMINATION:  Wt Readings from Last 3 Encounters:   11/13/17 65.7 kg (144 lb 13.5 oz)   11/01/17 65.6 kg (144 lb 10 oz)   10/27/17 65.2 kg (143 lb 11.8 oz)     Temp Readings from Last 3 Encounters:   11/01/17 98.2 °F (36.8 °C) (Oral)   10/27/17 98.2 °F (36.8 °C) (Oral)   10/10/17 98.5 °F (36.9 °C) (Oral)     BP Readings from Last 3 Encounters:   11/13/17 (!) 165/81   11/01/17 131/81   10/27/17 132/74     Pulse Readings from Last 3 Encounters:   11/13/17 60   11/01/17 63   10/27/17 69     GENERAL:  Reveals a well-built, well-nourished woman, comfortable, obese, well   groomed, ambulating to clinic without any issues.  HEENT:  Showed no congestion.  NECK:  Supple, without JVD.  Trachea is central.  No masses or thyromegaly felt.  HEART:  S1 is normally heard without murmurs or gallops.  ABDOMEN:  Soft.  No rebound, guarding or rigidity.  BREASTS.  Expanders bilat+  EXTREMITIES:  The patient has no generalized lymphadenopathy or supraclavicular   adenopathy.  MUSCULOSKELETAL:  Good range of motion.  NEUROLOGIC:  She seems appropriate.  SKIN:  Within normal range.  PSYCHIATRIC:  Within normal range.pt is anxious about results of path and need for chemo  Lab Results   Component Value Date    WBC 5.00 10/09/2017    HGB 12.9 10/09/2017    HCT 38.6 10/09/2017    MCV 85 10/09/2017      10/09/2017     CMP  Sodium   Date Value Ref Range Status   10/09/2017 145 136 - 145 mmol/L Final     Potassium   Date Value Ref Range Status   10/09/2017 4.3 3.5 - 5.1 mmol/L Final     Chloride   Date Value Ref Range Status   10/09/2017 108 95 - 110 mmol/L Final     CO2   Date Value Ref Range Status   10/09/2017 30 (H) 23 - 29 mmol/L Final     Glucose   Date Value Ref Range Status   10/09/2017 86 70 - 110 mg/dL Final     BUN, Bld   Date Value Ref Range Status   10/09/2017 22 8 - 23 mg/dL Final     Creatinine   Date Value Ref Range Status   10/09/2017 0.8 0.5 - 1.4 mg/dL Final     Calcium   Date Value Ref Range Status   10/09/2017 10.3 8.7 - 10.5 mg/dL Final     Total Protein   Date Value Ref Range Status   10/09/2017 6.8 6.0 - 8.4 g/dL Final     Albumin   Date Value Ref Range Status   10/09/2017 3.6 3.5 - 5.2 g/dL Final     Total Bilirubin   Date Value Ref Range Status   10/09/2017 0.5 0.1 - 1.0 mg/dL Final     Comment:     For infants and newborns, interpretation of results should be based  on gestational age, weight and in agreement with clinical  observations.  Premature Infant recommended reference ranges:  Up to 24 hours.............<8.0 mg/dL  Up to 48 hours............<12.0 mg/dL  3-5 days..................<15.0 mg/dL  6-29 days.................<15.0 mg/dL       Alkaline Phosphatase   Date Value Ref Range Status   10/09/2017 98 55 - 135 U/L Final     AST   Date Value Ref Range Status   10/09/2017 13 10 - 40 U/L Final     ALT   Date Value Ref Range Status   10/09/2017 13 10 - 44 U/L Final     Anion Gap   Date Value Ref Range Status   10/09/2017 7 (L) 8 - 16 mmol/L Final     eGFR if    Date Value Ref Range Status   10/09/2017 >60 >60 mL/min/1.73 m^2 Final     eGFR if non    Date Value Ref Range Status   10/09/2017 >60 >60 mL/min/1.73 m^2 Final     Comment:     Calculation used to obtain the estimated glomerular filtration  rate (eGFR) is the CKD-EPI equation. Since race is  unknown   in our information system, the eGFR values for   -American and Non--American patients are given   for each creatinine result.       PATHOLOGY  SPECIMEN  1) Cerulean Lymph Node, Breast Lt  2) Breast mastectomy/Lt  3) Breast mastectomy /Rt  4) Breast lumpectomy More Rt  5) Breast lumpectomy, more Lt  FINAL PATHOLOGIC DIAGNOSIS  1. Cerulean lymph node, left axillary, excision:  One lymph node NEGATIVE for metastatic carcinoma (0/1).  2. Breast, left, mastectomy:  Residual invasive ductal carcinoma at 1:00 position, low grade, 1.64 mm (pathologic staging: pT1a N0). SEE  SYNOPTIC REPORT.  Residual invasive ductal carcinoma at 12:00 position, low grade,10 mm (pathologic staging: pT1b N0). SEE  SYNOPTIC REPORT.  Negative surgical margins.  Previous biopsy sites.  3. Breast, right, mastectomy:  Benign nipple, skin, and breast parenchyma.    Bone density is normal.  DIAGNOSTIC IMPRESSION:    Breast cancer two separate locations on the breasts,   left, both ER/TN positive, HER-2 negative. S/p double mastectomy, with sentinel LN x1 on left apth as above,   oncolty dx rec score of 18,she is tolerating arimidex,  well. continue  No need for prolia due to normal density   sent in ultram for better pain control  b12 , iron,  pet scan cbc, cmp, [id number]

## 2017-11-14 NOTE — TELEPHONE ENCOUNTER
Did we ever find anything more about the coverage for surgery?  My note is finished. Do we need to place a plastic surgery referral?

## 2017-11-14 NOTE — TELEPHONE ENCOUNTER
I would go ahead and place the surgery consult. I will see if Ms. Oscar PA-C can assist in getting the photo put in her chart of her arm. Thanks, Anum

## 2017-11-15 ENCOUNTER — PATIENT MESSAGE (OUTPATIENT)
Dept: FAMILY MEDICINE | Facility: CLINIC | Age: 67
End: 2017-11-15

## 2017-11-17 ENCOUNTER — PATIENT MESSAGE (OUTPATIENT)
Dept: PLASTIC SURGERY | Facility: CLINIC | Age: 67
End: 2017-11-17

## 2017-11-20 ENCOUNTER — PATIENT MESSAGE (OUTPATIENT)
Dept: HEMATOLOGY/ONCOLOGY | Facility: CLINIC | Age: 67
End: 2017-11-20

## 2017-12-01 ENCOUNTER — PATIENT MESSAGE (OUTPATIENT)
Dept: PLASTIC SURGERY | Facility: CLINIC | Age: 67
End: 2017-12-01

## 2017-12-01 ENCOUNTER — INITIAL CONSULT (OUTPATIENT)
Dept: PLASTIC SURGERY | Facility: CLINIC | Age: 67
End: 2017-12-01
Payer: MEDICARE

## 2017-12-01 VITALS
SYSTOLIC BLOOD PRESSURE: 177 MMHG | TEMPERATURE: 98 F | DIASTOLIC BLOOD PRESSURE: 79 MMHG | BODY MASS INDEX: 24.99 KG/M2 | HEIGHT: 64 IN | HEART RATE: 60 BPM | WEIGHT: 146.38 LBS

## 2017-12-01 DIAGNOSIS — Z09 SURGERY FOLLOW-UP EXAMINATION: Primary | ICD-10-CM

## 2017-12-01 PROCEDURE — 99024 POSTOP FOLLOW-UP VISIT: CPT | Mod: S$GLB,,, | Performed by: SURGERY

## 2017-12-01 NOTE — PROGRESS NOTES
Ms. Hickey presents to the Plastic Surgery Clinic for two reasons:  1.  Followup of her breast reconstruction.  She has done well.  She will need to   have some fat grafting, which we will do in another stage of her breast   reconstruction.  She also complains now of chronic macerating rashes on her   inner arm secondary to the excess skin she developed after losing over 100   pounds secondary to gastric bypass surgery.  She went to see her primary care   physician where she presented with erythematous, swollen rash, left arm greater   than the right arm.  Her primary care physician prescribed her some cortisone   cream, which does not appear to be helping.  The patient states this is chronic   in nature.  The patient would benefit from having the excess skin of the upper   arm excised.  The plan is to do bilateral brachioplasty.  We will go ahead and   submit for insurance coverage and enclose the photos of the rash that she has.    She will also need fat grafting to the breast.  We can do this at the same time.      URBAN  dd: 12/01/2017 12:34:23 (CST)  td: 12/02/2017 00:43:15 (CST)  Doc ID   #[id number]  Job ID #[id number]    CC:

## 2017-12-01 NOTE — LETTER
December 1, 2017      Savannah Garvey MD  [address]  [place] [address]           [facility]  [address] [place] [address] , [address]  [place] [address]  Phone: [phone]  Fax: [phone]          Patient: Marce Hickey   MR Number: [id number]   YOB: 1950   Date of Visit: 12/1/2017       Dear Dr. Savannah Garvey:    Thank you for referring Marce Hickey to me for evaluation. Attached you will find relevant portions of my assessment and plan of care.    If you have questions, please do not hesitate to call me. I look forward to following Marce Hickey along with you.    Sincerely,    Montrell Thomas MD    Enclosure  CC:  No Recipients    If you would like to receive this communication electronically, please contact [email or web address] or [phone] to request more information on [facility] access.    For providers and/or their staff who would like to refer a patient to [facility], please contact us through our one-stop-shop provider referral line, [facility], at [phone].    If you feel you have received this communication in error or would no longer like to receive these types of communications, please e-mail [email or web address]

## 2017-12-22 ENCOUNTER — PATIENT MESSAGE (OUTPATIENT)
Dept: PLASTIC SURGERY | Facility: CLINIC | Age: 67
End: 2017-12-22

## 2017-12-23 DIAGNOSIS — C50.011 MALIGNANT NEOPLASM INVOLVING BOTH NIPPLE AND AREOLA OF RIGHT BREAST IN FEMALE, UNSPECIFIED ESTROGEN RECEPTOR STATUS: ICD-10-CM

## 2017-12-23 RX ORDER — ANASTROZOLE 1 MG/1
TABLET ORAL
Qty: 90 TABLET | Refills: 1 | Status: SHIPPED | OUTPATIENT
Start: 2017-12-23 | End: 2018-06-21 | Stop reason: SDUPTHER

## 2018-01-03 ENCOUNTER — PATIENT MESSAGE (OUTPATIENT)
Dept: PLASTIC SURGERY | Facility: CLINIC | Age: 68
End: 2018-01-03

## 2018-01-05 ENCOUNTER — LAB VISIT (OUTPATIENT)
Dept: LAB | Facility: HOSPITAL | Age: 68
End: 2018-01-05
Attending: SURGERY
Payer: MEDICARE

## 2018-01-05 DIAGNOSIS — E78.5 DYSLIPIDEMIA: ICD-10-CM

## 2018-01-05 DIAGNOSIS — E66.01 MORBID OBESITY: ICD-10-CM

## 2018-01-05 DIAGNOSIS — E55.9 VITAMIN D DEFICIENCY: ICD-10-CM

## 2018-01-05 LAB
25(OH)D3+25(OH)D2 SERPL-MCNC: 22 NG/ML
ALBUMIN SERPL BCP-MCNC: 3.7 G/DL
ALP SERPL-CCNC: 97 U/L
ALT SERPL W/O P-5'-P-CCNC: 13 U/L
ANION GAP SERPL CALC-SCNC: 7 MMOL/L
AST SERPL-CCNC: 14 U/L
BASOPHILS # BLD AUTO: 0 K/UL
BASOPHILS NFR BLD: 0.7 %
BILIRUB SERPL-MCNC: 0.6 MG/DL
BUN SERPL-MCNC: 19 MG/DL
CALCIUM SERPL-MCNC: 10.2 MG/DL
CHLORIDE SERPL-SCNC: 109 MMOL/L
CHOLEST SERPL-MCNC: 248 MG/DL
CHOLEST/HDLC SERPL: 3.6 {RATIO}
CO2 SERPL-SCNC: 28 MMOL/L
CREAT SERPL-MCNC: 0.8 MG/DL
DIFFERENTIAL METHOD: ABNORMAL
EOSINOPHIL # BLD AUTO: 0.1 K/UL
EOSINOPHIL NFR BLD: 2 %
ERYTHROCYTE [DISTWIDTH] IN BLOOD BY AUTOMATED COUNT: 13.1 %
EST. GFR  (AFRICAN AMERICAN): >60 ML/MIN/1.73 M^2
EST. GFR  (NON AFRICAN AMERICAN): >60 ML/MIN/1.73 M^2
GLUCOSE SERPL-MCNC: 83 MG/DL
HCT VFR BLD AUTO: 41.5 %
HDLC SERPL-MCNC: 68 MG/DL
HDLC SERPL: 27.4 %
HGB BLD-MCNC: 13.9 G/DL
LDLC SERPL CALC-MCNC: 152.8 MG/DL
LYMPHOCYTES # BLD AUTO: 1.3 K/UL
LYMPHOCYTES NFR BLD: 31.4 %
MCH RBC QN AUTO: 28.6 PG
MCHC RBC AUTO-ENTMCNC: 33.6 G/DL
MCV RBC AUTO: 85 FL
MONOCYTES # BLD AUTO: 0.4 K/UL
MONOCYTES NFR BLD: 10.2 %
NEUTROPHILS # BLD AUTO: 2.3 K/UL
NEUTROPHILS NFR BLD: 55.7 %
NONHDLC SERPL-MCNC: 180 MG/DL
PLATELET # BLD AUTO: 141 K/UL
PMV BLD AUTO: 10.5 FL
POTASSIUM SERPL-SCNC: 4.6 MMOL/L
PROT SERPL-MCNC: 6.8 G/DL
RBC # BLD AUTO: 4.88 M/UL
SODIUM SERPL-SCNC: 144 MMOL/L
TRIGL SERPL-MCNC: 136 MG/DL
WBC # BLD AUTO: 4.1 K/UL

## 2018-01-05 PROCEDURE — 84425 ASSAY OF VITAMIN B-1: CPT

## 2018-01-05 PROCEDURE — 82306 VITAMIN D 25 HYDROXY: CPT

## 2018-01-05 PROCEDURE — 85025 COMPLETE CBC W/AUTO DIFF WBC: CPT

## 2018-01-05 PROCEDURE — 80061 LIPID PANEL: CPT

## 2018-01-05 PROCEDURE — 80053 COMPREHEN METABOLIC PANEL: CPT

## 2018-01-05 PROCEDURE — 36415 COLL VENOUS BLD VENIPUNCTURE: CPT

## 2018-01-08 ENCOUNTER — PATIENT MESSAGE (OUTPATIENT)
Dept: PLASTIC SURGERY | Facility: CLINIC | Age: 68
End: 2018-01-08

## 2018-01-09 ENCOUNTER — OFFICE VISIT (OUTPATIENT)
Dept: BARIATRICS | Facility: CLINIC | Age: 68
End: 2018-01-09
Payer: MEDICARE

## 2018-01-09 VITALS
SYSTOLIC BLOOD PRESSURE: 176 MMHG | RESPIRATION RATE: 16 BRPM | HEIGHT: 64 IN | HEART RATE: 61 BPM | BODY MASS INDEX: 24.24 KG/M2 | WEIGHT: 142 LBS | DIASTOLIC BLOOD PRESSURE: 76 MMHG | TEMPERATURE: 98 F

## 2018-01-09 DIAGNOSIS — E55.9 VITAMIN D DEFICIENCY: ICD-10-CM

## 2018-01-09 DIAGNOSIS — Z98.890 STATUS POST GASTRIC SURGERY: Primary | ICD-10-CM

## 2018-01-09 DIAGNOSIS — E78.5 DYSLIPIDEMIA: ICD-10-CM

## 2018-01-09 DIAGNOSIS — I10 HYPERTENSION, UNSPECIFIED TYPE: ICD-10-CM

## 2018-01-09 LAB — VIT B1 SERPL-MCNC: 55 UG/L (ref 38–122)

## 2018-01-09 PROCEDURE — 99213 OFFICE O/P EST LOW 20 MIN: CPT | Mod: PBBFAC,PN | Performed by: SURGERY

## 2018-01-09 PROCEDURE — 99999 PR PBB SHADOW E&M-EST. PATIENT-LVL III: CPT | Mod: PBBFAC,,, | Performed by: SURGERY

## 2018-01-09 PROCEDURE — 99213 OFFICE O/P EST LOW 20 MIN: CPT | Mod: S$PBB,,, | Performed by: SURGERY

## 2018-01-09 RX ORDER — FERROUS SULFATE, DRIED 160(50) MG
TABLET, EXTENDED RELEASE ORAL DAILY
COMMUNITY
End: 2021-02-12

## 2018-01-09 NOTE — PROGRESS NOTES
Post Op Note    Surgery: gastric sleeve surgery  Date: 1/30/17  Initial weight: 225  Last weight: 140  Current weight: 142    Constipation: none- twice a day-monique's cracker- heavy grain and fiber   Reflux: heartburn/regurgitation after eating only, none while laying flat, omeprazole helps   Vomiting: vomited once    Diet:  Breakfast:  Occasional breakfast burrito, sometimes just coffee and crackers  Lunch: [employer]: 1/2 a sandwich (just part of it)  Dinner: stuffed salmon  Snack: grain crackers  Protein shake: none    Exercise:    Mostly treadmill and stair stepper- starting weights soon; 3 times per week at gym 45 minutes    MVI: patches, added calcium and vitamin d    Vitals:    01/09/18 0900   BP: (!) 176/76   Pulse: 61   Resp: 16   Temp: 98.1 °F (36.7 °C)       Body comp:  Fat Percent:  34.2 %  Fat Mass:  48.6 lb  FFM:  93.4 lb  TBW: 64.2 lb  TBW %:  45.2 %  BMR: 1267 kcal    PE:  NAD  RRR  Soft/nt/nd  Incisions: well healed    Labs: reviewed, high total cholesterol, low vitamin d    A/P: s/p sleeve doing well     Counseling for patient:    Diet: continue doing a healthy diet, keep portions small and chew well  Exercise: continue exercise regimen   Vitamins: continue regimen, keep doing vitamin d      Co morbidities:     1. Status post gastric surgery     2. Dyslipidemia      elevated total cholesterol - follow up labs, continue exercising, defer to pcp   3. Hypertension, unspecified type     4. Vitamin D deficiency      on vitamin patches continue recheck with pcp       : I met with the patient for 15 minutes and counseled her for over 50% of that time

## 2018-01-10 ENCOUNTER — TELEPHONE (OUTPATIENT)
Dept: BARIATRICS | Facility: CLINIC | Age: 68
End: 2018-01-10

## 2018-01-10 DIAGNOSIS — E55.9 VITAMIN D DEFICIENCY: ICD-10-CM

## 2018-01-10 DIAGNOSIS — Z98.890 STATUS POST GASTRIC SURGERY: Primary | ICD-10-CM

## 2018-01-10 DIAGNOSIS — E78.5 DYSLIPIDEMIA: ICD-10-CM

## 2018-01-10 DIAGNOSIS — I10 HYPERTENSION, UNSPECIFIED TYPE: ICD-10-CM

## 2018-01-11 ENCOUNTER — PATIENT MESSAGE (OUTPATIENT)
Dept: BARIATRICS | Facility: CLINIC | Age: 68
End: 2018-01-11

## 2018-01-29 ENCOUNTER — PATIENT MESSAGE (OUTPATIENT)
Dept: PLASTIC SURGERY | Facility: CLINIC | Age: 68
End: 2018-01-29

## 2018-02-09 DIAGNOSIS — Z85.3 PERSONAL HISTORY OF MALIGNANT NEOPLASM OF BREAST: Primary | ICD-10-CM

## 2018-02-15 ENCOUNTER — DOCUMENTATION ONLY (OUTPATIENT)
Dept: FAMILY MEDICINE | Facility: CLINIC | Age: 68
End: 2018-02-15

## 2018-02-15 NOTE — PROGRESS NOTES
Pre-Visit Chart Review  For Appointment Scheduled on 2/20/18.     Health Maintenance Due   Topic Date Due    Eye Exam  02/13/2018

## 2018-02-16 ENCOUNTER — OFFICE VISIT (OUTPATIENT)
Dept: PLASTIC SURGERY | Facility: CLINIC | Age: 68
End: 2018-02-16
Payer: MEDICARE

## 2018-02-16 VITALS
DIASTOLIC BLOOD PRESSURE: 89 MMHG | BODY MASS INDEX: 24.24 KG/M2 | HEIGHT: 64 IN | WEIGHT: 142 LBS | HEART RATE: 68 BPM | SYSTOLIC BLOOD PRESSURE: 129 MMHG | TEMPERATURE: 98 F

## 2018-02-16 DIAGNOSIS — Z98.890 S/P BREAST RECONSTRUCTION: Primary | ICD-10-CM

## 2018-02-16 PROCEDURE — 99213 OFFICE O/P EST LOW 20 MIN: CPT | Mod: PBBFAC,PO | Performed by: SURGERY

## 2018-02-16 PROCEDURE — 1126F AMNT PAIN NOTED NONE PRSNT: CPT | Mod: ,,, | Performed by: SURGERY

## 2018-02-16 PROCEDURE — 99213 OFFICE O/P EST LOW 20 MIN: CPT | Mod: S$PBB,,, | Performed by: SURGERY

## 2018-02-16 PROCEDURE — 99999 PR PBB SHADOW E&M-EST. PATIENT-LVL III: CPT | Mod: PBBFAC,,, | Performed by: SURGERY

## 2018-02-16 PROCEDURE — 1159F MED LIST DOCD IN RCRD: CPT | Mod: ,,, | Performed by: SURGERY

## 2018-02-16 NOTE — PROGRESS NOTES
Ms. Hickey presents after having bilateral breast reconstruction.  She looks   nice.  She does need a small plication inferiorly on the left breast medially.    This would have to be done through either in an internal or external approach.    She needs a lot of fat and I am hesitant to do it internally at the same time.    She just has a slight asymmetry in the implant in that area.  Nonetheless, she   is scheduled for free fat injections.      RAZ/CATRACHITO  dd: 02/16/2018 10:34:53 (CST)  td: 02/17/2018 00:50:12 (CST)  Doc ID   #[id number]  Job ID #[id number]    CC:

## 2018-02-20 ENCOUNTER — OFFICE VISIT (OUTPATIENT)
Dept: FAMILY MEDICINE | Facility: CLINIC | Age: 68
End: 2018-02-20
Payer: MEDICARE

## 2018-02-20 ENCOUNTER — ANESTHESIA EVENT (OUTPATIENT)
Dept: SURGERY | Facility: HOSPITAL | Age: 68
End: 2018-02-20
Payer: MEDICARE

## 2018-02-20 VITALS
WEIGHT: 144.38 LBS | TEMPERATURE: 98 F | HEIGHT: 64 IN | DIASTOLIC BLOOD PRESSURE: 80 MMHG | SYSTOLIC BLOOD PRESSURE: 120 MMHG | HEART RATE: 65 BPM | BODY MASS INDEX: 24.65 KG/M2

## 2018-02-20 DIAGNOSIS — Z86.39 HISTORY OF TYPE 2 DIABETES MELLITUS: ICD-10-CM

## 2018-02-20 DIAGNOSIS — M79.89 SOFT TISSUE MASS: ICD-10-CM

## 2018-02-20 DIAGNOSIS — Z86.79 HISTORY OF HYPERTENSION: Primary | ICD-10-CM

## 2018-02-20 PROCEDURE — 99999 PR PBB SHADOW E&M-EST. PATIENT-LVL IV: CPT | Mod: PBBFAC,,, | Performed by: FAMILY MEDICINE

## 2018-02-20 PROCEDURE — 99214 OFFICE O/P EST MOD 30 MIN: CPT | Mod: PBBFAC,PO | Performed by: FAMILY MEDICINE

## 2018-02-20 PROCEDURE — 99214 OFFICE O/P EST MOD 30 MIN: CPT | Mod: S$PBB,,, | Performed by: FAMILY MEDICINE

## 2018-02-20 NOTE — PRE ADMISSION SCREENING
"Anesthesia Assessment: Preoperative EQUATION    Planned Procedure: Procedure(s) (LRB):  INJECTION-FAT FAT TRANSFER (Bilateral)  Requested Anesthesia Type:General  Surgeon: Montrell Thomas MD  Service: Plastics  Known or anticipated Date of Surgery:3/6/2018    Surgeon notes: reviewed    Electronic QUestionnaire Assessment completed via nurse interview with patient.      NO AQ    Triage considerations:     The patient has no apparent active cardiac condition (No unstable coronary Syndrome such as severe unstable angina or recent [<1 month] myocardial infarction, decompensated CHF, severe valvular   disease or significant arrhythmia)    Previous anesthesia records:[facility]  09/25/17; Placement Time: 1109; Method of Intubation: Direct laryngoscopy; Inserted by: Anesthesia Resident; Airway Device: Endotracheal Tube; Mask Ventilation: Easy; Intubated: Postinduction; Blade: Amaya #2; Airway Device Size: 7.0; Style: Cuffed; Cuff Inflation: Minimal occlusive pressure; Placement Verified By: Auscultation, Capnometry; Grade: Grade II; Complicating Factors: None; Intubation Findings: Positive EtCO2, Bilateral breath sounds, Atraumatic/Condition of teeth unchanged;  Depth of Insertion: 22; Securment: Lips; Complications: None; Breath Sounds: Equal Bilateral; Insertion Attempts: 1  Anesthesia Hx:  No problems with previous Anesthesia  Personal Hx of Anesthesia complications Slow To Awaken/Delayed Emergence   Airway/Jaw/Neck:  Airway Findings: Mouth Opening: Normal Tongue: Normal  General Airway Assessment: Adult  Mallampati: II  Improves to II with phonation.  TM Distance: Normal, at least 6 cm       Last PCP note: within 1 month , within [facility]    Subspecialty notes: Bariatrics    Other important co-morbidities:  "Takes a long time to wake up from anesthesia" HX Gastric Sleeve-1/2017, HX Breat Cancer-4/2017, GERD, BS-0-Eihcwevtfz,  HTN-Takes no meds, Fatty Liver      Tests already available:  Available tests,  within 3 " months , within [facility] .  1/2018 CMP, CBC, B1, Vit D, Lipid Panel      12/2016 EKG            Instructions given. (See in Nurse's note)    Optimization:  Anesthesia Preop Clinic Assessment  Indicated-not required for this surgery      Plan:    Testing:  none needed     Patient  has previously scheduled Medical Appointment:  2/21     Navigation: Tests Scheduled.none              Straight Line to surgery.               No tests, anesthesia preop clinic visit, or consult required.     Tracey Landaverde RN

## 2018-02-20 NOTE — ANESTHESIA PREPROCEDURE EVALUATION
"Anesthesia Assessment: Preoperative EQUATION     Planned Procedure: Procedure(s) (LRB):  INJECTION-FAT FAT TRANSFER (Bilateral)  Requested Anesthesia Type:General  Surgeon: Montrell Thomas MD  Service: Plastics  Known or anticipated Date of Surgery:3/6/2018     Surgeon notes: reviewed     Electronic QUestionnaire Assessment completed via nurse interview with patient.      NO AQ     Triage considerations:      The patient has no apparent active cardiac condition (No unstable coronary Syndrome such as severe unstable angina or recent [<1 month] myocardial infarction, decompensated CHF, severe valvular   disease or significant arrhythmia)     Previous anesthesia records:[facility]  09/25/17; Placement Time: 1109; Method of Intubation: Direct laryngoscopy; Inserted by: Anesthesia Resident; Airway Device: Endotracheal Tube; Mask Ventilation: Easy; Intubated: Postinduction; Blade: Amaya #2; Airway Device Size: 7.0; Style: Cuffed; Cuff Inflation: Minimal occlusive pressure; Placement Verified By: Auscultation, Capnometry; Grade: Grade II; Complicating Factors: None; Intubation Findings: Positive EtCO2, Bilateral breath sounds, Atraumatic/Condition of teeth unchanged;  Depth of Insertion: 22; Securment: Lips; Complications: None; Breath Sounds: Equal Bilateral; Insertion Attempts: 1  Anesthesia Hx:  No problems with previous Anesthesia  Personal Hx of Anesthesia complications Slow To Awaken/Delayed Emergence   Airway/Jaw/Neck:  Airway Findings: Mouth Opening: Normal Tongue: Normal  General Airway Assessment: Adult  Mallampati: II  Improves to II with phonation.  TM Distance: Normal, at least 6 cm        Last PCP note: within 1 month , within [facility]    Subspecialty notes: Bariatrics     Other important co-morbidities:  "Takes a long time to wake up from anesthesia" HX Gastric Sleeve-1/2017, HX Breat Cancer-4/2017, GERD, YT-5-[place],  HTN-Takes no meds, Fatty Liver      Tests already available:  Available tests,  " within 3 months , within [facility] .  1/2018 CMP, CBC, B1, Vit D, Lipid Panel      12/2016 EKG                            Instructions given. (See in Nurse's note)     Optimization:  Anesthesia Preop Clinic Assessment  Indicated-not required for this surgery                Plan:    Testing:  none needed                           Patient  has previously scheduled Medical Appointment:  2/21      Navigation: Tests Scheduled.none                         Straight Line to surgery.                          No tests, anesthesia preop clinic visit, or consult required.      Tracey Landaverde RN                                                  Electronically signed by Tracey Landaverde RN at 2/20/2018  2:47 PM                                                                                                                   02/20/2018  Marce Hickey is a 67 y.o., female.    Anesthesia Evaluation    I have reviewed the Patient Summary Reports.     I have reviewed the Medications.     Review of Systems  Anesthesia Hx:  No problems with previous Anesthesia History of delayed emergence, not with recent anesthetics History of prior surgery of interest to airway management or planning: Previous anesthesia: General Airway issues documented on chart review include mask, easy, GETA, easy direct laryngoscopy , view on direct laryngoscopy Grade II  Personal Hx of Anesthesia complications Slow To Awaken/Delayed Emergence   Hematology/Oncology:  Hematology Normal       -- Cancer in past history: s/p surgery and s/p chemotherapy  Chemotherapy: s/p chemotherapy in past     EENT/Dental:EENT/Dental Normal   Cardiovascular:   Hypertension    Pulmonary:  Pulmonary Normal    Hepatic/GI:  Hepatic/GI Normal  Esophageal / Stomach Disorders Gerd Controlled by s/p Gastric Surgery, PRN antireflux medication.  Liver Disease, Fatty Liver    Endocrine:  Endocrine Normal  Diabetes, Type 2 Diabetes  Borderline.    Psych:  Psychiatric Normal            Physical Exam  General:  Well nourished    Airway/Jaw/Neck:  Airway Findings: Mouth Opening: Normal Tongue: Normal  General Airway Assessment: Adult  Mallampati: I  TM Distance: Normal, at least 6 cm      Dental:  Dental Findings: In tact   Chest/Lungs:  Chest/Lungs Findings: Normal Respiratory Rate     Heart/Vascular:  Heart Findings: Rate: Normal  Rhythm: Regular Rhythm  Vascular Findings:  Vascular Access: Peripheral IV(s)        Mental Status:  Mental Status Findings:  Cooperative, Alert and Oriented         Anesthesia Plan  Type of Anesthesia, risks & benefits discussed:  Anesthesia Type:  general  Patient's Preference:   Intra-op Monitoring Plan: standard ASA monitors  Intra-op Monitoring Plan Comments:   Post Op Pain Control Plan:   Post Op Pain Control Plan Comments:   Induction:   IV  Beta Blocker:  Patient is not currently on a Beta-Blocker (No further documentation required).       Informed Consent: Patient understands risks and agrees with Anesthesia plan.  Questions answered. Anesthesia consent signed with patient.  ASA Score: 2     Day of Surgery Review of History & Physical:    H&P update referred to the surgeon.         Ready For Surgery From Anesthesia Perspective.

## 2018-02-20 NOTE — PROGRESS NOTES
CHIEF COMPLAINT: follow up     HISTORY OF PRESENT ILLNESS:  Marce Hickey is a 67 y.o. female who presents to clinic for follow up. She underwent gastric sleeve surgery  with Dr. Panchal and since then has discontinued all of her hypertension, diabetes medications.  She has repeat lab work ordered by Dr. Panchal.   She was also recently diagnosed with breast cancer and underwent b/l mastectomy. She is on arimidex and she is in the process of undergoing breast reconstruction.  She has noticed a mass over her left clavicle. It is not tender or painful. It has not changed in size.    REVIEW OF SYSTEMS:  The patient denies any fever, chills, night sweats, headaches, vision changes, difficulty speaking or swallowing, decreased hearing,  chest pain, palpitations, shortness of breath, cough, nausea, vomiting, abdominal pain, dysuria, diarrhea, constipation, hematuria, hematochezia, melena, changes in her hair, skin, nails, numbness or weakness in her extremities, erythema,  swelling over any of her joints, myalgia, swollen glands, easy bruising, fatigue, edema,    MEDICATIONS:   Reviewed and/or reconciled in EPIC    ALLERGIES:  Reviewed and/or reconciled in [facility]    PAST MEDICAL/SURGICAL HISTORY:   Past Medical History:   Diagnosis Date    Anxiety     Depression     Diabetes mellitus, type 2     Borderline    Fatty liver disease, nonalcoholic     GERD (gastroesophageal reflux disease)     Hyperlipidemia     Hypertension     Plantar fasciitis of right foot       Past Surgical History:   Procedure Laterality Date    breast reduction       SECTION      x 2    CHOLECYSTECTOMY      FOOT SURGERY      left bunionectomy    gastric sleve      HYSTERECTOMY      one ovary intact, adhesions    LIPOSUCTION      TONSILLECTOMY         FAMILY HISTORY:    Family History   Problem Relation Age of Onset    Hypertension Mother     Heart disease Mother      pacemaker    Heart attack Mother     Heart disease  "Father     Psoriasis Father     Cancer Neg Hx        SOCIAL HISTORY:    Social History     Social History    Marital status:      Spouse name: N/A    Number of children: N/A    Years of education: N/A     Occupational History    Not on file.     Social History Main Topics    Smoking status: Former Smoker     Quit date: 11/27/1993    Smokeless tobacco: Never Used      Comment: quit 1993    Alcohol use No    Drug use: No    Sexual activity: Yes     Birth control/ protection: Surgical     Other Topics Concern    Not on file     Social History Narrative    No narrative on file       PHYSICAL EXAM:  VITAL SIGNS:   Vitals:    02/20/18 1042   BP: (!) 143/87   BP Location: Right arm   Patient Position: Sitting   BP Method: Medium (Automatic)   Pulse: 65   Temp: 98.2 °F (36.8 °C)   TempSrc: Oral   Weight: 65.5 kg (144 lb 6.4 oz)   Height: 5' 4" (1.626 m)     GENERAL:  Patient appears well nourished, sitting on exam table, in no acute distress.  HEENT:  Atraumatic, normocephalic, PERRLA, EOMI, no conjunctival injection, sclerae are anicteric, normal external auditory canals,TMs clear b/l, gross hearing intact to whisper, MMM, no oropharygneal erythema or exudate.  NECK:  Supple, normal ROM, trachea is midline , no supraclavicular or cervical LAD or masses palpated.    CARDIOVASCULAR:  RRR, normal S1 and S2, no m/r/g.  RESPIRATORY:  CTA b/l, no wheezes, rhonchi, rales.  No increased work of breathing, no  use of accessory muscles.  ABDOMEN:  Soft, nontender, nondistended, normoactive bowel sounds in all four quadrants, no rebound or guarding, no HSM or masses palpated.  Normal percussion.  EXTREMITIES:  2+ DP pulses b/l, no edema.  SKIN:  Warm, 1 cm firm, moveable mass superior to left clavicle, scapula.  NEUROMUSCULAR:  Cranial nerves II-XII grossly intact.   No clubbing or cyanosis of digits/nails.  Steady gait.  PSYCH:  Patient is alert and oriented to person, time, place. They are appropriately dressed " and groomed. There is normal eye contact. Rate and tone of speech is normal. Normal insight, judgement. Normal thought content and process. Patient describes her mood as good, affect is depressed, she is tearful throughout the encoutner    ASSESSMENT/PLAN: This is a 67 y.o. female who presents to clinic for evaluation of the following concerns    1. History of hypertension  Continue to monitor    2. History of type 2 diabetes mellitus  Continue to montitor    3. Soft tissue mass  Likely a cyst, will obtain ultrasound to further evaluate.      Savannah Garvey MD

## 2018-02-21 ENCOUNTER — PATIENT MESSAGE (OUTPATIENT)
Dept: FAMILY MEDICINE | Facility: CLINIC | Age: 68
End: 2018-02-21

## 2018-02-21 ENCOUNTER — HOSPITAL ENCOUNTER (OUTPATIENT)
Dept: RADIOLOGY | Facility: CLINIC | Age: 68
Discharge: HOME | End: 2018-02-21
Attending: FAMILY MEDICINE
Payer: MEDICARE

## 2018-02-21 DIAGNOSIS — M79.89 SOFT TISSUE MASS: ICD-10-CM

## 2018-02-21 PROCEDURE — 76604 US EXAM CHEST: CPT | Mod: 26,,, | Performed by: RADIOLOGY

## 2018-02-21 PROCEDURE — 76604 US EXAM CHEST: CPT | Mod: TC,PO

## 2018-02-22 ENCOUNTER — PATIENT MESSAGE (OUTPATIENT)
Dept: FAMILY MEDICINE | Facility: CLINIC | Age: 68
End: 2018-02-22

## 2018-02-24 ENCOUNTER — HOSPITAL ENCOUNTER (EMERGENCY)
Facility: HOSPITAL | Age: 68
Discharge: HOME | End: 2018-02-24
Attending: EMERGENCY MEDICINE
Payer: MEDICARE

## 2018-02-24 VITALS
OXYGEN SATURATION: 97 % | HEART RATE: 72 BPM | DIASTOLIC BLOOD PRESSURE: 65 MMHG | RESPIRATION RATE: 18 BRPM | WEIGHT: 140 LBS | BODY MASS INDEX: 23.9 KG/M2 | TEMPERATURE: 98 F | HEIGHT: 64 IN | SYSTOLIC BLOOD PRESSURE: 136 MMHG

## 2018-02-24 DIAGNOSIS — T14.8XXA PUNCTURE WOUND: Primary | ICD-10-CM

## 2018-02-24 DIAGNOSIS — Z23 TETANUS TOXOID VACCINATION ADMINISTERED AT CURRENT VISIT: ICD-10-CM

## 2018-02-24 PROCEDURE — 63600175 PHARM REV CODE 636 W HCPCS: Performed by: PHYSICIAN ASSISTANT

## 2018-02-24 PROCEDURE — 90715 TDAP VACCINE 7 YRS/> IM: CPT | Performed by: PHYSICIAN ASSISTANT

## 2018-02-24 PROCEDURE — 90471 IMMUNIZATION ADMIN: CPT | Performed by: PHYSICIAN ASSISTANT

## 2018-02-24 PROCEDURE — 99283 EMERGENCY DEPT VISIT LOW MDM: CPT

## 2018-02-24 RX ORDER — CEPHALEXIN 500 MG/1
500 CAPSULE ORAL EVERY 12 HOURS
Qty: 10 CAPSULE | Refills: 0 | Status: SHIPPED | OUTPATIENT
Start: 2018-02-24 | End: 2018-03-01

## 2018-02-24 RX ORDER — MUPIROCIN CALCIUM 20 MG/G
CREAM TOPICAL 2 TIMES DAILY
Qty: 30 G | Refills: 0 | Status: SHIPPED | OUTPATIENT
Start: 2018-02-24 | End: 2018-03-01

## 2018-02-24 RX ADMIN — CLOSTRIDIUM TETANI TOXOID ANTIGEN (FORMALDEHYDE INACTIVATED), CORYNEBACTERIUM DIPHTHERIAE TOXOID ANTIGEN (FORMALDEHYDE INACTIVATED), BORDETELLA PERTUSSIS TOXOID ANTIGEN (GLUTARALDEHYDE INACTIVATED), BORDETELLA PERTUSSIS FILAMENTOUS HEMAGGLUTININ ANTIGEN (FORMALDEHYDE INACTIVATED), BORDETELLA PERTUSSIS PERTACTIN ANTIGEN, AND BORDETELLA PERTUSSIS FIMBRIAE 2/3 ANTIGEN 0.5 ML: 5; 2; 2.5; 5; 3; 5 INJECTION, SUSPENSION INTRAMUSCULAR at 03:02

## 2018-02-24 NOTE — ED PROVIDER NOTES
"Encounter Date: 2018    SCRIBE #1 NOTE: IMayela, am scribing for, and in the presence of, Peg Luna PA-C.       History     Chief Complaint   Patient presents with    Puncture Wound     rt. great toe  / needs tetanus        2018 2:59 PM     Chief complaint: Puncture Wound      Marce Hickey is a 67 y.o. female with PMHx of HTN, HLD, and plantar fascitis of right foot who presents to the ED with complaints of a puncture wound to right great toe. Patient reports stepping on a roof nail immediately PTA and that the nail went through her shoe. She denies washing area, but endorses area feels like a "burning". The patient visited the ED to receive a tetanus shot and reports last tetanus was "years ago". Currently, she has no other medical complaints and denies onset of any other symptoms. NSAIDS allergy noted.       The history is provided by the patient.     Review of patient's allergies indicates:   Allergen Reactions    Nsaids (non-steroidal anti-inflammatory drug) Anaphylaxis     Past Medical History:   Diagnosis Date    Anxiety     Depression     Diabetes mellitus, type 2     Borderline    Fatty liver disease, nonalcoholic     GERD (gastroesophageal reflux disease)     Hyperlipidemia     Hypertension     Plantar fasciitis of right foot      Past Surgical History:   Procedure Laterality Date    breast reduction       SECTION      x 2    CHOLECYSTECTOMY      FOOT SURGERY      left bunionectomy    gastric sleve      HYSTERECTOMY      one ovary intact, adhesions    LIPOSUCTION      TONSILLECTOMY       Family History   Problem Relation Age of Onset    Hypertension Mother     Heart disease Mother      pacemaker    Heart attack Mother     Heart disease Father     Psoriasis Father     Cancer Neg Hx      Social History   Substance Use Topics    Smoking status: Former Smoker     Quit date: 1993    Smokeless tobacco: Never Used      Comment: quit " 1993    Alcohol use No     Review of Systems   Constitutional: Negative for fever.   Respiratory: Negative for shortness of breath.    Gastrointestinal: Negative for nausea.   Musculoskeletal: Negative for gait problem.   Skin: Positive for wound (puncture; right great toe).   Neurological: Negative for weakness.   Psychiatric/Behavioral: Negative for confusion.       Physical Exam     Initial Vitals [02/24/18 1455]   BP Pulse Resp Temp SpO2   136/65 72 18 97.6 °F (36.4 °C) 97 %      MAP       88.67         Physical Exam    Constitutional: Vital signs are normal. She appears well-developed and well-nourished. She is not diaphoretic. She is cooperative.  Non-toxic appearance. She does not have a sickly appearance. No distress.   HENT:   Head: Normocephalic and atraumatic.   Right Ear: External ear normal.   Left Ear: External ear normal.   Nose: Nose normal.   Mouth/Throat: Oropharynx is clear and moist.   Eyes: Conjunctivae, EOM and lids are normal.   Neck: Normal range of motion and full passive range of motion without pain.   Cardiovascular: Normal rate and regular rhythm.   Pulmonary/Chest: Breath sounds normal. She has no wheezes. She has no rales.   Abdominal: Normal appearance. There is no rigidity.   Musculoskeletal: Normal range of motion. She exhibits no edema.        Right foot: There is normal range of motion, no tenderness, no bony tenderness, no swelling and normal capillary refill.   Pinpoint wound to the right great toe. There is no erythema. No warmth. No tenderness to palpation.    Neurological: She is alert and oriented to person, place, and time.   Skin: Skin is warm, dry and intact. No rash noted.         ED Course   Procedures  Labs Reviewed - No data to display                APC / Resident Notes:   Urgent evaluation of a 67 year old female who presents with puncture wound to the right great toe. She is requesting a tetanus shot. She no tenderness or cellulitic changes. Tetanus updated. Will  give Bactroban for wound. Offered antibiotics, patient states she will start if she develops redness. Return precautions given. Based on my clinical evaluation, I do not appreciate any immediate, emergent, or life threatening condition or etiology that warrants additional workup today and feel that the patient can be discharged with close follow up care.  Patient is to follow up with their primary care provider. Case was discussed with Dr. Horvath who is in agreement with the plan of care. All questions answered.          Scribe Attestation:   Scribe #1: I performed the above scribed service and the documentation accurately describes the services I performed. I attest to the accuracy of the note.    Attending Attestation:     Physician Attestation Statement for NP/PA:   I discussed this assessment and plan of this patient with the NP/PA, but I did not personally examine the patient. The face to face encounter was performed by the NP/PA.    Other NP/PA Attestation Additions:    History of Present Illness: 67-year-old female presented with a chief complaint of a puncture wound to her foot.    Medical Decision Making:   I am in agreement with the physician assistant's  assessment, treatment, and plan of care.           I, Peg Luna PA-C, personally performed the services described in this documentation. All medical record entries made by the scribe were at my direction and in my presence.  I have reviewed the chart and agree that the record reflects my personal performance and is accurate and complete. Peg Luna PA-C.  3:05 PM 02/24/2018             Clinical Impression:   The primary encounter diagnosis was Puncture wound. A diagnosis of Tetanus toxoid vaccination administered at current visit was also pertinent to this visit.    Disposition:   Disposition: Discharged  Condition: Stable                        Peg Luna PA-C  02/24/18 1753       Bijan Horvath MD  02/25/18 2007

## 2018-03-02 ENCOUNTER — TELEPHONE (OUTPATIENT)
Dept: PLASTIC SURGERY | Facility: CLINIC | Age: 68
End: 2018-03-02

## 2018-03-02 NOTE — TELEPHONE ENCOUNTER
Mrs. Hickey was instructed to arrive at [facility] on the 2nd floor for 0530 with expected surgery start time of 0730. I discussed night before and morning of surgery instructions according to [facility] Surgery Guide. Mrs. Hickey verbalized understanding and had no questions.

## 2018-03-05 ENCOUNTER — TELEPHONE (OUTPATIENT)
Dept: PLASTIC SURGERY | Facility: CLINIC | Age: 68
End: 2018-03-05

## 2018-03-06 ENCOUNTER — SURGERY (OUTPATIENT)
Age: 68
End: 2018-03-06

## 2018-03-06 ENCOUNTER — HOSPITAL ENCOUNTER (OUTPATIENT)
Facility: HOSPITAL | Age: 68
Discharge: HOME | End: 2018-03-06
Attending: SURGERY | Admitting: SURGERY
Payer: MEDICARE

## 2018-03-06 ENCOUNTER — ANESTHESIA (OUTPATIENT)
Dept: SURGERY | Facility: HOSPITAL | Age: 68
End: 2018-03-06
Payer: MEDICARE

## 2018-03-06 VITALS
HEIGHT: 64 IN | SYSTOLIC BLOOD PRESSURE: 126 MMHG | OXYGEN SATURATION: 100 % | RESPIRATION RATE: 16 BRPM | WEIGHT: 144.38 LBS | HEART RATE: 61 BPM | BODY MASS INDEX: 24.65 KG/M2 | TEMPERATURE: 98 F | DIASTOLIC BLOOD PRESSURE: 63 MMHG

## 2018-03-06 DIAGNOSIS — N64.89 BREAST ASYMMETRY: ICD-10-CM

## 2018-03-06 DIAGNOSIS — C50.011 MALIGNANT NEOPLASM OF NIPPLE OF RIGHT BREAST IN FEMALE, UNSPECIFIED ESTROGEN RECEPTOR STATUS: ICD-10-CM

## 2018-03-06 LAB
POCT GLUCOSE: 100 MG/DL (ref 70–110)
POCT GLUCOSE: 82 MG/DL (ref 70–110)

## 2018-03-06 PROCEDURE — 71000039 HC RECOVERY, EACH ADD'L HOUR: Performed by: SURGERY

## 2018-03-06 PROCEDURE — D9220A PRA ANESTHESIA: Mod: ANES,,, | Performed by: ANESTHESIOLOGY

## 2018-03-06 PROCEDURE — 71000016 HC POSTOP RECOV ADDL HR: Performed by: SURGERY

## 2018-03-06 PROCEDURE — 20926 PR REMV TISSUE FOR GRAFT OTHR: CPT | Mod: LT,,, | Performed by: SURGERY

## 2018-03-06 PROCEDURE — 71000015 HC POSTOP RECOV 1ST HR: Performed by: SURGERY

## 2018-03-06 PROCEDURE — 27000221 HC OXYGEN, UP TO 24 HOURS

## 2018-03-06 PROCEDURE — 82962 GLUCOSE BLOOD TEST: CPT | Performed by: SURGERY

## 2018-03-06 PROCEDURE — 25000003 PHARM REV CODE 250

## 2018-03-06 PROCEDURE — 63600175 PHARM REV CODE 636 W HCPCS: Performed by: SURGERY

## 2018-03-06 PROCEDURE — 27201423 OPTIME MED/SURG SUP & DEVICES STERILE SUPPLY: Performed by: SURGERY

## 2018-03-06 PROCEDURE — 71000033 HC RECOVERY, INTIAL HOUR: Performed by: SURGERY

## 2018-03-06 PROCEDURE — 37000009 HC ANESTHESIA EA ADD 15 MINS: Performed by: SURGERY

## 2018-03-06 PROCEDURE — 63600175 PHARM REV CODE 636 W HCPCS: Performed by: ANESTHESIOLOGY

## 2018-03-06 PROCEDURE — 94761 N-INVAS EAR/PLS OXIMETRY MLT: CPT

## 2018-03-06 PROCEDURE — 25000003 PHARM REV CODE 250: Performed by: NURSE ANESTHETIST, CERTIFIED REGISTERED

## 2018-03-06 PROCEDURE — 25000003 PHARM REV CODE 250: Performed by: SURGERY

## 2018-03-06 PROCEDURE — 36000704 HC OR TIME LEV I 1ST 15 MIN: Performed by: SURGERY

## 2018-03-06 PROCEDURE — 63600175 PHARM REV CODE 636 W HCPCS: Performed by: NURSE ANESTHETIST, CERTIFIED REGISTERED

## 2018-03-06 PROCEDURE — 37000008 HC ANESTHESIA 1ST 15 MINUTES: Performed by: SURGERY

## 2018-03-06 PROCEDURE — 36000705 HC OR TIME LEV I EA ADD 15 MIN: Performed by: SURGERY

## 2018-03-06 PROCEDURE — D9220A PRA ANESTHESIA: Mod: CRNA,,, | Performed by: NURSE ANESTHETIST, CERTIFIED REGISTERED

## 2018-03-06 RX ORDER — LIDOCAINE HCL/PF 100 MG/5ML
SYRINGE (ML) INTRAVENOUS
Status: DISCONTINUED | OUTPATIENT
Start: 2018-03-06 | End: 2018-03-06

## 2018-03-06 RX ORDER — EPINEPHRINE 1 MG/ML
INJECTION INTRAMUSCULAR; INTRAVENOUS; SUBCUTANEOUS
Status: DISCONTINUED | OUTPATIENT
Start: 2018-03-06 | End: 2018-03-06 | Stop reason: HOSPADM

## 2018-03-06 RX ORDER — AMOXICILLIN 250 MG
1 CAPSULE ORAL 2 TIMES DAILY
COMMUNITY
Start: 2018-03-06 | End: 2018-08-02

## 2018-03-06 RX ORDER — LIDOCAINE HYDROCHLORIDE 10 MG/ML
INJECTION, SOLUTION EPIDURAL; INFILTRATION; INTRACAUDAL; PERINEURAL
Status: DISCONTINUED | OUTPATIENT
Start: 2018-03-06 | End: 2018-03-06 | Stop reason: HOSPADM

## 2018-03-06 RX ORDER — HEPARIN SODIUM 5000 [USP'U]/ML
5000 INJECTION, SOLUTION INTRAVENOUS; SUBCUTANEOUS ONCE
Status: DISCONTINUED | OUTPATIENT
Start: 2018-03-06 | End: 2018-03-06 | Stop reason: HOSPADM

## 2018-03-06 RX ORDER — ONDANSETRON 2 MG/ML
INJECTION INTRAMUSCULAR; INTRAVENOUS
Status: DISCONTINUED | OUTPATIENT
Start: 2018-03-06 | End: 2018-03-06

## 2018-03-06 RX ORDER — PHENYLEPHRINE HYDROCHLORIDE 10 MG/ML
INJECTION INTRAVENOUS
Status: DISCONTINUED | OUTPATIENT
Start: 2018-03-06 | End: 2018-03-06

## 2018-03-06 RX ORDER — PROPOFOL 10 MG/ML
VIAL (ML) INTRAVENOUS
Status: DISCONTINUED | OUTPATIENT
Start: 2018-03-06 | End: 2018-03-06

## 2018-03-06 RX ORDER — FENTANYL CITRATE 50 UG/ML
INJECTION, SOLUTION INTRAMUSCULAR; INTRAVENOUS
Status: DISCONTINUED | OUTPATIENT
Start: 2018-03-06 | End: 2018-03-06

## 2018-03-06 RX ORDER — OXYCODONE AND ACETAMINOPHEN 10; 325 MG/1; MG/1
1 TABLET ORAL EVERY 4 HOURS PRN
Qty: 28 TABLET | Refills: 0 | Status: SHIPPED | OUTPATIENT
Start: 2018-03-06 | End: 2018-08-02

## 2018-03-06 RX ORDER — SODIUM CHLORIDE 0.9 % (FLUSH) 0.9 %
3 SYRINGE (ML) INJECTION
Status: DISCONTINUED | OUTPATIENT
Start: 2018-03-06 | End: 2018-03-06 | Stop reason: HOSPADM

## 2018-03-06 RX ORDER — GLYCOPYRROLATE 0.2 MG/ML
INJECTION INTRAMUSCULAR; INTRAVENOUS
Status: DISCONTINUED | OUTPATIENT
Start: 2018-03-06 | End: 2018-03-06

## 2018-03-06 RX ORDER — MIDAZOLAM HYDROCHLORIDE 1 MG/ML
INJECTION, SOLUTION INTRAMUSCULAR; INTRAVENOUS
Status: DISCONTINUED | OUTPATIENT
Start: 2018-03-06 | End: 2018-03-06

## 2018-03-06 RX ORDER — ACETAMINOPHEN 10 MG/ML
INJECTION, SOLUTION INTRAVENOUS
Status: DISCONTINUED | OUTPATIENT
Start: 2018-03-06 | End: 2018-03-06

## 2018-03-06 RX ORDER — SODIUM CHLORIDE 9 MG/ML
INJECTION, SOLUTION INTRAVENOUS CONTINUOUS
Status: DISCONTINUED | OUTPATIENT
Start: 2018-03-06 | End: 2018-03-06 | Stop reason: HOSPADM

## 2018-03-06 RX ORDER — LIDOCAINE HYDROCHLORIDE 10 MG/ML
1 INJECTION, SOLUTION EPIDURAL; INFILTRATION; INTRACAUDAL; PERINEURAL ONCE
Status: COMPLETED | OUTPATIENT
Start: 2018-03-06 | End: 2018-03-06

## 2018-03-06 RX ORDER — ROCURONIUM BROMIDE 10 MG/ML
INJECTION, SOLUTION INTRAVENOUS
Status: DISCONTINUED | OUTPATIENT
Start: 2018-03-06 | End: 2018-03-06

## 2018-03-06 RX ORDER — NEOSTIGMINE METHYLSULFATE 1 MG/ML
INJECTION, SOLUTION INTRAVENOUS
Status: DISCONTINUED | OUTPATIENT
Start: 2018-03-06 | End: 2018-03-06

## 2018-03-06 RX ORDER — CEPHALEXIN 250 MG/1
250 CAPSULE ORAL EVERY 6 HOURS
Qty: 20 CAPSULE | Refills: 0 | Status: SHIPPED | OUTPATIENT
Start: 2018-03-06 | End: 2018-03-11

## 2018-03-06 RX ORDER — HYDROCODONE BITARTRATE AND ACETAMINOPHEN 5; 325 MG/1; MG/1
TABLET ORAL
Status: COMPLETED
Start: 2018-03-06 | End: 2018-03-06

## 2018-03-06 RX ORDER — FENTANYL CITRATE 50 UG/ML
25 INJECTION, SOLUTION INTRAMUSCULAR; INTRAVENOUS EVERY 5 MIN PRN
Status: DISCONTINUED | OUTPATIENT
Start: 2018-03-06 | End: 2018-03-06 | Stop reason: HOSPADM

## 2018-03-06 RX ORDER — TRIAMCINOLONE ACETONIDE 40 MG/ML
INJECTION, SUSPENSION INTRA-ARTICULAR; INTRAMUSCULAR
Status: DISCONTINUED | OUTPATIENT
Start: 2018-03-06 | End: 2018-03-06 | Stop reason: HOSPADM

## 2018-03-06 RX ORDER — TRAMADOL HYDROCHLORIDE 50 MG/1
50 TABLET ORAL EVERY 6 HOURS PRN
Qty: 28 TABLET | Refills: 0 | Status: SHIPPED | OUTPATIENT
Start: 2018-03-06 | End: 2018-08-02

## 2018-03-06 RX ORDER — HYDROCODONE BITARTRATE AND ACETAMINOPHEN 5; 325 MG/1; MG/1
1 TABLET ORAL EVERY 4 HOURS PRN
Status: DISCONTINUED | OUTPATIENT
Start: 2018-03-06 | End: 2018-03-06 | Stop reason: HOSPADM

## 2018-03-06 RX ORDER — CEFAZOLIN SODIUM 1 G/3ML
INJECTION, POWDER, FOR SOLUTION INTRAMUSCULAR; INTRAVENOUS
Status: DISCONTINUED | OUTPATIENT
Start: 2018-03-06 | End: 2018-03-06

## 2018-03-06 RX ORDER — SODIUM BICARBONATE 42 MG/ML
INJECTION, SOLUTION INTRAVENOUS
Status: DISCONTINUED | OUTPATIENT
Start: 2018-03-06 | End: 2018-03-06 | Stop reason: HOSPADM

## 2018-03-06 RX ADMIN — LIDOCAINE HYDROCHLORIDE 80 MG: 20 INJECTION, SOLUTION INTRAVENOUS at 07:03

## 2018-03-06 RX ADMIN — PROPOFOL 150 MG: 10 INJECTION, EMULSION INTRAVENOUS at 07:03

## 2018-03-06 RX ADMIN — PHENYLEPHRINE HYDROCHLORIDE 100 MCG: 10 INJECTION INTRAVENOUS at 08:03

## 2018-03-06 RX ADMIN — ACETAMINOPHEN 1000 MG: 10 INJECTION, SOLUTION INTRAVENOUS at 08:03

## 2018-03-06 RX ADMIN — FENTANYL CITRATE 25 MCG: 50 INJECTION, SOLUTION INTRAMUSCULAR; INTRAVENOUS at 09:03

## 2018-03-06 RX ADMIN — CEFAZOLIN 2 G: 330 INJECTION, POWDER, FOR SOLUTION INTRAMUSCULAR; INTRAVENOUS at 07:03

## 2018-03-06 RX ADMIN — SODIUM BICARBONATE 8.4 MEQ: 42 INJECTION, SOLUTION INTRAVENOUS at 08:03

## 2018-03-06 RX ADMIN — SODIUM CHLORIDE: 0.9 INJECTION, SOLUTION INTRAVENOUS at 06:03

## 2018-03-06 RX ADMIN — FENTANYL CITRATE 100 MCG: 50 INJECTION, SOLUTION INTRAMUSCULAR; INTRAVENOUS at 07:03

## 2018-03-06 RX ADMIN — GLYCOPYRROLATE 0.6 MG: 0.2 INJECTION, SOLUTION INTRAMUSCULAR; INTRAVENOUS at 09:03

## 2018-03-06 RX ADMIN — HYDROCODONE BITARTRATE AND ACETAMINOPHEN 1 TABLET: 5; 325 TABLET ORAL at 09:03

## 2018-03-06 RX ADMIN — MIDAZOLAM HYDROCHLORIDE 2 MG: 1 INJECTION, SOLUTION INTRAMUSCULAR; INTRAVENOUS at 07:03

## 2018-03-06 RX ADMIN — NEOSTIGMINE METHYLSULFATE 4 MG: 1 INJECTION INTRAVENOUS at 09:03

## 2018-03-06 RX ADMIN — SODIUM CHLORIDE, SODIUM GLUCONATE, SODIUM ACETATE, POTASSIUM CHLORIDE, MAGNESIUM CHLORIDE, SODIUM PHOSPHATE, DIBASIC, AND POTASSIUM PHOSPHATE: .53; .5; .37; .037; .03; .012; .00082 INJECTION, SOLUTION INTRAVENOUS at 08:03

## 2018-03-06 RX ADMIN — ROCURONIUM BROMIDE 50 MG: 10 INJECTION, SOLUTION INTRAVENOUS at 07:03

## 2018-03-06 RX ADMIN — LIDOCAINE HYDROCHLORIDE 0.1 MG: 10 INJECTION, SOLUTION EPIDURAL; INFILTRATION; INTRACAUDAL; PERINEURAL at 06:03

## 2018-03-06 RX ADMIN — TRIAMCINOLONE ACETONIDE 40 MG: 40 INJECTION, SUSPENSION INTRA-ARTICULAR; INTRAMUSCULAR at 08:03

## 2018-03-06 RX ADMIN — ONDANSETRON 8 MG: 2 INJECTION INTRAMUSCULAR; INTRAVENOUS at 09:03

## 2018-03-06 RX ADMIN — LIDOCAINE HYDROCHLORIDE 37.5 ML: 10 INJECTION, SOLUTION EPIDURAL; INFILTRATION; INTRACAUDAL; PERINEURAL at 08:03

## 2018-03-06 RX ADMIN — EPINEPHRINE 1 MG: 1 INJECTION INTRAMUSCULAR; INTRAVENOUS; SUBCUTANEOUS at 08:03

## 2018-03-06 NOTE — H&P
Patient ID: Marce Hickey is a 67 y.o. female.    Chief Complaint: Breast asymmetry    HPI   Ms. Hickey presents after having bilateral breast reconstruction.  She looks   nice.  She does need a small plication inferiorly on the left breast medially.    This would have to be done through either in an internal or external approach.    She needs a lot of fat and I am hesitant to do it internally at the same time.    She just has a slight asymmetry in the implant in that area.  Nonetheless, she   is scheduled for free fat injections.    Review of Systems   Constitutional: Negative for chills and fever.   HENT: Negative for sore throat and trouble swallowing.    Eyes: Negative for discharge and redness.   Respiratory: Negative for chest tightness and shortness of breath.    Cardiovascular: Negative for chest pain.   Gastrointestinal: Negative for abdominal pain, constipation, diarrhea, nausea and vomiting.   Genitourinary: Negative for dysuria.   Musculoskeletal: Negative for back pain and neck pain.   Skin: Negative for color change.   Allergic/Immunologic: Negative for immunocompromised state.   Neurological: Negative for dizziness and headaches.   Hematological: Negative for adenopathy.          Current Facility-Administered Medications   Medication Dose Route Frequency Provider Last Rate Last Dose    0.9%  NaCl infusion   Intravenous Continuous Montrell Thomas MD 10 mL/hr at 03/06/18 0619      0.9%  NaCl infusion   Intravenous Continuous Osmar Christian MD        heparin (porcine) injection 5,000 Units  5,000 Units Subcutaneous Once Osmar Christian MD           Review of patient's allergies indicates:   Allergen Reactions    Nsaids (non-steroidal anti-inflammatory drug) Anaphylaxis       Past Medical History:   Diagnosis Date    Anxiety     Depression     Diabetes mellitus, type 2     Borderline    Fatty liver disease, nonalcoholic     GERD (gastroesophageal reflux disease)      Hyperlipidemia     Hypertension     Plantar fasciitis of right foot        Past Surgical History:   Procedure Laterality Date    breast reduction       SECTION      x 2    CHOLECYSTECTOMY      FOOT SURGERY      left bunionectomy    gastric sleve      HYSTERECTOMY      one ovary intact, adhesions    LIPOSUCTION      TONSILLECTOMY         Family History   Problem Relation Age of Onset    Hypertension Mother     Heart disease Mother      pacemaker    Heart attack Mother     Heart disease Father     Psoriasis Father     Cancer Neg Hx        Social History     Social History    Marital status:      Spouse name: N/A    Number of children: N/A    Years of education: N/A     Occupational History    Not on file.     Social History Main Topics    Smoking status: Former Smoker     Quit date: 1993    Smokeless tobacco: Never Used      Comment: quit     Alcohol use No    Drug use: No    Sexual activity: Yes     Birth control/ protection: Surgical     Other Topics Concern    Not on file     Social History Narrative    No narrative on file       Vitals:    18 0557   BP: (!) 153/83   Pulse: 65   Resp: 16   Temp: 98.2 °F (36.8 °C)       Physical Exam   Constitutional: She is oriented to person, place, and time. She appears well-developed and well-nourished. No distress.   HENT:   Head: Normocephalic and atraumatic.   Eyes: EOM are normal. Pupils are equal, round, and reactive to light.   Neck: Normal range of motion. Neck supple.   Cardiovascular: Normal rate and regular rhythm.    Pulmonary/Chest: Effort normal. No respiratory distress.   Abdominal: Soft. She exhibits no distension. There is no tenderness. There is no guarding.   Musculoskeletal: Normal range of motion.   Neurological: She is alert and oriented to person, place, and time.   Skin: Skin is warm and dry. She is not diaphoretic.   Psychiatric: She has a normal mood and affect. Her behavior is normal. Judgment  and thought content normal.   Nursing note and vitals reviewed.      Assessment & Plan:  To OR today for free fat transfer

## 2018-03-06 NOTE — BRIEF OP NOTE
[facility]  Brief Operative Note     SUMMARY     Surgery Date: 3/6/2018     Surgeon(s) and Role:     * Montrell Thomas MD - Primary     * Tayo Cain MD - Fellow     * Osmar Christian MD - Resident - Assistant    Pre-op Diagnosis:  Personal history of malignant neoplasm of breast [Z85.3]    Post-op Diagnosis:  Post-Op Diagnosis Codes:     * Personal history of malignant neoplasm of breast [Z85.3]    Procedure(s) (LRB):  INJECTION-FAT FAT TRANSFER (Bilateral)    Anesthesia: General    Description of the findings of the procedure: Free fat transfer from bilateral thighs and abdominal walls to bilateral breasts. Also had small scar revision.    Findings/Key Components: Free fat transfer from bilateral thighs and abdominal walls to bilateral breasts. Also had small scar revision.    Estimated Blood Loss: * No values recorded between 3/6/2018  7:57 AM and 3/6/2018  9:13 AM *         Specimens:   Specimen (12h ago through future)    None          Discharge Note    SUMMARY     Admit Date: 3/6/2018    Discharge Date and Time:  03/06/2018 9:25 AM    Hospital Course (synopsis of major diagnoses, care, treatment, and services provided during the course of the hospital stay): Patient arrived for scheduled procedure. Patient tolerated procedure well. Patient was discharged after recovery.     Final Diagnosis: Post-Op Diagnosis Codes:     * Personal history of malignant neoplasm of breast [Z85.3]    Disposition: Home or Self Care    Follow Up/Patient Instructions:     Medications:  Reconciled Home Medications:   Current Discharge Medication List      START taking these medications    Details   cephALEXin (KEFLEX) 250 MG capsule Take 1 capsule (250 mg total) by mouth every 6 (six) hours.  Qty: 20 capsule, Refills: 0      oxyCODONE-acetaminophen (PERCOCET)  mg per tablet Take 1 tablet by mouth every 4 (four) hours as needed for Pain.  Qty: 28 tablet, Refills: 0      senna-docusate 8.6-50 mg  (PERICOLACE) 8.6-50 mg per tablet Take 1 tablet by mouth 2 (two) times daily.         CONTINUE these medications which have NOT CHANGED    Details   anastrozole (ARIMIDEX) 1 mg Tab TAKE 1 TABLET DAILY  Qty: 90 tablet, Refills: 1    Associated Diagnoses: Malignant neoplasm involving both nipple and areola of right breast in female, unspecified estrogen receptor status      calcium-vitamin D3 500 mg(1,250mg) -200 unit per tablet once daily. Patient uses a patch, not oral medication      multivitamin with minerals tablet Take 1 tablet by mouth once daily.      ZEGERID 40-1.1 mg-gram per capsule TAKE 1 CAPSULE BEFORE BREAKFAST  Qty: 90 capsule, Refills: 2      tramadol (ULTRAM) 50 mg tablet Take 1 tablet (50 mg total) by mouth every 6 (six) hours as needed for Pain.  Qty: 20 tablet, Refills: 0    Associated Diagnoses: Malignant neoplasm of nipple of right breast in female, unspecified estrogen receptor status      triamcinolone acetonide 0.1% (KENALOG) 0.1 % ointment Apply topically 2 (two) times daily.  Qty: 30 g, Refills: 3      vitamin D 1000 units Tab Take 2,000 Units by mouth once daily. Patient uses a patch, not oral medication             Discharge Procedure Orders  Diet general     Activity as tolerated     Wound care routine (specify)   Order Comments: Keep thigh and abdominal wall compression devices on when not showering.  OK to shower in 48 hours.       Follow-up Information     Montrell Thomas MD On 3/14/2018.    Specialty:  Plastic Surgery  Contact information:  [address]  [facility] [address]  [phone]

## 2018-03-06 NOTE — DISCHARGE INSTRUCTIONS

## 2018-03-06 NOTE — PLAN OF CARE
Problem: Patient Care Overview  Goal: Plan of Care Review  Outcome: Ongoing (interventions implemented as appropriate)  Vital signs stable. Afebrile. Drowsy but oriented and following commands. Denies nausea. Pain controlled with PRN pain meds. Surgical incisions remain CDI. POC reviewed and all questions/concerns addressed.

## 2018-03-06 NOTE — PLAN OF CARE
Discharge instructions given and explained to patient and family with verbalization of understanding all instructions. Patients v/s stable, denies n/v and tolerating po, rates pain level tolerable, IV removed, and family at bedside for patient discharge home.   Waiting on tramadol prescription from MD who is currently in surgery because patient says she cannot tolerate percocet

## 2018-03-06 NOTE — TRANSFER OF CARE
"Anesthesia Transfer of Care Note    Patient: Marce Hickey    Procedure(s) Performed: Procedure(s) (LRB):  INJECTION-FAT FAT TRANSFER (Bilateral)    Patient location: PACU    Anesthesia Type: general    Transport from OR: Transported from OR on 6-10 L/min O2 by face mask with adequate spontaneous ventilation    Post pain: adequate analgesia    Post assessment: no apparent anesthetic complications    Post vital signs: stable    Level of consciousness: responds to stimulation and sedated    Nausea/Vomiting: no nausea/vomiting    Complications: none    Transfer of care protocol was followed      Last vitals:   Visit Vitals  /71   Pulse 60   Temp 36.1 °C (97 °F) (Temporal)   Resp 17   Ht 5' 4" (1.626 m)   Wt 65.5 kg (144 lb 6.4 oz)   SpO2 100%   Breastfeeding? No   BMI 24.79 kg/m²     "

## 2018-03-07 ENCOUNTER — PATIENT MESSAGE (OUTPATIENT)
Dept: HEMATOLOGY/ONCOLOGY | Facility: CLINIC | Age: 68
End: 2018-03-07

## 2018-03-07 ENCOUNTER — PATIENT MESSAGE (OUTPATIENT)
Dept: PLASTIC SURGERY | Facility: CLINIC | Age: 68
End: 2018-03-07

## 2018-03-07 NOTE — ANESTHESIA POSTPROCEDURE EVALUATION
"Anesthesia Post Evaluation    Patient: Marce Hickey    Procedure(s) Performed: Procedure(s) (LRB):  INJECTION-FAT FAT TRANSFER (Bilateral)    Final Anesthesia Type: general  Patient location during evaluation: PACU  Patient participation: Yes- Able to Participate  Level of consciousness: awake and alert and oriented  Post-procedure vital signs: reviewed and stable  Pain management: adequate  Airway patency: patent  PONV status at discharge: No PONV  Anesthetic complications: no      Cardiovascular status: hemodynamically stable  Respiratory status: unassisted and spontaneous ventilation  Hydration status: euvolemic  Follow-up not needed.        Visit Vitals  /63   Pulse 61   Temp 36.7 °C (98.1 °F) (Temporal)   Resp 16   Ht 5' 4" (1.626 m)   Wt 65.5 kg (144 lb 6.4 oz)   SpO2 100%   Breastfeeding? No   BMI 24.79 kg/m²       Pain/Shaina Score: Pain Assessment Performed: Yes (3/6/2018 12:47 PM)  Presence of Pain: denies (3/6/2018 12:47 PM)  Pain Rating Prior to Med Admin: 8 (3/6/2018  9:54 AM)  Pain Rating Post Med Admin: 5 (3/6/2018 10:10 AM)  Shaina Score: 9 (3/6/2018  9:30 AM)      "

## 2018-03-08 ENCOUNTER — TELEPHONE (OUTPATIENT)
Dept: PLASTIC SURGERY | Facility: CLINIC | Age: 68
End: 2018-03-08

## 2018-03-08 NOTE — TELEPHONE ENCOUNTER
Called the Pt and spoke with her and informed her that she has to wait at least 48 hours to shower and change her gauze and can only remove  the compression suit when she is showering and washing the suit .. She said  ok thank you and I also informed her that her appt in  3/16/18 is her next f/u appt

## 2018-03-15 NOTE — OP NOTE
DATE OF PROCEDURE:  03/06/2018    PREOPERATIVE DIAGNOSIS:  History of breast cancer.    POSTOPERATIVE DIAGNOSIS:  History of breast cancer.    PROCEDURE PERFORMED:  Free fat transfer to right and left breast.    SURGEON:  Montrell Thomas M.D., [facility]    ANESTHESIA:  General.    DESCRIPTION OF PROCEDURE:  The patient was evaluated in the preoperative holding   area, markings for the free fat transfer were then marked.  The patient was   taken to the Operating Room, placed in a supine position.  After adequate   general anesthesia, she was prepped and draped in the normal sterile fashion.    Fat was then harvested from the thighs and abdomen using the Outcome Referrals system.  It   was washed and loaded into syringes.  The fat was then freely injected in small   aliquots into both breasts.  After completion of the procedure, the incisions   were closed using interrupted 6-0 nylon.  There were no complications.  A   compression garment was applied.      RAZ/CATRACHITO  dd: 03/14/2018 09:36:40 (CDT)  td: 03/14/2018 22:23:09 (CDT)  Doc ID   #[id number]  Job ID #[id number]    CC:

## 2018-03-16 ENCOUNTER — OFFICE VISIT (OUTPATIENT)
Dept: PLASTIC SURGERY | Facility: CLINIC | Age: 68
End: 2018-03-16
Payer: MEDICARE

## 2018-03-16 VITALS
SYSTOLIC BLOOD PRESSURE: 143 MMHG | HEART RATE: 59 BPM | TEMPERATURE: 98 F | BODY MASS INDEX: 24.59 KG/M2 | DIASTOLIC BLOOD PRESSURE: 79 MMHG | WEIGHT: 144 LBS | HEIGHT: 64 IN

## 2018-03-16 DIAGNOSIS — Z09 SURGERY FOLLOW-UP EXAMINATION: Primary | ICD-10-CM

## 2018-03-16 PROCEDURE — 99999 PR PBB SHADOW E&M-EST. PATIENT-LVL III: CPT | Mod: PBBFAC,,, | Performed by: SURGERY

## 2018-03-16 PROCEDURE — 99024 POSTOP FOLLOW-UP VISIT: CPT | Mod: POP,,, | Performed by: SURGERY

## 2018-03-16 PROCEDURE — 99213 OFFICE O/P EST LOW 20 MIN: CPT | Mod: PBBFAC,PO | Performed by: SURGERY

## 2018-03-20 ENCOUNTER — PATIENT MESSAGE (OUTPATIENT)
Dept: HEMATOLOGY/ONCOLOGY | Facility: CLINIC | Age: 68
End: 2018-03-20

## 2018-03-21 ENCOUNTER — OFFICE VISIT (OUTPATIENT)
Dept: PLASTIC SURGERY | Facility: CLINIC | Age: 68
End: 2018-03-21
Payer: MEDICARE

## 2018-03-21 VITALS
DIASTOLIC BLOOD PRESSURE: 87 MMHG | HEIGHT: 64 IN | SYSTOLIC BLOOD PRESSURE: 155 MMHG | HEART RATE: 62 BPM | BODY MASS INDEX: 24.59 KG/M2 | WEIGHT: 144 LBS | TEMPERATURE: 98 F

## 2018-03-21 DIAGNOSIS — Z09 SURGERY FOLLOW-UP EXAMINATION: Primary | ICD-10-CM

## 2018-03-21 PROCEDURE — 99999 PR PBB SHADOW E&M-EST. PATIENT-LVL III: CPT | Mod: PBBFAC,,, | Performed by: SURGERY

## 2018-03-21 PROCEDURE — 99024 POSTOP FOLLOW-UP VISIT: CPT | Mod: POP,,, | Performed by: SURGERY

## 2018-03-21 PROCEDURE — 99213 OFFICE O/P EST LOW 20 MIN: CPT | Mod: PBBFAC,PO | Performed by: SURGERY

## 2018-04-02 ENCOUNTER — PATIENT MESSAGE (OUTPATIENT)
Dept: HEMATOLOGY/ONCOLOGY | Facility: CLINIC | Age: 68
End: 2018-04-02

## 2018-04-06 ENCOUNTER — OFFICE VISIT (OUTPATIENT)
Dept: PLASTIC SURGERY | Facility: CLINIC | Age: 68
End: 2018-04-06
Payer: MEDICARE

## 2018-04-06 VITALS
HEART RATE: 61 BPM | WEIGHT: 150.31 LBS | DIASTOLIC BLOOD PRESSURE: 78 MMHG | HEIGHT: 64 IN | BODY MASS INDEX: 25.66 KG/M2 | SYSTOLIC BLOOD PRESSURE: 159 MMHG | TEMPERATURE: 99 F

## 2018-04-06 DIAGNOSIS — Z09 SURGERY FOLLOW-UP EXAMINATION: Primary | ICD-10-CM

## 2018-04-06 PROCEDURE — 99024 POSTOP FOLLOW-UP VISIT: CPT | Mod: POP,,, | Performed by: PHYSICIAN ASSISTANT

## 2018-04-06 PROCEDURE — 99999 PR PBB SHADOW E&M-EST. PATIENT-LVL III: CPT | Mod: PBBFAC,,, | Performed by: PHYSICIAN ASSISTANT

## 2018-04-06 PROCEDURE — 99213 OFFICE O/P EST LOW 20 MIN: CPT | Mod: PBBFAC,PO | Performed by: PHYSICIAN ASSISTANT

## 2018-04-06 NOTE — PROGRESS NOTES
Marce Hickey presents to [facility] on 4/6/2018 for a follow up visit status post free fat transfer to Bilateral breast on 03/06/2018 for second stage breast reconstruction. She is doing well today with no issues since her last visit.     Review of patient's allergies indicates:   Allergen Reactions    Nsaids (non-steroidal anti-inflammatory drug) Anaphylaxis     Current Outpatient Prescriptions on File Prior to Visit   Medication Sig Dispense Refill    anastrozole (ARIMIDEX) 1 mg Tab TAKE 1 TABLET DAILY 90 tablet 1    calcium-vitamin D3 500 mg(1,250mg) -200 unit per tablet once daily. Patient uses a patch, not oral medication      multivitamin with minerals tablet Take 1 tablet by mouth once daily.      oxyCODONE-acetaminophen (PERCOCET)  mg per tablet Take 1 tablet by mouth every 4 (four) hours as needed for Pain. 28 tablet 0    senna-docusate 8.6-50 mg (PERICOLACE) 8.6-50 mg per tablet Take 1 tablet by mouth 2 (two) times daily.      traMADol (ULTRAM) 50 mg tablet Take 1 tablet (50 mg total) by mouth every 6 (six) hours as needed for Pain. 28 tablet 0    triamcinolone acetonide 0.1% (KENALOG) 0.1 % ointment Apply topically 2 (two) times daily. 30 g 3    vitamin D 1000 units Tab Take 2,000 Units by mouth once daily. Patient uses a patch, not oral medication      ZEGERID 40-1.1 mg-gram per capsule TAKE 1 CAPSULE BEFORE BREAKFAST (Patient taking differently: TAKE 1 CAPSULE BEFORE BREAKFAST with bio carb) 90 capsule 2     No current facility-administered medications on file prior to visit.      Patient Active Problem List   Diagnosis    GERD (gastroesophageal reflux disease)    Plantar fasciitis of right foot    Fatty liver disease, nonalcoholic    Dysmetabolic syndrome    Hypovitaminosis D    Moderate episode of recurrent major depressive disorder    CEASAR (generalized anxiety disorder)    Back pain    Dyslipidemia    Status post gastric surgery    History of type 2  diabetes mellitus    History of hypertension    Breast cancer    Breast asymmetry     Past Surgical History:   Procedure Laterality Date    breast reduction       SECTION      x 2    CHOLECYSTECTOMY      FOOT SURGERY      left bunionectomy    gastric sleve      HYSTERECTOMY      one ovary intact, adhesions    LIPOSUCTION      TONSILLECTOMY         DATE OF PROCEDURE:  2018   PREOPERATIVE DIAGNOSIS:  History of breast cancer.   POSTOPERATIVE DIAGNOSIS:  History of breast cancer.   PROCEDURE PERFORMED:  Free fat transfer to right and left breast.   SURGEON:  Montrell Thomas M.D., FACS   ANESTHESIA:  General.    PHYSICAL EXAMINATION    Vitals:    18 0925   BP: (!) 159/78   Pulse: 61   Temp: 98.6 °F (37 °C)       WD WN NAD  VSS  Normal resp effort  R breast - incision CDI, no erythema/drainage  L breast - incision CDI, no erythema/drainage  Abdomen - incision CDI, no erythema/drainage    ASSESSMENT/PLAN  67 y.o. F s/p B Breast reconstruction  - Doing well, no issues. Pleased with outcome  - Will schedule for 3D nipple tattoo    All questions were answered. The patient was advised to call the clinic with any questions or concerns prior to their next visit.     -

## 2018-04-24 ENCOUNTER — HOSPITAL ENCOUNTER (OUTPATIENT)
Dept: RADIOLOGY | Facility: HOSPITAL | Age: 68
Discharge: HOME | End: 2018-04-24
Attending: INTERNAL MEDICINE
Payer: MEDICARE

## 2018-04-24 PROCEDURE — 78815 PET IMAGE W/CT SKULL-THIGH: CPT | Mod: 26,PI,, | Performed by: RADIOLOGY

## 2018-04-24 PROCEDURE — A9552 F18 FDG: HCPCS | Mod: PO

## 2018-04-24 PROCEDURE — 78815 PET IMAGE W/CT SKULL-THIGH: CPT | Mod: TC,PI,PO

## 2018-04-25 ENCOUNTER — TELEPHONE (OUTPATIENT)
Dept: HEMATOLOGY/ONCOLOGY | Facility: CLINIC | Age: 68
End: 2018-04-25

## 2018-04-25 NOTE — TELEPHONE ENCOUNTER
----- Message from Freya Martines sent at 4/25/2018 12:11 PM CDT -----  Contact: self  Calling to reschedule her appt. Please call [phone]

## 2018-04-25 NOTE — TELEPHONE ENCOUNTER
Spoke with pt she had PET scan done yesterday 4/24/18, she wanted to get in sooner to see Dr. Ding b/c her orgianlly appt date was her birthday and she was going to be out of town.     New appt4/30 to see Dr. Ding,  4/26 labs.    Pt voiced all understanding.

## 2018-04-26 ENCOUNTER — LAB VISIT (OUTPATIENT)
Dept: LAB | Facility: HOSPITAL | Age: 68
End: 2018-04-26
Attending: INTERNAL MEDICINE
Payer: MEDICARE

## 2018-04-26 DIAGNOSIS — C50.011 MALIGNANT NEOPLASM OF NIPPLE OF RIGHT BREAST IN FEMALE, UNSPECIFIED ESTROGEN RECEPTOR STATUS: ICD-10-CM

## 2018-04-26 DIAGNOSIS — E53.8 B12 DEFICIENCY: ICD-10-CM

## 2018-04-26 LAB
ALBUMIN SERPL BCP-MCNC: 3.9 G/DL
ALP SERPL-CCNC: 96 U/L
ALT SERPL W/O P-5'-P-CCNC: 11 U/L
ANION GAP SERPL CALC-SCNC: 9 MMOL/L
AST SERPL-CCNC: 14 U/L
BASOPHILS # BLD AUTO: 0 K/UL
BASOPHILS NFR BLD: 0.6 %
BILIRUB SERPL-MCNC: 0.5 MG/DL
BUN SERPL-MCNC: 21 MG/DL
CALCIUM SERPL-MCNC: 10 MG/DL
CHLORIDE SERPL-SCNC: 109 MMOL/L
CO2 SERPL-SCNC: 26 MMOL/L
CREAT SERPL-MCNC: 0.8 MG/DL
DIFFERENTIAL METHOD: ABNORMAL
EOSINOPHIL # BLD AUTO: 0.1 K/UL
EOSINOPHIL NFR BLD: 1.6 %
ERYTHROCYTE [DISTWIDTH] IN BLOOD BY AUTOMATED COUNT: 13.9 %
EST. GFR  (AFRICAN AMERICAN): >60 ML/MIN/1.73 M^2
EST. GFR  (NON AFRICAN AMERICAN): >60 ML/MIN/1.73 M^2
FERRITIN SERPL-MCNC: 131 NG/ML
GLUCOSE SERPL-MCNC: 105 MG/DL
HCT VFR BLD AUTO: 40 %
HGB BLD-MCNC: 13.5 G/DL
IRON SERPL-MCNC: 88 UG/DL
LYMPHOCYTES # BLD AUTO: 1.3 K/UL
LYMPHOCYTES NFR BLD: 33.8 %
MCH RBC QN AUTO: 28.5 PG
MCHC RBC AUTO-ENTMCNC: 33.6 G/DL
MCV RBC AUTO: 85 FL
MONOCYTES # BLD AUTO: 0.3 K/UL
MONOCYTES NFR BLD: 7.7 %
NEUTROPHILS # BLD AUTO: 2.2 K/UL
NEUTROPHILS NFR BLD: 56.3 %
PLATELET # BLD AUTO: 155 K/UL
PMV BLD AUTO: 10.5 FL
POTASSIUM SERPL-SCNC: 4.2 MMOL/L
PROT SERPL-MCNC: 7 G/DL
RBC # BLD AUTO: 4.72 M/UL
SATURATED IRON: 24 %
SODIUM SERPL-SCNC: 144 MMOL/L
TOTAL IRON BINDING CAPACITY: 363 UG/DL
TRANSFERRIN SERPL-MCNC: 245 MG/DL
VIT B12 SERPL-MCNC: 247 PG/ML
WBC # BLD AUTO: 3.8 K/UL

## 2018-04-26 PROCEDURE — 80053 COMPREHEN METABOLIC PANEL: CPT

## 2018-04-26 PROCEDURE — 83540 ASSAY OF IRON: CPT

## 2018-04-26 PROCEDURE — 82728 ASSAY OF FERRITIN: CPT

## 2018-04-26 PROCEDURE — 85025 COMPLETE CBC W/AUTO DIFF WBC: CPT

## 2018-04-26 PROCEDURE — 82607 VITAMIN B-12: CPT

## 2018-04-26 PROCEDURE — 36415 COLL VENOUS BLD VENIPUNCTURE: CPT

## 2018-04-28 LAB — CANCER AG27-29 SERPL-ACNC: 13.4 U/ML

## 2018-04-30 ENCOUNTER — OFFICE VISIT (OUTPATIENT)
Dept: HEMATOLOGY/ONCOLOGY | Facility: CLINIC | Age: 68
End: 2018-04-30
Payer: MEDICARE

## 2018-04-30 VITALS
HEIGHT: 64 IN | BODY MASS INDEX: 25.06 KG/M2 | TEMPERATURE: 98 F | DIASTOLIC BLOOD PRESSURE: 74 MMHG | WEIGHT: 146.81 LBS | RESPIRATION RATE: 20 BRPM | SYSTOLIC BLOOD PRESSURE: 160 MMHG | HEART RATE: 57 BPM

## 2018-04-30 DIAGNOSIS — Z86.39 HISTORY OF TYPE 2 DIABETES MELLITUS: ICD-10-CM

## 2018-04-30 DIAGNOSIS — C50.012 MALIGNANT NEOPLASM OF AREOLA OF LEFT BREAST IN FEMALE, UNSPECIFIED ESTROGEN RECEPTOR STATUS: Primary | ICD-10-CM

## 2018-04-30 DIAGNOSIS — Z98.890 STATUS POST GASTRIC SURGERY: ICD-10-CM

## 2018-04-30 DIAGNOSIS — Z86.79 HISTORY OF HYPERTENSION: ICD-10-CM

## 2018-04-30 PROCEDURE — 99999 PR PBB SHADOW E&M-EST. PATIENT-LVL III: CPT | Mod: PBBFAC,,, | Performed by: INTERNAL MEDICINE

## 2018-04-30 PROCEDURE — 99215 OFFICE O/P EST HI 40 MIN: CPT | Mod: S$PBB,,, | Performed by: INTERNAL MEDICINE

## 2018-04-30 PROCEDURE — 99213 OFFICE O/P EST LOW 20 MIN: CPT | Mod: PBBFAC,PO | Performed by: INTERNAL MEDICINE

## 2018-04-30 NOTE — PROGRESS NOTES
A 66-year-old woman coming in consultation for breast cancer. S/p double mastectomy. 5/15/2017  2 separate location on left breast both biopsied in March by Dr. Luis.  reduction mammoplasty.  .  She had invasive ductal carcinoma in both locations and they were both ER positive, NH   positive and HER-2 negative. Recurrence score of 18 .  .  She takes omeprazole and multivitamins.  Had bariatric sleeve in 1/2017, pt had her expanders removed due to discomfort. No has silicone and is feeling well  PHYSICAL EXAMINATION:  Wt Readings from Last 3 Encounters:   04/30/18 66.6 kg (146 lb 13.2 oz)   04/06/18 68.2 kg (150 lb 4.8 oz)   03/21/18 65.3 kg (144 lb)     Temp Readings from Last 3 Encounters:   04/30/18 97.9 °F (36.6 °C) (Oral)   04/06/18 98.6 °F (37 °C) (Oral)   03/21/18 98 °F (36.7 °C) (Oral)     BP Readings from Last 3 Encounters:   04/30/18 (!) 160/74   04/06/18 (!) 159/78   03/21/18 (!) 155/87     Pulse Readings from Last 3 Encounters:   04/30/18 (!) 57   04/06/18 61   03/21/18 62     GENERAL:  Reveals a well-built, well-nourished woman, comfortable, obese, well   groomed, ambulating to clinic without any issues.  HEENT:  Showed no congestion.  NECK:  Supple, without JVD.  Trachea is central.  No masses or thyromegaly felt.  HEART:  S1 is normally heard without murmurs or gallops.  ABDOMEN:  Soft.  No rebound, guarding or rigidity.  BREASTS.  Silicone to breast +  EXTREMITIES:  The patient has no generalized lymphadenopathy or supraclavicular   adenopathy.  MUSCULOSKELETAL:  Good range of motion.  NEUROLOGIC:  She seems appropriate.  SKIN:  Within normal range.  PSYCHIATRIC:  Within normal range.pt is anxious about results of path and need for chemo  Lab Results   Component Value Date    WBC 3.80 (L) 04/26/2018    HGB 13.5 04/26/2018    HCT 40.0 04/26/2018    MCV 85 04/26/2018     04/26/2018     CMP  Sodium   Date Value Ref Range Status   04/26/2018 144 136 - 145 mmol/L Final     Potassium   Date Value  Ref Range Status   04/26/2018 4.2 3.5 - 5.1 mmol/L Final     Chloride   Date Value Ref Range Status   04/26/2018 109 95 - 110 mmol/L Final     CO2   Date Value Ref Range Status   04/26/2018 26 23 - 29 mmol/L Final     Glucose   Date Value Ref Range Status   04/26/2018 105 70 - 110 mg/dL Final     BUN, Bld   Date Value Ref Range Status   04/26/2018 21 8 - 23 mg/dL Final     Creatinine   Date Value Ref Range Status   04/26/2018 0.8 0.5 - 1.4 mg/dL Final     Calcium   Date Value Ref Range Status   04/26/2018 10.0 8.7 - 10.5 mg/dL Final     Total Protein   Date Value Ref Range Status   04/26/2018 7.0 6.0 - 8.4 g/dL Final     Albumin   Date Value Ref Range Status   04/26/2018 3.9 3.5 - 5.2 g/dL Final     Total Bilirubin   Date Value Ref Range Status   04/26/2018 0.5 0.1 - 1.0 mg/dL Final     Comment:     For infants and newborns, interpretation of results should be based  on gestational age, weight and in agreement with clinical  observations.  Premature Infant recommended reference ranges:  Up to 24 hours.............<8.0 mg/dL  Up to 48 hours............<12.0 mg/dL  3-5 days..................<15.0 mg/dL  6-29 days.................<15.0 mg/dL       Alkaline Phosphatase   Date Value Ref Range Status   04/26/2018 96 55 - 135 U/L Final     AST   Date Value Ref Range Status   04/26/2018 14 10 - 40 U/L Final     ALT   Date Value Ref Range Status   04/26/2018 11 10 - 44 U/L Final     Anion Gap   Date Value Ref Range Status   04/26/2018 9 8 - 16 mmol/L Final     eGFR if    Date Value Ref Range Status   04/26/2018 >60 >60 mL/min/1.73 m^2 Final     eGFR if non    Date Value Ref Range Status   04/26/2018 >60 >60 mL/min/1.73 m^2 Final     Comment:     Calculation used to obtain the estimated glomerular filtration  rate (eGFR) is the CKD-EPI equation.        PATHOLOGY  SPECIMEN  1) [place] Lymph Node, Breast Lt  2) Breast mastectomy/Lt  3) Breast mastectomy /Rt  4) Breast lumpectomy More Rt  5)  Breast lumpectomy, more Lt  FINAL PATHOLOGIC DIAGNOSIS  1. [place] lymph node, left axillary, excision:  One lymph node NEGATIVE for metastatic carcinoma (0/1).  2. Breast, left, mastectomy:  Residual invasive ductal carcinoma at 1:00 position, low grade, 1.64 mm (pathologic staging: pT1a N0). SEE  SYNOPTIC REPORT.  Residual invasive ductal carcinoma at 12:00 position, low grade,10 mm (pathologic staging: pT1b N0). SEE  SYNOPTIC REPORT.  Negative surgical margins.  Previous biopsy sites.  3. Breast, right, mastectomy:  Benign nipple, skin, and breast parenchyma.  Impression 4/2018 PET      1. No worrisome hypermetabolic activity to suggest metastatic disease.  Evidence of mastectomy with bilateral breast augmentation is noted  2. Lumbar spine central canal stenosis, a dedicated examination could be performed if desired       Bone density is normal.  DIAGNOSTIC IMPRESSION:    Breast cancer two separate locations on the breasts, so would like to keep closer watch  left, both ER/IL positive, HER-2 negative. S/p double mastectomy, with sentinel LN negative   oncolty dx rec score of 18,she is tolerating arimidex,  well. continue  No need for prolia due to normal density   sent in ultram for better pain control  b12 , iron,  pet scan cbc, cmp, [id number] rtc 6 months

## 2018-05-01 ENCOUNTER — PATIENT MESSAGE (OUTPATIENT)
Dept: RESEARCH | Facility: HOSPITAL | Age: 68
End: 2018-05-01

## 2018-05-16 ENCOUNTER — OFFICE VISIT (OUTPATIENT)
Dept: PLASTIC SURGERY | Facility: CLINIC | Age: 68
End: 2018-05-16
Payer: MEDICARE

## 2018-05-16 VITALS
BODY MASS INDEX: 25.03 KG/M2 | SYSTOLIC BLOOD PRESSURE: 166 MMHG | HEIGHT: 64 IN | HEART RATE: 61 BPM | DIASTOLIC BLOOD PRESSURE: 79 MMHG | WEIGHT: 146.63 LBS

## 2018-05-16 DIAGNOSIS — Z09 SURGERY FOLLOW-UP EXAMINATION: Primary | ICD-10-CM

## 2018-05-16 PROCEDURE — 99213 OFFICE O/P EST LOW 20 MIN: CPT | Mod: PBBFAC,PO | Performed by: SURGERY

## 2018-05-16 PROCEDURE — 99999 PR PBB SHADOW E&M-EST. PATIENT-LVL III: CPT | Mod: PBBFAC,,, | Performed by: SURGERY

## 2018-05-16 PROCEDURE — 99024 POSTOP FOLLOW-UP VISIT: CPT | Mod: POP,,, | Performed by: SURGERY

## 2018-05-16 NOTE — PROGRESS NOTES
POST OP VISIT    SUBJECTIVE:  Marce Hickey is a 68 y.o. female now s/p status post free fat transfer to Bilateral breast on 03/06/2018 for second stage breast reconstruction. She has been doing well since her surgery and has no complaints aside from asymmetry of the left medial breast which she states looks alike a slight bulge compared to the contralateral side. She denies post op pain, infection, or other issues. States she feels better than she has in years.     PHYSICAL EXAM:  Vitals:    05/16/18 0837   BP: (!) 166/79   Pulse: 61       A&O, NAD  No Respiratory Distress  Incision well healing without erythema or drainage  Slightly lower IMF of the left medial breast     ASSESSMENT & PLAN:  Marce Hickey is a 68 y.o. female s/p bilateral breast reconstruction. Slightly lower Left IMF medial side    - We discussed the possibility of an alloderm sling to lift the IMF  - She will return to clinic in 6 weeks to discuss whether she wants surgery  - Planning for BL nipple tattoos in July        Reji Jernigan MD   [phone]  General Surgery   [facility]

## 2018-06-21 DIAGNOSIS — C50.011 MALIGNANT NEOPLASM INVOLVING BOTH NIPPLE AND AREOLA OF RIGHT BREAST IN FEMALE, UNSPECIFIED ESTROGEN RECEPTOR STATUS: ICD-10-CM

## 2018-06-21 RX ORDER — ANASTROZOLE 1 MG/1
TABLET ORAL
Qty: 90 TABLET | Refills: 1 | Status: SHIPPED | OUTPATIENT
Start: 2018-06-21 | End: 2018-12-10 | Stop reason: SDUPTHER

## 2018-06-29 ENCOUNTER — OFFICE VISIT (OUTPATIENT)
Dept: PLASTIC SURGERY | Facility: CLINIC | Age: 68
End: 2018-06-29
Payer: MEDICARE

## 2018-06-29 VITALS
BODY MASS INDEX: 25.42 KG/M2 | HEIGHT: 64 IN | DIASTOLIC BLOOD PRESSURE: 89 MMHG | WEIGHT: 148.88 LBS | HEART RATE: 86 BPM | SYSTOLIC BLOOD PRESSURE: 139 MMHG

## 2018-06-29 DIAGNOSIS — Z09 SURGERY FOLLOW-UP EXAMINATION: Primary | ICD-10-CM

## 2018-06-29 PROCEDURE — 99212 OFFICE O/P EST SF 10 MIN: CPT | Mod: PBBFAC,PO | Performed by: SURGERY

## 2018-06-29 PROCEDURE — 99212 OFFICE O/P EST SF 10 MIN: CPT | Mod: S$PBB,,, | Performed by: SURGERY

## 2018-06-29 PROCEDURE — 99999 PR PBB SHADOW E&M-EST. PATIENT-LVL II: CPT | Mod: PBBFAC,,, | Performed by: SURGERY

## 2018-07-06 ENCOUNTER — PATIENT MESSAGE (OUTPATIENT)
Dept: PLASTIC SURGERY | Facility: CLINIC | Age: 68
End: 2018-07-06

## 2018-07-09 NOTE — PROGRESS NOTES
Ms. Marce Hickey presents to the Plastic Surgery Clinic after having breast   reconstruction.  She looks good.  She is very happy with the result.  We will   see her back in approximately six weeks.      RAZ/CATRACHITO  dd: 07/09/2018 09:10:24 (CDT)  td: 07/10/2018 02:32:32 (CDT)  Doc ID   #[id number]  Job ID #[id number]    CC:

## 2018-07-13 NOTE — PROGRESS NOTES
Ms. Hickey presents to the Plastic Surgery Clinic after bilateral breast   reconstruction using tissue expanders followed by implants.  She looks good.    She does need to have a reconstruction of the left inframammary fold more than   likely using AlloDerm.  We will go ahead and arrange that.      URBAN  dd: 07/12/2018 14:03:32 (CDT)  td: 07/13/2018 04:04:37 (CDT)  Doc ID   #[id number]  Job ID #[id number]    CC:

## 2018-07-16 ENCOUNTER — PATIENT MESSAGE (OUTPATIENT)
Dept: PLASTIC SURGERY | Facility: CLINIC | Age: 68
End: 2018-07-16

## 2018-07-16 DIAGNOSIS — Z85.3 HX OF BREAST CANCER: Primary | ICD-10-CM

## 2018-07-20 DIAGNOSIS — M25.562 LEFT KNEE PAIN, UNSPECIFIED CHRONICITY: Primary | ICD-10-CM

## 2018-07-23 ENCOUNTER — OFFICE VISIT (OUTPATIENT)
Dept: ORTHOPEDICS | Facility: CLINIC | Age: 68
End: 2018-07-23
Payer: MEDICARE

## 2018-07-23 ENCOUNTER — HOSPITAL ENCOUNTER (OUTPATIENT)
Dept: RADIOLOGY | Facility: HOSPITAL | Age: 68
Discharge: HOME | End: 2018-07-23
Attending: ORTHOPAEDIC SURGERY
Payer: MEDICARE

## 2018-07-23 VITALS
SYSTOLIC BLOOD PRESSURE: 153 MMHG | WEIGHT: 148 LBS | HEART RATE: 87 BPM | HEIGHT: 64 IN | BODY MASS INDEX: 25.27 KG/M2 | DIASTOLIC BLOOD PRESSURE: 81 MMHG

## 2018-07-23 DIAGNOSIS — M17.12 ARTHRITIS OF KNEE, LEFT: Primary | ICD-10-CM

## 2018-07-23 DIAGNOSIS — M25.562 LEFT KNEE PAIN, UNSPECIFIED CHRONICITY: ICD-10-CM

## 2018-07-23 PROCEDURE — 73562 X-RAY EXAM OF KNEE 3: CPT | Mod: TC,59,PN,RT

## 2018-07-23 PROCEDURE — 73564 X-RAY EXAM KNEE 4 OR MORE: CPT | Mod: 26,LT,, | Performed by: RADIOLOGY

## 2018-07-23 PROCEDURE — 99999 PR PBB SHADOW E&M-EST. PATIENT-LVL III: CPT | Mod: PBBFAC,,, | Performed by: ORTHOPAEDIC SURGERY

## 2018-07-23 PROCEDURE — 99213 OFFICE O/P EST LOW 20 MIN: CPT | Mod: PBBFAC,25,PN | Performed by: ORTHOPAEDIC SURGERY

## 2018-07-23 PROCEDURE — 73562 X-RAY EXAM OF KNEE 3: CPT | Mod: 26,XS,RT, | Performed by: RADIOLOGY

## 2018-07-23 PROCEDURE — 99214 OFFICE O/P EST MOD 30 MIN: CPT | Mod: 57,S$PBB,, | Performed by: ORTHOPAEDIC SURGERY

## 2018-07-23 PROCEDURE — 73564 X-RAY EXAM KNEE 4 OR MORE: CPT | Mod: TC,PN,LT

## 2018-07-25 PROBLEM — M17.12 ARTHRITIS OF KNEE, LEFT: Status: ACTIVE | Noted: 2018-07-25

## 2018-07-25 NOTE — PROGRESS NOTES
Past Medical History:   Diagnosis Date    Anxiety     Depression     Diabetes mellitus, type 2     Borderline    Fatty liver disease, nonalcoholic     GERD (gastroesophageal reflux disease)     Hyperlipidemia     Hypertension     Plantar fasciitis of right foot        Past Surgical History:   Procedure Laterality Date    breast reduction       SECTION      x 2    CHOLECYSTECTOMY      FOOT SURGERY      left bunionectomy    gastric sleve      HYSTERECTOMY      one ovary intact, adhesions    LIPOSUCTION      TONSILLECTOMY         Current Outpatient Prescriptions   Medication Sig    anastrozole (ARIMIDEX) 1 mg Tab TAKE 1 TABLET DAILY    calcium-vitamin D3 500 mg(1,250mg) -200 unit per tablet once daily. Patient uses a patch, not oral medication    multivitamin with minerals tablet Take 1 tablet by mouth once daily.    oxyCODONE-acetaminophen (PERCOCET)  mg per tablet Take 1 tablet by mouth every 4 (four) hours as needed for Pain.    senna-docusate 8.6-50 mg (PERICOLACE) 8.6-50 mg per tablet Take 1 tablet by mouth 2 (two) times daily.    traMADol (ULTRAM) 50 mg tablet Take 1 tablet (50 mg total) by mouth every 6 (six) hours as needed for Pain.    vitamin D 1000 units Tab Take 2,000 Units by mouth once daily. Patient uses a patch, not oral medication    ZEGERID 40-1.1 mg-gram per capsule TAKE 1 CAPSULE BEFORE BREAKFAST (Patient taking differently: TAKE 1 CAPSULE BEFORE BREAKFAST with bio carb)    triamcinolone acetonide 0.1% (KENALOG) 0.1 % ointment Apply topically 2 (two) times daily.     No current facility-administered medications for this visit.        Review of patient's allergies indicates:   Allergen Reactions    Nsaids (non-steroidal anti-inflammatory drug) Anaphylaxis       Family History   Problem Relation Age of Onset    Hypertension Mother     Heart disease Mother         pacemaker    Heart attack Mother     Heart disease Father     Psoriasis Father     Cancer Neg  Hx        Social History     Social History    Marital status:      Spouse name: N/A    Number of children: N/A    Years of education: N/A     Occupational History    Not on file.     Social History Main Topics    Smoking status: Former Smoker     Quit date: 11/27/1993    Smokeless tobacco: Never Used      Comment: quit 1993    Alcohol use No    Drug use: No    Sexual activity: Yes     Birth control/ protection: Surgical     Other Topics Concern    Not on file     Social History Narrative    No narrative on file       Chief Complaint:   Chief Complaint   Patient presents with    Left Knee - Pain       History of present illness:  This is a 68-year-old female seen for left knee pain.  Patient has had pain for years.  She had an injection back in 2016.  Does not really recall the injection helped very much.  Patient notes more weakness and pain in her hip now.  Symptoms are worsening and moderate to severe.  Pain with walking.  She also had a prior knee scope on this knee as well. Pain is laterally located.  She rates the pain as a 4/10.      Answers for HPI/ROS submitted by the patient on 7/18/2018   Leg pain  unexpected weight change: No  appetite change : No  sleep disturbance: No  IMMUNOCOMPROMISED: No  nervous/ anxious: No  dysphoric mood: No  rash: No  visual disturbance: No  eye redness: No  eye pain: No  ear pain: No  tinnitus: No  hearing loss: No  sinus pressure : No  nosebleeds: No  enviro allergies: No  food allergies: No  cough: No  shortness of breath: No  sweating: No  dysuria: No  frequency: No  difficulty urinating: No  hematuria: No  painful intercourse: No  chest pain: No  palpitations: No  nausea: No  vomiting: No  diarrhea: No  blood in stool: No  constipation: No  headaches: No  dizziness: No  numbness: No  seizures: No  joint swelling: Yes  myalgia: Yes  weakness: No  back pain: Yes  Pain Chronicity: chronic  History of trauma: No  Onset: more than 1 year ago  Frequency:  daily  Progression since onset: gradually worsening  Injury mechanism: running  injury location: exercising  pain- numeric: 4/10  pain location: left knee  pain quality: dull  Radiating Pain: Yes  If your pain is radiating, to what part of the body?: left thigh, lower back  Aggravating factors: activity, bending, exercise, standing, twisting, walking, lying down, sitting  fever: No  inability to bear weight: Yes  itching: No  joint locking: No  limited range of motion: No  stiffness: Yes  tingling: No  Treatments tried: chiropractic manipulation, OTC ointments, rest  physical therapy: ineffective  Improvement on treatment: mild      Physical Examination:    Vital Signs:    Vitals:    07/23/18 1348   BP: (!) 153/81   Pulse: 87       Body mass index is 25.4 kg/m².    This a well-developed, well nourished patient in no acute distress.  They are alert and oriented and cooperative to examination.  Pt. walks without an antalgic gait.      Examination of the left knee shows no rashes or erythema. There are no masses ecchymosis or effusion. Patient has full range of motion from 0-130°. Patient is moderately tender to palpation over lateral joint line and nontender to palpation over the medial joint line. Patient has a - Lachman exam, - anterior drawer exam, and - posterior drawer exam. - Desiree's exam. Knee is stable to varus and valgus stress. 5 out of 5 motor strength. Palpable distal pulses. Intact light touch sensation. Negative Patellofemoral crepitus    Examination of the right knee shows no rashes or erythema. There are no masses ecchymosis or effusion. Patient has full range of motion from 0-130°. Patient is nontender to palpation over lateral joint line and nontender to palpation over the medial joint line. Patient has a - Lachman exam, - anterior drawer exam, and - posterior drawer exam. - Desiree's exam. Knee is stable to varus and valgus stress. 5 out of 5 motor strength. Palpable distal pulses. Intact light  touch sensation. Negative Patellofemoral crepitus    Heart is regular rate without obvious murmurs   Normal respiratory effort without audible wheezing  Abdomen is soft and nontender       X-rays:  X-rays left knee are ordered and reviewed which show severe lateral joint space narrowing of the left knee      Assessment::  Severe left knee arthritis with valgus aligned    Plan:  I reviewed the findings with her today.  We talked about knee replacement in great detail. We talked about the recovery and the risks of the surgery. Patient would like to go ahead and proceed.  We will schedule her for a left DJO total knee arthroplasty. Risks, benefits, and alternatives to the procedure were explained to the patient including but not limited to damage to nerves, arteries, blood vessels, bones, tendons, ligaments, stiffness, instability, infection, DVT, PE, as well as general anesthetic complications including seizure, stroke, heart attack and even death. The patient understood these risks and wished to proceed and signed the informed consent.       This note was created using [employer] voice recognition software that occasionally misinterpreted phrases or words.    Consult note is delivered via Epic messaging service.

## 2018-07-27 ENCOUNTER — PATIENT MESSAGE (OUTPATIENT)
Dept: SURGERY | Facility: HOSPITAL | Age: 68
End: 2018-07-27

## 2018-07-27 ENCOUNTER — TELEPHONE (OUTPATIENT)
Dept: FAMILY MEDICINE | Facility: CLINIC | Age: 68
End: 2018-07-27

## 2018-07-27 RX ORDER — SODIUM CHLORIDE 9 MG/ML
INJECTION, SOLUTION INTRAVENOUS CONTINUOUS
Status: CANCELLED | OUTPATIENT
Start: 2018-07-27

## 2018-07-27 RX ORDER — MUPIROCIN 20 MG/G
OINTMENT TOPICAL
Status: CANCELLED | OUTPATIENT
Start: 2018-07-27

## 2018-07-27 NOTE — TELEPHONE ENCOUNTER
----- Message from Freya Martines sent at 7/27/2018  2:47 PM CDT -----  Contact: patient  Returning missed call. Please call back [phone]

## 2018-07-27 NOTE — TELEPHONE ENCOUNTER
----- Message from Jenny Angulo LPN sent at 7/27/2018 12:38 PM CDT -----  Pt is scheduled for TKA with Dr. Montanez on 9/25/18. Please update chart with medical clearance prior to sx. Thanks!

## 2018-08-02 ENCOUNTER — TELEPHONE (OUTPATIENT)
Dept: PLASTIC SURGERY | Facility: CLINIC | Age: 68
End: 2018-08-02

## 2018-08-06 ENCOUNTER — ANESTHESIA EVENT (OUTPATIENT)
Dept: SURGERY | Facility: HOSPITAL | Age: 68
End: 2018-08-06
Payer: MEDICARE

## 2018-08-06 DIAGNOSIS — Z01.818 PREOP TESTING: Primary | ICD-10-CM

## 2018-08-06 NOTE — PRE ADMISSION SCREENING
Anesthesia Assessment: Preoperative EQUATION    Planned Procedure: Procedure(s) (LRB):  CREATION OR REVISION, INFRAMAMMARY FOLD (Left)  Requested Anesthesia Type:General  Surgeon: Montrell Thomas MD  Service: Plastics  Known or anticipated Date of Surgery:8/16/2018    Surgeon notes: reviewed    Electronic QUestionnaire Assessment completed via nurse interview with patient.      NO AQ    Triage considerations:     The patient has no apparent active cardiac condition (No unstable coronary Syndrome such as severe unstable angina or recent [<1 month] myocardial infarction, decompensated CHF, severe valvular   disease or significant arrhythmia)    Previous anesthesia records:GETA and No problems with last anesthesia   Anesthesia Hx:  No problems with previous Anesthesia History of delayed emergence, not with recent anesthetics History of prior surgery of interest to airway management or planning: Previous anesthesia: General Airway issues documented on chart review include mask, easy, GETA, easy direct laryngoscopy , view on direct laryngoscopy Grade II  Personal Hx of Anesthesia complications Slow To Awaken/Delayed Emergence   Airway/Jaw/Neck:  Airway Findings: Mouth Opening: Normal Tongue: Normal  General Airway Assessment: Adult  Mallampati: I  TM Distance: Normal, at least 6 cm       Placement Date: 03/06/18; Placement Time: 0737; Method of Intubation: Direct laryngoscopy; Inserted by: CRNA; Airway Device: Endotracheal Tube; Mask Ventilation: Easy; Intubated: Postinduction; Blade: Amaya #2; Airway Device Size: 7.0; Style: Cuffed; Cuff Inflation: Minimal occlusive pressure; Inflation Amount: 5; Placement Verified By: Auscultation, Capnometry; Grade: Grade II; Complicating Factors: None; Intubation Findings: Positive EtCO2, Bilateral breath sounds, Atraumatic/Condition of teeth unchanged;  Depth of Insertion: 21; Securment: Lips; Complications: None; Breath Sounds: Equal Bilateral; Insertion Attempts: 1;  "  Last PCP note: 3-6 months ago , within [facility]   Subspecialty notes: Hematology/Oncology, Bariatrics    Other important co-morbidities: "Takes a long time to wake up from anesthesia" HX Gastric Sleeve-1/2017, HX Breat Cancer-4/2017, GERD, OG-3-Mzraxgolxc,  HTN-Takes no meds, Fatty Liver     Tests already available:  Available tests,  6-12 months ago , > 1 year ago , within [facility] .   10/2017 A1C-5.2   12/2016 EKG             Instructions given. (See in Nurse's note)    Optimization:  Anesthesia Preop Clinic Assessment  Indicated-not required for this surgery. Previous anesthesia in EPIC        Plan:    Testing:  Hemoglobin      Patient  has previously scheduled Medical Appointment: none    Navigation: Tests Scheduled. 8/7             Results will be tracked by Preop Clinic.    Tracey Landaverde RN                  "

## 2018-08-06 NOTE — ANESTHESIA PREPROCEDURE EVALUATION
Anesthesia Assessment: Preoperative EQUATION     Planned Procedure: Procedure(s) (LRB):  CREATION OR REVISION, INFRAMAMMARY FOLD (Left)  Requested Anesthesia Type:General  Surgeon: Montrell Thomas MD  Service: Plastics  Known or anticipated Date of Surgery:8/16/2018     Surgeon notes: reviewed     Electronic QUestionnaire Assessment completed via nurse interview with patient.      NO AQ     Triage considerations:      The patient has no apparent active cardiac condition (No unstable coronary Syndrome such as severe unstable angina or recent [<1 month] myocardial infarction, decompensated CHF, severe valvular   disease or significant arrhythmia)     Previous anesthesia records:GETA and No problems with last anesthesia   Anesthesia Hx:  No problems with previous Anesthesia History of delayed emergence, not with recent anesthetics History of prior surgery of interest to airway management or planning: Previous anesthesia: General Airway issues documented on chart review include mask, easy, GETA, easy direct laryngoscopy , view on direct laryngoscopy Grade II  Personal Hx of Anesthesia complications Slow To Awaken/Delayed Emergence   Airway/Jaw/Neck:  Airway Findings: Mouth Opening: Normal Tongue: Normal  General Airway Assessment: Adult  Mallampati: I  TM Distance: Normal, at least 6 cm        Placement Date: 03/06/18; Placement Time: 0737; Method of Intubation: Direct laryngoscopy; Inserted by: CRNA; Airway Device: Endotracheal Tube; Mask Ventilation: Easy; Intubated: Postinduction; Blade: Amaya #2; Airway Device Size: 7.0; Style: Cuffed; Cuff Inflation: Minimal occlusive pressure; Inflation Amount: 5; Placement Verified By: Auscultation, Capnometry; Grade: Grade II; Complicating Factors: None; Intubation Findings: Positive EtCO2, Bilateral breath sounds, Atraumatic/Condition of teeth unchanged;  Depth of Insertion: 21; Securment: Lips; Complications: None; Breath Sounds: Equal Bilateral; Insertion  "Attempts: 1;   Last PCP note: 3-6 months ago , within [facility]   Subspecialty notes: Hematology/Oncology, Bariatrics     Other important co-morbidities: "Takes a long time to wake up from anesthesia" HX Gastric Sleeve-1/2017, HX Breat Cancer-4/2017, GERD, NQ-6-Yxplzxfuji,  HTN-Takes no meds, Fatty Liver     Tests already available:  Available tests,  6-12 months ago , > 1 year ago , within [facility] .   10/2017 A1C-5.2   12/2016 EKG                             Instructions given. (See in Nurse's note)     Optimization:  Anesthesia Preop Clinic Assessment  Indicated-not required for this surgery. Previous anesthesia in [facility]                Plan:    Testing:  Hemoglobin                           Patient  has previously scheduled Medical Appointment: none     Navigation: Tests Scheduled. 8/7                        Results will be tracked by Preop Clinic.  8/8 Lab results reviewed     Tracey Landaverde RN                       Electronically signed by Tracey Landaverde RN at 8/6/2018  1:15 PM                                                                                                                  08/06/2018  Marce Hickey is a 68 y.o., female.    Anesthesia Evaluation    I have reviewed the Patient Summary Reports.    I have reviewed the Nursing Notes.   I have reviewed the Medications.     Review of Systems  Anesthesia Hx:  No problems with previous Anesthesia Hx of Anesthetic complications  Personal Hx of Anesthesia complications Slow To Awaken/Delayed Emergence   Social:  Former Smoker    Hematology/Oncology:  Hematology Normal   Oncology Normal     EENT/Dental:EENT/Dental Normal   Cardiovascular:   Hypertension  Hypertension  Takes no meds   Pulmonary:  Pulmonary Normal    Renal/:  Renal/ Normal     Hepatic/GI:  Hepatic/GI Normal  Esophageal / Stomach Disorders Gerd Controlled by PRN antireflux medication, s/p Gastric Surgery.  Liver Disease, Fatty Liver    Musculoskeletal:  Musculoskeletal Normal  "   Neurological:  Neurology Normal    Endocrine:  Endocrine Normal  Diabetes , most recent HgA1c value was 5.2 on 10/9/17.  Patient denies having DM.    Dermatological:  Skin Normal    Psych:   Psychiatric History anxiety  Anxiety Disorder.  Depression.          Physical Exam  General:  Well nourished    Airway/Jaw/Neck:  Airway Findings: Mouth Opening: Normal Tongue: Normal  General Airway Assessment: Adult  Mallampati: II  Jaw/Neck Findings:  Neck ROM: Normal ROM     Eyes/Ears/Nose:  Eyes/Ears/Nose Findings:    Dental:  Dental Findings: In tact   Chest/Lungs:  Chest/Lungs Findings: Clear to auscultation, Normal Respiratory Rate     Heart/Vascular:  Heart Findings: Rate: Normal  Rhythm: Regular Rhythm  Sounds: Normal  Heart Murmur  Vascular Findings:        Mental Status:  Mental Status Findings:  Cooperative, Alert and Oriented         Anesthesia Plan  Type of Anesthesia, risks & benefits discussed:  Anesthesia Type:  general  Patient's Preference: General  Intra-op Monitoring Plan: standard ASA monitors  Intra-op Monitoring Plan Comments:   Post Op Pain Control Plan:   Post Op Pain Control Plan Comments:   Induction:   IV  Beta Blocker:         Informed Consent: Patient understands risks and agrees with Anesthesia plan.  Questions answered. Anesthesia consent signed with patient.  ASA Score: 2     Day of Surgery Review of History & Physical:    H&P update referred to the surgeon.     Anesthesia Plan Notes: Discussed plan for general endotracheal anesthesia, pt understands and agrees with plan        Ready For Surgery From Anesthesia Perspective.

## 2018-08-07 ENCOUNTER — LAB VISIT (OUTPATIENT)
Dept: LAB | Facility: HOSPITAL | Age: 68
End: 2018-08-07
Attending: ANESTHESIOLOGY
Payer: MEDICARE

## 2018-08-07 DIAGNOSIS — Z01.818 PREOP TESTING: ICD-10-CM

## 2018-08-07 LAB — HGB BLD-MCNC: 13.9 G/DL

## 2018-08-07 PROCEDURE — 85018 HEMOGLOBIN: CPT

## 2018-08-07 PROCEDURE — 36415 COLL VENOUS BLD VENIPUNCTURE: CPT

## 2018-08-10 ENCOUNTER — TELEPHONE (OUTPATIENT)
Dept: PLASTIC SURGERY | Facility: CLINIC | Age: 68
End: 2018-08-10

## 2018-08-10 NOTE — TELEPHONE ENCOUNTER
Spoke with pt regarding surgery, pt advised to arrive to [facility] at 0530 for 0730 surgery, pt verbalized understanding, pre-op education reinforced, all questions answered at this time, pt given reassurance

## 2018-08-13 ENCOUNTER — HOSPITAL ENCOUNTER (OUTPATIENT)
Facility: HOSPITAL | Age: 68
Discharge: HOME | End: 2018-08-13
Attending: SURGERY | Admitting: SURGERY
Payer: MEDICARE

## 2018-08-13 VITALS
HEART RATE: 62 BPM | TEMPERATURE: 98 F | HEIGHT: 64 IN | RESPIRATION RATE: 18 BRPM | WEIGHT: 143 LBS | DIASTOLIC BLOOD PRESSURE: 84 MMHG | SYSTOLIC BLOOD PRESSURE: 158 MMHG | BODY MASS INDEX: 24.41 KG/M2 | OXYGEN SATURATION: 99 %

## 2018-08-13 DIAGNOSIS — Z85.3 HISTORY OF BREAST CANCER: Primary | ICD-10-CM

## 2018-08-13 LAB — POCT GLUCOSE: 134 MG/DL (ref 70–110)

## 2018-08-13 PROCEDURE — 63600175 PHARM REV CODE 636 W HCPCS: Performed by: NURSE ANESTHETIST, CERTIFIED REGISTERED

## 2018-08-13 PROCEDURE — 71000039 HC RECOVERY, EACH ADD'L HOUR: Performed by: SURGERY

## 2018-08-13 PROCEDURE — 25000003 PHARM REV CODE 250: Performed by: STUDENT IN AN ORGANIZED HEALTH CARE EDUCATION/TRAINING PROGRAM

## 2018-08-13 PROCEDURE — S0077 INJECTION, CLINDAMYCIN PHOSP: HCPCS | Performed by: SURGERY

## 2018-08-13 PROCEDURE — 71000016 HC POSTOP RECOV ADDL HR: Performed by: SURGERY

## 2018-08-13 PROCEDURE — 36000707: Performed by: SURGERY

## 2018-08-13 PROCEDURE — 19380 REVJ RECONSTRUCTED BREAST: CPT | Mod: LT,,, | Performed by: SURGERY

## 2018-08-13 PROCEDURE — 94761 N-INVAS EAR/PLS OXIMETRY MLT: CPT

## 2018-08-13 PROCEDURE — 25000003 PHARM REV CODE 250: Performed by: NURSE ANESTHETIST, CERTIFIED REGISTERED

## 2018-08-13 PROCEDURE — 37000009 HC ANESTHESIA EA ADD 15 MINS: Performed by: SURGERY

## 2018-08-13 PROCEDURE — 63600175 PHARM REV CODE 636 W HCPCS: Performed by: ANESTHESIOLOGY

## 2018-08-13 PROCEDURE — 36000706: Performed by: SURGERY

## 2018-08-13 PROCEDURE — 71000015 HC POSTOP RECOV 1ST HR: Performed by: SURGERY

## 2018-08-13 PROCEDURE — C1729 CATH, DRAINAGE: HCPCS | Performed by: SURGERY

## 2018-08-13 PROCEDURE — 71000033 HC RECOVERY, INTIAL HOUR: Performed by: SURGERY

## 2018-08-13 PROCEDURE — 27000221 HC OXYGEN, UP TO 24 HOURS

## 2018-08-13 PROCEDURE — 63600175 PHARM REV CODE 636 W HCPCS: Performed by: SURGERY

## 2018-08-13 PROCEDURE — 37000008 HC ANESTHESIA 1ST 15 MINUTES: Performed by: SURGERY

## 2018-08-13 PROCEDURE — 25000003 PHARM REV CODE 250: Performed by: SURGERY

## 2018-08-13 PROCEDURE — S0077 INJECTION, CLINDAMYCIN PHOSP: HCPCS | Performed by: STUDENT IN AN ORGANIZED HEALTH CARE EDUCATION/TRAINING PROGRAM

## 2018-08-13 PROCEDURE — D9220A PRA ANESTHESIA: Mod: CRNA,,, | Performed by: NURSE ANESTHETIST, CERTIFIED REGISTERED

## 2018-08-13 PROCEDURE — 82962 GLUCOSE BLOOD TEST: CPT | Performed by: SURGERY

## 2018-08-13 PROCEDURE — D9220A PRA ANESTHESIA: Mod: ANES,,, | Performed by: ANESTHESIOLOGY

## 2018-08-13 RX ORDER — PHENYLEPHRINE HYDROCHLORIDE 10 MG/ML
INJECTION INTRAVENOUS
Status: DISCONTINUED | OUTPATIENT
Start: 2018-08-13 | End: 2018-08-13

## 2018-08-13 RX ORDER — OXYCODONE AND ACETAMINOPHEN 5; 325 MG/1; MG/1
1 TABLET ORAL EVERY 6 HOURS PRN
Qty: 21 TABLET | Refills: 0 | Status: SHIPPED | OUTPATIENT
Start: 2018-08-13 | End: 2018-08-13

## 2018-08-13 RX ORDER — LIDOCAINE HYDROCHLORIDE 10 MG/ML
1 INJECTION, SOLUTION EPIDURAL; INFILTRATION; INTRACAUDAL; PERINEURAL ONCE
Status: COMPLETED | OUTPATIENT
Start: 2018-08-13 | End: 2018-08-13

## 2018-08-13 RX ORDER — GLYCOPYRROLATE 0.2 MG/ML
INJECTION INTRAMUSCULAR; INTRAVENOUS
Status: DISCONTINUED | OUTPATIENT
Start: 2018-08-13 | End: 2018-08-13

## 2018-08-13 RX ORDER — SODIUM CHLORIDE 0.9 % (FLUSH) 0.9 %
3 SYRINGE (ML) INJECTION
Status: DISCONTINUED | OUTPATIENT
Start: 2018-08-13 | End: 2018-08-13 | Stop reason: HOSPADM

## 2018-08-13 RX ORDER — SODIUM CHLORIDE 9 MG/ML
INJECTION, SOLUTION INTRAVENOUS CONTINUOUS
Status: DISCONTINUED | OUTPATIENT
Start: 2018-08-13 | End: 2018-08-13 | Stop reason: HOSPADM

## 2018-08-13 RX ORDER — ROCURONIUM BROMIDE 10 MG/ML
INJECTION, SOLUTION INTRAVENOUS
Status: DISCONTINUED | OUTPATIENT
Start: 2018-08-13 | End: 2018-08-13

## 2018-08-13 RX ORDER — CLINDAMYCIN HYDROCHLORIDE 300 MG/1
300 CAPSULE ORAL 3 TIMES DAILY
Qty: 30 CAPSULE | Refills: 0 | Status: SHIPPED | OUTPATIENT
Start: 2018-08-13 | End: 2018-08-13 | Stop reason: SDUPTHER

## 2018-08-13 RX ORDER — ONDANSETRON 2 MG/ML
4 INJECTION INTRAMUSCULAR; INTRAVENOUS DAILY PRN
Status: DISCONTINUED | OUTPATIENT
Start: 2018-08-13 | End: 2018-08-13 | Stop reason: HOSPADM

## 2018-08-13 RX ORDER — BACITRACIN 50000 [IU]/1
INJECTION, POWDER, FOR SOLUTION INTRAMUSCULAR
Status: DISCONTINUED | OUTPATIENT
Start: 2018-08-13 | End: 2018-08-13 | Stop reason: HOSPADM

## 2018-08-13 RX ORDER — FENTANYL CITRATE 50 UG/ML
INJECTION, SOLUTION INTRAMUSCULAR; INTRAVENOUS
Status: DISCONTINUED | OUTPATIENT
Start: 2018-08-13 | End: 2018-08-13

## 2018-08-13 RX ORDER — PROPOFOL 10 MG/ML
VIAL (ML) INTRAVENOUS
Status: DISCONTINUED | OUTPATIENT
Start: 2018-08-13 | End: 2018-08-13

## 2018-08-13 RX ORDER — MIDAZOLAM HYDROCHLORIDE 1 MG/ML
INJECTION, SOLUTION INTRAMUSCULAR; INTRAVENOUS
Status: DISCONTINUED | OUTPATIENT
Start: 2018-08-13 | End: 2018-08-13

## 2018-08-13 RX ORDER — LIDOCAINE HCL/PF 100 MG/5ML
SYRINGE (ML) INTRAVENOUS
Status: DISCONTINUED | OUTPATIENT
Start: 2018-08-13 | End: 2018-08-13

## 2018-08-13 RX ORDER — PROMETHAZINE HYDROCHLORIDE 25 MG/ML
6.25 INJECTION, SOLUTION INTRAMUSCULAR; INTRAVENOUS EVERY 30 MIN PRN
Status: DISCONTINUED | OUTPATIENT
Start: 2018-08-13 | End: 2018-08-13 | Stop reason: HOSPADM

## 2018-08-13 RX ORDER — CLINDAMYCIN HYDROCHLORIDE 300 MG/1
300 CAPSULE ORAL 3 TIMES DAILY
Qty: 30 CAPSULE | Refills: 0 | OUTPATIENT
Start: 2018-08-13 | End: 2018-08-23

## 2018-08-13 RX ORDER — ONDANSETRON 2 MG/ML
INJECTION INTRAMUSCULAR; INTRAVENOUS
Status: DISCONTINUED | OUTPATIENT
Start: 2018-08-13 | End: 2018-08-13

## 2018-08-13 RX ORDER — LIDOCAINE HYDROCHLORIDE 10 MG/ML
1 INJECTION, SOLUTION EPIDURAL; INFILTRATION; INTRACAUDAL; PERINEURAL ONCE
Status: DISCONTINUED | OUTPATIENT
Start: 2018-08-13 | End: 2018-08-13 | Stop reason: HOSPADM

## 2018-08-13 RX ORDER — OXYCODONE AND ACETAMINOPHEN 5; 325 MG/1; MG/1
1 TABLET ORAL EVERY 6 HOURS PRN
Status: DISCONTINUED | OUTPATIENT
Start: 2018-08-13 | End: 2018-08-13 | Stop reason: HOSPADM

## 2018-08-13 RX ORDER — ACETAMINOPHEN 10 MG/ML
INJECTION, SOLUTION INTRAVENOUS
Status: DISCONTINUED | OUTPATIENT
Start: 2018-08-13 | End: 2018-08-13

## 2018-08-13 RX ORDER — CIPROFLOXACIN 2 MG/ML
INJECTION, SOLUTION INTRAVENOUS CONTINUOUS PRN
Status: COMPLETED | OUTPATIENT
Start: 2018-08-13 | End: 2018-08-13

## 2018-08-13 RX ORDER — NEOSTIGMINE METHYLSULFATE 1 MG/ML
INJECTION, SOLUTION INTRAVENOUS
Status: DISCONTINUED | OUTPATIENT
Start: 2018-08-13 | End: 2018-08-13

## 2018-08-13 RX ORDER — CLINDAMYCIN PHOSPHATE 900 MG/50ML
900 INJECTION, SOLUTION INTRAVENOUS
Status: COMPLETED | OUTPATIENT
Start: 2018-08-13 | End: 2018-08-13

## 2018-08-13 RX ORDER — OXYCODONE HYDROCHLORIDE 5 MG/1
5 TABLET ORAL ONCE AS NEEDED
Status: COMPLETED | OUTPATIENT
Start: 2018-08-13 | End: 2018-08-13

## 2018-08-13 RX ORDER — OXYCODONE AND ACETAMINOPHEN 5; 325 MG/1; MG/1
1 TABLET ORAL EVERY 6 HOURS PRN
Qty: 21 TABLET | Refills: 0 | Status: SHIPPED | OUTPATIENT
Start: 2018-08-13 | End: 2018-09-11

## 2018-08-13 RX ORDER — OXYCODONE HYDROCHLORIDE 5 MG/1
TABLET ORAL
Status: DISCONTINUED
Start: 2018-08-13 | End: 2018-08-13 | Stop reason: HOSPADM

## 2018-08-13 RX ORDER — CLINDAMYCIN PHOSPHATE 150 MG/ML
INJECTION, SOLUTION INTRAVENOUS
Status: DISCONTINUED | OUTPATIENT
Start: 2018-08-13 | End: 2018-08-13 | Stop reason: HOSPADM

## 2018-08-13 RX ORDER — EPHEDRINE SULFATE 50 MG/ML
INJECTION, SOLUTION INTRAVENOUS
Status: DISCONTINUED | OUTPATIENT
Start: 2018-08-13 | End: 2018-08-13

## 2018-08-13 RX ORDER — FENTANYL CITRATE 50 UG/ML
25 INJECTION, SOLUTION INTRAMUSCULAR; INTRAVENOUS EVERY 5 MIN PRN
Status: DISCONTINUED | OUTPATIENT
Start: 2018-08-13 | End: 2018-08-13 | Stop reason: HOSPADM

## 2018-08-13 RX ORDER — HEPARIN SODIUM 5000 [USP'U]/ML
5000 INJECTION, SOLUTION INTRAVENOUS; SUBCUTANEOUS EVERY 8 HOURS
Status: DISCONTINUED | OUTPATIENT
Start: 2018-08-13 | End: 2018-08-13 | Stop reason: HOSPADM

## 2018-08-13 RX ADMIN — CLINDAMYCIN PHOSPHATE 900 MG: 18 INJECTION, SOLUTION INTRAVENOUS at 07:08

## 2018-08-13 RX ADMIN — LIDOCAINE HYDROCHLORIDE 50 MG: 20 INJECTION, SOLUTION INTRAVENOUS at 07:08

## 2018-08-13 RX ADMIN — PHENYLEPHRINE HYDROCHLORIDE 100 MCG: 10 INJECTION INTRAVENOUS at 08:08

## 2018-08-13 RX ADMIN — ROCURONIUM BROMIDE 30 MG: 10 INJECTION, SOLUTION INTRAVENOUS at 07:08

## 2018-08-13 RX ADMIN — LIDOCAINE HYDROCHLORIDE: 10 INJECTION, SOLUTION EPIDURAL; INFILTRATION; INTRACAUDAL; PERINEURAL at 06:08

## 2018-08-13 RX ADMIN — FENTANYL CITRATE 50 MCG: 50 INJECTION, SOLUTION INTRAMUSCULAR; INTRAVENOUS at 07:08

## 2018-08-13 RX ADMIN — FENTANYL CITRATE 50 MCG: 50 INJECTION, SOLUTION INTRAMUSCULAR; INTRAVENOUS at 08:08

## 2018-08-13 RX ADMIN — ONDANSETRON 4 MG: 2 INJECTION, SOLUTION INTRAMUSCULAR; INTRAVENOUS at 11:08

## 2018-08-13 RX ADMIN — SODIUM CHLORIDE: 0.9 INJECTION, SOLUTION INTRAVENOUS at 07:08

## 2018-08-13 RX ADMIN — GLYCOPYRROLATE 0.2 MG: 0.2 INJECTION, SOLUTION INTRAMUSCULAR; INTRAVENOUS at 08:08

## 2018-08-13 RX ADMIN — NEOSTIGMINE METHYLSULFATE 4 MG: 1 INJECTION INTRAVENOUS at 09:08

## 2018-08-13 RX ADMIN — GLYCOPYRROLATE 0.6 MG: 0.2 INJECTION, SOLUTION INTRAMUSCULAR; INTRAVENOUS at 09:08

## 2018-08-13 RX ADMIN — ONDANSETRON 4 MG: 2 INJECTION INTRAMUSCULAR; INTRAVENOUS at 08:08

## 2018-08-13 RX ADMIN — PROPOFOL 140 MG: 10 INJECTION, EMULSION INTRAVENOUS at 07:08

## 2018-08-13 RX ADMIN — OXYCODONE HYDROCHLORIDE 5 MG: 5 TABLET ORAL at 12:08

## 2018-08-13 RX ADMIN — ACETAMINOPHEN 1000 MG: 10 INJECTION, SOLUTION INTRAVENOUS at 07:08

## 2018-08-13 RX ADMIN — SODIUM CHLORIDE: 0.9 INJECTION, SOLUTION INTRAVENOUS at 06:08

## 2018-08-13 RX ADMIN — EPHEDRINE SULFATE 10 MG: 50 INJECTION, SOLUTION INTRAMUSCULAR; INTRAVENOUS; SUBCUTANEOUS at 08:08

## 2018-08-13 RX ADMIN — FENTANYL CITRATE 25 MCG: 50 INJECTION INTRAMUSCULAR; INTRAVENOUS at 10:08

## 2018-08-13 RX ADMIN — PROMETHAZINE HYDROCHLORIDE 6.25 MG: 25 INJECTION INTRAMUSCULAR; INTRAVENOUS at 01:08

## 2018-08-13 RX ADMIN — MIDAZOLAM HYDROCHLORIDE 4 MG: 1 INJECTION, SOLUTION INTRAMUSCULAR; INTRAVENOUS at 07:08

## 2018-08-13 RX ADMIN — OXYCODONE HYDROCHLORIDE AND ACETAMINOPHEN 1 TABLET: 5; 325 TABLET ORAL at 10:08

## 2018-08-13 RX ADMIN — SODIUM CHLORIDE, SODIUM GLUCONATE, SODIUM ACETATE, POTASSIUM CHLORIDE, MAGNESIUM CHLORIDE, SODIUM PHOSPHATE, DIBASIC, AND POTASSIUM PHOSPHATE: .53; .5; .37; .037; .03; .012; .00082 INJECTION, SOLUTION INTRAVENOUS at 08:08

## 2018-08-13 RX ADMIN — ROCURONIUM BROMIDE 10 MG: 10 INJECTION, SOLUTION INTRAVENOUS at 08:08

## 2018-08-13 NOTE — TRANSFER OF CARE
"Anesthesia Transfer of Care Note    Patient: Marce Hickey    Procedure(s) Performed: Procedure(s) (LRB):  CREATION OR REVISION, INFRAMAMMARY FOLD (Left)    Patient location: PACU    Anesthesia Type: general    Transport from OR: Transported from OR on 6-10 L/min O2 by face mask with adequate spontaneous ventilation    Post pain: adequate analgesia    Post assessment: no apparent anesthetic complications    Post vital signs: stable    Level of consciousness: sedated    Nausea/Vomiting: no nausea/vomiting    Complications: none    Transfer of care protocol was followed      Last vitals:   Visit Vitals  BP (!) 156/91 (BP Location: Right arm, Patient Position: Lying)   Pulse 71   Temp 36.8 °C (98.3 °F) (Oral)   Resp 18   Ht 5' 4" (1.626 m)   Wt 64.9 kg (143 lb)   SpO2 100%   Breastfeeding? No   BMI 24.55 kg/m²     "

## 2018-08-13 NOTE — BRIEF OP NOTE
[facility]  Surgery Department  Operative Note    SUMMARY     Date of Procedure: 8/13/2018     Procedure: Procedure(s) (LRB):  CREATION OR REVISION, INFRAMAMMARY FOLD (Left)     Surgeon(s) and Role:     * Montrell Thomas MD - Primary     * Eze Brunson MD - Resident - Assisting        Pre-Operative Diagnosis: Hx of breast cancer [Z85.3]    Post-Operative Diagnosis: Post-Op Diagnosis Codes:     * Hx of breast cancer [Z85.3]    Anesthesia: General    Technical Procedures Used: elevation of left inframammary fold    Description of the Findings of the Procedure: Left inframmammary fold elevated; implant reinserted    Complications: No    Estimated Blood Loss (EBL): * No values recorded between 8/13/2018  8:13 AM and 8/13/2018  9:35 AM *           Implants: * No implants in log *    Specimens:   Specimen (12h ago, onward)    None                  Condition: Good    Disposition: PACU - hemodynamically stable.    Attestation: I was present and scrubbed for the entire procedure.

## 2018-08-13 NOTE — DISCHARGE INSTRUCTIONS

## 2018-08-13 NOTE — PROGRESS NOTES
Patient states she has had several surgeries with Dr. Thomas prior to today, and has never received a Heparin shot. The patient is asking to speak with Dr. Thomas prior to accepting Heparin.

## 2018-08-13 NOTE — PROGRESS NOTES
Plan of care reviewed with pt & spouse, both verbalized understanding, pt progressing with plan of care, denies nausea, pain tolerable, tolerating PO, reviewed all DC instructions, home meds, scripts, when to call MD, when to follow-up, answered questions.    Pt voided 2 x prior to DC.

## 2018-08-13 NOTE — ANESTHESIA POSTPROCEDURE EVALUATION
"Anesthesia Post Evaluation    Patient: Marce Hickey    Procedure(s) Performed: Procedure(s) (LRB):  CREATION OR REVISION, INFRAMAMMARY FOLD (Left)    Final Anesthesia Type: general  Patient location during evaluation: PACU  Patient participation: Yes- Able to Participate  Level of consciousness: awake and alert  Post-procedure vital signs: reviewed and stable  Pain management: adequate  Airway patency: patent  PONV status at discharge: No PONV  Anesthetic complications: no      Cardiovascular status: blood pressure returned to baseline  Respiratory status: unassisted  Hydration status: euvolemic  Follow-up not needed.        Visit Vitals  BP (!) 158/84   Pulse 62   Temp 36.7 °C (98 °F) (Temporal)   Resp 18   Ht 5' 4" (1.626 m)   Wt 64.9 kg (143 lb)   SpO2 99%   Breastfeeding? No   BMI 24.55 kg/m²       Pain/Shaina Score: Pain Assessment Performed: Yes (8/13/2018  2:00 PM)  Presence of Pain: complains of pain/discomfort (8/13/2018  2:00 PM)  Pain Rating Prior to Med Admin: 6 (8/13/2018 12:55 PM)  Pain Rating Post Med Admin: 3 (8/13/2018  2:00 PM)  Shaina Score: 10 (8/13/2018  2:00 PM)        "

## 2018-08-13 NOTE — H&P
[facility]  General Surgery  History & Physical    Patient Name: Marce Hickey  MRN: [id number]  Admission Date: 8/13/2018  Attending Physician: Montrell Thomas, *   Primary Care Provider: Savannah Garvey MD    Patient information was obtained from patient and past medical records.     Subjective:       History of Present Illness:  Marce Hickey is a 68 y.o. female now s/p status post free fat transfer to Bilateral breast on 03/06/2018 for second stage breast reconstruction. She has been doing well since her surgery and has no complaints aside from asymmetry of the left medial breast which she states looks alike a slight bulge compared to the contralateral side. She denies post op pain, infection, or other issues. States she feels better than she has in years. Here for elevation of left IMF.        No current facility-administered medications on file prior to encounter.      Current Outpatient Medications on File Prior to Encounter   Medication Sig    anastrozole (ARIMIDEX) 1 mg Tab TAKE 1 TABLET DAILY    calcium-vitamin D3 500 mg(1,250mg) -200 unit per tablet once daily. Patient uses a patch, not oral medication    multivitamin with minerals tablet Take 1 tablet by mouth once daily.    vitamin D 1000 units Tab Take 2,000 Units by mouth once daily. Patient uses a patch, not oral medication    ZEGERID 40-1.1 mg-gram per capsule TAKE 1 CAPSULE BEFORE BREAKFAST (Patient taking differently: TAKE 1 CAPSULE BEFORE BREAKFAST with bio carb)       Review of patient's allergies indicates:   Allergen Reactions    Nsaids (non-steroidal anti-inflammatory drug) Anaphylaxis       Past Medical History:   Diagnosis Date    Anxiety     Cancer     breast cancer - left    Depression     Diabetes mellitus, type 2     Borderline    Fatty liver disease, nonalcoholic     GERD (gastroesophageal reflux disease)     Hyperlipidemia     Hypertension     Plantar fasciitis of right foot       Past Surgical History:   Procedure Laterality Date    breast reduction       SECTION      x 2    CHOLECYSTECTOMY      FOOT SURGERY      left bunionectomy    gastric sleve      HYSTERECTOMY      one ovary intact, adhesions    LIPOSUCTION      TONSILLECTOMY       Family History     Problem Relation (Age of Onset)    Heart attack Mother    Heart disease Mother, Father    Hypertension Mother    Psoriasis Father        Tobacco Use    Smoking status: Former Smoker     Last attempt to quit: 1993     Years since quittin.7    Smokeless tobacco: Never Used    Tobacco comment: quit    Substance and Sexual Activity    Alcohol use: No     Alcohol/week: 0.0 oz    Drug use: No    Sexual activity: Yes     Birth control/protection: Surgical     Review of Systems   See HPI; otherwise negative    Objective:     Vital Signs (Most Recent):  Temp: 98.3 °F (36.8 °C) (18)  Pulse: 71 (18)  Resp: 18 (18)  BP: (!) 156/91 (18)  SpO2: 100 % (18) Vital Signs (24h Range):  Temp:  [98.3 °F (36.8 °C)] 98.3 °F (36.8 °C)  Pulse:  [71] 71  Resp:  [18] 18  SpO2:  [100 %] 100 %  BP: (156)/(91) 156/91     Weight: 64.9 kg (143 lb)  Body mass index is 24.55 kg/m².    Physical Exam  A&O, NAD  No Respiratory Distress  Incision well healing without erythema or drainage  Slightly lower IMF of the left medial breast     Significant Labs:  CBC:   Recent Labs   Lab  18   1249   HGB  13.9     Significant Diagnostics:  I have reviewed and interpreted all pertinent imaging results/findings within the past 24 hours.    Assessment/Plan:   OR today for reconstruction of left IMF, more likely using AlloDerm.    Active Diagnoses:    Diagnosis Date Noted POA    History of breast cancer [Z85.3] 2018 Not Applicable      Problems Resolved During this Admission:     VTE Risk Mitigation (From admission, onward)        Ordered     heparin (porcine) injection 5,000 Units   Every 8 hours      08/13/18 0628     Place LOR hose  Until discontinued      08/13/18 0628     Place sequential compression device  Until discontinued      08/13/18 0628     IP VTE HIGH RISK PATIENT  Once      08/13/18 0628          Eze Brunson MD  General Surgery  [facility]

## 2018-08-13 NOTE — TRANSFER OF CARE
"Anesthesia Transfer of Care Note    Patient: Marce Hickey    Procedure(s) Performed: Procedure(s) (LRB):  CREATION OR REVISION, INFRAMAMMARY FOLD (Left)    Patient location: PACU    Anesthesia Type: general    Transport from OR: Transported from OR on room air with adequate spontaneous ventilation    Post pain: Adequate analgesia    Post assessment: no apparent anesthetic complications    Last vitals: There were no vitals taken for this visit.    Post vital signs: stable    Level of consciousness: awake    Nausea/Vomiting: no nausea/no vomiting    Complications: noneAnesthesia Transfer of Care Note    Patient: Marce Hickey    Procedure(s) Performed: Procedure(s) (LRB):  CREATION OR REVISION, INFRAMAMMARY FOLD (Left)    Anesthesia PACU Handoff    Last vitals:   Visit Vitals  BP (!) 156/91 (BP Location: Right arm, Patient Position: Lying)   Pulse 71   Temp 36.8 °C (98.3 °F) (Oral)   Resp 18   Ht 5' 4" (1.626 m)   Wt 64.9 kg (143 lb)   SpO2 100%   Breastfeeding? No   BMI 24.55 kg/m²     "

## 2018-08-14 NOTE — OP NOTE
DATE OF PROCEDURE:  08/13/2018    PREOPERATIVE DIAGNOSIS:  History of breast cancer.    POSTOPERATIVE DIAGNOSIS:  History of breast cancer.    PROCEDURES PERFORMED:  Recreation of left inframammary fold.    SURGEON:  Montrell Thomas M.D., F.A.C.S.    ANESTHESIA:  General.    DESCRIPTION OF PROCEDURE:  The patient was evaluated in the preoperative holding   area.  Markings were made for the correction of the inframammary fold.  Fold   needed to come up approximately 1 cm.  The patient was taken to the Operative   Room, placed in the supine position.  After adequate general endotracheal   anesthesia, was prepped and draped in the normal sterile fashion.  The previous   mastectomy incision was then reopened.  The implant was then removed.  It was   placed in antibiotic solution.  It should be noted that the capsule was opened   at a higher level on the incision.  Next, the inferior capsule was then   repositioned at a higher level using first interrupted 2-0 Vicryl.  The patient   was set up with a temporary implant in to confirm the position of the   inframammary fold.  After this was completed, a second layer of 2-0 Prolene was   then used.  The implant was then replaced.  The capsule was closed using running   2-0 Vicryl, the skin using 3-0 Monocryl followed by running 4-0 Monocryl   subcuticular suture followed by Prineo and Dermabond.  There were no   complications and blood loss was minimal.  No specimens were sent.  The patient   was wheeled to the Recovery Room in stable condition.      CRB/IN  dd: 08/14/2018 15:26:54 (CDT)  td: 08/14/2018 15:46:13 (CDT)  Doc ID   #[id number]  Job ID #[id number]    CC:

## 2018-08-16 ENCOUNTER — ANESTHESIA (OUTPATIENT)
Dept: SURGERY | Facility: HOSPITAL | Age: 68
End: 2018-08-16
Payer: MEDICARE

## 2018-08-16 NOTE — DISCHARGE SUMMARY
[facility]  Discharge Summary  Plastic Surgery      Admit Date: 8/13/2018    Discharge Date and Time: 8/13/2018  2:24 PM    Attending Physician: Montrell Thomas MD    Discharge Provider: Eze Brunson    Reason for Admission: recreation of left inframammary fold    Procedures Performed: Procedure(s) (LRB):  CREATION OR REVISION, INFRAMAMMARY FOLD (Left)    Hospital Course (synopsis of major diagnoses, care, treatment, and services provided during the course of the hospital stay): Marce Hickey is a 68 y.o. female with history of breast cancer, s/p status post free fat transfer to Bilateral breast on 03/06/2018 for second stage breast reconstruction. She was admitted for recreation of left inframammary fold on 08/13/18. The operation went as planned, and the patient was discharged home the same day with oral antibiotics and pain medications.      Consults: none    Significant Diagnostic Studies: Labs: CMP No results for input(s): NA, K, CL, CO2, GLU, BUN, CREATININE, CALCIUM, PROT, ALBUMIN, BILITOT, ALKPHOS, AST, ALT, ANIONGAP, ESTGFRAFRICA, EGFRNONAA in the last 48 hours. and CBC No results for input(s): WBC, HGB, HCT, PLT in the last 48 hours.    Final Diagnoses:    Principal Problem: History of breast cancer   Secondary Diagnoses:   Active Hospital Problems    Diagnosis  POA    *History of breast cancer [Z85.3]  Not Applicable      Resolved Hospital Problems   No resolved problems to display.       Discharged Condition: good    Disposition: Home or Self Care    Follow Up/Patient Instructions:     Medications:  Reconciled Home Medications:      Medication List      START taking these medications    clindamycin 300 MG capsule  Commonly known as:  CLEOCIN  Take 1 capsule (300 mg total) by mouth 3 (three) times daily. for 10 days     oxyCODONE-acetaminophen 5-325 mg per tablet  Commonly known as:  PERCOCET  Take 1 tablet by mouth every 6 (six) hours as needed for Pain.        CHANGE  how you take these medications    ZEGERID 40-1.1 mg-gram per capsule  Generic drug:  omeprazole-sodium bicarbonate  TAKE 1 CAPSULE BEFORE BREAKFAST  What changed:  See the new instructions.        CONTINUE taking these medications    anastrozole 1 mg Tab  Commonly known as:  ARIMIDEX  TAKE 1 TABLET DAILY     calcium-vitamin D3 500 mg(1,250mg) -200 unit per tablet  once daily. Patient uses a patch, not oral medication     multivitamin with minerals tablet  Take 1 tablet by mouth once daily.     vitamin D 1000 units Tab  Take 2,000 Units by mouth once daily. Patient uses a patch, not oral medication          Discharge Procedure Orders   Wear Surgical Bra and/or Girdle at all times (may remove for short times to shower)   Order Comments: Wear Surgical Bra and/or Girdle at all times (may remove for short times to shower)     Other restrictions (specify):   Scheduling Instructions: No showering until 48 hours after surgery.   Wear surgical bra at all times (may remove briefly to shower)     Call MD for:  temperature >100.4     Call MD for:  persistent nausea and vomiting     Call MD for:  severe uncontrolled pain     Call MD for:  extreme fatigue     Call MD for:  persistent dizziness or light-headedness     Call MD for:  hives     Call MD for:  redness, tenderness, or signs of infection (pain, swelling, redness, odor or green/yellow discharge around incision site)     Call MD for:  difficulty breathing, headache or visual disturbances     Follow-up Information     Montrell Thomas MD. Schedule an appointment as soon as possible for a visit in 1 week.    Specialty:  Plastic Surgery  Why:  1 week post-op  Contact information:  [address]  [facility] [id number]  [phone]

## 2018-08-22 ENCOUNTER — TELEPHONE (OUTPATIENT)
Dept: ORTHOPEDICS | Facility: CLINIC | Age: 68
End: 2018-08-22

## 2018-08-22 NOTE — TELEPHONE ENCOUNTER
----- Message from Kelly Dean sent at 8/22/2018 10:54 AM CDT -----  Contact: Self  Patient needs to speak to the nurse about her upcoming procedure     Please call back [phone]

## 2018-08-22 NOTE — TELEPHONE ENCOUNTER
Spoke to patient. Had questions about a machine for her knee she could possibly use for after her surgery. Advised all her post op information will be discussed with Dr Montanez at her visit.

## 2018-08-24 ENCOUNTER — OFFICE VISIT (OUTPATIENT)
Dept: PLASTIC SURGERY | Facility: CLINIC | Age: 68
End: 2018-08-24
Payer: MEDICARE

## 2018-08-24 VITALS
WEIGHT: 150.38 LBS | HEART RATE: 72 BPM | DIASTOLIC BLOOD PRESSURE: 87 MMHG | HEIGHT: 64 IN | RESPIRATION RATE: 12 BRPM | TEMPERATURE: 98 F | SYSTOLIC BLOOD PRESSURE: 158 MMHG | BODY MASS INDEX: 25.67 KG/M2

## 2018-08-24 DIAGNOSIS — Z09 SURGERY FOLLOW-UP EXAMINATION: Primary | ICD-10-CM

## 2018-08-24 PROCEDURE — 99999 PR PBB SHADOW E&M-EST. PATIENT-LVL III: CPT | Mod: PBBFAC,,, | Performed by: SURGERY

## 2018-08-24 PROCEDURE — 99024 POSTOP FOLLOW-UP VISIT: CPT | Mod: POP,,, | Performed by: SURGERY

## 2018-08-24 PROCEDURE — 99213 OFFICE O/P EST LOW 20 MIN: CPT | Mod: PBBFAC,PO | Performed by: SURGERY

## 2018-08-24 NOTE — PROGRESS NOTES
Marce Hickey presents to the Plastic Surgery Clinic after a recreation of left   inframammary fold.  She looks good.  These folds are symmetric.  We will go   ahead and see her back in several weeks.      RAZ/CATRACHITO  dd: 08/24/2018 10:08:50 (CDT)  td: 08/25/2018 04:23:18 (CDT)  Doc ID   #[id number]  Job ID #[id number]    CC:

## 2018-09-07 ENCOUNTER — PATIENT MESSAGE (OUTPATIENT)
Dept: PLASTIC SURGERY | Facility: CLINIC | Age: 68
End: 2018-09-07

## 2018-09-07 ENCOUNTER — OFFICE VISIT (OUTPATIENT)
Dept: PLASTIC SURGERY | Facility: CLINIC | Age: 68
End: 2018-09-07
Payer: MEDICARE

## 2018-09-07 VITALS
HEART RATE: 70 BPM | SYSTOLIC BLOOD PRESSURE: 182 MMHG | HEIGHT: 64 IN | BODY MASS INDEX: 25.61 KG/M2 | RESPIRATION RATE: 12 BRPM | DIASTOLIC BLOOD PRESSURE: 102 MMHG | WEIGHT: 150 LBS

## 2018-09-07 DIAGNOSIS — Z09 SURGERY FOLLOW-UP EXAMINATION: Primary | ICD-10-CM

## 2018-09-07 PROCEDURE — 99213 OFFICE O/P EST LOW 20 MIN: CPT | Mod: PBBFAC,PO | Performed by: SURGERY

## 2018-09-07 PROCEDURE — 99024 POSTOP FOLLOW-UP VISIT: CPT | Mod: POP,,, | Performed by: SURGERY

## 2018-09-07 PROCEDURE — 99999 PR PBB SHADOW E&M-EST. PATIENT-LVL III: CPT | Mod: PBBFAC,,, | Performed by: SURGERY

## 2018-09-07 NOTE — PROGRESS NOTES
Ms. Hickey is status post breast reconstruction.  She is a very nice result.  She   is very happy now.  She is now ready for nipple tattooing.  We will go ahead   and refer her to [facility] for 3-dimensional tattoos.      RAZ/CATRACHITO  dd: 09/07/2018 10:58:27 (CDT)  td: 09/07/2018 14:53:02 (CDT)  Doc ID   #[id number]  Job ID #[id number]    CC:

## 2018-09-10 ENCOUNTER — PATIENT MESSAGE (OUTPATIENT)
Dept: PLASTIC SURGERY | Facility: CLINIC | Age: 68
End: 2018-09-10

## 2018-09-11 ENCOUNTER — OFFICE VISIT (OUTPATIENT)
Dept: FAMILY MEDICINE | Facility: CLINIC | Age: 68
End: 2018-09-11
Payer: MEDICARE

## 2018-09-11 ENCOUNTER — TELEPHONE (OUTPATIENT)
Dept: FAMILY MEDICINE | Facility: CLINIC | Age: 68
End: 2018-09-11

## 2018-09-11 VITALS
DIASTOLIC BLOOD PRESSURE: 82 MMHG | WEIGHT: 149.25 LBS | BODY MASS INDEX: 25.48 KG/M2 | HEIGHT: 64 IN | TEMPERATURE: 98 F | HEART RATE: 86 BPM | SYSTOLIC BLOOD PRESSURE: 142 MMHG

## 2018-09-11 DIAGNOSIS — R82.90 ABNORMAL FINDING IN URINE: ICD-10-CM

## 2018-09-11 DIAGNOSIS — Z23 IMMUNIZATION DUE: ICD-10-CM

## 2018-09-11 DIAGNOSIS — Z01.818 PRE-OP EXAMINATION: ICD-10-CM

## 2018-09-11 DIAGNOSIS — M79.609 PAIN IN EXTREMITY, UNSPECIFIED EXTREMITY: ICD-10-CM

## 2018-09-11 DIAGNOSIS — Z01.818 PRE-OP EXAMINATION: Primary | ICD-10-CM

## 2018-09-11 DIAGNOSIS — M17.12 ARTHRITIS OF KNEE, LEFT: ICD-10-CM

## 2018-09-11 PROCEDURE — 93010 ELECTROCARDIOGRAM REPORT: CPT | Mod: S$PBB,,, | Performed by: INTERNAL MEDICINE

## 2018-09-11 PROCEDURE — 90662 IIV NO PRSV INCREASED AG IM: CPT | Mod: PBBFAC,PO

## 2018-09-11 PROCEDURE — 87088 URINE BACTERIA CULTURE: CPT

## 2018-09-11 PROCEDURE — 93005 ELECTROCARDIOGRAM TRACING: CPT | Mod: PBBFAC,PO | Performed by: INTERNAL MEDICINE

## 2018-09-11 PROCEDURE — 81001 URINALYSIS AUTO W/SCOPE: CPT

## 2018-09-11 PROCEDURE — 87086 URINE CULTURE/COLONY COUNT: CPT

## 2018-09-11 PROCEDURE — 99215 OFFICE O/P EST HI 40 MIN: CPT | Mod: 25,S$PBB,, | Performed by: FAMILY MEDICINE

## 2018-09-11 PROCEDURE — 87186 SC STD MICRODIL/AGAR DIL: CPT

## 2018-09-11 PROCEDURE — 87077 CULTURE AEROBIC IDENTIFY: CPT

## 2018-09-11 PROCEDURE — 99999 PR PBB SHADOW E&M-EST. PATIENT-LVL IV: CPT | Mod: PBBFAC,,, | Performed by: FAMILY MEDICINE

## 2018-09-11 PROCEDURE — 99214 OFFICE O/P EST MOD 30 MIN: CPT | Mod: PBBFAC,PO | Performed by: FAMILY MEDICINE

## 2018-09-11 NOTE — PROGRESS NOTES
CHIEF COMPLAINT:  Pre-operative examination      HISTORY OF PRESENT ILLNESS:  Marce Hickey is a 68 y.o. female who presents to clinic for a pre-operative examination. She is scheduled to undergo left knee arthroplasty under general anesthesia with Dr. Montanez on 18. There is no personal or family history of complication from anesthesia. There is no personal or family history of coagulopathy, hypercoagulable state. She describes good functional capacity. She is not on any antiplatelet agents, anticoagulants.       REVIEW OF SYSTEMS:  The patient denies any fever, chills, night sweats, headaches, vision changes, difficulty speaking or swallowing, decreased hearing, weight loss, weight gain, chest pain, palpitations, shortness of breath, cough, nausea, vomiting, abdominal pain, dysuria, diarrhea, constipation, hematuria, hematochezia, melena, changes in her hair, skin, nails, numbness or weakness in her extremities, erythema, pain or swelling over any of her joints, myalgia, swollen glands, easy bruising, fatigue, edema, symptoms of anxiety or depression. She denies any vaginal discharge, breast masses, nipple discharge, change in the skin overlying her breasts.      MEDICATIONS:   Reviewed and/or reconciled in EPIC    ALLERGIES:  Reviewed and/or reconciled in EPIC    PAST MEDICAL/SURGICAL HISTORY:   Past Medical History:   Diagnosis Date    Anxiety     Cancer     breast cancer - left    Depression     Diabetes mellitus, type 2     Borderline    Fatty liver disease, nonalcoholic     GERD (gastroesophageal reflux disease)     Hyperlipidemia     Hypertension     Plantar fasciitis of right foot       Past Surgical History:   Procedure Laterality Date    BIOPSY-SENTINEL NODE Left 2017    [facility] at [facility]    breast reduction       SECTION      x 2    CHOLECYSTECTOMY      COLONOSCOPY N/A 10/10/2014    Performed by [facility]aitanya Ro [facility] at [facility] ENDO    CREATION  OR REVISION, INFRAMAMMARY FOLD Left 2018    [facility] 2ND FLR    DISSECTION-AXILLARY LYMPH NODE Left 2017    [facility]    EXCHANGE IMPLANT-BREAST Bilateral 2017    [facility] 2ND [facility]LR    FOOT SURGERY      left bunionectomy    GASTRECTOMY-SLEEVE-LAPAROSCOPIC N/A 2017    [facility]al [facility] at [facility] OR    gastric sleve      HYSTERECTOMY      one ovary intact, adhesions    INJECTION-FAT FAT TRANSFER Bilateral 3/6/2018    [facility] 2ND FLR    LIPOSUCTION      MASTECTOMY Bilateral 2017    [facility] at [facility] OR    RECONSTRUCTION-BREAST Bilateral 2017    [facility] at [facility] OR    RECONSTRUCTION-BREAST/REVISION-FLAP Soft Tissue Revision Bilateral 2017    [facility] 2ND FLR    TONSILLECTOMY         FAMILY HISTORY:    Family History   Problem Relation Age of Onset    Hypertension Mother     Heart disease Mother         pacemaker    Heart attack Mother     Heart disease Father     Psoriasis Father     Cancer Neg Hx        SOCIAL HISTORY:    Social History     Socioeconomic History    Marital status:      Spouse name: Not on file    Number of children: Not on file    Years of education: Not on file    Highest education level: Not on file   Social Needs    Financial resource strain: Not on file    Food insecurity - worry: Not on file    Food insecurity - inability: Not on file    Transportation needs - medical: Not on file    Transportation needs - non-medical: Not on file   Occupational History    Not on file   Tobacco Use    Smoking status: Former Smoker     Last attempt to quit: 1993     Years since quittin.8    Smokeless tobacco: Never Used    Tobacco comment: quit    Substance and  "Sexual Activity    Alcohol use: No     Alcohol/week: 0.0 oz    Drug use: No    Sexual activity: Yes     Birth control/protection: Surgical   Other Topics Concern    Are you pregnant or think you may be? Not Asked    Breast-feeding Not Asked   Social History Narrative    Not on file       PHYSICAL EXAM:  VITAL SIGNS:   Vitals:    09/11/18 1559   Height: 5' 4" (1.626 m)     GENERAL:  Patient appears well nourished, sitting on exam table, in no acute distress.  HEENT:  Atraumatic, normocephalic, PERRLA, EOMI, no conjunctival injection, sclerae are anicteric, normal external auditory canals,TMs clear b/l, gross hearing intact to whisper, MMM, no oropharygneal erythema or exudate.  NECK:  Supple, normal ROM, trachea is midline , no supraclavicular or cervical LAD or masses palpated.  Thyroid gland not palpable.  CARDIOVASCULAR:  RRR, normal S1 and S2, no m/r/g.  RESPIRATORY:  CTA b/l, no wheezes, rhonchi, rales.  No increased work of breathing, no  use of accessory muscles.  ABDOMEN:  Soft, nontender, nondistended, normoactive bowel sounds in all four quadrants, no rebound or guarding, no HSM or masses palpated.  Normal percussion.  EXTREMITIES:  2+ DP pulses b/l, no edema.  SKIN:  Warm, no lesions on exposed skin.  NEUROMUSCULAR:  Cranial nerves II-XII grossly intact.  Strength is 4+/5 over upper and lower extremity flexors/extensors b/l, 2+ biceps and patellar reflexes b/l. No clubbing or cyanosis of digits/nails.  Steady gait.  PSYCH:  Patient is alert and oriented to person, time, place. They are appropriately dressed and groomed. There is normal eye contact. Rate and tone of speech is normal. Normal insight, judgement. Normal thought content and process.     LABORATORY/IMAGING STUDIES: I have reviewed a 12 lead EKG which demonstrates a rate of 70 bpm, NSR, no ST changes or axis deviation    ASSESSMENT/PLAN: This is a 68 y.o. female who presents to clinic for evaluation of the following concerns.  1. Pre-op " examination,Arthritis of knee, left, pain in extremity  Will schedule CBC, CMP, INR, PTT, UA, UCX, CXR if not ordered by orthopedics. If no significant abnormalities patient will be cleared for surgery and Dr. East will be notified.     2. Abnormal finding in urine   - Urine culture    3. Immunization due  Will receive influenza vaccine in clinic today        Addendum: patient successfully treated for UTI. She is cleared for surgery. Dr. Montanez notified in Epic.     Savannah Garvey MD

## 2018-09-11 NOTE — TELEPHONE ENCOUNTER
Mrs. Hickey was seen today for a pre-op. I did an EKG and UA, UCX, however she has an appointment for CXR and labs ordered by Dr. Montanez on 9/13 but I can't see what labs were ordered. Can we look into this and link any of my labs to that appointment if they weren't ordered by him?

## 2018-09-12 LAB
BACTERIA #/AREA URNS AUTO: ABNORMAL /HPF
BILIRUB UR QL STRIP: NEGATIVE
CAOX CRY UR QL COMP ASSIST: ABNORMAL
CLARITY UR REFRACT.AUTO: ABNORMAL
COLOR UR AUTO: YELLOW
GLUCOSE UR QL STRIP: NEGATIVE
HGB UR QL STRIP: ABNORMAL
KETONES UR QL STRIP: NEGATIVE
LEUKOCYTE ESTERASE UR QL STRIP: ABNORMAL
MICROSCOPIC COMMENT: ABNORMAL
NITRITE UR QL STRIP: POSITIVE
PH UR STRIP: 5 [PH] (ref 5–8)
PROT UR QL STRIP: NEGATIVE
RBC #/AREA URNS AUTO: 3 /HPF (ref 0–4)
SP GR UR STRIP: 1.02 (ref 1–1.03)
SQUAMOUS #/AREA URNS AUTO: 0 /HPF
URN SPEC COLLECT METH UR: ABNORMAL
UROBILINOGEN UR STRIP-ACNC: NEGATIVE EU/DL
WBC #/AREA URNS AUTO: 9 /HPF (ref 0–5)

## 2018-09-12 NOTE — TELEPHONE ENCOUNTER
I linked the orders from Dr. Garvey to the pre-op lab appt. Added an note to the appt comments stating that I had done so. Thanks, Anum

## 2018-09-13 ENCOUNTER — PATIENT MESSAGE (OUTPATIENT)
Dept: PLASTIC SURGERY | Facility: CLINIC | Age: 68
End: 2018-09-13

## 2018-09-13 ENCOUNTER — TELEPHONE (OUTPATIENT)
Dept: FAMILY MEDICINE | Facility: CLINIC | Age: 68
End: 2018-09-13

## 2018-09-13 ENCOUNTER — HOSPITAL ENCOUNTER (OUTPATIENT)
Dept: PREADMISSION TESTING | Facility: HOSPITAL | Age: 68
Discharge: HOME | End: 2018-09-13
Attending: ORTHOPAEDIC SURGERY
Payer: MEDICARE

## 2018-09-13 VITALS — BODY MASS INDEX: 25.1 KG/M2 | WEIGHT: 147 LBS | HEIGHT: 64 IN

## 2018-09-13 DIAGNOSIS — D69.6 THROMBOCYTOPENIA: ICD-10-CM

## 2018-09-13 DIAGNOSIS — D69.1 THROMBOCYTOPATHIA: Primary | ICD-10-CM

## 2018-09-13 DIAGNOSIS — M17.12 ARTHRITIS OF KNEE, LEFT: Primary | ICD-10-CM

## 2018-09-13 DIAGNOSIS — N30.00 ACUTE CYSTITIS WITHOUT HEMATURIA: Primary | ICD-10-CM

## 2018-09-13 LAB
ABO + RH BLD: NORMAL
ANION GAP SERPL CALC-SCNC: 9 MMOL/L
BLD GP AB SCN CELLS X3 SERPL QL: NORMAL
BLOOD GROUP ANTIBODIES SERPL: NORMAL
BUN SERPL-MCNC: 20 MG/DL
CALCIUM SERPL-MCNC: 10.3 MG/DL
CHLORIDE SERPL-SCNC: 107 MMOL/L
CO2 SERPL-SCNC: 25 MMOL/L
CREAT SERPL-MCNC: 0.7 MG/DL
EST. GFR  (AFRICAN AMERICAN): >60 ML/MIN/1.73 M^2
EST. GFR  (NON AFRICAN AMERICAN): >60 ML/MIN/1.73 M^2
GLUCOSE SERPL-MCNC: 89 MG/DL
POTASSIUM SERPL-SCNC: 4.1 MMOL/L
SODIUM SERPL-SCNC: 141 MMOL/L

## 2018-09-13 PROCEDURE — 99900103 DSU ONLY-NO CHARGE-INITIAL HR (STAT)

## 2018-09-13 PROCEDURE — 99900104 DSU ONLY-NO CHARGE-EA ADD'L HR (STAT)

## 2018-09-13 PROCEDURE — 87081 CULTURE SCREEN ONLY: CPT

## 2018-09-13 PROCEDURE — 86870 RBC ANTIBODY IDENTIFICATION: CPT

## 2018-09-13 PROCEDURE — 86850 RBC ANTIBODY SCREEN: CPT

## 2018-09-13 RX ORDER — CIPROFLOXACIN 250 MG/1
250 TABLET, FILM COATED ORAL EVERY 12 HOURS
Qty: 10 TABLET | Refills: 0 | Status: SHIPPED | OUTPATIENT
Start: 2018-09-13 | End: 2018-09-18

## 2018-09-13 NOTE — PRE ADMISSION SCREENING
"               [facility] Risk Assessment Scale    Patient Name: Marce Hickey  YOB: 1950  MRN: [id number]            RIsk Factor Measure Recommendation Patient Data Scale/Score   BMI >40 Reconsider surgery, weight loss   Estimated body mass index is 25.23 kg/m² as calculated from the following:    Height as of this encounter: 5' 4" (1.626 m).    Weight as of this encounter: 66.7 kg (147 lb).   [] 0 = 1 - 24.9  [x] 1 = 25-29.9  [] 2 = 30-34.9  [] 3 = 35-39.9  [] 4 = 40-44.9  [] 5 = 45-99.9   Hemoglobin AIC (if applicable) >9 Delay surgery until DM under control  Refer for:  · Nutrition Therapy  · Exercise   · Medication    Lab Results   Component Value Date    HGBA1C 5.2 10/09/2017       Lab Results   Component Value Date    GLU 90 09/13/2018    GLU 89 09/13/2018      [x] 0 = 4.0-5.6  [] 1 = 5.7-6.4  [] 2 = 6.5-6.9  [] 3 = 7.0-7.9  [] 4 = 8.0-8.9  [] 5 = 9.0-12   Hemoglobin (Anemia) <9 Delay surgery   Correct anemia Lab Results   Component Value Date    HGB 13.7 09/13/2018    [] 20 - <9.0                    Albumin <3 Delay surgery &Workup Lab Results   Component Value Date    ALBUMIN 4.0 09/13/2018    [] 20 - <3.0   Smoking Cessation >4 Weeks Delay Surgery  Refer to OP Cessation Class    Former Smoker  Quit:1993 [] 20 - current smoker                                _____ PPD                    Hx of MI, PE, Arrhythmia, CVA, DVT <30 Days Delay Surgery    N/A [] 20      Infection Variable Delay surgery and re-evaluate   N/A [] 20 - recent/current infection     Depression (PHQ) >10 out of 27 Delay Surgery and re-evaluate  Medication  Counseling              [x] 0     []1     []2     []3      []4      [] 5                    (1-4)      (5-9)  (10-14)  (15-19)   (20-27)     Memory Impairment & Memory loss (Mini-Cog Screening Tool) Advanced dementia and/or Parkinson's Reconsider surgery     [x] 0     []1     []2     []3     []4     [] 5     Physical Conditioning (Modified AM-PAC Per Physical Therapy at Joint " [facility]) Unable to ambulate on day of surgery Delay surgery and re-evaluate  Pre-Rehabilitation   (PT evaluation)       [x]  0   []4       []8     []12        []16     []20       (<20%)   (<40%)   (<60%)   (<80% )    (>80%)     Home Environment/Caregiver support  (Per /Navigator Interview)    Availability of basic services and/or approprate assistance during post-operative period Delay surgery and re-evaluate  Safe home environment  Health   1 week post-surgery  Transportation  availability  Ability to obtain DME/Medications post-op    [x] 0     []1     []2     []3     []4     [] 5  [x] 0     []1     []2     []3     []4     [] 5  [x] 0     []1     []2     []3     []4     [] 5  [x] 0     []1     []2     []3     []4     [] 5         MD Contact: Dr. Montanez Comments:  Total Score:  1

## 2018-09-13 NOTE — PRE ADMISSION SCREENING
Patient Name: Marce Hickey  YOB: 1950   MRN: [id number]     [facility]-PAC   Basic Mobility Inpatient Short Form 6 Clicks         How much difficulty does the patient currently have  Unable  A Lot  A Little  None      1. Turning over in bed (including adjusting bedclothes, sheets and blankets)?     1 []    2 []    3 [x]    4 []        2. Sitting down on and standing up from a chair with arms (e.g., wheelchair, bedside commode, etc.)     1 []  2 []  3 []     4 [x]      3. Moving from lying on back to sitting on the side of the bed?     1 []  2 []  3 []    4 [x]    How much help from another person does the patient currently need  Total  A Lot  A Little  None      4. Moving to and from a bed to a chair (including a wheelchair)?    1 []  2 []  3 []    4 [x]      5. Need to walk in hospital room?    1 []  2 []  3 []    4 [x]      6. Climbing 3-5 steps with a railing?    1 []  2 []  3 []    4 [x]       Raw Score:       23           CMS 0-100% Score:   11.20         %   Standardized Score:    56.93           CMS Modifier:       CI                                   [facility]-PAC   Basic Mobility Inpatient Short Form 6 Clicks Score Conversion Table*         *Use this form to convert -PAC Basic Mobility Inpatient Raw Scores.   -[facility] Inpatient Basic Mobility Short Form Scoring Example   1. Add the number values associated with the response to each item. For example, items totals yield a Raw Score of 21.   2. Match the raw score to the t-Scale scores (t-Scale score = 50.25, SE = 4.69).   3. Find the associated CMS % (CMS % = 28.97%).   4. Locate the correct CMS Functional Modifier Code, or G Code (G code = CJ)     NOTE: Each -PAC Short Form has a separate conversion table. Make sure that you use the correct conversion table.       Instruction Manual - page 45 contains conversion table

## 2018-09-13 NOTE — TELEPHONE ENCOUNTER
Platelet count slightly low, needs repeat cbc next week when she comes in for the repeat UA and UCX-see other telephone message regarding this

## 2018-09-13 NOTE — TELEPHONE ENCOUNTER
Patient was seen for a pre-op. UA is concerning for infection Needs to start antibiotics for 5 days followed up repeat UA and UCX next Monday so that she can be cleared by 9/25

## 2018-09-13 NOTE — PRE ADMISSION SCREENING
[facility] ASSESSMENT    Name Marce Hickey   MRN [id number]    Age/Sex 68 y.o. female    Surgeon Dr. Christos Montanez   [facility] Date 2018   Surgery Date 2018   Procedure Left Knee Arthroplasty   Insurance Payor: MEDICARE / Plan: MEDICARE PART A & B / Product Type: Government /    Care Team Patient Care Team:  Savannah Garvey MD as PCP - General (Family Medicine)  Savannah Garvey MD as PCP - MSSP Attributed  Christos Montanez MD as Consulting Physician (Sports Medicine)    Pharmacy   [facility] [place], [place] - [address]  [address]  [place] [address]  Phone: [phone] Fax: [phone]    [facility] - [place], [place] - [address] [place] [address]  Phone: [phone] Fax: [phone]    [facility] - [place], [place] - [address] [place] [address]  Phone: [phone] Fax: [phone]     AM-PAC Score   23   Risk Assessment Score 1     Past Medical History:   Diagnosis Date    Anxiety     Cancer     breast cancer - left    Depression     Diabetes mellitus, type 2     Borderline    Fatty liver disease, nonalcoholic     GERD (gastroesophageal reflux disease)     Hyperlipidemia     Hypertension     Plantar fasciitis of right foot        Past Surgical History:   Procedure Laterality Date    BIOPSY-SENTINEL NODE Left 2017    [facility] OR    breast reduction       SECTION      x 2    CHOLECYSTECTOMY      COLONOSCOPY N/A 10/10/2014    Performed by Chaitanya Ro MD at [facility] ENDO    CREATION OR REVISION, INFRAMAMMARY FOLD Left 2018    [facility] 2ND FLR    DISSECTION-AXILLARY LYMPH NODE Left 2017    [facility]formed by [facility] at [facility] OR    EXCHANGE IMPLANT-BREAST Bilateral 2017    Performed by Montrell CHAPIN  MD [facility] at [facility] OR 2ND FLR    FOOT SURGERY      left bunionectomy    GASTRECTOMY-SLEEVE-LAPAROSCOPIC N/A 1/30/2017    Performed by [facility]al [facility] at [facility] OR    gastric sleve      HYSTERECTOMY      one ovary intact, adhesions    INJECTION-FAT FAT TRANSFER Bilateral 3/6/2018    Performed by [facility] at [facility] OR 2ND FLR    LIPOSUCTION      MASTECTOMY Bilateral 5/4/2017    Performed by [facility] at [facility] OR    RECONSTRUCTION-BREAST Bilateral 5/4/2017    Performed by [facility] at [facility] OR    RECONSTRUCTION-BREAST/REVISION-FLAP Soft Tissue Revision Bilateral 9/25/2017    Performed by [facility] at [facility] OR 2ND FLR    TONSILLECTOMY           Home Enviroment     Living Arrangement: Lives with spouse  Home Environment: 1-story house/ trailer, number of outside stairs: 1, walk-in shower  Home Safety Concerns: Pets in the home: dogs (5) and cats (2).    DISCHARGE CAREGIVER/SUPPORT SYSTEM     Identified post-op caregiver: Patient has spouse / significant other.  Patient's caregiver(s) will be able to provide physical assistance. Patient will have someone to assist overnight.      Caregiver present at pre-op interview:  No      PRE-OPERATIVE FUNCTIONAL STATUS     Employment: Employed full time    Pre-op Functional Status: Patient is independent with mobility/ambulation, transfers, ADL's, IADL's.    Use of assistive device for ambulation: none  ADL: self care  ADL Limitations: difficulty with walking  Medical Restrictions: Decreased range of motions in extremities    POTENTIAL BARRIERS TO DISCHARGE/POTENTIAL POST-OP COMPLICATIONS     Patient with Type II diabetes and Hx of cancer with double mastectomy. Patient is allergic to NSAIDS. Different anticoagulation medication needed prior to discharge.      DISCHARGE PLAN     Expected LOS of 2 days or less for joint replacement discussed with patient. We also discussed a discharge path of  for  approximately one week with a transition to outpatient PT on the second week given no post-op complications.      Patient in agreement with discharge plan: Yes    Discharge to: Discharge home with home kaleigh (PT/OT) x2 weeks with transition to outpatient PT     HH: [facility]      OP PT: [facility]     Home DME: none     Needed DME at D/C: rolling walker and bedside commode     Rx: Per Dr. Montanez at discharge.     Meds to Beds: N/A  Patient expected to discharge on Aspirin 81mg by mouth or alternative medication due to NSAID allergy for DVT prophylaxis.

## 2018-09-14 NOTE — TELEPHONE ENCOUNTER
Called pt regarding below message per Dr. Garvey. Informed pt of results per Dr. Garvey in regards to urine and blood results. Pt did  prescription tho was unaware of results. Scheduled lab appt with pt. Appt date, time, and location given. Pt verbalized understanding with no further questions.

## 2018-09-15 LAB
BACTERIA UR CULT: NORMAL
MRSA SPEC QL CULT: NORMAL

## 2018-09-17 ENCOUNTER — LAB VISIT (OUTPATIENT)
Dept: LAB | Facility: HOSPITAL | Age: 68
End: 2018-09-17
Attending: FAMILY MEDICINE
Payer: MEDICARE

## 2018-09-17 DIAGNOSIS — N30.00 ACUTE CYSTITIS WITHOUT HEMATURIA: ICD-10-CM

## 2018-09-17 LAB
BILIRUB UR QL STRIP: NEGATIVE
CLARITY UR REFRACT.AUTO: CLEAR
COLOR UR AUTO: YELLOW
GLUCOSE UR QL STRIP: NEGATIVE
HGB UR QL STRIP: NEGATIVE
KETONES UR QL STRIP: NEGATIVE
LEUKOCYTE ESTERASE UR QL STRIP: NEGATIVE
NITRITE UR QL STRIP: NEGATIVE
PH UR STRIP: 6 [PH] (ref 5–8)
PROT UR QL STRIP: NEGATIVE
SP GR UR STRIP: 1.02 (ref 1–1.03)
URN SPEC COLLECT METH UR: NORMAL
UROBILINOGEN UR STRIP-ACNC: NEGATIVE EU/DL

## 2018-09-17 PROCEDURE — 87086 URINE CULTURE/COLONY COUNT: CPT

## 2018-09-17 PROCEDURE — 81003 URINALYSIS AUTO W/O SCOPE: CPT

## 2018-09-18 ENCOUNTER — TELEPHONE (OUTPATIENT)
Dept: ORTHOPEDICS | Facility: CLINIC | Age: 68
End: 2018-09-18

## 2018-09-18 ENCOUNTER — PATIENT MESSAGE (OUTPATIENT)
Dept: FAMILY MEDICINE | Facility: CLINIC | Age: 68
End: 2018-09-18

## 2018-09-18 NOTE — TELEPHONE ENCOUNTER
Called pt and advised she will need to be medically cleared prior to sx and sx will have to be post-poned if she is not cleared by 9/25/18. Pt verbalized understanding.

## 2018-09-18 NOTE — TELEPHONE ENCOUNTER
----- Message from Adele Brooke MA sent at 9/18/2018 11:25 AM CDT -----  Contact: Self  The patient has labs done last week and had a urinary track infection and her platelets were low. The patient retested this week and her platelets are 147 and the UTI is gone. She has not been cleared by Dr. Garvey yet due to her being out sick. The patient wants to know where to go from here for her clearance.    Call Back# [phone]  Thanks

## 2018-09-19 ENCOUNTER — TELEPHONE (OUTPATIENT)
Dept: FAMILY MEDICINE | Facility: CLINIC | Age: 68
End: 2018-09-19

## 2018-09-19 ENCOUNTER — PATIENT MESSAGE (OUTPATIENT)
Dept: FAMILY MEDICINE | Facility: CLINIC | Age: 68
End: 2018-09-19

## 2018-09-19 ENCOUNTER — TELEPHONE (OUTPATIENT)
Dept: ORTHOPEDICS | Facility: CLINIC | Age: 68
End: 2018-09-19

## 2018-09-19 LAB — BACTERIA UR CULT: NO GROWTH

## 2018-09-19 NOTE — TELEPHONE ENCOUNTER
----- Message from Savannah Garvey MD sent at 9/19/2018 11:03 AM CDT -----  Mrs. Hickey is cleared for surgery.    Savannah

## 2018-09-24 ENCOUNTER — ANESTHESIA EVENT (OUTPATIENT)
Dept: SURGERY | Facility: HOSPITAL | Age: 68
DRG: 470 | End: 2018-09-24
Payer: MEDICARE

## 2018-09-25 ENCOUNTER — ANESTHESIA (OUTPATIENT)
Dept: SURGERY | Facility: HOSPITAL | Age: 68
DRG: 470 | End: 2018-09-25
Payer: MEDICARE

## 2018-09-25 ENCOUNTER — HOSPITAL ENCOUNTER (INPATIENT)
Facility: HOSPITAL | Age: 68
LOS: 1 days | Discharge: HOME HEALTH | DRG: 470 | End: 2018-09-26
Attending: ORTHOPAEDIC SURGERY | Admitting: ORTHOPAEDIC SURGERY
Payer: MEDICARE

## 2018-09-25 DIAGNOSIS — M17.12 ARTHRITIS OF KNEE, LEFT: Primary | ICD-10-CM

## 2018-09-25 PROCEDURE — D9220A PRA ANESTHESIA: Mod: CRNA,,, | Performed by: NURSE ANESTHETIST, CERTIFIED REGISTERED

## 2018-09-25 PROCEDURE — 94799 UNLISTED PULMONARY SVC/PX: CPT

## 2018-09-25 PROCEDURE — 63600175 PHARM REV CODE 636 W HCPCS: Performed by: ORTHOPAEDIC SURGERY

## 2018-09-25 PROCEDURE — 63600175 PHARM REV CODE 636 W HCPCS: Performed by: ANESTHESIOLOGY

## 2018-09-25 PROCEDURE — C1776 JOINT DEVICE (IMPLANTABLE): HCPCS | Performed by: ORTHOPAEDIC SURGERY

## 2018-09-25 PROCEDURE — 71000033 HC RECOVERY, INTIAL HOUR: Performed by: ORTHOPAEDIC SURGERY

## 2018-09-25 PROCEDURE — 99900104 DSU ONLY-NO CHARGE-EA ADD'L HR (STAT): Performed by: ORTHOPAEDIC SURGERY

## 2018-09-25 PROCEDURE — 63600175 PHARM REV CODE 636 W HCPCS

## 2018-09-25 PROCEDURE — 97161 PT EVAL LOW COMPLEX 20 MIN: CPT | Performed by: PHYSICAL THERAPIST

## 2018-09-25 PROCEDURE — 97530 THERAPEUTIC ACTIVITIES: CPT | Performed by: PHYSICAL THERAPIST

## 2018-09-25 PROCEDURE — 25000003 PHARM REV CODE 250: Performed by: ORTHOPAEDIC SURGERY

## 2018-09-25 PROCEDURE — 36000710: Performed by: ORTHOPAEDIC SURGERY

## 2018-09-25 PROCEDURE — 27447 TOTAL KNEE ARTHROPLASTY: CPT | Mod: LT,,, | Performed by: ORTHOPAEDIC SURGERY

## 2018-09-25 PROCEDURE — 37000009 HC ANESTHESIA EA ADD 15 MINS: Performed by: ORTHOPAEDIC SURGERY

## 2018-09-25 PROCEDURE — 36000711: Performed by: ORTHOPAEDIC SURGERY

## 2018-09-25 PROCEDURE — G8978 MOBILITY CURRENT STATUS: HCPCS | Mod: CJ | Performed by: PHYSICAL THERAPIST

## 2018-09-25 PROCEDURE — 99900103 DSU ONLY-NO CHARGE-INITIAL HR (STAT): Performed by: ORTHOPAEDIC SURGERY

## 2018-09-25 PROCEDURE — 11000001 HC ACUTE MED/SURG PRIVATE ROOM

## 2018-09-25 PROCEDURE — 71000039 HC RECOVERY, EACH ADD'L HOUR: Performed by: ORTHOPAEDIC SURGERY

## 2018-09-25 PROCEDURE — S0020 INJECTION, BUPIVICAINE HYDRO: HCPCS | Performed by: ANESTHESIOLOGY

## 2018-09-25 PROCEDURE — D9220A PRA ANESTHESIA: Mod: ANES,,, | Performed by: ANESTHESIOLOGY

## 2018-09-25 PROCEDURE — 25000003 PHARM REV CODE 250: Performed by: ANESTHESIOLOGY

## 2018-09-25 PROCEDURE — 97116 GAIT TRAINING THERAPY: CPT | Performed by: PHYSICAL THERAPIST

## 2018-09-25 PROCEDURE — 94761 N-INVAS EAR/PLS OXIMETRY MLT: CPT

## 2018-09-25 PROCEDURE — 64447 NJX AA&/STRD FEMORAL NRV IMG: CPT | Mod: 59,LT,, | Performed by: ANESTHESIOLOGY

## 2018-09-25 PROCEDURE — 63600175 PHARM REV CODE 636 W HCPCS: Performed by: NURSE ANESTHETIST, CERTIFIED REGISTERED

## 2018-09-25 PROCEDURE — G8979 MOBILITY GOAL STATUS: HCPCS | Mod: CI | Performed by: PHYSICAL THERAPIST

## 2018-09-25 PROCEDURE — 97110 THERAPEUTIC EXERCISES: CPT | Performed by: PHYSICAL THERAPIST

## 2018-09-25 PROCEDURE — 0SRD0J9 REPLACEMENT OF LEFT KNEE JOINT WITH SYNTHETIC SUBSTITUTE, CEMENTED, OPEN APPROACH: ICD-10-PCS | Performed by: ORTHOPAEDIC SURGERY

## 2018-09-25 PROCEDURE — 76942 ECHO GUIDE FOR BIOPSY: CPT | Mod: 26,,, | Performed by: ANESTHESIOLOGY

## 2018-09-25 PROCEDURE — 37000008 HC ANESTHESIA 1ST 15 MINUTES: Performed by: ORTHOPAEDIC SURGERY

## 2018-09-25 PROCEDURE — C1713 ANCHOR/SCREW BN/BN,TIS/BN: HCPCS | Performed by: ORTHOPAEDIC SURGERY

## 2018-09-25 PROCEDURE — 64447 NJX AA&/STRD FEMORAL NRV IMG: CPT | Performed by: ANESTHESIOLOGY

## 2018-09-25 PROCEDURE — 27201423 OPTIME MED/SURG SUP & DEVICES STERILE SUPPLY: Performed by: ORTHOPAEDIC SURGERY

## 2018-09-25 PROCEDURE — 25000003 PHARM REV CODE 250: Performed by: NURSE ANESTHETIST, CERTIFIED REGISTERED

## 2018-09-25 DEVICE — IMPLANTABLE DEVICE: Type: IMPLANTABLE DEVICE | Site: KNEE | Status: FUNCTIONAL

## 2018-09-25 DEVICE — CEMENT COBALT: Type: IMPLANTABLE DEVICE | Site: KNEE | Status: FUNCTIONAL

## 2018-09-25 DEVICE — PATELLA ALL POLY DOMED E 8X32: Type: IMPLANTABLE DEVICE | Site: KNEE | Status: FUNCTIONAL

## 2018-09-25 RX ORDER — LOPERAMIDE HYDROCHLORIDE 2 MG/1
2 CAPSULE ORAL CONTINUOUS PRN
Status: DISCONTINUED | OUTPATIENT
Start: 2018-09-25 | End: 2018-09-26 | Stop reason: HOSPADM

## 2018-09-25 RX ORDER — DEXAMETHASONE SODIUM PHOSPHATE 4 MG/ML
INJECTION, SOLUTION INTRA-ARTICULAR; INTRALESIONAL; INTRAMUSCULAR; INTRAVENOUS; SOFT TISSUE
Status: DISCONTINUED | OUTPATIENT
Start: 2018-09-25 | End: 2018-09-25

## 2018-09-25 RX ORDER — PROPOFOL 10 MG/ML
VIAL (ML) INTRAVENOUS
Status: DISCONTINUED | OUTPATIENT
Start: 2018-09-25 | End: 2018-09-25

## 2018-09-25 RX ORDER — MUPIROCIN 20 MG/G
OINTMENT TOPICAL
Status: DISCONTINUED | OUTPATIENT
Start: 2018-09-25 | End: 2018-09-25

## 2018-09-25 RX ORDER — ONDANSETRON 2 MG/ML
4 INJECTION INTRAMUSCULAR; INTRAVENOUS DAILY PRN
Status: DISCONTINUED | OUTPATIENT
Start: 2018-09-25 | End: 2018-09-25

## 2018-09-25 RX ORDER — BUPIVACAINE HYDROCHLORIDE 7.5 MG/ML
INJECTION, SOLUTION EPIDURAL; RETROBULBAR
Status: COMPLETED | OUTPATIENT
Start: 2018-09-25 | End: 2018-09-25

## 2018-09-25 RX ORDER — PREGABALIN 75 MG/1
75 CAPSULE ORAL 2 TIMES DAILY
Status: DISCONTINUED | OUTPATIENT
Start: 2018-09-25 | End: 2018-09-26 | Stop reason: HOSPADM

## 2018-09-25 RX ORDER — GLYCOPYRROLATE 0.2 MG/ML
INJECTION INTRAMUSCULAR; INTRAVENOUS
Status: DISCONTINUED | OUTPATIENT
Start: 2018-09-25 | End: 2018-09-25

## 2018-09-25 RX ORDER — OXYCODONE HCL 10 MG/1
10 TABLET, FILM COATED, EXTENDED RELEASE ORAL ONCE
Status: COMPLETED | OUTPATIENT
Start: 2018-09-25 | End: 2018-09-25

## 2018-09-25 RX ORDER — EPINEPHRINE 1 MG/ML
INJECTION, SOLUTION INTRACARDIAC; INTRAMUSCULAR; INTRAVENOUS; SUBCUTANEOUS
Status: DISCONTINUED | OUTPATIENT
Start: 2018-09-25 | End: 2018-09-25 | Stop reason: HOSPADM

## 2018-09-25 RX ORDER — HYDRALAZINE HYDROCHLORIDE 20 MG/ML
5 INJECTION INTRAMUSCULAR; INTRAVENOUS EVERY 4 HOURS PRN
Status: DISCONTINUED | OUTPATIENT
Start: 2018-09-25 | End: 2018-09-26 | Stop reason: HOSPADM

## 2018-09-25 RX ORDER — DEXTROSE MONOHYDRATE AND SODIUM CHLORIDE 5; .9 G/100ML; G/100ML
INJECTION, SOLUTION INTRAVENOUS CONTINUOUS
Status: DISCONTINUED | OUTPATIENT
Start: 2018-09-25 | End: 2018-09-25

## 2018-09-25 RX ORDER — PHENYLEPHRINE HYDROCHLORIDE 10 MG/ML
INJECTION INTRAVENOUS
Status: DISCONTINUED | OUTPATIENT
Start: 2018-09-25 | End: 2018-09-25

## 2018-09-25 RX ORDER — LIDOCAINE HYDROCHLORIDE 10 MG/ML
1 INJECTION, SOLUTION EPIDURAL; INFILTRATION; INTRACAUDAL; PERINEURAL ONCE
Status: COMPLETED | OUTPATIENT
Start: 2018-09-25 | End: 2018-09-25

## 2018-09-25 RX ORDER — MEPERIDINE HYDROCHLORIDE 50 MG/ML
12.5 INJECTION INTRAMUSCULAR; INTRAVENOUS; SUBCUTANEOUS ONCE AS NEEDED
Status: DISCONTINUED | OUTPATIENT
Start: 2018-09-25 | End: 2018-09-25

## 2018-09-25 RX ORDER — FENTANYL CITRATE 50 UG/ML
INJECTION, SOLUTION INTRAMUSCULAR; INTRAVENOUS
Status: DISCONTINUED | OUTPATIENT
Start: 2018-09-25 | End: 2018-09-25

## 2018-09-25 RX ORDER — SODIUM CHLORIDE 0.9 % (FLUSH) 0.9 %
5 SYRINGE (ML) INJECTION
Status: DISCONTINUED | OUTPATIENT
Start: 2018-09-25 | End: 2018-09-26 | Stop reason: HOSPADM

## 2018-09-25 RX ORDER — CEFAZOLIN SODIUM 2 G/50ML
2 SOLUTION INTRAVENOUS
Status: COMPLETED | OUTPATIENT
Start: 2018-09-25 | End: 2018-09-26

## 2018-09-25 RX ORDER — PROPOFOL 10 MG/ML
VIAL (ML) INTRAVENOUS CONTINUOUS PRN
Status: DISCONTINUED | OUTPATIENT
Start: 2018-09-25 | End: 2018-09-25

## 2018-09-25 RX ORDER — LIDOCAINE HCL/PF 100 MG/5ML
SYRINGE (ML) INTRAVENOUS
Status: DISCONTINUED | OUTPATIENT
Start: 2018-09-25 | End: 2018-09-25

## 2018-09-25 RX ORDER — DOCUSATE SODIUM 100 MG/1
100 CAPSULE, LIQUID FILLED ORAL EVERY 12 HOURS
Status: DISCONTINUED | OUTPATIENT
Start: 2018-09-25 | End: 2018-09-26 | Stop reason: HOSPADM

## 2018-09-25 RX ORDER — CEFAZOLIN SODIUM 2 G/50ML
2 SOLUTION INTRAVENOUS
Status: COMPLETED | OUTPATIENT
Start: 2018-09-25 | End: 2018-09-25

## 2018-09-25 RX ORDER — ACETAMINOPHEN 325 MG/1
650 TABLET ORAL EVERY 4 HOURS
Status: DISCONTINUED | OUTPATIENT
Start: 2018-09-25 | End: 2018-09-26 | Stop reason: HOSPADM

## 2018-09-25 RX ORDER — HYDROMORPHONE HYDROCHLORIDE 2 MG/ML
0.2 INJECTION, SOLUTION INTRAMUSCULAR; INTRAVENOUS; SUBCUTANEOUS EVERY 5 MIN PRN
Status: DISCONTINUED | OUTPATIENT
Start: 2018-09-25 | End: 2018-09-25

## 2018-09-25 RX ORDER — MIDAZOLAM HYDROCHLORIDE 1 MG/ML
INJECTION, SOLUTION INTRAMUSCULAR; INTRAVENOUS
Status: DISCONTINUED | OUTPATIENT
Start: 2018-09-25 | End: 2018-09-25

## 2018-09-25 RX ORDER — PREGABALIN 75 MG/1
75 CAPSULE ORAL ONCE
Status: COMPLETED | OUTPATIENT
Start: 2018-09-25 | End: 2018-09-25

## 2018-09-25 RX ORDER — FAMOTIDINE 20 MG/1
20 TABLET, FILM COATED ORAL 2 TIMES DAILY
Status: DISCONTINUED | OUTPATIENT
Start: 2018-09-25 | End: 2018-09-26 | Stop reason: HOSPADM

## 2018-09-25 RX ORDER — MUPIROCIN 20 MG/G
1 OINTMENT TOPICAL 2 TIMES DAILY
Status: DISCONTINUED | OUTPATIENT
Start: 2018-09-25 | End: 2018-09-26 | Stop reason: HOSPADM

## 2018-09-25 RX ORDER — SODIUM CHLORIDE 9 MG/ML
INJECTION, SOLUTION INTRAVENOUS CONTINUOUS
Status: DISCONTINUED | OUTPATIENT
Start: 2018-09-25 | End: 2018-09-25

## 2018-09-25 RX ORDER — ROPIVACAINE HYDROCHLORIDE 5 MG/ML
INJECTION, SOLUTION EPIDURAL; INFILTRATION; PERINEURAL
Status: DISCONTINUED | OUTPATIENT
Start: 2018-09-25 | End: 2018-09-25 | Stop reason: HOSPADM

## 2018-09-25 RX ORDER — LORAZEPAM 2 MG/ML
0.25 INJECTION INTRAMUSCULAR
Status: DISCONTINUED | OUTPATIENT
Start: 2018-09-25 | End: 2018-09-25

## 2018-09-25 RX ORDER — OXYCODONE HYDROCHLORIDE 5 MG/1
5 TABLET ORAL
Status: DISCONTINUED | OUTPATIENT
Start: 2018-09-25 | End: 2018-09-26 | Stop reason: HOSPADM

## 2018-09-25 RX ORDER — ANASTROZOLE 1 MG/1
1 TABLET ORAL DAILY
Status: DISCONTINUED | OUTPATIENT
Start: 2018-09-25 | End: 2018-09-26 | Stop reason: HOSPADM

## 2018-09-25 RX ORDER — ONDANSETRON 2 MG/ML
4 INJECTION INTRAMUSCULAR; INTRAVENOUS EVERY 12 HOURS PRN
Status: DISCONTINUED | OUTPATIENT
Start: 2018-09-25 | End: 2018-09-26 | Stop reason: HOSPADM

## 2018-09-25 RX ORDER — FERROUS SULFATE, DRIED 160(50) MG
1 TABLET, EXTENDED RELEASE ORAL DAILY
Status: DISCONTINUED | OUTPATIENT
Start: 2018-09-25 | End: 2018-09-26 | Stop reason: HOSPADM

## 2018-09-25 RX ORDER — SODIUM CHLORIDE 0.9 % (FLUSH) 0.9 %
3 SYRINGE (ML) INJECTION
Status: DISCONTINUED | OUTPATIENT
Start: 2018-09-25 | End: 2018-09-26 | Stop reason: HOSPADM

## 2018-09-25 RX ORDER — TRANEXAMIC ACID 100 MG/ML
INJECTION, SOLUTION INTRAVENOUS
Status: DISCONTINUED | OUTPATIENT
Start: 2018-09-25 | End: 2018-09-25

## 2018-09-25 RX ORDER — SODIUM CHLORIDE, SODIUM LACTATE, POTASSIUM CHLORIDE, CALCIUM CHLORIDE 600; 310; 30; 20 MG/100ML; MG/100ML; MG/100ML; MG/100ML
INJECTION, SOLUTION INTRAVENOUS CONTINUOUS
Status: DISCONTINUED | OUTPATIENT
Start: 2018-09-25 | End: 2018-09-25

## 2018-09-25 RX ORDER — ONDANSETRON HYDROCHLORIDE 2 MG/ML
INJECTION, SOLUTION INTRAMUSCULAR; INTRAVENOUS
Status: DISCONTINUED | OUTPATIENT
Start: 2018-09-25 | End: 2018-09-25

## 2018-09-25 RX ORDER — BUPIVACAINE HYDROCHLORIDE 5 MG/ML
INJECTION, SOLUTION EPIDURAL; INTRACAUDAL
Status: COMPLETED | OUTPATIENT
Start: 2018-09-25 | End: 2018-09-25

## 2018-09-25 RX ORDER — ACETAMINOPHEN 10 MG/ML
1000 INJECTION, SOLUTION INTRAVENOUS ONCE
Status: COMPLETED | OUTPATIENT
Start: 2018-09-25 | End: 2018-09-25

## 2018-09-25 RX ORDER — OXYCODONE HYDROCHLORIDE 10 MG/1
10 TABLET ORAL
Status: DISCONTINUED | OUTPATIENT
Start: 2018-09-25 | End: 2018-09-26 | Stop reason: HOSPADM

## 2018-09-25 RX ORDER — ENOXAPARIN SODIUM 100 MG/ML
40 INJECTION SUBCUTANEOUS EVERY 24 HOURS
Status: DISCONTINUED | OUTPATIENT
Start: 2018-09-25 | End: 2018-09-26 | Stop reason: HOSPADM

## 2018-09-25 RX ADMIN — MIDAZOLAM 2 MG: 1 INJECTION INTRAMUSCULAR; INTRAVENOUS at 08:09

## 2018-09-25 RX ADMIN — MUPIROCIN 1 G: 20 OINTMENT TOPICAL at 01:09

## 2018-09-25 RX ADMIN — PROPOFOL 50 MCG/KG/MIN: 10 INJECTION, EMULSION INTRAVENOUS at 08:09

## 2018-09-25 RX ADMIN — FENTANYL CITRATE 50 MCG: 50 INJECTION, SOLUTION INTRAMUSCULAR; INTRAVENOUS at 07:09

## 2018-09-25 RX ADMIN — ENOXAPARIN SODIUM 40 MG: 100 INJECTION SUBCUTANEOUS at 04:09

## 2018-09-25 RX ADMIN — DEXTROSE AND SODIUM CHLORIDE: 5; .9 INJECTION, SOLUTION INTRAVENOUS at 01:09

## 2018-09-25 RX ADMIN — TRANEXAMIC ACID 600 MG: 100 INJECTION, SOLUTION INTRAVENOUS at 09:09

## 2018-09-25 RX ADMIN — ONDANSETRON 4 MG: 2 INJECTION INTRAMUSCULAR; INTRAVENOUS at 04:09

## 2018-09-25 RX ADMIN — OXYCODONE HYDROCHLORIDE 10 MG: 10 TABLET, FILM COATED, EXTENDED RELEASE ORAL at 06:09

## 2018-09-25 RX ADMIN — ONDANSETRON 4 MG: 2 INJECTION, SOLUTION INTRAMUSCULAR; INTRAVENOUS at 09:09

## 2018-09-25 RX ADMIN — FENTANYL CITRATE 50 MCG: 50 INJECTION, SOLUTION INTRAMUSCULAR; INTRAVENOUS at 08:09

## 2018-09-25 RX ADMIN — BUPIVACAINE HYDROCHLORIDE 30 ML: 5 INJECTION, SOLUTION EPIDURAL; INTRACAUDAL; PERINEURAL at 07:09

## 2018-09-25 RX ADMIN — SODIUM CHLORIDE 1000 ML: 0.9 INJECTION, SOLUTION INTRAVENOUS at 07:09

## 2018-09-25 RX ADMIN — GLYCOPYRROLATE 0.2 MG: 0.2 INJECTION, SOLUTION INTRAMUSCULAR; INTRAVENOUS at 09:09

## 2018-09-25 RX ADMIN — LIDOCAINE HYDROCHLORIDE 10 MG: 10 INJECTION, SOLUTION EPIDURAL; INFILTRATION; INTRACAUDAL; PERINEURAL at 06:09

## 2018-09-25 RX ADMIN — ACETAMINOPHEN 650 MG: 325 TABLET, FILM COATED ORAL at 06:09

## 2018-09-25 RX ADMIN — SODIUM CHLORIDE, SODIUM LACTATE, POTASSIUM CHLORIDE, AND CALCIUM CHLORIDE: .6; .31; .03; .02 INJECTION, SOLUTION INTRAVENOUS at 07:09

## 2018-09-25 RX ADMIN — DOCUSATE SODIUM 100 MG: 100 CAPSULE, LIQUID FILLED ORAL at 09:09

## 2018-09-25 RX ADMIN — MULTIPLE VITAMINS W/ MINERALS TAB 1 TABLET: TAB at 01:09

## 2018-09-25 RX ADMIN — MUPIROCIN 1 G: 20 OINTMENT TOPICAL at 09:09

## 2018-09-25 RX ADMIN — DOCUSATE SODIUM 100 MG: 100 CAPSULE, LIQUID FILLED ORAL at 01:09

## 2018-09-25 RX ADMIN — ACETAMINOPHEN 650 MG: 325 TABLET, FILM COATED ORAL at 01:09

## 2018-09-25 RX ADMIN — ACETAMINOPHEN 1000 MG: 10 INJECTION, SOLUTION INTRAVENOUS at 07:09

## 2018-09-25 RX ADMIN — MIDAZOLAM 2 MG: 1 INJECTION INTRAMUSCULAR; INTRAVENOUS at 07:09

## 2018-09-25 RX ADMIN — MUPIROCIN: 20 OINTMENT TOPICAL at 07:09

## 2018-09-25 RX ADMIN — PROPOFOL 20 MG: 10 INJECTION, EMULSION INTRAVENOUS at 08:09

## 2018-09-25 RX ADMIN — PREGABALIN 75 MG: 75 CAPSULE ORAL at 06:09

## 2018-09-25 RX ADMIN — CEFAZOLIN SODIUM 2 G: 2 SOLUTION INTRAVENOUS at 08:09

## 2018-09-25 RX ADMIN — CALCIUM CARBONATE 500 MG (1,250 MG)-VITAMIN D3 200 UNIT TABLET 1 TABLET: at 01:09

## 2018-09-25 RX ADMIN — SODIUM CHLORIDE, SODIUM LACTATE, POTASSIUM CHLORIDE, AND CALCIUM CHLORIDE: .6; .31; .03; .02 INJECTION, SOLUTION INTRAVENOUS at 09:09

## 2018-09-25 RX ADMIN — BUPIVACAINE HYDROCHLORIDE 1.8 ML: 7.5 INJECTION, SOLUTION EPIDURAL; RETROBULBAR at 09:09

## 2018-09-25 RX ADMIN — LIDOCAINE HYDROCHLORIDE 50 MG: 20 INJECTION, SOLUTION INTRAVENOUS at 08:09

## 2018-09-25 RX ADMIN — PHENYLEPHRINE HYDROCHLORIDE 100 MCG: 10 INJECTION INTRAVENOUS at 09:09

## 2018-09-25 RX ADMIN — DEXAMETHASONE SODIUM PHOSPHATE 8 MG: 4 INJECTION, SOLUTION INTRAMUSCULAR; INTRAVENOUS at 09:09

## 2018-09-25 RX ADMIN — PREGABALIN 75 MG: 75 CAPSULE ORAL at 09:09

## 2018-09-25 RX ADMIN — ACETAMINOPHEN 650 MG: 325 TABLET, FILM COATED ORAL at 09:09

## 2018-09-25 RX ADMIN — CEFAZOLIN SODIUM 2 G: 2 SOLUTION INTRAVENOUS at 04:09

## 2018-09-25 RX ADMIN — FAMOTIDINE 20 MG: 20 TABLET, FILM COATED ORAL at 09:09

## 2018-09-25 NOTE — TRANSFER OF CARE
"Anesthesia Transfer of Care Note    Patient: Marce Hickey    Procedure(s) Performed: Procedure(s) (LRB):  ARTHROPLASTY, KNEE (Left)    Patient location: PACU    Anesthesia Type: spinal    Transport from OR: Transported from OR on room air with adequate spontaneous ventilation    Post pain: adequate analgesia    Post assessment: no apparent anesthetic complications and tolerated procedure well    Post vital signs: stable    Level of consciousness: awake, alert and oriented    Nausea/Vomiting: no nausea/vomiting    Complications: none    Transfer of care protocol was followed      Last vitals:   Visit Vitals  BP (!) 186/89   Pulse 68   Temp 37.1 °C (98.8 °F) (Temporal)   Resp 16   Ht 5' 4" (1.626 m)   Wt 66.7 kg (147 lb)   SpO2 100%   Breastfeeding? No   BMI 25.23 kg/m²     "

## 2018-09-25 NOTE — ANESTHESIA PROCEDURE NOTES
Spinal    Diagnosis: Left knee pain  Patient location during procedure: OR  Start time: 9/25/2018 8:43 AM  Timeout: 9/25/2018 8:42 AM  End time: 9/25/2018 8:50 AM  Staffing  Anesthesiologist: Teofilo Cadet MD  Preanesthetic Checklist  Completed: patient identified, site marked, surgical consent, pre-op evaluation, timeout performed, IV checked, risks and benefits discussed and monitors and equipment checked  Spinal Block  Patient position: sitting  Prep: ChloraPrep  Patient monitoring: heart rate and cardiac monitor  Approach: midline  Location: L3-4  Injection technique: single shot  CSF Fluid: clear free-flowing CSF  Needle  Needle type: Wilbert   Needle gauge: 25 G  Needle length: 3.5 in  Additional Documentation: incremental injection, negative aspiration for heme and no paresthesia on injection  Needle localization: anatomical landmarks  Assessment  Sensory level: T10   Dermatomal levels determined by alcohol wipe  Ease of block: easy  Patient's tolerance of the procedure: comfortable throughout block and no complaints  Additional Notes  Spinal block placed with 1.8 cc 0.75% hyperbaric bupivicaine.  Pt tolerated well.

## 2018-09-25 NOTE — ANESTHESIA POSTPROCEDURE EVALUATION
"Anesthesia Post Evaluation    Patient: Marce Hickey    Procedure(s) Performed: Procedure(s) (LRB):  ARTHROPLASTY, KNEE (Left)    Final Anesthesia Type: spinal  Patient location during evaluation: PACU  Patient participation: Yes- Able to Participate  Level of consciousness: awake and alert  Post-procedure vital signs: reviewed and stable  Pain management: adequate  Airway patency: patent  PONV status at discharge: No PONV  Anesthetic complications: no      Cardiovascular status: blood pressure returned to baseline and hemodynamically stable  Respiratory status: unassisted  Hydration status: euvolemic  Follow-up not needed.        Visit Vitals  BP (!) 186/89   Pulse 68   Temp 37.1 °C (98.8 °F) (Temporal)   Resp 16   Ht 5' 4" (1.626 m)   Wt 66.7 kg (147 lb)   SpO2 100%   Breastfeeding? No   BMI 25.23 kg/m²       Pain/Shaina Score: Pain Assessment Performed: Yes (9/25/2018  7:04 AM)  Presence of Pain: denies (9/25/2018  7:04 AM)  Pain Rating Prior to Med Admin: 0 (9/25/2018  7:07 AM)        "

## 2018-09-25 NOTE — OR NURSING
Pt arrived to preop and placed in bed. SCD to right leg and warm blankets provided. Periop process explained to both patient and spouse with verbalized understanding. Pts belongings tagged/bagged and brought to PACU.

## 2018-09-25 NOTE — ANESTHESIA PROCEDURE NOTES
Peripheral    Patient location during procedure: pre-op   Block not for primary anesthetic.  Reason for block: at surgeon's request and post-op pain management   Post-op Pain Location: Left knee pain  Start time: 9/25/2018 7:46 AM  Timeout: 9/25/2018 7:45 AM   End time: 9/25/2018 7:52 AM  Staffing  Anesthesiologist: Teofilo Cadet MD  Performed: anesthesiologist   Preanesthetic Checklist  Completed: patient identified, site marked, surgical consent, pre-op evaluation, timeout performed, IV checked, risks and benefits discussed and monitors and equipment checked  Peripheral Block  Patient position: supine  Prep: ChloraPrep  Patient monitoring: cardiac monitor and continuous pulse ox  Block type: adductor canal  Laterality: left  Injection technique: single shot  Needle  Needle type: Short-bevel   Needle gauge: 22 G  Needle length: 4 in  Needle localization: ultrasound guidance   -ultrasound image captured on disc.  Assessment  Injection assessment: negative aspiration, negative parasthesia and local visualized surrounding nerve  Paresthesia pain: none  Heart rate change: no  Slow fractionated injection: no  Additional Notes  Left AC block placed with 20 cc Exparel plus 10 cc 0.5% bupivicaine.  Pt tolerated well.

## 2018-09-25 NOTE — PLAN OF CARE
Problem: Patient Care Overview  Goal: Plan of Care Review  Outcome: Ongoing (interventions implemented as appropriate)  IS instructed to use Q1 hrs tolerates well

## 2018-09-25 NOTE — ANESTHESIA PREPROCEDURE EVALUATION
09/25/2018  Marce Hickey is a 68 y.o., female.    Anesthesia Evaluation    I have reviewed the Patient Summary Reports.    I have reviewed the Nursing Notes.      Review of Systems  Anesthesia Hx:  No problems with previous Anesthesia    Cardiovascular:   Hypertension, well controlled    Hepatic/GI:   GERD, well controlled    Endocrine:   Diabetes, well controlled, type 2        Physical Exam  General:  Well nourished    Airway/Jaw/Neck:  Airway Findings: Mallampati: II                Anesthesia Plan  Type of Anesthesia, risks & benefits discussed:  Anesthesia Type:  MAC, spinal  Patient's Preference:   Intra-op Monitoring Plan:   Intra-op Monitoring Plan Comments:   Post Op Pain Control Plan:   Post Op Pain Control Plan Comments:   Induction:   IV  Beta Blocker:  Patient is not currently on a Beta-Blocker (No further documentation required).       Informed Consent: Patient understands risks and agrees with Anesthesia plan.  Questions answered. Anesthesia consent signed with patient.  ASA Score: 2     Day of Surgery Review of History & Physical:    H&P update referred to the surgeon.         Ready For Surgery From Anesthesia Perspective.

## 2018-09-25 NOTE — OP NOTE
[facility]  Orthopedic Surgery  Operative Note    SUMMARY     Date of Procedure: 9/25/2018     Procedure: Procedure(s) (LRB):  ARTHROPLASTY, KNEE (Left)       Surgeon(s) and Role:     * Christos Montanez MD - Primary    Assistant: Felipe Damon    Pre-Operative Diagnosis: Arthritis of knee, left [M17.12]    Post-Operative Diagnosis: Post-Op Diagnosis Codes:     * Arthritis of knee, left [M17.12]    Anesthesia: Spinal    Complications: No    Estimated Blood Loss (EBL): 30 mL           Implants:   Implant Name Type Inv. Item Serial No.  Lot No. LRB No. Used   CEMENT [employer] - [id number]  CEMENT [employer]  DJO [id number] Left 2   PATELLA ALL POLY DOMED E 8X32 - [id number]  PATELLA ALL POLY DOMED E 8X32  DJO [id number] Left 1   FINNED BASEPLATE, NON-POROUS    DJO [id number] Left 1   FEMUR EMPOWR 3D 6 LEFT - [id number]  FEMUR EMPOWR 3D 6 LEFT  DJO [id number] Left 1   EMPOWR 3D KNEE TIBIAL INSERT    DJO [id number] Left 1       Tourniquet time: 54min at 300mmHg    Specimens:   Specimen (12h ago, onward)    None                  Condition: Good    Disposition: PACU - hemodynamically stable.    Attestation: I was present and scrubbed for the entire procedure.    INDICATIONS FOR THE PROCEDURE: A 68 female with a history of chronic   Left knee arthritis, had failed all conservative measures including cortisone   injections, PT, viscosupplementation and activity modification. After a long   discussion, the patient wished to proceed with the procedure above.     PROCEDURE IN DETAIL: Risks, benefits and alternatives of the procedure were   explained to the patient including, but not limited to damage to nerves,   arteries or blood vessels. Also explained risk of infection, stiffness, DVT, PE,   polyethylene wear as well as anesthetic complications including seizure, stroke,   heart attack and death, understood this and signed informed consent. The   patient's Left knee was marked prior to coming to the  Operating Room. The patient was brought to the operating room, placed on the operating table in a supine position.A  formal timeout was done in which correct patient, procedure and op site were all   correctly identified and confirmed by the entire operating team.  2gm Ancef was given prior to surgical incision. Spinal  anesthesia was induced. The patient'sLeft  lower extremity was prepped and   draped in normal sterile fashion. TheLeft  leg was exsanguinated with an   Esmarch. Tourniquet was inflated up 300 mmHg. Standard anterior approach to   the knee was made. Medial parapatellar arthrotomy was made, leaving about 1/8   inch of tissue for later repair. Proximal medial tibial release was performed,   making sure not to release any of the MCL. We then   everted the patella and reflexed the knee. Fat pad was excised as well as some   of the ACL and PCL. Soft tissue off the distal anterior femur was removed for   visualization, so as to help prevent notching. IM canal was drilled and suctioned. IM guide set on 5 degrees was inserted and pinned into place. 10mm distal femoral cut was made.  We then sized the femur to a 6. This was parallel to the epicondylar axis and 3 degrees off the posterior condylar axis.  We then used the previous hole, as made by the block, placed the 4-in-1 block for the size 6. I then made our   4-in-1 cuts.  After   this was done, we turned our attention to the tibia. Ankle clamp and stylus were used to measure 2 mm off the most affected lateral side but was thin so 2mm more was selected.  Cutting block was placed with a drop walter and this went   perfectly down the center of the tibial crest, down to the ankle. A tibial cut   was then made. We then checked our gaps, a 10-spacer was able to be placed both   in extension and flexion with equal varus valgus balancing in both. We then   checked posteriorly and removed posterior femoral osteophytes, so we   decided to trial. Trial components were  placed with a 10 meniscal bearing. The   patient had good balancing again in varus valgus in both flexion and extension.   We turned our attention to patella, caliper was used on the patella where it   measured 26. We set guide at 18 and made our 8-mm cut for polyethylene component, 32 was selected. Then drill holes were placed and the patellar button was placed.   This had good tracking. No need for a lateral release and with no hand tests,   it stayed centered the whole time. We then marked our tibial rotation. We then   removed everything except the size 6- tibial tray. We then checked our tibial tray   with a drop walter again and then marked our rotation and pinned it into place then   drilled, and then punched for our keel. We then started preparing final   components on the back table. Posterior capsule was injected with our local   Cocktail. Bony surfaces   copiously irrigated with Pulsavac and then thoroughly dried. Once the cement   was the appropriate consistency, first the tibia and then the femur were   cemented into place, removing excess cement. A 10 trial was placed. The knee   was held in compression and then the patella was placed. Cement was allowed to   cure. Once the cement was cured, we then removed excess cement. Again trialed   one final time, again seeing a 10. We then  place the   final 10 meniscal bearing into place. After this was done, we then did our   diluted iodine soak for 3 minutes and then proceeded with closing.  Arthrotomy was closed using our StrataFix.   Subcutaneous tissue was closed using 2-0 Vicryl and skin was using a running 3-0   StrataFix and Dermabond.Instrument, sponge, and needle counts were correct prior to wound closure and at the conclusion of the case.  Sterile dressing was applied including a Cryo/Cuff.   They were extubated, awakened and transferred from the Operating Room to the   Recovery Room in stable condition.                   \

## 2018-09-25 NOTE — NURSING
Pt arrived to room 404. B/p elevated. Ace wrap at L knee c/d/i. Cryo in place. AAO. Dr Leon notified of consult via secure chat

## 2018-09-25 NOTE — H&P
Past Medical History:   Diagnosis Date    Anxiety      Depression      Diabetes mellitus, type 2       Borderline    Fatty liver disease, nonalcoholic      GERD (gastroesophageal reflux disease)      Hyperlipidemia      Hypertension      Plantar fasciitis of right foot                 Past Surgical History:   Procedure Laterality Date    breast reduction         SECTION         x 2    CHOLECYSTECTOMY        FOOT SURGERY         left bunionectomy    gastric sleve        HYSTERECTOMY         one ovary intact, adhesions    LIPOSUCTION        TONSILLECTOMY                  Current Outpatient Prescriptions   Medication Sig    anastrozole (ARIMIDEX) 1 mg Tab TAKE 1 TABLET DAILY    calcium-vitamin D3 500 mg(1,250mg) -200 unit per tablet once daily. Patient uses a patch, not oral medication    multivitamin with minerals tablet Take 1 tablet by mouth once daily.    oxyCODONE-acetaminophen (PERCOCET)  mg per tablet Take 1 tablet by mouth every 4 (four) hours as needed for Pain.    senna-docusate 8.6-50 mg (PERICOLACE) 8.6-50 mg per tablet Take 1 tablet by mouth 2 (two) times daily.    traMADol (ULTRAM) 50 mg tablet Take 1 tablet (50 mg total) by mouth every 6 (six) hours as needed for Pain.    vitamin D 1000 units Tab Take 2,000 Units by mouth once daily. Patient uses a patch, not oral medication    ZEGERID 40-1.1 mg-gram per capsule TAKE 1 CAPSULE BEFORE BREAKFAST (Patient taking differently: TAKE 1 CAPSULE BEFORE BREAKFAST with bio carb)    triamcinolone acetonide 0.1% (KENALOG) 0.1 % ointment Apply topically 2 (two) times daily.      No current facility-administered medications for this visit.               Review of patient's allergies indicates:   Allergen Reactions    Nsaids (non-steroidal anti-inflammatory drug) Anaphylaxis               Family History   Problem Relation Age of Onset    Hypertension Mother      Heart disease Mother           pacemaker    Heart attack Mother       Heart disease Father      Psoriasis Father      Cancer Neg Hx           Social History               Social History    Marital status:        Spouse name: N/A    Number of children: N/A    Years of education: N/A          Occupational History    Not on file.             Social History Main Topics    Smoking status: Former Smoker       Quit date: 11/27/1993    Smokeless tobacco: Never Used         Comment: quit 1993    Alcohol use No    Drug use: No    Sexual activity: Yes       Birth control/ protection: Surgical           Other Topics Concern    Not on file          Social History Narrative    No narrative on file            Chief Complaint:       Chief Complaint   Patient presents with    Left Knee - Pain         History of present illness:  This is a 68-year-old female seen for left knee pain.  Patient has had pain for years.  She had an injection back in 2016.  Does not really recall the injection helped very much.  Patient notes more weakness and pain in her hip now.  Symptoms are worsening and moderate to severe.  Pain with walking.  She also had a prior knee scope on this knee as well. Pain is laterally located.  She rates the pain as a 4/10.        Answers for HPI/ROS submitted by the patient on 7/18/2018   Leg pain  unexpected weight change: No  appetite change : No  sleep disturbance: No  IMMUNOCOMPROMISED: No  nervous/ anxious: No  dysphoric mood: No  rash: No  visual disturbance: No  eye redness: No  eye pain: No  ear pain: No  tinnitus: No  hearing loss: No  sinus pressure : No  nosebleeds: No  enviro allergies: No  food allergies: No  cough: No  shortness of breath: No  sweating: No  dysuria: No  frequency: No  difficulty urinating: No  hematuria: No  painful intercourse: No  chest pain: No  palpitations: No  nausea: No  vomiting: No  diarrhea: No  blood in stool: No  constipation: No  headaches: No  dizziness: No  numbness: No  seizures: No  joint swelling: Yes  myalgia:  Yes  weakness: No  back pain: Yes  Pain Chronicity: chronic  History of trauma: No  Onset: more than 1 year ago  Frequency: daily  Progression since onset: gradually worsening  Injury mechanism: running  injury location: exercising  pain- numeric: 4/10  pain location: left knee  pain quality: dull  Radiating Pain: Yes  If your pain is radiating, to what part of the body?: left thigh, lower back  Aggravating factors: activity, bending, exercise, standing, twisting, walking, lying down, sitting  fever: No  inability to bear weight: Yes  itching: No  joint locking: No  limited range of motion: No  stiffness: Yes  tingling: No  Treatments tried: chiropractic manipulation, OTC ointments, rest  physical therapy: ineffective  Improvement on treatment: mild        Physical Examination:     Vital Signs:    Vitals:     07/23/18 1348   BP: (!) 153/81   Pulse: 87         Body mass index is 25.4 kg/m².     This a well-developed, well nourished patient in no acute distress.  They are alert and oriented and cooperative to examination.  Pt. walks without an antalgic gait.       Examination of the left knee shows no rashes or erythema. There are no masses ecchymosis or effusion. Patient has full range of motion from 0-130°. Patient is moderately tender to palpation over lateral joint line and nontender to palpation over the medial joint line. Patient has a - Lachman exam, - anterior drawer exam, and - posterior drawer exam. - Desiree's exam. Knee is stable to varus and valgus stress. 5 out of 5 motor strength. Palpable distal pulses. Intact light touch sensation. Negative Patellofemoral crepitus     Examination of the right knee shows no rashes or erythema. There are no masses ecchymosis or effusion. Patient has full range of motion from 0-130°. Patient is nontender to palpation over lateral joint line and nontender to palpation over the medial joint line. Patient has a - Lachman exam, - anterior drawer exam, and - posterior drawer  exam. - Desiree's exam. Knee is stable to varus and valgus stress. 5 out of 5 motor strength. Palpable distal pulses. Intact light touch sensation. Negative Patellofemoral crepitus     Heart is regular rate without obvious murmurs   Normal respiratory effort without audible wheezing  Abdomen is soft and nontender         X-rays:  X-rays left knee are ordered and reviewed which show severe lateral joint space narrowing of the left knee      Assessment::  Severe left knee arthritis with valgus aligned     Plan:  I reviewed the findings with her today.  We talked about knee replacement in great detail. We talked about the recovery and the risks of the surgery. Patient would like to go ahead and proceed.  We will schedule her for a left DJO total knee arthroplasty. Risks, benefits, and alternatives to the procedure were explained to the patient including but not limited to damage to nerves, arteries, blood vessels, bones, tendons, ligaments, stiffness, instability, infection, DVT, PE, as well as general anesthetic complications including seizure, stroke, heart attack and even death. The patient understood these risks and wished to proceed and signed the informed consent.         This note was created using [email or web address] voice recognition software that occasionally misinterpreted phrases or words.     Consult note is delivered via Epic messaging service.

## 2018-09-25 NOTE — PLAN OF CARE
Problem: Patient Care Overview  Goal: Plan of Care Review  Outcome: Ongoing (interventions implemented as appropriate)  Pt remains free from injury. Pt ambulates frequently around nurses station without difficulty. Pain well controlled. Ace wrap L leg c/d/i. scds in place with cryo and overhead trapeze. Pt bp now stable with no prn med needed. AAO. Bed locked and low. Call light in reach. Nurse hourly rounding to ensure pt safety.

## 2018-09-25 NOTE — UM SECONDARY REVIEW
Physician Advisor External    Level of Care Issue    Approved Inpatient for admit 9/25/2018 per Dr. Carreon at [facility].  Level of care discussed with Felix Alejandra.  IP order received.

## 2018-09-25 NOTE — PT/OT/SLP EVAL
Physical Therapy Evaluation    Patient Name:  Marce Hickey   MRN:  [id number]    Recommendations:     Discharge Recommendations:  outpatient PT   Discharge Equipment Recommendations: none   Barriers to discharge: None    Assessment:     Marce Hickey is a 68 y.o. female admitted with a medical diagnosis of Arthritis of knee, left.  She presents with the following impairments/functional limitations:  weakness, gait instability, decreased ROM, impaired balance, decreased lower extremity function, impaired joint extensibility, decreased safety awareness, pain, impaired skin, orthopedic precautions, impaired functional mobilty.  During PT evaluation, pt was able to perform LE exercises, as well as ambulate 250ft with RW and CGA.  She is recommended to d/c home with HHPT.    Rehab Prognosis:  Excellent; patient would benefit from acute skilled PT services to address these deficits and reach maximum level of function.      Recent Surgery: Procedure(s) (LRB):  ARTHROPLASTY, KNEE (Left) Day of Surgery    Plan:     During this hospitalization, patient to be seen BID to address the above listed problems via therapeutic activities, therapeutic exercises, gait training  · Plan of Care Expires:  10/09/18   Plan of Care Reviewed with: patient, spouse    Subjective     Communicated with JOSEPH Monroe prior to session.  Patient found supine in bed upon PT entry to room, and agreeable to evaluation.   present.    Chief Complaint: Lightheaded and nauseated after ambulation  Patient comments/goals: Pt wants to ambulate better and go home.   Pain/Comfort:  · Pain Rating 1: 3/10  · Location - Side 1: Left  · Location 1: knee  · Pain Addressed 1: Pre-medicate for activity, Reposition, Distraction  · Pain Rating Post-Intervention 1: 3/10    Patients cultural, spiritual, Anabaptism conflicts given the current situation: none    Living Environment:  Pt lives with her  in a 1 level home with 1 entry step.    Prior to  admission, patients level of function was independent, driving, unlimited community ambulator.  Patient has the following equipment: none.  DME owned (not currently used): rolling walker, single point cane and bedside commode.  Upon discharge, patient will have assistance from .    Objective:     Patient found with: peripheral IV, cryotherapy     General Precautions: Standard, fall   Orthopedic Precautions:LLE weight bearing as tolerated   Braces: N/A     Exams:  · Cognitive Exam:  Patient is oriented to Person, Place, Time and Situation  · Postural Exam:  Patient presented with the following abnormalities:    · -       Rounded shoulders  · -       Forward head  · RUE ROM: WFL  · RUE Strength: WFL  · LUE ROM: WFL  · LUE Strength: WFL  · RLE ROM: WFL  · RLE Strength: WFL  · LLE ROM: Deficits: knee extension lacking 8* from full extension, and knee flexion to 80* sitting on the EOB.   · LLE Strength: Grossly 3/5    Functional Mobility:  · Bed Mobility:     · Supine to Sit: supervision and bed flat with increased time.  · Transfers:     · Sit to Stand:  contact guard assistance with rolling walker and vc's for hand placement and increased time.   · Gait: 250ft with RW and CGA.  She demonstrates decreased terminal knee extension, and requires cues to look forward.  She progressed to a smooth reciprocal step through pattern.    · Balance: dynamic standing balance: Fair    AM-PAC 6 CLICK MOBILITY  Total Score:20       Therapeutic Activities and Exercises:   As above.  Pt was able to complete AP's x 20 reps, Quad set and glut set bilaterally x 10 reps with 5 sec hold, and L LE SLR x 10 reps.  After ambulation, pt was positioned with feet elevated in the recliner chair, however, she began to c/o lightheadedness and nausea, and was noted to be pale.  Therefore, PT assisted pt from the chair, back into supine position in the bed, and RN notified.      Patient left HOB elevated with all lines intact, call button in  JOSEPH munson Torrey notified and  present.    GOALS:   Multidisciplinary Problems     Physical Therapy Goals        Problem: Physical Therapy Goal    Goal Priority Disciplines Outcome Goal Variances Interventions   Physical Therapy Goal     PT, PT/OT Ongoing (interventions implemented as appropriate)                     History:     Past Medical History:   Diagnosis Date    Anxiety     Cancer     breast cancer - left    Depression     Diabetes mellitus, type 2     Borderline    Fatty liver disease, nonalcoholic     GERD (gastroesophageal reflux disease)     Hyperlipidemia     Hypertension     Plantar fasciitis of right foot        Past Surgical History:   Procedure Laterality Date    BIOPSY-SENTINEL NODE Left 2017    [facility] at [facility]    breast reduction      BREAST SURGERY Bilateral     masectomy    BREAST SURGERY Bilateral     implants     SECTION      x 2    CHOLECYSTECTOMY      COLONOSCOPY N/A 10/10/2014    Performed by [facility]aitanya Ro [facility] at [facility] ENDO    CREATION OR REVISION, INFRAMAMMARY FOLD Left 2018    [facility] at [facility] OR 2ND FLR    DISSECTION-AXILLARY LYMPH NODE Left 2017    [facility] at [facility] OR    EXCHANGE IMPLANT-BREAST Bilateral 2017    [facility] at [facility] OR 2ND FLR    FOOT SURGERY      left bunionectomy    GASTRECTOMY-SLEEVE-LAPAROSCOPIC N/A 2017    [facility]al [facility] at [facility] OR    gastric sleve      HYSTERECTOMY      one ovary intact, adhesions    INJECTION-FAT FAT TRANSFER Bilateral 3/6/2018    [facility] at [facility] OR 2ND FLR    KNEE ARTHROSCOPY Left     LIPOSUCTION      MASTECTOMY Bilateral 2017    [facility]formed [facility] at [facility]    RECONSTRUCTION-BREAST Bilateral 2017    Performed by [facility]rell [facility] at [facility]    RECONSTRUCTION-BREAST/REVISION-FLAP  Soft Tissue Revision Bilateral 9/25/2017    [facility] 2ND FLR    ROTATOR CUFF REPAIR Right     TONSILLECTOMY         Clinical Decision Making:     History  Co-morbidities and personal factors that may impact the plan of care Examination  Body Structures and Functions, activity limitations and participation restrictions that may impact the plan of care Clinical Presentation   Decision Making/ Complexity Score   Co-morbidities:   [] Time since onset of injury / illness / exacerbation  [] Status of current condition  []Patient's cognitive status and safety concerns    [] Multiple Medical Problems (see med hx)  Personal Factors:   [] Patient's age  [] Prior Level of function   [] Patient's home situation (environment and family support)  [] Patient's level of motivation  [] Expected progression of patient      HISTORY:(criteria)    [] 83894 - no personal factors/history    [] 82479 - has 1-2 personal factor/comorbidity     [] 82204 - has >3 personal factor/comorbidity     Body Regions:  [] Objective examination findings  [] Head     []  Neck  [] Trunk   [] Upper Extremity  [] Lower Extremity    Body Systems:  [] For communication ability, affect, cognition, language, and learning style: the assessment of the ability to make needs known, consciousness, orientation (person, place, and time), expected emotional /behavioral responses, and learning preferences (eg, learning barriers, education  needs)  [] For the neuromuscular system: a general assessment of gross coordinated movement (eg, balance, gait, locomotion, transfers, and transitions) and motor function  (motor control and motor learning)  [] For the musculoskeletal system: the assessment of gross symmetry, gross range of motion, gross strength, height, and weight  [] For the integumentary system: the assessment of pliability(texture), presence of scar formation, skin color, and skin integrity  [] For  cardiovascular/pulmonary system: the assessment of heart rate, respiratory rate, blood pressure, and edema     Activity limitations:    [] Patient's cognitive status and saf ety concerns          [] Status of current condition      [] Weight bearing restriction  [] Cardiopulmunary Restriction    Participation Restrictions:   [] Goals and goal agreement with the patient     [] Rehab potential (prognosis) and probable outcome      Examination of Body System: (criteria)    [] 81460 - addressing 1-2 elements    [] 73921 - addressing a total of 3 or more elements     [] 71878 -  Addressing a total of 4 or more elements         Clinical Presentation: (criteria)  Choose one     On examination of body system using standardized tests and measures patient presents with (CHOOSE ONE) elements from any of the following: body structures and functions, activity limitations, and/or participation restrictions.  Leading to a clinical presentation that is considered (CHOOSE ONE)                              Clinical Decision Making  (Eval Complexity):  Choose One     Time Tracking:     PT Received On: 09/25/18  PT Start Time: 1359     PT Stop Time: 1437  PT Total Time (min): 38 min     Billable Minutes: Evaluation 10, Gait Training 10, Therapeutic Activity 8 and Therapeutic Exercise 10      Carmel Salinas, PT  09/25/2018

## 2018-09-25 NOTE — NURSING
Pt to be seen by Debbie Ordonez. I just spoke with her.prn orders for HTN received and to d/c IV fluids.

## 2018-09-26 VITALS
TEMPERATURE: 98 F | BODY MASS INDEX: 25.1 KG/M2 | RESPIRATION RATE: 18 BRPM | WEIGHT: 147 LBS | OXYGEN SATURATION: 100 % | DIASTOLIC BLOOD PRESSURE: 68 MMHG | SYSTOLIC BLOOD PRESSURE: 144 MMHG | HEART RATE: 85 BPM | HEIGHT: 64 IN

## 2018-09-26 LAB
ANION GAP SERPL CALC-SCNC: 8 MMOL/L
BASOPHILS # BLD AUTO: 0 K/UL
BASOPHILS NFR BLD: 0.2 %
BUN SERPL-MCNC: 15 MG/DL
CALCIUM SERPL-MCNC: 9.4 MG/DL
CHLORIDE SERPL-SCNC: 108 MMOL/L
CO2 SERPL-SCNC: 25 MMOL/L
CREAT SERPL-MCNC: 0.8 MG/DL
DIFFERENTIAL METHOD: ABNORMAL
EOSINOPHIL # BLD AUTO: 0 K/UL
EOSINOPHIL NFR BLD: 0.2 %
ERYTHROCYTE [DISTWIDTH] IN BLOOD BY AUTOMATED COUNT: 13.4 %
EST. GFR  (AFRICAN AMERICAN): >60 ML/MIN/1.73 M^2
EST. GFR  (NON AFRICAN AMERICAN): >60 ML/MIN/1.73 M^2
GLUCOSE SERPL-MCNC: 184 MG/DL
HCT VFR BLD AUTO: 33 %
HGB BLD-MCNC: 11 G/DL
LYMPHOCYTES # BLD AUTO: 1.4 K/UL
LYMPHOCYTES NFR BLD: 19.9 %
MCH RBC QN AUTO: 28.6 PG
MCHC RBC AUTO-ENTMCNC: 33.3 G/DL
MCV RBC AUTO: 86 FL
MONOCYTES # BLD AUTO: 0.8 K/UL
MONOCYTES NFR BLD: 11.5 %
NEUTROPHILS # BLD AUTO: 4.6 K/UL
NEUTROPHILS NFR BLD: 68.2 %
PLATELET # BLD AUTO: 145 K/UL
PMV BLD AUTO: 10.2 FL
POTASSIUM SERPL-SCNC: 4.3 MMOL/L
RBC # BLD AUTO: 3.84 M/UL
SODIUM SERPL-SCNC: 141 MMOL/L
WBC # BLD AUTO: 6.8 K/UL

## 2018-09-26 PROCEDURE — 63600175 PHARM REV CODE 636 W HCPCS: Performed by: ORTHOPAEDIC SURGERY

## 2018-09-26 PROCEDURE — G8987 SELF CARE CURRENT STATUS: HCPCS | Mod: CJ

## 2018-09-26 PROCEDURE — G8980 MOBILITY D/C STATUS: HCPCS | Mod: CI | Performed by: PHYSICAL THERAPIST

## 2018-09-26 PROCEDURE — 85025 COMPLETE CBC W/AUTO DIFF WBC: CPT

## 2018-09-26 PROCEDURE — 25000003 PHARM REV CODE 250: Performed by: ORTHOPAEDIC SURGERY

## 2018-09-26 PROCEDURE — 97535 SELF CARE MNGMENT TRAINING: CPT

## 2018-09-26 PROCEDURE — 97110 THERAPEUTIC EXERCISES: CPT | Performed by: PHYSICAL THERAPIST

## 2018-09-26 PROCEDURE — G8978 MOBILITY CURRENT STATUS: HCPCS | Mod: CI | Performed by: PHYSICAL THERAPIST

## 2018-09-26 PROCEDURE — 97530 THERAPEUTIC ACTIVITIES: CPT | Performed by: PHYSICAL THERAPIST

## 2018-09-26 PROCEDURE — 25000003 PHARM REV CODE 250: Performed by: ANESTHESIOLOGY

## 2018-09-26 PROCEDURE — 97116 GAIT TRAINING THERAPY: CPT | Performed by: PHYSICAL THERAPIST

## 2018-09-26 PROCEDURE — G8979 MOBILITY GOAL STATUS: HCPCS | Mod: CI | Performed by: PHYSICAL THERAPIST

## 2018-09-26 PROCEDURE — 36415 COLL VENOUS BLD VENIPUNCTURE: CPT

## 2018-09-26 PROCEDURE — G8988 SELF CARE GOAL STATUS: HCPCS | Mod: CI

## 2018-09-26 PROCEDURE — 80048 BASIC METABOLIC PNL TOTAL CA: CPT

## 2018-09-26 PROCEDURE — 97165 OT EVAL LOW COMPLEX 30 MIN: CPT

## 2018-09-26 RX ORDER — MUPIROCIN 20 MG/G
1 OINTMENT TOPICAL 2 TIMES DAILY
Qty: 1 TUBE | Refills: 0 | Status: SHIPPED | OUTPATIENT
Start: 2018-09-26 | End: 2018-10-01

## 2018-09-26 RX ORDER — OXYCODONE AND ACETAMINOPHEN 10; 325 MG/1; MG/1
1 TABLET ORAL EVERY 6 HOURS PRN
Qty: 15 TABLET | Refills: 0 | Status: SHIPPED | OUTPATIENT
Start: 2018-09-26 | End: 2018-09-26 | Stop reason: HOSPADM

## 2018-09-26 RX ORDER — OXYCODONE AND ACETAMINOPHEN 10; 325 MG/1; MG/1
1 TABLET ORAL EVERY 8 HOURS PRN
Qty: 15 TABLET | Refills: 0 | Status: SHIPPED | OUTPATIENT
Start: 2018-09-26 | End: 2018-10-23 | Stop reason: ALTCHOICE

## 2018-09-26 RX ORDER — ENOXAPARIN SODIUM 100 MG/ML
40 INJECTION SUBCUTANEOUS DAILY
Qty: 4 ML | Refills: 0 | Status: SHIPPED | OUTPATIENT
Start: 2018-09-26 | End: 2018-10-06

## 2018-09-26 RX ORDER — ASPIRIN 325 MG
325 TABLET ORAL 2 TIMES DAILY
Qty: 60 TABLET | Refills: 0 | COMMUNITY
Start: 2018-09-26 | End: 2018-09-26 | Stop reason: HOSPADM

## 2018-09-26 RX ADMIN — ACETAMINOPHEN 650 MG: 325 TABLET, FILM COATED ORAL at 02:09

## 2018-09-26 RX ADMIN — DOCUSATE SODIUM 100 MG: 100 CAPSULE, LIQUID FILLED ORAL at 09:09

## 2018-09-26 RX ADMIN — ENOXAPARIN SODIUM 40 MG: 100 INJECTION SUBCUTANEOUS at 04:09

## 2018-09-26 RX ADMIN — OXYCODONE HYDROCHLORIDE 10 MG: 10 TABLET ORAL at 01:09

## 2018-09-26 RX ADMIN — CALCIUM CARBONATE 500 MG (1,250 MG)-VITAMIN D3 200 UNIT TABLET 1 TABLET: at 09:09

## 2018-09-26 RX ADMIN — MUPIROCIN 1 G: 20 OINTMENT TOPICAL at 09:09

## 2018-09-26 RX ADMIN — OXYCODONE HYDROCHLORIDE 15 MG: 10 TABLET ORAL at 04:09

## 2018-09-26 RX ADMIN — CEFAZOLIN SODIUM 2 G: 2 SOLUTION INTRAVENOUS at 01:09

## 2018-09-26 RX ADMIN — ACETAMINOPHEN 650 MG: 325 TABLET, FILM COATED ORAL at 10:09

## 2018-09-26 RX ADMIN — MULTIPLE VITAMINS W/ MINERALS TAB 1 TABLET: TAB at 09:09

## 2018-09-26 RX ADMIN — ACETAMINOPHEN 650 MG: 325 TABLET, FILM COATED ORAL at 01:09

## 2018-09-26 RX ADMIN — OXYCODONE HYDROCHLORIDE 10 MG: 10 TABLET ORAL at 09:09

## 2018-09-26 NOTE — PLAN OF CARE
[facility] sent home health referral to [facility] via [facility] system    The referral for the patient in [facility] [id number], room 404, bed 404 A admitted at 9/25/2018 6:21 AM has been updated by [email or web address].  Update: Accepted.

## 2018-09-26 NOTE — PLAN OF CARE
Appointtment with Dr. Shane scheduled for 10-11 @ 9:15 a.m. Updated the AVS.     1;30 p.m - attempted to schedule 2 week hospital follow up with Dr. Garvey.  Was informed the nurse would call pt with the appointment time.  Updated the AVS and pt's nurse.        09/26/18 1007   Discharge Reassessment   Assessment Type Discharge Planning Reassessment

## 2018-09-26 NOTE — PROGRESS NOTES
Lovenox script transferred from [facility] to [facility] on [address]. Pt and  updated. Pt states that she has a neighbor willing to give her the injection for 10 days. Discharge instructions given. Pt verbalized understanding. Peripheral IV removed. Afebrile and VS stable. Denies pain at this time. Pt transferred to vehicle via wheelchair by staff. All belongings sent.

## 2018-09-26 NOTE — PT/OT/SLP EVAL
Occupational Therapy   Evaluation    Name: Marce Hickey  MRN: [id number]  Admitting Diagnosis:  Arthritis of knee, left 1 Day Post-Op    Recommendations:     Discharge Recommendations: home health PT  Discharge Equipment Recommendations:  none  Barriers to discharge:  None    History:     Occupational Profile:  Living Environment: Pt lives with  in [facility] with 1 [address]AN[place].   Previous level of function: Pt was independent with ADLs and mobility.  Equipment Used at Home:  none(pt has RW, straight cane, and BSC but does not use them at home.)  Assistance upon Discharge: Pt will receive assistance from .    Past Medical History:   Diagnosis Date    Anxiety     Cancer     breast cancer - left    Depression     Diabetes mellitus, type 2     Borderline    Fatty liver disease, nonalcoholic     GERD (gastroesophageal reflux disease)     Hyperlipidemia     Hypertension     Plantar fasciitis of right foot        Past Surgical History:   Procedure Laterality Date    BIOPSY-SENTINEL NODE Left 2017    Performed by [facility]on [facility] at [facility] OR    breast reduction      BREAST SURGERY Bilateral     masectomy    BREAST SURGERY Bilateral     implants     SECTION      x 2    CHOLECYSTECTOMY      COLONOSCOPY N/A 10/10/2014    Performed by Chaitanya Ro MD at [facility] ENDO    CREATION OR REVISION, INFRAMAMMARY FOLD Left 2018    Performed by [facility]rell [facility] at [facility] OR 2ND FLR    DISSECTION-AXILLARY LYMPH NODE Left 2017    Performed by [facility]on [facility] at [facility] OR    EXCHANGE IMPLANT-BREAST Bilateral 2017    Performed by [facility] at [facility] OR 2ND FLR    FOOT SURGERY      left bunionectomy    GASTRECTOMY-SLEEVE-LAPAROSCOPIC N/A 2017    Performed by Nelson Panchal MD at [facility] OR    gastric sleve      HYSTERECTOMY      one ovary intact, adhesions    INJECTION-FAT FAT TRANSFER Bilateral 3/6/2018    Performed by Montrell Thomas MD at  "[facility] OR 2ND FLR    KNEE ARTHROSCOPY Left     LIPOSUCTION      MASTECTOMY Bilateral 5/4/2017    Performed by [facility] at [facility] OR    RECONSTRUCTION-BREAST Bilateral 5/4/2017    Performed by Montrell Thomas MD at [facility] OR    RECONSTRUCTION-BREAST/REVISION-FLAP Soft Tissue Revision Bilateral 9/25/2017    Performed by Montrell Thomas [facility] at [facility] OR 2ND FLR    ROTATOR CUFF REPAIR Right     TONSILLECTOMY         Subjective     Chief Complaint: none  Patient/Family Comments/goals: "I can get out of bed myself."    Pain/Comfort:  · Pain Rating 1: 1/10  · Location - Side 1: Left  · Location - Orientation 1: generalized  · Location 1: knee  · Pain Addressed 1: Reposition, Distraction, Nurse notified  · Pain Rating Post-Intervention 1: 4/10    Patients cultural, spiritual, Gnosticism conflicts given the current situation:      Objective:     Communicated with: nurse Clancy prior to session.  Patient found all lines intact, call button in reach and  present and cryotherapy upon OT entry to room.    General Precautions: Standard, fall   Orthopedic Precautions:LLE weight bearing as tolerated   Braces: N/A     Occupational Performance:    Bed Mobility:    · Patient completed Scooting/Bridging with supervision  · Patient completed Supine to Sit with supervision  · Patient completed Sit to Supine with supervision    Functional Mobility/Transfers:  · Patient completed Sit <> Stand Transfer with supervision  with  rolling walker   · Patient completed Toilet Transfer Stand Pivot technique with supervision with  rolling walker  · Functional Mobility: Pt ambulated with Supervision and RW from EOB to bathroom, then to sink and back to bathroom. No LOB.    Activities of Daily Living:  · Pt's ADLs performed prior to OT arrival.     Cognitive/Visual Perceptual:  Cognitive/Psychosocial Skills:     -       Oriented to: x4   -       Follows Commands/attention:Follows multistep  commands  -       " "Communication: clear/fluent  -       Safety awareness/insight to disability: impaired; pt given verbal cues hand placement with RW as to reduce fall risk.  -       Mood/Affect/Coping skills/emotional control: Appropriate to situation  Visual/Perceptual:      -Intact     Physical Exam:  Postural examination/scapula alignment:    -       Rounded shoulders  -       Forward head  Upper Extremity Range of Motion:     -       Right Upper Extremity: WNL   -       Left Upper Extremity: WNL   Upper Extremity Strength:    -       Right Upper Extremity: WNL  -       Left Upper Extremity: WNL    Strength:    -       Right Upper Extremity: WNL   -       Left Upper Extremity: WNL   Fine Motor Coordination:    -       Intact  Gross motor coordination:   WFL    AMPAC 6 Click ADL:  AMPAC Total Score: 21    Treatment & Education:  OT ed patient on safety with walker use for functional mobility with cues for hand placement & sequencing.   OT ed pt on use of adaptive equipment for LB dressing & safe item retrieval with reacher with demonstration provided.    Education:    Patient left HOB elevated with all lines intact, call button in reach, nurse notified and  present    Assessment:     Marce Hickey is a 68 y.o. female with a medical diagnosis of Arthritis of knee, left.  She presents with the following performance deficits affecting function: weakness, pain, decreased ROM, orthopedic precautions, impaired self care skills, gait instability, impaired endurance, decreased lower extremity function, decreased safety awareness, impaired functional mobilty, impaired balance. Pt was able to transfer and ambulate safety after receiving instruction on hand placement during transfers using RW.       Rehab Prognosis: Good; patient would benefit from acute skilled OT services to address these deficits and reach maximum level of function.         Clinical Decision Makin.  OT Low:  "Pt evaluation falls under low " "complexity for evaluation coding due to performance deficits noted in 1-3 areas as stated above and 0 co-morbities affecting current functional status. Data obtained from problem focused assessments. No modifications or assistance was required for completion of evaluation. Only brief occupational profile and history review completed."     Plan:     Patient to be seen 2 x/week to address the above listed problems via self-care/home management, therapeutic activities, therapeutic exercises  · Plan of Care Expires: 10/03/18  · Plan of Care Reviewed with: patient, spouse    This Plan of care has been discussed with the patient who was involved in its development and understands and is in agreement with the identified goals and treatment plan    GOALS:   Multidisciplinary Problems     Occupational Therapy Goals        Problem: Occupational Therapy Goal    Goal Priority Disciplines Outcome Interventions   Occupational Therapy Goal     OT, PT/OT Ongoing (interventions implemented as appropriate)    Description:  Goals to be met by: 10/3/2018     Patient will increase functional independence with ADLs by performing:    LE Dressing with Modified [place] and Assistive Devices as needed.  Grooming while standing at sink with Modified [place].  Toileting from toilet with [place] for hygiene and clothing management.   Supine to sit with Modified [place].  Stand pivot transfers with Modified [place].  Toilet transfer to toilet with Modified [place].                      Time Tracking:     OT Date of Treatment: 09/26/18  OT Start Time: 1101  OT Stop Time: 1118  OT Total Time (min): 17 min    Billable Minutes:Evaluation 9  Self Care/Home Management 8    Lalo Mondragon, OT  9/26/2018    "

## 2018-09-26 NOTE — PLAN OF CARE
Problem: Occupational Therapy Goal  Goal: Occupational Therapy Goal  Goals to be met by: 10/3/2018     Patient will increase functional independence with ADLs by performing:    LE Dressing with Modified [place] and Assistive Devices as needed.  Grooming while standing at sink with Modified [place].  Toileting from toilet with [place] for hygiene and clothing management.   Supine to sit with Modified [place].  Stand pivot transfers with Modified [place].  Toilet transfer to toilet with Modified [place].    Outcome: Ongoing (interventions implemented as appropriate)  OT evaluation completed today. Goals & care plan established.    TERESSA Varela  9/26/2018

## 2018-09-26 NOTE — PLAN OF CARE
09/26/18 1332   Final Note   Assessment Type Final Discharge Note   Discharge Disposition Home-Health  ([facility])   What phone number can be called within the next 1-3 days to see how you are doing after discharge? ([phone])   Hospital Follow Up  Appt(s) scheduled? Yes  (updated the AVS with one appointment; MD nurse to call with PCP appointment)

## 2018-09-26 NOTE — PLAN OF CARE
Met with pt to complete her assessment.  Pt's spouse was in the room.  Educated pt on the blue discharge folder and left the folder in the room.  Pt, who lives with her spouse,  is independent with her self care, denies using any DME or having home health services prior to her admission.  A rolling walker and 3 in 1 commode were recently delivered to pt's home.  Pt verified her PCP as Dr. Garvey, primary insurance as Medicare and secondary insurance as  for life.  Pt's discharge disposition is home with [facility] that was prearranged prior to pt's admission.  Obtained signature for the disclosure form.       09/26/18 9484   Discharge Assessment   Assessment Type Discharge Planning Assessment   Confirmed/corrected address and phone number on facesheet? Yes   Assessment information obtained from? Patient   Prior to hospitilization cognitive status: Alert/Oriented   Prior to hospitalization functional status: Independent   Current cognitive status: Alert/Oriented   Current Functional Status: Independent   Lives With spouse   Able to Return to Prior Arrangements yes   Is patient able to care for self after discharge? Yes  (with assistance)   Who are your caregiver(s) and their phone number(s)? (spouse Jaclyn Hickey, [phone])   Patient's perception of discharge disposition home health   Readmission Within The Last 30 Days no previous admission in last 30 days   Patient currently being followed by outpatient case management? No   Patient currently receives any other outside agency services? No   Equipment Currently Used at Home none  (pt recently had a rolling walker and 3 in 1 commode delivered to her home)   Do you have any problems affording any of your prescribed medications? No  (pharmacy is [facility] on [address])   Is the patient taking medications as prescribed? yes   Does the patient have transportation home? Yes   Discharge Plan A Home Health  ([facility])   Patient/Family In Agreement With Plan yes

## 2018-09-26 NOTE — PT/OT/SLP PROGRESS
Physical Therapy Treatment    Patient Name:  Marce Hickey   MRN:  [id number]    Recommendations:     Discharge Recommendations:  home health PT, outpatient PT   Discharge Equipment Recommendations: none   Barriers to discharge: None    Assessment:     Marce Hickey is a 68 y.o. female admitted with a medical diagnosis of Arthritis of knee, left.  She presents with the following impairments/functional limitations:  weakness, gait instability, decreased ROM, impaired balance, impaired endurance, decreased lower extremity function, impaired joint extensibility, decreased safety awareness, impaired muscle length, impaired self care skills, pain, orthopedic precautions, impaired functional mobilty.  Pt continues to progress with functional mobility, however she is limited by pain in L knee s/p L TKA.  She has met all PT goals and is cleared to d/c home with HHPT vs OPPT.     Rehab Prognosis:  good; patient would benefit from acute skilled PT services to address these deficits and reach maximum level of function.      Recent Surgery: Procedure(s) (LRB):  ARTHROPLASTY, KNEE (Left) 1 Day Post-Op    Plan:     During this hospitalization, patient to be seen BID to address the above listed problems via gait training, therapeutic activities, therapeutic exercises  · Plan of Care Expires:  10/09/18   Plan of Care Reviewed with: patient    Subjective     Communicated with JOSEPH DUNCAN prior to session.  Patient found supine in bed upon PT entry to room, agreeable to treatment.      Chief Complaint: pain and fatigue.   Patient comments/goals: pt wants to be able to walk better  Pain/Comfort:  · Pain Rating 1: 7/10  · Location - Side 1: Left  · Location 1: knee  · Pain Addressed 1: Reposition, Distraction, Nurse notified  · Pain Rating Post-Intervention 1: 5/10  · Pain Rating Post-Intervention 2: 7/10    Patients cultural, spiritual, Mu-ism conflicts given the current situation: none    Objective:     Patient found with:  cryotherapy, peripheral IV     General Precautions: Standard, fall   Orthopedic Precautions:LLE weight bearing as tolerated   Braces: N/A     Functional Mobility:  · Bed Mobility:     · Supine to Sit: modified independence and pt uses UE's to advance L LE towards the EOB.   · Sit to Supine: modified independence and pt uses UE's to assist L LE back into the bed.  · Transfers:     · Sit to Stand:  modified independence with rolling walker  · Gait: 250 with RW and Mod (I), with decreased L knee terminal knee extension.  She is also demonstrating a step to pattern this afternoon.  She required 3 standing rest breaks with c/o fatigue and not resting well.        AM-PAC 6 CLICK MOBILITY  Turning over in bed (including adjusting bedclothes, sheets and blankets)?: 4  Sitting down on and standing up from a chair with arms (e.g., wheelchair, bedside commode, etc.): 4  Moving from lying on back to sitting on the side of the bed?: 4  Moving to and from a bed to a chair (including a wheelchair)?: 4  Need to walk in hospital room?: 4  Climbing 3-5 steps with a railing?: 4  Basic Mobility Total Score: 24       Therapeutic Activities and Exercises:   As above.  Seated L LAQ with Mod A x 10 reps with poor L quad activation this session.  L HS curl x 10 reps.     Patient left supine with all lines intact and call button in reach..    GOALS:   Multidisciplinary Problems     Physical Therapy Goals     Not on file          Multidisciplinary Problems (Resolved)        Problem: Physical Therapy Goal    Goal Priority Disciplines Outcome Goal Variances Interventions   Physical Therapy Goal   (Resolved)     PT, PT/OT Outcome(s) achieved     Description:  Patient will increase functional independence with mobility by performin. Supine to sit with Modified [place]  2. Sit to supine with Modified [place]  3. Sit to stand transfer with Modified [place]  4. Gait  x 250 feet with Modified [place] using Rolling Walker.    5. Ascend/Descend 4 inch curb step with Stand-by Assistance using Rolling Walker.  6. Lower extremity exercise program x10-20 reps per handout, with independence                      Time Tracking:     PT Received On: 09/26/18  PT Start Time: 1307     PT Stop Time: 1330  PT Total Time (min): 23 min     Billable Minutes: Gait Training 13 and Therapeutic Exercise 10    Treatment Type: Treatment  PT/PTA: PT           Carmel Salinas, PT  09/26/2018

## 2018-09-26 NOTE — PLAN OF CARE
Problem: Physical Therapy Goal  Goal: Physical Therapy Goal  Patient will increase functional independence with mobility by performin. Supine to sit with Modified [place]  2. Sit to supine with Modified [place]  3. Sit to stand transfer with Modified [place]  4. Gait  x 250 feet with Modified [place] using Rolling Walker.   5. Ascend/Descend 4 inch curb step with Stand-by Assistance using Rolling Walker.  6. Lower extremity exercise program x10-20 reps per handout, with independence    Outcome: Outcome(s) achieved Date Met: 18  Goals met

## 2018-09-26 NOTE — PLAN OF CARE
Called by NP Sherrie Tsai to rx pain medication to patient pharmacy.   Patient is s/p orthopedic procedure. Rx percocet 10/325 every 6 hours prn #15 sent however pharmacy restrictions for MEDD 60mg /day required change to every 8 hours so new rx sent and pharmacy notified.   Discussed with patient Kevon Clancy who will relay information to the patient.

## 2018-09-26 NOTE — ADDENDUM NOTE
Addendum  created 09/26/18 1819 by Lalo Leblanc MD    Intraprocedure Event edited, Sign clinical note

## 2018-09-26 NOTE — ANESTHESIA POST-OP PAIN MANAGEMENT
Anesthesia Acute Pain Service Follow up Note    Marce Hickey : 1950 MRN: [id number]      Date of Procedure: 2018    Procedure(s) (LRB):  ARTHROPLASTY, KNEE (Left)    Patient discharged to home, no c/o pain at discharge.    Post-op: 1 Day Post-Op     Type of block: beatrice CARROLL MD  Department of Anesthesiology   [facility]

## 2018-09-26 NOTE — PLAN OF CARE
Problem: Physical Therapy Goal  Goal: Physical Therapy Goal  Outcome: Ongoing (interventions implemented as appropriate)  Pt is progressing towards all goals.  Will continue to work on exercises, transfers and gait.

## 2018-09-26 NOTE — HPI
This is a 68-year-old female who has a history of HTN, DM, gatric sleeve, and arthritis of knee who underwent a left knee arthroplasty today. Patient tolerated procedure well.

## 2018-09-26 NOTE — PT/OT/SLP PROGRESS
Physical Therapy Treatment    Patient Name:  Marce Hickey   MRN:  [id number]    Recommendations:     Discharge Recommendations:  home health PT, outpatient PT   Discharge Equipment Recommendations: none   Barriers to discharge: None    Assessment:     Marce Hickey is a 68 y.o. female admitted with a medical diagnosis of Arthritis of knee, left. S/P L TKA on 09/25/2018.   She presents with the following impairments/functional limitations:  weakness, gait instability, decreased ROM, impaired endurance, impaired balance, decreased lower extremity function, impaired joint extensibility, decreased safety awareness, impaired muscle length, impaired self care skills, pain, impaired skin, orthopedic precautions, impaired functional mobilty.  She is progressing well with ambulation and is very determined to complete her rehab quickly.  AROM for L knee extension is -10*, and she is able to achieve ~80* flexion while sitting on the EOB.  She is recommended to return home with HHPT vs OPPT.     Rehab Prognosis:  Good; patient would benefit from acute skilled PT services to address these deficits and reach maximum level of function.      Recent Surgery: Procedure(s) (LRB):  ARTHROPLASTY, KNEE (Left) 1 Day Post-Op    Plan:     During this hospitalization, patient to be seen BID to address the above listed problems via gait training, therapeutic exercises, therapeutic activities  · Plan of Care Expires:  10/09/18   Plan of Care Reviewed with: patient, spouse    Subjective     Communicated with RN Julieth prior to session, and Julieth was called during PT session due to increasing pain level.  Pt was medicated during PT session.  Patient found up ambulating in the room with RN present upon PT entry to room, agreeable to treatment.      Chief Complaint: pain  Patient comments/goals: Pt wants to walk better  Pain/Comfort:  · Pain Rating 1: 1/10  · Location - Side 1: Left  · Location 1: knee  · Pain Addressed 1: Reposition,  "Distraction, Nurse notified(RN called and medicated during tx session)  · Pain Rating Post-Intervention 1: 5/10    Patients cultural, spiritual, Mormon conflicts given the current situation: none    Objective:     Patient found with: peripheral IV, cryotherapy     General Precautions: Standard, fall   Orthopedic Precautions:LLE weight bearing as tolerated   Braces: N/A     Functional Mobility:  · Bed Mobility:     · Sit to Supine: modified independence and pt was encouraged to use LE musculature to lift the L LE into the bed, but pt expressed that she still needs to use her hands to perform this task.   · Transfers:     · Sit to Stand:  modified independence and she demonstrates good hand placement with rolling walker  · Gait: 250ft with RW and Mod (I) with vc's to decrease weight bearing through the RW, and tc's to activate gluts to avoid compensation with trunk.  · Balance: dynamic standing balance is good  · Stairs:  Pt ascended/descended 2" curb step x2 trials with Rolling Walker with Stand-by Assistance and  present to perform hands on training during 2nd trial.  Pt also negotiated 10ft ramp x 2 trials with RW and SBA because she expressed that her driveway is on an incline at about the same angle as the ramp.        AM-PAC 6 CLICK MOBILITY  Turning over in bed (including adjusting bedclothes, sheets and blankets)?: 4  Sitting down on and standing up from a chair with arms (e.g., wheelchair, bedside commode, etc.): 4  Moving from lying on back to sitting on the side of the bed?: 4  Moving to and from a bed to a chair (including a wheelchair)?: 4  Need to walk in hospital room?: 4  Climbing 3-5 steps with a railing?: 3  Basic Mobility Total Score: 23       Therapeutic Activities and Exercises:   As Above.  Pt also completed Quad set and glut set 10reps x 5 sec hold, L LE SLR x 10 reps using gait belt to self assist, and L SAQ x 10 reps using gait belt to self assist.     Patient left supine with all " lines intact, call button in reach and  present..    GOALS:   Multidisciplinary Problems     Physical Therapy Goals        Problem: Physical Therapy Goal    Goal Priority Disciplines Outcome Goal Variances Interventions   Physical Therapy Goal     PT, PT/OT Ongoing (interventions implemented as appropriate)                     Time Tracking:     PT Received On: 09/26/18  PT Start Time: 0911     PT Stop Time: 0948  PT Total Time (min): 37 min     Billable Minutes: Gait Training 10, Therapeutic Activity 10 and Therapeutic Exercise 17    Treatment Type: Treatment  PT/PTA: PT           Carmel Salinas, PT  09/26/2018

## 2018-09-26 NOTE — PLAN OF CARE
09/25/18 2000   PRE-TX-O2-ETCO2   O2 Device (Oxygen Therapy) room air   SpO2 97 %   Pulse Oximetry Type Intermittent   $ Pulse Oximetry - Multiple Charge Pulse Oximetry - Multiple   Incentive Spirometer   $ Incentive Spirometer Charges done with encouragement   Administration (Incentive Spirometer) done with encouragement   Number of Repetitions (Incentive Spirometer) 10   Level (mL) (Incentive Spirometer) 2500   Patient Tolerance good

## 2018-09-26 NOTE — SUBJECTIVE & OBJECTIVE
Past Medical History:   Diagnosis Date    Anxiety     Cancer     breast cancer - left    Depression     Diabetes mellitus, type 2     Borderline    Fatty liver disease, nonalcoholic     GERD (gastroesophageal reflux disease)     Hyperlipidemia     Hypertension     Plantar fasciitis of right foot        Past Surgical History:   Procedure Laterality Date    BIOPSY-SENTINEL NODE Left 2017    Performed by [facility]on [facility] at [facility] OR    breast reduction      BREAST SURGERY Bilateral     masectomy    BREAST SURGERY Bilateral     implants     SECTION      x 2    CHOLECYSTECTOMY      COLONOSCOPY N/A 10/10/2014    Performed by Chaitanya Ro MD at [facility] ENDO    CREATION OR REVISION, INFRAMAMMARY FOLD Left 2018    Performed by [facility] at [facility] OR 2ND FLR    DISSECTION-AXILLARY LYMPH NODE Left 2017    Performed by [facility] at [facility] OR    EXCHANGE IMPLANT-BREAST Bilateral 2017    Performed by [facility] at [facility] OR 2ND FLR    FOOT SURGERY      left bunionectomy    GASTRECTOMY-SLEEVE-LAPAROSCOPIC N/A 2017    Performed by Nelson Panchal MD at [facility] OR    gastric sleve      HYSTERECTOMY      one ovary intact, adhesions    INJECTION-FAT FAT TRANSFER Bilateral 3/6/2018    Performed by [facility] at [facility] OR 2ND FLR    KNEE ARTHROSCOPY Left     LIPOSUCTION      MASTECTOMY Bilateral 2017    Performed by Damon [facility] at [facility] OR    RECONSTRUCTION-BREAST Bilateral 2017    Performed by Montrell Thomas MD at [facility] OR    RECONSTRUCTION-BREAST/REVISION-FLAP Soft Tissue Revision Bilateral 2017    Performed by [facility]rell [facility] at [facility] OR 2ND FLR    ROTATOR CUFF REPAIR Right     TONSILLECTOMY         Review of patient's allergies indicates:   Allergen Reactions    Nsaids (non-steroidal anti-inflammatory drug) Anaphylaxis       No current facility-administered medications on  file prior to encounter.      Current Outpatient Medications on File Prior to Encounter   Medication Sig    anastrozole (ARIMIDEX) 1 mg Tab TAKE 1 TABLET DAILY    calcium-vitamin D3 500 mg(1,250mg) -200 unit per tablet once daily. Patient uses a patch, not oral medication    multivitamin with minerals tablet Take 1 tablet by mouth once daily.    ZEGERID 40-1.1 mg-gram per capsule TAKE 1 CAPSULE BEFORE BREAKFAST (Patient taking differently: TAKE 1 CAPSULE BEFORE BREAKFAST with bio carb)     Family History     Problem Relation (Age of Onset)    Heart attack Mother    Heart disease Mother, Father    Hypertension Mother    Psoriasis Father        Tobacco Use    Smoking status: Former Smoker     Last attempt to quit: 1993     Years since quittin.8    Smokeless tobacco: Never Used    Tobacco comment: quit    Substance and Sexual Activity    Alcohol use: No     Alcohol/week: 0.0 oz    Drug use: No    Sexual activity: Yes     Birth control/protection: Surgical     Review of Systems   Constitutional: Positive for activity change. Negative for appetite change, chills, diaphoresis, fatigue and fever.   HENT: Negative for ear discharge, ear pain and facial swelling.    Eyes: Negative for pain and redness.   Respiratory: Negative for cough and shortness of breath.    Cardiovascular: Negative for chest pain and palpitations.   Gastrointestinal: Positive for nausea. Negative for abdominal distention, abdominal pain, blood in stool, constipation, diarrhea and vomiting.   Endocrine: Negative for polydipsia and polyphagia.   Genitourinary: Negative for difficulty urinating, dyspareunia, dysuria, flank pain and hematuria.   Musculoskeletal: Positive for arthralgias. Negative for neck pain and neck stiffness.   Skin: Positive for wound.        Left knee dressing   Allergic/Immunologic: Negative for food allergies.   Neurological: Positive for light-headedness. Negative for seizures, syncope, facial asymmetry,  speech difficulty and weakness.   Hematological: Does not bruise/bleed easily.   Psychiatric/Behavioral: Negative for agitation, behavioral problems, confusion, hallucinations and suicidal ideas. The patient is nervous/anxious.      Objective:     Vital Signs (Most Recent):  Temp: 97.9 °F (36.6 °C) (09/25/18 1130)  Pulse: 69 (09/25/18 1619)  Resp: 18 (09/25/18 1615)  BP: (!) 143/80 (09/25/18 1619)  SpO2: 96 % (09/25/18 1615) Vital Signs (24h Range):  Temp:  [97.9 °F (36.6 °C)-98.8 °F (37.1 °C)] 97.9 °F (36.6 °C)  Pulse:  [64-92] 69  Resp:  [13-21] 18  SpO2:  [95 %-100 %] 96 %  BP: (143-186)/(67-94) 143/80     Weight: 66.7 kg (147 lb)  Body mass index is 25.23 kg/m².    Physical Exam   Constitutional: She is oriented to person, place, and time. She appears well-developed and well-nourished. No distress.   HENT:   Head: Normocephalic and atraumatic.   Eyes: Conjunctivae and EOM are normal. Pupils are equal, round, and reactive to light. Right eye exhibits no discharge. Left eye exhibits no discharge.   Neck: Normal range of motion. Neck supple. No JVD present.   Cardiovascular: Normal rate, regular rhythm, normal heart sounds and intact distal pulses. Exam reveals no gallop and no friction rub.   No murmur heard.  Pulmonary/Chest: Effort normal and breath sounds normal. No stridor. No respiratory distress. She has no wheezes.   Abdominal: Soft. Bowel sounds are normal. She exhibits no distension. There is no tenderness. There is no guarding.   Genitourinary:   Genitourinary Comments: Not examined   Musculoskeletal:   ROM LLE not tested    Neurological: She is alert and oriented to person, place, and time. No cranial nerve deficit.   Skin: Skin is warm and dry. Capillary refill takes less than 2 seconds. She is not diaphoretic.   LLE dressing intact   Psychiatric: She has a normal mood and affect. Her behavior is normal. Judgment and thought content normal.       Significant Labs: Reviewed    Significant Imaging:  Reviewed

## 2018-09-26 NOTE — NURSING
Verified lovenox is covered by pt insurance however [facility] does not have any and it is currently on back order.

## 2018-09-26 NOTE — PLAN OF CARE
Problem: Patient Care Overview  Goal: Plan of Care Review  Outcome: Ongoing (interventions implemented as appropriate)  Plan of care reviewed with pt at beginning of shift- continue cryotherapy, pain medication as needed, encouraged ambulation.  Pt verbalized understanding. Hourly/ Q2 hourly rounds completed on this pt throughout shift.  Pt has denied need for pain medication up to this point, up to restroom with walker and SBA.  Repositions self, safety maintained.  Patient has remained free from fall/injury, no skin breakdown noted.  Side rails up x2, bed in locked and lowest position, call light kept within reach.  Needs attended to, will continue to monitor/ update as indicated

## 2018-09-26 NOTE — CONSULTS
[facility] Hospital Medicine  Consult Note    Patient Name: Marce Hickey  MRN: [id number]  Admission Date: 2018  Hospital Length of Stay: 0 days  Attending Physician: Christos Montanez MD   Primary Care Provider: Savannah Garvey MD     Patient information was obtained from patient and prior records.     Consults  Subjective:     Principal Problem: Arthritis of knee, left    Chief Complaint: No chief complaint on file.       HPI: This is a 68-year-old female who has a history of breast surgery,  HTN, DM, gatric sleeve, and arthritis of knee who underwent a left knee arthroplasty today. Patient tolerated procedure well.       Past Medical History:   Diagnosis Date    Anxiety     Cancer     breast cancer - left    Depression     Diabetes mellitus, type 2     Borderline    Fatty liver disease, nonalcoholic     GERD (gastroesophageal reflux disease)     Hyperlipidemia     Hypertension     Plantar fasciitis of right foot        Past Surgical History:   Procedure Laterality Date    BIOPSY-SENTINEL NODE Left 2017    [facility]    breast reduction      BREAST SURGERY Bilateral     masectomy    BREAST SURGERY Bilateral     implants     SECTION      x 2    CHOLECYSTECTOMY      COLONOSCOPY N/A 10/10/2014    Performed by [facility]aitanya Ro MD at [facility] ENDO    CREATION OR REVISION, INFRAMAMMARY FOLD Left 2018    [facility]formed by [facility] at [facility] OR 2ND FLR    DISSECTION-AXILLARY LYMPH NODE Left 2017    [facility]formed by [facility] at [facility] OR    EXCHANGE IMPLANT-BREAST Bilateral 2017    [facility]formed by [facility]rell [facility] at [facility] OR 2ND FLR    FOOT SURGERY      left bunionectomy    GASTRECTOMY-SLEEVE-LAPAROSCOPIC N/A 2017    Performed by Nelson Panchal MD at [facility] OR    gastric sleve      HYSTERECTOMY      one ovary intact, adhesions    INJECTION-FAT FAT TRANSFER Bilateral 3/6/2018     [facility] at [facility] 2ND FLR    KNEE ARTHROSCOPY Left     LIPOSUCTION      MASTECTOMY Bilateral 2017    [facility] at [facility]    RECONSTRUCTION-BREAST Bilateral 2017    Performed by [facility] at [facility] OR    RECONSTRUCTION-BREAST/REVISION-FLAP Soft Tissue Revision Bilateral 2017    Performed by [facility] at [facility] OR 2ND FLR    ROTATOR CUFF REPAIR Right     TONSILLECTOMY         Review of patient's allergies indicates:   Allergen Reactions    Nsaids (non-steroidal anti-inflammatory drug) Anaphylaxis       No current facility-administered medications on file prior to encounter.      Current Outpatient Medications on File Prior to Encounter   Medication Sig    anastrozole (ARIMIDEX) 1 mg Tab TAKE 1 TABLET DAILY    calcium-vitamin D3 500 mg(1,250mg) -200 unit per tablet once daily. Patient uses a patch, not oral medication    multivitamin with minerals tablet Take 1 tablet by mouth once daily.    ZEGERID 40-1.1 mg-gram per capsule TAKE 1 CAPSULE BEFORE BREAKFAST (Patient taking differently: TAKE 1 CAPSULE BEFORE BREAKFAST with bio carb)     Family History     Problem Relation (Age of Onset)    Heart attack Mother    Heart disease Mother, Father    Hypertension Mother    Psoriasis Father        Tobacco Use    Smoking status: Former Smoker     Last attempt to quit: 1993     Years since quittin.8    Smokeless tobacco: Never Used    Tobacco comment: quit    Substance and Sexual Activity    Alcohol use: No     Alcohol/week: 0.0 oz    Drug use: No    Sexual activity: Yes     Birth control/protection: Surgical     Review of Systems   Constitutional: Positive for activity change. Negative for appetite change, chills, diaphoresis, fatigue and fever.   HENT: Negative for ear discharge, ear pain and facial swelling.    Eyes: Negative for pain and redness.   Respiratory: Negative for cough and  shortness of breath.    Cardiovascular: Negative for chest pain and palpitations.   Gastrointestinal: Positive for nausea. Negative for abdominal distention, abdominal pain, blood in stool, constipation, diarrhea and vomiting.   Endocrine: Negative for polydipsia and polyphagia.   Genitourinary: Negative for difficulty urinating, dyspareunia, dysuria, flank pain and hematuria.   Musculoskeletal: Positive for arthralgias. Negative for neck pain and neck stiffness.   Skin: Positive for wound.        Left knee dressing   Allergic/Immunologic: Negative for food allergies.   Neurological: Positive for light-headedness. Negative for seizures, syncope, facial asymmetry, speech difficulty and weakness.   Hematological: Does not bruise/bleed easily.   Psychiatric/Behavioral: Negative for agitation, behavioral problems, confusion, hallucinations and suicidal ideas. The patient is nervous/anxious.      Objective:     Vital Signs (Most Recent):  Temp: 97.9 °F (36.6 °C) (09/25/18 1130)  Pulse: 69 (09/25/18 1619)  Resp: 18 (09/25/18 1615)  BP: (!) 143/80 (09/25/18 1619)  SpO2: 96 % (09/25/18 1615) Vital Signs (24h Range):  Temp:  [97.9 °F (36.6 °C)-98.8 °F (37.1 °C)] 97.9 °F (36.6 °C)  Pulse:  [64-92] 69  Resp:  [13-21] 18  SpO2:  [95 %-100 %] 96 %  BP: (143-186)/(67-94) 143/80     Weight: 66.7 kg (147 lb)  Body mass index is 25.23 kg/m².    Physical Exam   Constitutional: She is oriented to person, place, and time. She appears well-developed and well-nourished. No distress.   HENT:   Head: Normocephalic and atraumatic.   Eyes: Conjunctivae and EOM are normal. Pupils are equal, round, and reactive to light. Right eye exhibits no discharge. Left eye exhibits no discharge.   Neck: Normal range of motion. Neck supple. No JVD present.   Cardiovascular: Normal rate, regular rhythm, normal heart sounds and intact distal pulses. Exam reveals no gallop and no friction rub.   No murmur heard.  Pulmonary/Chest: Effort normal and breath  "sounds normal. No stridor. No respiratory distress. She has no wheezes.   Abdominal: Soft. Bowel sounds are normal. She exhibits no distension. There is no tenderness. There is no guarding.   Genitourinary:   Genitourinary Comments: Not examined   Musculoskeletal:   ROM LLE not tested    Neurological: She is alert and oriented to person, place, and time. No cranial nerve deficit.   Skin: Skin is warm and dry. Capillary refill takes less than 2 seconds. She is not diaphoretic.   LLE dressing intact   Psychiatric: She has a normal mood and affect. Her behavior is normal. Judgment and thought content normal.       Significant Labs: Reviewed    Significant Imaging: Reviewed    Assessment/Plan:     * Arthritis of knee, left    S/p Left Knee Arthroplasty 9/25/18-  Orders as per Surgeon  PT/OT  Pain control        Breast cancer    Continue home anastrozole        History of type 2 diabetes mellitus    Diet controlled  Resolved after prior Gastric Surgery  Last HGA1c was 5.2       HTN -  Monitor  Patient currently not on any antihypertensives since prior gastric Surgery       CEASAR (generalized anxiety disorder)    Patient reports she has a lot of anxiety routinely but "does not like to take medication"          GERD (gastroesophageal reflux disease)    Continue pepcid            VTE Risk Mitigation (From admission, onward)        Ordered     enoxaparin injection 40 mg  Daily      09/25/18 1148     IP VTE HIGH RISK PATIENT  Once      09/25/18 1148     Place LOR hose  Until discontinued      09/25/18 1148     Place sequential compression device  Until discontinued      09/25/18 1148          Thank you for your consult. A provider from the Hospitalist team will  follow-up with patient. Please contact us if you have any additional questions.    NAYA Horton  Department of Hospital Medicine   [facility]    Time spent seeing patient( greater than 1/2 spent in direct contact) : 58 minutes      "

## 2018-09-27 ENCOUNTER — TELEPHONE (OUTPATIENT)
Dept: FAMILY MEDICINE | Facility: CLINIC | Age: 68
End: 2018-09-27

## 2018-09-27 ENCOUNTER — PATIENT OUTREACH (OUTPATIENT)
Dept: ADMINISTRATIVE | Facility: CLINIC | Age: 68
End: 2018-09-27

## 2018-09-27 NOTE — HPI
This is a 68-year-old female seen for left knee pain.  Patient has had pain for years.  She had an injection back in 2016.  Does not really recall the injection helped very much.  Patient notes more weakness and pain in her hip now.  Symptoms are worsening and moderate to severe.  Pain with walking.  She also had a prior knee scope on this knee as well. Pain is laterally located.  She rates the pain as a 4/10.

## 2018-09-27 NOTE — TELEPHONE ENCOUNTER
Called pt regarding below message. Informed pt we received an appt request per case management to schedule pt for a hospital f/u appt. Pt seemed confused by the need for this appt. Instructed pt no additional information was given other than need for appt. Pt comfortable with scheduling appt. Appt date, time, and location given. Pt verbalized understanding with no further questions.     ----- Message from Moise Yoo sent at 9/26/2018  1:29 PM CDT -----  Contact: carmen  Type:  Sooner Apoointment Request    Caller is requesting a sooner appointment.  Caller declined first available appointment listed below.  Caller will not accept being placed on the waitlist and is requesting a message be sent to doctor.    Name of Caller:  Carmen with [facility] case manger  When is the first available appointment?  01/14/19  Symptoms:   Best Call Back Number:  [phone]  Additional Information:  Requesting a call back to confirm hospital follow up

## 2018-09-27 NOTE — DISCHARGE SUMMARY
[facility]  Orthopedics  Discharge Summary      Patient Name: Marce Hickey  MRN: [id number]  Admission Date: 9/25/2018  Hospital Length of Stay: 1 days  Discharge Date and Time: 9/26/18  Attending Physician: No att. providers found   Discharging Provider: Christos Montanez MD  Primary Care Provider: Savannah Garvey MD    HPI:   This is a 68-year-old female seen for left knee pain.  Patient has had pain for years.  She had an injection back in 2016.  Does not really recall the injection helped very much.  Patient notes more weakness and pain in her hip now.  Symptoms are worsening and moderate to severe.  Pain with walking.  She also had a prior knee scope on this knee as well. Pain is laterally located.  She rates the pain as a 4/10.        Procedure(s) (LRB):  ARTHROPLASTY, KNEE (Left)      Hospital Course:  S/p L TKA on 9/25/18        Significant Diagnostic Studies: No pertinent studies.    Pending Diagnostic Studies:     None        Final Active Diagnoses:    Diagnosis Date Noted POA    PRINCIPAL PROBLEM:  Arthritis of knee, left [M17.12] 07/25/2018 Yes    Breast cancer [C50.919] 05/04/2017 Yes    History of type 2 diabetes mellitus [Z86.39] 02/10/2017 Yes    Status post gastric surgery [Z98.890] 02/10/2017 Not Applicable    History of hypertension [Z86.79] 02/10/2017 Not Applicable    CEASAR (generalized anxiety disorder) [F41.1] 09/26/2016 Yes    GERD (gastroesophageal reflux disease) [K21.9]  Yes      Problems Resolved During this Admission:      Discharged Condition: good    Disposition: Home-Health Care [facility]    Follow Up:  Follow-up Information     Christos Montanez MD On 10/11/2018.    Specialties:  Sports Medicine, Orthopedic Surgery  Why:  10-11-18 @ 9:15 a.m.  Contact information:  [address] [place] [address] [address] [address]  [phone]             Savannah Garvey MD In 1 week.    Specialty:  Family Medicine  Why:  MD office will call pt with appointment time  Contact  information:  [address] [place] [address]  [phone]             [facility].    Specialty:  Home Health Services  Why:  Home Health  Contact information:  [address] [place] [address]  [phone]                 Patient Instructions:      Diet Adult Regular     Notify your health care provider if you experience any of the following:  redness, tenderness, or signs of infection (pain, swelling, redness, odor or green/yellow discharge around incision site)     Notify your health care provider if you experience any of the following:  severe uncontrolled pain     Notify your health care provider if you experience any of the following:  persistent nausea and vomiting or diarrhea     Notify your health care provider if you experience any of the following:  worsening rash     Notify your health care provider if you experience any of the following:  severe persistent headache     Notify your health care provider if you experience any of the following:  persistent dizziness, light-headedness, or visual disturbances     Notify your health care provider if you experience any of the following:  increased confusion or weakness     Activity as tolerated     Medications:  Reconciled Home Medications:      Medication List      START taking these medications    enoxaparin 40 mg/0.4 mL Syrg  Commonly known as:  LOVENOX  Inject 0.4 mLs (40 mg total) into the skin once daily. for 10 days     mupirocin 2 % ointment  Commonly known as:  BACTROBAN  1 g by Nasal route 2 (two) times daily. for 5 days     oxyCODONE-acetaminophen  mg per tablet  Commonly known as:  PERCOCET  Take 1 tablet by mouth every 8 (eight) hours as needed for Pain. ATTN PHARMACIST : PLEASE USE THIS PRESCRIPTION        CHANGE how you take these medications    ZEGERID 40-1.1 mg-gram per capsule  Generic drug:  omeprazole-sodium bicarbonate  TAKE 1 CAPSULE BEFORE BREAKFAST  What changed:  See the new instructions.         CONTINUE taking these medications    anastrozole 1 mg Tab  Commonly known as:  ARIMIDEX  TAKE 1 TABLET DAILY     calcium-vitamin D3 500 mg(1,250mg) -200 unit per tablet  Commonly known as:  OS-DEEPAK 500 + D3  once daily. Patient uses a patch, not oral medication     multivitamin with minerals tablet  Take 1 tablet by mouth once daily.            Christos Montanez MD  Orthopedics  [facility]

## 2018-09-27 NOTE — PATIENT INSTRUCTIONS
Wound Check After Surgery: Infection  Your surgical wound has become infected. Infection after surgery usually involves just the top layers of skin. Sometimes the infection is deeper in the wound and may involve a collection of fluid or pus. Treatment will depend on the type of infection you have.  Once antibiotic treatment is started the area of redness should not increase. Pain should start to decrease after two days of treatment.  Home Care:  Keep the wound clean and dry. Change the dressing as directed, or sooner, if the dressing becomes wet or stained with blood or fluid.   If you were told to clean the wound:  Remove the old bandage and wash the wound with soap and water.  Clean with hydrogen peroxide on a cotton tip applicator (Q-tip) to remove any crust or drainage.  Apply an antibiotic cream or ointment such as Neosporin or Bacitracin.  Reapply the bandage.  Bathe with a sponge (no shower or tub baths) for the first few days after surgery, or until there is no more drainage from you wound. Then, you may shower, but do not soak the area in water (no baths or swimming) until the sutures, staples or steri-strips are removed and any wound opening has healed and become dry.  If antibiotics have been prescribed, take them exactly as directed until they are all gone.  You may use acetaminophen (Tylenol) or ibuprofen (Motrin, Advil) to control pain, unless another medicine was prescribed. [NOTE: If you have chronic liver or kidney disease or ever had a stomach ulcer or GI bleeding, talk with your doctor before using these medicines.]  Follow Up  with your doctor as scheduled or as advised by our staff for your next wound check or suture/staple/tape removal.  Return Promptly  or contact your doctor if any of the following occurs:  Increasing pain at the site of surgery or pain not controlled by medicine prescribed  Fever of 100.4°F (38ºC) or higher, or as directed by your healthcare provider  Increasing redness  around the wound  Fluid, pus or blood that continues to drain from the wound after five days of treatment  Vomiting, constipation or diarrhea  Shortness of breath or chest pain  © 6183-6135 [employer], [address] [place] [address], [place] [address]. All rights reserved. This information is not intended as a substitute for professional medical care. Always follow your healthcare professional's instructions.

## 2018-10-04 ENCOUNTER — TELEPHONE (OUTPATIENT)
Dept: ADMINISTRATIVE | Facility: CLINIC | Age: 68
End: 2018-10-04

## 2018-10-04 NOTE — TELEPHONE ENCOUNTER
Home Health SOC 09/27/2018 - 11/25/2018 with [facility] - Dr. Christos Montanez. Patient received PT services.

## 2018-10-09 DIAGNOSIS — M25.562 LEFT KNEE PAIN, UNSPECIFIED CHRONICITY: Primary | ICD-10-CM

## 2018-10-11 ENCOUNTER — HOSPITAL ENCOUNTER (OUTPATIENT)
Dept: RADIOLOGY | Facility: HOSPITAL | Age: 68
Discharge: HOME | End: 2018-10-11
Attending: ORTHOPAEDIC SURGERY
Payer: MEDICARE

## 2018-10-11 ENCOUNTER — OFFICE VISIT (OUTPATIENT)
Dept: ORTHOPEDICS | Facility: CLINIC | Age: 68
End: 2018-10-11
Payer: MEDICARE

## 2018-10-11 VITALS
HEART RATE: 96 BPM | HEIGHT: 64 IN | BODY MASS INDEX: 25.1 KG/M2 | WEIGHT: 147 LBS | DIASTOLIC BLOOD PRESSURE: 72 MMHG | SYSTOLIC BLOOD PRESSURE: 161 MMHG

## 2018-10-11 DIAGNOSIS — Z96.652 STATUS POST TOTAL KNEE REPLACEMENT USING CEMENT, LEFT: Primary | ICD-10-CM

## 2018-10-11 DIAGNOSIS — M25.562 LEFT KNEE PAIN, UNSPECIFIED CHRONICITY: ICD-10-CM

## 2018-10-11 PROCEDURE — 99213 OFFICE O/P EST LOW 20 MIN: CPT | Mod: PBBFAC,25,PN | Performed by: ORTHOPAEDIC SURGERY

## 2018-10-11 PROCEDURE — 99999 PR PBB SHADOW E&M-EST. PATIENT-LVL III: CPT | Mod: PBBFAC,,, | Performed by: ORTHOPAEDIC SURGERY

## 2018-10-11 PROCEDURE — 99024 POSTOP FOLLOW-UP VISIT: CPT | Mod: POP,,, | Performed by: ORTHOPAEDIC SURGERY

## 2018-10-11 PROCEDURE — 73560 X-RAY EXAM OF KNEE 1 OR 2: CPT | Mod: 26,LT,, | Performed by: RADIOLOGY

## 2018-10-11 PROCEDURE — 73560 X-RAY EXAM OF KNEE 1 OR 2: CPT | Mod: TC,PN,LT

## 2018-10-11 NOTE — PROGRESS NOTES
Past Medical History:   Diagnosis Date    Anxiety     Cancer     breast cancer - left    Depression     Diabetes mellitus, type 2     Borderline    Fatty liver disease, nonalcoholic     GERD (gastroesophageal reflux disease)     Hyperlipidemia     Hypertension     Plantar fasciitis of right foot        Past Surgical History:   Procedure Laterality Date    ARTHROPLASTY, KNEE Left 2018    [facility]    BIOPSY-SENTINEL NODE Left 2017    [facility]    breast reduction      BREAST SURGERY Bilateral     masectomy    BREAST SURGERY Bilateral     implants     SECTION      x 2    CHOLECYSTECTOMY      COLONOSCOPY N/A 10/10/2014    [facility] Ro [facility] at [facility] ENDO    CREATION OR REVISION, INFRAMAMMARY FOLD Left 2018    [facility]    DISSECTION-AXILLARY LYMPH NODE Left 2017    [facility] OR    EXCHANGE IMPLANT-BREAST Bilateral 2017    [facility]    FOOT SURGERY      left bunionectomy    GASTRECTOMY-SLEEVE-LAPAROSCOPIC N/A 2017    [facility]al [facility]    gastric sleve      HYSTERECTOMY      one ovary intact, adhesions    INJECTION-FAT FAT TRANSFER Bilateral 3/6/2018    [facility]    KNEE ARTHROPLASTY Left 2018    Procedure: ARTHROPLASTY, KNEE;  Surgeon: Christos Montanez MD;  Location: [facility];  Service: Orthopedics;  Laterality: Left;    KNEE ARTHROSCOPY Left     LIPOSUCTION      MASTECTOMY Bilateral 2017    [facility]    RECONSTRUCTION-BREAST Bilateral 2017    [facility]    RECONSTRUCTION-BREAST/REVISION-FLAP Soft Tissue Revision Bilateral 2017    Performed by Montrell CHAPIN  [facility]    ROTATOR CUFF REPAIR Right     TONSILLECTOMY         Current Outpatient Medications   Medication Sig    anastrozole (ARIMIDEX) 1 mg Tab TAKE 1 TABLET DAILY    calcium-vitamin D3 500 mg(1,250mg) -200 unit per tablet once daily. Patient uses a patch, not oral medication    multivitamin with minerals tablet Take 1 tablet by mouth once daily.    oxyCODONE-acetaminophen (PERCOCET)  mg per tablet Take 1 tablet by mouth every 8 (eight) hours as needed for Pain. ATTN PHARMACIST : PLEASE USE THIS PRESCRIPTION    ZEGERID 40-1.1 mg-gram per capsule TAKE 1 CAPSULE BEFORE BREAKFAST (Patient taking differently: TAKE 1 CAPSULE BEFORE BREAKFAST with bio carb)     No current facility-administered medications for this visit.        Review of patient's allergies indicates:   Allergen Reactions    Nsaids (non-steroidal anti-inflammatory drug) Anaphylaxis       Family History   Problem Relation Age of Onset    Hypertension Mother     Heart disease Mother         pacemaker    Heart attack Mother     Heart disease Father     Psoriasis Father     Cancer Neg Hx        Social History     Socioeconomic History    Marital status:      Spouse name: Not on file    Number of children: Not on file    Years of education: Not on file    Highest education level: Not on file   Social Needs    Financial resource strain: Not on file    Food insecurity - worry: Not on file    Food insecurity - inability: Not on file    Transportation needs - medical: Not on file    Transportation needs - non-medical: Not on file   Occupational History    Not on file   Tobacco Use    Smoking status: Former Smoker     Last attempt to quit: 1993     Years since quittin.8    Smokeless tobacco: Never Used    Tobacco comment: quit    Substance and Sexual Activity    Alcohol use: No     Alcohol/week: 0.0 oz    Drug use: No    Sexual activity: Yes     Birth control/protection: Surgical    Other Topics Concern    Are you pregnant or think you may be? Not Asked    Breast-feeding Not Asked   Social History Narrative    Not on file       Chief Complaint:   Chief Complaint   Patient presents with    Knee Pain     L knee s/p TKA 9/25/18       Date of surgery:  September 25, 2018    History of present illness:  68-year-old female who underwent left total knee arthroplasty.  Patient is doing okay.  Patient is very guarded in irritable.  She is very hesitant to move the knee but does not want to take pain medicine to help.  Pain is a 4/10.  Walking with a walker.      Review of Systems:    Musculoskeletal:  See HPI        Physical Examination:    Vital Signs:    Vitals:    10/11/18 0914   BP: (!) 161/72   Pulse: 96       Body mass index is 25.23 kg/m².    This a well-developed, well nourished patient in no acute distress.  They are alert and oriented and cooperative to examination.  Pt. walks with a walker.      Examination left knee shows well-healing surgical incision.  Mild swelling. No erythema or drainage.  Patient is very guarded and hesitant.  Range of motion is about 10° to 60°.  No calf pain. Negative Homans sign.    X-rays:  Two views of the left knee are ordered and reviewed which show well-aligned total knee arthroplasty components     Assessment::  Status post left total knee arthroplasty with early stiffness    Plan:  I reviewed the findings with her today.  I encouraged her to get aggressive and stem moving this knee.  I am very concerned that she will get stiff need a manipulation.  I have expressed this to her and encouraged her to take pain medication if she needs to to be able to push through some of the physical therapy pain.  We will get her set up outpatient physical therapy so that they can get a little more aggressive.  Follow up in 3 weeks.    This note was created using [employer] voice recognition software that occasionally misinterpreted phrases or words.

## 2018-10-23 ENCOUNTER — OFFICE VISIT (OUTPATIENT)
Dept: FAMILY MEDICINE | Facility: CLINIC | Age: 68
End: 2018-10-23
Payer: MEDICARE

## 2018-10-23 ENCOUNTER — DOCUMENTATION ONLY (OUTPATIENT)
Dept: FAMILY MEDICINE | Facility: CLINIC | Age: 68
End: 2018-10-23

## 2018-10-23 VITALS
HEIGHT: 64 IN | TEMPERATURE: 98 F | WEIGHT: 149.5 LBS | BODY MASS INDEX: 25.52 KG/M2 | HEART RATE: 70 BPM | SYSTOLIC BLOOD PRESSURE: 138 MMHG | DIASTOLIC BLOOD PRESSURE: 81 MMHG

## 2018-10-23 DIAGNOSIS — Z96.652 S/P TOTAL KNEE REPLACEMENT USING CEMENT, LEFT: ICD-10-CM

## 2018-10-23 DIAGNOSIS — F32.9 REACTIVE DEPRESSION (SITUATIONAL): Primary | ICD-10-CM

## 2018-10-23 PROCEDURE — 99213 OFFICE O/P EST LOW 20 MIN: CPT | Mod: PBBFAC,PO | Performed by: PHYSICIAN ASSISTANT

## 2018-10-23 PROCEDURE — 99999 PR PBB SHADOW E&M-EST. PATIENT-LVL III: CPT | Mod: PBBFAC,,, | Performed by: PHYSICIAN ASSISTANT

## 2018-10-23 PROCEDURE — 99214 OFFICE O/P EST MOD 30 MIN: CPT | Mod: S$PBB,,, | Performed by: PHYSICIAN ASSISTANT

## 2018-10-23 NOTE — PATIENT INSTRUCTIONS
Established High Blood Pressure    High blood pressure (hypertension) is a chronic disease. Often, healthcare providers dont know what causes it. But it can be caused by certain health conditions and medicines.  If you have high blood pressure, you may not have any symptoms. If you do have symptoms, they may include headache, dizziness, changes in your vision, chest pain, and shortness of breath. But even without symptoms, high blood pressure thats not treated raises your risk for heart attack and stroke. High blood pressure is a serious health risk and shouldnt be ignored.  A blood pressure reading is made up of two numbers: a higher number over a lower number. The top number is the systolic pressure. The bottom number is the diastolic pressure. A normal blood pressure is a systolic pressure of  less than 120 over a diastolic pressure of less than 80. You will see your blood pressure readings written together. For example, a person with a systolic pressure of 188 and a diastolic pressure of 78 will have 118/78 written in the medical record.  High blood pressure is when either the top number is 140 or higher, or the bottom number is 90 or higher. This must be the result when taking your blood pressure a number of times. The blood pressures between normal and high are called prehypertension.  Home care  If you have high blood pressure, you should do what is listed below to lower your blood pressure. If you are taking medicines for high blood pressure, these methods may reduce or end your need for medicines in the future.  · Begin a weight-loss program if you are overweight.  · Cut back on how much salt you get in your diet. Heres how to do this:  ¨ Dont eat foods that have a lot of salt. These include olives, pickles, smoked meats, and salted potato chips.  ¨ Dont add salt to your food at the table.  ¨ Use only small amounts of salt when cooking.  · Start an exercise program. Talk with your healthcare  provider about the type of exercise program that would be best for you. It doesn't have to be hard. Even brisk walking for 20 minutes 3 times a week is a good form of exercise.  · Dont take medicines that stimulate the heart. This includes many over-the-counter cold and sinus decongestant pills and sprays, as well as diet pills. Check the warnings about hypertension on the label. Before buying any over-the-counter medicines or supplements, always ask the pharmacist about the product's potential interaction with your high blood pressure and your high blood pressure medicines.  · Stimulants such as amphetamine or cocaine could be deadly for someone with high blood pressure. Never take these.  · Limit how much caffeine you get in your diet. Switch to caffeine-free products.  · Stop smoking. If you are a long-time smoker, this can be hard. Talk to your healthcare provider about medicines and nicotine replacement options to help you. Also, enroll in a stop-smoking program to make it more likely that you will quit for good.  · Learn how to handle stress. This is an important part of any program to lower blood pressure. Learn about relaxation methods like meditation, yoga, or biofeedback.  · If your provider prescribed medicines, take them exactly as directed. Missing doses may cause your blood pressure get out of control.  · If you miss a dose or doses, check with your healthcare provider or pharmacist about what to do.  · Consider buying an automatic blood pressure machine. Ask your provider for a recommendation. You can get one of these at most pharmacies.     The American Heart Association recommends the following guidelines for home blood pressure monitoring:  · Don't smoke or drink coffee for 30 minutes before taking your blood pressure.  · Go to the bathroom before the test.  · Relax for 5 minutes before taking the measurement.  · Sit with your back supported (don't sit on a couch or soft chair); keep your feet on  the floor uncrossed. Place your arm on a solid flat surface (like a table) with the upper part of the arm at heart level. Place the middle of the cuff directly above the eye of the elbow. Check the monitor's instruction manual for an illustration.  · Take multiple readings. When you measure, take 2 to 3 readings one minute apart and record all of the results.  · Take your blood pressure at the same time every day, or as your healthcare provider recommends.  · Record the date, time, and blood pressure reading.  · Take the record with you to your next medical appointment. If your blood pressure monitor has a built-in memory, simply take the monitor with you to your next appointment.  · Call your provider if you have several high readings. Don't be frightened by a single high blood pressure reading, but if you get several high readings, check in with your healthcare provider.  · Note: When blood pressure reaches a systolic (top number) of 180 or higher OR diastolic (bottom number) of 110 or higher, seek emergency medical treatment.  Follow-up care  You will need to see your healthcare provider regularly. This is to check your blood pressure and to make changes to your medicines. Make a follow-up appointment as directed. Bring the record of your home blood pressure readings to the appointment.  When to seek medical advice  Call your healthcare provider right away if any of these occur:  · Blood pressure reaches a systolic (upper number) of 180 or higher OR a diastolic (bottom number) of 110 or higher  · Chest pain or shortness of breath  · Severe headache  · Throbbing or rushing sound in the ears  · Nosebleed  · Sudden severe pain in your belly (abdomen)  · Extreme drowsiness, confusion, or fainting  · Dizziness or spinning sensation (vertigo)  · Weakness of an arm or leg or one side of the face  · You have problems speaking or seeing   Date Last Reviewed: 12/1/2016  © 5978-5768 [employer]. [address] [place] [address]  [place] [address]. All rights reserved. This information is not intended as a substitute for professional medical care. Always follow your healthcare professional's instructions.

## 2018-10-23 NOTE — PROGRESS NOTES
"Subjective:       Patient ID: Marce Hickey is a 68 y.o. female.    Chief Complaint: Hospital Follow Up    HPI   Patient is a 68 year old  female presenting to the clinic for hospital follow-up 9/25/18-9/26/18 L TKA with Dr. Shane. She has since seen Dr. Shane for follow-up. She weaned off narcotic pain medication 1 week after surgery and reports pain is being controlled with OTC tylenol. She is going to outpatient PT 3x week with good results.    Patient reports her greatest problem is total exhaustion and feelings of being defeated after a difficult past couple of years- gastric sleeve, breast cancer and related surgeries, and now knee replacement. She is not used to feeling "held back" and reports "not feeling like myself." She admits to feelings of despair, agitation, and stress. She reports taking emotions out on her  frequently. She has been on "crazy meds" in the past and does not wish to restart these. She feels as though she has always been a strong individual and does not like feeling less.  Review of Systems   Constitutional: Negative for activity change, appetite change, chills, diaphoresis, fatigue and fever.   HENT: Negative for congestion, postnasal drip and rhinorrhea.    Respiratory: Negative.  Negative for cough, shortness of breath and wheezing.    Cardiovascular: Negative.  Negative for chest pain.   Gastrointestinal: Negative for abdominal pain, blood in stool, constipation, diarrhea, nausea and vomiting.   Genitourinary: Negative for dysuria, frequency, hematuria and urgency.   Musculoskeletal: Positive for joint swelling (L knee swelling).   Skin: Negative.  Negative for color change and rash.   Neurological: Negative for dizziness and syncope.   Psychiatric/Behavioral: Positive for dysphoric mood and sleep disturbance. Negative for agitation, behavioral problems, confusion, self-injury and suicidal ideas. The patient is not nervous/anxious.        Objective:    "   Physical Exam   Constitutional: Vital signs are normal. She appears well-developed and well-nourished. No distress.   Cardiovascular: Normal rate, regular rhythm, S1 normal, S2 normal and normal heart sounds. Exam reveals no gallop.   No murmur heard.  Pulses:       Radial pulses are 2+ on the right side, and 2+ on the left side.   <2sec cap refill fingers bilat     Pulmonary/Chest: Effort normal and breath sounds normal. No respiratory distress. She has no wheezes. She has no rhonchi.   Musculoskeletal:        Left knee: She exhibits swelling (mild L knee swelling as expection s/p TKA; no redness, minimal warmth; Well healing incisional scar without evidence of infection).   Skin: Skin is warm and dry. She is not diaphoretic.   Appropriate skin turgor   Psychiatric: Her speech is normal and behavior is normal. Judgment and thought content normal. Cognition and memory are normal. She exhibits a depressed mood.   Patient very tearful in clinic; long conversation providing support with Dr. Garvey in clinic room       Assessment:       1. Reactive depression (situational)    2. S/P total knee replacement using cement, left        Plan:       Marce was seen today for hospital follow up.    Diagnoses and all orders for this visit:    Reactive depression (situational)  -     Ambulatory referral to Psychology    S/P total knee replacement using cement, left  Healing as expected  Continue PT as directed  Continue pain management and f/u with orthopedics as directed

## 2018-10-23 NOTE — PROGRESS NOTES
Pre-Visit Chart Review  For Appointment Scheduled on 10/23/18    There are no preventive care reminders to display for this patient.

## 2018-11-01 ENCOUNTER — OFFICE VISIT (OUTPATIENT)
Dept: ORTHOPEDICS | Facility: CLINIC | Age: 68
End: 2018-11-01
Payer: MEDICARE

## 2018-11-01 VITALS
BODY MASS INDEX: 25.44 KG/M2 | DIASTOLIC BLOOD PRESSURE: 72 MMHG | HEART RATE: 79 BPM | HEIGHT: 64 IN | WEIGHT: 149 LBS | SYSTOLIC BLOOD PRESSURE: 133 MMHG

## 2018-11-01 DIAGNOSIS — Z96.652 STATUS POST TOTAL KNEE REPLACEMENT USING CEMENT, LEFT: Primary | ICD-10-CM

## 2018-11-01 PROCEDURE — 99213 OFFICE O/P EST LOW 20 MIN: CPT | Mod: PBBFAC,PN | Performed by: ORTHOPAEDIC SURGERY

## 2018-11-01 PROCEDURE — 99999 PR PBB SHADOW E&M-EST. PATIENT-LVL III: CPT | Mod: PBBFAC,,, | Performed by: ORTHOPAEDIC SURGERY

## 2018-11-01 PROCEDURE — 99024 POSTOP FOLLOW-UP VISIT: CPT | Mod: POP,,, | Performed by: ORTHOPAEDIC SURGERY

## 2018-11-01 NOTE — PROGRESS NOTES
Past Medical History:   Diagnosis Date    Anxiety     Cancer     breast cancer - left    Depression     Diabetes mellitus, type 2     Borderline    Fatty liver disease, nonalcoholic     GERD (gastroesophageal reflux disease)     Hyperlipidemia     Hypertension     Plantar fasciitis of right foot        Past Surgical History:   Procedure Laterality Date    ARTHROPLASTY, KNEE Left 2018    [facility] OR    BIOPSY-SENTINEL NODE Left 2017    [facility]    breast reduction      BREAST SURGERY Bilateral     masectomy    BREAST SURGERY Bilateral     implants     SECTION      x 2    CHOLECYSTECTOMY      COLONOSCOPY N/A 10/10/2014    [facility] by [facility] Ro [facility] at [facility] ENDO    CREATION OR REVISION, INFRAMAMMARY FOLD Left 2018    [facility] 2ND [facility]    DISSECTION-AXILLARY LYMPH NODE Left 2017    [facility] at [facility]    EXCHANGE IMPLANT-BREAST Bilateral 2017    [facility] 2[facility]    FOOT SURGERY      left bunionectomy    GASTRECTOMY-SLEEVE-LAPAROSCOPIC N/A 2017    [facility]al [facility] at [facility]    gastric sleve      HYSTERECTOMY      one ovary intact, adhesions    INJECTION-FAT FAT TRANSFER Bilateral 3/6/2018    [facility]    KNEE ARTHROPLASTY Left 2018    Procedure: ARTHROPLASTY, KNEE;  Surgeon: Christos Montanez MD;  [facility];  Service: Orthopedics;  Laterality: Left;    KNEE ARTHROSCOPY Left     LIPOSUCTION      MASTECTOMY Bilateral 2017    [facility] at [facility]    RECONSTRUCTION-BREAST Bilateral 2017    [facility]    RECONSTRUCTION-BREAST/REVISION-FLAP Soft Tissue Revision Bilateral 2017    [facility]IN  [facility]    ROTATOR CUFF REPAIR Right     TONSILLECTOMY         Current Outpatient Medications   Medication Sig    anastrozole (ARIMIDEX) 1 mg Tab TAKE 1 TABLET DAILY    calcium-vitamin D3 500 mg(1,250mg) -200 unit per tablet once daily. Patient uses a patch, not oral medication    diphenhydramine-acetaminophen (TYLENOL PM)  mg Tab Take 1 tablet by mouth nightly as needed.    multivitamin with minerals tablet Take 1 tablet by mouth once daily.    ZEGERID 40-1.1 mg-gram per capsule TAKE 1 CAPSULE BEFORE BREAKFAST (Patient taking differently: TAKE 1 CAPSULE BEFORE BREAKFAST with bio carb)     No current facility-administered medications for this visit.        Review of patient's allergies indicates:   Allergen Reactions    Nsaids (non-steroidal anti-inflammatory drug) Anaphylaxis       Family History   Problem Relation Age of Onset    Hypertension Mother     Heart disease Mother         pacemaker    Heart attack Mother     Heart disease Father     Psoriasis Father     Cancer Neg Hx        Social History     Socioeconomic History    Marital status:      Spouse name: Not on file    Number of children: Not on file    Years of education: Not on file    Highest education level: Not on file   Social Needs    Financial resource strain: Not on file    Food insecurity - worry: Not on file    Food insecurity - inability: Not on file    Transportation needs - medical: Not on file    Transportation needs - non-medical: Not on file   Occupational History    Not on file   Tobacco Use    Smoking status: Former Smoker     Last attempt to quit: 1993     Years since quittin.9    Smokeless tobacco: Never Used    Tobacco comment: quit    Substance and Sexual Activity    Alcohol use: No     Alcohol/week: 0.0 oz    Drug use: No    Sexual activity: Yes     Birth control/protection: Surgical   Other Topics Concern    Are you pregnant or think you may be? Not  Asked    Breast-feeding Not Asked   Social History Narrative    Not on file       Chief Complaint:   Chief Complaint   Patient presents with    Post-op Evaluation     s/p left knee TKA 9/25/18        Date of surgery:  September 25, 2018    History of present illness:  68-year-old female who underwent left total knee arthroplasty.  Patient is doing okay.  Patient is less guarded.  Still very stiff.  Feels like she is progressing well.  Pain today is 0/10 but up was 7/10 with certain times.  Working with Kadeem at [employer].      Review of Systems:    Musculoskeletal:  See HPI        Physical Examination:    Vital Signs:    Vitals:    11/01/18 0929   BP: 133/72   Pulse: 79       Body mass index is 25.58 kg/m².    This a well-developed, well nourished patient in no acute distress.  They are alert and oriented and cooperative to examination.  Pt. walks with a walker.      Examination left knee shows  healed surgical incision.  Mild swelling. No erythema or drainage.    Range of motion is about 10° to 70°.  No calf pain. Negative Homans sign.    X-rays:  Two views of the left knee are reviewed which show well-aligned total knee arthroplasty components     Assessment::  Status post left total knee arthroplasty with early stiffness    Plan:  I reviewed the findings with her today.  We talked about manipulation.  Patient declines.  She does not want more anesthesia.  Continue working on range of motion at physical therapy.  We will get her range of motion device or Estrella splint.  Follow up in 4 weeks.    This note was created using [employer] voice recognition software that occasionally misinterpreted phrases or words.

## 2018-11-02 ENCOUNTER — OFFICE VISIT (OUTPATIENT)
Dept: PSYCHIATRY | Facility: CLINIC | Age: 68
End: 2018-11-02
Payer: MEDICARE

## 2018-11-02 DIAGNOSIS — Z85.3 HISTORY OF BREAST CANCER: Primary | ICD-10-CM

## 2018-11-02 DIAGNOSIS — F43.29 ADJUSTMENT DISORDER WITH MIXED EMOTIONAL FEATURES: ICD-10-CM

## 2018-11-02 PROCEDURE — 99999 PR PBB SHADOW E&M-EST. PATIENT-LVL II: CPT | Mod: PBBFAC,,, | Performed by: SOCIAL WORKER

## 2018-11-02 PROCEDURE — 90791 PSYCH DIAGNOSTIC EVALUATION: CPT | Mod: PBBFAC,PN | Performed by: SOCIAL WORKER

## 2018-11-02 PROCEDURE — 90791 PSYCH DIAGNOSTIC EVALUATION: CPT | Mod: S$PBB,,, | Performed by: SOCIAL WORKER

## 2018-11-02 PROCEDURE — 99212 OFFICE O/P EST SF 10 MIN: CPT | Mod: PBBFAC,PN,25 | Performed by: SOCIAL WORKER

## 2018-11-02 NOTE — PROGRESS NOTES
Individual Psychotherapy (PhD/LCSW)  Psychiatry Initial Visit (PhD/LCSW)  Diagnostic Interview - CPT 97368    Date: 2018    Site: [place]    Referral source: DEONTE Garvey    Clinical status of patient: Outpatient    Marce Hickey, a 68 y.o. female, for initial evaluation visit.  Met with patient.    Chief complaint/reason for encounter: anger, anxiety and interpersonal    History of present illness: reactive depression as client has had multiple surgeries including gastric sleeve, double mastectomy after a diagnosis of breast cancer, and a recent knee replacement as well.  She is overwhelmed.    Pain: noncontributory    Symptoms:   · Mood: depressed mood and tearfulness  · Anxiety: excessive anxiety/worry and irritability  · Substance abuse: denied  · Cognitive functioning: denied  · Health behaviors: noncontributory    Psychiatric history: none    Medical history: double mastectomy, reconstructive surgeries, knee replacement, gastric sleeve, has lost 90 lbs in recent months    Family history of psychiatric illness: state father was a pathological liar, and mother was in therapy for 12 years but does not know diagnosis    Social history (marriage, employment, etc.): client presents with good eye contact, circumstantial, ruminating about the past with her recently  bio mother, and experiencing feelings of being overwhelmed, highly stressed, and finding it difficult to cope because of multiple surgeries (gastric sleeve, double mastectomy after being diagnosed with breast cancer, upcoming reconstruction, and most recently a knee replacement).  States she gets little support from her current spouse, Juliocesar Sol (3rd ) as he is easily distracted and has difficulty staying focus for tasks on hand.  Is still friendly with second  Edilberto who resides in [place].  Interpersonal relationships are difficult for client.   Served in the [employer] for 22 years and is retired.  Has two children: Akshat  "44, , with kids and resides in [place] and Gamaliel, 41, not , no kids but lives with significant other.  Believes from what others have told her including her brother, Susannah Roach, has told her that mother would badmouth her and client admits that bio mother treated her badly, although she is tearful when speaking to bio mom's recent death in 2017 from multiple ailments.  Mother was Bertha, Kiko, homemaker, and father was Tristan, mid-50's when he  in , and parents were .  States dale dad was a gambler and pathological liar and when she was 16 years of age he asked her whom she would like to live with because parents were getting  and she says she told both parents "I rather be a [place] the [place] than make a decision like that.  Prior to marrying Ebenezer she states that she got cold feet on the day of the wedding but decided to go through with it anyway.  Strong Cluster B/histrionic traits noted.      Substance use:   Alcohol: none   Drugs: none   Tobacco: none   Caffeine: 1 cup of coffee daily and 2 ice teas    Current medications and drug reactions (include OTC, herbal): see medication list - reviewed same with client - cancer drugs    Strengths and liabilities: Strength: Patient accepts guidance/feedback, Strength: Patient is expressive/articulate., Strength: Patient is intelligent., Strength: Patient has positive support network., Liability: Patient is impulsive., Liability: Patient has poor judgment, Liability: Patient lacks coping skills.    Current Evaluation:     Mental Status Exam:  General Appearance:  unremarkable, age appropriate, normal weight, casually dressed, neatly groomed   Speech: normal tone, normal rate, normal pitch, normal volume      Level of Cooperation: cooperative      Thought Processes: normal and logical, circumstantial, loose associations   Mood: anxious, depressed, irritable, sad      Thought Content: normal, no suicidality, no homicidality, " delusions, or paranoia   Affect: congruent and appropriate, expansive, irritable, labile, sad, anxious   Orientation: Oriented x3   Memory: recent >  intact, remote >  intact   Attention Span & Concentration: intact   Fund of General Knowledge: intact and appropriate to age and level of education   Abstract Reasoning: interpretation of similarities was abstract   Judgment & Insight: limited     Language  intact     Diagnostic Impression - Plan:       ICD-10-CM ICD-9-CM   1. History of breast cancer Z85.3 V10.3   2. Adjustment disorder with mixed emotional features F43.29 309.9       Plan:individual psychotherapy    Return to Clinic: as scheduled    Length of Service (minutes): 45

## 2018-11-06 ENCOUNTER — PATIENT MESSAGE (OUTPATIENT)
Dept: PLASTIC SURGERY | Facility: CLINIC | Age: 68
End: 2018-11-06

## 2018-11-07 ENCOUNTER — PATIENT MESSAGE (OUTPATIENT)
Dept: ORTHOPEDICS | Facility: CLINIC | Age: 68
End: 2018-11-07

## 2018-11-13 ENCOUNTER — PATIENT MESSAGE (OUTPATIENT)
Dept: ORTHOPEDICS | Facility: CLINIC | Age: 68
End: 2018-11-13

## 2018-11-13 DIAGNOSIS — R20.0 NUMBNESS AND TINGLING: Primary | ICD-10-CM

## 2018-11-13 DIAGNOSIS — R20.2 NUMBNESS AND TINGLING: Primary | ICD-10-CM

## 2018-11-14 ENCOUNTER — PATIENT MESSAGE (OUTPATIENT)
Dept: ORTHOPEDICS | Facility: CLINIC | Age: 68
End: 2018-11-14

## 2018-11-15 ENCOUNTER — OFFICE VISIT (OUTPATIENT)
Dept: PSYCHIATRY | Facility: CLINIC | Age: 68
End: 2018-11-15
Payer: MEDICARE

## 2018-11-15 DIAGNOSIS — F43.29 ADJUSTMENT DISORDER WITH MIXED EMOTIONAL FEATURES: Primary | ICD-10-CM

## 2018-11-15 PROCEDURE — 90834 PSYTX W PT 45 MINUTES: CPT | Mod: PBBFAC,PN | Performed by: SOCIAL WORKER

## 2018-11-15 PROCEDURE — 99211 OFF/OP EST MAY X REQ PHY/QHP: CPT | Mod: PBBFAC,PN,25 | Performed by: SOCIAL WORKER

## 2018-11-15 PROCEDURE — 90834 PSYTX W PT 45 MINUTES: CPT | Mod: S$PBB,,, | Performed by: SOCIAL WORKER

## 2018-11-15 PROCEDURE — 99999 PR PBB SHADOW E&M-EST. PATIENT-LVL I: CPT | Mod: PBBFAC,,, | Performed by: SOCIAL WORKER

## 2018-11-15 NOTE — PROGRESS NOTES
"Individual Psychotherapy (PhD/LCSW)    11/15/2018    Site:  [place]         Therapeutic Intervention: Met with patient.  Outpatient - Insight oriented psychotherapy 45 min - CPT code 71090, Outpatient - Behavior modifying psychotherapy 45 min - CPT code 86915 and Outpatient - Supportive psychotherapy 45 min - CPT Code 66470    Chief complaint/reason for encounter: anger and interpersonal     Interval history and content of current session: client presents with good eye contact, circumstantial, ruminations noted, anger present and heightened (gets louder, almost shouting although clinician does not believe that client presents a threat to clinician) as client begins session with clinician.  Partly cultural (loud talker).  Speaks to continued marital problems with  Ebenezer who is currently unemployed, finances are separate, has taken over the garage and forbids her to use it (although she is removing his stuff little by little and is aware that at some point he will notice and will be angry).  It is clear, and clinician pointed same out to client, that  has trust issues.  Client states she will fight if the following occurs: believes she is getting short end of the stick; if she is being treated badly; and if she wants things to be right.  Interestingly, client stated early in the session that  believes he is always right and his opinion is the only one that counts, per client.  States he makes "feel like crap".  Client comes across as somewhat combative, argumentative, driven by her anger but also appears to enjoy being loud and getting undivided attention and speech tends to be very on the surface and not profound or deep.  Clinician spoke to client's anger not just at her , but her  bio-mom, her children, etc. And will address same in future sessions.  Clinician also gave client information on marriage counselors in the [facility] area in the event that both client and spouse would " like to go.  Will engage client over the next month on an every 2 weeks basis.    Treatment plan:  · Target symptoms: anxiety , anger, volatility and lability noted as well  · Why chosen therapy is appropriate versus another modality: relevant to diagnosis, patient responds to this modality, evidence based practice  · Outcome monitoring methods: self-report, observation  · Therapeutic intervention type: insight oriented psychotherapy, behavior modifying psychotherapy, supportive psychotherapy    Risk parameters:  Patient reports no suicidal ideation  Patient reports no homicidal ideation  Patient reports no self-injurious behavior  Patient reports no violent behavior    Verbal deficits: None    Patient's response to intervention:  The patient's response to intervention is accepting, motivated.    Progress toward goals and other mental status changes:  The patient's progress toward goals is fair .    Diagnosis:     ICD-10-CM ICD-9-CM   1. Adjustment disorder with mixed emotional features F43.29 309.9       Plan:  individual psychotherapy    Return to clinic: 2 weeks    Length of Service (minutes): 45

## 2018-11-16 ENCOUNTER — PROCEDURE VISIT (OUTPATIENT)
Dept: PLASTIC SURGERY | Facility: CLINIC | Age: 68
End: 2018-11-16
Payer: MEDICARE

## 2018-11-16 DIAGNOSIS — Z85.3 PERSONAL HISTORY OF BREAST CANCER: ICD-10-CM

## 2018-11-16 DIAGNOSIS — Z98.890 S/P BREAST RECONSTRUCTION, BILATERAL: Primary | ICD-10-CM

## 2018-11-16 PROCEDURE — 11921 CORRECT SKN COLOR 6.1-20.0CM: CPT | Mod: S$PBB,,, | Performed by: PHYSICIAN ASSISTANT

## 2018-11-16 PROCEDURE — 11921 CORRECT SKN COLOR 6.1-20.0CM: CPT | Mod: PBBFAC,PO | Performed by: PHYSICIAN ASSISTANT

## 2018-11-16 NOTE — PROCEDURES
[facility] Hickey [facility] for bilateral 3 dimensional nipple areolar complex tattoos for completion of bilateral implant based breast reconstruction. She has a very nice result and is pleased with the outcome of her reconstruction. She chose to proceed with 3D tattoos vs. Nipple recon with tattoo. She is doing well today. Incisions of Bilateral breast are well healed with no erythema, drainage or induration.     Review of patient's allergies indicates:   Allergen Reactions    Nsaids (non-steroidal anti-inflammatory drug) Anaphylaxis     Current Outpatient Medications on File Prior to Visit   Medication Sig Dispense Refill    anastrozole (ARIMIDEX) 1 mg Tab TAKE 1 TABLET DAILY 90 tablet 1    calcium-vitamin D3 500 mg(1,250mg) -200 unit per tablet once daily. Patient uses a patch, not oral medication      diphenhydramine-acetaminophen (TYLENOL PM)  mg Tab Take 1 tablet by mouth nightly as needed.      multivitamin with minerals tablet Take 1 tablet by mouth once daily.      ZEGERID 40-1.1 mg-gram per capsule TAKE 1 CAPSULE BEFORE BREAKFAST (Patient taking differently: TAKE 1 CAPSULE BEFORE BREAKFAST with bio carb) 90 capsule 2     No current facility-administered medications on file prior to visit.      Patient Active Problem List   Diagnosis    GERD (gastroesophageal reflux disease)    Plantar fasciitis of right foot    Fatty liver disease, nonalcoholic    Dysmetabolic syndrome    Hypovitaminosis D    Moderate episode of recurrent major depressive disorder    CEASAR (generalized anxiety disorder)    Back pain    Dyslipidemia    Status post gastric surgery    History of type 2 diabetes mellitus    History of hypertension    Breast cancer    Breast asymmetry    Arthritis of knee, left    History of breast cancer    Status post DJO total knee replacement using cement, left 9/25/18     Past Surgical History:   Procedure Laterality Date     ARTHROPLASTY, KNEE Left 2018    [facility] at [facility]    BIOPSY-SENTINEL NODE Left 2017    [facility] by [facility]on Luis [facility] at [facility]    breast reduction      BREAST SURGERY Bilateral     masectomy    BREAST SURGERY Bilateral     implants     SECTION      x 2    CHOLECYSTECTOMY      COLONOSCOPY N/A 10/10/2014    Performed by Chaitanya Ro MD at [facility] ENDO    CREATION OR REVISION, INFRAMAMMARY FOLD Left 2018    [facility] at [facility] 2ND FLR    DISSECTION-AXILLARY LYMPH NODE Left 2017    Performed by [facility] at [facility] OR    EXCHANGE IMPLANT-BREAST Bilateral 2017    [facility] at [facility] 2ND FLR    FOOT SURGERY      left bunionectomy    GASTRECTOMY-SLEEVE-LAPAROSCOPIC N/A 2017    Performed by Nelson Panchal [facility] at [facility] OR    gastric sleve      HYSTERECTOMY      one ovary intact, adhesions    INJECTION-FAT FAT TRANSFER Bilateral 3/6/2018    [facility] at [facility] 2ND FLR    KNEE ARTHROPLASTY Left 2018    Procedure: ARTHROPLASTY, KNEE;  Surgeon: Christos Montanez MD;  Location: [facility] OR;  Service: Orthopedics;  Laterality: Left;    KNEE ARTHROSCOPY Left     LIPOSUCTION      MASTECTOMY Bilateral 2017    Performed by [facility] at [facility] OR    RECONSTRUCTION-BREAST Bilateral 2017    Performed by Montrell Thomas MD at [facility] OR    RECONSTRUCTION-BREAST/REVISION-FLAP Soft Tissue Revision Bilateral 2017    [facility] at [facility] 2ND FLR    ROTATOR CUFF REPAIR Right     TONSILLECTOMY       Social History     Socioeconomic History    Marital status:      Spouse name: Not on file    Number of children: Not on file    Years of education: Not on file    Highest education level: Not on file   Social Needs    Financial resource strain: Not on file    Food insecurity  - worry: Not on file    Food insecurity - inability: Not on file    Transportation needs - medical: Not on file    Transportation needs - non-medical: Not on file   Occupational History    Not on file   Tobacco Use    Smoking status: Former Smoker     Last attempt to quit: 1993     Years since quittin.9    Smokeless tobacco: Never Used    Tobacco comment: quit    Substance and Sexual Activity    Alcohol use: No     Alcohol/week: 0.0 oz    Drug use: No    Sexual activity: Yes     Birth control/protection: Surgical   Other Topics Concern    Are you pregnant or think you may be? Not Asked    Breast-feeding Not Asked   Social History Narrative    Not on file           PROCEDURE NOTE    Procedure was discussed in detail with the patient as were the risks and possible complications. She understands that the risks include but are not limited to bleeding, scarring, infection, pain, numbness, skin necrosis, asymmetry, allergic reaction, failure to take and need for touch up/second stage tattoo. I explained to her that > %50 of patients require a second stage procedure for better saturation of tattoo for 3 dimension. Patient voiced understanding, all questions were answered, informed consent was signed and that will be scanned into her medical record.     In the standing position, a 34mm template was used for nipple areolar complex placement. Patient viewed placement in the mirror and agreed to proceed with placement. Colors were mixed and tested on the skin of her breast, patient agreed to proceed with colors testes on skin of breast.  Breast were prepped with chlora prep and draped per usual aseptic guidelines. Bilateral 3D nipple tattoos were then performed using 3 dimensional technique. Positive punctate bleeding noted. Photos were taken using Haiku melissa. (see below)    Patient tolerated the tattoo procedure well. Breasts were cleaned with normal saline, bacitracin and nonstick dressing applied.  Post procedure instructions were reviewed with verbal understanding. Will return to clinic in 4 weeks, appointment slip provided with writeen post op instructions.     All questions were answered. The patient was advised to call the clinic with any questions or concerns prior to their next visit.

## 2018-11-29 ENCOUNTER — OFFICE VISIT (OUTPATIENT)
Dept: ORTHOPEDICS | Facility: CLINIC | Age: 68
End: 2018-11-29
Payer: MEDICARE

## 2018-11-29 VITALS
BODY MASS INDEX: 25.44 KG/M2 | HEART RATE: 85 BPM | SYSTOLIC BLOOD PRESSURE: 148 MMHG | DIASTOLIC BLOOD PRESSURE: 70 MMHG | WEIGHT: 149 LBS | HEIGHT: 64 IN

## 2018-11-29 DIAGNOSIS — Z96.652 STATUS POST TOTAL KNEE REPLACEMENT USING CEMENT, LEFT: Primary | ICD-10-CM

## 2018-11-29 PROCEDURE — 99024 POSTOP FOLLOW-UP VISIT: CPT | Mod: POP,,, | Performed by: ORTHOPAEDIC SURGERY

## 2018-11-29 PROCEDURE — 99999 PR PBB SHADOW E&M-EST. PATIENT-LVL III: CPT | Mod: PBBFAC,,, | Performed by: ORTHOPAEDIC SURGERY

## 2018-11-29 PROCEDURE — 99213 OFFICE O/P EST LOW 20 MIN: CPT | Mod: PBBFAC,PN | Performed by: ORTHOPAEDIC SURGERY

## 2018-11-30 ENCOUNTER — PATIENT MESSAGE (OUTPATIENT)
Dept: PSYCHIATRY | Facility: CLINIC | Age: 68
End: 2018-11-30

## 2018-12-02 NOTE — PROGRESS NOTES
Past Medical History:   Diagnosis Date    Anxiety     Cancer     breast cancer - left    Depression     Diabetes mellitus, type 2     Borderline    Fatty liver disease, nonalcoholic     GERD (gastroesophageal reflux disease)     Hyperlipidemia     Hypertension     Plantar fasciitis of right foot        Past Surgical History:   Procedure Laterality Date    ARTHROPLASTY, KNEE Left 2018    [facility]    BIOPSY-SENTINEL NODE Left 2017    [facility] at [facility]    breast reduction      BREAST SURGERY Bilateral     masectomy    BREAST SURGERY Bilateral     implants     SECTION      x 2    CHOLECYSTECTOMY      COLONOSCOPY N/A 10/10/2014    Performed by [facility] at [facility] ENDO    CREATION OR REVISION, INFRAMAMMARY FOLD Left 2018    [facility] 2ND FLR    DISSECTION-AXILLARY LYMPH NODE Left 2017    [facility]formed by [facility] at [facility] OR    EXCHANGE IMPLANT-BREAST Bilateral 2017    [facility] at [facility] 2ND FLR    FOOT SURGERY      left bunionectomy    GASTRECTOMY-SLEEVE-LAPAROSCOPIC N/A 2017    Performed by [facility]al [facility] at [facility] OR    gastric sleve      HYSTERECTOMY      one ovary intact, adhesions    INJECTION-FAT FAT TRANSFER Bilateral 3/6/2018    [facility] at [facility] 2ND FLR    KNEE ARTHROPLASTY Left 2018    Procedure: ARTHROPLASTY, KNEE;  Surgeon: Christos Montanez MD;  Location: [facility];  Service: Orthopedics;  Laterality: Left;    KNEE ARTHROSCOPY Left     LIPOSUCTION      MASTECTOMY Bilateral 2017    Performed [facility] at [facility]    RECONSTRUCTION-BREAST Bilateral 2017    Performed by Montrell Thomas MD at [facility] OR    RECONSTRUCTION-BREAST/REVISION-FLAP Soft Tissue Revision Bilateral 2017    [facility]    ROTATOR CUFF REPAIR Right     TONSILLECTOMY         Current Outpatient Medications   Medication Sig    anastrozole (ARIMIDEX) 1 mg Tab TAKE 1 TABLET DAILY    calcium-vitamin D3 500 mg(1,250mg) -200 unit per tablet once daily. Patient uses a patch, not oral medication    diphenhydramine-acetaminophen (TYLENOL PM)  mg Tab Take 1 tablet by mouth nightly as needed.    multivitamin with minerals tablet Take 1 tablet by mouth once daily.    ZEGERID 40-1.1 mg-gram per capsule TAKE 1 CAPSULE BEFORE BREAKFAST (Patient taking differently: TAKE 1 CAPSULE BEFORE BREAKFAST with bio carb)     No current facility-administered medications for this visit.        Review of patient's allergies indicates:   Allergen Reactions    Nsaids (non-steroidal anti-inflammatory drug) Anaphylaxis       Family History   Problem Relation Age of Onset    Hypertension Mother     Heart disease Mother         pacemaker    Heart attack Mother     Heart disease Father     Psoriasis Father     Cancer Neg Hx        Social History     Socioeconomic History    Marital status:      Spouse name: Not on file    Number of children: Not on file    Years of education: Not on file    Highest education level: Not on file   Social Needs    Financial resource strain: Not on file    Food insecurity - worry: Not on file    Food insecurity - inability: Not on file    Transportation needs - medical: Not on file    Transportation needs - non-medical: Not on file   Occupational History    Not on file   Tobacco Use    Smoking status: Former Smoker     Last attempt to quit: 1993     Years since quittin.0    Smokeless tobacco: Never Used    Tobacco comment: quit    Substance and Sexual Activity    Alcohol use: No     Alcohol/week: 0.0 oz    Drug use: No    Sexual activity: Yes     Birth control/protection: Surgical   Other Topics Concern    Are you pregnant or think you may be? Not  Asked    Breast-feeding Not Asked   Social History Narrative    Not on file       Chief Complaint:   Chief Complaint   Patient presents with    Knee Pain     L knee 4wk f/u       Date of surgery:  September 25, 2018    History of present illness:  68-year-old female who underwent left total knee arthroplasty.  Patient is doing okay.  Patient is less guarded.  Still very stiff.  Feels like she is progressing well.  Pain today is 1/10.  Working with Kadeem across teran and using the Estrella splint.  Feels like she is making good progress.      Review of Systems:    Musculoskeletal:  See HPI        Physical Examination:    Vital Signs:    Vitals:    11/29/18 1501   BP: (!) 148/70   Pulse: 85       Body mass index is 25.58 kg/m².    This a well-developed, well nourished patient in no acute distress.  They are alert and oriented and cooperative to examination.  Pt. walks with a walker.      Examination left knee shows  healed surgical incision.  Mild swelling. No erythema or drainage.    Range of motion is about 10° to 80°.  No calf pain. Negative Homans sign.    X-rays:  Two views of the left knee are reviewed which show well-aligned total knee arthroplasty components     Assessment::  Status post left total knee arthroplasty with early stiffness    Plan:    Continue working on range of motion at physical therapy.  Continue the Estrella splint.  Follow up in 4 weeks.  Four views of the left knee at that time.    This note was created using M Modal voice recognition software that occasionally misinterpreted phrases or words.

## 2018-12-03 ENCOUNTER — TELEPHONE (OUTPATIENT)
Dept: ORTHOPEDICS | Facility: CLINIC | Age: 68
End: 2018-12-03

## 2018-12-03 NOTE — TELEPHONE ENCOUNTER
----- Message from Adele Brooke MA sent at 12/3/2018  9:31 AM CST -----  Contact: Cristine//Smith Colunga needs a recent office visit note for the patient in order to fit the patient. She states she needs the notes by tomorrow.    Call Back# [phone]

## 2018-12-09 ENCOUNTER — PATIENT MESSAGE (OUTPATIENT)
Dept: HEMATOLOGY/ONCOLOGY | Facility: CLINIC | Age: 68
End: 2018-12-09

## 2018-12-10 ENCOUNTER — PATIENT MESSAGE (OUTPATIENT)
Dept: HEMATOLOGY/ONCOLOGY | Facility: CLINIC | Age: 68
End: 2018-12-10

## 2018-12-10 DIAGNOSIS — C50.011 MALIGNANT NEOPLASM INVOLVING BOTH NIPPLE AND AREOLA OF RIGHT BREAST IN FEMALE, UNSPECIFIED ESTROGEN RECEPTOR STATUS: ICD-10-CM

## 2018-12-10 RX ORDER — ANASTROZOLE 1 MG/1
1 TABLET ORAL DAILY
Qty: 90 TABLET | Refills: 1 | Status: SHIPPED | OUTPATIENT
Start: 2018-12-10 | End: 2019-06-03 | Stop reason: SDUPTHER

## 2018-12-11 ENCOUNTER — LAB VISIT (OUTPATIENT)
Dept: LAB | Facility: HOSPITAL | Age: 68
End: 2018-12-11
Attending: FAMILY MEDICINE
Payer: MEDICARE

## 2018-12-11 ENCOUNTER — TELEPHONE (OUTPATIENT)
Dept: FAMILY MEDICINE | Facility: CLINIC | Age: 68
End: 2018-12-11

## 2018-12-11 ENCOUNTER — TELEPHONE (OUTPATIENT)
Dept: ORTHOPEDICS | Facility: CLINIC | Age: 68
End: 2018-12-11

## 2018-12-11 DIAGNOSIS — R30.0 DYSURIA: ICD-10-CM

## 2018-12-11 DIAGNOSIS — R30.0 DYSURIA: Primary | ICD-10-CM

## 2018-12-11 LAB
BACTERIA #/AREA URNS AUTO: ABNORMAL /HPF
BILIRUB UR QL STRIP: NEGATIVE
CAOX CRY UR QL COMP ASSIST: ABNORMAL
CLARITY UR REFRACT.AUTO: ABNORMAL
COLOR UR AUTO: YELLOW
GLUCOSE UR QL STRIP: NEGATIVE
HGB UR QL STRIP: NEGATIVE
KETONES UR QL STRIP: NEGATIVE
LEUKOCYTE ESTERASE UR QL STRIP: ABNORMAL
MICROSCOPIC COMMENT: ABNORMAL
NITRITE UR QL STRIP: POSITIVE
PH UR STRIP: 5 [PH] (ref 5–8)
PROT UR QL STRIP: NEGATIVE
SP GR UR STRIP: 1.03 (ref 1–1.03)
SQUAMOUS #/AREA URNS AUTO: 1 /HPF
URN SPEC COLLECT METH UR: ABNORMAL
WBC #/AREA URNS AUTO: 30 /HPF (ref 0–5)

## 2018-12-11 PROCEDURE — 87088 URINE BACTERIA CULTURE: CPT

## 2018-12-11 PROCEDURE — 87186 SC STD MICRODIL/AGAR DIL: CPT

## 2018-12-11 PROCEDURE — 87086 URINE CULTURE/COLONY COUNT: CPT

## 2018-12-11 PROCEDURE — 87077 CULTURE AEROBIC IDENTIFY: CPT

## 2018-12-11 PROCEDURE — 81001 URINALYSIS AUTO W/SCOPE: CPT

## 2018-12-11 RX ORDER — CIPROFLOXACIN 250 MG/1
250 TABLET, FILM COATED ORAL EVERY 12 HOURS
Qty: 6 TABLET | Refills: 0 | Status: SHIPPED | OUTPATIENT
Start: 2018-12-11 | End: 2018-12-14

## 2018-12-11 RX ORDER — AMOXICILLIN 500 MG/1
500 CAPSULE ORAL ONCE AS NEEDED
Qty: 4 CAPSULE | Refills: 0 | Status: SHIPPED | OUTPATIENT
Start: 2018-12-11 | End: 2018-12-11

## 2018-12-11 NOTE — TELEPHONE ENCOUNTER
Patient states she is not having pain patient notified medication was sent to pharmacy patient states her surgeon gave here a rx for antibiotics to take one time before dental procedure advise patient to contact surgeon and let him know that Dr Garvey has prescribed cipro to treat UTI patient states understanding and will contact her surgeon

## 2018-12-11 NOTE — TELEPHONE ENCOUNTER
----- Message from Bryon Webster sent at 12/11/2018  2:52 PM CST -----  Contact: julieth with [facility]  Julieth called to see if the pt needs a pre-med before her teeth cleaning. Please call to advise.    Fax #: [phone]  Call Back #: [phone]  Thanks

## 2018-12-11 NOTE — TELEPHONE ENCOUNTER
Patient came to clinic requesting order for ua and culture for possible uti. Complaints of cloudy urine and painful urination.   Order signed for ua and culture per Dr Garvey  Patient requesting antibiotics to be called in

## 2018-12-11 NOTE — TELEPHONE ENCOUNTER
----- Message from Ralph Torres sent at 12/11/2018  3:24 PM CST -----  Contact: Self  Pt is in lobby states she has a uti and is requesting a u/a.  Pt states she called and left a message 15 minutes ago.  Pt is in lab lobby I gave her a labeled specimen cup.      Thank you,      Ralph

## 2018-12-11 NOTE — TELEPHONE ENCOUNTER
Advised dental office that patient will need 2gm of Amoxil prior to her dental procedure. She verbalized understanding.

## 2018-12-11 NOTE — TELEPHONE ENCOUNTER
Will send cipro to pharmacy as she has had previous infections with e. Coli which were sensitive to it. Needs to stay hydrated and can take OTC pyridium or AZO for any dysuria.

## 2018-12-12 ENCOUNTER — TELEPHONE (OUTPATIENT)
Dept: FAMILY MEDICINE | Facility: CLINIC | Age: 68
End: 2018-12-12

## 2018-12-12 NOTE — TELEPHONE ENCOUNTER
----- Message from Pam Alvarado sent at 12/11/2018  3:06 PM CST -----  Contact: Patient  Type:  Same Day Appointment Request    Caller is requesting a same day appointment.  Caller declined first available appointment listed below.      Name of Caller:  Patient  When is the first available appointment?  12/18/18  Symptoms:  possible UTI  Best Call Back Number:    Additional Information:   Calling to schedule a same day appt today. Please advise.

## 2018-12-13 ENCOUNTER — OFFICE VISIT (OUTPATIENT)
Dept: PSYCHIATRY | Facility: CLINIC | Age: 68
End: 2018-12-13
Payer: MEDICARE

## 2018-12-13 DIAGNOSIS — F43.29 ADJUSTMENT DISORDER WITH MIXED EMOTIONAL FEATURES: Primary | ICD-10-CM

## 2018-12-13 PROCEDURE — 90834 PSYTX W PT 45 MINUTES: CPT | Mod: S$PBB,,, | Performed by: SOCIAL WORKER

## 2018-12-13 PROCEDURE — 99999 PR PBB SHADOW E&M-EST. PATIENT-LVL I: CPT | Mod: PBBFAC,,, | Performed by: SOCIAL WORKER

## 2018-12-13 PROCEDURE — 90834 PSYTX W PT 45 MINUTES: CPT | Mod: PBBFAC,PN | Performed by: SOCIAL WORKER

## 2018-12-13 PROCEDURE — 99211 OFF/OP EST MAY X REQ PHY/QHP: CPT | Mod: PBBFAC,PN | Performed by: SOCIAL WORKER

## 2018-12-13 NOTE — PROGRESS NOTES
"Individual Psychotherapy (PhD/LCSW)    12/13/2018    Site:  [facility]         Therapeutic Intervention: Met with patient.  Outpatient - Insight oriented psychotherapy 45 min - CPT code 98138 and Outpatient - Supportive psychotherapy 45 min - CPT Code 61146    Chief complaint/reason for encounter: anger, anxiety and interpersonal     Interval history and content of current session:  Client presents for session neat and appropriate in appearance, nicely groomed, speech in tangential at times, eye contact is normal, motor activity demonstrates a certain degree of restlessness, affect is at times labile, mood is anxious, angry, irritable, no ruminations noted, behavior is at times agitated yet cooperative, insight and judgment is poor.  Speaks to continued problems with  Ebenezer's biological children, in particular his daughter Carmel, although she likes his youngest son Kadeem and views him as an "ally".  Demonstrates exaggerated changes in mood throughout the session, shifting from "good to bad" for  Ebenezer, to believing that his family (children, mother, ex-wife) are not supportive of her.  This has created problems in navigating the holidays as Ebenezer wants to see his children during this time.  Clinician suggested client asked  to see about the two of them spending [place] together and he can see his children either Bonnie July or the day after Xmas.  Client states they have done this in the past.  Strong Cluster B traits noted and client responds well to clinician's suggestions and reality checks as presented.  Continue current treatment modality and add components of DBT to client's ongoing treatment, beginning with Mindfulness.  Engage client in Deep Diaphragmatic Breathing Exercise at next session.      Treatment plan:  · Target symptoms: anxiety , adjustment, family stressors  · Why chosen therapy is appropriate versus another modality: relevant to diagnosis, patient responds to this modality, " evidence based practice  · Outcome monitoring methods: self-report, observation  · Therapeutic intervention type: insight oriented psychotherapy, supportive psychotherapy    Risk parameters:  Patient reports no suicidal ideation  Patient reports no homicidal ideation  Patient reports no self-injurious behavior  Patient reports no violent behavior    Verbal deficits: None    Patient's response to intervention:  The patient's response to intervention is accepting, motivated.    Progress toward goals and other mental status changes:  The patient's progress toward goals is fair .    Diagnosis:     ICD-10-CM ICD-9-CM   1. Adjustment disorder with mixed emotional features F43.29 309.9       Plan:  individual psychotherapy    Return to clinic: as scheduled    Length of Service (minutes): 45

## 2018-12-14 ENCOUNTER — TELEPHONE (OUTPATIENT)
Dept: FAMILY MEDICINE | Facility: CLINIC | Age: 68
End: 2018-12-14

## 2018-12-14 ENCOUNTER — OFFICE VISIT (OUTPATIENT)
Dept: PLASTIC SURGERY | Facility: CLINIC | Age: 68
End: 2018-12-14
Payer: MEDICARE

## 2018-12-14 VITALS — BODY MASS INDEX: 25.45 KG/M2 | WEIGHT: 149.06 LBS | HEIGHT: 64 IN

## 2018-12-14 DIAGNOSIS — Z09 SURGERY FOLLOW-UP EXAMINATION: Primary | ICD-10-CM

## 2018-12-14 LAB — BACTERIA UR CULT: NORMAL

## 2018-12-14 PROCEDURE — 99213 OFFICE O/P EST LOW 20 MIN: CPT | Mod: PBBFAC,PO | Performed by: PHYSICIAN ASSISTANT

## 2018-12-14 PROCEDURE — 99999 PR PBB SHADOW E&M-EST. PATIENT-LVL III: CPT | Mod: PBBFAC,,, | Performed by: PHYSICIAN ASSISTANT

## 2018-12-14 PROCEDURE — 99024 POSTOP FOLLOW-UP VISIT: CPT | Mod: POP,,, | Performed by: PHYSICIAN ASSISTANT

## 2018-12-14 NOTE — TELEPHONE ENCOUNTER
Called pt regarding below results and recommendations per Dr. Garvey. Left voicemail with return number.

## 2018-12-14 NOTE — PROGRESS NOTES
Subjective:      Marce Hickey is a 68 y.o. year old female who [facility] for follow up visit status post Bilateral 3 dimensional nipple tattoo for breast reconstruction. She denies fever, chills, nausea, vomiting, or other systemic signs of infection. She is very pleased with the outcome of her nipple tattoos.     There were no vitals filed for this visit.     Review of patient's allergies indicates:   Allergen Reactions    Nsaids (non-steroidal anti-inflammatory drug) Anaphylaxis       Current Outpatient Medications on File Prior to Visit   Medication Sig Dispense Refill    anastrozole (ARIMIDEX) 1 mg Tab Take 1 tablet (1 mg total) by mouth once daily. 90 tablet 1    calcium-vitamin D3 500 mg(1,250mg) -200 unit per tablet once daily. Patient uses a patch, not oral medication      diphenhydramine-acetaminophen (TYLENOL PM)  mg Tab Take 1 tablet by mouth nightly as needed.      multivitamin with minerals tablet Take 1 tablet by mouth once daily.      ZEGERID 40-1.1 mg-gram per capsule TAKE 1 CAPSULE BEFORE BREAKFAST (Patient taking differently: TAKE 1 CAPSULE BEFORE BREAKFAST with bio carb) 90 capsule 2    ciprofloxacin HCl (CIPRO) 250 MG tablet Take 1 tablet (250 mg total) by mouth every 12 (twelve) hours. for 3 days 6 tablet 0     No current facility-administered medications on file prior to visit.        Patient Active Problem List   Diagnosis    GERD (gastroesophageal reflux disease)    Plantar fasciitis of right foot    Fatty liver disease, nonalcoholic    Dysmetabolic syndrome    Hypovitaminosis D    Moderate episode of recurrent major depressive disorder    CEASAR (generalized anxiety disorder)    Back pain    Dyslipidemia    Status post gastric surgery    History of type 2 diabetes mellitus    History of hypertension    Breast cancer    Breast asymmetry    Arthritis of knee, left    History of breast cancer    Status post DJO total  knee replacement using cement, left 18       [unfilled]    Social History     Socioeconomic History    Marital status:      Spouse name: Not on file    Number of children: Not on file    Years of education: Not on file    Highest education level: Not on file   Social Needs    Financial resource strain: Not on file    Food insecurity - worry: Not on file    Food insecurity - inability: Not on file    Transportation needs - medical: Not on file    Transportation needs - non-medical: Not on file   Occupational History    Not on file   Tobacco Use    Smoking status: Former Smoker     Last attempt to quit: 1993     Years since quittin.0    Smokeless tobacco: Never Used    Tobacco comment: quit    Substance and Sexual Activity    Alcohol use: No     Alcohol/week: 0.0 oz    Drug use: No    Sexual activity: Yes     Birth control/protection: Surgical   Other Topics Concern    Are you pregnant or think you may be? Not Asked    Breast-feeding Not Asked   Social History Narrative    Not on file         Review of Systems: negative    Objective:     Physical Exam:  WD WN NAD  Normal resp effort  R breast - incisions healed, nipple tattoo with good saturation, no erythema/drainage  L breast - incisions healed, nipple tattoo with good saturation, no erythema/drainage      Assessment:       1. Surgery follow-up examination        Plan:   68 y.o. female status post bilateral 3D nipple tattoo  - Doing well, no issues. Pleased with outcome  - Will not need second stage touch up at this time  - Will RTC x 3 months.     All questions were answered. The patient was advised to call the clinic with any questions or concerns prior to their next visit.

## 2018-12-14 NOTE — TELEPHONE ENCOUNTER
Final ucx demonstrated infection with e. Coli sensitive to cipro. Needs to complete the entire course, let me now if still symptomatic.

## 2018-12-17 ENCOUNTER — PATIENT MESSAGE (OUTPATIENT)
Dept: FAMILY MEDICINE | Facility: CLINIC | Age: 68
End: 2018-12-17

## 2018-12-17 NOTE — TELEPHONE ENCOUNTER
"Spoke to patient and gave her the results per Dr. Garvey. Patient confirmed her understanding of the results without further questions. Patient states that she "does not have any symptoms and that the cipro worked".   "

## 2018-12-17 NOTE — TELEPHONE ENCOUNTER
She does not need to come in for her uti since she dropped off a urine sample and finished the cipro. However I would like her to have a regular 6m onth follow up scheduled for April-please make sure it is 60 minutes.

## 2018-12-18 ENCOUNTER — PATIENT MESSAGE (OUTPATIENT)
Dept: PSYCHIATRY | Facility: CLINIC | Age: 68
End: 2018-12-18

## 2018-12-20 ENCOUNTER — PATIENT MESSAGE (OUTPATIENT)
Dept: PLASTIC SURGERY | Facility: CLINIC | Age: 68
End: 2018-12-20

## 2018-12-21 DIAGNOSIS — M25.562 LEFT KNEE PAIN, UNSPECIFIED CHRONICITY: Primary | ICD-10-CM

## 2018-12-27 ENCOUNTER — OFFICE VISIT (OUTPATIENT)
Dept: ORTHOPEDICS | Facility: CLINIC | Age: 68
End: 2018-12-27
Payer: MEDICARE

## 2018-12-27 ENCOUNTER — HOSPITAL ENCOUNTER (OUTPATIENT)
Dept: RADIOLOGY | Facility: HOSPITAL | Age: 68
Discharge: HOME | End: 2018-12-27
Attending: ORTHOPAEDIC SURGERY
Payer: MEDICARE

## 2018-12-27 VITALS
HEART RATE: 70 BPM | WEIGHT: 149 LBS | SYSTOLIC BLOOD PRESSURE: 186 MMHG | DIASTOLIC BLOOD PRESSURE: 88 MMHG | HEIGHT: 64 IN | BODY MASS INDEX: 25.44 KG/M2

## 2018-12-27 DIAGNOSIS — Z96.652 STATUS POST TOTAL KNEE REPLACEMENT USING CEMENT, LEFT: Primary | ICD-10-CM

## 2018-12-27 DIAGNOSIS — M25.562 LEFT KNEE PAIN, UNSPECIFIED CHRONICITY: ICD-10-CM

## 2018-12-27 PROBLEM — M17.12 ARTHRITIS OF KNEE, LEFT: Status: RESOLVED | Noted: 2018-07-25 | Resolved: 2018-12-27

## 2018-12-27 PROCEDURE — 73564 X-RAY EXAM KNEE 4 OR MORE: CPT | Mod: 26,LT,, | Performed by: RADIOLOGY

## 2018-12-27 PROCEDURE — 73564 X-RAY EXAM KNEE 4 OR MORE: CPT | Mod: TC,PN,LT

## 2018-12-27 PROCEDURE — 73562 X-RAY EXAM OF KNEE 3: CPT | Mod: 26,59,RT, | Performed by: RADIOLOGY

## 2018-12-27 PROCEDURE — 99213 OFFICE O/P EST LOW 20 MIN: CPT | Mod: PBBFAC,25,PN | Performed by: ORTHOPAEDIC SURGERY

## 2018-12-27 PROCEDURE — 99213 OFFICE O/P EST LOW 20 MIN: CPT | Mod: S$PBB,,, | Performed by: ORTHOPAEDIC SURGERY

## 2018-12-27 PROCEDURE — 99999 PR PBB SHADOW E&M-EST. PATIENT-LVL III: CPT | Mod: PBBFAC,,, | Performed by: ORTHOPAEDIC SURGERY

## 2018-12-27 RX ORDER — CYCLOBENZAPRINE HCL 5 MG
5 TABLET ORAL 3 TIMES DAILY PRN
Qty: 40 TABLET | Refills: 0 | Status: SHIPPED | OUTPATIENT
Start: 2018-12-27 | End: 2019-01-06

## 2018-12-27 NOTE — PROGRESS NOTES
Past Medical History:   Diagnosis Date    Anxiety     Cancer     breast cancer - left    Depression     Diabetes mellitus, type 2     Borderline    Fatty liver disease, nonalcoholic     GERD (gastroesophageal reflux disease)     Hyperlipidemia     Hypertension     Plantar fasciitis of right foot        Past Surgical History:   Procedure Laterality Date    ARTHROPLASTY, KNEE Left 2018    [facility]    BIOPSY-SENTINEL NODE Left 2017    [facility]    breast reduction      BREAST SURGERY Bilateral     masectomy    BREAST SURGERY Bilateral     implants     SECTION      x 2    CHOLECYSTECTOMY      COLONOSCOPY N/A 10/10/2014    [facility] at [facility] ENDO    CREATION OR REVISION, INFRAMAMMARY FOLD Left 2018    [facility]ND [facility]    DISSECTION-AXILLARY LYMPH NODE Left 2017    [facility]    EXCHANGE IMPLANT-BREAST Bilateral 2017    [facility] 2ND [facility]    FOOT SURGERY      left bunionectomy    GASTRECTOMY-SLEEVE-LAPAROSCOPIC N/A 2017    [facility]    gastric sleve      HYSTERECTOMY      one ovary intact, adhesions    INJECTION-FAT FAT TRANSFER Bilateral 3/6/2018    [facility] 2ND [facility]    KNEE ARTHROSCOPY Left     LIPOSUCTION      MASTECTOMY Bilateral 2017    [facility]    RECONSTRUCTION-BREAST Bilateral 2017    [facility]    RECONSTRUCTION-BREAST/REVISION-FLAP Soft Tissue Revision Bilateral 2017    [facility] 2ND [facility]LR    ROTATOR CUFF REPAIR Right     TONSILLECTOMY         Current Outpatient Medications   Medication Sig    anastrozole (ARIMIDEX) 1 mg Tab  Take 1 tablet (1 mg total) by mouth once daily.    calcium-vitamin D3 500 mg(1,250mg) -200 unit per tablet once daily. Patient uses a patch, not oral medication    diphenhydramine-acetaminophen (TYLENOL PM)  mg Tab Take 1 tablet by mouth nightly as needed.    multivitamin with minerals tablet Take 1 tablet by mouth once daily.    ZEGERID 40-1.1 mg-gram per capsule TAKE 1 CAPSULE BEFORE BREAKFAST (Patient taking differently: TAKE 1 CAPSULE BEFORE BREAKFAST with bio carb)    cyclobenzaprine (FLEXERIL) 5 MG tablet Take 1 tablet (5 mg total) by mouth 3 (three) times daily as needed for Muscle spasms.     No current facility-administered medications for this visit.        Review of patient's allergies indicates:   Allergen Reactions    Nsaids (non-steroidal anti-inflammatory drug) Anaphylaxis       Family History   Problem Relation Age of Onset    Hypertension Mother     Heart disease Mother         pacemaker    Heart attack Mother     Heart disease Father     Psoriasis Father     Cancer Neg Hx        Social History     Socioeconomic History    Marital status:      Spouse name: Not on file    Number of children: Not on file    Years of education: Not on file    Highest education level: Not on file   Social Needs    Financial resource strain: Not on file    Food insecurity - worry: Not on file    Food insecurity - inability: Not on file    Transportation needs - medical: Not on file    Transportation needs - non-medical: Not on file   Occupational History    Not on file   Tobacco Use    Smoking status: Former Smoker     Last attempt to quit: 1993     Years since quittin.0    Smokeless tobacco: Never Used    Tobacco comment: quit    Substance and Sexual Activity    Alcohol use: No     Alcohol/week: 0.0 oz    Drug use: No    Sexual activity: Yes     Birth control/protection: Surgical   Other Topics Concern    Are you pregnant or think you may be? Not Asked     Breast-feeding Not Asked   Social History Narrative    Not on file       Chief Complaint:   Chief Complaint   Patient presents with    Knee Pain     L knee 4wk f/u       Date of surgery:  September 25, 2018    History of present illness:  68-year-old female who underwent left total knee arthroplasty.  Patient is doing okay.  Patient is less guarded.  Still very stiff.  Pain today is 1/10.  Working with Kadeem across [facility] and using the Estrella splint.  Feels like she is making good progress.      Review of Systems:    Musculoskeletal:  See HPI        Physical Examination:    Vital Signs:    Vitals:    12/27/18 1102   BP: (!) 186/88   Pulse: 70       Body mass index is 25.58 kg/m².    This a well-developed, well nourished patient in no acute distress.  They are alert and oriented and cooperative to examination.  Pt. walks without assistive device.    Examination left knee shows  healed surgical incision.  Mild swelling. No erythema or drainage.    Range of motion is about 10° to 80°.  No calf pain. Negative Homans sign.    X-rays:  Four views of the left knee are ordered and reviewed which show well-aligned total knee arthroplasty components     Assessment::  Status post left total knee arthroplasty with  stiffness    Plan:    Continue working on range of motion at physical therapy.  Continue the Estrella splint.  Follow up in 6 weeks.  Gave her prescription for some muscle relaxers to help her sleep at night.  Continue with chiropractic care.    This note was created using M Modal voice recognition software that occasionally misinterpreted phrases or words.

## 2019-01-08 ENCOUNTER — PATIENT MESSAGE (OUTPATIENT)
Dept: PLASTIC SURGERY | Facility: CLINIC | Age: 69
End: 2019-01-08

## 2019-02-07 ENCOUNTER — OFFICE VISIT (OUTPATIENT)
Dept: ORTHOPEDICS | Facility: CLINIC | Age: 69
End: 2019-02-07
Payer: MEDICARE

## 2019-02-07 VITALS
HEART RATE: 69 BPM | DIASTOLIC BLOOD PRESSURE: 84 MMHG | HEIGHT: 64 IN | SYSTOLIC BLOOD PRESSURE: 170 MMHG | BODY MASS INDEX: 25.44 KG/M2 | WEIGHT: 149 LBS

## 2019-02-07 DIAGNOSIS — M54.32 LEFT SIDED SCIATICA: ICD-10-CM

## 2019-02-07 DIAGNOSIS — Z96.652 STATUS POST TOTAL KNEE REPLACEMENT USING CEMENT, LEFT: Primary | ICD-10-CM

## 2019-02-07 PROCEDURE — 99213 OFFICE O/P EST LOW 20 MIN: CPT | Mod: PBBFAC,PN | Performed by: ORTHOPAEDIC SURGERY

## 2019-02-07 PROCEDURE — 99213 OFFICE O/P EST LOW 20 MIN: CPT | Mod: S$PBB,,, | Performed by: ORTHOPAEDIC SURGERY

## 2019-02-07 PROCEDURE — 99999 PR PBB SHADOW E&M-EST. PATIENT-LVL III: ICD-10-PCS | Mod: PBBFAC,,, | Performed by: ORTHOPAEDIC SURGERY

## 2019-02-07 PROCEDURE — 99999 PR PBB SHADOW E&M-EST. PATIENT-LVL III: CPT | Mod: PBBFAC,,, | Performed by: ORTHOPAEDIC SURGERY

## 2019-02-07 PROCEDURE — 99213 PR OFFICE/OUTPT VISIT, EST, LEVL III, 20-29 MIN: ICD-10-PCS | Mod: S$PBB,,, | Performed by: ORTHOPAEDIC SURGERY

## 2019-02-07 NOTE — PROGRESS NOTES
Past Medical History:   Diagnosis Date    Anxiety     Cancer     breast cancer - left    Depression     Diabetes mellitus, type 2     Borderline    Fatty liver disease, nonalcoholic     GERD (gastroesophageal reflux disease)     Hyperlipidemia     Hypertension     Plantar fasciitis of right foot        Past Surgical History:   Procedure Laterality Date    ARTHROPLASTY, KNEE Left 2018    [facility]    BIOPSY-SENTINEL NODE Left 2017    [facility]    breast reduction      BREAST SURGERY Bilateral     masectomy    BREAST SURGERY Bilateral     implants     SECTION      x 2    CHOLECYSTECTOMY      COLONOSCOPY N/A 10/10/2014    [facility] ENDO    CREATION OR REVISION, INFRAMAMMARY FOLD Left 2018    [facility] 2ND [facility]    DISSECTION-AXILLARY LYMPH NODE Left 2017    [facility]    EXCHANGE IMPLANT-BREAST Bilateral 2017    [facility] 2[facility]    FOOT SURGERY      left bunionectomy    GASTRECTOMY-SLEEVE-LAPAROSCOPIC N/A 2017    [facility] OR    gastric sleve      HYSTERECTOMY      one ovary intact, adhesions    INJECTION-FAT FAT TRANSFER Bilateral 3/6/2018    [facility]    KNEE ARTHROSCOPY Left     LIPOSUCTION      MASTECTOMY Bilateral 2017    [facility]    RECONSTRUCTION-BREAST Bilateral 2017    [facility]    RECONSTRUCTION-BREAST/REVISION-FLAP Soft Tissue Revision Bilateral 2017    [facility] 2ND [facility]    ROTATOR CUFF REPAIR Right     TONSILLECTOMY         Current Outpatient Medications   Medication Sig    anastrozole (ARIMIDEX) 1 mg Tab  Take 1 tablet (1 mg total) by mouth once daily.    calcium-vitamin D3 500 mg(1,250mg) -200 unit per tablet once daily. Patient uses a patch, not oral medication    diphenhydramine-acetaminophen (TYLENOL PM)  mg Tab Take 1 tablet by mouth nightly as needed.    multivitamin with minerals tablet Take 1 tablet by mouth once daily.    ZEGERID 40-1.1 mg-gram per capsule TAKE 1 CAPSULE BEFORE BREAKFAST (Patient taking differently: TAKE 1 CAPSULE BEFORE BREAKFAST with bio carb)     No current facility-administered medications for this visit.        Review of patient's allergies indicates:   Allergen Reactions    Nsaids (non-steroidal anti-inflammatory drug) Anaphylaxis       Family History   Problem Relation Age of Onset    Hypertension Mother     Heart disease Mother         pacemaker    Heart attack Mother     Heart disease Father     Psoriasis Father     Cancer Neg Hx        Social History     Socioeconomic History    Marital status:      Spouse name: Not on file    Number of children: Not on file    Years of education: Not on file    Highest education level: Not on file   Social Needs    Financial resource strain: Not on file    Food insecurity - worry: Not on file    Food insecurity - inability: Not on file    Transportation needs - medical: Not on file    Transportation needs - non-medical: Not on file   Occupational History    Not on file   Tobacco Use    Smoking status: Former Smoker     Last attempt to quit: 1993     Years since quittin.2    Smokeless tobacco: Never Used    Tobacco comment: quit    Substance and Sexual Activity    Alcohol use: No     Alcohol/week: 0.0 oz    Drug use: No    Sexual activity: Yes     Birth control/protection: Surgical   Other Topics Concern    Are you pregnant or think you may be? Not Asked    Breast-feeding Not Asked   Social History Narrative    Not on file       Chief Complaint:   Chief Complaint   Patient presents with     Knee Pain     L knee 6wk f/u       Date of surgery:  September 25, 2018    History of present illness:  68-year-old female who underwent left total knee arthroplasty.  Patient is doing okay.  Patient is less guarded.  Still very stiff.  Pain today is 4/10.  Working with Kadeem at [employer] and using the Estrella splint.  Feels like she is making good progress.  Main complaint today is some left sciatica type pain.  Pain is starts in the left buttock and goes through the thigh down below the knee.  Pain started right around the time of surgery. Had been seeing a chiropractor originally.  Did feel like that was helping.  Using some topical ointment which seems to help for the knee but not for the hip.    Answers for HPI/ROS submitted by the patient on 2/5/2019   Leg pain  unexpected weight change: No  appetite change : No  sleep disturbance: Yes  IMMUNOCOMPROMISED: No  nervous/ anxious: No  dysphoric mood: No  rash: No  visual disturbance: No  eye redness: No  eye pain: No  ear pain: No  tinnitus: No  hearing loss: No  sinus pressure : No  nosebleeds: No  enviro allergies: No  food allergies: No  cough: No  shortness of breath: No  sweating: No  dysuria: No  frequency: No  difficulty urinating: No  hematuria: No  chest pain: No  palpitations: No  nausea: No  vomiting: No  diarrhea: No  blood in stool: No  constipation: No  headaches: No  dizziness: No  numbness: No  seizures: No  joint swelling: No  Pain Chronicity: chronic  History of trauma: No  Onset: more than 1 month ago  Frequency: intermittently  Progression since onset: gradually improving  injury location: at home  pain- numeric: 3/10  pain location: other  pain quality: aching  Radiating Pain: No  Aggravating factors: lying down  fever: No  inability to bear weight: No  itching: No  joint locking: No  limited range of motion: No  stiffness: Yes  tingling: No  Treatments tried: heat, exercise, other  physical therapy: effective  Improvement on treatment:  mild      Physical Examination:    Vital Signs:    There were no vitals filed for this visit.    Body mass index is 25.58 kg/m².    This a well-developed, well nourished patient in no acute distress.  They are alert and oriented and cooperative to examination.  Pt. walks without assistive device.  Walking a little more normally and naturally.    Examination left knee shows  healed surgical incision.  Mild swelling. No erythema or drainage.    Range of motion is about 10° to 95°.  No calf pain. Negative Homans sign.    X-rays:  Four views of the left knee are reviewed which show well-aligned total knee arthroplasty components     Assessment::  Status post left total knee arthroplasty with  Stiffness  Left sciatica.    Plan:    Continue working on range of motion at physical therapy.  Continue the Estrella splint.  Follow up in 8 weeks.  Recommended referral to see Dr. Palacios for her sciatica.    This note was created using M Modal voice recognition software that occasionally misinterpreted phrases or words.

## 2019-02-11 ENCOUNTER — OFFICE VISIT (OUTPATIENT)
Dept: PAIN MEDICINE | Facility: CLINIC | Age: 69
End: 2019-02-11
Payer: MEDICARE

## 2019-02-11 VITALS
HEIGHT: 64 IN | SYSTOLIC BLOOD PRESSURE: 142 MMHG | BODY MASS INDEX: 25.44 KG/M2 | WEIGHT: 149 LBS | HEART RATE: 63 BPM | DIASTOLIC BLOOD PRESSURE: 85 MMHG

## 2019-02-11 DIAGNOSIS — M51.36 DDD (DEGENERATIVE DISC DISEASE), LUMBAR: ICD-10-CM

## 2019-02-11 DIAGNOSIS — M54.16 LUMBAR RADICULITIS: Primary | ICD-10-CM

## 2019-02-11 PROCEDURE — 99204 PR OFFICE/OUTPT VISIT, NEW, LEVL IV, 45-59 MIN: ICD-10-PCS | Mod: S$PBB,,, | Performed by: ANESTHESIOLOGY

## 2019-02-11 PROCEDURE — 99999 PR PBB SHADOW E&M-EST. PATIENT-LVL IV: CPT | Mod: PBBFAC,,, | Performed by: ANESTHESIOLOGY

## 2019-02-11 PROCEDURE — 99214 OFFICE O/P EST MOD 30 MIN: CPT | Mod: PBBFAC,PN | Performed by: ANESTHESIOLOGY

## 2019-02-11 PROCEDURE — 99204 OFFICE O/P NEW MOD 45 MIN: CPT | Mod: S$PBB,,, | Performed by: ANESTHESIOLOGY

## 2019-02-11 PROCEDURE — 99999 PR PBB SHADOW E&M-EST. PATIENT-LVL IV: ICD-10-PCS | Mod: PBBFAC,,, | Performed by: ANESTHESIOLOGY

## 2019-02-11 RX ORDER — AMOXICILLIN 500 MG/1
CAPSULE ORAL
COMMUNITY
Start: 2018-12-11 | End: 2019-04-23

## 2019-02-11 NOTE — PROGRESS NOTES
This note was completed with dictation software and grammatical errors may exist.    Referring Physician: Christos Montanez,*    PCP: Savannah Garvey MD      CC:  Low back and left leg pain    HPI:   Marce Hickey is a 68 y.o. female referred to us for low back and left leg pain. Patient underwent a left knee replacement on September 2018.  She states since the surgery, she has had intermittent aching, burning, deep pain in her lower back.  Pain radiates to her left buttock and left lateral thigh.  Pain seldomly radiates past her left knee.  Pain worsens with lying on her left side, rising up, walking and standing.  Pain improves with rest.  She has undergone physical therapy with her left knee with mild benefits.  She has not had any physical therapy addressing her lower back.  She denies any weakness.  No bowel bladder changes.    ROS:  CONSTITUTIONAL: No fevers, chills, night sweats, wt. loss, appetite changes  SKIN: no rashes or itching  ENT: No headaches, head trauma, vision changes, or eye pain  LYMPH NODES: None noticed   CV: No chest pain, palpitations.   RESP: No shortness of breath, dyspnea on exertion, cough, wheezing, or hemoptysis  GI: No nausea, emesis, diarrhea, constipation, melena, hematochezia, pain.    : No dysuria, hematuria, urgency, or frequency   HEME: No easy bruising, bleeding problems  PSYCHIATRIC: No depression, anxiety, psychosis, hallucinations.  NEURO: No seizures, memory loss, dizziness or difficulty sleeping  MSK:  Positive HPI      Past Medical History:   Diagnosis Date    Anxiety     Cancer     breast cancer - left    Depression     Diabetes mellitus, type 2     Borderline    Fatty liver disease, nonalcoholic     GERD (gastroesophageal reflux disease)     Hyperlipidemia     Hypertension     Plantar fasciitis of right foot      Past Surgical History:   Procedure Laterality Date    ARTHROPLASTY, KNEE Left 9/25/2018    [facility]formed by [facility] at  [facility]    BIOPSY-SENTINEL NODE Left 2017    Performed by Damon Luis [facility] at [facility] OR    breast reduction      BREAST SURGERY Bilateral     masectomy    BREAST SURGERY Bilateral     implants     SECTION      x 2    CHOLECYSTECTOMY      COLONOSCOPY N/A 10/10/2014    Performed by Chaitanya Ro MD at [facility] ENDO    CREATION OR REVISION, INFRAMAMMARY FOLD Left 2018    Performed by [facility]rell [facility]Tomas [facility] at [facility] OR 2ND FLR    DISSECTION-AXILLARY LYMPH NODE Left 2017    Performed by [facility]on [facility] MD at [facility] OR    EXCHANGE IMPLANT-BREAST Bilateral 2017    Performed by Montrell Thomas MD at [facility] OR 2ND FLR    FOOT SURGERY      left bunionectomy    GASTRECTOMY-SLEEVE-LAPAROSCOPIC N/A 2017    Performed by Nelson Panchal MD at [facility] OR    gastric sleve      HYSTERECTOMY      one ovary intact, adhesions    INJECTION-FAT FAT TRANSFER Bilateral 3/6/2018    Performed by [facility] at [facility] OR 2ND FLR    KNEE ARTHROSCOPY Left     LIPOSUCTION      MASTECTOMY Bilateral 2017    Performed by [facility]on [facility] MD at [facility] OR    RECONSTRUCTION-BREAST Bilateral 2017    Performed by Montrell Thomas MD at [facility] OR    RECONSTRUCTION-BREAST/REVISION-FLAP Soft Tissue Revision Bilateral 2017    Performed by [facility] at [facility] OR 2ND FLR    ROTATOR CUFF REPAIR Right     TONSILLECTOMY       Family History   Problem Relation Age of Onset    Hypertension Mother     Heart disease Mother         pacemaker    Heart attack Mother     Heart disease Father     Psoriasis Father     Cancer Neg Hx      Social History     Socioeconomic History    Marital status:      Spouse name: None    Number of children: None    Years of education: None    Highest education level: None   Social Needs    Financial resource strain: None    Food insecurity - worry: None    Food insecurity - inability: None    Transportation  "needs - medical: None    Transportation needs - non-medical: None   Occupational History    None   Tobacco Use    Smoking status: Former Smoker     Last attempt to quit: 1993     Years since quittin.2    Smokeless tobacco: Never Used    Tobacco comment: quit    Substance and Sexual Activity    Alcohol use: No     Alcohol/week: 0.0 oz    Drug use: No    Sexual activity: Yes     Birth control/protection: Surgical   Other Topics Concern    Are you pregnant or think you may be? Not Asked    Breast-feeding Not Asked   Social History Narrative    None         Medications/Allergies: See med card    Vitals:    19 0836   BP: (!) 142/85   Pulse: 63   Weight: 67.6 kg (149 lb)   Height: 5' 4" (1.626 m)   PainSc:   2   PainLoc: Back         Physical exam:    GENERAL: A and O x3, the patient appears well groomed and is in no acute distress.  Skin: No rashes or obvious lesions  HEENT: normocephalic, atraumatic  CARDIOVASCULAR:  Palpable peripheral pulses  LUNGS: easy work of breathing  ABDOMEN: soft, nontender   UPPER EXTREMITIES: Normal alignment, normal range of motion, no atrophy, no skin changes,  hair growth and nail growth normal and equal bilaterally. No swelling, no tenderness.    LOWER EXTREMITIES:  Normal alignment, normal range of motion, no atrophy, no skin changes,  hair growth and nail growth normal and equal bilaterally. No swelling, no tenderness.    LUMBAR SPINE  Lumbar spine: ROM is full with flexion extension and oblique extension with mild increased pain.    Naebel's test causes no increased pain on either side.    Supine straight leg raise is positive on the left at 60°.    Internal and external rotation of the hip causes no increased pain on either side.  Myofascial exam: No tenderness to palpation across lumbar paraspinous muscles.      MENTAL STATUS: normal orientation, speech, language, and fund of knowledge for social situation.  Emotional state appropriate.    CRANIAL " NERVES:  II:  PERRL bilaterally,   III,IV,VI: EOMI.    V:  Facial sensation equal bilaterally  VII:  Facial motor function normal.  VIII:  Hearing equal to finger rub bilaterally  IX/X: Gag normal, palate symmetric  XI:  Shoulder shrug equal, head turn equal  XII:  Tongue midline without fasciculations      MOTOR: Tone and bulk: normal bilateral upper and lower Strength: normal   Delt Bi Tri WE WF     R 5 5 5 5 5 5   L 5 5 5 5 5 5     IP ADD ABD Quad TA Gas HAM  R 5 5 5 5 5 5 5  L 5 5 5 5 5 5 5    SENSATION: Light touch and pinprick intact bilaterally  REFLEXES: normal, symmetric, nonbrisk.  Toes down, no clonus. No hoffmans.  GAIT: normal rise, base, steps, and arm swing.        Imaging:  Xray L-spine 2016  There are small osteophytes on vertebral bodies and there is sclerosis and posterior articular facets.     Assessment:  Patient referred for low back and left leg pain  1. Lumbar radiculitis    2. DDD (degenerative disc disease), lumbar          Plan:  1. I have stressed the importance of physical activity and exercise to improve overall health  2. Reviewed pertinent imaging and records with patient.  Schedule lumbar MRI to further evaluate radicular symptoms  3.  Patient desires conservative therapy.  Order given for formal physical therapy as she is currently in physical therapy for her left knee.  4.  May consider lumbar JIN if radicular pain worsens.  5.  She will follow-up as needed.      Thank you for referring this interesting patient, and I look forward to continuing to collaborate in her care.

## 2019-02-14 ENCOUNTER — HOSPITAL ENCOUNTER (OUTPATIENT)
Dept: RADIOLOGY | Facility: HOSPITAL | Age: 69
Discharge: HOME | End: 2019-02-14
Attending: ANESTHESIOLOGY
Payer: MEDICARE

## 2019-02-14 DIAGNOSIS — M54.16 LUMBAR RADICULITIS: ICD-10-CM

## 2019-02-14 PROCEDURE — 72148 MRI LUMBAR SPINE WITHOUT CONTRAST: ICD-10-PCS | Mod: 26,,, | Performed by: RADIOLOGY

## 2019-02-14 PROCEDURE — 72148 MRI LUMBAR SPINE W/O DYE: CPT | Mod: TC

## 2019-02-14 PROCEDURE — 72148 MRI LUMBAR SPINE W/O DYE: CPT | Mod: 26,,, | Performed by: RADIOLOGY

## 2019-02-15 ENCOUNTER — PATIENT MESSAGE (OUTPATIENT)
Dept: PAIN MEDICINE | Facility: CLINIC | Age: 69
End: 2019-02-15

## 2019-02-19 ENCOUNTER — PATIENT MESSAGE (OUTPATIENT)
Dept: BARIATRICS | Facility: CLINIC | Age: 69
End: 2019-02-19

## 2019-03-22 ENCOUNTER — OFFICE VISIT (OUTPATIENT)
Dept: PLASTIC SURGERY | Facility: CLINIC | Age: 69
End: 2019-03-22
Payer: MEDICARE

## 2019-03-22 VITALS
WEIGHT: 149.06 LBS | DIASTOLIC BLOOD PRESSURE: 70 MMHG | HEIGHT: 64 IN | SYSTOLIC BLOOD PRESSURE: 138 MMHG | HEART RATE: 74 BPM | TEMPERATURE: 99 F | BODY MASS INDEX: 25.45 KG/M2

## 2019-03-22 DIAGNOSIS — Z09 SURGERY FOLLOW-UP EXAMINATION: Primary | ICD-10-CM

## 2019-03-22 PROCEDURE — 99999 PR PBB SHADOW E&M-EST. PATIENT-LVL III: CPT | Mod: PBBFAC,,, | Performed by: SURGERY

## 2019-03-22 PROCEDURE — 99024 POSTOP FOLLOW-UP VISIT: CPT | Mod: POP,,, | Performed by: SURGERY

## 2019-03-22 PROCEDURE — 99024 PR POST-OP FOLLOW-UP VISIT: ICD-10-PCS | Mod: POP,,, | Performed by: SURGERY

## 2019-03-22 PROCEDURE — 99999 PR PBB SHADOW E&M-EST. PATIENT-LVL III: ICD-10-PCS | Mod: PBBFAC,,, | Performed by: SURGERY

## 2019-03-22 PROCEDURE — 99213 OFFICE O/P EST LOW 20 MIN: CPT | Mod: PBBFAC,PO | Performed by: SURGERY

## 2019-03-22 NOTE — PROGRESS NOTES
Breast reconstruction complete.  Pt is happy.  Has a small amt of inferior drift of lt implant but still looks very good.  DC this clinic rtc prn

## 2019-03-27 ENCOUNTER — PATIENT MESSAGE (OUTPATIENT)
Dept: ORTHOPEDICS | Facility: CLINIC | Age: 69
End: 2019-03-27

## 2019-03-27 ENCOUNTER — PATIENT MESSAGE (OUTPATIENT)
Dept: HEMATOLOGY/ONCOLOGY | Facility: CLINIC | Age: 69
End: 2019-03-27

## 2019-03-27 ENCOUNTER — TELEPHONE (OUTPATIENT)
Dept: HEMATOLOGY/ONCOLOGY | Facility: CLINIC | Age: 69
End: 2019-03-27

## 2019-03-27 NOTE — TELEPHONE ENCOUNTER
----- Message from Ana Kendrick sent at 3/27/2019 10:49 AM CDT -----    Pt is calling to  Inform the office a  Pet  Scan  For  April 11 // please call  For details [phone]

## 2019-03-27 NOTE — TELEPHONE ENCOUNTER
Spoke to patient, patient stated that her scan is scheduled for 4/11/19. She also stated that her insurance will not pay for another scan because it has not been a year. Patient noted as having a scan on 4/24/18. Patient scheduled to see Dr Ding and requested the scan be cancelled and if Dr Ding wants her to have one after the visit she will. Pet scan cancelled.

## 2019-04-08 ENCOUNTER — OFFICE VISIT (OUTPATIENT)
Dept: ORTHOPEDICS | Facility: CLINIC | Age: 69
End: 2019-04-08
Payer: MEDICARE

## 2019-04-08 VITALS
SYSTOLIC BLOOD PRESSURE: 179 MMHG | WEIGHT: 149 LBS | HEIGHT: 64 IN | DIASTOLIC BLOOD PRESSURE: 84 MMHG | BODY MASS INDEX: 25.44 KG/M2 | HEART RATE: 58 BPM

## 2019-04-08 DIAGNOSIS — Z96.652 STATUS POST TOTAL KNEE REPLACEMENT USING CEMENT, LEFT: Primary | ICD-10-CM

## 2019-04-08 PROCEDURE — 99213 OFFICE O/P EST LOW 20 MIN: CPT | Mod: PBBFAC,PN | Performed by: ORTHOPAEDIC SURGERY

## 2019-04-08 PROCEDURE — 99213 PR OFFICE/OUTPT VISIT, EST, LEVL III, 20-29 MIN: ICD-10-PCS | Mod: S$PBB,,, | Performed by: ORTHOPAEDIC SURGERY

## 2019-04-08 PROCEDURE — 99213 OFFICE O/P EST LOW 20 MIN: CPT | Mod: S$PBB,,, | Performed by: ORTHOPAEDIC SURGERY

## 2019-04-08 PROCEDURE — 99999 PR PBB SHADOW E&M-EST. PATIENT-LVL III: ICD-10-PCS | Mod: PBBFAC,,, | Performed by: ORTHOPAEDIC SURGERY

## 2019-04-08 PROCEDURE — 99999 PR PBB SHADOW E&M-EST. PATIENT-LVL III: CPT | Mod: PBBFAC,,, | Performed by: ORTHOPAEDIC SURGERY

## 2019-04-08 RX ORDER — TALC
6 POWDER (GRAM) TOPICAL
COMMUNITY
End: 2019-06-04

## 2019-04-09 ENCOUNTER — PATIENT OUTREACH (OUTPATIENT)
Dept: ADMINISTRATIVE | Facility: HOSPITAL | Age: 69
End: 2019-04-09

## 2019-04-09 NOTE — PROGRESS NOTES
Past Medical History:   Diagnosis Date    Anxiety     Cancer     breast cancer - left    Depression     Diabetes mellitus, type 2     Borderline    Fatty liver disease, nonalcoholic     GERD (gastroesophageal reflux disease)     Hyperlipidemia     Hypertension     Plantar fasciitis of right foot        Past Surgical History:   Procedure Laterality Date    ARTHROPLASTY, KNEE Left 2018    [facility]formed by [facility] at [facility] OR    BIOPSY-SENTINEL NODE Left 2017    [facility]formed by [facility]on [facility] at [facility] OR    breast reduction      BREAST SURGERY Bilateral     masectomy    BREAST SURGERY Bilateral     implants     SECTION      x 2    CHOLECYSTECTOMY      COLONOSCOPY N/A 10/10/2014    Performed by [facility]aitanya Ro MD at [facility] ENDO    CREATION OR REVISION, INFRAMAMMARY FOLD Left 2018    Performed by [facility] at [facility] OR 2ND FLR    DISSECTION-AXILLARY LYMPH NODE Left 2017    [facility]formed by [facility] at [facility] OR    EXCHANGE IMPLANT-BREAST Bilateral 2017    Performed by [facility] MD at [facility] OR 2ND FLR    FOOT SURGERY      left bunionectomy    GASTRECTOMY-SLEEVE-LAPAROSCOPIC N/A 2017    Performed by Nelson Panchal MD at [facility] OR    gastric sleve      HYSTERECTOMY      one ovary intact, adhesions    INJECTION-FAT FAT TRANSFER Bilateral 3/6/2018    Performed by [facility] at [facility] OR 2ND FLR    KNEE ARTHROSCOPY Left     LIPOSUCTION      MASTECTOMY Bilateral 2017    [facility]formed by [facility] at [facility] OR    RECONSTRUCTION-BREAST Bilateral 2017    [facility]formed by [facility] at [facility] OR    RECONSTRUCTION-BREAST/REVISION-FLAP Soft Tissue Revision Bilateral 2017    Performed by [facility] at [facility] OR 2ND FLR    ROTATOR CUFF REPAIR Right     TONSILLECTOMY         Current Outpatient Medications   Medication Sig    amoxicillin (AMOXIL) 500 MG capsule      anastrozole (ARIMIDEX) 1 mg Tab Take 1 tablet (1 mg total) by mouth once daily.    calcium-vitamin D3 500 mg(1,250mg) -200 unit per tablet once daily. Patient uses a patch, not oral medication    diphenhydramine-acetaminophen (TYLENOL PM)  mg Tab Take 1 tablet by mouth nightly as needed.    melatonin 3 mg Tab Take 6 mg by mouth.    multivitamin with minerals tablet Take 1 tablet by mouth once daily.    ZEGERID 40-1.1 mg-gram per capsule TAKE 1 CAPSULE BEFORE BREAKFAST (Patient taking differently: TAKE 1 CAPSULE BEFORE BREAKFAST with bio carb)     No current facility-administered medications for this visit.        Review of patient's allergies indicates:   Allergen Reactions    Nsaids (non-steroidal anti-inflammatory drug) Anaphylaxis       Family History   Problem Relation Age of Onset    Hypertension Mother     Heart disease Mother         pacemaker    Heart attack Mother     Heart disease Father     Psoriasis Father     Cancer Neg Hx        Social History     Socioeconomic History    Marital status:      Spouse name: Not on file    Number of children: Not on file    Years of education: Not on file    Highest education level: Not on file   Occupational History    Not on file   Social Needs    Financial resource strain: Not on file    Food insecurity:     Worry: Not on file     Inability: Not on file    Transportation needs:     Medical: Not on file     Non-medical: Not on file   Tobacco Use    Smoking status: Former Smoker     Last attempt to quit: 1993     Years since quittin.3    Smokeless tobacco: Never Used    Tobacco comment: quit    Substance and Sexual Activity    Alcohol use: No     Alcohol/week: 0.0 oz    Drug use: No    Sexual activity: Yes     Birth control/protection: Surgical   Lifestyle    Physical activity:     Days per week: Not on file     Minutes per session: Not on file    Stress: Not on file   Relationships    Social connections:      Talks on phone: Not on file     Gets together: Not on file     Attends Mu-ism service: Not on file     Active member of club or organization: Not on file     Attends meetings of clubs or organizations: Not on file     Relationship status: Not on file   Other Topics Concern    Are you pregnant or think you may be? Not Asked    Breast-feeding Not Asked   Social History Narrative    Not on file       Chief Complaint:   Chief Complaint   Patient presents with    Post-op Evaluation     s.p left knee TKA 9/25/18        Date of surgery:  September 25, 2018    History of present illness:  68-year-old female who underwent left total knee arthroplasty.  Patient is doing okay.  Patient is less guarded.  Still very stiff.  Pain today is 4/10.  Working with Kadeem at [employer] and using the Estrella splint.  Feels like she is making good progress.       Answers for HPI/ROS submitted by the patient on 2/5/2019   Leg pain  unexpected weight change: No  appetite change : No  sleep disturbance: Yes  IMMUNOCOMPROMISED: No  nervous/ anxious: No  dysphoric mood: No  rash: No  visual disturbance: No  eye redness: No  eye pain: No  ear pain: No  tinnitus: No  hearing loss: No  sinus pressure : No  nosebleeds: No  enviro allergies: No  food allergies: No  cough: No  shortness of breath: No  sweating: No  dysuria: No  frequency: No  difficulty urinating: No  hematuria: No  chest pain: No  palpitations: No  nausea: No  vomiting: No  diarrhea: No  blood in stool: No  constipation: No  headaches: No  dizziness: No  numbness: No  seizures: No  joint swelling: No  Pain Chronicity: chronic  History of trauma: No  Onset: more than 1 month ago  Frequency: intermittently  Progression since onset: gradually improving  injury location: at home  pain- numeric: 3/10  pain location: other  pain quality: aching  Radiating Pain: No  Aggravating factors: lying down  fever: No  inability to bear weight: No  itching: No  joint locking: No  limited  range of motion: No  stiffness: Yes  tingling: No  Treatments tried: heat, exercise, other  physical therapy: effective  Improvement on treatment: mild      Physical Examination:    Vital Signs:    Vitals:    04/08/19 0954   BP: (!) 179/84   Pulse: (!) 58       Body mass index is 25.58 kg/m².    This a well-developed, well nourished patient in no acute distress.  They are alert and oriented and cooperative to examination.  Pt. walks without assistive device.  Walking a little more normally and naturally.    Examination left knee shows  healed surgical incision.  Mild swelling. No erythema or drainage.    Range of motion is about 5° to 105°.  No calf pain. Negative Homans sign.    X-rays:  Four views of the left knee are reviewed which show well-aligned total knee arthroplasty components     Assessment::  Status post left total knee arthroplasty with  Stiffness      Plan:    Continue working on range of motion at physical therapy.  Continue the Estrella splint.  Follow up in 8 weeks.  Continue to work on other physical therapy for her back as well.    This note was created using [employer] voice recognition software that occasionally misinterpreted phrases or words.

## 2019-04-11 ENCOUNTER — LAB VISIT (OUTPATIENT)
Dept: LAB | Facility: HOSPITAL | Age: 69
End: 2019-04-11
Attending: INTERNAL MEDICINE
Payer: MEDICARE

## 2019-04-11 DIAGNOSIS — C50.012 MALIGNANT NEOPLASM INVOLVING BOTH NIPPLE AND AREOLA OF LEFT BREAST IN FEMALE, UNSPECIFIED ESTROGEN RECEPTOR STATUS: ICD-10-CM

## 2019-04-11 DIAGNOSIS — Z86.39 HISTORY OF TYPE 2 DIABETES MELLITUS: ICD-10-CM

## 2019-04-11 LAB
ALBUMIN SERPL BCP-MCNC: 4 G/DL (ref 3.5–5.2)
ALP SERPL-CCNC: 85 U/L (ref 55–135)
ALT SERPL W/O P-5'-P-CCNC: 12 U/L (ref 10–44)
ANION GAP SERPL CALC-SCNC: 7 MMOL/L (ref 8–16)
AST SERPL-CCNC: 15 U/L (ref 10–40)
BASOPHILS # BLD AUTO: 0 K/UL (ref 0–0.2)
BASOPHILS NFR BLD: 0.4 % (ref 0–1.9)
BILIRUB SERPL-MCNC: 0.4 MG/DL (ref 0.1–1)
BUN SERPL-MCNC: 19 MG/DL (ref 8–23)
CALCIUM SERPL-MCNC: 10.5 MG/DL (ref 8.7–10.5)
CHLORIDE SERPL-SCNC: 107 MMOL/L (ref 95–110)
CO2 SERPL-SCNC: 29 MMOL/L (ref 23–29)
CREAT SERPL-MCNC: 0.8 MG/DL (ref 0.5–1.4)
DIFFERENTIAL METHOD: NORMAL
EOSINOPHIL # BLD AUTO: 0.1 K/UL (ref 0–0.5)
EOSINOPHIL NFR BLD: 1.8 % (ref 0–8)
ERYTHROCYTE [DISTWIDTH] IN BLOOD BY AUTOMATED COUNT: 13.7 % (ref 11.5–14.5)
EST. GFR  (AFRICAN AMERICAN): >60 ML/MIN/1.73 M^2
EST. GFR  (NON AFRICAN AMERICAN): >60 ML/MIN/1.73 M^2
GLUCOSE SERPL-MCNC: 90 MG/DL (ref 70–110)
HCT VFR BLD AUTO: 39.1 % (ref 37–48.5)
HGB BLD-MCNC: 12.9 G/DL (ref 12–16)
LYMPHOCYTES # BLD AUTO: 1.4 K/UL (ref 1–4.8)
LYMPHOCYTES NFR BLD: 32 % (ref 18–48)
MCH RBC QN AUTO: 28.3 PG (ref 27–31)
MCHC RBC AUTO-ENTMCNC: 32.9 G/DL (ref 32–36)
MCV RBC AUTO: 86 FL (ref 82–98)
MONOCYTES # BLD AUTO: 0.4 K/UL (ref 0.3–1)
MONOCYTES NFR BLD: 9.9 % (ref 4–15)
NEUTROPHILS # BLD AUTO: 2.4 K/UL (ref 1.8–7.7)
NEUTROPHILS NFR BLD: 55.9 % (ref 38–73)
PLATELET # BLD AUTO: 152 K/UL (ref 150–350)
PMV BLD AUTO: 10.8 FL (ref 9.2–12.9)
POTASSIUM SERPL-SCNC: 5.4 MMOL/L (ref 3.5–5.1)
PROT SERPL-MCNC: 6.9 G/DL (ref 6–8.4)
RBC # BLD AUTO: 4.55 M/UL (ref 4–5.4)
SODIUM SERPL-SCNC: 143 MMOL/L (ref 136–145)
WBC # BLD AUTO: 4.3 K/UL (ref 3.9–12.7)

## 2019-04-11 PROCEDURE — 85025 COMPLETE CBC W/AUTO DIFF WBC: CPT

## 2019-04-11 PROCEDURE — 80053 COMPREHEN METABOLIC PANEL: CPT

## 2019-04-14 LAB — CANCER AG27-29 SERPL-ACNC: 20.4 U/ML

## 2019-04-15 ENCOUNTER — OFFICE VISIT (OUTPATIENT)
Dept: HEMATOLOGY/ONCOLOGY | Facility: CLINIC | Age: 69
End: 2019-04-15
Payer: MEDICARE

## 2019-04-15 VITALS
HEIGHT: 64 IN | RESPIRATION RATE: 20 BRPM | HEART RATE: 80 BPM | TEMPERATURE: 98 F | BODY MASS INDEX: 25.7 KG/M2 | DIASTOLIC BLOOD PRESSURE: 84 MMHG | WEIGHT: 150.56 LBS | SYSTOLIC BLOOD PRESSURE: 184 MMHG | OXYGEN SATURATION: 98 %

## 2019-04-15 DIAGNOSIS — C50.012 MALIGNANT NEOPLASM OF NIPPLE OF LEFT BREAST IN FEMALE, UNSPECIFIED ESTROGEN RECEPTOR STATUS: Primary | ICD-10-CM

## 2019-04-15 DIAGNOSIS — E78.5 DYSLIPIDEMIA: ICD-10-CM

## 2019-04-15 PROCEDURE — 99999 PR PBB SHADOW E&M-EST. PATIENT-LVL IV: CPT | Mod: PBBFAC,,, | Performed by: INTERNAL MEDICINE

## 2019-04-15 PROCEDURE — 99999 PR PBB SHADOW E&M-EST. PATIENT-LVL IV: ICD-10-PCS | Mod: PBBFAC,,, | Performed by: INTERNAL MEDICINE

## 2019-04-15 PROCEDURE — 99214 OFFICE O/P EST MOD 30 MIN: CPT | Mod: PBBFAC,PO | Performed by: INTERNAL MEDICINE

## 2019-04-15 PROCEDURE — 99214 PR OFFICE/OUTPT VISIT, EST, LEVL IV, 30-39 MIN: ICD-10-PCS | Mod: S$PBB,,, | Performed by: INTERNAL MEDICINE

## 2019-04-15 PROCEDURE — 99214 OFFICE O/P EST MOD 30 MIN: CPT | Mod: S$PBB,,, | Performed by: INTERNAL MEDICINE

## 2019-04-15 NOTE — PROGRESS NOTES
A 66-year-old woman coming in consultation for breast cancer. S/p double mastectomy. 5/15/2017  2 separate location on left breast both biopsied in March by Dr. Luis.  reduction mammoplasty.  .  She had invasive ductal carcinoma in both locations and they were both ER positive, AR   positive and HER-2 negative. Recurrence score of 18 .  .  She takes omeprazole and multivitamins.  Had bariatric sleeve in 1/2017, pt had her expanders removed due to discomfort. No has silicone and is feeling well  PHYSICAL EXAMINATION:  Wt Readings from Last 3 Encounters:   04/15/19 68.3 kg (150 lb 9.2 oz)   04/08/19 67.6 kg (149 lb)   03/22/19 67.6 kg (149 lb 0.5 oz)     Temp Readings from Last 3 Encounters:   04/15/19 98.2 °F (36.8 °C)   03/22/19 99 °F (37.2 °C)   10/23/18 98.3 °F (36.8 °C) (Oral)     BP Readings from Last 3 Encounters:   04/15/19 (!) 184/84   04/08/19 (!) 179/84   03/22/19 138/70     Pulse Readings from Last 3 Encounters:   04/15/19 80   04/08/19 (!) 58   03/22/19 74     GENERAL:  Reveals a well-built, well-nourished woman, comfortable, obese, well   groomed, ambulating to clinic without any issues.  HEENT:  Showed no congestion.  NECK:  Supple, without JVD.  Trachea is central.  No masses or thyromegaly felt.  HEART:  S1 is normally heard without murmurs or gallops.  ABDOMEN:  Soft.  No rebound, guarding or rigidity.  BREASTS.  Silicone to breast +  EXTREMITIES:  The patient has no generalized lymphadenopathy or supraclavicular   adenopathy.  MUSCULOSKELETAL:  Good range of motion.  NEUROLOGIC:  She seems appropriate.  SKIN:  Within normal range.  PSYCHIATRIC:  Within normal range.pt is anxious about results of path and need for chemo  Lab Results   Component Value Date    WBC 4.30 04/11/2019    HGB 12.9 04/11/2019    HCT 39.1 04/11/2019    MCV 86 04/11/2019     04/11/2019     CMP  Sodium   Date Value Ref Range Status   04/11/2019 143 136 - 145 mmol/L Final     Potassium   Date Value Ref Range Status    04/11/2019 5.4 (H) 3.5 - 5.1 mmol/L Final     Chloride   Date Value Ref Range Status   04/11/2019 107 95 - 110 mmol/L Final     CO2   Date Value Ref Range Status   04/11/2019 29 23 - 29 mmol/L Final     Glucose   Date Value Ref Range Status   04/11/2019 90 70 - 110 mg/dL Final     BUN, Bld   Date Value Ref Range Status   04/11/2019 19 8 - 23 mg/dL Final     Creatinine   Date Value Ref Range Status   04/11/2019 0.8 0.5 - 1.4 mg/dL Final     Calcium   Date Value Ref Range Status   04/11/2019 10.5 8.7 - 10.5 mg/dL Final     Total Protein   Date Value Ref Range Status   04/11/2019 6.9 6.0 - 8.4 g/dL Final     Albumin   Date Value Ref Range Status   04/11/2019 4.0 3.5 - 5.2 g/dL Final     Total Bilirubin   Date Value Ref Range Status   04/11/2019 0.4 0.1 - 1.0 mg/dL Final     Comment:     For infants and newborns, interpretation of results should be based  on gestational age, weight and in agreement with clinical  observations.  Premature Infant recommended reference ranges:  Up to 24 hours.............<8.0 mg/dL  Up to 48 hours............<12.0 mg/dL  3-5 days..................<15.0 mg/dL  6-29 days.................<15.0 mg/dL       Alkaline Phosphatase   Date Value Ref Range Status   04/11/2019 85 55 - 135 U/L Final     AST   Date Value Ref Range Status   04/11/2019 15 10 - 40 U/L Final     ALT   Date Value Ref Range Status   04/11/2019 12 10 - 44 U/L Final     Anion Gap   Date Value Ref Range Status   04/11/2019 7 (L) 8 - 16 mmol/L Final     eGFR if    Date Value Ref Range Status   04/11/2019 >60 >60 mL/min/1.73 m^2 Final     eGFR if non    Date Value Ref Range Status   04/11/2019 >60 >60 mL/min/1.73 m^2 Final     Comment:     Calculation used to obtain the estimated glomerular filtration  rate (eGFR) is the CKD-EPI equation.        PATHOLOGY  SPECIMEN  1) [place] Lymph Node, Breast Lt  2) Breast mastectomy/Lt  3) Breast mastectomy /Rt  4) Breast lumpectomy More Rt  5) Breast  lumpectomy, more Lt  FINAL PATHOLOGIC DIAGNOSIS  1. [place] lymph node, left axillary, excision:  One lymph node NEGATIVE for metastatic carcinoma (0/1).  2. Breast, left, mastectomy:  Residual invasive ductal carcinoma at 1:00 position, low grade, 1.64 mm (pathologic staging: pT1a N0). SEE  SYNOPTIC REPORT.  Residual invasive ductal carcinoma at 12:00 position, low grade,10 mm (pathologic staging: pT1b N0). SEE  SYNOPTIC REPORT.  Negative surgical margins.  Previous biopsy sites.  3. Breast, right, mastectomy:  Benign nipple, skin, and breast parenchyma.  Impression 4/2018 PET      1. No worrisome hypermetabolic activity to suggest metastatic disease.  Evidence of mastectomy with bilateral breast augmentation is noted  2. Lumbar spine central canal stenosis, a dedicated examination could be performed if desired       Bone density is normal.  DIAGNOSTIC IMPRESSION:    Breast cancer two separate locations on the breasts, so would like to keep closer watch  left, both ER/OK positive, HER-2 negative. S/p double mastectomy, with sentinel LN negative   oncolty dx rec score of 18,she is tolerating arimidex,  well. continue  No need for prolia due to normal density   sent in ultram for better pain control  b12 , iron,  pet scan cbc, cmp, [id number] rtc 6 months

## 2019-04-23 ENCOUNTER — TELEPHONE (OUTPATIENT)
Dept: FAMILY MEDICINE | Facility: CLINIC | Age: 69
End: 2019-04-23

## 2019-04-23 ENCOUNTER — OFFICE VISIT (OUTPATIENT)
Dept: FAMILY MEDICINE | Facility: CLINIC | Age: 69
End: 2019-04-23
Payer: MEDICARE

## 2019-04-23 VITALS
HEART RATE: 63 BPM | DIASTOLIC BLOOD PRESSURE: 82 MMHG | SYSTOLIC BLOOD PRESSURE: 156 MMHG | WEIGHT: 148.56 LBS | HEIGHT: 64 IN | TEMPERATURE: 99 F | BODY MASS INDEX: 25.36 KG/M2

## 2019-04-23 DIAGNOSIS — Z86.39 HISTORY OF TYPE 2 DIABETES MELLITUS: Primary | ICD-10-CM

## 2019-04-23 DIAGNOSIS — Z86.79 HISTORY OF HYPERTENSION: ICD-10-CM

## 2019-04-23 DIAGNOSIS — K76.0 FATTY LIVER DISEASE, NONALCOHOLIC: ICD-10-CM

## 2019-04-23 DIAGNOSIS — Z98.890 STATUS POST GASTRIC SURGERY: ICD-10-CM

## 2019-04-23 DIAGNOSIS — F41.8 SITUATIONAL ANXIETY: ICD-10-CM

## 2019-04-23 DIAGNOSIS — E55.9 HYPOVITAMINOSIS D: ICD-10-CM

## 2019-04-23 DIAGNOSIS — D53.9 DEFICIENCY ANEMIA: ICD-10-CM

## 2019-04-23 PROCEDURE — 99214 PR OFFICE/OUTPT VISIT, EST, LEVL IV, 30-39 MIN: ICD-10-PCS | Mod: S$PBB,,, | Performed by: FAMILY MEDICINE

## 2019-04-23 PROCEDURE — 99999 PR PBB SHADOW E&M-EST. PATIENT-LVL IV: CPT | Mod: PBBFAC,,, | Performed by: FAMILY MEDICINE

## 2019-04-23 PROCEDURE — 99214 OFFICE O/P EST MOD 30 MIN: CPT | Mod: S$PBB,,, | Performed by: FAMILY MEDICINE

## 2019-04-23 PROCEDURE — 99214 OFFICE O/P EST MOD 30 MIN: CPT | Mod: PBBFAC,PO | Performed by: FAMILY MEDICINE

## 2019-04-23 PROCEDURE — 99999 PR PBB SHADOW E&M-EST. PATIENT-LVL IV: ICD-10-PCS | Mod: PBBFAC,,, | Performed by: FAMILY MEDICINE

## 2019-04-23 RX ORDER — OMEPRAZOLE AND SODIUM BICARBONATE 40; 1100 MG/1; MG/1
1 CAPSULE ORAL
Qty: 90 CAPSULE | Refills: 3 | Status: SHIPPED | OUTPATIENT
Start: 2019-04-23 | End: 2019-10-28 | Stop reason: SDUPTHER

## 2019-04-24 NOTE — TELEPHONE ENCOUNTER
Called pt regarding below message. Scheduled appt per referral. Appt date, time, and location given. Pt verbalized understanding with no further questions.

## 2019-04-24 NOTE — TELEPHONE ENCOUNTER
Can you reach out to Dr. Miller's staff and make sure they help Felix Ruperto schedule an appointmetn.

## 2019-04-25 ENCOUNTER — LAB VISIT (OUTPATIENT)
Dept: LAB | Facility: HOSPITAL | Age: 69
End: 2019-04-25
Attending: FAMILY MEDICINE
Payer: MEDICARE

## 2019-04-25 DIAGNOSIS — D53.9 DEFICIENCY ANEMIA: ICD-10-CM

## 2019-04-25 DIAGNOSIS — Z98.890 STATUS POST GASTRIC SURGERY: ICD-10-CM

## 2019-04-25 DIAGNOSIS — E55.9 HYPOVITAMINOSIS D: ICD-10-CM

## 2019-04-25 DIAGNOSIS — Z86.79 HISTORY OF HYPERTENSION: ICD-10-CM

## 2019-04-25 DIAGNOSIS — K76.0 FATTY LIVER DISEASE, NONALCOHOLIC: ICD-10-CM

## 2019-04-25 DIAGNOSIS — Z86.39 HISTORY OF TYPE 2 DIABETES MELLITUS: ICD-10-CM

## 2019-04-25 LAB
25(OH)D3+25(OH)D2 SERPL-MCNC: 23 NG/ML (ref 30–96)
CHOLEST SERPL-MCNC: 290 MG/DL (ref 120–199)
CHOLEST/HDLC SERPL: 4.2 {RATIO} (ref 2–5)
ESTIMATED AVG GLUCOSE: 103 MG/DL (ref 68–131)
FERRITIN SERPL-MCNC: 119 NG/ML (ref 20–300)
FOLATE SERPL-MCNC: 7.9 NG/ML (ref 4–24)
HBA1C MFR BLD HPLC: 5.2 % (ref 4–5.6)
HDLC SERPL-MCNC: 69 MG/DL (ref 40–75)
HDLC SERPL: 23.8 % (ref 20–50)
IRON SERPL-MCNC: 102 UG/DL (ref 30–160)
LDLC SERPL CALC-MCNC: 187.6 MG/DL (ref 63–159)
NONHDLC SERPL-MCNC: 221 MG/DL
SATURATED IRON: 27 % (ref 20–50)
TOTAL IRON BINDING CAPACITY: 371 UG/DL (ref 250–450)
TRANSFERRIN SERPL-MCNC: 251 MG/DL (ref 200–375)
TRIGL SERPL-MCNC: 167 MG/DL (ref 30–150)
TSH SERPL DL<=0.005 MIU/L-ACNC: 2.89 UIU/ML (ref 0.4–4)
VIT B12 SERPL-MCNC: 274 PG/ML (ref 210–950)

## 2019-04-25 PROCEDURE — 82746 ASSAY OF FOLIC ACID SERUM: CPT

## 2019-04-25 PROCEDURE — 84443 ASSAY THYROID STIM HORMONE: CPT

## 2019-04-25 PROCEDURE — 80061 LIPID PANEL: CPT

## 2019-04-25 PROCEDURE — 82525 ASSAY OF COPPER: CPT

## 2019-04-25 PROCEDURE — 82306 VITAMIN D 25 HYDROXY: CPT

## 2019-04-25 PROCEDURE — 84630 ASSAY OF ZINC: CPT

## 2019-04-25 PROCEDURE — 83036 HEMOGLOBIN GLYCOSYLATED A1C: CPT

## 2019-04-25 PROCEDURE — 82728 ASSAY OF FERRITIN: CPT

## 2019-04-25 PROCEDURE — 82607 VITAMIN B-12: CPT

## 2019-04-25 PROCEDURE — 83540 ASSAY OF IRON: CPT

## 2019-04-25 PROCEDURE — 84425 ASSAY OF VITAMIN B-1: CPT

## 2019-04-26 ENCOUNTER — TELEPHONE (OUTPATIENT)
Dept: FAMILY MEDICINE | Facility: CLINIC | Age: 69
End: 2019-04-26

## 2019-04-26 LAB
COPPER SERPL-MCNC: 1014 UG/L (ref 810–1990)
ZINC SERPL-MCNC: 79 UG/DL (ref 60–130)

## 2019-04-26 NOTE — TELEPHONE ENCOUNTER
Received an incoming call from heidi with lab on pt that her labs from yesterday where drawn and that there was a problem with the thymine lab that it was not the right temperature and that they needed to redraw it. Lab had called pt to come in and have this redrawn but pt refused to come back for one lab at this time.

## 2019-05-01 ENCOUNTER — PATIENT MESSAGE (OUTPATIENT)
Dept: HEMATOLOGY/ONCOLOGY | Facility: CLINIC | Age: 69
End: 2019-05-01

## 2019-05-06 ENCOUNTER — OFFICE VISIT (OUTPATIENT)
Dept: PHYSICAL MEDICINE AND REHAB | Facility: CLINIC | Age: 69
End: 2019-05-06
Payer: MEDICARE

## 2019-05-06 VITALS
HEART RATE: 61 BPM | SYSTOLIC BLOOD PRESSURE: 141 MMHG | DIASTOLIC BLOOD PRESSURE: 85 MMHG | BODY MASS INDEX: 25.27 KG/M2 | WEIGHT: 148 LBS | HEIGHT: 64 IN

## 2019-05-06 DIAGNOSIS — M79.18 MYOFASCIAL PAIN ON LEFT SIDE: ICD-10-CM

## 2019-05-06 DIAGNOSIS — G89.29 CHRONIC PAIN OF LEFT KNEE: Primary | ICD-10-CM

## 2019-05-06 DIAGNOSIS — M25.562 CHRONIC PAIN OF LEFT KNEE: Primary | ICD-10-CM

## 2019-05-06 PROCEDURE — 99204 OFFICE O/P NEW MOD 45 MIN: CPT | Mod: 25,S$PBB,, | Performed by: PHYSICAL MEDICINE & REHABILITATION

## 2019-05-06 PROCEDURE — 99999 PR PBB SHADOW E&M-EST. PATIENT-LVL III: CPT | Mod: PBBFAC,,, | Performed by: PHYSICAL MEDICINE & REHABILITATION

## 2019-05-06 PROCEDURE — 20552 NJX 1/MLT TRIGGER POINT 1/2: CPT | Mod: PBBFAC,PN | Performed by: PHYSICAL MEDICINE & REHABILITATION

## 2019-05-06 PROCEDURE — 99999 PR PBB SHADOW E&M-EST. PATIENT-LVL III: ICD-10-PCS | Mod: PBBFAC,,, | Performed by: PHYSICAL MEDICINE & REHABILITATION

## 2019-05-06 PROCEDURE — 99213 OFFICE O/P EST LOW 20 MIN: CPT | Mod: PBBFAC,PN,25 | Performed by: PHYSICAL MEDICINE & REHABILITATION

## 2019-05-06 PROCEDURE — 20552 PR INJECT TRIGGER POINT, 1 OR 2: ICD-10-PCS | Mod: S$PBB,,, | Performed by: PHYSICAL MEDICINE & REHABILITATION

## 2019-05-06 PROCEDURE — 20552 NJX 1/MLT TRIGGER POINT 1/2: CPT | Mod: S$PBB,,, | Performed by: PHYSICAL MEDICINE & REHABILITATION

## 2019-05-06 PROCEDURE — 99204 PR OFFICE/OUTPT VISIT, NEW, LEVL IV, 45-59 MIN: ICD-10-PCS | Mod: 25,S$PBB,, | Performed by: PHYSICAL MEDICINE & REHABILITATION

## 2019-05-06 NOTE — PROGRESS NOTES
[facility]    CHIEF COMPLAINT:   Chief Complaint   Patient presents with    Knee Pain     left knee pain     HISTORY OF PRESENT ILLNESS: Marce Hickey is a 68 y.o. female who presents to me as a new patient for evaluation of left knee pain.    Patient is s/p L TKA on 9/25/18 with Dr. López. Patient reports she has had persistent lateral knee pain about the fibular head since prior to surgery. She describes a tight and achy pain, rated 1/10. She feels the pain is more around the knee than deep in the joint. Pain is worse with prolonged walking and standing, she fatigues easily. She has continued PT with some relief. She is nearing the end of her sessiojns. She has persisted with limited knee flexion.  Dr. López has recommended manipulation under anesthesia but she wants to avoid any general anesthesia.     She reports minimal swelling. Denies popping, clicking, locking of the knee. Some giving out initially but this has resolved. She takes tylenol PRN with some relief. She reports an allergy to NSAIDs.     Review of Systems   Constitutional: Negative for fever.   HENT: Negative for drooling.    Eyes: Negative for discharge.   Respiratory: Negative for choking.    Cardiovascular: Negative for chest pain.   Genitourinary: Negative for flank pain.   Skin: Negative for wound.   Allergic/Immunologic: Negative for immunocompromised state.   Neurological: Negative for tremors and syncope.   Psychiatric/Behavioral: Negative for behavioral problems.     Past Medical History:   Past Medical History:   Diagnosis Date    Anxiety     Cancer     breast cancer - left    Depression     Diabetes mellitus, type 2     Borderline    Fatty liver disease, nonalcoholic     GERD (gastroesophageal reflux disease)     Hyperlipidemia     Hypertension     Plantar fasciitis of right foot        Past Surgical History:   Past Surgical History:   Procedure Laterality Date    ARTHROPLASTY, KNEE Left  2018    [facility]    BIOPSY-SENTINEL NODE Left 2017    [facility] by [facility]on Luis [facility] at [facility] OR    breast reduction      BREAST SURGERY Bilateral     masectomy    BREAST SURGERY Bilateral     implants     SECTION      x 2    CHOLECYSTECTOMY      COLONOSCOPY N/A 10/10/2014    Performed by Chaitanya Ro [facility] at [facility] ENDO    CREATION OR REVISION, INFRAMAMMARY FOLD Left 2018    [facility] 2ND [facility]    DISSECTION-AXILLARY LYMPH NODE Left 2017    [facility] at [facility] OR    EXCHANGE IMPLANT-BREAST Bilateral 2017    [facility] at [facility] 2ND [facility]LR    FOOT SURGERY      left bunionectomy    GASTRECTOMY-SLEEVE-LAPAROSCOPIC N/A 2017    Performed by Nelson Panchal [facility] at [facility] OR    gastric sleve      HYSTERECTOMY      one ovary intact, adhesions    INJECTION-FAT FAT TRANSFER Bilateral 3/6/2018    [facility] at [facility] 2ND [facility]    KNEE ARTHROSCOPY Left     LIPOSUCTION      MASTECTOMY Bilateral 2017    [facility] at [facility] OR    RECONSTRUCTION-BREAST Bilateral 2017    Performed by [facility]rell [facility] at [facility] OR    RECONSTRUCTION-BREAST/REVISION-FLAP Soft Tissue Revision Bilateral 2017    [facility] at [facility] 2ND FLR    ROTATOR CUFF REPAIR Right     TONSILLECTOMY         Family History:   Family History   Problem Relation Age of Onset    Hypertension Mother     Heart disease Mother         pacemaker    Heart attack Mother     Heart disease Father     Psoriasis Father     Cancer Neg Hx        Medications:   Current Outpatient Medications on File Prior to Visit   Medication Sig Dispense Refill    anastrozole (ARIMIDEX) 1 mg Tab Take 1 tablet (1 mg total) by mouth once daily. 90 tablet 1    calcium-vitamin D3 500 mg(1,250mg) -200 unit per  tablet once daily. Patient uses a patch, not oral medication      melatonin 3 mg Tab Take 6 mg by mouth.      multivitamin with minerals tablet Take 1 tablet by mouth once daily.      omeprazole-sodium bicarbonate (ZEGERID) 40-1.1 mg-gram per capsule Take 1 capsule by mouth before breakfast. 90 capsule 3     No current facility-administered medications on file prior to visit.        Allergies:   Review of patient's allergies indicates:   Allergen Reactions    Nsaids (non-steroidal anti-inflammatory drug) Anaphylaxis       Social History:   Social History     Socioeconomic History    Marital status:      Spouse name: Not on file    Number of children: Not on file    Years of education: Not on file    Highest education level: Not on file   Occupational History    Not on file   Social Needs    Financial resource strain: Not on file    Food insecurity:     Worry: Not on file     Inability: Not on file    Transportation needs:     Medical: Not on file     Non-medical: Not on file   Tobacco Use    Smoking status: Former Smoker     Last attempt to quit: 1993     Years since quittin.4    Smokeless tobacco: Never Used    Tobacco comment: quit    Substance and Sexual Activity    Alcohol use: No     Alcohol/week: 0.0 oz    Drug use: No    Sexual activity: Yes     Birth control/protection: Surgical   Lifestyle    Physical activity:     Days per week: Not on file     Minutes per session: Not on file    Stress: Not on file   Relationships    Social connections:     Talks on phone: Not on file     Gets together: Not on file     Attends [employer] service: Not on file     Active member of club or organization: Not on file     Attends meetings of clubs or organizations: Not on file     Relationship status: Not on file   Other Topics Concern    Are you pregnant or think you may be? Not Asked    Breast-feeding Not Asked   Social History Narrative    Not on file     Marce ambrosio in  "[facility].    PHYSICAL EXAMINATION:   General    Vitals:    05/06/19 0917   BP: (!) 141/85   Pulse: 61   Weight: 67.1 kg (148 lb)   Height: 5' 4" (1.626 m)     Constitutional: Oriented to person, place, and time. No apparent distress. Appears well-developed and well-nourished. Pleasant.  HENT:   Head: Normocephalic and atraumatic.   Eyes: Right eye exhibits no discharge. Left eye exhibits no discharge. No scleral icterus.   Pulmonary/Chest: Effort normal. No respiratory distress.   Abdominal: There is no guarding.   Neurological: Alert and oriented to person, place, and time.   Psychiatric: Behavior is normal.   Right Knee Exam     Muscle Strength   The patient has normal right knee strength.    Tenderness   The patient is experiencing no tenderness.     Range of Motion   Extension:  0 normal   Flexion:  140 normal     Tests   Desiree:  Medial - negative Lateral - negative  Varus: negative Valgus: negative  Lachman:  Anterior - negative        Other   Erythema: absent  Scars: absent  Sensation: normal  Pulse: present  Swelling: none  Effusion: no effusion present      Left Knee Exam     Muscle Strength   The patient has normal left knee strength.    Tenderness   The patient is experiencing tenderness in the lateral joint line and lateral retinaculum (peroneus/tib ant mm. ).    Range of Motion   Extension:  0 normal   Flexion:  100 abnormal     Tests   Desiree:  Medial - negative Lateral - negative  Varus: negative Valgus: negative  Lachman:  Anterior - negative        Other   Erythema: absent  Scars: present  Sensation: normal  Pulse: present  Swelling: mild  Effusion: no effusion present    Comments:  Equivocal Milka's        INSPECTION: There is no swelling, ecchymoses, erythema or gross deformity of the left knee.  GAIT/DYNAMIC: normal stride length, dominick and base of support    Imaging  X-ray of the left knee from 12/21/18: The patient has undergone a left knee joint replacement with metallic femoral and " "tibial components in good position.  Lucency around the prosthesis consistent with osteomyelitis or loosening is not seen.  No fractures are noted.  There is continued soft tissue swelling however noted both superior and inferior to the patella anteriorly.  Significant joint effusion is not seen.    Data Reviewed: X-ray    Supportive Actions: Independent visualization of images or test specimens    ASSESSMENT:   1. Chronic pain of left knee    2. Myofascial pain on left side      PLAN:     1. Time was spent reviewing the above diagnosis in depth with Marce today, including acute management and rehabilitation.     2.  We discussed potential etiologies of her pain including distal ITB band syndrome, proximal tibial fibular joint arthrosis, and myofascial pain.  Her point of maximal tenderness is located over the peroneus longus and tibialis anterior musculature proximally.  Palpation of these areas seem to reproduce her pain.  As such, I offered trigger point injections of the area in the hopes of reducing her pain.  After discussion, she wished to proceed.     3. Discussed option of peroneus TPI today including risks, benefits and alternatives. Patient elected to proceed. See procedure note below.     4. RTC in 6 weeks if symptoms persist or worsen.  If no improvement from the above, we may consider injection of the proximal tibial fibular joint or perhaps further imaging of the area.    The above note was completed, in part, with the aid of Dragon dictation software/hardware. Translation errors may be present.    Procedure Note: Trigger point injection  Time: 10:30  Indications: Pain  Description: After 1 maximal tender points were identified in the left peroneus longus and tibialis anterior musculature, the overlying skin was cleaned with Betadine solution. The point was injected with 1 cc of 1% Lidocaine after negative aspiration using a 27 g 1.5" needle. A stellate pattern was then used to needle the " surrounding area.  Ultrasound guidance was used to avoid the neighboring peroneal nerve. Needle was removed and areas cleaned. Blood loss minimal.  Complications: None  Disposition: Pt left in good condition with no complaints.

## 2019-05-07 ENCOUNTER — TELEPHONE (OUTPATIENT)
Dept: FAMILY MEDICINE | Facility: CLINIC | Age: 69
End: 2019-05-07

## 2019-05-07 ENCOUNTER — CLINICAL SUPPORT (OUTPATIENT)
Dept: FAMILY MEDICINE | Facility: CLINIC | Age: 69
End: 2019-05-07
Payer: MEDICARE

## 2019-05-07 VITALS — DIASTOLIC BLOOD PRESSURE: 82 MMHG | SYSTOLIC BLOOD PRESSURE: 134 MMHG

## 2019-05-07 DIAGNOSIS — Z86.79 HISTORY OF HYPERTENSION: Primary | ICD-10-CM

## 2019-05-07 PROCEDURE — 99999 PR PBB SHADOW E&M-EST. PATIENT-LVL II: ICD-10-PCS | Mod: PBBFAC,,, | Performed by: FAMILY MEDICINE

## 2019-05-07 PROCEDURE — 99999 PR PBB SHADOW E&M-EST. PATIENT-LVL II: CPT | Mod: PBBFAC,,, | Performed by: FAMILY MEDICINE

## 2019-05-07 PROCEDURE — 99212 OFFICE O/P EST SF 10 MIN: CPT | Mod: PBBFAC,PO | Performed by: FAMILY MEDICINE

## 2019-05-07 RX ORDER — HYDROCHLOROTHIAZIDE 25 MG/1
TABLET ORAL
Qty: 30 TABLET | Refills: 11 | Status: SHIPPED | OUTPATIENT
Start: 2019-05-07 | End: 2020-05-11

## 2019-05-07 NOTE — TELEPHONE ENCOUNTER
Pt was here for BP check. First reading was at 148/84. After 5 minutes rest, BP was at 134/82, taken manually on the right arm, sitting position. Advised patient per Dr. Garvey's recommendation that HCTz will be sent to pharmacy to be taken PRN when BP is at 140/90 or above. Understanding verbalized.

## 2019-05-07 NOTE — PROGRESS NOTES
Pt comes in for a nurse B/P check.First BP check was at 148/84. After 5 minutes rest, pt's BP was 134/82, manual on right arm, sitting position. Provider notified regarding BP.   Per PCP's plan: HCTz sent to pharmacy for patient to take PRN when blood pressure is at 140/90 or above. Reminded patient of upcoming appointment 05/29/19.

## 2019-05-10 ENCOUNTER — OFFICE VISIT (OUTPATIENT)
Dept: PSYCHIATRY | Facility: CLINIC | Age: 69
End: 2019-05-10
Payer: MEDICARE

## 2019-05-10 VITALS
HEART RATE: 80 BPM | RESPIRATION RATE: 18 BRPM | WEIGHT: 147.94 LBS | SYSTOLIC BLOOD PRESSURE: 111 MMHG | HEIGHT: 64 IN | BODY MASS INDEX: 25.25 KG/M2 | DIASTOLIC BLOOD PRESSURE: 68 MMHG

## 2019-05-10 DIAGNOSIS — Z63.0 PARTNER RELATIONSHIP PROBLEMS: ICD-10-CM

## 2019-05-10 DIAGNOSIS — F33.1 MODERATE EPISODE OF RECURRENT MAJOR DEPRESSIVE DISORDER: Primary | ICD-10-CM

## 2019-05-10 PROCEDURE — 90792 PSYCH DIAG EVAL W/MED SRVCS: CPT | Mod: ,,, | Performed by: PSYCHOLOGIST

## 2019-05-10 PROCEDURE — 99999 PR PBB SHADOW E&M-EST. PATIENT-LVL III: ICD-10-PCS | Mod: PBBFAC,,, | Performed by: PSYCHOLOGIST

## 2019-05-10 PROCEDURE — 99213 OFFICE O/P EST LOW 20 MIN: CPT | Mod: PBBFAC,PO | Performed by: PSYCHOLOGIST

## 2019-05-10 PROCEDURE — 99999 PR PBB SHADOW E&M-EST. PATIENT-LVL III: CPT | Mod: PBBFAC,,, | Performed by: PSYCHOLOGIST

## 2019-05-10 PROCEDURE — 90792 PR PSYCHIATRIC DIAGNOSTIC EVALUATION W/MEDICAL SERVICES: ICD-10-PCS | Mod: ,,, | Performed by: PSYCHOLOGIST

## 2019-05-10 SDOH — SOCIAL DETERMINANTS OF HEALTH (SDOH): PROBLEMS IN RELATIONSHIP WITH SPOUSE OR PARTNER: Z63.0

## 2019-05-10 NOTE — PROGRESS NOTES
"Client: Marce Hickey  : 1950  Savannah Garvey MD  [id number]    Presenting complaint:/Past psychiatric treatment:  Jaciel august she has had depression in the past beginning in the [employer].  She was on medication at that tmie but did not stay on it. She reports she "blew up" weight wise after the [place] and has a gastric sleeve in 2017 and has lost 90 lbs.  GAD7 5 today, PHQ9 3 today. She has no history of SI/HI/delusoins/hallucinations and denied havng any npatient care.      Stressors: marital stress/sts he does not stand up for her (sts his family does not like her)    Family psychiatric history: none reported    Relevant lab reviewed  CBC normal, Vit D low, Cholesterol 296/Triglycerides 197  Relevant EKG reviewed   QTc 453 NSR    Review of patient's allergies indicates:   Allergen Reactions    Nsaids (non-steroidal anti-inflammatory drug) Anaphylaxis       Current Outpatient Medications:     anastrozole (ARIMIDEX) 1 mg Tab, Take 1 tablet (1 mg total) by mouth once daily., Disp: 90 tablet, Rfl: 1    calcium-vitamin D3 500 mg(1,250mg) -200 unit per tablet, once daily. Patient uses a patch, not oral medication, Disp: , Rfl:     hydroCHLOROthiazide (HYDRODIURIL) 25 MG tablet, 1 tablet PO daily prn BP greater than 140/90, Disp: 30 tablet, Rfl: 11    melatonin 3 mg Tab, Take 6 mg by mouth., Disp: , Rfl:     multivitamin with minerals tablet, Take 1 tablet by mouth once daily., Disp: , Rfl:     omeprazole-sodium bicarbonate (ZEGERID) 40-1.1 mg-gram per capsule, Take 1 capsule by mouth before breakfast., Disp: 90 capsule, Rfl: 3  Past Medical History:   Diagnosis Date    Anxiety     Cancer     breast cancer - left    Depression     Diabetes mellitus, type 2     Borderline    Fatty liver disease, nonalcoholic     GERD (gastroesophageal reflux disease)     Hyperlipidemia     Hypertension     Plantar fasciitis of right foot      Clinical presentation:  Appearance:  The client presented in casual " attire evidencing good grooming and hygiene    Neuro:  the client was alert, oriented x 4 and evidenced good judgement and insight.      Attention/concentration:  Was good in session    Memory:  The client had no subjective memory complaints and they were able to recall information discussed in session accurately    Judgement:  behavior is adequate to the situation    Fund of knowledge:  intact and appropriate to age and level of education    Gait/station:  was normal    Heart: the patients heartbeat was regular in rate and rhythm.  There were nor murmurs, gallops or rubs.    Lung: clear bilaterally    Skin warm and dry. Nailbeds were pink and refill was good    Extremities: were warn and did not evidence any edema    Mood:  was mildly dysthymic.  No SI/HI/delusions or hallucinations reported or suspected.     Affect: was normal range    Speech:  normal rate and tone     Sleep: the client had no reported history of sleep problems.  Onset was normal and they reported waking up feeling well rested.  No daytime sleepiness was reported    Appetite: was reported as good.  She sts she has cut out ice cream.  Has a gastric sleeve in 2017.     Pain complaints:  none were voiced    ETOH/Substance use history:  The client had no reported ETOH/or other substance use problems by their report.    Psychosocial history:  The client currently lives with her 4th  of 8.5 years.  She has a grown son who is a recovering addict and has started a recovery program in [place] (15years sober)  And another son does not talk to her and lives in [place].    She has positive peer support and states she volunteers at [facility].  Her 's mother and children dont like her (his wife had left him).    Work history:  22 years in the [employer] as .  She had numerous jobs throughout the year and is retired and volunteers at [facility].     Education/session discussion:   · Demonstrated diaphragmatic breathing in session and asked the  client to start practicing the technique at home. WIll pair this with a relaxation tape later if the client learns the technique well.  · Discussed her writing a storybook about her life and the things she regrets about her relationship with her .   · Discussed general issues about treatment of depression/anxiety including utility of medication and therapy.reference. She did not believe therapy would be helpful and did not want medication at this time.  Discussed need to explore treatment options next session. The client appeared to understand the information shared based on their questions and responses made in session.    · Discussed the need for regular, restful sleep and strategies that help promote good sleep.  Reviewed the negative impact of alcohol, smoking, and caffeine on sleep with the client.  The client was given educations information about sleep/insomnia and sleep hygiene for home reference.  · Discussed Melatonin with her and she was given handout for home reference about Melatonin    Impression:   Recurrent MD, moderate, relationship issues    Plan:  Will explore medication next session and encourage client to engage in therapy.    RTC 4 weeks     Session:  [id number]

## 2019-05-13 ENCOUNTER — PATIENT MESSAGE (OUTPATIENT)
Dept: BARIATRICS | Facility: CLINIC | Age: 69
End: 2019-05-13

## 2019-05-20 ENCOUNTER — TELEPHONE (OUTPATIENT)
Dept: PHYSICAL MEDICINE AND REHAB | Facility: CLINIC | Age: 69
End: 2019-05-20

## 2019-05-20 NOTE — TELEPHONE ENCOUNTER
Spoke with patient who advised that the actual injection site has still been painful but the overall injection helped. She said it feels like cut glass in the injection site but overall the leg pain has improved. She has been using lidoderm patches on the injection site which is still very painful to the touch. No redness or swelling no temp. She is able to go to the gym without any issues. She does not want any further injections but she wanted you to know

## 2019-05-20 NOTE — TELEPHONE ENCOUNTER
----- Message from Riki Morales MD sent at 5/20/2019  7:53 AM CDT -----  Please give her a call to see how she did from the injection last visit. If not any better, she can return to clinic for further eval.    ----- Message -----  From: Riki Morales MD  Sent: 5/6/2019  10:33 PM  To: Riki Morales MD

## 2019-05-21 ENCOUNTER — PATIENT OUTREACH (OUTPATIENT)
Dept: ADMINISTRATIVE | Facility: HOSPITAL | Age: 69
End: 2019-05-21

## 2019-06-02 NOTE — PROGRESS NOTES
CHIEF COMPLAINT: follow up     HISTORY OF PRESENT ILLNESS:  Marce Hickey is a 69 y.o. female who presents to clinic for follow up. She underwent gastric sleeve surgery  with Dr. Panchal and since then has discontinued all of her hypertension, diabetes medications. Recently she has noticed that her BP is again elevated. She feels anxious and would like to meet with psychology. She is due for for lab work.     REVIEW OF SYSTEMS:  The patient denies any fever, chills, night sweats, headaches, vision changes, difficulty speaking or swallowing, decreased hearing,  chest pain, palpitations, shortness of breath, cough, nausea, vomiting, abdominal pain, dysuria, diarrhea, constipation, hematuria, hematochezia, melena, changes in her hair, skin, nails, numbness or weakness in her extremities, erythema,  swelling over any of her joints, myalgia, swollen glands, easy bruising, fatigue, edema,    MEDICATIONS:   Reviewed and/or reconciled in EPIC    ALLERGIES:  Reviewed and/or reconciled in [facility]    PAST MEDICAL/SURGICAL HISTORY:   Past Medical History:   Diagnosis Date    Anxiety     Cancer     breast cancer - left    Depression     Diabetes mellitus, type 2     Borderline    Fatty liver disease, nonalcoholic     GERD (gastroesophageal reflux disease)     Hyperlipidemia     Hypertension     Plantar fasciitis of right foot       Past Surgical History:   Procedure Laterality Date    ARTHROPLASTY, KNEE Left 2018    [facility]    BIOPSY-SENTINEL NODE Left 2017    [facility] at [facility]    breast reduction      BREAST SURGERY Bilateral     masectomy    BREAST SURGERY Bilateral     implants     SECTION      x 2    CHOLECYSTECTOMY      COLONOSCOPY N/A 10/10/2014    [facility]formed by [facility] Ro [facility] at [facility] ENDO    CREATION OR REVISION, INFRAMAMMARY FOLD Left 2018    [facility]     DISSECTION-AXILLARY LYMPH NODE Left 2017    Performed by [facility] at [facility] OR    EXCHANGE IMPLANT-BREAST Bilateral 2017    [facility]    FOOT SURGERY      left bunionectomy    GASTRECTOMY-SLEEVE-LAPAROSCOPIC N/A 2017    [facility] at [facility] OR    gastric sleve      HYSTERECTOMY      one ovary intact, adhesions    INJECTION-FAT FAT TRANSFER Bilateral 3/6/2018    [facility]    KNEE ARTHROSCOPY Left     LIPOSUCTION      MASTECTOMY Bilateral 2017    Performed by [facility]on [facility] at [facility] OR    RECONSTRUCTION-BREAST Bilateral 2017    Performed by [facility]rell Thomas MD at [facility] OR    RECONSTRUCTION-BREAST/REVISION-FLAP Soft Tissue Revision Bilateral 2017    [facility]    ROTATOR CUFF REPAIR Right     TONSILLECTOMY         FAMILY HISTORY:    Family History   Problem Relation Age of Onset    Hypertension Mother     Heart disease Mother         pacemaker    Heart attack Mother     Heart disease Father     Psoriasis Father     Cancer Neg Hx        SOCIAL HISTORY:    Social History     Socioeconomic History    Marital status:      Spouse name: Not on file    Number of children: Not on file    Years of education: Not on file    Highest education level: Not on file   Occupational History    Not on file   Social Needs    Financial resource strain: Not very hard    Food insecurity:     Worry: Never true     Inability: Never true    Transportation needs:     Medical: No     Non-medical: No   Tobacco Use    Smoking status: Former Smoker     Last attempt to quit: 1993     Years since quittin.5    Smokeless tobacco: Never Used    Tobacco comment: quit    Substance and Sexual Activity    Alcohol use: No     Alcohol/week: 0.0 oz     Frequency: Never     Binge frequency:  "Never    Drug use: No    Sexual activity: Yes     Birth control/protection: Surgical   Lifestyle    Physical activity:     Days per week: 3 days     Minutes per session: 30 min    Stress: Only a little   Relationships    Social connections:     Talks on phone: More than three times a week     Gets together: Three times a week     Attends Mandaen service: Not on file     Active member of club or organization: Yes     Attends meetings of clubs or organizations: More than 4 times per year     Relationship status:    Other Topics Concern    Are you pregnant or think you may be? Not Asked    Breast-feeding Not Asked   Social History Narrative    Not on file       PHYSICAL EXAM:  VITAL SIGNS:   Vitals:    04/23/19 1758 04/23/19 1910   BP: (!) 168/91 (!) 156/82   BP Location:  Right arm   Patient Position:  Sitting   BP Method:  Large (Manual)   Pulse: 63    Temp: 99.1 °F (37.3 °C)    Weight: 67.4 kg (148 lb 9.4 oz)    Height: 5' 4" (1.626 m)      GENERAL:  Patient appears well nourished, sitting on exam table, in no acute distress.  HEENT:  Atraumatic, normocephalic, PERRLA, EOMI, no conjunctival injection, sclerae are anicteric, normal external auditory canals,TMs clear b/l, gross hearing intact to whisper, MMM, no oropharygneal erythema or exudate.  NECK:  Supple, normal ROM, trachea is midline , no supraclavicular or cervical LAD or masses palpated.    CARDIOVASCULAR:  RRR, normal S1 and S2, no m/r/g.  RESPIRATORY:  CTA b/l, no wheezes, rhonchi, rales.  No increased work of breathing, no  use of accessory muscles.  ABDOMEN:  Soft, nontender, nondistended, normoactive bowel sounds in all four quadrants, no rebound or guarding, no HSM or masses palpated.  Normal percussion.  EXTREMITIES:  2+ DP pulses b/l, no edema.  SKIN:  Warm, 1 cm firm, moveable mass superior to left clavicle, scapula.  NEUROMUSCULAR:  Cranial nerves II-XII grossly intact.   No clubbing or cyanosis of digits/nails.  Steady " gait.  PSYCH:  Patient is alert and oriented to person, time, place. They are appropriately dressed and groomed. There is normal eye contact. Rate and tone of speech is normal. Normal insight, judgement. Normal thought content and process. Patient describes her mood as good, affect is depressed, she is tearful throughout the encoutner    ASSESSMENT/PLAN: This is a 69 y.o. female who presents to clinic for evaluation of the following concerns    1. History of type 2 diabetes mellitus  - Lipid panel; Future  - Hemoglobin A1c; Future  - TSH; Future    2. History of hypertension  -follow up in 2 weeks for nurse BP check.   - Lipid panel; Future  - Hemoglobin A1c; Future  - TSH; Future    3. Fatty liver disease, nonalcoholic  - Lipid panel; Future  - Hemoglobin A1c; Future  - TSH; Future    4. Status post gastric surgery  - Lipid panel; Future  - Hemoglobin A1c; Future  - Copper, serum; Future  - Ferritin; Future  - Folate; Future  - Iron and TIBC; Future  - TSH; Future  - Vitamin B12; Future  - Vitamin D; Future  - Zinc; Future    5. Deficiency anemia  - Ferritin; Future  - Folate; Future  - Iron and TIBC; Future  - Vitamin B12; Future    6. Hypovitaminosis D  - Vitamin D; Future    7. Situational anxiety  - Ambulatory referral to Psychology        Savannah Garvey MD

## 2019-06-03 ENCOUNTER — PATIENT MESSAGE (OUTPATIENT)
Dept: PSYCHIATRY | Facility: CLINIC | Age: 69
End: 2019-06-03

## 2019-06-03 ENCOUNTER — PATIENT MESSAGE (OUTPATIENT)
Dept: HEMATOLOGY/ONCOLOGY | Facility: CLINIC | Age: 69
End: 2019-06-03

## 2019-06-03 DIAGNOSIS — C50.011 MALIGNANT NEOPLASM INVOLVING BOTH NIPPLE AND AREOLA OF RIGHT BREAST IN FEMALE, UNSPECIFIED ESTROGEN RECEPTOR STATUS: ICD-10-CM

## 2019-06-03 RX ORDER — ANASTROZOLE 1 MG/1
1 TABLET ORAL DAILY
Qty: 90 TABLET | Refills: 1 | Status: CANCELLED | OUTPATIENT
Start: 2019-06-03

## 2019-06-03 RX ORDER — ANASTROZOLE 1 MG/1
1 TABLET ORAL DAILY
Qty: 90 TABLET | Refills: 1 | Status: SHIPPED | OUTPATIENT
Start: 2019-06-03 | End: 2019-11-30 | Stop reason: SDUPTHER

## 2019-06-04 ENCOUNTER — OFFICE VISIT (OUTPATIENT)
Dept: FAMILY MEDICINE | Facility: CLINIC | Age: 69
End: 2019-06-04
Payer: MEDICARE

## 2019-06-04 VITALS
TEMPERATURE: 99 F | DIASTOLIC BLOOD PRESSURE: 84 MMHG | WEIGHT: 151.44 LBS | SYSTOLIC BLOOD PRESSURE: 148 MMHG | HEART RATE: 60 BPM | HEIGHT: 64 IN | BODY MASS INDEX: 25.85 KG/M2

## 2019-06-04 DIAGNOSIS — I10 HYPERTENSION, UNSPECIFIED TYPE: Primary | ICD-10-CM

## 2019-06-04 DIAGNOSIS — E55.9 HYPOVITAMINOSIS D: ICD-10-CM

## 2019-06-04 PROCEDURE — 99214 OFFICE O/P EST MOD 30 MIN: CPT | Mod: S$PBB,,, | Performed by: FAMILY MEDICINE

## 2019-06-04 PROCEDURE — 99999 PR PBB SHADOW E&M-EST. PATIENT-LVL III: ICD-10-PCS | Mod: PBBFAC,,, | Performed by: FAMILY MEDICINE

## 2019-06-04 PROCEDURE — 99999 PR PBB SHADOW E&M-EST. PATIENT-LVL III: CPT | Mod: PBBFAC,,, | Performed by: FAMILY MEDICINE

## 2019-06-04 PROCEDURE — 99214 PR OFFICE/OUTPT VISIT, EST, LEVL IV, 30-39 MIN: ICD-10-PCS | Mod: S$PBB,,, | Performed by: FAMILY MEDICINE

## 2019-06-04 PROCEDURE — 99213 OFFICE O/P EST LOW 20 MIN: CPT | Mod: PBBFAC,PO | Performed by: FAMILY MEDICINE

## 2019-06-04 RX ORDER — ASPIRIN 325 MG
50000 TABLET, DELAYED RELEASE (ENTERIC COATED) ORAL WEEKLY
Qty: 8 CAPSULE | Refills: 0 | Status: SHIPPED | OUTPATIENT
Start: 2019-06-04 | End: 2019-07-24

## 2019-06-04 NOTE — PROGRESS NOTES
CHIEF COMPLAINT: follow up HTN    HISTORY OF PRESENT ILLNESS:  Marce Hickey is a 69 y.o. female who presents to clinic for follow up. She underwent gastric sleeve surgery  with Dr. Panchal and since then has discontinued all of her hypertension, diabetes medications. Recently she has noticed that her BP is again elevated and she was started on HCTZ. She is taking this when her BP is greater than 140/90 and so far has only had to take it 2-3 times.   Recent lab work demonstrated that her vitamin D level was also low at 23.     REVIEW OF SYSTEMS:  The patient denies any fever, chills, night sweats, headaches, vision changes, difficulty speaking or swallowing, decreased hearing,  chest pain, palpitations, shortness of breath, cough, nausea, vomiting, abdominal pain, dysuria, diarrhea, constipation, hematuria, hematochezia, melena, changes in her hair, skin, nails, numbness or weakness in her extremities, erythema,  swelling over any of her joints, myalgia, swollen glands, easy bruising, fatigue, edema    MEDICATIONS:   Reviewed and/or reconciled in EPIC    ALLERGIES:  Reviewed and/or reconciled in [facility]    PAST MEDICAL/SURGICAL HISTORY:   Past Medical History:   Diagnosis Date    Anxiety     Cancer     breast cancer - left    Depression     Diabetes mellitus, type 2     Borderline    Fatty liver disease, nonalcoholic     GERD (gastroesophageal reflux disease)     Hyperlipidemia     Hypertension     Plantar fasciitis of right foot       Past Surgical History:   Procedure Laterality Date    ARTHROPLASTY, KNEE Left 2018    [facility]    BIOPSY-SENTINEL NODE Left 2017    [facility]    breast reduction      BREAST SURGERY Bilateral     masectomy    BREAST SURGERY Bilateral     implants     SECTION      x 2    CHOLECYSTECTOMY      COLONOSCOPY N/A 10/10/2014    [facility] oR [facility] at [facility] ENDO     CREATION OR REVISION, INFRAMAMMARY FOLD Left 2018    [facility]    DISSECTION-AXILLARY LYMPH NODE Left 2017    [facility]    EXCHANGE IMPLANT-BREAST Bilateral 2017    [facility]    FOOT SURGERY      left bunionectomy    GASTRECTOMY-SLEEVE-LAPAROSCOPIC N/A 2017    [facility] at [facility]    gastric sleve      HYSTERECTOMY      one ovary intact, adhesions    INJECTION-FAT FAT TRANSFER Bilateral 3/6/2018    [facility]    KNEE ARTHROSCOPY Left     LIPOSUCTION      MASTECTOMY Bilateral 2017    [facility] at [facility]    RECONSTRUCTION-BREAST Bilateral 2017    [facility] at [facility]    RECONSTRUCTION-BREAST/REVISION-FLAP Soft Tissue Revision Bilateral 2017    [facility]    ROTATOR CUFF REPAIR Right     TONSILLECTOMY         FAMILY HISTORY:    Family History   Problem Relation Age of Onset    Hypertension Mother     Heart disease Mother         pacemaker    Heart attack Mother     Heart disease Father     Psoriasis Father     Cancer Neg Hx        SOCIAL HISTORY:    Social History     Socioeconomic History    Marital status:      Spouse name: Not on file    Number of children: Not on file    Years of education: Not on file    Highest education level: Not on file   Occupational History    Not on file   Social Needs    Financial resource strain: Not very hard    Food insecurity:     Worry: Never true     Inability: Never true    Transportation needs:     Medical: No     Non-medical: No   Tobacco Use    Smoking status: Former Smoker     Last attempt to quit: 1993     Years since quittin.5    Smokeless tobacco: Never Used    Tobacco comment: quit     Substance and Sexual Activity    Alcohol use: No     Alcohol/week: 0.0 oz     Frequency: Never     Binge frequency: Never    Drug use: No    Sexual activity: Yes     Birth control/protection: Surgical   Lifestyle    Physical activity:     Days per week: 3 days     Minutes per session: 30 min    Stress: Only a little   Relationships    Social connections:     Talks on phone: More than three times a week     Gets together: Three times a week     Attends Caodaism service: Not on file     Active member of club or organization: Yes     Attends meetings of clubs or organizations: More than 4 times per year     Relationship status:    Other Topics Concern    Are you pregnant or think you may be? Not Asked    Breast-feeding Not Asked   Social History Narrative    Not on file       PHYSICAL EXAM:  VITAL SIGNS:   There were no vitals filed for this visit.  GENERAL:  Patient appears well nourished, sitting on exam table, in no acute distress.  HEENT:  Atraumatic, normocephalic, PERRLA, EOMI, no conjunctival injection, sclerae are anicteric, normal external auditory canals,TMs clear b/l, gross hearing intact to whisper, MMM, no oropharygneal erythema or exudate.  NECK:  Supple, normal ROM, trachea is midline , no supraclavicular or cervical LAD or masses palpated.    CARDIOVASCULAR:  RRR, normal S1 and S2, no m/r/g.  RESPIRATORY:  CTA b/l, no wheezes, rhonchi, rales.  No increased work of breathing, no  use of accessory muscles.  ABDOMEN:  Soft, nontender, nondistended, normoactive bowel sounds in all four quadrants, no rebound or guarding, no HSM or masses palpated.  Normal percussion.  EXTREMITIES:  2+ DP pulses b/l, no edema.  SKIN:  Warm, 1 cm firm, moveable mass superior to left clavicle, scapula.  NEUROMUSCULAR:  Cranial nerves II-XII grossly intact.   No clubbing or cyanosis of digits/nails.  Steady gait.  PSYCH:  Patient is alert and oriented to person, time, place. They are appropriately dressed and  groomed. There is normal eye contact. Rate and tone of speech is normal. Normal insight, judgement. Normal thought content and process. Patient describes her mood as good, affect is depressed, she is tearful throughout the encoutner    ASSESSMENT/PLAN: This is a 69 y.o. female who presents to clinic for evaluation of the following concerns    1. Hypertension, unspecified type  See below    2. Hypovitaminosis D  - cholecalciferol, vitamin D3, 50,000 unit capsule; Take 1 capsule (50,000 Units total) by mouth once a week. for 8 doses  Dispense: 8 capsule; Refill: 0  - Vitamin D; Future      Patient readiness: acceptance and barriers:none    During the course of the visit the patient was educated and counseled about the following:     Hypertension:   Medication: no change.    Goals: Hypertension: Reduce Blood Pressure    Did patient meet goals/outcomes: Yes    The following self management tools provided: declined    Patient Instructions (the written plan) was given to the patient/family.     Time spent with patient: 30 minutes      Savannah Garvey MD

## 2019-06-05 ENCOUNTER — OFFICE VISIT (OUTPATIENT)
Dept: PSYCHIATRY | Facility: CLINIC | Age: 69
End: 2019-06-05
Payer: MEDICARE

## 2019-06-05 ENCOUNTER — PATIENT MESSAGE (OUTPATIENT)
Dept: ORTHOPEDICS | Facility: CLINIC | Age: 69
End: 2019-06-05

## 2019-06-05 ENCOUNTER — TELEPHONE (OUTPATIENT)
Dept: PSYCHIATRY | Facility: CLINIC | Age: 69
End: 2019-06-05

## 2019-06-05 VITALS
WEIGHT: 151.44 LBS | HEART RATE: 61 BPM | DIASTOLIC BLOOD PRESSURE: 84 MMHG | BODY MASS INDEX: 25.85 KG/M2 | SYSTOLIC BLOOD PRESSURE: 135 MMHG | HEIGHT: 64 IN

## 2019-06-05 DIAGNOSIS — F33.1 MODERATE EPISODE OF RECURRENT MAJOR DEPRESSIVE DISORDER: Primary | ICD-10-CM

## 2019-06-05 PROCEDURE — 99999 PR PBB SHADOW E&M-EST. PATIENT-LVL II: CPT | Mod: PBBFAC,,, | Performed by: PSYCHOLOGIST

## 2019-06-05 PROCEDURE — 99214 PR OFFICE/OUTPT VISIT, EST, LEVL IV, 30-39 MIN: ICD-10-PCS | Mod: S$PBB,,, | Performed by: PSYCHOLOGIST

## 2019-06-05 PROCEDURE — 99999 PR PBB SHADOW E&M-EST. PATIENT-LVL II: ICD-10-PCS | Mod: PBBFAC,,, | Performed by: PSYCHOLOGIST

## 2019-06-05 PROCEDURE — 99214 OFFICE O/P EST MOD 30 MIN: CPT | Mod: S$PBB,,, | Performed by: PSYCHOLOGIST

## 2019-06-05 PROCEDURE — 99212 OFFICE O/P EST SF 10 MIN: CPT | Mod: PBBFAC,PO | Performed by: PSYCHOLOGIST

## 2019-06-05 RX ORDER — AMOXICILLIN 500 MG/1
500 CAPSULE ORAL ONCE AS NEEDED
Qty: 4 CAPSULE | Refills: 0 | Status: SHIPPED | OUTPATIENT
Start: 2019-06-05 | End: 2019-06-05

## 2019-06-05 RX ORDER — TOPIRAMATE 25 MG/1
TABLET ORAL
Qty: 30 TABLET | Refills: 1 | Status: SHIPPED | OUTPATIENT
Start: 2019-06-05 | End: 2019-07-09 | Stop reason: SDUPTHER

## 2019-06-05 NOTE — TELEPHONE ENCOUNTER
"Her problem list says "history of type 2 diabetes", per [facility]'s protocol  this cannot be resolved until she has had normal blood sugar values for 5 years.  "

## 2019-06-05 NOTE — PROGRESS NOTES
"Client: Marce Hickey  : 1950    PCP:   Savannah Garvey MD    Presenting complaint:/Past psychiatric treatment:  Jaciel august she has been doing better had depression in the past beginning in the [employer].  She was on medication at that tmie but did not stay on it. She reports she "blew up" weight wise after the [place] and has a gastric sleeve in 2017 and has lost 90 lbs.  GAD7 5 today (last session 5) , PHQ9 was 1 today (last session 3). She has no history of SI/HI/delusions/hallucinations and denied havng any npatient care.  She reported she has found the breathing technique has helped and she is utilizing it.  She is also recognizing her anger is an issue.  She talked about her anger toward her 's family-and toward him for not standing up for her.  No new stressors reported.    Family psychiatric history: none reported    Relevant lab reviewed  CBC normal, Vit D low, Cholesterol 296/Triglycerides 197  Relevant EKG reviewed   QTc 453 NSR    Review of patient's allergies indicates:   Allergen Reactions    Nsaids (non-steroidal anti-inflammatory drug) Anaphylaxis       Current Outpatient Medications:     anastrozole (ARIMIDEX) 1 mg Tab, Take 1 tablet (1 mg total) by mouth once daily., Disp: 90 tablet, Rfl: 1    calcium-vitamin D3 500 mg(1,250mg) -200 unit per tablet, once daily. Patient uses a patch, not oral medication, Disp: , Rfl:     cholecalciferol, vitamin D3, 50,000 unit capsule, Take 1 capsule (50,000 Units total) by mouth once a week. for 8 doses, Disp: 8 capsule, Rfl: 0    hydroCHLOROthiazide (HYDRODIURIL) 25 MG tablet, 1 tablet PO daily prn BP greater than 140/90, Disp: 30 tablet, Rfl: 11    multivitamin with minerals tablet, Take 1 tablet by mouth once daily., Disp: , Rfl:     omeprazole-sodium bicarbonate (ZEGERID) 40-1.1 mg-gram per capsule, Take 1 capsule by mouth before breakfast., Disp: 90 capsule, Rfl: 3  Past Medical History:   Diagnosis Date    Anxiety     Cancer     " breast cancer - left    Depression     Diabetes mellitus, type 2     Borderline    Fatty liver disease, nonalcoholic     GERD (gastroesophageal reflux disease)     Hyperlipidemia     Hypertension     Plantar fasciitis of right foot      Clinical presentation:  Appearance:  The client presented in casual attire evidencing good grooming and hygiene    Neuro:  the client was alert, oriented x 4 and evidenced good judgement and insight.      Attention/concentration:  Was good in session    Memory:  The client had no subjective memory complaints and they were able to recall information discussed in session accurately    Judgement:  behavior is adequate to the situation    Fund of knowledge:  intact and appropriate to age and level of education    Gait/station:  was normal    Heart: the patients heartbeat was regular in rate and rhythm.  There were nor murmurs, gallops or rubs.    Lung: clear bilaterally    Skin/extremities:  No rashes/lesions/bruises/edema reported or evident.     Mood:  was mildly dysthymic, marked ease of frustration ongoing (Trial Topamax planned).  No SI/HI/delusions/hallucinations/mood swings or expansive moods.          Affect: was normal range    Speech:  normal rate and tone     Sleep: the client had no reported history of sleep problems if using Melatonin.  Onset was normal and they reported waking up feeling well rested.  No daytime sleepiness was reported    Appetite: was reported as good.  She sts she has cut out ice cream.  Has a gastric sleeve in 2017.     Pain complaints:  none were voiced    ETOH/Substance use history:  The client had no reported ETOH/or other substance use problems by their report.    Psychosocial history:  The client currently lives with her 4th  of 8.5 years.  She has a grown son who is a recovering addict and has started a recovery program in [place] (15years sober)  And another son does not talk to her and lives in [place].    She has positive peer support  and states she volunteers at [facility].  Her 's mother and children dont like her (his wife had left him). She did report she and her     Work history:  22 years in the [employer] as .  She had numerous jobs throughout the year and is retired and volunteers at [facility].     Education/session discussion:   · Reviewed diaphragmatic breathing in session and she was able to utilize the technique efficiently..  · Reviewed strategies to help her coping with her frustration/anger (journaling, storybook etc) and need to utilize fair fighting technique. Handout given to client for home reading.    · Discussed medication and rationale for using low dose Topamax was reviewed.  Discussed RBA/meded including risk of confusions, blurred vision, headaches, decreased appetite.  She was agreeable with medication and was given a handout on Topamax for home reference.        Impression:   Recurrent depression/anger Mood has improved but marked ease of frustration persists; relationship issues    Plan:    Start Topamax 25mg at bedtime, call if any problems  Practice fair fighting techniques, consider couples counseling    RTC 4 weeks     Session:  0221-5578

## 2019-06-05 NOTE — TELEPHONE ENCOUNTER
Pt was in our clinic today seeing Dr. Miller, and she expressed her concern over a prior dx of Diabetes Mellitis II and asked if I could assist her with reaching out to Dr. Garvey. She states that this should have been taken off of her current dx, but forgot to mention it to Dr. Garvey at her appt 6/4/19.     Thanks,  [facility]  [phone]

## 2019-07-01 ENCOUNTER — PATIENT MESSAGE (OUTPATIENT)
Dept: PSYCHIATRY | Facility: CLINIC | Age: 69
End: 2019-07-01

## 2019-07-02 ENCOUNTER — PATIENT MESSAGE (OUTPATIENT)
Dept: PSYCHIATRY | Facility: CLINIC | Age: 69
End: 2019-07-02

## 2019-07-08 ENCOUNTER — OFFICE VISIT (OUTPATIENT)
Dept: ORTHOPEDICS | Facility: CLINIC | Age: 69
End: 2019-07-08
Payer: MEDICARE

## 2019-07-08 VITALS
HEIGHT: 64 IN | WEIGHT: 151 LBS | BODY MASS INDEX: 25.78 KG/M2 | HEART RATE: 72 BPM | DIASTOLIC BLOOD PRESSURE: 83 MMHG | SYSTOLIC BLOOD PRESSURE: 168 MMHG

## 2019-07-08 DIAGNOSIS — Z96.652 STATUS POST TOTAL KNEE REPLACEMENT USING CEMENT, LEFT: Primary | ICD-10-CM

## 2019-07-08 DIAGNOSIS — M24.662 ARTHROFIBROSIS OF KNEE JOINT, LEFT: ICD-10-CM

## 2019-07-08 PROCEDURE — 99213 OFFICE O/P EST LOW 20 MIN: CPT | Mod: PBBFAC,PN | Performed by: ORTHOPAEDIC SURGERY

## 2019-07-08 PROCEDURE — 99213 OFFICE O/P EST LOW 20 MIN: CPT | Mod: S$PBB,,, | Performed by: ORTHOPAEDIC SURGERY

## 2019-07-08 PROCEDURE — 99999 PR PBB SHADOW E&M-EST. PATIENT-LVL III: ICD-10-PCS | Mod: PBBFAC,,, | Performed by: ORTHOPAEDIC SURGERY

## 2019-07-08 PROCEDURE — 99213 PR OFFICE/OUTPT VISIT, EST, LEVL III, 20-29 MIN: ICD-10-PCS | Mod: S$PBB,,, | Performed by: ORTHOPAEDIC SURGERY

## 2019-07-08 PROCEDURE — 99999 PR PBB SHADOW E&M-EST. PATIENT-LVL III: CPT | Mod: PBBFAC,,, | Performed by: ORTHOPAEDIC SURGERY

## 2019-07-08 NOTE — PROGRESS NOTES
Past Medical History:   Diagnosis Date    Anxiety     Cancer     breast cancer - left    Depression     Diabetes mellitus, type 2     Borderline    Dyslipidemia 2016    Fatty liver disease, nonalcoholic     GERD (gastroesophageal reflux disease)     Hyperlipidemia     Hypertension     Plantar fasciitis of right foot        Past Surgical History:   Procedure Laterality Date    ARTHROPLASTY, KNEE Left 2018    [facility] at [facility] OR    BIOPSY-SENTINEL NODE Left 2017    [facility] by [facility] at [facility]    breast reduction      BREAST SURGERY Bilateral     masectomy    BREAST SURGERY Bilateral     implants     SECTION      x 2    CHOLECYSTECTOMY      COLONOSCOPY N/A 10/10/2014    Performed by [facility]aitanya Ro [facility] at [facility] ENDO    CREATION OR REVISION, INFRAMAMMARY FOLD Left 2018    [facility] 2ND FLR    DISSECTION-AXILLARY LYMPH NODE Left 2017    [facility] at [facility] OR    EXCHANGE IMPLANT-BREAST Bilateral 2017    [facility] 2ND FLR    FOOT SURGERY      left bunionectomy    GASTRECTOMY-SLEEVE-LAPAROSCOPIC N/A 2017    Performed by [facility]al [facility] at [facility] OR    gastric sleve      HYSTERECTOMY      one ovary intact, adhesions    INJECTION-FAT FAT TRANSFER Bilateral 3/6/2018    [facility] 2ND FLR    KNEE ARTHROSCOPY Left     LIPOSUCTION      MASTECTOMY Bilateral 2017    [facility] at [facility] OR    RECONSTRUCTION-BREAST Bilateral 2017    [facility] at [facility] OR    RECONSTRUCTION-BREAST/REVISION-FLAP Soft Tissue Revision Bilateral 2017    [facility] OR 2ND FLR    ROTATOR CUFF REPAIR Right     TONSILLECTOMY         Current Outpatient Medications   Medication Sig     anastrozole (ARIMIDEX) 1 mg Tab Take 1 tablet (1 mg total) by mouth once daily.    calcium-vitamin D3 500 mg(1,250mg) -200 unit per tablet once daily. Patient uses a patch, not oral medication    cholecalciferol, vitamin D3, 50,000 unit capsule Take 1 capsule (50,000 Units total) by mouth once a week. for 8 doses    hydroCHLOROthiazide (HYDRODIURIL) 25 MG tablet 1 tablet PO daily prn BP greater than 140/90    multivitamin with minerals tablet Take 1 tablet by mouth once daily.    omeprazole-sodium bicarbonate (ZEGERID) 40-1.1 mg-gram per capsule Take 1 capsule by mouth before breakfast.    topiramate (TOPAMAX) 25 MG tablet Take one tablet at bedtime each night     No current facility-administered medications for this visit.        Review of patient's allergies indicates:   Allergen Reactions    Nsaids (non-steroidal anti-inflammatory drug) Anaphylaxis       Family History   Problem Relation Age of Onset    Hypertension Mother     Heart disease Mother         pacemaker    Heart attack Mother     Heart disease Father     Psoriasis Father     Cancer Neg Hx        Social History     Socioeconomic History    Marital status:      Spouse name: Not on file    Number of children: Not on file    Years of education: Not on file    Highest education level: Not on file   Occupational History    Not on file   Social Needs    Financial resource strain: Not very hard    Food insecurity:     Worry: Never true     Inability: Never true    Transportation needs:     Medical: No     Non-medical: No   Tobacco Use    Smoking status: Former Smoker     Last attempt to quit: 1993     Years since quittin.6    Smokeless tobacco: Never Used    Tobacco comment: quit    Substance and Sexual Activity    Alcohol use: No     Alcohol/week: 0.0 oz     Frequency: Never     Binge frequency: Never    Drug use: No    Sexual activity: Yes     Birth control/protection: Surgical   Lifestyle    Physical  activity:     Days per week: 3 days     Minutes per session: 30 min    Stress: Only a little   Relationships    Social connections:     Talks on phone: More than three times a week     Gets together: Three times a week     Attends [employer] service: Not on file     Active member of club or organization: Yes     Attends meetings of clubs or organizations: More than 4 times per year     Relationship status:    Other Topics Concern    Are you pregnant or think you may be? Not Asked    Breast-feeding Not Asked   Social History Narrative    Not on file       Chief Complaint:   Chief Complaint   Patient presents with    Knee Pain     s/p left knee TKA 9/25/18        Date of surgery:  September 25, 2018    History of present illness:  69-year-old female who underwent left total knee arthroplasty.  Patient is doing okay.  She still pretty stiff.  Pain today is 3/10.  She has finished in physical therapy.  Finished about a month ago.  To stop using the Estrella splint because it was making her worse.  Still lacks significant flexion.  Can get it to about 100° with stretching on her own.    Answers for HPI/ROS submitted by the patient on 7/1/2019   Leg pain  unexpected weight change: No  appetite change : No  sleep disturbance: No  IMMUNOCOMPROMISED: No  nervous/ anxious: No  dysphoric mood: No  rash: No  visual disturbance: No  eye redness: No  eye pain: No  ear pain: No  tinnitus: No  hearing loss: No  sinus pressure : No  nosebleeds: No  enviro allergies: No  food allergies: No  cough: No  shortness of breath: No  sweating: No  dysuria: No  frequency: No  difficulty urinating: No  hematuria: No  painful intercourse: No  chest pain: No  palpitations: No  nausea: No  vomiting: No  diarrhea: No  blood in stool: No  constipation: No  headaches: No  dizziness: No  numbness: No  seizures: No  joint swelling: No  myalgia: No  weakness: No  back pain: No  Pain Chronicity: chronic  History of trauma: No  Onset: more than 1  month ago  Frequency: constantly  Progression since onset: unchanged  injury location: at home  pain- numeric: 8/10  pain location: other  pain quality: burning  Radiating Pain: No  Aggravating factors: lying down  fever: No  inability to bear weight: No  itching: No  joint locking: No  limited range of motion: Yes  stiffness: Yes  tingling: No  Treatments tried: chiropractic manipulation, heat, exercise, injection treatment, rest, other  physical therapy: effective  Improvement on treatment: mild      Physical Examination:    Vital Signs:    Vitals:    07/08/19 0827   BP: (!) 168/83   Pulse: 72       Body mass index is 25.92 kg/m².    This a well-developed, well nourished patient in no acute distress.  They are alert and oriented and cooperative to examination.  Pt. walks without assistive device.  Walking a little more normally and naturally.    Examination left knee shows  healed surgical incision.  Mild swelling. No erythema or drainage.    Range of motion is about 5° to 105°.  No calf pain. Negative Homans sign.    X-rays:  Four views of the left knee are reviewed which show well-aligned total knee arthroplasty components     Assessment::  Status post left total knee arthroplasty with  Stiffness      Plan:    Continue working on range of motion at physical therapy.  We will get her a range of motion device instead of the Estrella splint.  Follow up in 8 weeks.      This note was created using M Mod[employer] voice recognition software that occasionally misinterpreted phrases or words.

## 2019-07-09 ENCOUNTER — DOCUMENTATION ONLY (OUTPATIENT)
Dept: PSYCHIATRY | Facility: CLINIC | Age: 69
End: 2019-07-09

## 2019-07-09 ENCOUNTER — OFFICE VISIT (OUTPATIENT)
Dept: PSYCHIATRY | Facility: CLINIC | Age: 69
End: 2019-07-09
Payer: MEDICARE

## 2019-07-09 VITALS
HEIGHT: 64 IN | SYSTOLIC BLOOD PRESSURE: 140 MMHG | DIASTOLIC BLOOD PRESSURE: 77 MMHG | WEIGHT: 151 LBS | BODY MASS INDEX: 25.78 KG/M2 | HEART RATE: 78 BPM

## 2019-07-09 DIAGNOSIS — F33.1 MODERATE EPISODE OF RECURRENT MAJOR DEPRESSIVE DISORDER: Primary | ICD-10-CM

## 2019-07-09 PROCEDURE — 99213 PR OFFICE/OUTPT VISIT, EST, LEVL III, 20-29 MIN: ICD-10-PCS | Mod: S$PBB,,, | Performed by: PSYCHOLOGIST

## 2019-07-09 PROCEDURE — 99999 PR PBB SHADOW E&M-EST. PATIENT-LVL III: CPT | Mod: PBBFAC,,, | Performed by: PSYCHOLOGIST

## 2019-07-09 PROCEDURE — 99999 PR PBB SHADOW E&M-EST. PATIENT-LVL III: ICD-10-PCS | Mod: PBBFAC,,, | Performed by: PSYCHOLOGIST

## 2019-07-09 PROCEDURE — 99213 OFFICE O/P EST LOW 20 MIN: CPT | Mod: S$PBB,,, | Performed by: PSYCHOLOGIST

## 2019-07-09 PROCEDURE — 99213 OFFICE O/P EST LOW 20 MIN: CPT | Mod: PBBFAC,PO | Performed by: PSYCHOLOGIST

## 2019-07-09 RX ORDER — TOPIRAMATE 25 MG/1
TABLET ORAL
Qty: 60 TABLET | Refills: 1 | Status: SHIPPED | OUTPATIENT
Start: 2019-07-09 | End: 2019-08-14 | Stop reason: SDUPTHER

## 2019-07-09 NOTE — PROGRESS NOTES
Client: Marce Hickey  : 1950    PCP:   Savannah Garvey MD    Reason for visit:  the client was seen to assess their response to the medication prescribed, evaluate compliance, continue counseling and education and to coordinate care if needed.  Jaciel sts she has been doing better and sts she has had fewer episodes of frustration, only one blow up with . And sts she feels she is dong better.  PHQ9 3 today  GAD7 2 today.  No SI/HI/delusions/ hallucinations/no mood swings or expansive moods butrongoing ease of frustration.  She is using DB and has paired it with a tape.  Has not used fair fighting effectively yet.   Reviewed her progress in therapy.  Discussed how she should educate her  about what she is trying to do and what she has done to get better with him so he may be less critical. She had talked about his not validating her in the past and we brieflytalked abou this also needing validation.   Client advised that she could bring him to the session if she desired.  Jaciel also sts her grandson is visiting and that he has been a stressor at times-allowed to vent.  She did feel she was doing good with him overall.  No ADRs/SE reported.    Family psychiatric history: none reported    Relevant lab reviewed  CBC normal, Vit D low, Cholesterol 296/Triglycerides 197  Relevant EKG reviewed   QTc 453 NSR    Review of patient's allergies indicates:   Allergen Reactions    Nsaids (non-steroidal anti-inflammatory drug) Anaphylaxis       Current Outpatient Medications:     anastrozole (ARIMIDEX) 1 mg Tab, Take 1 tablet (1 mg total) by mouth once daily., Disp: 90 tablet, Rfl: 1    calcium-vitamin D3 500 mg(1,250mg) -200 unit per tablet, once daily. Patient uses a patch, not oral medication, Disp: , Rfl:     cholecalciferol, vitamin D3, 50,000 unit capsule, Take 1 capsule (50,000 Units total) by mouth once a week. for 8 doses, Disp: 8 capsule, Rfl: 0    multivitamin with minerals tablet, Take  1 tablet by mouth once daily., Disp: , Rfl:     omeprazole-sodium bicarbonate (ZEGERID) 40-1.1 mg-gram per capsule, Take 1 capsule by mouth before breakfast., Disp: 90 capsule, Rfl: 3    topiramate (TOPAMAX) 25 MG tablet, Take one tablet at bedtime each night, Disp: 30 tablet, Rfl: 1    hydroCHLOROthiazide (HYDRODIURIL) 25 MG tablet, 1 tablet PO daily prn BP greater than 140/90, Disp: 30 tablet, Rfl: 11  Past Medical History:   Diagnosis Date    Anxiety     Cancer     breast cancer - left    Depression     Diabetes mellitus, type 2     Borderline    Dyslipidemia 12/12/2016    Fatty liver disease, nonalcoholic     GERD (gastroesophageal reflux disease)     Hyperlipidemia     Hypertension     Plantar fasciitis of right foot      Clinical presentation:  Appearance:  The client presented in casual attire evidencing good grooming and hygiene    Neuro:  the client was alert, oriented x 4 and evidenced good judgement and insight.      Attention/concentration:  Was good in session    Memory:  The client had no subjective memory complaints and they were able to recall information discussed in session accurately    Judgement:  behavior is adequate to the situation    Fund of knowledge:  intact and appropriate to age and level of education    Gait/station:  was normal    Heart: the patients heartbeat was regular in rate and rhythm.  There were nor murmurs, gallops or rubs.    Lung: clear bilaterally    Skin/extremities:  No rashes/lesions/bruises/edema reported or evident.     Mood:  was mildly dysthymic, decreased ease of frustration noted.  No SI/HI/delusions/hallucinations/mood swings or expansive moods.          Affect: was normal range    Speech:  normal rate and tone     Sleep: the client had no reported history of sleep problems if using Melatonin.  Onset was normal and they reported waking up feeling well rested.  No daytime sleepiness was reported    Appetite: was reported as good.  She sts she has cut  out ice cream.  Has a gastric sleeve in 2017.     Pain complaints:  none were voiced    ETOH/Substance use history:  The client had no reported ETOH/or other substance use problems by their report.    Psychosocial history:  The client currently lives with her 4th  of 8.5 years.  She has a grown son who is a recovering addict and has started a recovery program in [place] (15years sober)  And another son does not talk to her and lives in [place].    She has positive peer support and states she volunteers at [facility].  Her 's mother and children dont like her (his wife had left him).     Work history:  22 years in the [employer] as .  She had numerous jobs throughout the year and is retired and volunteers at [facility].     Education/session discussion:   · Reviewed diaphragmatic breathing in session and she sts regularly practicing this and has paired it was a tape  · Reviewed strategies to help her coping with her frustration/anger (journaling, storybook etc) and need to utilize fair fighting technique. Handout given to client for home reading.  Asked to have  read fair fighting techniques.  · Reviewed medication and rationale for using low dose Topamax was reviewed.  Discussed RBA/meded including risk of confusions, blurred vision, headaches, decreased appetite.  She was agreeable with medication and was given a handout on Topamax for home reference.      · Reviewed fair fighting techniques    Impression:  No reported depression and less anger reported. Prognosis good    Plan:    Start Topamax 25mg one tablet q12 hours, call if any problems  Practice fair fighting techniques, consider couples counseling    RTC 4 weeks and prn    Session:  5810-1546

## 2019-07-11 ENCOUNTER — TELEPHONE (OUTPATIENT)
Dept: ORTHOPEDICS | Facility: CLINIC | Age: 69
End: 2019-07-11

## 2019-07-11 NOTE — TELEPHONE ENCOUNTER
----- Message from Zo Covarrubias RT sent at 7/11/2019  3:45 PM CDT -----  Contact: pt    pt , requesting to inform she have not heard from [employer] for the knee devise as of yet, thanks.  
Called pt and advised we contacted the company regarding getting in touch with her regarding her ROM brace. Advised to return call tomorrow if she does not here from them by tomorrow afternoon. Pt verbalized understanding.   
<--- Click to Launch ICDx for PreOp, PostOp and Procedure

## 2019-07-16 ENCOUNTER — PATIENT MESSAGE (OUTPATIENT)
Dept: ORTHOPEDICS | Facility: CLINIC | Age: 69
End: 2019-07-16

## 2019-07-20 DIAGNOSIS — C50.012 MALIGNANT NEOPLASM OF NIPPLE OF LEFT BREAST IN FEMALE, ESTROGEN RECEPTOR POSITIVE: Primary | ICD-10-CM

## 2019-07-20 DIAGNOSIS — Z17.0 MALIGNANT NEOPLASM OF NIPPLE OF LEFT BREAST IN FEMALE, ESTROGEN RECEPTOR POSITIVE: Primary | ICD-10-CM

## 2019-07-21 ENCOUNTER — PATIENT MESSAGE (OUTPATIENT)
Dept: PSYCHIATRY | Facility: CLINIC | Age: 69
End: 2019-07-21

## 2019-07-24 ENCOUNTER — PATIENT MESSAGE (OUTPATIENT)
Dept: ORTHOPEDICS | Facility: CLINIC | Age: 69
End: 2019-07-24

## 2019-07-25 ENCOUNTER — OFFICE VISIT (OUTPATIENT)
Dept: ORTHOPEDICS | Facility: CLINIC | Age: 69
End: 2019-07-25
Payer: MEDICARE

## 2019-07-25 VITALS — WEIGHT: 151 LBS | BODY MASS INDEX: 25.78 KG/M2 | HEIGHT: 64 IN

## 2019-07-25 DIAGNOSIS — M24.662 ARTHROFIBROSIS OF KNEE JOINT, LEFT: Primary | ICD-10-CM

## 2019-07-25 DIAGNOSIS — M89.8X6 PAIN IN LEFT TIBIA: Primary | ICD-10-CM

## 2019-07-25 PROCEDURE — 99999 PR PBB SHADOW E&M-EST. PATIENT-LVL III: ICD-10-PCS | Mod: PBBFAC,,, | Performed by: ORTHOPAEDIC SURGERY

## 2019-07-25 PROCEDURE — 99213 OFFICE O/P EST LOW 20 MIN: CPT | Mod: S$PBB,,, | Performed by: ORTHOPAEDIC SURGERY

## 2019-07-25 PROCEDURE — 99213 OFFICE O/P EST LOW 20 MIN: CPT | Mod: PBBFAC,PN | Performed by: ORTHOPAEDIC SURGERY

## 2019-07-25 PROCEDURE — 99213 PR OFFICE/OUTPT VISIT, EST, LEVL III, 20-29 MIN: ICD-10-PCS | Mod: S$PBB,,, | Performed by: ORTHOPAEDIC SURGERY

## 2019-07-25 PROCEDURE — 99999 PR PBB SHADOW E&M-EST. PATIENT-LVL III: CPT | Mod: PBBFAC,,, | Performed by: ORTHOPAEDIC SURGERY

## 2019-07-25 RX ORDER — LIDOCAINE 50 MG/G
1 PATCH TOPICAL DAILY
Qty: 40 PATCH | Refills: 0 | Status: SHIPPED | OUTPATIENT
Start: 2019-07-25 | End: 2021-11-15

## 2019-07-25 NOTE — PROGRESS NOTES
Past Medical History:   Diagnosis Date    Anxiety     Cancer     breast cancer - left    Depression     Diabetes mellitus, type 2     Borderline    Dyslipidemia 2016    Fatty liver disease, nonalcoholic     GERD (gastroesophageal reflux disease)     Hyperlipidemia     Hypertension     Plantar fasciitis of right foot        Past Surgical History:   Procedure Laterality Date    ARTHROPLASTY, KNEE Left 2018    [facility] OR    BIOPSY-SENTINEL NODE Left 2017    [facility] at [facility]    breast reduction      BREAST SURGERY Bilateral     masectomy    BREAST SURGERY Bilateral     implants     SECTION      x 2    CHOLECYSTECTOMY      COLONOSCOPY N/A 10/10/2014    [facility]formed by [facility] at [facility] ENDO    CREATION OR REVISION, INFRAMAMMARY FOLD Left 2018    [facility] 2ND [facility]    DISSECTION-AXILLARY LYMPH NODE Left 2017    [facility]    EXCHANGE IMPLANT-BREAST Bilateral 2017    [facility] 2ND FLR    FOOT SURGERY      left bunionectomy    GASTRECTOMY-SLEEVE-LAPAROSCOPIC N/A 2017    [facility] at [facility]    gastric sleve      HYSTERECTOMY      one ovary intact, adhesions    INJECTION-FAT FAT TRANSFER Bilateral 3/6/2018    [facility] 2ND FLR    KNEE ARTHROSCOPY Left     LIPOSUCTION      MASTECTOMY Bilateral 2017    [facility]    RECONSTRUCTION-BREAST Bilateral 2017    [facility]    RECONSTRUCTION-BREAST/REVISION-FLAP Soft Tissue Revision Bilateral 2017    [facility] 2ND FLR    ROTATOR CUFF REPAIR Right     TONSILLECTOMY         Current Outpatient Medications   Medication Sig     anastrozole (ARIMIDEX) 1 mg Tab Take 1 tablet (1 mg total) by mouth once daily.    calcium-vitamin D3 500 mg(1,250mg) -200 unit per tablet once daily. Patient uses a patch, not oral medication    hydroCHLOROthiazide (HYDRODIURIL) 25 MG tablet 1 tablet PO daily prn BP greater than 140/90    multivitamin with minerals tablet Take 1 tablet by mouth once daily.    omeprazole-sodium bicarbonate (ZEGERID) 40-1.1 mg-gram per capsule Take 1 capsule by mouth before breakfast.    topiramate (TOPAMAX) 25 MG tablet Take one tablet every 12 hours    lidocaine (LIDODERM) 5 % Place 1 patch onto the skin once daily. Remove & Discard patch within 12 hours or as directed by MD     No current facility-administered medications for this visit.        Review of patient's allergies indicates:   Allergen Reactions    Nsaids (non-steroidal anti-inflammatory drug) Anaphylaxis       Family History   Problem Relation Age of Onset    Hypertension Mother     Heart disease Mother         pacemaker    Heart attack Mother     Heart disease Father     Psoriasis Father     Cancer Neg Hx        Social History     Socioeconomic History    Marital status:      Spouse name: Not on file    Number of children: Not on file    Years of education: Not on file    Highest education level: Not on file   Occupational History    Not on file   Social Needs    Financial resource strain: Not very hard    Food insecurity:     Worry: Never true     Inability: Never true    Transportation needs:     Medical: No     Non-medical: No   Tobacco Use    Smoking status: Former Smoker     Last attempt to quit: 1993     Years since quittin.6    Smokeless tobacco: Never Used    Tobacco comment: quit    Substance and Sexual Activity    Alcohol use: No     Alcohol/week: 0.0 oz     Frequency: Never     Binge frequency: Never    Drug use: No    Sexual activity: Yes     Birth control/protection: Surgical   Lifestyle    Physical  activity:     Days per week: 3 days     Minutes per session: 30 min    Stress: Only a little   Relationships    Social connections:     Talks on phone: More than three times a week     Gets together: Three times a week     Attends [employer] service: Not on file     Active member of club or organization: Yes     Attends meetings of clubs or organizations: More than 4 times per year     Relationship status:    Other Topics Concern    Are you pregnant or think you may be? Not Asked    Breast-feeding Not Asked   Social History Narrative    Not on file       Chief Complaint:   Chief Complaint   Patient presents with    Knee Pain     left knee pain-discuss treatment       Date of surgery:  September 25, 2018    History of present illness:  69-year-old female who underwent left total knee arthroplasty.  Patient is doing okay.  She still pretty stiff.  Pain today is 1/10.  She has finished in physical therapy.  Finished about a month ago.  She stop using the Estrella splint because it was making her worse.  Still lacks significant flexion.  Can get it to about 100° with stretching on her own.  Having more pain along the lateral calf area just above the mid tibia. Dr. Morales ultrasound of this previously and did not see anything.  He tried a trigger point injection in this area as well and did really help.    Answers for HPI/ROS submitted by the patient on 7/24/2019   Leg pain  unexpected weight change: No  appetite change : No  sleep disturbance: No  IMMUNOCOMPROMISED: No  dysphoric mood: No  rash: No  eye redness: No  cough: No  difficulty urinating: No  hematuria: No  chest pain: No  palpitations: No  vomiting: No  diarrhea: No  constipation: No  headaches: No  seizures: No  Pain Chronicity: chronic  History of trauma: Yes  Onset: more than 1 year ago  Frequency: intermittently  Progression since onset: gradually improving  injury location: at home  pain- numeric: 4/10  pain location: other  pain quality:  aching  Radiating Pain: Yes  Aggravating factors: activity  fever: No  inability to bear weight: Yes  itching: No  joint locking: No  limited range of motion: Yes  stiffness: Yes  tingling: No  Treatments tried: cold, heat, exercise, injection treatment, movement, OTC ointments, rest, other  physical therapy: effective  Improvement on treatment: moderate      Physical Examination:    Vital Signs:    There were no vitals filed for this visit.    Body mass index is 25.92 kg/m².    This a well-developed, well nourished patient in no acute distress.  They are alert and oriented and cooperative to examination.  Pt. walks without assistive device.  Walking a little more normally and naturally.    Examination left knee shows  healed surgical incision.  Mild swelling. No erythema or drainage.    Range of motion is about 5° to 105°.  No calf pain. Negative Homans sign.  She does have some tenderness and possibly a tight band along the anterior  compartment musculature    X-rays:  Four views of the left knee are reviewed which show well-aligned total knee arthroplasty components     Assessment::  Status post left total knee arthroplasty with  Stiffness  Possible peroneal neuritis versus myofascial pain      Plan:    Continue working on range of motion. Recommended getting an MRI to further evaluate this area near the lateral leg.  We talked about a nerve conduction test and she knows that she would not be able to tolerate this.  Follow-up after the MRI is completed.    This note was created using [employer] voice recognition software that occasionally misinterpreted phrases or words.

## 2019-07-30 ENCOUNTER — LAB VISIT (OUTPATIENT)
Dept: LAB | Facility: HOSPITAL | Age: 69
End: 2019-07-30
Attending: FAMILY MEDICINE
Payer: MEDICARE

## 2019-07-30 DIAGNOSIS — E55.9 HYPOVITAMINOSIS D: ICD-10-CM

## 2019-07-30 LAB — 25(OH)D3+25(OH)D2 SERPL-MCNC: 56 NG/ML (ref 30–96)

## 2019-07-30 PROCEDURE — 36415 COLL VENOUS BLD VENIPUNCTURE: CPT | Mod: PO

## 2019-07-30 PROCEDURE — 82306 VITAMIN D 25 HYDROXY: CPT

## 2019-08-14 ENCOUNTER — TELEPHONE (OUTPATIENT)
Dept: PSYCHIATRY | Facility: CLINIC | Age: 69
End: 2019-08-14

## 2019-08-14 ENCOUNTER — OFFICE VISIT (OUTPATIENT)
Dept: PSYCHIATRY | Facility: CLINIC | Age: 69
End: 2019-08-14
Payer: MEDICARE

## 2019-08-14 VITALS
HEART RATE: 59 BPM | HEIGHT: 64 IN | SYSTOLIC BLOOD PRESSURE: 136 MMHG | DIASTOLIC BLOOD PRESSURE: 76 MMHG | RESPIRATION RATE: 17 BRPM | BODY MASS INDEX: 26.16 KG/M2 | WEIGHT: 153.25 LBS

## 2019-08-14 DIAGNOSIS — Z63.0 PARTNER RELATIONSHIP PROBLEMS: Primary | ICD-10-CM

## 2019-08-14 PROCEDURE — 99214 OFFICE O/P EST MOD 30 MIN: CPT | Mod: S$PBB,,, | Performed by: PSYCHOLOGIST

## 2019-08-14 PROCEDURE — 99999 PR PBB SHADOW E&M-EST. PATIENT-LVL III: CPT | Mod: PBBFAC,,, | Performed by: PSYCHOLOGIST

## 2019-08-14 PROCEDURE — 99999 PR PBB SHADOW E&M-EST. PATIENT-LVL III: ICD-10-PCS | Mod: PBBFAC,,, | Performed by: PSYCHOLOGIST

## 2019-08-14 PROCEDURE — 99213 OFFICE O/P EST LOW 20 MIN: CPT | Mod: PBBFAC,PO | Performed by: PSYCHOLOGIST

## 2019-08-14 PROCEDURE — 99214 PR OFFICE/OUTPT VISIT, EST, LEVL IV, 30-39 MIN: ICD-10-PCS | Mod: S$PBB,,, | Performed by: PSYCHOLOGIST

## 2019-08-14 RX ORDER — TOPIRAMATE 25 MG/1
TABLET ORAL
Qty: 60 TABLET | Refills: 2 | Status: SHIPPED | OUTPATIENT
Start: 2019-08-14 | End: 2019-10-14 | Stop reason: SDUPTHER

## 2019-08-14 SDOH — SOCIAL DETERMINANTS OF HEALTH (SDOH): PROBLEMS IN RELATIONSHIP WITH SPOUSE OR PARTNER: Z63.0

## 2019-08-14 NOTE — TELEPHONE ENCOUNTER
Pt was seen in clinic today by Dr. Milena Miller, and was asking about where she was scheduled for labs on 10/14/19. Pt would like to be scheduled for labs here in [place], if possible at [facility] located at [address] [place] [address]. Please advise pt if Dr. Ding is ok with this change. If I can be of further assistance please let me know.    Thanks,  Kelsey [facility]za[facility]   [phone] ext [phone]

## 2019-08-14 NOTE — PROGRESS NOTES
"Client: Marce Hickey  : 1950  PCP:   Savannah Garvey MD    Reason for visit:  the client was seen to assess their response to the medication prescribed, evaluate compliance, continue counseling and education and to coordinate care if needed.  Jaciel sts she has been doing better and she has not had any arguments with her  since the last visit. She has not needed to journal with him.  She sts when he gets argumentative I am not going to get into "this".  "If you want to argue I have a mirror you can look into the mirror and argue with that person" and "I walk away".  She is analizing whether she really loves her  or is she there because she does not want to be alone. Talked about her negative response to 's  Comments-her negative responses first and how to make him a positive contributer for activities.  She also sts she noticed she is eating more and gaining some weight. She is starting to exercise some. No ADRs/SE reported.   PHQ9 0 [3 ]today  GAD7 0  [2] today.  No SI/HI/delusions/ hallucinations/no mood swings or expansive moods reported. No significant anger now.  She is still using DB and has paired it with a tape.  Reviewed her progress in therapy.  No ADRs/SE reported.  No new stressors reported.      Family psychiatric history: none reported  Relevant lab reviewed  CBC normal, Vit D low, Cholesterol 296/Triglycerides 197  Relevant EKG reviewed   QTc 453 NSR    Review of patient's allergies indicates:   Allergen Reactions    Nsaids (non-steroidal anti-inflammatory drug) Anaphylaxis       Current Outpatient Medications:     anastrozole (ARIMIDEX) 1 mg Tab, Take 1 tablet (1 mg total) by mouth once daily., Disp: 90 tablet, Rfl: 1    calcium-vitamin D3 500 mg(1,250mg) -200 unit per tablet, once daily. Patient uses a patch, not oral medication, Disp: , Rfl:     hydroCHLOROthiazide (HYDRODIURIL) 25 MG tablet, 1 tablet PO daily prn BP greater than 140/90, Disp: 30 tablet, " Rfl: 11    lidocaine (LIDODERM) 5 %, Place 1 patch onto the skin once daily. Remove & Discard patch within 12 hours or as directed by MD, Disp: 40 patch, Rfl: 0    multivitamin with minerals tablet, Take 1 tablet by mouth once daily., Disp: , Rfl:     omeprazole-sodium bicarbonate (ZEGERID) 40-1.1 mg-gram per capsule, Take 1 capsule by mouth before breakfast., Disp: 90 capsule, Rfl: 3    topiramate (TOPAMAX) 25 MG tablet, Take one tablet every 12 hours, Disp: 60 tablet, Rfl: 1  Past Medical History:   Diagnosis Date    Anxiety     Cancer     breast cancer - left    Depression     Diabetes mellitus, type 2     Borderline    Dyslipidemia 12/12/2016    Fatty liver disease, nonalcoholic     GERD (gastroesophageal reflux disease)     Hyperlipidemia     Hypertension     Plantar fasciitis of right foot      Clinical presentation:  Appearance:  The client presented in casual attire evidencing good grooming and hygiene  Neuro:  the client was alert, oriented x 4 and evidenced good judgement and insight.    Attention/concentration:  Was good in session  Memory:  The client had no subjective memory complaints and they were able to recall information discussed in session accurately  Judgement:  behavior is adequate to the situation  Fund of knowledge:  intact and appropriate to age and level of education  Gait/station:  was normal  Heart: the patients heartbeat was regular in rate and rhythm.    Lung: clear bilaterally  Skin/extremities:  No rashes/lesions/bruises/edema reported or evident.   Mood:  Was minimally dysthymic, decreased ease of frustration noted.  No SI/HI/delusions/hallucinations/mood swings or expansive moods.        Affect: was normal range  Speech:  normal rate and tone   Sleep: the client had no reported history of sleep problems if using Melatonin.  Onset was normal and they reported waking up feeling well rested.  No daytime sleepiness was reported  Appetite: was reported as good.  Has a  gastric sleeve in 2017.   Pain complaints:  none were voiced  ETOH/Substance use history:  The client had no reported ETOH/or other substance use problems by their report.  Psychosocial history:  The client currently lives with her 4th  of 8.5 years.  She has a grown son who is a recovering addict and has started a recovery program in [place] (15years sober)  And another son does not talk to her and lives in [place].    She has positive peer support and states she volunteers at [facility].  Her 's mother and children dont like her (his wife had left him).   Work history:  22 years in the [employer] as .  She had numerous jobs throughout the year and is retired and volunteers at [facility].     Education/session discussion:   · Reviewed diaphragmatic breathing in session and she sts regularly practicing this and has paired it was a tape  · Reviewed strategies to help her coping with her frustration/anger (journaling, storybook etc) and need to utilize fair fighting technique. Handout given to client for home reading.   · Reviewed medication and rationale for using low dose Topamax was reviewed.  Discussed RBA/meded including risk of confusions, blurred vision, headaches, decreased appetite.  She was agreeable with medication and was given a handout on Topamax for home reference.      · Reviewed fair fighting techniques and need to use this if arguments occur    Impression:  No reported depression and less anger reported. Prognosis good    Plan:    Cont Topamax 25mg one tablet q12 hours, call if any problems  RTC 8 weeks and prn    Session:  [id number]

## 2019-08-22 ENCOUNTER — PATIENT MESSAGE (OUTPATIENT)
Dept: PLASTIC SURGERY | Facility: CLINIC | Age: 69
End: 2019-08-22

## 2019-08-28 ENCOUNTER — OFFICE VISIT (OUTPATIENT)
Dept: FAMILY MEDICINE | Facility: CLINIC | Age: 69
End: 2019-08-28
Payer: MEDICARE

## 2019-08-28 VITALS
OXYGEN SATURATION: 98 % | TEMPERATURE: 99 F | WEIGHT: 152.56 LBS | BODY MASS INDEX: 26.05 KG/M2 | HEIGHT: 64 IN | SYSTOLIC BLOOD PRESSURE: 136 MMHG | DIASTOLIC BLOOD PRESSURE: 86 MMHG | HEART RATE: 76 BPM

## 2019-08-28 DIAGNOSIS — N39.0 URINARY TRACT INFECTION WITHOUT HEMATURIA, SITE UNSPECIFIED: Primary | ICD-10-CM

## 2019-08-28 LAB
BILIRUB SERPL-MCNC: NEGATIVE MG/DL
BLOOD, POC UA: NEGATIVE
GLUCOSE UR QL STRIP: NORMAL
KETONES UR QL STRIP: NEGATIVE
LEUKOCYTE ESTERASE URINE, POC: NORMAL
NITRITE, POC UA: NEGATIVE
PH, POC UA: 5
PROTEIN, POC: 5
SPECIFIC GRAVITY, POC UA: 1.02
UROBILINOGEN, POC UA: NORMAL

## 2019-08-28 PROCEDURE — 99999 PR PBB SHADOW E&M-EST. PATIENT-LVL III: CPT | Mod: PBBFAC,,, | Performed by: FAMILY MEDICINE

## 2019-08-28 PROCEDURE — 87077 CULTURE AEROBIC IDENTIFY: CPT

## 2019-08-28 PROCEDURE — 99214 PR OFFICE/OUTPT VISIT, EST, LEVL IV, 30-39 MIN: ICD-10-PCS | Mod: S$PBB,,, | Performed by: FAMILY MEDICINE

## 2019-08-28 PROCEDURE — 99213 OFFICE O/P EST LOW 20 MIN: CPT | Mod: PBBFAC,PO | Performed by: FAMILY MEDICINE

## 2019-08-28 PROCEDURE — 87086 URINE CULTURE/COLONY COUNT: CPT

## 2019-08-28 PROCEDURE — 99999 PR PBB SHADOW E&M-EST. PATIENT-LVL III: ICD-10-PCS | Mod: PBBFAC,,, | Performed by: FAMILY MEDICINE

## 2019-08-28 PROCEDURE — 99214 OFFICE O/P EST MOD 30 MIN: CPT | Mod: S$PBB,,, | Performed by: FAMILY MEDICINE

## 2019-08-28 PROCEDURE — 87186 SC STD MICRODIL/AGAR DIL: CPT

## 2019-08-28 PROCEDURE — 87088 URINE BACTERIA CULTURE: CPT

## 2019-08-28 PROCEDURE — 81000 URINALYSIS NONAUTO W/SCOPE: CPT | Mod: PBBFAC,PO | Performed by: FAMILY MEDICINE

## 2019-08-28 RX ORDER — SULFAMETHOXAZOLE AND TRIMETHOPRIM 800; 160 MG/1; MG/1
1 TABLET ORAL 2 TIMES DAILY
Qty: 10 TABLET | Refills: 0 | Status: SHIPPED | OUTPATIENT
Start: 2019-08-28 | End: 2019-09-02

## 2019-08-28 NOTE — PROGRESS NOTES
Subjective:       Patient ID: Marce Hickey is a 69 y.o. female.    Chief Complaint: Urinary Tract Infection    Patient here for UC visit.  Cloudy urine noted with a different odor.  Drinking more water helps some but otherwise it persists.      Urinary Tract Infection    This is a new problem. The current episode started in the past 7 days. The problem occurs intermittently. The problem has been waxing and waning. The patient is experiencing no pain. There has been no fever. Associated symptoms include flank pain (intermittient on right) and constipation. Pertinent negatives include no discharge, frequency, hematuria, nausea, vomiting or rash. She has tried increased fluids for the symptoms. The treatment provided no relief.     Review of Systems   Constitutional: Negative for activity change, fever and unexpected weight change.   HENT: Negative for hearing loss, rhinorrhea and trouble swallowing.    Eyes: Negative for discharge and visual disturbance.   Respiratory: Negative for chest tightness, shortness of breath and wheezing.    Cardiovascular: Negative for chest pain and palpitations.   Gastrointestinal: Positive for constipation. Negative for abdominal pain, blood in stool, diarrhea, nausea and vomiting.   Endocrine: Negative for polydipsia and polyuria.   Genitourinary: Positive for flank pain (intermittient on right). Negative for difficulty urinating, dysuria, frequency, hematuria and menstrual problem.   Musculoskeletal: Negative for arthralgias, joint swelling and neck pain.   Skin: Negative for rash.   Neurological: Negative for weakness and headaches.   Psychiatric/Behavioral: Negative for confusion and dysphoric mood.   All other systems reviewed and are negative.      Objective:      Physical Exam   Constitutional: She appears well-developed and well-nourished.   HENT:   Mouth/Throat: Oropharynx is clear and moist.   Eyes: Pupils are equal, round, and reactive to light. No scleral icterus.    Neck: Neck supple.   Cardiovascular: Normal rate and regular rhythm.   No murmur heard.  Pulmonary/Chest: Effort normal and breath sounds normal.   Abdominal: Soft. Bowel sounds are normal. There is no tenderness. There is no CVA tenderness.   Musculoskeletal: She exhibits no edema or tenderness.   Lymphadenopathy:     She has no cervical adenopathy.   Skin: Skin is warm and dry.       Assessment:       1. Urinary tract infection without hematuria, site unspecified        Plan:       Urinary tract infection without hematuria, site unspecified  -     POCT Urinalysis  -     Urine culture  -     sulfamethoxazole-trimethoprim 800-160mg (BACTRIM DS) 800-160 mg Tab; Take 1 tablet by mouth 2 (two) times daily. for 10 doses  Dispense: 10 tablet; Refill: 0    Increase water intake.

## 2019-08-31 LAB — BACTERIA UR CULT: ABNORMAL

## 2019-09-06 ENCOUNTER — OFFICE VISIT (OUTPATIENT)
Dept: PLASTIC SURGERY | Facility: CLINIC | Age: 69
End: 2019-09-06
Payer: MEDICARE

## 2019-09-06 VITALS
SYSTOLIC BLOOD PRESSURE: 153 MMHG | DIASTOLIC BLOOD PRESSURE: 91 MMHG | WEIGHT: 153 LBS | HEART RATE: 73 BPM | BODY MASS INDEX: 26.26 KG/M2

## 2019-09-06 DIAGNOSIS — Z09 SURGERY FOLLOW-UP EXAMINATION: Primary | ICD-10-CM

## 2019-09-06 PROCEDURE — 99213 OFFICE O/P EST LOW 20 MIN: CPT | Mod: PBBFAC,PO | Performed by: SURGERY

## 2019-09-06 PROCEDURE — 99999 PR PBB SHADOW E&M-EST. PATIENT-LVL III: CPT | Mod: PBBFAC,,, | Performed by: SURGERY

## 2019-09-06 PROCEDURE — 99211 OFF/OP EST MAY X REQ PHY/QHP: CPT | Mod: S$PBB,,, | Performed by: SURGERY

## 2019-09-06 PROCEDURE — 99999 PR PBB SHADOW E&M-EST. PATIENT-LVL III: ICD-10-PCS | Mod: PBBFAC,,, | Performed by: SURGERY

## 2019-09-06 PROCEDURE — 99211 PR OFFICE/OUTPT VISIT, EST, LEVL I: ICD-10-PCS | Mod: S$PBB,,, | Performed by: SURGERY

## 2019-09-06 NOTE — PROGRESS NOTES
Ms. Hickey is status post bilateral breast reconstruction using tissue expanders   followed by implants.  She had a revisionary surgery stage II with free fat   grafting and also had to have the left inframammary fold elevated.  This was   done using an open capsulotomy superiorly as well as in two layers, 1-0 Vicryl   and one of a permanent Prolene suture.  She did well after that surgery for over   nine months and recently stated that it has begun to slip.  Physical exam does   show that the inframammary fold now has slipped that it is approximately 1 to   1.5 cm lower than the opposite side.  I have taken multiple pictures.  I will go   ahead and present her at conference.  She also has a very painful burning   keloid in the left lateral breast.  We will excise that with postoperative   radiation treatment.      RAZ/CATRACHITO  dd: 09/06/2019 11:31:42 (CDT)  td: 09/07/2019 08:59:07 (CDT)  Doc ID   #[id number]  Job ID #[id number]    CC:

## 2019-09-09 ENCOUNTER — OFFICE VISIT (OUTPATIENT)
Dept: ORTHOPEDICS | Facility: CLINIC | Age: 69
End: 2019-09-09
Payer: MEDICARE

## 2019-09-09 VITALS
DIASTOLIC BLOOD PRESSURE: 80 MMHG | HEIGHT: 64 IN | HEART RATE: 70 BPM | SYSTOLIC BLOOD PRESSURE: 163 MMHG | BODY MASS INDEX: 26.12 KG/M2 | WEIGHT: 153 LBS

## 2019-09-09 DIAGNOSIS — M24.662 ARTHROFIBROSIS OF KNEE JOINT, LEFT: Primary | ICD-10-CM

## 2019-09-09 PROCEDURE — 99213 OFFICE O/P EST LOW 20 MIN: CPT | Mod: PBBFAC,PN | Performed by: ORTHOPAEDIC SURGERY

## 2019-09-09 PROCEDURE — 99999 PR PBB SHADOW E&M-EST. PATIENT-LVL III: ICD-10-PCS | Mod: PBBFAC,,, | Performed by: ORTHOPAEDIC SURGERY

## 2019-09-09 PROCEDURE — 99212 OFFICE O/P EST SF 10 MIN: CPT | Mod: S$PBB,,, | Performed by: ORTHOPAEDIC SURGERY

## 2019-09-09 PROCEDURE — 99999 PR PBB SHADOW E&M-EST. PATIENT-LVL III: CPT | Mod: PBBFAC,,, | Performed by: ORTHOPAEDIC SURGERY

## 2019-09-09 PROCEDURE — 99212 PR OFFICE/OUTPT VISIT, EST, LEVL II, 10-19 MIN: ICD-10-PCS | Mod: S$PBB,,, | Performed by: ORTHOPAEDIC SURGERY

## 2019-09-09 NOTE — PROGRESS NOTES
Past Medical History:   Diagnosis Date    Anxiety     Cancer     breast cancer - left    Depression     Diabetes mellitus, type 2     Borderline    Dyslipidemia 2016    Fatty liver disease, nonalcoholic     GERD (gastroesophageal reflux disease)     Hyperlipidemia     Hypertension     Plantar fasciitis of right foot        Past Surgical History:   Procedure Laterality Date    ARTHROPLASTY, KNEE Left 2018    [facility]    BIOPSY-SENTINEL NODE Left 2017    [facility]    breast reduction      BREAST SURGERY Bilateral     masectomy    BREAST SURGERY Bilateral     implants     SECTION      x 2    CHOLECYSTECTOMY      COLONOSCOPY N/A 10/10/2014    [facility]aitanya Ro [facility] at [facility] ENDO    CREATION OR REVISION, INFRAMAMMARY FOLD Left 2018    [facility] 2ND FLR    DISSECTION-AXILLARY LYMPH NODE Left 2017    [facility] OR    EXCHANGE IMPLANT-BREAST Bilateral 2017    [facility] at [facility] OR 2ND FLR    FOOT SURGERY      left bunionectomy    GASTRECTOMY-SLEEVE-LAPAROSCOPIC N/A 2017    [facility]al [facility] at [facility] OR    gastric sleve      HYSTERECTOMY      one ovary intact, adhesions    INJECTION-FAT FAT TRANSFER Bilateral 3/6/2018    [facility] at [facility] 2ND [facility]LR    KNEE ARTHROSCOPY Left     LIPOSUCTION      MASTECTOMY Bilateral 2017    [facility]    RECONSTRUCTION-BREAST Bilateral 2017    [facility]    RECONSTRUCTION-BREAST/REVISION-FLAP Soft Tissue Revision Bilateral 2017    [facility] at [facility] OR 2ND FLR    ROTATOR CUFF REPAIR Right     TONSILLECTOMY         Current Outpatient Medications   Medication Sig     anastrozole (ARIMIDEX) 1 mg Tab Take 1 tablet (1 mg total) by mouth once daily.    calcium-vitamin D3 500 mg(1,250mg) -200 unit per tablet once daily. Patient uses a patch, not oral medication    hydroCHLOROthiazide (HYDRODIURIL) 25 MG tablet 1 tablet PO daily prn BP greater than 140/90    lidocaine (LIDODERM) 5 % Place 1 patch onto the skin once daily. Remove & Discard patch within 12 hours or as directed by MD    multivitamin with minerals tablet Take 1 tablet by mouth once daily.    omeprazole-sodium bicarbonate (ZEGERID) 40-1.1 mg-gram per capsule Take 1 capsule by mouth before breakfast.    topiramate (TOPAMAX) 25 MG tablet Take one tablet every 12 hours     No current facility-administered medications for this visit.        Review of patient's allergies indicates:   Allergen Reactions    Nsaids (non-steroidal anti-inflammatory drug) Anaphylaxis       Family History   Problem Relation Age of Onset    Hypertension Mother     Heart disease Mother         pacemaker    Heart attack Mother     Heart disease Father     Psoriasis Father     Cancer Neg Hx        Social History     Socioeconomic History    Marital status:      Spouse name: Not on file    Number of children: Not on file    Years of education: Not on file    Highest education level: Not on file   Occupational History    Not on file   Social Needs    Financial resource strain: Not very hard    Food insecurity:     Worry: Never true     Inability: Never true    Transportation needs:     Medical: No     Non-medical: No   Tobacco Use    Smoking status: Former Smoker     Last attempt to quit: 1993     Years since quittin.8    Smokeless tobacco: Never Used    Tobacco comment: quit    Substance and Sexual Activity    Alcohol use: No     Alcohol/week: 0.0 oz     Frequency: Never     Binge frequency: Never    Drug use: No    Sexual activity: Yes     Birth control/protection: Surgical   Lifestyle    Physical  activity:     Days per week: 3 days     Minutes per session: 30 min    Stress: Only a little   Relationships    Social connections:     Talks on phone: More than three times a week     Gets together: Three times a week     Attends Hoahaoism service: Not on file     Active member of club or organization: Yes     Attends meetings of clubs or organizations: More than 4 times per year     Relationship status:    Other Topics Concern    Are you pregnant or think you may be? Not Asked    Breast-feeding Not Asked   Social History Narrative    Not on file       Chief Complaint:   Chief Complaint   Patient presents with    Left Knee - Pain       Date of surgery:  September 25, 2018    History of present illness:  69-year-old female who underwent left total knee arthroplasty.  Patient is doing better.  She is finally minimally started to prepare for which she has and has start to work on it.  Has no pain.  Feels like it is loosening up on her.  She works out with her is [place]  few times a week.    Answers for HPI/ROS submitted by the patient on 9/6/2019   Leg pain  unexpected weight change: No  appetite change : No  sleep disturbance: No  IMMUNOCOMPROMISED: No  nervous/ anxious: No  dysphoric mood: No  rash: No  visual disturbance: No  eye redness: No  eye pain: No  ear pain: No  tinnitus: No  hearing loss: No  sinus pressure : No  nosebleeds: No  enviro allergies: No  food allergies: No  cough: No  shortness of breath: No  sweating: No  difficulty urinating: No  hematuria: No  chest pain: No  palpitations: No  vomiting: No  diarrhea: No  constipation: No  headaches: No  seizures: No  Pain Chronicity: recurrent  History of trauma: No  Onset: more than 1 year ago  Frequency: intermittently  Progression since onset: gradually improving  Injury mechanism: running  injury location: at work  pain- numeric: 1/10  pain location: left knee  pain quality: aching  Radiating Pain: No  Aggravating factors:  sitting  fever: No  inability to bear weight: No  itching: No  joint locking: No  limited range of motion: Yes  stiffness: Yes  tingling: No  Treatments tried: brace/corset, cold, heat, exercise, movement, rest, other  physical therapy: effective  Improvement on treatment: significant      Physical Examination:    Vital Signs:    Vitals:    09/09/19 0903   BP: (!) 163/80   Pulse: 70       Body mass index is 26.26 kg/m².    This a well-developed, well nourished patient in no acute distress.  They are alert and oriented and cooperative to examination.  Pt. walks without assistive device.  Walking a little more normally and naturally.    Examination left knee shows  healed surgical incision.  Mild swelling. No erythema or drainage.    Range of motion is about 0° to 105°.  No calf pain. Negative Homans sign.  She does have some tenderness and possibly a tight band along the anterior  compartment musculature    X-rays:  Four views of the left knee are reviewed which show well-aligned total knee arthroplasty components     Assessment::  Status post left total knee arthroplasty with  Stiffness        Plan:    Continue working on range of motion.  She is doing great.  I will see her back once a year with four views of the left knee.    This note was created using Meilishuo voice recognition software that occasionally misinterpreted phrases or words.

## 2019-09-12 ENCOUNTER — TELEPHONE (OUTPATIENT)
Dept: FAMILY MEDICINE | Facility: CLINIC | Age: 69
End: 2019-09-12

## 2019-09-20 ENCOUNTER — PATIENT MESSAGE (OUTPATIENT)
Dept: PLASTIC SURGERY | Facility: CLINIC | Age: 69
End: 2019-09-20

## 2019-09-25 ENCOUNTER — TELEPHONE (OUTPATIENT)
Dept: PLASTIC SURGERY | Facility: CLINIC | Age: 69
End: 2019-09-25

## 2019-09-25 ENCOUNTER — PATIENT MESSAGE (OUTPATIENT)
Dept: PLASTIC SURGERY | Facility: CLINIC | Age: 69
End: 2019-09-25

## 2019-09-25 NOTE — TELEPHONE ENCOUNTER
Spoke to pt she and told her that Dr. Thomas still plans to present her case to conference. We will do this to get the best possible option for her surgery. Pt states that she understands and will wait to be called for a surgery date.

## 2019-09-27 ENCOUNTER — TELEPHONE (OUTPATIENT)
Dept: HEMATOLOGY/ONCOLOGY | Facility: CLINIC | Age: 69
End: 2019-09-27

## 2019-09-27 NOTE — TELEPHONE ENCOUNTER
Spoke to pt to r/s apt due to Dr. Ding being out of clinic. Pt confirmed new apt date and time r/s and verbalized understanding.

## 2019-09-30 ENCOUNTER — HOSPITAL ENCOUNTER (OUTPATIENT)
Dept: RADIOLOGY | Facility: HOSPITAL | Age: 69
Discharge: HOME | End: 2019-09-30
Attending: INTERNAL MEDICINE
Payer: MEDICARE

## 2019-09-30 VITALS — BODY MASS INDEX: 25.61 KG/M2 | HEIGHT: 64 IN | WEIGHT: 150 LBS

## 2019-09-30 DIAGNOSIS — C50.012 MALIGNANT NEOPLASM OF AREOLA OF LEFT BREAST IN FEMALE, UNSPECIFIED ESTROGEN RECEPTOR STATUS: ICD-10-CM

## 2019-09-30 PROCEDURE — A9552 F18 FDG: HCPCS | Mod: PO

## 2019-09-30 PROCEDURE — 78815 PET IMAGE W/CT SKULL-THIGH: CPT | Mod: TC,PO

## 2019-10-01 ENCOUNTER — LAB VISIT (OUTPATIENT)
Dept: LAB | Facility: HOSPITAL | Age: 69
End: 2019-10-01
Attending: INTERNAL MEDICINE
Payer: MEDICARE

## 2019-10-01 DIAGNOSIS — C50.012 MALIGNANT NEOPLASM OF NIPPLE OF LEFT BREAST IN FEMALE, UNSPECIFIED ESTROGEN RECEPTOR STATUS: ICD-10-CM

## 2019-10-01 LAB
ALBUMIN SERPL BCP-MCNC: 4.4 G/DL (ref 3.5–5.2)
ALP SERPL-CCNC: 103 U/L (ref 55–135)
ALT SERPL W/O P-5'-P-CCNC: 15 U/L (ref 10–44)
ANION GAP SERPL CALC-SCNC: 8 MMOL/L (ref 8–16)
AST SERPL-CCNC: 14 U/L (ref 10–40)
BASOPHILS # BLD AUTO: 0.04 K/UL (ref 0–0.2)
BASOPHILS NFR BLD: 0.9 % (ref 0–1.9)
BILIRUB SERPL-MCNC: 0.5 MG/DL (ref 0.1–1)
BUN SERPL-MCNC: 19 MG/DL (ref 8–23)
CALCIUM SERPL-MCNC: 10.1 MG/DL (ref 8.7–10.5)
CHLORIDE SERPL-SCNC: 111 MMOL/L (ref 95–110)
CO2 SERPL-SCNC: 25 MMOL/L (ref 23–29)
CREAT SERPL-MCNC: 0.8 MG/DL (ref 0.5–1.4)
DIFFERENTIAL METHOD: NORMAL
EOSINOPHIL # BLD AUTO: 0.1 K/UL (ref 0–0.5)
EOSINOPHIL NFR BLD: 1.8 % (ref 0–8)
ERYTHROCYTE [DISTWIDTH] IN BLOOD BY AUTOMATED COUNT: 13.2 % (ref 11.5–14.5)
EST. GFR  (AFRICAN AMERICAN): >60 ML/MIN/1.73 M^2
EST. GFR  (NON AFRICAN AMERICAN): >60 ML/MIN/1.73 M^2
GLUCOSE SERPL-MCNC: 87 MG/DL (ref 70–110)
HCT VFR BLD AUTO: 41.7 % (ref 37–48.5)
HGB BLD-MCNC: 13.4 G/DL (ref 12–16)
IMM GRANULOCYTES # BLD AUTO: 0.02 K/UL (ref 0–0.04)
LYMPHOCYTES # BLD AUTO: 1.2 K/UL (ref 1–4.8)
LYMPHOCYTES NFR BLD: 28.2 % (ref 18–48)
MCH RBC QN AUTO: 29.1 PG (ref 27–31)
MCHC RBC AUTO-ENTMCNC: 32.1 G/DL (ref 32–36)
MCV RBC AUTO: 91 FL (ref 82–98)
MONOCYTES # BLD AUTO: 0.5 K/UL (ref 0.3–1)
MONOCYTES NFR BLD: 10.5 % (ref 4–15)
NEUTROPHILS # BLD AUTO: 2.6 K/UL (ref 1.8–7.7)
NEUTROPHILS NFR BLD: 58.1 % (ref 38–73)
NRBC BLD-RTO: 0 /100 WBC
PLATELET # BLD AUTO: 161 K/UL (ref 150–350)
PMV BLD AUTO: 11.7 FL (ref 9.2–12.9)
POTASSIUM SERPL-SCNC: 4.2 MMOL/L (ref 3.5–5.1)
PROT SERPL-MCNC: 7.3 G/DL (ref 6–8.4)
RBC # BLD AUTO: 4.61 M/UL (ref 4–5.4)
SODIUM SERPL-SCNC: 144 MMOL/L (ref 136–145)
WBC # BLD AUTO: 4.4 K/UL (ref 3.9–12.7)

## 2019-10-01 PROCEDURE — 85025 COMPLETE CBC W/AUTO DIFF WBC: CPT

## 2019-10-01 PROCEDURE — 80053 COMPREHEN METABOLIC PANEL: CPT

## 2019-10-03 LAB — CANCER AG27-29 SERPL-ACNC: 21 U/ML

## 2019-10-07 ENCOUNTER — OFFICE VISIT (OUTPATIENT)
Dept: HEMATOLOGY/ONCOLOGY | Facility: CLINIC | Age: 69
End: 2019-10-07
Payer: MEDICARE

## 2019-10-07 VITALS
HEIGHT: 64 IN | WEIGHT: 152.75 LBS | DIASTOLIC BLOOD PRESSURE: 68 MMHG | SYSTOLIC BLOOD PRESSURE: 141 MMHG | TEMPERATURE: 98 F | OXYGEN SATURATION: 100 % | RESPIRATION RATE: 12 BRPM | HEART RATE: 65 BPM | BODY MASS INDEX: 26.08 KG/M2

## 2019-10-07 DIAGNOSIS — C50.012 MALIGNANT NEOPLASM OF AREOLA OF LEFT BREAST IN FEMALE, UNSPECIFIED ESTROGEN RECEPTOR STATUS: Primary | ICD-10-CM

## 2019-10-07 DIAGNOSIS — M89.9 DISORDER OF BONE, UNSPECIFIED: ICD-10-CM

## 2019-10-07 PROCEDURE — 99214 PR OFFICE/OUTPT VISIT, EST, LEVL IV, 30-39 MIN: ICD-10-PCS | Mod: S$PBB,,, | Performed by: INTERNAL MEDICINE

## 2019-10-07 PROCEDURE — 99214 OFFICE O/P EST MOD 30 MIN: CPT | Mod: PBBFAC,PO | Performed by: INTERNAL MEDICINE

## 2019-10-07 PROCEDURE — 99999 PR PBB SHADOW E&M-EST. PATIENT-LVL IV: ICD-10-PCS | Mod: PBBFAC,,, | Performed by: INTERNAL MEDICINE

## 2019-10-07 PROCEDURE — 99999 PR PBB SHADOW E&M-EST. PATIENT-LVL IV: CPT | Mod: PBBFAC,,, | Performed by: INTERNAL MEDICINE

## 2019-10-07 PROCEDURE — 99214 OFFICE O/P EST MOD 30 MIN: CPT | Mod: S$PBB,,, | Performed by: INTERNAL MEDICINE

## 2019-10-07 NOTE — PROGRESS NOTES
A 69year-old woman coming in consultation for breast cancer. S/p double mastectomy. 5/15/2017  2 separate location on left breast both biopsied in March by Dr. Luis.  reduction mammoplasty.  .  She had invasive ductal carcinoma in both locations and they were both ER positive, GA   positive and HER-2 negative. Recurrence score of 18 .  .  She takes omeprazole and multivitamins.  Had bariatric sleeve in 1/2017, pt had her expanders removed due to discomfort. No has silicone and is feeling well  PHYSICAL EXAMINATION:  Wt Readings from Last 3 Encounters:   09/30/19 68 kg (150 lb)   09/09/19 69.4 kg (153 lb)   09/06/19 69.4 kg (153 lb)     Temp Readings from Last 3 Encounters:   08/28/19 98.7 °F (37.1 °C)   06/04/19 98.6 °F (37 °C)   04/23/19 99.1 °F (37.3 °C)     BP Readings from Last 3 Encounters:   09/09/19 (!) 163/80   09/06/19 (!) 153/91   08/28/19 136/86     Pulse Readings from Last 3 Encounters:   09/09/19 70   09/06/19 73   08/28/19 76     GENERAL:  Reveals a well-built, well-nourished woman, comfortable, obese, well   groomed, ambulating to clinic without any issues.  HEENT:  Showed no congestion.  NECK:  Supple, without JVD.  Trachea is central.  No masses or thyromegaly felt.  HEART:  S1 is normally heard without murmurs or gallops.  ABDOMEN:  Soft.  No rebound, guarding or rigidity.  BREASTS.  Silicone to breast +  EXTREMITIES:  The patient has no generalized lymphadenopathy or supraclavicular   adenopathy.  MUSCULOSKELETAL:  Good range of motion.  NEUROLOGIC:  She seems appropriate.  SKIN:  Within normal range.  PSYCHIATRIC:  Within normal range.pt is anxious about results of path and need for chemo  Lab Results   Component Value Date    WBC 4.40 10/01/2019    HGB 13.4 10/01/2019    HCT 41.7 10/01/2019    MCV 91 10/01/2019     10/01/2019     CMP  Sodium   Date Value Ref Range Status   10/01/2019 144 136 - 145 mmol/L Final     Potassium   Date Value Ref Range Status   10/01/2019 4.2 3.5 - 5.1  mmol/L Final     Chloride   Date Value Ref Range Status   10/01/2019 111 (H) 95 - 110 mmol/L Final     CO2   Date Value Ref Range Status   10/01/2019 25 23 - 29 mmol/L Final     Glucose   Date Value Ref Range Status   10/01/2019 87 70 - 110 mg/dL Final     BUN, Bld   Date Value Ref Range Status   10/01/2019 19 8 - 23 mg/dL Final     Creatinine   Date Value Ref Range Status   10/01/2019 0.8 0.5 - 1.4 mg/dL Final     Calcium   Date Value Ref Range Status   10/01/2019 10.1 8.7 - 10.5 mg/dL Final     Total Protein   Date Value Ref Range Status   10/01/2019 7.3 6.0 - 8.4 g/dL Final     Albumin   Date Value Ref Range Status   10/01/2019 4.4 3.5 - 5.2 g/dL Final     Total Bilirubin   Date Value Ref Range Status   10/01/2019 0.5 0.1 - 1.0 mg/dL Final     Comment:     For infants and newborns, interpretation of results should be based  on gestational age, weight and in agreement with clinical  observations.  Premature Infant recommended reference ranges:  Up to 24 hours.............<8.0 mg/dL  Up to 48 hours............<12.0 mg/dL  3-5 days..................<15.0 mg/dL  6-29 days.................<15.0 mg/dL       Alkaline Phosphatase   Date Value Ref Range Status   10/01/2019 103 55 - 135 U/L Final     AST   Date Value Ref Range Status   10/01/2019 14 10 - 40 U/L Final     ALT   Date Value Ref Range Status   10/01/2019 15 10 - 44 U/L Final     Anion Gap   Date Value Ref Range Status   10/01/2019 8 8 - 16 mmol/L Final     eGFR if    Date Value Ref Range Status   10/01/2019 >60 >60 mL/min/1.73 m^2 Final     eGFR if non    Date Value Ref Range Status   10/01/2019 >60 >60 mL/min/1.73 m^2 Final     Comment:     Calculation used to obtain the estimated glomerular filtration  rate (eGFR) is the CKD-EPI equation.       Tumor marker 21.0  PATHOLOGY  SPECIMEN  1) [place] Lymph Node, Breast Lt  2) Breast mastectomy/Lt  3) Breast mastectomy /Rt  4) Breast lumpectomy More Rt  5) Breast lumpectomy, more  Lt  FINAL PATHOLOGIC DIAGNOSIS  1. [place] lymph node, left axillary, excision:  One lymph node NEGATIVE for metastatic carcinoma (0/1).  2. Breast, left, mastectomy:  Residual invasive ductal carcinoma at 1:00 position, low grade, 1.64 mm (pathologic staging: pT1a N0). SEE  SYNOPTIC REPORT.  Residual invasive ductal carcinoma at 12:00 position, low grade,10 mm (pathologic staging: pT1b N0). SEE  SYNOPTIC REPORT.  Negative surgical margins.  Previous biopsy sites.  3. Breast, right, mastectomy:  Benign nipple, skin, and breast parenchyma.  Impression 9/2019PET  B12 is 274 and low iron studies within normal range.  Folate within normal range.  Negative for Mets       Bone density is normal. July 2017 needs another bone density at next visit  DIAGNOSTIC IMPRESSION:    Breast cancer two separate locations on the breasts, so would like to keep closer watch  left, both ER/HI positive, HER-2 negative. S/p double mastectomy, with sentinel LN negative   oncolty dx rec score of 18,she is tolerating arimidex,  well. continue  No need for prolia due to normal density   sent in ultram for better pain control  Replace B12 sublingual 1000 mcg daily  , ,  cbc, cmp, [id number] rtc 6 months  Bone density scan next visit  Advance Care Planning     Living Will  During this visit, I engaged the patient  in the advance care planning process.  The patient and I reviewed the role for advance directives and their purpose in directing future healthcare if the patient's unable to speak for herself.  At this point in time, the patient does have full decision-making capacity.  We discussed different extreme health states that she could experience, and reviewed what kind of medical care she would want in those situations.  The patient communicated that if she were comatose and had little chance of a meaningful recovery, she would not want machines/life-sustaining treatments used.  has no advance directive power of  or a Living Will  establish

## 2019-10-14 ENCOUNTER — OFFICE VISIT (OUTPATIENT)
Dept: PSYCHIATRY | Facility: CLINIC | Age: 69
End: 2019-10-14
Payer: MEDICARE

## 2019-10-14 VITALS
DIASTOLIC BLOOD PRESSURE: 76 MMHG | BODY MASS INDEX: 26.27 KG/M2 | HEART RATE: 57 BPM | WEIGHT: 153.88 LBS | SYSTOLIC BLOOD PRESSURE: 136 MMHG | RESPIRATION RATE: 18 BRPM | HEIGHT: 64 IN

## 2019-10-14 DIAGNOSIS — F41.1 GAD (GENERALIZED ANXIETY DISORDER): ICD-10-CM

## 2019-10-14 DIAGNOSIS — Z63.0 PARTNER RELATIONSHIP PROBLEMS: Primary | ICD-10-CM

## 2019-10-14 PROCEDURE — 99999 PR PBB SHADOW E&M-EST. PATIENT-LVL III: CPT | Mod: PBBFAC,,, | Performed by: PSYCHOLOGIST

## 2019-10-14 PROCEDURE — 99213 PR OFFICE/OUTPT VISIT, EST, LEVL III, 20-29 MIN: ICD-10-PCS | Mod: S$PBB,,, | Performed by: PSYCHOLOGIST

## 2019-10-14 PROCEDURE — 99999 PR PBB SHADOW E&M-EST. PATIENT-LVL III: ICD-10-PCS | Mod: PBBFAC,,, | Performed by: PSYCHOLOGIST

## 2019-10-14 PROCEDURE — 99213 OFFICE O/P EST LOW 20 MIN: CPT | Mod: PBBFAC,PO | Performed by: PSYCHOLOGIST

## 2019-10-14 PROCEDURE — 99213 OFFICE O/P EST LOW 20 MIN: CPT | Mod: S$PBB,,, | Performed by: PSYCHOLOGIST

## 2019-10-14 RX ORDER — TOPIRAMATE 25 MG/1
TABLET ORAL
Qty: 60 TABLET | Refills: 2 | Status: SHIPPED | OUTPATIENT
Start: 2019-10-14 | End: 2020-01-13 | Stop reason: SDUPTHER

## 2019-10-14 SDOH — SOCIAL DETERMINANTS OF HEALTH (SDOH): PROBLEMS IN RELATIONSHIP WITH SPOUSE OR PARTNER: Z63.0

## 2019-10-14 NOTE — PROGRESS NOTES
"Client: Marce Hickey  : 1950  PCP:   Savannah Garvey MD    Reason for visit:  the client was seen to assess their response to the medication prescribed, evaluate compliance, continue counseling and education and to coordinate care if needed.  Jaciel sts she is continuing to do "good."  There have been some marital issues but she is handling them much better.  Her  is now employed and he is now happy and interacting well with her.  She also reported she is still cancer free she has been active in the community and she feels her "value is good too".  Her [place] mentor is in hospice but she is still coping well with that. Pt allowed to vent about her anticipated loss and talked about how she will handle it. Has a trip soon to go see her son and his family in [place]-looking forward to this and to a cruise she has booked with her .  Sleep and appetite were said to be good and no ADRs/SE reported   PHQ9 0  [0 ]today  GAD7  0  [0] today.  No SI/HI/delusions/ hallucinations/no mood swings or expansive moods reported. No significant anger now.      Family psychiatric history: none reported  Relevant lab reviewed  CBC normal, Vit D low, Cholesterol 296/Triglycerides 197  Relevant EKG reviewed   QTc 453 NSR    Review of patient's allergies indicates:   Allergen Reactions    Nsaids (non-steroidal anti-inflammatory drug) Anaphylaxis       Current Outpatient Medications:     anastrozole (ARIMIDEX) 1 mg Tab, Take 1 tablet (1 mg total) by mouth once daily., Disp: 90 tablet, Rfl: 1    calcium-vitamin D3 500 mg(1,250mg) -200 unit per tablet, once daily. Patient uses a patch, not oral medication, Disp: , Rfl:     FLUAD 6910-1832, 65 YR UP,,PF, 45 mcg (15 mcg x 3)/0.5 mL Syrg, PHARMACIST ADMINISTERED IMMUNIZATION ADMINISTERED AT TIME OF DISPENSING, Disp: , Rfl: 0    hydroCHLOROthiazide (HYDRODIURIL) 25 MG tablet, 1 tablet PO daily prn BP greater than 140/90, Disp: 30 tablet, Rfl: 11    lidocaine " (LIDODERM) 5 %, Place 1 patch onto the skin once daily. Remove & Discard patch within 12 hours or as directed by MD, Disp: 40 patch, Rfl: 0    multivitamin with minerals tablet, Take 1 tablet by mouth once daily., Disp: , Rfl:     omeprazole-sodium bicarbonate (ZEGERID) 40-1.1 mg-gram per capsule, Take 1 capsule by mouth before breakfast., Disp: 90 capsule, Rfl: 3    topiramate (TOPAMAX) 25 MG tablet, Take one tablet every 12 hours, Disp: 60 tablet, Rfl: 2  Past Medical History:   Diagnosis Date    Anxiety     Cancer     breast cancer - left    Depression     Diabetes mellitus, type 2     Borderline    Dyslipidemia 12/12/2016    Fatty liver disease, nonalcoholic     GERD (gastroesophageal reflux disease)     Hyperlipidemia     Hypertension     Plantar fasciitis of right foot      Clinical presentation:  Appearance:  The client presented in casual attire evidencing good grooming and hygiene  Neuro:  the client was alert, oriented x 4 and evidenced good judgement and insight.    Attention/concentration:  Was good in session  Memory:  The client had no subjective memory complaints and they were able to recall information discussed in session accurately  Judgement:  behavior is adequate to the situation  Fund of knowledge:  intact and appropriate to age and level of education  Gait/station:  was normal  Heart: the patients heartbeat was regular in rate and rhythm.    Lung: clear bilaterally  Skin/extremities:  No rashes/lesions/bruises/edema reported or evident.   Mood:  Was minimally dysthymic, tearful about her mentor having cancer and being in hospice.  No SI/HI/delusions/hallucinations/mood swings or expansive moods.        Affect: was normal range  Speech:  normal rate and tone   Sleep: the client had no reported history of sleep problems if using Melatonin.  Onset was normal and they reported waking up feeling well rested.  No daytime sleepiness was reported  Appetite: was reported as good.  Has a  gastric sleeve in 2017.   Pain complaints:  none were voiced  ETOH/Substance use history:  The client had no reported ETOH/or other substance use problems by their report.  Psychosocial history:  The client currently lives with her 4th  of 8.5 years.  She has a grown son who is a recovering addict and has started a recovery program in [place] (15years sober)  And another son does not talk to her and lives in [place].    She has positive peer support and states she volunteers at [facility].  Her 's mother and children dont like her (his wife had left him).   Work history:  22 years in the [employer] as .  She had numerous jobs throughout the year and is retired and volunteers at [facility].     Education/session discussion:   · Reviewed strategies to help her coping with her frustration/anger (journaling, storybook etc) and need to utilize fair fighting technique.   · Reviewed medication and RBA/meded again today. Long term treatment issues reviewed      · Reviewed fair fighting techniques and need to use this if arguments occur    Impression:  Jaciel is stable on her cureent medication. She is processing theloss of her mentor.  Prognosis is  good    Plan:    Cont Topamax 25mg one tablet q12 hours, call if any problems  RTC 8 weeks and prn    Session:  [id number]

## 2019-10-21 ENCOUNTER — PATIENT MESSAGE (OUTPATIENT)
Dept: ORTHOPEDICS | Facility: CLINIC | Age: 69
End: 2019-10-21

## 2019-10-22 DIAGNOSIS — M25.562 LEFT KNEE PAIN, UNSPECIFIED CHRONICITY: Primary | ICD-10-CM

## 2019-10-24 ENCOUNTER — HOSPITAL ENCOUNTER (OUTPATIENT)
Dept: RADIOLOGY | Facility: HOSPITAL | Age: 69
Discharge: HOME | End: 2019-10-24
Attending: ORTHOPAEDIC SURGERY
Payer: MEDICARE

## 2019-10-24 ENCOUNTER — OFFICE VISIT (OUTPATIENT)
Dept: ORTHOPEDICS | Facility: CLINIC | Age: 69
End: 2019-10-24
Payer: MEDICARE

## 2019-10-24 VITALS
HEART RATE: 72 BPM | DIASTOLIC BLOOD PRESSURE: 79 MMHG | SYSTOLIC BLOOD PRESSURE: 182 MMHG | BODY MASS INDEX: 26.12 KG/M2 | WEIGHT: 153 LBS | HEIGHT: 64 IN

## 2019-10-24 DIAGNOSIS — S80.02XA CONTUSION OF LEFT KNEE, INITIAL ENCOUNTER: ICD-10-CM

## 2019-10-24 DIAGNOSIS — Z96.652 STATUS POST TOTAL KNEE REPLACEMENT USING CEMENT, LEFT: ICD-10-CM

## 2019-10-24 DIAGNOSIS — M25.562 LEFT KNEE PAIN, UNSPECIFIED CHRONICITY: ICD-10-CM

## 2019-10-24 DIAGNOSIS — M24.662 ARTHROFIBROSIS OF KNEE JOINT, LEFT: Primary | ICD-10-CM

## 2019-10-24 PROCEDURE — 73562 X-RAY EXAM OF KNEE 3: CPT | Mod: 26,59,RT, | Performed by: RADIOLOGY

## 2019-10-24 PROCEDURE — 73562 XR KNEE ORTHO LEFT WITH FLEXION: ICD-10-PCS | Mod: 26,59,RT, | Performed by: RADIOLOGY

## 2019-10-24 PROCEDURE — 73562 X-RAY EXAM OF KNEE 3: CPT | Mod: TC,PN,59,RT

## 2019-10-24 PROCEDURE — 73564 XR KNEE ORTHO LEFT WITH FLEXION: ICD-10-PCS | Mod: 26,LT,, | Performed by: RADIOLOGY

## 2019-10-24 PROCEDURE — 99213 OFFICE O/P EST LOW 20 MIN: CPT | Mod: PBBFAC,25,PN | Performed by: ORTHOPAEDIC SURGERY

## 2019-10-24 PROCEDURE — 99213 OFFICE O/P EST LOW 20 MIN: CPT | Mod: S$PBB,,, | Performed by: ORTHOPAEDIC SURGERY

## 2019-10-24 PROCEDURE — 99999 PR PBB SHADOW E&M-EST. PATIENT-LVL III: ICD-10-PCS | Mod: PBBFAC,,, | Performed by: ORTHOPAEDIC SURGERY

## 2019-10-24 PROCEDURE — 99213 PR OFFICE/OUTPT VISIT, EST, LEVL III, 20-29 MIN: ICD-10-PCS | Mod: S$PBB,,, | Performed by: ORTHOPAEDIC SURGERY

## 2019-10-24 PROCEDURE — 73564 X-RAY EXAM KNEE 4 OR MORE: CPT | Mod: 26,LT,, | Performed by: RADIOLOGY

## 2019-10-24 PROCEDURE — 99999 PR PBB SHADOW E&M-EST. PATIENT-LVL III: CPT | Mod: PBBFAC,,, | Performed by: ORTHOPAEDIC SURGERY

## 2019-10-24 NOTE — PROGRESS NOTES
Past Medical History:   Diagnosis Date    Anxiety     Cancer     breast cancer - left    Depression     Diabetes mellitus, type 2     Borderline    Dyslipidemia 2016    Fatty liver disease, nonalcoholic     GERD (gastroesophageal reflux disease)     Hyperlipidemia     Hypertension     Plantar fasciitis of right foot        Past Surgical History:   Procedure Laterality Date    breast reduction      BREAST SURGERY Bilateral     masectomy    BREAST SURGERY Bilateral     implants     SECTION      x 2    CHOLECYSTECTOMY      FOOT SURGERY      left bunionectomy    gastric sleve      HYSTERECTOMY      one ovary intact, adhesions    KNEE ARTHROPLASTY Left 2018    Procedure: ARTHROPLASTY, KNEE;  Surgeon: Christos Montanez MD;  [facility];  Service: Orthopedics;  Laterality: Left;    KNEE ARTHROSCOPY Left     LIPOSUCTION      ROTATOR CUFF REPAIR Right     TONSILLECTOMY         Current Outpatient Medications   Medication Sig    anastrozole (ARIMIDEX) 1 mg Tab Take 1 tablet (1 mg total) by mouth once daily.    calcium-vitamin D3 500 mg(1,250mg) -200 unit per tablet once daily. Patient uses a patch, not oral medication    FLUAD 7205-0778, 65 YR UP,,PF, 45 mcg (15 mcg x 3)/0.5 mL Syrg PHARMACIST ADMINISTERED IMMUNIZATION ADMINISTERED AT TIME OF DISPENSING    hydroCHLOROthiazide (HYDRODIURIL) 25 MG tablet 1 tablet PO daily prn BP greater than 140/90    lidocaine (LIDODERM) 5 % Place 1 patch onto the skin once daily. Remove & Discard patch within 12 hours or as directed by MD    multivitamin with minerals tablet Take 1 tablet by mouth once daily.    omeprazole-sodium bicarbonate (ZEGERID) 40-1.1 mg-gram per capsule Take 1 capsule by mouth before breakfast.    topiramate (TOPAMAX) 25 MG tablet Take one tablet every 12 hours     No current facility-administered medications for this visit.        Review of patient's allergies indicates:   Allergen Reactions    Nsaids  (non-steroidal anti-inflammatory drug) Anaphylaxis       Family History   Problem Relation Age of Onset    Hypertension Mother     Heart disease Mother         pacemaker    Heart attack Mother     Heart disease Father     Psoriasis Father     Cancer Neg Hx        Social History     Socioeconomic History    Marital status:      Spouse name: Not on file    Number of children: Not on file    Years of education: Not on file    Highest education level: Not on file   Occupational History    Not on file   Social Needs    Financial resource strain: Not very hard    Food insecurity:     Worry: Never true     Inability: Never true    Transportation needs:     Medical: No     Non-medical: No   Tobacco Use    Smoking status: Former Smoker     Last attempt to quit: 1993     Years since quittin.9    Smokeless tobacco: Never Used    Tobacco comment: quit    Substance and Sexual Activity    Alcohol use: No     Alcohol/week: 0.0 standard drinks     Frequency: Never     Binge frequency: Never    Drug use: No    Sexual activity: Yes     Birth control/protection: Surgical   Lifestyle    Physical activity:     Days per week: 3 days     Minutes per session: 30 min    Stress: Only a little   Relationships    Social connections:     Talks on phone: More than three times a week     Gets together: Three times a week     Attends [employer] service: Not on file     Active member of club or organization: Yes     Attends meetings of clubs or organizations: More than 4 times per year     Relationship status:    Other Topics Concern    Are you pregnant or think you may be? Not Asked    Breast-feeding Not Asked   Social History Narrative    Not on file       Chief Complaint:   Chief Complaint   Patient presents with    Left Knee - Pain       Date of surgery:  2018    History of present illness:  69-year-old female who underwent left total knee arthroplasty.  Patient fell on the  left knee getting out of the tub about 3 weeks ago.  It still is not all the way better and she just want to get checked out.  They are about to go to [place] at the end November and do a lot of walking.  Has a little swelling laterally. Pain is a 3/10.    Answers for HPI/ROS submitted by the patient on 10/22/2019   Leg pain  unexpected weight change: No  appetite change : No  sleep disturbance: No  dysphoric mood: No  rash: No  eye redness: No  cough: No  difficulty urinating: No  hematuria: No  chest pain: No  palpitations: No  vomiting: No  diarrhea: No  constipation: No  headaches: No  seizures: No  Pain Chronicity: chronic  History of trauma: No  Onset: 1 to 4 weeks ago  Frequency: daily  Progression since onset: unchanged  Injury mechanism: falling  injury location: at home  pain- numeric: 3/10  pain location: left knee  pain quality: dull  Radiating Pain: No  Aggravating factors: bending, flexion, twisting, walking  fever: No  inability to bear weight: No  itching: No  joint locking: No  limited range of motion: No  stiffness: Yes  tingling: No  Treatments tried: heat, exercise, movement, other  physical therapy: not tried  Improvement on treatment: moderate      Physical Examination:    Vital Signs:    Vitals:    10/24/19 1331   BP: (!) 182/79   Pulse: 72       Body mass index is 26.26 kg/m².    This a well-developed, well nourished patient in no acute distress.  They are alert and oriented and cooperative to examination.  Pt. walks without assistive device.  Walking a little more normally and naturally.    Examination left knee shows  healed surgical incision.  Mild swelling. No erythema or drainage.    Range of motion is about 0° to 105°.  No calf pain. Negative Homans sign.  She does have some tenderness and possibly a tight band along the anterior  compartment musculature    X-rays:  Four views of the left knee are ordered and reviewed which show well-aligned total knee arthroplasty components. No new  fracture or change noted     Assessment::  Status post left total knee arthroplasty with  Stiffness  New left knee contusion        Plan:    I reviewed the x-rays with her today.  I do not see a new injury.  I recommended starting to use the lidocaine patches again.  Use some heat to help break down any residual hematoma.  Follow-up as needed.    This note was created using M Modal voice recognition software that occasionally misinterpreted phrases or words.

## 2019-10-28 ENCOUNTER — TELEPHONE (OUTPATIENT)
Dept: FAMILY MEDICINE | Facility: CLINIC | Age: 69
End: 2019-10-28

## 2019-10-28 RX ORDER — OMEPRAZOLE AND SODIUM BICARBONATE 40; 1100 MG/1; MG/1
1 CAPSULE ORAL
Qty: 90 CAPSULE | Refills: 0 | Status: SHIPPED | OUTPATIENT
Start: 2019-10-28 | End: 2020-06-12

## 2019-10-28 NOTE — TELEPHONE ENCOUNTER
----- Message from Harriet Rodriguez sent at 10/28/2019  7:18 AM CDT -----  Type:  RX Refill Request    Who Called:  Patient  RX Name and Strength:  omeprazole-sodium bicarbonate (ZEGERID) 40-1.1 mg-gram per capsule  Preferred Pharmacy with phone number:  [facility]  Best Call Back Number:  [phone]  Additional Information:

## 2019-10-28 NOTE — TELEPHONE ENCOUNTER
Patient has appointment to [facility] care in December requesting refill on pended medication previous Dr Garvey patient

## 2019-11-09 ENCOUNTER — PATIENT MESSAGE (OUTPATIENT)
Dept: PLASTIC SURGERY | Facility: CLINIC | Age: 69
End: 2019-11-09

## 2019-11-27 ENCOUNTER — OFFICE VISIT (OUTPATIENT)
Dept: PLASTIC SURGERY | Facility: CLINIC | Age: 69
End: 2019-11-27
Payer: MEDICARE

## 2019-11-27 VITALS
DIASTOLIC BLOOD PRESSURE: 79 MMHG | BODY MASS INDEX: 26.12 KG/M2 | HEART RATE: 63 BPM | SYSTOLIC BLOOD PRESSURE: 128 MMHG | HEIGHT: 64 IN | WEIGHT: 153 LBS

## 2019-11-27 DIAGNOSIS — Z98.890 S/P BREAST RECONSTRUCTION: Primary | ICD-10-CM

## 2019-11-27 PROCEDURE — 99212 OFFICE O/P EST SF 10 MIN: CPT | Mod: S$PBB,,, | Performed by: SURGERY

## 2019-11-27 PROCEDURE — 1126F PR PAIN SEVERITY QUANTIFIED, NO PAIN PRESENT: ICD-10-PCS | Mod: ,,, | Performed by: SURGERY

## 2019-11-27 PROCEDURE — 99999 PR PBB SHADOW E&M-EST. PATIENT-LVL III: ICD-10-PCS | Mod: PBBFAC,,, | Performed by: SURGERY

## 2019-11-27 PROCEDURE — 99213 OFFICE O/P EST LOW 20 MIN: CPT | Mod: PBBFAC | Performed by: SURGERY

## 2019-11-27 PROCEDURE — 1159F MED LIST DOCD IN RCRD: CPT | Mod: ,,, | Performed by: SURGERY

## 2019-11-27 PROCEDURE — 1126F AMNT PAIN NOTED NONE PRSNT: CPT | Mod: ,,, | Performed by: SURGERY

## 2019-11-27 PROCEDURE — 1159F PR MEDICATION LIST DOCUMENTED IN MEDICAL RECORD: ICD-10-PCS | Mod: ,,, | Performed by: SURGERY

## 2019-11-27 PROCEDURE — 99999 PR PBB SHADOW E&M-EST. PATIENT-LVL III: CPT | Mod: PBBFAC,,, | Performed by: SURGERY

## 2019-11-27 PROCEDURE — 99212 PR OFFICE/OUTPT VISIT, EST, LEVL II, 10-19 MIN: ICD-10-PCS | Mod: S$PBB,,, | Performed by: SURGERY

## 2019-11-30 DIAGNOSIS — C50.011 MALIGNANT NEOPLASM INVOLVING BOTH NIPPLE AND AREOLA OF RIGHT BREAST IN FEMALE, UNSPECIFIED ESTROGEN RECEPTOR STATUS: ICD-10-CM

## 2019-11-30 RX ORDER — ANASTROZOLE 1 MG/1
TABLET ORAL
Qty: 90 TABLET | Refills: 4 | Status: SHIPPED | OUTPATIENT
Start: 2019-11-30 | End: 2021-07-05

## 2019-12-02 NOTE — PROGRESS NOTES
Dictation #1  MRN:[id number]  [facility]:[id number]  Patient presents to Plastic surgery Clinic after having bilateral breast reconstruction using tissue expanders followed by implants.  The  She has a nice result although the left implant inframammary fold is lower than the right side. Still all being said she has a nice result.  I had a long discussion with her, and we both agreed to not perform any further operations.  The asymmetry has been stable now for approximately 6 months, and does not appear to be getting worse.  If things should change she will return to the clinic.

## 2019-12-27 ENCOUNTER — OFFICE VISIT (OUTPATIENT)
Dept: FAMILY MEDICINE | Facility: CLINIC | Age: 69
End: 2019-12-27
Payer: MEDICARE

## 2019-12-27 ENCOUNTER — TELEPHONE (OUTPATIENT)
Dept: FAMILY MEDICINE | Facility: CLINIC | Age: 69
End: 2019-12-27

## 2019-12-27 VITALS
WEIGHT: 156.31 LBS | HEART RATE: 63 BPM | HEIGHT: 64 IN | BODY MASS INDEX: 26.69 KG/M2 | SYSTOLIC BLOOD PRESSURE: 132 MMHG | TEMPERATURE: 99 F | DIASTOLIC BLOOD PRESSURE: 80 MMHG | OXYGEN SATURATION: 98 %

## 2019-12-27 DIAGNOSIS — R21 RASH OF BACK: Primary | ICD-10-CM

## 2019-12-27 DIAGNOSIS — N39.0 FREQUENT UTI: ICD-10-CM

## 2019-12-27 DIAGNOSIS — Z12.11 SCREEN FOR COLON CANCER: ICD-10-CM

## 2019-12-27 PROCEDURE — 99214 OFFICE O/P EST MOD 30 MIN: CPT | Mod: PBBFAC,PO | Performed by: FAMILY MEDICINE

## 2019-12-27 PROCEDURE — 99214 OFFICE O/P EST MOD 30 MIN: CPT | Mod: S$PBB,,, | Performed by: FAMILY MEDICINE

## 2019-12-27 PROCEDURE — 1159F PR MEDICATION LIST DOCUMENTED IN MEDICAL RECORD: ICD-10-PCS | Mod: ,,, | Performed by: FAMILY MEDICINE

## 2019-12-27 PROCEDURE — 99214 PR OFFICE/OUTPT VISIT, EST, LEVL IV, 30-39 MIN: ICD-10-PCS | Mod: S$PBB,,, | Performed by: FAMILY MEDICINE

## 2019-12-27 PROCEDURE — 99999 PR PBB SHADOW E&M-EST. PATIENT-LVL IV: ICD-10-PCS | Mod: PBBFAC,,, | Performed by: FAMILY MEDICINE

## 2019-12-27 PROCEDURE — 99999 PR PBB SHADOW E&M-EST. PATIENT-LVL IV: CPT | Mod: PBBFAC,,, | Performed by: FAMILY MEDICINE

## 2019-12-27 PROCEDURE — 1159F MED LIST DOCD IN RCRD: CPT | Mod: ,,, | Performed by: FAMILY MEDICINE

## 2019-12-27 RX ORDER — CLOTRIMAZOLE AND BETAMETHASONE DIPROPIONATE 10; .64 MG/G; MG/G
CREAM TOPICAL 2 TIMES DAILY
Qty: 45 G | Refills: 0 | Status: SHIPPED | OUTPATIENT
Start: 2019-12-27 | End: 2021-02-12

## 2019-12-27 NOTE — PROGRESS NOTES
Subjective:       Patient ID: Marce Hickey is a 69 y.o. female.    Chief Complaint: Establish Care    HPI    Presents to clinic to establish care.     Has a patch of skin that gets really itchy on her back. Has been using gold bond anti-itch. Symptoms resolved when she took a shower with different water out of town. Would like something for rash.    Has issues with chronic urinay infections. Doesn't always have symptoms although it may smell bad.    Has been under stress at home with . States that  seems depressed due to family issues.      Past Medical History:   Diagnosis Date    Anxiety     Cancer     breast cancer - left    Depression     Diabetes mellitus, type 2     Borderline    Dyslipidemia 2016    Fatty liver disease, nonalcoholic     GERD (gastroesophageal reflux disease)     Hyperlipidemia     Hypertension     Plantar fasciitis of right foot        Past Surgical History:   Procedure Laterality Date    breast reduction      BREAST SURGERY Bilateral     masectomy    BREAST SURGERY Bilateral     implants     SECTION      x 2    CHOLECYSTECTOMY      FOOT SURGERY      left bunionectomy    gastric sleve      HYSTERECTOMY      one ovary intact, adhesions    KNEE ARTHROPLASTY Left 2018    Procedure: ARTHROPLASTY, KNEE;  Surgeon: Christos Montanez MD;  Location[facility];  Service: Orthopedics;  Laterality: Left;    KNEE ARTHROSCOPY Left     LIPOSUCTION      ROTATOR CUFF REPAIR Right     TONSILLECTOMY         Family History   Problem Relation Age of Onset    Hypertension Mother     Heart disease Mother         pacemaker    Heart attack Mother     Heart disease Father     Psoriasis Father     Cancer Neg Hx        Social History     Tobacco Use    Smoking status: Former Smoker     Last attempt to quit: 1993     Years since quittin.0    Smokeless tobacco: Never Used    Tobacco comment: quit    Substance Use Topics    Alcohol  use: No     Alcohol/week: 0.0 standard drinks     Frequency: Never     Binge frequency: Never    Drug use: No       Social History     Substance and Sexual Activity   Sexual Activity Yes    Birth control/protection: Surgical          Current Outpatient Medications:     anastrozole (ARIMIDEX) 1 mg Tab, TAKE 1 TABLET DAILY, Disp: 90 tablet, Rfl: 4    calcium-vitamin D3 500 mg(1,250mg) -200 unit per tablet, once daily. Patient uses a patch, not oral medication, Disp: , Rfl:     FLUAD 2756-0359, 65 YR UP,,PF, 45 mcg (15 mcg x 3)/0.5 mL Syrg, PHARMACIST ADMINISTERED IMMUNIZATION ADMINISTERED AT TIME OF DISPENSING, Disp: , Rfl: 0    hydroCHLOROthiazide (HYDRODIURIL) 25 MG tablet, 1 tablet PO daily prn BP greater than 140/90, Disp: 30 tablet, Rfl: 11    lidocaine (LIDODERM) 5 %, Place 1 patch onto the skin once daily. Remove & Discard patch within 12 hours or as directed by MD, Disp: 40 patch, Rfl: 0    multivitamin with minerals tablet, Take 1 tablet by mouth once daily., Disp: , Rfl:     omeprazole-sodium bicarbonate (ZEGERID) 40-1.1 mg-gram per capsule, Take 1 capsule by mouth before breakfast., Disp: 90 capsule, Rfl: 0    topiramate (TOPAMAX) 25 MG tablet, Take one tablet every 12 hours, Disp: 60 tablet, Rfl: 2     Review of patient's allergies indicates:   Allergen Reactions    Nsaids (non-steroidal anti-inflammatory drug) Anaphylaxis            Review of Systems   Constitutional: Negative for activity change.   Eyes: Negative for discharge.   Respiratory: Negative for wheezing.    Cardiovascular: Negative for chest pain and palpitations.   Gastrointestinal: Negative for constipation, diarrhea and vomiting.   Genitourinary: Negative for difficulty urinating and hematuria.   Skin: Positive for rash.   Neurological: Negative for headaches.   Psychiatric/Behavioral: Negative for dysphoric mood.           Objective:          Vitals:    12/27/19 0812   BP: 132/80   Pulse: 63   Temp: 98.5 °F (36.9 °C)  "  SpO2: 98%   Weight: 70.9 kg (156 lb 4.9 oz)   Height: 5' 4" (1.626 m)       Physical Exam   Constitutional: She appears well-developed and well-nourished.   HENT:   Head: Normocephalic and atraumatic.   Eyes: Conjunctivae are normal.   Cardiovascular: Normal rate, regular rhythm and normal heart sounds.   Pulmonary/Chest: Effort normal and breath sounds normal.   Abdominal: Soft. Bowel sounds are normal.   Neurological: She is alert.   Skin: Skin is warm.        Psychiatric: She has a normal mood and affect. Her behavior is normal.   Vitals reviewed.              Assessment/Plan     Marce was seen today for establish care.    Diagnoses and all orders for this visit:    Rash of back  -     clotrimazole-betamethasone 1-0.05% (LOTRISONE) cream; Apply topically 2 (two) times daily.    Frequent UTI  -     Ambulatory referral to Urology    Screen for colon cancer  -     Ambulatory referral to Gastroenterology    Advised to do family therapy with . Patient states that she will mention this to her .      Follow up in about 6 months (around 6/27/2020) for wellness.    Future Appointments   Date Time Provider Department Center   1/13/2020  8:00 AM Milena Miller MP [facility] PSYCH [place]   4/3/2020  1:00 PM [facility] DEXA1 [facility] BMD [place]   4/3/2020  1:30 PM [facility] XR2 [facility] XRAY [place]   4/3/2020  2:30 PM [facility] CLIN[facility] [place] [facility]   4/8/2020  9:40 AM [facility] Ren Blackburn MD  [facility]     "

## 2020-01-06 DIAGNOSIS — Z86.010 HISTORY OF COLON POLYPS: Primary | ICD-10-CM

## 2020-01-13 ENCOUNTER — OFFICE VISIT (OUTPATIENT)
Dept: PSYCHIATRY | Facility: CLINIC | Age: 70
End: 2020-01-13
Payer: MEDICARE

## 2020-01-13 VITALS
HEIGHT: 64 IN | SYSTOLIC BLOOD PRESSURE: 118 MMHG | WEIGHT: 156.75 LBS | DIASTOLIC BLOOD PRESSURE: 75 MMHG | HEART RATE: 63 BPM | BODY MASS INDEX: 26.76 KG/M2

## 2020-01-13 DIAGNOSIS — F33.9 RECURRENT DEPRESSION: Primary | ICD-10-CM

## 2020-01-13 PROCEDURE — 1159F PR MEDICATION LIST DOCUMENTED IN MEDICAL RECORD: ICD-10-PCS | Mod: ,,, | Performed by: PSYCHOLOGIST

## 2020-01-13 PROCEDURE — 99214 PR OFFICE/OUTPT VISIT, EST, LEVL IV, 30-39 MIN: ICD-10-PCS | Mod: S$PBB,,, | Performed by: PSYCHOLOGIST

## 2020-01-13 PROCEDURE — 1159F MED LIST DOCD IN RCRD: CPT | Mod: ,,, | Performed by: PSYCHOLOGIST

## 2020-01-13 PROCEDURE — 99214 OFFICE O/P EST MOD 30 MIN: CPT | Mod: S$PBB,,, | Performed by: PSYCHOLOGIST

## 2020-01-13 PROCEDURE — 1126F AMNT PAIN NOTED NONE PRSNT: CPT | Mod: ,,, | Performed by: PSYCHOLOGIST

## 2020-01-13 PROCEDURE — 99999 PR PBB SHADOW E&M-EST. PATIENT-LVL III: ICD-10-PCS | Mod: PBBFAC,,, | Performed by: PSYCHOLOGIST

## 2020-01-13 PROCEDURE — 99999 PR PBB SHADOW E&M-EST. PATIENT-LVL III: CPT | Mod: PBBFAC,,, | Performed by: PSYCHOLOGIST

## 2020-01-13 PROCEDURE — 1126F PR PAIN SEVERITY QUANTIFIED, NO PAIN PRESENT: ICD-10-PCS | Mod: ,,, | Performed by: PSYCHOLOGIST

## 2020-01-13 PROCEDURE — 99213 OFFICE O/P EST LOW 20 MIN: CPT | Mod: PBBFAC,PO | Performed by: PSYCHOLOGIST

## 2020-01-13 RX ORDER — TOPIRAMATE 25 MG/1
TABLET ORAL
Qty: 180 TABLET | Refills: 0 | Status: SHIPPED | OUTPATIENT
Start: 2020-01-13 | End: 2020-03-30

## 2020-01-13 NOTE — PROGRESS NOTES
Client: Marce Hickey  : 1950  PCP:   Edilma Blackburn MD    Reason for visit:  the client was seen to assess their response to the medication prescribed, evaluate compliance, continue counseling and education and to coordinate care if needed.  Jaciel presented with a euthymic mood and no subjective complaints.  She reported she is doing well and she sts she has had no ADRs/SEs.  She went on her river cruise and had sts shew had an awesome time.  Situational stressors include 's unemployment.  She talked about her 's depression.  She talked about his threatening to leave and his not acknowledging his depression.  She talked about her not being comfortable in her home  Discussed her writing him a letter.  She is also volunteering at the hospital and she is thinking of gong to marriage therapy.  Sleep and appetite were reported as being good.  On the PHQ9 her score today was 2  [0 ] and on the GAD7  1  [0] today.  No SI/HI/delusions/ hallucinations/no mood swings or expansive moods reported. No significant anger now.      Family psychiatric history: none reported  Relevant lab reviewed  CBC normal, Vit D low, Cholesterol 296/Triglycerides 197  Relevant EKG reviewed   QTc 453 NSR    Review of patient's allergies indicates:   Allergen Reactions    Nsaids (non-steroidal anti-inflammatory drug) Anaphylaxis       Current Outpatient Medications:     anastrozole (ARIMIDEX) 1 mg Tab, TAKE 1 TABLET DAILY, Disp: 90 tablet, Rfl: 4    calcium-vitamin D3 500 mg(1,250mg) -200 unit per tablet, once daily. Patient uses a patch, not oral medication, Disp: , Rfl:     clotrimazole-betamethasone 1-0.05% (LOTRISONE) cream, Apply topically 2 (two) times daily., Disp: 45 g, Rfl: 0    FLUAD [id number], 65 YR UP,,PF, 45 mcg (15 mcg x 3)/0.5 mL Syrg, PHARMACIST ADMINISTERED IMMUNIZATION ADMINISTERED AT TIME OF DISPENSING, Disp: , Rfl: 0    hydroCHLOROthiazide (HYDRODIURIL) 25 MG tablet, 1 tablet PO daily prn BP  greater than 140/90, Disp: 30 tablet, Rfl: 11    lidocaine (LIDODERM) 5 %, Place 1 patch onto the skin once daily. Remove & Discard patch within 12 hours or as directed by MD, Disp: 40 patch, Rfl: 0    multivitamin with minerals tablet, Take 1 tablet by mouth once daily., Disp: , Rfl:     omeprazole-sodium bicarbonate (ZEGERID) 40-1.1 mg-gram per capsule, Take 1 capsule by mouth before breakfast., Disp: 90 capsule, Rfl: 0    topiramate (TOPAMAX) 25 MG tablet, Take one tablet every 12 hours, Disp: 60 tablet, Rfl: 2  Past Medical History:   Diagnosis Date    Anxiety     Cancer     breast cancer - left    Depression     Diabetes mellitus, type 2     Borderline    Dyslipidemia 12/12/2016    Fatty liver disease, nonalcoholic     GERD (gastroesophageal reflux disease)     Hyperlipidemia     Hypertension     Plantar fasciitis of right foot      Clinical presentation:  Appearance:  The client presented in casual attire evidencing good grooming and hygiene  Neuro:  the client was alert, oriented x 4 and evidenced good judgement and insight.    Attention/concentration:  Was good in session  Memory:  The client had no subjective memory complaints and they were able to recall information discussed in session accurately  Judgement:  behavior is adequate to the situation  Fund of knowledge:  intact and appropriate to age and level of education  Gait/station:  was normal  Heart: the patients heartbeat was regular in rate and rhythm.    Lung: clear bilaterally  Skin/extremities:  No rashes/lesions/bruises/edema reported or evident.   Mood:  Was minimally dysthymic, tearful about her mentor having cancer and being in hospice.  No SI/HI/delusions/hallucinations/mood swings or expansive moods.        Affect: was normal range  Speech:  normal rate and tone   Sleep: the client had no reported history of sleep problems if using Melatonin.  Onset was normal and they reported waking up feeling well rested.  No daytime  sleepiness was reported  Appetite: was reported as good.  Has a gastric sleeve in 2017.   Pain complaints:  none were voiced  ETOH/Substance use history:  The client had no reported ETOH/or other substance use problems by their report.  Psychosocial history:  The client currently lives with her 4th  of 8.5 years.  She has a grown son who is a recovering addict and has started a recovery program in [place] (15years sober)  And another son does not talk to her and lives in [place].    She has positive peer support and states she volunteers at [facility].  Her 's mother and children dont like her (his wife had left him).   Work history:  22 years in the [employer] as .  She had numerous jobs throughout the year and is retired and volunteers at [facility].     Education/session discussion:   · Reviewed strategies to help her coping with her stress, recommended therapy    · Reviewed medication and RBA/meded again today. Long term treatment issues reviewed        Impression:  Recurrent depression.  Jaciel is stable on her current medication.  Prognosis is  good    Plan:    Cont Topamax 25mg one tablet q12 hours, call if any problems  RTC 3 months and prn    Session:  [id number]

## 2020-01-15 ENCOUNTER — OFFICE VISIT (OUTPATIENT)
Dept: UROLOGY | Facility: CLINIC | Age: 70
End: 2020-01-15
Payer: MEDICARE

## 2020-01-15 VITALS
DIASTOLIC BLOOD PRESSURE: 83 MMHG | HEART RATE: 66 BPM | SYSTOLIC BLOOD PRESSURE: 134 MMHG | BODY MASS INDEX: 25.97 KG/M2 | HEIGHT: 64 IN | WEIGHT: 152.13 LBS

## 2020-01-15 DIAGNOSIS — N39.0 RECURRENT UTI: Primary | ICD-10-CM

## 2020-01-15 LAB
BILIRUB SERPL-MCNC: ABNORMAL MG/DL
BLOOD URINE, POC: ABNORMAL
COLOR, POC UA: YELLOW
GLUCOSE UR QL STRIP: ABNORMAL
KETONES UR QL STRIP: ABNORMAL
LEUKOCYTE ESTERASE URINE, POC: ABNORMAL
NITRITE, POC UA: ABNORMAL
PH, POC UA: 5
PROTEIN, POC: ABNORMAL
SPECIFIC GRAVITY, POC UA: 1.02
UROBILINOGEN, POC UA: 0.2

## 2020-01-15 PROCEDURE — 87088 URINE BACTERIA CULTURE: CPT

## 2020-01-15 PROCEDURE — 87186 SC STD MICRODIL/AGAR DIL: CPT

## 2020-01-15 PROCEDURE — 87077 CULTURE AEROBIC IDENTIFY: CPT

## 2020-01-15 PROCEDURE — 99213 OFFICE O/P EST LOW 20 MIN: CPT | Mod: PBBFAC,PN | Performed by: UROLOGY

## 2020-01-15 PROCEDURE — 1126F AMNT PAIN NOTED NONE PRSNT: CPT | Mod: ,,, | Performed by: UROLOGY

## 2020-01-15 PROCEDURE — 99999 PR PBB SHADOW E&M-EST. PATIENT-LVL III: CPT | Mod: PBBFAC,,, | Performed by: UROLOGY

## 2020-01-15 PROCEDURE — 99205 OFFICE O/P NEW HI 60 MIN: CPT | Mod: S$PBB,,, | Performed by: UROLOGY

## 2020-01-15 PROCEDURE — 1159F PR MEDICATION LIST DOCUMENTED IN MEDICAL RECORD: ICD-10-PCS | Mod: ,,, | Performed by: UROLOGY

## 2020-01-15 PROCEDURE — 1159F MED LIST DOCD IN RCRD: CPT | Mod: ,,, | Performed by: UROLOGY

## 2020-01-15 PROCEDURE — 81002 URINALYSIS NONAUTO W/O SCOPE: CPT | Mod: PBBFAC,PN | Performed by: UROLOGY

## 2020-01-15 PROCEDURE — 99999 PR PBB SHADOW E&M-EST. PATIENT-LVL III: ICD-10-PCS | Mod: PBBFAC,,, | Performed by: UROLOGY

## 2020-01-15 PROCEDURE — 87086 URINE CULTURE/COLONY COUNT: CPT

## 2020-01-15 PROCEDURE — 99205 PR OFFICE/OUTPT VISIT, NEW, LEVL V, 60-74 MIN: ICD-10-PCS | Mod: S$PBB,,, | Performed by: UROLOGY

## 2020-01-15 PROCEDURE — 1126F PR PAIN SEVERITY QUANTIFIED, NO PAIN PRESENT: ICD-10-PCS | Mod: ,,, | Performed by: UROLOGY

## 2020-01-15 NOTE — PROGRESS NOTES
[facility] New Patient/H&P:    Marce Hickey is a 69 y.o. female who presents for recurrent UTI.     Patient with a history of recurrent UTI for the past several months. She states that she has foul-smelling, cloudy urine during the infections. She also has suprapubic discomfort as well. Her most recent documented UTI was >100 K E. Coli in 2019.     She denies any fever, chills, flank pain, dysuria, gross hematuria, recent  trauma or history of  malignancy.     Patient with a history of breast cancer s/p bilateral mastectomy now on anastrozole.     A1C  5.2    Urine culture  >100K E. Coli (19)    >100K E. Coli (18)    Past Medical History:   Diagnosis Date    Anxiety     Cancer     breast cancer - left    Depression     Diabetes mellitus, type 2     Borderline    Dyslipidemia 2016    Fatty liver disease, nonalcoholic     GERD (gastroesophageal reflux disease)     Hyperlipidemia     Hypertension     Plantar fasciitis of right foot        Past Surgical History:   Procedure Laterality Date    breast reduction      BREAST SURGERY Bilateral     masectomy    BREAST SURGERY Bilateral     implants     SECTION      x 2    CHOLECYSTECTOMY      FOOT SURGERY      left bunionectomy    gastric sleve      HYSTERECTOMY      one ovary intact, adhesions    KNEE ARTHROPLASTY Left 2018    Procedure: ARTHROPLASTY, KNEE;  Surgeon: Christos Montanez MD;  Location: [facility];  Service: Orthopedics;  Laterality: Left;    KNEE ARTHROSCOPY Left     LIPOSUCTION      ROTATOR CUFF REPAIR Right     TONSILLECTOMY         Family History   Problem Relation Age of Onset    Hypertension Mother     Heart disease Mother         pacemaker    Heart attack Mother     Heart disease Father     Psoriasis Father     Cancer Neg Hx        Social History     Socioeconomic History    Marital status:      Spouse name: Not on file    Number of children: Not on  file    Years of education: Not on file    Highest education level: Not on file   Occupational History    Not on file   Social Needs    Financial resource strain: Not very hard    Food insecurity:     Worry: Never true     Inability: Never true    Transportation needs:     Medical: No     Non-medical: No   Tobacco Use    Smoking status: Former Smoker     Last attempt to quit: 1993     Years since quittin.1    Smokeless tobacco: Never Used    Tobacco comment: quit    Substance and Sexual Activity    Alcohol use: No     Alcohol/week: 0.0 standard drinks     Frequency: Never     Binge frequency: Never    Drug use: No    Sexual activity: Yes     Birth control/protection: Surgical   Lifestyle    Physical activity:     Days per week: 3 days     Minutes per session: 30 min    Stress: Only a little   Relationships    Social connections:     Talks on phone: More than three times a week     Gets together: Three times a week     Attends L[employer] day service: Not on file     Active member of club or organization: Yes     Attends meetings of clubs or organizations: More than 4 times per year     Relationship status:    Other Topics Concern    Are you pregnant or think you may be? Not Asked    Breast-feeding Not Asked   Social History Narrative    Not on file       Review of patient's allergies indicates:   Allergen Reactions    Nsaids (non-steroidal anti-inflammatory drug) Anaphylaxis       Medications Reviewed: see MAR    ROS:    Constitutional: denies fevers, chills, night sweats, fatigue, malaise  Respiratory: negative for cough, shortness of breath, wheezing, dyspnea.  Cardiovascular: - for high blood pressure, negative for chest pain, varicose veins, ankle swelling, palpitations, syncope.  GI: negative for abdominal pain, heartburn, indigestion, nausea, vomiting, constipation, diarrhea, blood in stool.   Urology: as noted above in HPI  Endocrinology: negative for cold intolerance,  "excessive thirst, not feeling tired/sluggish, no heat intolerance.   Hematology/Lymph: negative for easy bleeding, easy bruising, swollen glands.  Musculoskeletal: negative for back pain, joint pain, joint swelling, neck pain.  Allergy-Immunology: negative for seasonal allergies, negative for unusual infections.   Skin: negative for boils, breast lumps, hives, itching, rash.   Neurology: negative for, dizziness, headache, tingling/numbness, tremors.   Psych: satisfied with life; negative for, anxiety, depression, suicidal thoughts.     PHYSICAL EXAM:    Vitals:    01/15/20 0952   BP: 134/83   Pulse: 66     Body mass index is 26.11 kg/m². Weight: 69 kg (152 lb 1.9 oz) Height: 5' 4" (162.6 cm)       General: Alert, cooperative, no distress, appears stated age  Head: Normocephalic, without obvious abnormality, atraumatic  Neck: no masses, no thyromegaly, no lymphadenopathy  Eyes: PERRL, conjunctiva/corneas clear  Lungs: Respirations unlabored, normal effort, no accessory muscle use  CV: Warm and well perfused extremities  Abdomen: Soft, non-tender, no CVA tenderness, no hepatosplenomegaly, no hernia  Extremities: Extremities normal, atraumatic, no cyanosis or edema  Skin: Normal color, texture, and turgor, no rashes or lesions  Psych: Appropriate, well oriented, normal affect, normal mood  Neuro: Non-focal      LABS:    Recent Results (from the past 336 hour(s))   POCT URINE DIPSTICK WITHOUT MICROSCOPE    Collection Time: 01/15/20 10:11 AM   Result Value Ref Range    Color, UA yellow     Spec Grav UA 1.025     pH, UA 5     WBC, UA neg     Nitrite, UA pos     Protein neg     Glucose, UA neg     Ketones, UA neg     Urobilinogen, UA 0.2     Bilirubin neg     Blood, UA neg        IMAGING:    Reviewed       Assessment/Diagnosis:    1. Recurrent UTI  POCT URINE DIPSTICK WITHOUT MICROSCOPE    Urine Culture High Risk       Plans:    - I spent 60 minutes with the patient; more than 50% was in counseling about the disease " process and methods of treatment. Extensive discussion with patient regarding the etiology and management of recurrent UTI. Informed patient that UTI is multifactorial and can be secondary to nephrolithiasis, decreased estrogen levels after menopause, anatomic abnormalities, sexual intercourse, genetic predisposition, chronic preston, constipation, medications, dehydration and urinary retention.   - Instructed patient to increase water intake, start Cranberry tablets PO TID, avoid pads if possible and urinate every 2 hours. Also instructed to avoid long-term antibiotic use as this has been shown to increase bacterial resistance.   - Unable to start estrace cream due to history of breast cancer on anastrozole.   - Although data is lacking, we discussed that in severe cases, there may be some utility in performing a CT abd/pelvis and cystoscopy for further evaluation.   - Urine culture today.  - RTC in 3 months.

## 2020-01-15 NOTE — LETTER
January 16, 2020      Edilma Blackburn MD  [address]  [place] [address]           [place] [facility]  [address] [place] [address] [address]  [place] [address]  Phone: [phone]  Fax: [phone]          Patient: Marce Hickey   MR Number: [id number]   YOB: 1950   Date of Visit: 1/15/2020       Dear Dr. Edilma Blackburn:    Thank you for referring Marce Hickey to me for evaluation. Attached you will find relevant portions of my assessment and plan of care.    If you have questions, please do not hesitate to call me. I look forward to following Marce Hickey along with you.    Sincerely,    William Coley Jr., MD    Enclosure  CC:  No Recipients    If you would like to receive this communication electronically, please contact [email or web address] or [phone] to request more information on [employer] access.    For providers and/or their staff who would like to refer a patient to [facility], please contact us through our one-stop-shop provider referral line, [facility], at [phone].    If you feel you have received this communication in error or would no longer like to receive these types of communications, please e-mail [email or web address]

## 2020-01-16 ENCOUNTER — PATIENT MESSAGE (OUTPATIENT)
Dept: UROLOGY | Facility: CLINIC | Age: 70
End: 2020-01-16

## 2020-01-17 LAB — BACTERIA UR CULT: ABNORMAL

## 2020-01-22 ENCOUNTER — ANESTHESIA (OUTPATIENT)
Dept: ENDOSCOPY | Facility: HOSPITAL | Age: 70
End: 2020-01-22
Payer: MEDICARE

## 2020-01-22 ENCOUNTER — HOSPITAL ENCOUNTER (OUTPATIENT)
Facility: HOSPITAL | Age: 70
Discharge: HOME | End: 2020-01-22
Attending: INTERNAL MEDICINE | Admitting: INTERNAL MEDICINE
Payer: MEDICARE

## 2020-01-22 ENCOUNTER — ANESTHESIA EVENT (OUTPATIENT)
Dept: ENDOSCOPY | Facility: HOSPITAL | Age: 70
End: 2020-01-22
Payer: MEDICARE

## 2020-01-22 ENCOUNTER — PATIENT MESSAGE (OUTPATIENT)
Dept: UROLOGY | Facility: CLINIC | Age: 70
End: 2020-01-22

## 2020-01-22 DIAGNOSIS — Z86.010 HISTORY OF COLON POLYPS: ICD-10-CM

## 2020-01-22 DIAGNOSIS — N30.00 ACUTE CYSTITIS WITHOUT HEMATURIA: Primary | ICD-10-CM

## 2020-01-22 PROBLEM — Z86.0100 HISTORY OF COLON POLYPS: Status: ACTIVE | Noted: 2020-01-22

## 2020-01-22 PROCEDURE — 88305 TISSUE EXAM BY PATHOLOGIST: ICD-10-PCS | Mod: 26,,, | Performed by: PATHOLOGY

## 2020-01-22 PROCEDURE — D9220A PRA ANESTHESIA: ICD-10-PCS | Mod: PT,ANES,, | Performed by: ANESTHESIOLOGY

## 2020-01-22 PROCEDURE — 45380 COLONOSCOPY AND BIOPSY: CPT | Mod: PT,,, | Performed by: INTERNAL MEDICINE

## 2020-01-22 PROCEDURE — 27201012 HC FORCEPS, HOT/COLD, DISP: Performed by: INTERNAL MEDICINE

## 2020-01-22 PROCEDURE — 45380 COLONOSCOPY AND BIOPSY: CPT | Performed by: INTERNAL MEDICINE

## 2020-01-22 PROCEDURE — 37000008 HC ANESTHESIA 1ST 15 MINUTES: Performed by: INTERNAL MEDICINE

## 2020-01-22 PROCEDURE — D9220A PRA ANESTHESIA: ICD-10-PCS | Mod: PT,CRNA,, | Performed by: NURSE ANESTHETIST, CERTIFIED REGISTERED

## 2020-01-22 PROCEDURE — 63600175 PHARM REV CODE 636 W HCPCS: Performed by: INTERNAL MEDICINE

## 2020-01-22 PROCEDURE — 88305 TISSUE EXAM BY PATHOLOGIST: CPT | Mod: 26,,, | Performed by: PATHOLOGY

## 2020-01-22 PROCEDURE — 63600175 PHARM REV CODE 636 W HCPCS: Performed by: NURSE ANESTHETIST, CERTIFIED REGISTERED

## 2020-01-22 PROCEDURE — D9220A PRA ANESTHESIA: Mod: PT,ANES,, | Performed by: ANESTHESIOLOGY

## 2020-01-22 PROCEDURE — D9220A PRA ANESTHESIA: Mod: PT,CRNA,, | Performed by: NURSE ANESTHETIST, CERTIFIED REGISTERED

## 2020-01-22 PROCEDURE — 88305 TISSUE EXAM BY PATHOLOGIST: CPT | Performed by: PATHOLOGY

## 2020-01-22 PROCEDURE — 45380 PR COLONOSCOPY,BIOPSY: ICD-10-PCS | Mod: PT,,, | Performed by: INTERNAL MEDICINE

## 2020-01-22 PROCEDURE — 37000009 HC ANESTHESIA EA ADD 15 MINS: Performed by: INTERNAL MEDICINE

## 2020-01-22 RX ORDER — SODIUM CHLORIDE 9 MG/ML
INJECTION, SOLUTION INTRAVENOUS CONTINUOUS
Status: DISCONTINUED | OUTPATIENT
Start: 2020-01-22 | End: 2020-01-22 | Stop reason: HOSPADM

## 2020-01-22 RX ORDER — LIDOCAINE HCL/PF 100 MG/5ML
SYRINGE (ML) INTRAVENOUS
Status: DISCONTINUED | OUTPATIENT
Start: 2020-01-22 | End: 2020-01-22

## 2020-01-22 RX ORDER — PROPOFOL 10 MG/ML
VIAL (ML) INTRAVENOUS
Status: DISCONTINUED | OUTPATIENT
Start: 2020-01-22 | End: 2020-01-22

## 2020-01-22 RX ADMIN — PROPOFOL 100 MG: 10 INJECTION, EMULSION INTRAVENOUS at 09:01

## 2020-01-22 RX ADMIN — SODIUM CHLORIDE: 0.9 INJECTION, SOLUTION INTRAVENOUS at 09:01

## 2020-01-22 RX ADMIN — LIDOCAINE HYDROCHLORIDE 50 MG: 20 INJECTION, SOLUTION INTRAVENOUS at 09:01

## 2020-01-22 NOTE — PLAN OF CARE
Patient awake, alert and oriented.  No complaints of pain.  Passing air.  Vitals within defined limits.  Ready for discharge.  Meets discharge requirements.

## 2020-01-22 NOTE — TRANSFER OF CARE
"Anesthesia Transfer of Care Note    Patient: Marce Hickey    Procedure(s) Performed: Procedure(s) (LRB):  COLONOSCOPY (N/A)    Patient location: GI    Anesthesia Type: general    Transport from OR: Transported from OR on room air with adequate spontaneous ventilation    Post pain: adequate analgesia    Post assessment: no apparent anesthetic complications    Post vital signs: stable    Level of consciousness: awake    Nausea/Vomiting: no nausea/vomiting    Complications: none    Transfer of care protocol was followed      Last vitals:   Visit Vitals  BP (!) 173/81   Pulse 73   Temp 37.4 °C (99.3 °F) (Skin)   Resp 18   Ht 5' 4" (1.626 m)   Wt 67.6 kg (149 lb)   SpO2 99%   Breastfeeding? No   BMI 25.58 kg/m²     "

## 2020-01-22 NOTE — OR NURSING
Went to update Mr. Hickey on patient's status and he was not there.  Left message with Michelle at  to give him the information upon his return.

## 2020-01-22 NOTE — OR NURSING
Have you had a colonoscopy LESS THAN 3 years ago? No.    1. Did patient have a prior colonic polyp in a previous surveillance/diagnostic colonoscopy and is 18 years or older on date of encounter?  2. Documentation of < 3 year interval since the patients last colonoscopy due to medical reasons (eg., last colonoscopy incomplete, last colonoscopy had inadequate prep, piecemeal removal of adenomas, or last colonoscopy found > 10 adenomas) ?

## 2020-01-22 NOTE — H&P
[facility] Note    CC: History of colon polyps    HPI 69 y.o. female with personal history of adenomatous colon polyps presents for surveillance colonoscopy. Last colonoscopy 2014.    Past Medical History:   Diagnosis Date    Anxiety     Cancer     breast cancer - left    Depression     Diabetes mellitus, type 2     Borderline    Dyslipidemia 12/12/2016    Fatty liver disease, nonalcoholic     GERD (gastroesophageal reflux disease)     Hyperlipidemia     Hypertension     Plantar fasciitis of right foot          Review of Systems  General ROS: negative for - chills, fever or weight loss  Cardiovascular ROS: no chest pain or dyspnea on exertion  Gastrointestinal ROS: no blood in stool    Physical Examination  There were no vitals taken for this visit.  General appearance: alert, cooperative, no distress  HENT: Normocephalic, atraumatic, neck symmetrical, no nasal discharge, sclera anicteric   Lungs: clear to auscultation bilaterally, symmetric chest wall expansion bilaterally  Heart: regular rate and rhythm without rub; no displacement of the PMI   Abdomen: soft  Extremities: extremities symmetric; no clubbing, cyanosis, or edema          Assessment:   69 y.o. female with personal history of adenomatous colon polyps presents for surveillance colonoscopy. Last colonoscopy 2014.    Plan:  -Proceed to colonoscopy    Nupur Leonard MD  [facility]  [address], [address]  [place] [address]  Office: [phone]  Fax: [phone]

## 2020-01-22 NOTE — ANESTHESIA POSTPROCEDURE EVALUATION
Anesthesia Post Evaluation    Patient: Marce Hickey    Procedure(s) Performed: Procedure(s) (LRB):  COLONOSCOPY (N/A)    Final Anesthesia Type: general    Patient location during evaluation: PACU  Patient participation: Yes- Able to Participate  Level of consciousness: awake and alert  Post-procedure vital signs: reviewed and stable  Pain management: adequate  Airway patency: patent    PONV status at discharge: No PONV  Anesthetic complications: no      Cardiovascular status: blood pressure returned to baseline  Respiratory status: unassisted  Hydration status: euvolemic  Follow-up not needed.          Vitals Value Taken Time   /80 1/22/2020 10:40 AM   Temp 37.2 °C (98.9 °F) 1/22/2020 10:40 AM   Pulse 59 1/22/2020 10:40 AM   Resp 12 1/22/2020 10:40 AM   SpO2 99 % 1/22/2020 10:40 AM         No case tracking events are documented in the log.      Pain/Shaina Score: Shaina Score: 10 (1/22/2020 10:40 AM)

## 2020-01-22 NOTE — DISCHARGE INSTRUCTIONS
Diverticulosis    Diverticulosis means that small pouches have formed in the wall of your large intestine (colon). Most often, this problem causes no symptoms and is common as people age. But the pouches in the colon are at risk of becoming infected. When this happens, the condition is called diverticulitis. Although most people with diverticulosis never develop diverticulitis, it is still not uncommon. Rectal bleeding can also occur and in less common situations, a type of colon inflammation called colitis.  While most people do not have symptoms, some people with diverticulosis may have:  · Abdominal cramps and pain  · Bloating  · Constipation  · Change in bowel habits  Causes  The exact cause of diverticulosis (and diverticulitis) has not been proved, but a few things are associated with the condition:  · Low-fiber diet  · Constipation  · Lack of exercise  Your healthcare provider will talk with you about how to manage your condition. Diet changes may be all that are needed to help control diverticulosis and prevent progression to diverticulitis. If you develop diverticulitis, you will likely need other treatments.  Home care  You may be told to take fiber supplements daily. Fiber adds bulk to the stool so that it passes through the colon more easily. Stool softeners may be recommended. You may also be given medications for pain relief. Be sure to take all medications as directed.  In the past, people were told to avoid corn, nuts, and seeds. This is no longer necessary.  Follow these guidelines when caring for yourself at home:  · Eat unprocessed foods that are high in fiber. Whole grains, fruits, and vegetables are good choices.  · Drink 6 to 8 glasses of water every day unless your healthcare provider has you limit how much fluid you should have.  · Watch for changes in your bowel movements. Tell your provider if you notice any changes.  · Begin an exercise program. Ask your provider how to get started.  Generally, walking is the best.  · Get plenty of rest and sleep.  Follow-up care  Follow up with your healthcare provider, or as advised. Regular visits may be needed to check on your health. Sometimes special procedures such as colonoscopy, are needed after an episode of diverticulitis or blooding. Be sure to keep all your appointments.  If a stool sample was taken, or cultures were done, you should be told if they are positive, or if your treatment needs to be changed. You can call as directed for the results.  If X-rays were done, a radiologist will look at them. You will be told if there is a change in your treatment.  If antibiotics were prescribed, be sure to finish them all.  When to seek medical advice  Call your healthcare provider right away if any of these occur:  · Fever of 100.4°F (38°C) or higher, or as directed by your healthcare provider  · Severe cramps in the lower left side of the abdomen or pain that is getting worse  · Tenderness in the lower left side of the abdomen or worsening pain throughout the abdomen  · Diarrhea or constipation that doesn't get better within 24 hours  · Nausea and vomiting  · Bleeding from the rectum  Call 911  Call emergency services if any of the following occur:  · Trouble breathing  · Confusion  · Very drowsy or trouble awakening  · Fainting or loss of consciousness  · Rapid heart rate  · Chest pain  Date Last Reviewed: 12/30/2015 © 2000-2017 [employer]. [address] [place] [address]. All rights reserved. This information is not intended as a substitute for professional medical care. Always follow your healthcare professional's instructions.        Hemorrhoids    Hemorrhoids are swollen and inflamed veins inside the rectum and near the anus. The rectum is the last several inches of the colon. The anus is the passage between the rectum and the outside of the body.  Causes  The veins can become swollen due to increased pressure in them. This  is most often caused by:  · Chronic constipation or diarrhea  · Straining when having a bowel movement  · Sitting too long on the toilet  · A low-fiber diet  · Pregnancy  Symptoms  · Bleeding from the rectum (this may be noticeable after bowel movements)  · Lump near the anus  · Itching around the anus  · Pain around the anus  There are different types of hemorrhoids. Depending on the type you have and the severity, you may be able to treat yourself at home. In some cases, a procedure may be the best treatment option. Your healthcare provider can tell you more about this, if needed.  Home care  General care  · To get relief from pain or itching, try:  ¨ Topical products. Your healthcare provider may prescribe or recommend creams, ointments, or pads that can be applied to the hemorrhoid. Use these exactly as directed.  ¨ Medicines. Your healthcare provider may recommend stool softeners, suppositories, or laxatives to help manage constipation. Use these exactly as directed.  ¨ Sitz baths. A sitz bath involves sitting in a few inches of warm bath water. Be careful not to make the water so hot that you burn yourself--test it before sitting in it. Soak for about 10 to 15 minutes a few times a day. This may help relieve pain.  Tips to help prevent hemorrhoids  · Eat more fiber. Fiber adds bulk to stool and absorbs water as it moves through your colon. This makes stool softer and easier to pass.  ¨ Increase the fiber in your diet with more fiber-rich foods. These include fresh fruit, vegetables, and whole grains.  ¨ Take a fiber supplement or bulking agent, if advised to by your provider. These include products such as psyllium or methylcellulose.  · Drink plenty of water, if directed to by your provider. This can help keep stool soft.  · Be more active. Frequent exercise aids digestion and helps prevent constipation. It may also help make bowel movements more regular.  · Dont strain during bowel movements. This can make  hemorrhoids more likely. Also, dont sit on the toilet for long periods of time.  Follow-up care  Follow up with your healthcare provider, or as advised. If a culture or imaging tests were done, you will be notified of the results when they are ready. This may take a few days or longer.  When to seek medical advice  Call your healthcare provider right away if any of these occur:  · Increased bleeding from the rectum  · Increased pain around the rectum or anus  · Weakness or dizziness  Call 911  Call 911 or return to the emergency department right away if any of these occur:  · Trouble breathing or swallowing  · Fainting or loss of consciousness  · Unusually fast heart rate  · Vomiting blood  · Large amounts of blood in stool  Date Last Reviewed: 6/22/2015 © 2000-2017 [employer]. [address] [place] [address], [place] [address]. All rights reserved. This information is not intended as a substitute for professional medical care. Always follow your healthcare professional's instructions.      Discharge Instructions: After Your Surgery/Procedure  Youve just had surgery. During surgery you were given medicine called anesthesia to keep you relaxed and free of pain. After surgery you may have some pain or nausea. This is common. Here are some tips for feeling better and getting well after surgery.     Stay on schedule with your medication.   Going home  Your doctor or nurse will show you how to take care of yourself when you go home. He or she will also answer your questions. Have an adult family member or friend drive you home.      For your safety we recommend these precaution for the first 24 hours after your procedure:  · Do not drive or use heavy equipment.  · Do not make important decisions or sign legal papers.  · Do not drink alcohol.  · Have someone stay with you, if needed. He or she can watch for problems and help keep you safe.  · Your concentration, balance, coordination, and judgement may be  "impaired for many hours after anesthesia.  Use caution when ambulating or standing up.     · You may feel weak and "washed out" after anesthesia and surgery.      Subtle residual effects of general anesthesia or sedation with regional / local anesthesia can last more than 24 hours.  Rest for the remainder of the day or longer if your Doctor/Surgeon has advised you to do so.  Although you may feel normal within the first 24 hours, your reflexes and mental ability may be impaired without you realizing it.  You may feel dizzy, lightheaded or sleepy for 24 hours or longer.      Be sure to go to all follow-up visits with your doctor. And rest after your surgery for as long as your doctor tells you to.  Coping with pain  If you have pain after surgery, pain medicine will help you feel better. Take it as told, before pain becomes severe. Also, ask your doctor or pharmacist about other ways to control pain. This might be with heat, ice, or relaxation. And follow any other instructions your surgeon or nurse gives you.  Tips for taking pain medicine  To get the best relief possible, remember these points:  · Pain medicines can upset your stomach. Taking them with a little food may help.  · Most pain relievers taken by mouth need at least 20 to 30 minutes to start to work.  · Taking medicine on a schedule can help you remember to take it. Try to time your medicine so that you can take it before starting an activity. This might be before you get dressed, go for a walk, or sit down for dinner.  · Constipation is a common side effect of pain medicines. Call your doctor before taking any medicines such as laxatives or stool softeners to help ease constipation. Also ask if you should skip any foods. Drinking lots of fluids and eating foods such as fruits and vegetables that are high in fiber can also help. Remember, do not take laxatives unless your surgeon has prescribed them.  · Drinking alcohol and taking pain medicine can cause " dizziness and slow your breathing. It can even be deadly. Do not drink alcohol while taking pain medicine.  · Pain medicine can make you react more slowly to things. Do not drive or run machinery while taking pain medicine.  Your health care provider may tell you to take acetaminophen to help ease your pain. Ask him or her how much you are supposed to take each day. Acetaminophen or other pain relievers may interact with your prescription medicines or other over-the-counter (OTC) drugs. Some prescription medicines have acetaminophen and other ingredients. Using both prescription and OTC acetaminophen for pain can cause you to overdose. Read the labels on your OTC medicines with care. This will help you to clearly know the list of ingredients, how much to take, and any warnings. It may also help you not take too much acetaminophen. If you have questions or do not understand the information, ask your pharmacist or health care provider to explain it to you before you take the OTC medicine.  Managing nausea  Some people have an upset stomach after surgery. This is often because of anesthesia, pain, or pain medicine, or the stress of surgery. These tips will help you handle nausea and eat healthy foods as you get better. If you were on a special food plan before surgery, ask your doctor if you should follow it while you get better. These tips may help:  · Do not push yourself to eat. Your body will tell you when to eat and how much.  · Start off with clear liquids and soup. They are easier to digest.  · Next try semi-solid foods, such as mashed potatoes, applesauce, and gelatin, as you feel ready.  · Slowly move to solid foods. Dont eat fatty, rich, or spicy foods at first.  · Do not force yourself to have 3 large meals a day. Instead eat smaller amounts more often.  · Take pain medicines with a small amount of solid food, such as crackers or toast, to avoid nausea.     Call your surgeon if  · You still have pain an  hour after taking medicine. The medicine may not be strong enough.  · You feel too sleepy, dizzy, or groggy. The medicine may be too strong.  · You have side effects like nausea, vomiting, or skin changes, such as rash, itching, or hives.       If you have obstructive sleep apnea  You were given anesthesia medicine during surgery to keep you comfortable and free of pain. After surgery, you may have more apnea spells because of this medicine and other medicines you were given. The spells may last longer than usual.   At home:  · Keep using the continuous positive airway pressure (CPAP) device when you sleep. Unless your health care provider tells you not to, use it when you sleep, day or night. CPAP is a common device used to treat obstructive sleep apnea.  · Talk with your provider before taking any pain medicine, muscle relaxants, or sedatives. Your provider will tell you about the possible dangers of taking these medicines.  © 0063-0946 [employer]. [address] [place] [address], [place] [address]. All rights reserved. This information is not intended as a substitute for professional medical care. Always follow your healthcare professional's instructions.  Discharge Instructions: After Your Surgery/Procedure  Youve just had surgery. During surgery you were given medicine called anesthesia to keep you relaxed and free of pain. After surgery you may have some pain or nausea. This is common. Here are some tips for feeling better and getting well after surgery.     Stay on schedule with your medication.   Going home  Your doctor or nurse will show you how to take care of yourself when you go home. He or she will also answer your questions. Have an adult family member or friend drive you home.      For your safety we recommend these precaution for the first 24 hours after your procedure:  · Do not drive or use heavy equipment.  · Do not make important decisions or sign legal papers.  · Do not drink  "alcohol.  · Have someone stay with you, if needed. He or she can watch for problems and help keep you safe.  · Your concentration, balance, coordination, and judgement may be impaired for many hours after anesthesia.  Use caution when ambulating or standing up.     · You may feel weak and "washed out" after anesthesia and surgery.      Subtle residual effects of general anesthesia or sedation with regional / local anesthesia can last more than 24 hours.  Rest for the remainder of the day or longer if your Doctor/Surgeon has advised you to do so.  Although you may feel normal within the first 24 hours, your reflexes and mental ability may be impaired without you realizing it.  You may feel dizzy, lightheaded or sleepy for 24 hours or longer.      Be sure to go to all follow-up visits with your doctor. And rest after your surgery for as long as your doctor tells you to.  Coping with pain  If you have pain after surgery, pain medicine will help you feel better. Take it as told, before pain becomes severe. Also, ask your doctor or pharmacist about other ways to control pain. This might be with heat, ice, or relaxation. And follow any other instructions your surgeon or nurse gives you.  Tips for taking pain medicine  To get the best relief possible, remember these points:  · Pain medicines can upset your stomach. Taking them with a little food may help.  · Most pain relievers taken by mouth need at least 20 to 30 minutes to start to work.  · Taking medicine on a schedule can help you remember to take it. Try to time your medicine so that you can take it before starting an activity. This might be before you get dressed, go for a walk, or sit down for dinner.  · Constipation is a common side effect of pain medicines. Call your doctor before taking any medicines such as laxatives or stool softeners to help ease constipation. Also ask if you should skip any foods. Drinking lots of fluids and eating foods such as fruits and " vegetables that are high in fiber can also help. Remember, do not take laxatives unless your surgeon has prescribed them.  · Drinking alcohol and taking pain medicine can cause dizziness and slow your breathing. It can even be deadly. Do not drink alcohol while taking pain medicine.  · Pain medicine can make you react more slowly to things. Do not drive or run machinery while taking pain medicine.  Your health care provider may tell you to take acetaminophen to help ease your pain. Ask him or her how much you are supposed to take each day. Acetaminophen or other pain relievers may interact with your prescription medicines or other over-the-counter (OTC) drugs. Some prescription medicines have acetaminophen and other ingredients. Using both prescription and OTC acetaminophen for pain can cause you to overdose. Read the labels on your OTC medicines with care. This will help you to clearly know the list of ingredients, how much to take, and any warnings. It may also help you not take too much acetaminophen. If you have questions or do not understand the information, ask your pharmacist or health care provider to explain it to you before you take the OTC medicine.  Managing nausea  Some people have an upset stomach after surgery. This is often because of anesthesia, pain, or pain medicine, or the stress of surgery. These tips will help you handle nausea and eat healthy foods as you get better. If you were on a special food plan before surgery, ask your doctor if you should follow it while you get better. These tips may help:  · Do not push yourself to eat. Your body will tell you when to eat and how much.  · Start off with clear liquids and soup. They are easier to digest.  · Next try semi-solid foods, such as mashed potatoes, applesauce, and gelatin, as you feel ready.  · Slowly move to solid foods. Dont eat fatty, rich, or spicy foods at first.  · Do not force yourself to have 3 large meals a day. Instead eat smaller  amounts more often.  · Take pain medicines with a small amount of solid food, such as crackers or toast, to avoid nausea.     Call your surgeon if  · You still have pain an hour after taking medicine. The medicine may not be strong enough.  · You feel too sleepy, dizzy, or groggy. The medicine may be too strong.  · You have side effects like nausea, vomiting, or skin changes, such as rash, itching, or hives.       If you have obstructive sleep apnea  You were given anesthesia medicine during surgery to keep you comfortable and free of pain. After surgery, you may have more apnea spells because of this medicine and other medicines you were given. The spells may last longer than usual.   At home:  · Keep using the continuous positive airway pressure (CPAP) device when you sleep. Unless your health care provider tells you not to, use it when you sleep, day or night. CPAP is a common device used to treat obstructive sleep apnea.  · Talk with your provider before taking any pain medicine, muscle relaxants, or sedatives. Your provider will tell you about the possible dangers of taking these medicines.  © 7922-0210 [employer]. [address] [place], [place] [id number]. All rights reserved. This information is not intended as a substitute for professional medical care. Always follow your healthcare professional's instructions.  Discharge Instructions: After Your Surgery/Procedure  Youve just had surgery. During surgery you were given medicine called anesthesia to keep you relaxed and free of pain. After surgery you may have some pain or nausea. This is common. Here are some tips for feeling better and getting well after surgery.     Stay on schedule with your medication.   Going home  Your doctor or nurse will show you how to take care of yourself when you go home. He or she will also answer your questions. Have an adult family member or friend drive you home.      For your safety we recommend these  "precaution for the first 24 hours after your procedure:  · Do not drive or use heavy equipment.  · Do not make important decisions or sign legal papers.  · Do not drink alcohol.  · Have someone stay with you, if needed. He or she can watch for problems and help keep you safe.  · Your concentration, balance, coordination, and judgement may be impaired for many hours after anesthesia.  Use caution when ambulating or standing up.     · You may feel weak and "washed out" after anesthesia and surgery.      Subtle residual effects of general anesthesia or sedation with regional / local anesthesia can last more than 24 hours.  Rest for the remainder of the day or longer if your Doctor/Surgeon has advised you to do so.  Although you may feel normal within the first 24 hours, your reflexes and mental ability may be impaired without you realizing it.  You may feel dizzy, lightheaded or sleepy for 24 hours or longer.      Be sure to go to all follow-up visits with your doctor. And rest after your surgery for as long as your doctor tells you to.  Coping with pain  If you have pain after surgery, pain medicine will help you feel better. Take it as told, before pain becomes severe. Also, ask your doctor or pharmacist about other ways to control pain. This might be with heat, ice, or relaxation. And follow any other instructions your surgeon or nurse gives you.  Tips for taking pain medicine  To get the best relief possible, remember these points:  · Pain medicines can upset your stomach. Taking them with a little food may help.  · Most pain relievers taken by mouth need at least 20 to 30 minutes to start to work.  · Taking medicine on a schedule can help you remember to take it. Try to time your medicine so that you can take it before starting an activity. This might be before you get dressed, go for a walk, or sit down for dinner.  · Constipation is a common side effect of pain medicines. Call your doctor before taking any " medicines such as laxatives or stool softeners to help ease constipation. Also ask if you should skip any foods. Drinking lots of fluids and eating foods such as fruits and vegetables that are high in fiber can also help. Remember, do not take laxatives unless your surgeon has prescribed them.  · Drinking alcohol and taking pain medicine can cause dizziness and slow your breathing. It can even be deadly. Do not drink alcohol while taking pain medicine.  · Pain medicine can make you react more slowly to things. Do not drive or run machinery while taking pain medicine.  Your health care provider may tell you to take acetaminophen to help ease your pain. Ask him or her how much you are supposed to take each day. Acetaminophen or other pain relievers may interact with your prescription medicines or other over-the-counter (OTC) drugs. Some prescription medicines have acetaminophen and other ingredients. Using both prescription and OTC acetaminophen for pain can cause you to overdose. Read the labels on your OTC medicines with care. This will help you to clearly know the list of ingredients, how much to take, and any warnings. It may also help you not take too much acetaminophen. If you have questions or do not understand the information, ask your pharmacist or health care provider to explain it to you before you take the OTC medicine.  Managing nausea  Some people have an upset stomach after surgery. This is often because of anesthesia, pain, or pain medicine, or the stress of surgery. These tips will help you handle nausea and eat healthy foods as you get better. If you were on a special food plan before surgery, ask your doctor if you should follow it while you get better. These tips may help:  · Do not push yourself to eat. Your body will tell you when to eat and how much.  · Start off with clear liquids and soup. They are easier to digest.  · Next try semi-solid foods, such as mashed potatoes, applesauce, and  gelatin, as you feel ready.  · Slowly move to solid foods. Dont eat fatty, rich, or spicy foods at first.  · Do not force yourself to have 3 large meals a day. Instead eat smaller amounts more often.  · Take pain medicines with a small amount of solid food, such as crackers or toast, to avoid nausea.     Call your surgeon if  · You still have pain an hour after taking medicine. The medicine may not be strong enough.  · You feel too sleepy, dizzy, or groggy. The medicine may be too strong.  · You have side effects like nausea, vomiting, or skin changes, such as rash, itching, or hives.       If you have obstructive sleep apnea  You were given anesthesia medicine during surgery to keep you comfortable and free of pain. After surgery, you may have more apnea spells because of this medicine and other medicines you were given. The spells may last longer than usual.   At home:  · Keep using the continuous positive airway pressure (CPAP) device when you sleep. Unless your health care provider tells you not to, use it when you sleep, day or night. CPAP is a common device used to treat obstructive sleep apnea.  · Talk with your provider before taking any pain medicine, muscle relaxants, or sedatives. Your provider will tell you about the possible dangers of taking these medicines.  © 5706-4966 [employer]. [address] [place] [address], [place] [address]. All rights reserved. This information is not intended as a substitute for professional medical care. Always follow your healthcare professional's instructions.

## 2020-01-22 NOTE — [employer] PATIENT INSTRUCTIONS
Discharge Summary/Instructions after an Endoscopic Procedure  Patient Name: Marce Hickey  Patient MRN: [id number]  Patient YOB: 1950  Wednesday, January 22, 2020  Nupur Leonard MD  RESTRICTIONS:  During your procedure today, you received medications for sedation.  These   medications may affect your judgment, balance and coordination.  Therefore,   for 24 hours, you have the following restrictions:   - DO NOT drive a car, operate machinery, make legal/financial decisions,   sign important papers or drink alcohol.    ACTIVITY:  Today: no heavy lifting, straining or running due to procedural   sedation/anesthesia.  The following day: return to full activity including work.  DIET:  Eat and drink normally unless instructed otherwise.     TREATMENT FOR COMMON SIDE EFFECTS:  - Mild abdominal pain, nausea, belching, bloating or excessive gas:  rest,   eat lightly and use a heating pad.  - Sore Throat: treat with throat lozenges and/or gargle with warm salt   water.  - Because air was used during the procedure, expelling large amounts of air   from your rectum or belching is normal.  - If a bowel prep was taken, you may not have a bowel movement for 1-3 days.    This is normal.  SYMPTOMS TO WATCH FOR AND REPORT TO YOUR PHYSICIAN:  1. Abdominal pain or bloating, other than gas cramps.  2. Chest pain.  3. Back pain.  4. Signs of infection such as: chills or fever occurring within 24 hours   after the procedure.  5. Rectal bleeding, which would show as bright red, maroon, or black stools.   (A tablespoon of blood from the rectum is not serious, especially if   hemorrhoids are present.)  6. Vomiting.  7. Weakness or dizziness.  GO DIRECTLY TO THE NEAREST EMERGENCY ROOM IF YOU HAVE ANY OF THE FOLLOWING:      Difficulty breathing              Chills and/or fever over 101 F   Persistent vomiting and/or vomiting blood   Severe abdominal pain   Severe chest pain   Black, tarry stools   Bleeding- more than  one tablespoon   Any other symptom or condition that you feel may need urgent attention  Your doctor recommends these additional instructions:  If any biopsies were taken, your doctors clinic will contact you in 1 to 2   weeks with any results.  - Discharge patient to home (with spouse).   - Patient has a contact number available for emergencies.  The signs and   symptoms of potential delayed complications were discussed with the   patient.  Return to normal activities tomorrow.  Written discharge   instructions were provided to the patient.   - Resume previous diet.   - Continue present medications.   - Await pathology results.   - Repeat colonoscopy in 5-10 years for surveillance based on pathology   results.   -High fiber diet/fiber supplement + stool softener daily for hemorrhoidal   disease  - Return to my office PRN.  For questions, problems or results please call your physician - Nupur Leonard MD at Work:  [phone].  [facility], EMERGENCY ROOM PHONE NUMBER: [phone]  IF A COMPLICATION OR EMERGENCY SITUATION ARISES AND YOU ARE UNABLE TO REACH   YOUR PHYSICIAN - GO DIRECTLY TO THE EMERGENCY ROOM.  Nupur Leonard MD  1/22/2020 10:10:51 AM  This report has been verified and signed electronically.  [employer]

## 2020-01-23 ENCOUNTER — PATIENT MESSAGE (OUTPATIENT)
Dept: UROLOGY | Facility: CLINIC | Age: 70
End: 2020-01-23

## 2020-01-23 VITALS
HEIGHT: 64 IN | OXYGEN SATURATION: 99 % | SYSTOLIC BLOOD PRESSURE: 149 MMHG | RESPIRATION RATE: 12 BRPM | HEART RATE: 59 BPM | BODY MASS INDEX: 25.44 KG/M2 | WEIGHT: 149 LBS | DIASTOLIC BLOOD PRESSURE: 80 MMHG | TEMPERATURE: 99 F

## 2020-01-23 RX ORDER — METHENAMINE HIPPURATE 1000 MG/1
1 TABLET ORAL 2 TIMES DAILY
Qty: 60 TABLET | Refills: 6 | Status: SHIPPED | OUTPATIENT
Start: 2020-01-23 | End: 2020-02-22

## 2020-01-23 RX ORDER — SULFAMETHOXAZOLE AND TRIMETHOPRIM 800; 160 MG/1; MG/1
1 TABLET ORAL 2 TIMES DAILY
Qty: 10 TABLET | Refills: 0 | Status: SHIPPED | OUTPATIENT
Start: 2020-01-23 | End: 2020-01-28

## 2020-01-23 NOTE — TELEPHONE ENCOUNTER
Her urine culture is positive with pansensitive EColi.   5 day treatment course of bactrim Rxed   Advise to complete course and given recurrence will forward to Dr Coley for any further recs in interim until next follow up, but also review his recs in plan from last o/v

## 2020-01-25 LAB
FINAL PATHOLOGIC DIAGNOSIS: NORMAL
GROSS: NORMAL

## 2020-01-31 ENCOUNTER — TELEPHONE (OUTPATIENT)
Dept: GASTROENTEROLOGY | Facility: CLINIC | Age: 70
End: 2020-01-31

## 2020-01-31 NOTE — TELEPHONE ENCOUNTER
----- Message from Nupur Leonard MD sent at 1/31/2020  2:03 PM CST -----  Please let patient know her colon biopsies are normal. She should repeat colonoscopy in 10 years

## 2020-03-11 ENCOUNTER — PATIENT MESSAGE (OUTPATIENT)
Dept: FAMILY MEDICINE | Facility: CLINIC | Age: 70
End: 2020-03-11

## 2020-03-11 ENCOUNTER — PATIENT MESSAGE (OUTPATIENT)
Dept: HEMATOLOGY/ONCOLOGY | Facility: CLINIC | Age: 70
End: 2020-03-11

## 2020-03-11 NOTE — TELEPHONE ENCOUNTER
What are your thoughts on this? I personally think due to Arimidex being hormone based chemo and lowering her immune system she may need to cut volunteering for a bit. Being a  she may be the first person in contact with a possible case and not know it.

## 2020-03-19 ENCOUNTER — TELEPHONE (OUTPATIENT)
Dept: ORTHOPEDICS | Facility: CLINIC | Age: 70
End: 2020-03-19

## 2020-03-19 NOTE — TELEPHONE ENCOUNTER
Pt rescheduled to 4/20/20 due to current COVID-19 recommendations.  Pt agreed to appointment date and time.

## 2020-03-30 ENCOUNTER — PATIENT MESSAGE (OUTPATIENT)
Dept: GASTROENTEROLOGY | Facility: CLINIC | Age: 70
End: 2020-03-30

## 2020-03-30 ENCOUNTER — PATIENT MESSAGE (OUTPATIENT)
Dept: HEMATOLOGY/ONCOLOGY | Facility: CLINIC | Age: 70
End: 2020-03-30

## 2020-03-30 RX ORDER — TOPIRAMATE 25 MG/1
TABLET ORAL
Qty: 180 TABLET | Refills: 3 | Status: SHIPPED | OUTPATIENT
Start: 2020-03-30 | End: 2020-06-08 | Stop reason: SDUPTHER

## 2020-03-31 ENCOUNTER — PATIENT MESSAGE (OUTPATIENT)
Dept: PSYCHIATRY | Facility: CLINIC | Age: 70
End: 2020-03-31

## 2020-03-31 NOTE — TELEPHONE ENCOUNTER
Attempted to contact patient to either R/S appt or offer Telemedicine appt--left message with call back number

## 2020-04-01 ENCOUNTER — PATIENT MESSAGE (OUTPATIENT)
Dept: PHYSICAL MEDICINE AND REHAB | Facility: CLINIC | Age: 70
End: 2020-04-01

## 2020-04-01 ENCOUNTER — PATIENT MESSAGE (OUTPATIENT)
Dept: PSYCHIATRY | Facility: CLINIC | Age: 70
End: 2020-04-01

## 2020-04-06 ENCOUNTER — PATIENT MESSAGE (OUTPATIENT)
Dept: HEMATOLOGY/ONCOLOGY | Facility: CLINIC | Age: 70
End: 2020-04-06

## 2020-04-30 ENCOUNTER — TELEPHONE (OUTPATIENT)
Dept: HEMATOLOGY/ONCOLOGY | Facility: CLINIC | Age: 70
End: 2020-04-30

## 2020-05-05 ENCOUNTER — PATIENT MESSAGE (OUTPATIENT)
Dept: ADMINISTRATIVE | Facility: HOSPITAL | Age: 70
End: 2020-05-05

## 2020-05-07 ENCOUNTER — OFFICE VISIT (OUTPATIENT)
Dept: HEMATOLOGY/ONCOLOGY | Facility: CLINIC | Age: 70
End: 2020-05-07
Payer: MEDICARE

## 2020-05-07 VITALS
HEART RATE: 64 BPM | RESPIRATION RATE: 16 BRPM | DIASTOLIC BLOOD PRESSURE: 89 MMHG | WEIGHT: 158.69 LBS | BODY MASS INDEX: 27.24 KG/M2 | TEMPERATURE: 98 F | SYSTOLIC BLOOD PRESSURE: 151 MMHG

## 2020-05-07 DIAGNOSIS — C50.412 MALIGNANT NEOPLASM OF UPPER-OUTER QUADRANT OF LEFT BREAST IN FEMALE, ESTROGEN RECEPTOR POSITIVE: ICD-10-CM

## 2020-05-07 DIAGNOSIS — D53.9 ANEMIA ASSOCIATED WITH NUTRITIONAL DEFICIENCY: ICD-10-CM

## 2020-05-07 DIAGNOSIS — M81.8 OSTEOPOROSIS DUE TO AROMATASE INHIBITOR: ICD-10-CM

## 2020-05-07 DIAGNOSIS — Z17.0 MALIGNANT NEOPLASM OF UPPER-OUTER QUADRANT OF LEFT BREAST IN FEMALE, ESTROGEN RECEPTOR POSITIVE: ICD-10-CM

## 2020-05-07 DIAGNOSIS — Z98.84 HISTORY OF BARIATRIC SURGERY: ICD-10-CM

## 2020-05-07 DIAGNOSIS — T38.6X5A OSTEOPOROSIS DUE TO AROMATASE INHIBITOR: ICD-10-CM

## 2020-05-07 DIAGNOSIS — E55.9 HYPOVITAMINOSIS D: Primary | ICD-10-CM

## 2020-05-07 DIAGNOSIS — Z13.820 ENCOUNTER FOR SCREENING FOR OSTEOPOROSIS: ICD-10-CM

## 2020-05-07 PROCEDURE — 99204 OFFICE O/P NEW MOD 45 MIN: CPT | Mod: S$GLB,,, | Performed by: INTERNAL MEDICINE

## 2020-05-07 PROCEDURE — 99204 PR OFFICE/OUTPT VISIT, NEW, LEVL IV, 45-59 MIN: ICD-10-PCS | Mod: S$GLB,,, | Performed by: INTERNAL MEDICINE

## 2020-05-08 ENCOUNTER — TELEPHONE (OUTPATIENT)
Dept: ADMINISTRATIVE | Facility: HOSPITAL | Age: 70
End: 2020-05-08

## 2020-05-08 NOTE — TELEPHONE ENCOUNTER
Called patient and informed her that the reason she was outreached regarding the Doctor Visits letter was because she was placed on the hypertension registry due to her being on hydrochlorothiazide medication prescribed by Dr Garvey last year. Patient says that she was informed to take the medication if her blood pressure goes over 140/90 and has only had to take the medication about one time, and does not currently have a problem with her blood pressure. Patient says when she comes for a doctors appointment her blood pressure seems to get elevated but will then go down when taken manually. Patient is wondering if Dr Blackburn thinks she needs to be on the hypertension registry or not. Patient says she does not want to be on it if she doesn't have too and doesn't want any diagnosis she doesn't need. Informed patient I will consult with Dr. Blackburn. Patient says she will follow what  thinks is right.

## 2020-05-10 NOTE — PROGRESS NOTES
"[facility] hematology Oncology consultation note  May 7, 2020    Patient is following isolation precautions at home because of the corona virus epidemic.    Subjective:      Patient ID:   Marce Hickey  69 y.o. female  1950  KATHERINE Shah MD Sumathi Smith, MD      Chief Complaint:   Breast cancer evaluation    HPI:  69 y.o. female has a history of breast cancer.  She has asked if I would assume her oncology care  .  She has a history of breast reduction back in 1992.  Recently in March 2017 she had a abnormal mammogram at [facility].  Two adjacent masses were seen at the 12 and 1:00 position of the left breast.  She had bilateral mastectomy, with left sentinel node biopsy in May 2017.  She also had reconstruction with expanders placed and eventually implants paced per Dr. Thomas of Plastic surgery.    She is status post gastric sleeve surgery January 29, 2017.  She has a history of B12 deficiency and has been on B12 sublingually in the past, but has not taken it," in a long time.  Lab work and bone density test is due now.    Estimated breast cancer recurrence was reduced from 23% to 12% with adjuvant Arimidex or tamoxifen usage using the breast cancer index reference.  She has been on Arimidex adjuvantly x3 years and this is refilled through [facility].    She complains of pain at her left 2nd distal joint finger area and is due to see MD for evaluation.  She is status post partial hysterectomy.  She could birth control pills until about age 26.  She had hysterectomy at age 30.  She did not take hormone replacement therapy.    She wears a calcium and vitamin-D and multivitamin patch x2 years.    For her history includes generalized anxiety disorder, type 2 diabetes, low vitamin-D levels, GERD, fatty liver, dyslipidemia, depression, hypertension, plantar fasciitis of the right foot.    She is status post breast reduction, bilateral mastectomy, breast implant placement, " with reconstruction.  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  Other history includes the gastric sleeve surgery, partial hysterectomy, right rotator cuff repair, tonsillectomy, and cholecystectomy.    Her mother had hypertension, heart disease and history of heart attack.  Her father had heart disease and psoriasis.    She was in the [employer] times 26 years till .  She smoked times 19 years, 1 pack per day, and quit in .  She does not drink alcohol with with regularity.  She is allergic to aspirin and nonsteroidal anti inflammatory drugs as manifested with anaphylaxis.    Her mother  in 2017 of old age.  She had a lumpectomy done but it is not clear if she had breast cancer.  Her father had heart disease.  She has a brother alive and well and a cousin with prostate cancer.  She has 2 sons alive and well.      ROS:   GEN: normal without any fever, night sweats or weight loss  HEENT: normal with no HA's, sore throat, stiff neck, changes in vision  CV: normal with no CP, SOB, PND, FLORES or orthopnea  PULM: normal with no SOB, cough, hemoptysis, sputum or pleuritic pain  GI:  See history of present illness  : normal with no hematuria, dysuria  BREAST:  See history of present illness  SKIN: normal with no rash, erythema, bruising, or swelling     Past Medical History:   Diagnosis Date    Anxiety     Arthritis     Cancer     breast cancer - left    Depression     Diabetes mellitus, type 2     Borderline    Dyslipidemia 2016    Fatty liver disease, nonalcoholic     GERD (gastroesophageal reflux disease)     Hyperlipidemia     Hypertension     Plantar fasciitis of right foot      Past Surgical History:   Procedure Laterality Date    breast reduction      BREAST SURGERY Bilateral     masectomy    BREAST SURGERY Bilateral     implants     SECTION      x 2    CHOLECYSTECTOMY      COLONOSCOPY N/A 2020     Procedure: COLONOSCOPY;  Surgeon: Nupur Leonard MD;  Location: [facility];  Service: Endoscopy;  Laterality: N/A;    FOOT SURGERY      left bunionectomy    gastric sleve      HYSTERECTOMY      one ovary intact, adhesions    KNEE ARTHROPLASTY Left 2018    Procedure: ARTHROPLASTY, KNEE;  Surgeon: Christos Montanez MD;  Location: [facility] OR;  Service: Orthopedics;  Laterality: Left;    KNEE ARTHROSCOPY Left     LIPOSUCTION      ROTATOR CUFF REPAIR Right     TONSILLECTOMY         Review of patient's allergies indicates:   Allergen Reactions    Nsaids (non-steroidal anti-inflammatory drug) Anaphylaxis     Social History     Socioeconomic History    Marital status:      Spouse name: Not on file    Number of children: Not on file    Years of education: Not on file    Highest education level: Not on file   Occupational History    Not on file   Social Needs    Financial resource strain: Not very hard    Food insecurity:     Worry: Never true     Inability: Never true    Transportation needs:     Medical: No     Non-medical: No   Tobacco Use    Smoking status: Former Smoker     Last attempt to quit: 1993     Years since quittin.4    Smokeless tobacco: Never Used    Tobacco comment: quit    Substance and Sexual Activity    Alcohol use: No     Alcohol/week: 0.0 standard drinks     Frequency: Never     Binge frequency: Never    Drug use: No    Sexual activity: Yes     Birth control/protection: Surgical   Lifestyle    Physical activity:     Days per week: 3 days     Minutes per session: 30 min    Stress: Only a little   Relationships    Social connections:     Talks on phone: More than three times a week     Gets together: Three times a week     Attends [employer] service: Not on file     Active member of club or organization: Yes     Attends meetings of clubs or organizations: More than 4 times per year     Relationship status:    Other Topics Concern    Are you  pregnant or think you may be? Not Asked    Breast-feeding Not Asked   Social History Narrative    Not on file         Current Outpatient Medications:     anastrozole (ARIMIDEX) 1 mg Tab, TAKE 1 TABLET DAILY, Disp: 90 tablet, Rfl: 4    calcium-vitamin D3 500 mg(1,250mg) -200 unit per tablet, once daily. Patient uses a patch, not oral medication, Disp: , Rfl:     clotrimazole-betamethasone 1-0.05% (LOTRISONE) cream, Apply topically 2 (two) times daily., Disp: 45 g, Rfl: 0    lidocaine (LIDODERM) 5 %, Place 1 patch onto the skin once daily. Remove & Discard patch within 12 hours or as directed by MD, Disp: 40 patch, Rfl: 0    multivitamin with minerals tablet, Take 1 tablet by mouth once daily., Disp: , Rfl:     omeprazole-sodium bicarbonate (ZEGERID) 40-1.1 mg-gram per capsule, Take 1 capsule by mouth before breakfast. (Patient taking differently: Take 1 capsule by mouth daily as needed. ), Disp: 90 capsule, Rfl: 0    topiramate (TOPAMAX) 25 MG tablet, TAKE 1 TABLET EVERY 12 HOURS, Disp: 180 tablet, Rfl: 3    FLUAD [id number], 65 YR UP,,PF, 45 mcg (15 mcg x 3)/0.5 mL Syrg, PHARMACIST ADMINISTERED IMMUNIZATION ADMINISTERED AT TIME OF DISPENSING, Disp: , Rfl: 0    hydroCHLOROthiazide (HYDRODIURIL) 25 MG tablet, 1 tablet PO daily prn BP greater than 140/90, Disp: 30 tablet, Rfl: 11          Objective:   Vitals:  Blood pressure (!) 151/89, pulse 64, temperature 97.9 °F (36.6 °C), temperature source Oral, resp. rate 16, weight 72 kg (158 lb 11.2 oz).    Physical Examination:   GEN: no apparent distress, comfortable  HEAD: atraumatic and normocephalic  EYES: no pallor, no icterus  ENT: no pharyngeal erythema, external ears WNL; no nasal discharge  NECK: no masses, thyroid normal, trachea midline, no LAD/LN's, supple  CV: RRR with no murmur; normal pulse; normal S1 and S2; no pedal edema  CHEST: Normal respiratory effort; CTAB; normal breath sounds; no wheeze or crackles  ABDOM: nontender and nondistended; soft;  normal bowel sounds; no rebound/guarding, liver and spleen were not palpable  MUSC/Skeletal: ROM normal; no crepitus; joints normal; no deformities or arthropathy  EXTREM: no clubbing, cyanosis, inflammation or swelling  SKIN: no rashes, lesions, ulcers, petechiae or subcutaneous nodules  : no cvat  NEURO: grossly intact; motor/sensory WNL;  no tremors  PSYCH: normal mood, affect and behavior  LYMPH: normal cervical, supraclavicular, axillary and groin LN's  BREASTS:  She has extensive postoperative change at the chest wall anteriorly, she does not have palpable mass at the left or right breast, chest area is nontender      Labs:   Lab Results   Component Value Date    WBC 4.40 10/01/2019    HGB 13.4 10/01/2019    HCT 41.7 10/01/2019    MCV 91 10/01/2019     10/01/2019    CMP  Sodium   Date Value Ref Range Status   10/01/2019 144 136 - 145 mmol/L Final     Potassium   Date Value Ref Range Status   10/01/2019 4.2 3.5 - 5.1 mmol/L Final     Chloride   Date Value Ref Range Status   10/01/2019 111 (H) 95 - 110 mmol/L Final     CO2   Date Value Ref Range Status   10/01/2019 25 23 - 29 mmol/L Final     Glucose   Date Value Ref Range Status   10/01/2019 87 70 - 110 mg/dL Final     BUN, Bld   Date Value Ref Range Status   10/01/2019 19 8 - 23 mg/dL Final     Creatinine   Date Value Ref Range Status   10/01/2019 0.8 0.5 - 1.4 mg/dL Final     Calcium   Date Value Ref Range Status   10/01/2019 10.1 8.7 - 10.5 mg/dL Final     Total Protein   Date Value Ref Range Status   10/01/2019 7.3 6.0 - 8.4 g/dL Final     Albumin   Date Value Ref Range Status   10/01/2019 4.4 3.5 - 5.2 g/dL Final     Total Bilirubin   Date Value Ref Range Status   10/01/2019 0.5 0.1 - 1.0 mg/dL Final     Comment:     For infants and newborns, interpretation of results should be based  on gestational age, weight and in agreement with clinical  observations.  Premature Infant recommended reference ranges:  Up to 24 hours.............<8.0 mg/dL  Up  to 48 hours............<12.0 mg/dL  3-5 days..................<15.0 mg/dL  6-29 days.................<15.0 mg/dL       Alkaline Phosphatase   Date Value Ref Range Status   10/01/2019 103 55 - 135 U/L Final     AST   Date Value Ref Range Status   10/01/2019 14 10 - 40 U/L Final     ALT   Date Value Ref Range Status   10/01/2019 15 10 - 44 U/L Final     Anion Gap   Date Value Ref Range Status   10/01/2019 8 8 - 16 mmol/L Final     eGFR if    Date Value Ref Range Status   10/01/2019 >60 >60 mL/min/1.73 m^2 Final     eGFR if non    Date Value Ref Range Status   10/01/2019 >60 >60 mL/min/1.73 m^2 Final     Comment:     Calculation used to obtain the estimated glomerular filtration  rate (eGFR) is the CKD-EPI equation.        Lab studies and bone density study ordered    Assessment:   (1) 69 y.o. female with diagnosis of multifocal left breast cancer, clinical stage I disease, ERP positive, HER2 Alec negative.    (2) currently on adjuvant Arimidex 1 mg p.o. daily x3 of 5-8 years.  Estimated 10 year recurrence risk is 12% per Oncotype DX testing.    Bone density testing is being done to check for osteoporosis while on long-term Arimidex.    She is status post gastric sleeve surgery and has history of B12 deficiency in the past.  Will check her vitamins and iron for nutritional deficiencies and supplement her as needed.    Tumor marker studies have been ordered.    She will follow-up here in 1 month to review results.  She will follow-up here in 6 months for examination.          1. Hypovitaminosis D    2. History of breast cancer    3. Anemia associated with nutritional deficiency    4. Encounter for screening for osteoporosis    5. Osteoporosis due to aromatase inhibitor        Plan:       Hypovitaminosis D  -     Cancel: CMP; Standing  -     Cancel: Vitamin D; Standing    History of breast cancer  -     Cancel: Cancer antigen 15-3; Standing  -     Cancel: Cancer antigen 27.29; Standing  -      Cancel: CMP; Standing    Anemia associated with nutritional deficiency  -     Cancel: Vitamin B12; Standing  -     Cancel: Ferritin; Standing  -     Cancel: CBC auto differential; Standing  -     Cancel: CMP; Standing  -     Cancel: Vitamin D; Standing    Encounter for screening for osteoporosis    Osteoporosis due to aromatase inhibitor  -     BONE MIN DENSITY; Future; Expected date: 05/15/2020      Follow up in about 3 weeks (around 5/28/2020) for follow up on cancer status.    I have explained and the patient understands all of  the current recommendation(s). I have answered all of their questions to the best of my ability and to their complete satisfaction.

## 2020-05-11 ENCOUNTER — PATIENT MESSAGE (OUTPATIENT)
Dept: HEMATOLOGY/ONCOLOGY | Facility: CLINIC | Age: 70
End: 2020-05-11

## 2020-05-11 ENCOUNTER — OFFICE VISIT (OUTPATIENT)
Dept: ORTHOPEDICS | Facility: CLINIC | Age: 70
End: 2020-05-11
Payer: MEDICARE

## 2020-05-11 ENCOUNTER — HOSPITAL ENCOUNTER (OUTPATIENT)
Dept: RADIOLOGY | Facility: HOSPITAL | Age: 70
Discharge: HOME | End: 2020-05-11
Attending: ORTHOPAEDIC SURGERY
Payer: MEDICARE

## 2020-05-11 ENCOUNTER — TELEPHONE (OUTPATIENT)
Dept: HEMATOLOGY/ONCOLOGY | Facility: CLINIC | Age: 70
End: 2020-05-11

## 2020-05-11 VITALS
BODY MASS INDEX: 27.1 KG/M2 | WEIGHT: 158.75 LBS | TEMPERATURE: 98 F | DIASTOLIC BLOOD PRESSURE: 77 MMHG | SYSTOLIC BLOOD PRESSURE: 154 MMHG | HEART RATE: 62 BPM | HEIGHT: 64 IN

## 2020-05-11 DIAGNOSIS — Z98.84 HISTORY OF BARIATRIC SURGERY: ICD-10-CM

## 2020-05-11 DIAGNOSIS — M19.042 ARTHRITIS OF LEFT HAND: Primary | ICD-10-CM

## 2020-05-11 DIAGNOSIS — Z17.0 MALIGNANT NEOPLASM OF UPPER-OUTER QUADRANT OF LEFT BREAST IN FEMALE, ESTROGEN RECEPTOR POSITIVE: ICD-10-CM

## 2020-05-11 DIAGNOSIS — C50.412 MALIGNANT NEOPLASM OF UPPER-OUTER QUADRANT OF LEFT BREAST IN FEMALE, ESTROGEN RECEPTOR POSITIVE: ICD-10-CM

## 2020-05-11 DIAGNOSIS — E63.9 NUTRITIONAL DEFICIENCY: Primary | ICD-10-CM

## 2020-05-11 DIAGNOSIS — D53.9 ANEMIA ASSOCIATED WITH NUTRITIONAL DEFICIENCY: ICD-10-CM

## 2020-05-11 DIAGNOSIS — M19.042 ARTHRITIS OF LEFT HAND: ICD-10-CM

## 2020-05-11 PROCEDURE — 99213 OFFICE O/P EST LOW 20 MIN: CPT | Mod: PBBFAC,25,PN | Performed by: ORTHOPAEDIC SURGERY

## 2020-05-11 PROCEDURE — 99203 PR OFFICE/OUTPT VISIT, NEW, LEVL III, 30-44 MIN: ICD-10-PCS | Mod: S$PBB,,, | Performed by: ORTHOPAEDIC SURGERY

## 2020-05-11 PROCEDURE — 99999 PR PBB SHADOW E&M-EST. PATIENT-LVL III: CPT | Mod: PBBFAC,,, | Performed by: ORTHOPAEDIC SURGERY

## 2020-05-11 PROCEDURE — 73130 X-RAY EXAM OF HAND: CPT | Mod: TC,PO,LT

## 2020-05-11 PROCEDURE — 99999 PR PBB SHADOW E&M-EST. PATIENT-LVL III: ICD-10-PCS | Mod: PBBFAC,,, | Performed by: ORTHOPAEDIC SURGERY

## 2020-05-11 PROCEDURE — 99203 OFFICE O/P NEW LOW 30 MIN: CPT | Mod: S$PBB,,, | Performed by: ORTHOPAEDIC SURGERY

## 2020-05-11 PROCEDURE — 73130 X-RAY EXAM OF HAND: CPT | Mod: 26,LT,, | Performed by: RADIOLOGY

## 2020-05-11 PROCEDURE — 73130 XR HAND COMPLETE 3 VIEW LEFT: ICD-10-PCS | Mod: 26,LT,, | Performed by: RADIOLOGY

## 2020-05-11 NOTE — PROGRESS NOTES
2020    Chief Complaint:  Chief Complaint   Patient presents with    Left Hand - Pain       HPI:  Marce Hickey is a 69 y.o. female, who presents to clinic today she has a history of left hand pain specifically about the index finger.  She states that she has noticed a deformity developing over the left index finger tip as well.  She states that this is significantly painful.  She feels as if this may be arthritis but is concerned about the appearance.  She is here today for evaluation and treatment options.  Has no other current complaints.    PMHX:  Past Medical History:   Diagnosis Date    Anxiety     Arthritis     Cancer     breast cancer - left    Depression     Diabetes mellitus, type 2     Borderline    Dyslipidemia 2016    Fatty liver disease, nonalcoholic     GERD (gastroesophageal reflux disease)     Hyperlipidemia     Hypertension     Plantar fasciitis of right foot        PSHX:  Past Surgical History:   Procedure Laterality Date    breast reduction      BREAST SURGERY Bilateral     masectomy    BREAST SURGERY Bilateral     implants     SECTION      x 2    CHOLECYSTECTOMY      COLONOSCOPY N/A 2020    Procedure: COLONOSCOPY;  Surgeon: Nupur Leonard MD;  Location: [facility] ENDO;  Service: Endoscopy;  Laterality: N/A;    FOOT SURGERY      left bunionectomy    gastric sleve      HYSTERECTOMY      one ovary intact, adhesions    KNEE ARTHROPLASTY Left 2018    Procedure: ARTHROPLASTY, KNEE;  Surgeon: Christos Montanez MD;  Location: [facility] OR;  Service: Orthopedics;  Laterality: Left;    KNEE ARTHROSCOPY Left     LIPOSUCTION      ROTATOR CUFF REPAIR Right     TONSILLECTOMY         FMHX:  Family History   Problem Relation Age of Onset    Hypertension Mother     Heart disease Mother         pacemaker    Heart attack Mother     Heart disease Father     Psoriasis Father     Cancer Neg Hx        SOCHX:  Social History     Tobacco Use    Smoking  status: Former Smoker     Last attempt to quit: 1993     Years since quittin.4    Smokeless tobacco: Never Used    Tobacco comment: quit    Substance Use Topics    Alcohol use: No     Alcohol/week: 0.0 standard drinks     Frequency: Never     Binge frequency: Never       ALLERGIES:  Nsaids (non-steroidal anti-inflammatory drug)    CURRENT MEDICATIONS:  Current Outpatient Medications on File Prior to Visit   Medication Sig Dispense Refill    anastrozole (ARIMIDEX) 1 mg Tab TAKE 1 TABLET DAILY 90 tablet 4    calcium-vitamin D3 500 mg(1,250mg) -200 unit per tablet once daily. Patient uses a patch, not oral medication      clotrimazole-betamethasone 1-0.05% (LOTRISONE) cream Apply topically 2 (two) times daily. 45 g 0    FLUAD [id number], 65 YR UP,,PF, 45 mcg (15 mcg x 3)/0.5 mL Syrg PHARMACIST ADMINISTERED IMMUNIZATION ADMINISTERED AT TIME OF DISPENSING  0    lidocaine (LIDODERM) 5 % Place 1 patch onto the skin once daily. Remove & Discard patch within 12 hours or as directed by MD 40 patch 0    multivitamin with minerals tablet Take 1 tablet by mouth once daily.      omeprazole-sodium bicarbonate (ZEGERID) 40-1.1 mg-gram per capsule Take 1 capsule by mouth before breakfast. (Patient taking differently: Take 1 capsule by mouth daily as needed. ) 90 capsule 0    topiramate (TOPAMAX) 25 MG tablet TAKE 1 TABLET EVERY 12 HOURS 180 tablet 3    [DISCONTINUED] hydroCHLOROthiazide (HYDRODIURIL) 25 MG tablet 1 tablet PO daily prn BP greater than 140/90 30 tablet 11     No current facility-administered medications on file prior to visit.        REVIEW OF SYSTEMS:  Review of Systems   Constitutional: Negative for diaphoresis and fever.   HENT: Negative for ear pain, hearing loss, nosebleeds and tinnitus.    Eyes: Negative for pain and redness.   Respiratory: Negative for cough and shortness of breath.    Cardiovascular: Negative for chest pain and palpitations.   Gastrointestinal: Negative for blood in  "stool, constipation, diarrhea, nausea and vomiting.   Genitourinary: Negative for dysuria, frequency and hematuria.   Musculoskeletal: Negative for back pain and myalgias.   Skin: Negative for itching and rash.   Neurological: Negative for dizziness, tingling, seizures, weakness and headaches.   Endo/Heme/Allergies: Negative for environmental allergies.   Psychiatric/Behavioral: The patient is not nervous/anxious.        GENERAL PHYSICAL EXAM:   Temp 98.4 °F (36.9 °C) (Temporal)   Ht 5' 4" (1.626 m)   Wt 72 kg (158 lb 11.7 oz)   BMI 27.25 kg/m²    GEN: well developed, well nourished, no acute distress   HENT: Normocephalic, atraumatic   EYES: No discharge, conjunctiva normal   NECK: Supple, non-tender   PULM: No wheezing, no respiratory distress   CV: RRR   ABD: Soft, non-tender    ORTHO EXAM:   Examination left hand and wrist reveals that there is no significant edema.  There are no major skin changes.  She does have changes consistent with osteoarthritis through multiple joints of the hand.  Most affected appears to be the left index finger distal interphalangeal joint.  There is an ulnar deviation deformity noted.  Palpation at the joint produces mild tenderness.  There is no increased warmth or erythema.  She does have sensation intact in the median radial and ulnar distributions.  Capillary refill is less than 2 sec in all the digits.    RADIOLOGY:   X-rays of the left hand were taken in clinic today.  The films have been reviewed by me.  She is noted have arthritis at multiple joints in the hand most significant of which is at the distal interphalangeal joint of the small finger there are no fractures dislocations    ASSESSMENT:   Left small finger distal interphalangeal joint arthritis    PLAN:  1.  I have discussed treatment options with the patient.  I have discussed anti-inflammatories but the patient states that she is allergic anti-inflammatories and cannot take them.  I did discuss the possibility of " steroid injection.  The patient states that she does not want a steroid injection at this time.    2.  She will follow up with me on a p.r.n. basis    Answers for HPI/ROS submitted by the patient on 5/7/2020   Hand injury  unexpected weight change: No  appetite change : No  sleep disturbance: No  IMMUNOCOMPROMISED: No  dysphoric mood: No  visual disturbance: No  sinus pressure : No  food allergies: No  difficulty urinating: No  painful intercourse: No  numbness: No  joint swelling: No  Pain Chronicity: chronic  History of trauma: No  Onset: more than 1 month ago  Frequency: 2 to 4 times per day  Progression since onset: waxing and waning  injury location: at home  pain- numeric: 6/10  pain location: left fingers  pain quality: burning  Radiating Pain: No  Aggravating factors: activity, bearing weight, cold, contact, exercise, flexion, twisting, lifting, rotation, touching  inability to bear weight: Yes  joint locking: No  limited range of motion: No  stiffness: No  Treatments tried: heat, movement, other  physical therapy: effective  Improvement on treatment: moderate

## 2020-05-13 ENCOUNTER — PATIENT OUTREACH (OUTPATIENT)
Dept: ADMINISTRATIVE | Facility: OTHER | Age: 70
End: 2020-05-13

## 2020-05-14 ENCOUNTER — HOSPITAL ENCOUNTER (OUTPATIENT)
Dept: RADIOLOGY | Facility: HOSPITAL | Age: 70
Discharge: HOME | End: 2020-05-14
Attending: INTERNAL MEDICINE
Payer: MEDICARE

## 2020-05-14 ENCOUNTER — OFFICE VISIT (OUTPATIENT)
Dept: UROLOGY | Facility: CLINIC | Age: 70
End: 2020-05-14
Payer: MEDICARE

## 2020-05-14 DIAGNOSIS — N39.0 RECURRENT UTI: Primary | ICD-10-CM

## 2020-05-14 DIAGNOSIS — T38.6X5A OSTEOPOROSIS DUE TO AROMATASE INHIBITOR: ICD-10-CM

## 2020-05-14 DIAGNOSIS — M81.8 OSTEOPOROSIS DUE TO AROMATASE INHIBITOR: ICD-10-CM

## 2020-05-14 PROCEDURE — 99442 PR PHYSICIAN TELEPHONE EVALUATION 11-20 MIN: ICD-10-PCS | Mod: 95,,, | Performed by: UROLOGY

## 2020-05-14 PROCEDURE — 77080 DXA BONE DENSITY AXIAL: CPT | Mod: 26,,, | Performed by: RADIOLOGY

## 2020-05-14 PROCEDURE — 77080 DXA BONE DENSITY AXIAL: CPT | Mod: TC

## 2020-05-14 PROCEDURE — 99442 PR PHYSICIAN TELEPHONE EVALUATION 11-20 MIN: CPT | Mod: 95,,, | Performed by: UROLOGY

## 2020-05-14 PROCEDURE — 77080 DEXA BONE DENSITY SPINE HIP: ICD-10-PCS | Mod: 26,,, | Performed by: RADIOLOGY

## 2020-05-14 NOTE — PROGRESS NOTES
Established Patient - Audio Only Telehealth Visit     The patient location is: Home  The chief complaint leading to consultation is: Recurrent UTI  Visit type: Virtual visit with audio only (telephone)  Total time spent with patient: 11 minutes       The reason for the audio only service rather than synchronous audio and video virtual visit was related to technical difficulties or patient preference/necessity.     Each patient to whom I provide medical services by telemedicine is:  (1) informed of the relationship between the physician and patient and the respective role of any other health care provider with respect to management of the patient; and (2) notified that they may decline to receive medical services by telemedicine and may withdraw from such care at any time. Patient verbally consented to receive this service via voice-only telephone call.       HPI:     Marce Hickey is a 69 y.o. female who presents for recurrent UTI.      Patient with a history of recurrent UTI for the past several months. She has a history of foul-smelling, cloudy urine during the infections with suprapubic discomfort as well. Her most recent documented UTI 1/15/20.      Patient with a history of breast cancer s/p bilateral mastectomy now on anastrozole.       Interval History    5/14/20: Patient started on Cranberry and encouraged to increase her water intake as well as urinate more frequently. She has not had another UTI since her visit and states she feels improved significantly.     She denies any fever, chills, flank pain, dysuria, gross hematuria, recent  trauma or history of  malignancy.         Urine culture  E. Coli   1/15/20  E. Coli    8/28/19  E. Coli    12/11/18       Assessment and plan:      1. Recurrent urinary tract infection    - We discussed again that UTI is multifactorial and can be secondary to nephrolithiasis, decreased estrogen levels after menopause, anatomic abnormalities, sexual intercourse,  genetic predisposition, chronic preston, constipation, medications, dehydration and urinary retention.   - Instructed patient to continue to increase water intake, continue Cranberry tablets PO TID, avoid pads if possible and urinate every 2 hours. Also instructed to avoid long-term antibiotic use as this has been shown to increase bacterial resistance.   - RTC PRN       This service was not originating from a related E/M service provided within the previous 7 days nor will  to an E/M service or procedure within the next 24 hours or my soonest available appointment.  Prevailing standard of care was able to be met in this audio-only visit.

## 2020-05-17 NOTE — PLAN OF CARE
02/02/17 0832   Final Note   Assessment Type Final Discharge Note   Discharge Disposition Home   Discharge planning education complete? Yes      Patient

## 2020-05-18 ENCOUNTER — PATIENT MESSAGE (OUTPATIENT)
Dept: HEMATOLOGY/ONCOLOGY | Facility: CLINIC | Age: 70
End: 2020-05-18

## 2020-05-20 ENCOUNTER — TELEPHONE (OUTPATIENT)
Dept: HEMATOLOGY/ONCOLOGY | Facility: CLINIC | Age: 70
End: 2020-05-20

## 2020-05-20 NOTE — TELEPHONE ENCOUNTER
----- Message from Shanthi Lara sent at 5/20/2020 10:02 AM CDT -----  Regarding: Patient Transfer  Patient wanted us to let Dr. Ding know she has transferred to Dr. Matos.    Thank you

## 2020-05-28 ENCOUNTER — OFFICE VISIT (OUTPATIENT)
Dept: HEMATOLOGY/ONCOLOGY | Facility: CLINIC | Age: 70
End: 2020-05-28
Payer: MEDICARE

## 2020-05-28 DIAGNOSIS — D51.8 ANEMIA OF DECREASED VITAMIN B12 ABSORPTION: ICD-10-CM

## 2020-05-28 DIAGNOSIS — D53.9 ANEMIA ASSOCIATED WITH NUTRITIONAL DEFICIENCY: Primary | ICD-10-CM

## 2020-05-28 DIAGNOSIS — Z98.84 HISTORY OF BARIATRIC SURGERY: ICD-10-CM

## 2020-05-28 DIAGNOSIS — C50.412 MALIGNANT NEOPLASM OF UPPER-OUTER QUADRANT OF LEFT BREAST IN FEMALE, ESTROGEN RECEPTOR POSITIVE: ICD-10-CM

## 2020-05-28 DIAGNOSIS — Z17.0 MALIGNANT NEOPLASM OF UPPER-OUTER QUADRANT OF LEFT BREAST IN FEMALE, ESTROGEN RECEPTOR POSITIVE: ICD-10-CM

## 2020-05-28 PROCEDURE — 99214 PR OFFICE/OUTPT VISIT, EST, LEVL IV, 30-39 MIN: ICD-10-PCS | Mod: 95,,, | Performed by: INTERNAL MEDICINE

## 2020-05-28 PROCEDURE — 99214 OFFICE O/P EST MOD 30 MIN: CPT | Mod: 95,,, | Performed by: INTERNAL MEDICINE

## 2020-05-29 NOTE — PROGRESS NOTES
"[facility] hematology Oncology audio video encounter progress note  May 28, 2020    Patient is following isolation precautions at home because of the corona virus epidemic.    This is an audio video encounter in lieu of in-person encounter because of corona virus emergency.  Patient acknowledges and consents to audio video encounter today..    Subjective:      Patient ID:   Marce Hickey  70 y.o. female  1950  KATHERINE Shah MD Sumathi Smith, MD      Chief Complaint:   Breast cancer evaluation    HPI:  70 y.o. female has a history of breast cancer.  She has asked if I would assume her oncology care  .  She has a history of breast reduction back in 1992.  Recently in March 2017 she had a abnormal mammogram at [facility].  Two adjacent masses were seen at the 12 and 1:00 position of the left breast.  She had bilateral mastectomy, with left sentinel node biopsy in May 2017.  She also had reconstruction with expanders placed and eventually implants paced per Dr. Thomas of Plastic surgery.    Breast cancer tumor markers were normal.  She will continue on adjuvant Arimidex daily for now.    She is status post gastric sleeve surgery January 29, 2017.  She has a history of B12 deficiency and has been on B12 sublingually in the past, but has not taken it," in a long time.      B12 is low normal at 290 and iron or ferritin is normal at 93.  Vitamin D is low at 24 and bone density test shows osteopenia.    I have asked her to begin B12 sublingual supplement daily and will recheck her B12 level in 3 months.  She is to follow-up with her primary care for recommendations regarding vitamin-D supplementation and osteopenia/osteoporosis management and prevention while on Arimidex.    Estimated breast cancer recurrence was reduced from 23% to 12% with adjuvant Arimidex or tamoxifen usage using the breast cancer index reference.  She has been on Arimidex adjuvantly x3 years and this is refilled " through [facility].    She complains of pain at her left 2nd distal joint finger area and is due to see MD for evaluation.  She is status post partial hysterectomy.  She took birth control pills until about age 26.  She had hysterectomy at age 30.  She did not take hormone replacement therapy.    She wears a calcium and vitamin-D and multivitamin patch x2 years.    For her history includes generalized anxiety disorder, type 2 diabetes, low vitamin-D levels, GERD, fatty liver, dyslipidemia, depression, hypertension, plantar fasciitis of the right foot.    She is status post breast reduction, bilateral mastectomy, breast implant placement, with reconstruction.  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  Other history includes the gastric sleeve surgery, partial hysterectomy, right rotator cuff repair, tonsillectomy, and cholecystectomy.    Her mother had hypertension, heart disease and history of heart attack.  Her father had heart disease and psoriasis.    She was in the [employer] times 26 years till .  She smoked times 19 years, 1 pack per day, and quit in .  She does not drink alcohol with with regularity.  She is allergic to aspirin and nonsteroidal anti inflammatory drugs as manifested with anaphylaxis.    Her mother  in 2017 of old age.  She had a lumpectomy done but it is not clear if she had breast cancer.  Her father had heart disease.  She has a brother alive and well and a cousin with prostate cancer.  She has 2 sons alive and well.      ROS:   GEN: normal without any fever, night sweats or weight loss  HEENT: normal with no HA's, sore throat, stiff neck, changes in vision  CV: normal with no CP, SOB, PND, FLORES or orthopnea  PULM: normal with no SOB, cough, hemoptysis, sputum or pleuritic pain  GI:  See history of present illness  : normal with no hematuria, dysuria  BREAST:  See history of present illness  SKIN:  normal with no rash, erythema, bruising, or swelling     Past Medical History:   Diagnosis Date    Anxiety     Arthritis     Cancer     breast cancer - left    Depression     Diabetes mellitus, type 2     Borderline    Dyslipidemia 2016    Fatty liver disease, nonalcoholic     GERD (gastroesophageal reflux disease)     Hyperlipidemia     Hypertension     Plantar fasciitis of right foot      Past Surgical History:   Procedure Laterality Date    breast reduction      BREAST SURGERY Bilateral     masectomy    BREAST SURGERY Bilateral     implants     SECTION      x 2    CHOLECYSTECTOMY      COLONOSCOPY N/A 2020    Procedure: COLONOSCOPY;  Surgeon: Nupur Leonard MD;  Location: [facility] ENDO;  Service: Endoscopy;  Laterality: N/A;    FOOT SURGERY      left bunionectomy    gastric sleve      HYSTERECTOMY      one ovary intact, adhesions    KNEE ARTHROPLASTY Left 2018    Procedure: ARTHROPLASTY, KNEE;  Surgeon: Christos Montanez MD;  Location: [facility] OR;  Service: Orthopedics;  Laterality: Left;    KNEE ARTHROSCOPY Left     LIPOSUCTION      ROTATOR CUFF REPAIR Right     TONSILLECTOMY         Review of patient's allergies indicates:   Allergen Reactions    Nsaids (non-steroidal anti-inflammatory drug) Anaphylaxis     Social History     Socioeconomic History    Marital status:      Spouse name: Not on file    Number of children: Not on file    Years of education: Not on file    Highest education level: Not on file   Occupational History    Not on file   Social Needs    Financial resource strain: Not very hard    Food insecurity:     Worry: Never true     Inability: Never true    Transportation needs:     Medical: No     Non-medical: No   Tobacco Use    Smoking status: Former Smoker     Last attempt to quit: 1993     Years since quittin.5    Smokeless tobacco: Never Used    Tobacco comment: quit    Substance and Sexual Activity    Alcohol  use: No     Alcohol/week: 0.0 standard drinks     Frequency: Never     Binge frequency: Never    Drug use: No    Sexual activity: Yes     Birth control/protection: Surgical   Lifestyle    Physical activity:     Days per week: 3 days     Minutes per session: 30 min    Stress: Only a little   Relationships    Social connections:     Talks on phone: More than three times a week     Gets together: Three times a week     Attends Jainism service: Not on file     Active member of club or organization: Yes     Attends meetings of clubs or organizations: More than 4 times per year     Relationship status:    Other Topics Concern    Are you pregnant or think you may be? Not Asked    Breast-feeding Not Asked   Social History Narrative    Not on file         Current Outpatient Medications:     anastrozole (ARIMIDEX) 1 mg Tab, TAKE 1 TABLET DAILY, Disp: 90 tablet, Rfl: 4    calcium-vitamin D3 500 mg(1,250mg) -200 unit per tablet, once daily. Patient uses a patch, not oral medication, Disp: , Rfl:     clotrimazole-betamethasone 1-0.05% (LOTRISONE) cream, Apply topically 2 (two) times daily., Disp: 45 g, Rfl: 0    FLUAD [id number], 65 YR UP,,PF, 45 mcg (15 mcg x 3)/0.5 mL Syrg, PHARMACIST ADMINISTERED IMMUNIZATION ADMINISTERED AT TIME OF DISPENSING, Disp: , Rfl: 0    lidocaine (LIDODERM) 5 %, Place 1 patch onto the skin once daily. Remove & Discard patch within 12 hours or as directed by MD, Disp: 40 patch, Rfl: 0    multivitamin with minerals tablet, Take 1 tablet by mouth once daily., Disp: , Rfl:     omeprazole-sodium bicarbonate (ZEGERID) 40-1.1 mg-gram per capsule, Take 1 capsule by mouth before breakfast. (Patient taking differently: Take 1 capsule by mouth daily as needed. ), Disp: 90 capsule, Rfl: 0    topiramate (TOPAMAX) 25 MG tablet, TAKE 1 TABLET EVERY 12 HOURS, Disp: 180 tablet, Rfl: 3          Objective:   Vitals:  There were no vitals taken for this visit.    Physical Examination:   GEN: no  apparent distress, comfortable  HEAD: atraumatic and normocephalic  EYES: no pallor, no icterus  ENT: no pharyngeal erythema, external ears WNL; no nasal discharge  NECK: no masses, thyroid normal, trachea midline, no LAD/LN's, supple  CV: RRR with no murmur; normal pulse; normal S1 and S2; no pedal edema  CHEST: Normal respiratory effort; CTAB; normal breath sounds; no wheeze or crackles  ABDOM: nontender and nondistended; soft; normal bowel sounds; no rebound/guarding, liver and spleen were not palpable  MUSC/Skeletal: ROM normal; no crepitus; joints normal; no deformities or arthropathy  EXTREM: no clubbing, cyanosis, inflammation or swelling  SKIN: no rashes, lesions, ulcers, petechiae or subcutaneous nodules  : no cvat  NEURO: grossly intact; motor/sensory WNL;  no tremors  PSYCH: normal mood, affect and behavior  LYMPH: normal cervical, supraclavicular, axillary and groin LN's  BREASTS:  She has extensive postoperative change at the chest wall anteriorly, she does not have palpable mass at the left or right breast, chest area is nontender      Labs:   Lab Results   Component Value Date    WBC 4.32 05/14/2020    HGB 13.0 05/14/2020    HCT 40.7 05/14/2020    MCV 91 05/14/2020     (L) 05/14/2020    CMP  Sodium   Date Value Ref Range Status   05/14/2020 141 136 - 145 mmol/L Final     Potassium   Date Value Ref Range Status   05/14/2020 4.1 3.5 - 5.1 mmol/L Final     Chloride   Date Value Ref Range Status   05/14/2020 110 95 - 110 mmol/L Final     CO2   Date Value Ref Range Status   05/14/2020 25 23 - 29 mmol/L Final     Glucose   Date Value Ref Range Status   05/14/2020 84 70 - 110 mg/dL Final     BUN, Bld   Date Value Ref Range Status   05/14/2020 19 8 - 23 mg/dL Final     Creatinine   Date Value Ref Range Status   05/14/2020 0.8 0.5 - 1.4 mg/dL Final     Calcium   Date Value Ref Range Status   05/14/2020 9.7 8.7 - 10.5 mg/dL Final     Total Protein   Date Value Ref Range Status   05/14/2020 6.9 6.0 -  8.4 g/dL Final     Albumin   Date Value Ref Range Status   05/14/2020 4.1 3.5 - 5.2 g/dL Final     Total Bilirubin   Date Value Ref Range Status   05/14/2020 0.5 0.1 - 1.0 mg/dL Final     Comment:     For infants and newborns, interpretation of results should be based  on gestational age, weight and in agreement with clinical  observations.  Premature Infant recommended reference ranges:  Up to 24 hours.............<8.0 mg/dL  Up to 48 hours............<12.0 mg/dL  3-5 days..................<15.0 mg/dL  6-29 days.................<15.0 mg/dL       Alkaline Phosphatase   Date Value Ref Range Status   05/14/2020 96 55 - 135 U/L Final     AST   Date Value Ref Range Status   05/14/2020 15 10 - 40 U/L Final     ALT   Date Value Ref Range Status   05/14/2020 15 10 - 44 U/L Final     Anion Gap   Date Value Ref Range Status   05/14/2020 6 (L) 8 - 16 mmol/L Final     eGFR if    Date Value Ref Range Status   05/14/2020 >60 >60 mL/min/1.73 m^2 Final     eGFR if non    Date Value Ref Range Status   05/14/2020 >60 >60 mL/min/1.73 m^2 Final     Comment:     Calculation used to obtain the estimated glomerular filtration  rate (eGFR) is the CKD-EPI equation.        Lab studies and bone density study ordered    Assessment:   (1) 70 y.o. female with diagnosis of multifocal left breast cancer, clinical stage I disease, ERP positive, HER2 Alec negative.    (2) currently on adjuvant Arimidex 1 mg p.o. daily x3 of 5-8 years.  Estimated 10 year recurrence risk is 12% per Oncotype DX testing.    Bone density testing shows osteopenia.  She is to follow-up with her primary care for osteopenia/osteoporosis management while on Arimidex.    She is status post gastric sleeve surgery and has history of B12 deficiency in the past.  Her B12 level returned low normal at 290.  She will begin B12 supplement sublingual and we will recheck her vitamin level in 3 months.  Her ferritin is normal at 93, she does not require  iron supplementation now.    Vitamin D is low at 24.  She will follow-up with her primary care physician for vitamin-D supplementation and management.    Tumor marker studies are normal    She will follow-up here in 3 months with CBC, B12, and see me.

## 2020-06-08 ENCOUNTER — OFFICE VISIT (OUTPATIENT)
Dept: PSYCHIATRY | Facility: CLINIC | Age: 70
End: 2020-06-08
Payer: MEDICARE

## 2020-06-08 VITALS
HEART RATE: 63 BPM | BODY MASS INDEX: 27.03 KG/M2 | HEIGHT: 64 IN | WEIGHT: 158.31 LBS | SYSTOLIC BLOOD PRESSURE: 158 MMHG | DIASTOLIC BLOOD PRESSURE: 86 MMHG

## 2020-06-08 DIAGNOSIS — F33.9 RECURRENT DEPRESSION: Primary | ICD-10-CM

## 2020-06-08 PROCEDURE — 99999 PR PBB SHADOW E&M-EST. PATIENT-LVL III: ICD-10-PCS | Mod: PBBFAC,,, | Performed by: PSYCHOLOGIST

## 2020-06-08 PROCEDURE — 99214 PR OFFICE/OUTPT VISIT, EST, LEVL IV, 30-39 MIN: ICD-10-PCS | Mod: S$PBB,,, | Performed by: PSYCHOLOGIST

## 2020-06-08 PROCEDURE — 99213 OFFICE O/P EST LOW 20 MIN: CPT | Mod: PBBFAC,PN | Performed by: PSYCHOLOGIST

## 2020-06-08 PROCEDURE — 99214 OFFICE O/P EST MOD 30 MIN: CPT | Mod: S$PBB,,, | Performed by: PSYCHOLOGIST

## 2020-06-08 PROCEDURE — 99999 PR PBB SHADOW E&M-EST. PATIENT-LVL III: CPT | Mod: PBBFAC,,, | Performed by: PSYCHOLOGIST

## 2020-06-08 RX ORDER — TOPIRAMATE 25 MG/1
TABLET ORAL
Qty: 180 TABLET | Refills: 3 | Status: SHIPPED | OUTPATIENT
Start: 2020-06-08 | End: 2020-08-10 | Stop reason: SDUPTHER

## 2020-06-08 NOTE — PROGRESS NOTES
Client: Marce Hickey  : 1950  PCP:   Edilma Blackburn MD    Reason for visit:  the client was seen to assess their response to the medication prescribed, evaluate compliance, continue counseling and education and to coordinate care if needed.  Jaciel presented with a mild dysthymic mood, no overt anxiety.  Ongoing issue remains relationship with her  and her feeling he is not communicating well.   Her anger has worsened since she sts she has not been consistent taking the Topamax twice daily-plans to improve compliance.  No SI/HI/delusions/ hallucinations/no mood swings or expansive moods reported. No significant anger clinically, good mood, no anxiety.    Sleep and appetite were reported as being good.  No ADRs/SEs. Stressors:  situational stressors include 's unemployment.  On the PHQ9 her score today was  3 [2 ] and on the GAD7  2  [1] today.       Family psychiatric history: none reported  Relevant lab reviewed  MCHC 31.9; Plts 147; Vit D 24;   CBC normal, Vit D low, Cholesterol 296/Triglycerides 197  Relevant EKG reviewed   QTc 453 NSR    Review of patient's allergies indicates:   Allergen Reactions    Nsaids (non-steroidal anti-inflammatory drug) Anaphylaxis       Current Outpatient Medications:     anastrozole (ARIMIDEX) 1 mg Tab, TAKE 1 TABLET DAILY, Disp: 90 tablet, Rfl: 4    calcium-vitamin D3 500 mg(1,250mg) -200 unit per tablet, once daily. Patient uses a patch, not oral medication, Disp: , Rfl:     clotrimazole-betamethasone 1-0.05% (LOTRISONE) cream, Apply topically 2 (two) times daily., Disp: 45 g, Rfl: 0    FLUAD [id number], 65 YR UP,,PF, 45 mcg (15 mcg x 3)/0.5 mL Syrg, PHARMACIST ADMINISTERED IMMUNIZATION ADMINISTERED AT TIME OF DISPENSING, Disp: , Rfl: 0    lidocaine (LIDODERM) 5 %, Place 1 patch onto the skin once daily. Remove & Discard patch within 12 hours or as directed by MD, Disp: 40 patch, Rfl: 0    multivitamin with minerals tablet, Take 1 tablet by  mouth once daily., Disp: , Rfl:     omeprazole-sodium bicarbonate (ZEGERID) 40-1.1 mg-gram per capsule, Take 1 capsule by mouth before breakfast. (Patient taking differently: Take 1 capsule by mouth daily as needed. ), Disp: 90 capsule, Rfl: 0    topiramate (TOPAMAX) 25 MG tablet, TAKE 1 TABLET EVERY 12 HOURS, Disp: 180 tablet, Rfl: 3  Past Medical History:   Diagnosis Date    Anxiety     Arthritis     Cancer     breast cancer - left    Depression     Diabetes mellitus, type 2     Borderline    Dyslipidemia 12/12/2016    Fatty liver disease, nonalcoholic     GERD (gastroesophageal reflux disease)     Hyperlipidemia     Hypertension     Plantar fasciitis of right foot      Clinical presentation:  Appearance:  The client presented in casual attire evidencing good grooming and hygiene  Neuro:  the client was alert, oriented x 4 and evidenced good judgement and insight.    Attention/concentration:  Was good in session  Memory:  The client had no subjective memory complaints and they were able to recall information discussed in session accurately  Judgement:  behavior is adequate to the situation  Fund of knowledge:  intact and appropriate to age and level of education  Gait/station:  was normal  Heart: the patients heartbeat was regular in rate and rhythm.    Lung: clear bilaterally  Skin/extremities:  No rashes/lesions/bruises/edema reported or evident.   Mood:  Was minimally dysthymic, tearful about her mentor having cancer and being in hospice.  No SI/HI/delusions/hallucinations/mood swings or expansive moods.        Affect: was normal range  Speech:  normal rate and tone   Sleep: the client had no reported history of sleep problems if using Melatonin.  Onset was normal and they reported waking up feeling well rested.  No daytime sleepiness was reported  Appetite: was reported as good.  Has a gastric sleeve in 2017.   Pain complaints:  none were voiced  ETOH/Substance use history:  The client had no  reported ETOH/or other substance use problems by their report.  Psychosocial history:  The client currently lives with her 4th  of 8.5 years.  She has a grown son who is a recovering addict and has started a recovery program in [place] (15years sober)  And another son does not talk to her and lives in [place].    She has positive peer support and states she volunteers at [facility].  Her 's mother and children dont like her (his wife had left him).   Work history:  22 years in the [employer] as .  She had numerous jobs throughout the year and is retired and volunteers at [facility].     Education/session discussion:   · Reviewed strategies to help her coping with her stress, recommended therapy    · Reviewed medication and RBA/meded again today. Long term treatment issues reviewed   Reviewed need to take medication as prescribed     Impression:  Recurrent depression anger issues.  Jaciel is stable on her current medication.  Prognosis is  good    Plan:    Cont Topamax 25mg one tablet q12 hours, call if any problems  F/U w/PCP/new provider    Session:  [id number] the client was seen face to face to assess their response to the medication prescribed, evaluate compliance, continue counseling and education and to coordinate care if needed. Greater than 50% of the time was spent counseling the client and coordinating care.

## 2020-08-04 ENCOUNTER — TELEPHONE (OUTPATIENT)
Dept: HEMATOLOGY/ONCOLOGY | Facility: CLINIC | Age: 70
End: 2020-08-04

## 2020-08-04 ENCOUNTER — LAB VISIT (OUTPATIENT)
Dept: LAB | Facility: HOSPITAL | Age: 70
End: 2020-08-04
Attending: INTERNAL MEDICINE
Payer: MEDICARE

## 2020-08-04 DIAGNOSIS — D51.8 ANEMIA OF DECREASED VITAMIN B12 ABSORPTION: ICD-10-CM

## 2020-08-04 DIAGNOSIS — D51.8 ANEMIA OF DECREASED VITAMIN B12 ABSORPTION: Primary | ICD-10-CM

## 2020-08-04 DIAGNOSIS — E55.9 HYPOVITAMINOSIS D: ICD-10-CM

## 2020-08-04 DIAGNOSIS — D53.9 ANEMIA ASSOCIATED WITH NUTRITIONAL DEFICIENCY: ICD-10-CM

## 2020-08-04 LAB — VIT B12 SERPL-MCNC: 1612 PG/ML (ref 210–950)

## 2020-08-04 PROCEDURE — 82306 VITAMIN D 25 HYDROXY: CPT

## 2020-08-04 PROCEDURE — 82607 VITAMIN B-12: CPT

## 2020-08-04 PROCEDURE — 36415 COLL VENOUS BLD VENIPUNCTURE: CPT

## 2020-08-05 ENCOUNTER — OFFICE VISIT (OUTPATIENT)
Dept: HEMATOLOGY/ONCOLOGY | Facility: CLINIC | Age: 70
End: 2020-08-05
Payer: MEDICARE

## 2020-08-05 VITALS
WEIGHT: 161 LBS | TEMPERATURE: 98 F | HEART RATE: 76 BPM | BODY MASS INDEX: 27.64 KG/M2 | RESPIRATION RATE: 19 BRPM | DIASTOLIC BLOOD PRESSURE: 101 MMHG | SYSTOLIC BLOOD PRESSURE: 150 MMHG

## 2020-08-05 DIAGNOSIS — E55.9 HYPOVITAMINOSIS D: ICD-10-CM

## 2020-08-05 DIAGNOSIS — Z98.890 STATUS POST GASTRIC SURGERY: Primary | ICD-10-CM

## 2020-08-05 DIAGNOSIS — D53.9 ANEMIA ASSOCIATED WITH NUTRITIONAL DEFICIENCY: ICD-10-CM

## 2020-08-05 LAB — 25(OH)D3+25(OH)D2 SERPL-MCNC: 34 NG/ML (ref 30–96)

## 2020-08-05 PROCEDURE — 99214 OFFICE O/P EST MOD 30 MIN: CPT | Mod: S$GLB,,, | Performed by: INTERNAL MEDICINE

## 2020-08-05 PROCEDURE — 99214 PR OFFICE/OUTPT VISIT, EST, LEVL IV, 30-39 MIN: ICD-10-PCS | Mod: S$GLB,,, | Performed by: INTERNAL MEDICINE

## 2020-08-06 NOTE — PROGRESS NOTES
"[facility] hematology Oncology In Office Follow Up  encounter progress note  8/4/20      Subjective:      Patient ID:   Marce Hickey  70 y.o. female  1950  MD Ren Watkins MD        Chief Complaint:   Breast cancer evaluation    HPI:  70 y.o. female has a history of breast cancer.  She has asked if I would assume her oncology care  .  She has a history of breast reduction back in 1992.  Recently in March 2017 she had a abnormal mammogram at [facility].  Two adjacent masses were seen at the 12 and 1:00 position of the left breast.  She had bilateral mastectomy, with left sentinel node biopsy in May 2017.  She also had reconstruction with expanders placed and eventually implants paced per Dr. Thomas of Plastic surgery.    Breast cancer tumor markers were normal.  She will continue on adjuvant Arimidex daily for now.    She is status post gastric sleeve surgery January 29, 2017.  She has a history of B12 deficiency and has been on B12 sublingually in the past, but has not taken it," in a long time.      B12 is low normal at 290 and  ferritin is normal at 93.  Vitamin D is low at 24 and bone density test shows osteopenia.  On B 12 subl daily, the level is up to 1,600.    Vitamin D is better at 36, on supplements.      She is to follow-up with her primary care for recommendations regarding vitamin-D supplementation and osteopenia/osteoporosis management and prevention while on Arimidex.  Bone Density test 5/14/20 showed osteopenia.    Estimated breast cancer recurrence was reduced from 23% to 12% with adjuvant Arimidex or tamoxifen usage using the breast cancer index reference.  She has been on Arimidex adjuvantly x3 years and this is refilled through [facility].    She complains of pain at her left 2nd distal joint finger area and saw Dr. Schultz  for evaluation.  Was told she had degenerative arthritis at L>R 2nd finger.    She is status post partial hysterectomy.  She took " birth control pills until about age 26.  She had hysterectomy at age 30.  She did not take hormone replacement therapy.    She wears a calcium and vitamin-D and multivitamin patch x2 years.  Hx anaphylaxis to NSAIDS.    For her history includes generalized anxiety disorder, type 2 diabetes, low vitamin-D levels, GERD, fatty liver, dyslipidemia, depression, hypertension, plantar fasciitis of the right foot.    She is status post breast reduction, bilateral mastectomy, breast implant placement, with reconstruction.  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  Other history includes the gastric sleeve surgery, partial hysterectomy, right rotator cuff repair, tonsillectomy, and cholecystectomy.    Her mother had hypertension, heart disease and history of heart attack.  Her father had heart disease and psoriasis.    She was in the [employer] times 26 years till .  She smoked times 19 years, 1 pack per day, and quit in .  She does not drink alcohol with with regularity.  She is allergic to aspirin and nonsteroidal anti inflammatory drugs as manifested with anaphylaxis.    Her mother  in  of old age.  She had a lumpectomy done but it is not clear if she had breast cancer.  Her father had heart disease.  She has a brother alive and well and a cousin with prostate cancer.  She has 2 sons alive and well.    , Antonyh Magee, IT, [employer] [facility].      ROS:   GEN: normal without any fever, night sweats or weight loss  HEENT: normal with no HA's, sore throat, stiff neck, changes in vision  CV: normal with no CP, SOB, PND, FLORES or orthopnea  PULM: normal with no SOB, cough, hemoptysis, sputum or pleuritic pain  GI:  See history of present illness  : normal with no hematuria, dysuria  BREAST:  See history of present illness  SKIN: normal with no rash, erythema, bruising, or swelling     Past Medical History:   Diagnosis Date    Anxiety      Arthritis     Cancer     breast cancer - left    Depression     Diabetes mellitus, type 2     Borderline    Dyslipidemia 2016    Fatty liver disease, nonalcoholic     GERD (gastroesophageal reflux disease)     Hyperlipidemia     Hypertension     Plantar fasciitis of right foot      Past Surgical History:   Procedure Laterality Date    breast reduction      BREAST SURGERY Bilateral     masectomy    BREAST SURGERY Bilateral     implants     SECTION      x 2    CHOLECYSTECTOMY      COLONOSCOPY N/A 2020    Procedure: COLONOSCOPY;  Surgeon: Nupur Leonard MD;  Location: [facility] ENDO;  Service: Endoscopy;  Laterality: N/A;    FOOT SURGERY      left bunionectomy    gastric sleve      HYSTERECTOMY      one ovary intact, adhesions    KNEE ARTHROPLASTY Left 2018    Procedure: ARTHROPLASTY, KNEE;  Surgeon: Christos Montanez MD;  Location: [facility] OR;  Service: Orthopedics;  Laterality: Left;    KNEE ARTHROSCOPY Left     LIPOSUCTION      ROTATOR CUFF REPAIR Right     TONSILLECTOMY         Review of patient's allergies indicates:   Allergen Reactions    Nsaids (non-steroidal anti-inflammatory drug) Anaphylaxis     Social History     Socioeconomic History    Marital status:      Spouse name: Not on file    Number of children: Not on file    Years of education: Not on file    Highest education level: Not on file   Occupational History    Not on file   Social Needs    Financial resource strain: Not very hard    Food insecurity     Worry: Never true     Inability: Never true    Transportation needs     Medical: No     Non-medical: No   Tobacco Use    Smoking status: Former Smoker     Quit date: 1993     Years since quittin.7    Smokeless tobacco: Never Used    Tobacco comment: quit    Substance and Sexual Activity    Alcohol use: No     Alcohol/week: 0.0 standard drinks     Frequency: Never     Binge frequency: Never    Drug use: No    Sexual  activity: Yes     Birth control/protection: Surgical   Lifestyle    Physical activity     Days per week: 3 days     Minutes per session: 30 min    Stress: Only a little   Relationships    Social connections     Talks on phone: More than three times a week     Gets together: Three times a week     Attends Protestant service: Not on file     Active member of club or organization: Yes     Attends meetings of clubs or organizations: More than 4 times per year     Relationship status:    Other Topics Concern    Are you pregnant or think you may be? Not Asked    Breast-feeding Not Asked   Social History Narrative    Not on file         Current Outpatient Medications:     anastrozole (ARIMIDEX) 1 mg Tab, TAKE 1 TABLET DAILY, Disp: 90 tablet, Rfl: 4    calcium-vitamin D3 500 mg(1,250mg) -200 unit per tablet, once daily. Patient uses a patch, not oral medication, Disp: , Rfl:     clotrimazole-betamethasone 1-0.05% (LOTRISONE) cream, Apply topically 2 (two) times daily., Disp: 45 g, Rfl: 0    FLUAD [id number], 65 YR UP,,PF, 45 mcg (15 mcg x 3)/0.5 mL Syrg, PHARMACIST ADMINISTERED IMMUNIZATION ADMINISTERED AT TIME OF DISPENSING, Disp: , Rfl: 0    lidocaine (LIDODERM) 5 %, Place 1 patch onto the skin once daily. Remove & Discard patch within 12 hours or as directed by MD, Disp: 40 patch, Rfl: 0    multivitamin with minerals tablet, Take 1 tablet by mouth once daily., Disp: , Rfl:     omeprazole-sodium bicarbonate (ZEGERID) 40-1.1 mg-gram per capsule, TAKE 1 CAPSULE BEFORE BREAKFAST, Disp: 90 capsule, Rfl: 1    topiramate (TOPAMAX) 25 MG tablet, TAKE 1 TABLET EVERY 12 HOURS, Disp: 180 tablet, Rfl: 3          Objective:   Vitals:  Blood pressure (!) 150/101, pulse 76, temperature 97.7 °F (36.5 °C), resp. rate 19, weight 73 kg (161 lb).    Physical Examination:   GEN: no apparent distress, comfortable  HEAD: atraumatic and normocephalic  EYES: no pallor, no icterus  ENT: no pharyngeal erythema, external ears  WNL; no nasal discharge  NECK: no masses, thyroid normal, trachea midline, no LAD/LN's, supple  CV: RRR with no murmur; normal pulse; normal S1 and S2; no pedal edema  CHEST: Normal respiratory effort; CTAB; normal breath sounds; no wheeze or crackles  ABDOM: nontender and nondistended; soft; normal bowel sounds; no rebound/guarding, liver and spleen were not palpable  MUSC/Skeletal: ROM normal; no crepitus; joints normal; no deformities or arthropathy  EXTREM: no clubbing, cyanosis, inflammation or swelling  SKIN: no rashes, lesions, ulcers, petechiae or subcutaneous nodules  : no cvat  NEURO: grossly intact; motor/sensory WNL;  no tremors  PSYCH: normal mood, affect and behavior  LYMPH: normal cervical, supraclavicular, axillary and groin LN's  BREASTS:  She has extensive postoperative change at the chest wall anteriorly, she does not have palpable mass at the left or right breast, chest area is nontender      Labs:   Lab Results   Component Value Date    WBC 4.32 05/14/2020    HGB 13.0 05/14/2020    HCT 40.7 05/14/2020    MCV 91 05/14/2020     (L) 05/14/2020    CMP  Sodium   Date Value Ref Range Status   05/14/2020 141 136 - 145 mmol/L Final     Potassium   Date Value Ref Range Status   05/14/2020 4.1 3.5 - 5.1 mmol/L Final     Chloride   Date Value Ref Range Status   05/14/2020 110 95 - 110 mmol/L Final     CO2   Date Value Ref Range Status   05/14/2020 25 23 - 29 mmol/L Final     Glucose   Date Value Ref Range Status   05/14/2020 84 70 - 110 mg/dL Final     BUN, Bld   Date Value Ref Range Status   05/14/2020 19 8 - 23 mg/dL Final     Creatinine   Date Value Ref Range Status   05/14/2020 0.8 0.5 - 1.4 mg/dL Final     Calcium   Date Value Ref Range Status   05/14/2020 9.7 8.7 - 10.5 mg/dL Final     Total Protein   Date Value Ref Range Status   05/14/2020 6.9 6.0 - 8.4 g/dL Final     Albumin   Date Value Ref Range Status   05/14/2020 4.1 3.5 - 5.2 g/dL Final     Total Bilirubin   Date Value Ref Range  Status   05/14/2020 0.5 0.1 - 1.0 mg/dL Final     Comment:     For infants and newborns, interpretation of results should be based  on gestational age, weight and in agreement with clinical  observations.  Premature Infant recommended reference ranges:  Up to 24 hours.............<8.0 mg/dL  Up to 48 hours............<12.0 mg/dL  3-5 days..................<15.0 mg/dL  6-29 days.................<15.0 mg/dL       Alkaline Phosphatase   Date Value Ref Range Status   05/14/2020 96 55 - 135 U/L Final     AST   Date Value Ref Range Status   05/14/2020 15 10 - 40 U/L Final     ALT   Date Value Ref Range Status   05/14/2020 15 10 - 44 U/L Final     Anion Gap   Date Value Ref Range Status   05/14/2020 6 (L) 8 - 16 mmol/L Final     eGFR if    Date Value Ref Range Status   05/14/2020 >60 >60 mL/min/1.73 m^2 Final     eGFR if non    Date Value Ref Range Status   05/14/2020 >60 >60 mL/min/1.73 m^2 Final     Comment:     Calculation used to obtain the estimated glomerular filtration  rate (eGFR) is the CKD-EPI equation.            Assessment:   (1) 70 y.o. female with diagnosis of multifocal left breast cancer, clinical stage I disease, ERP positive, HER2 Alec negative.    (2) currently on adjuvant Arimidex 1 mg p.o. daily x3 of 5-8 years.  Estimated 10 year recurrence risk is 12% per Oncotype DX testing.    Bone density testing shows osteopenia.  She is to follow-up with her primary care for osteopenia/osteoporosis management while on Arimidex.    She is status post gastric sleeve surgery and has history of B12 deficiency in the past.  Her B12 level returned low normal at 290.  On subl B 12 the level is better at 1,600.    Vitamin D is low at 24.  On calcium and Vitamin D, the level is better at 36.    Tumor marker studies are normal    She will follow-up here in 3 months with CBC, B12, and see me.

## 2020-08-08 ENCOUNTER — PATIENT MESSAGE (OUTPATIENT)
Dept: FAMILY MEDICINE | Facility: CLINIC | Age: 70
End: 2020-08-08

## 2020-08-10 RX ORDER — TOPIRAMATE 25 MG/1
TABLET ORAL
Qty: 180 TABLET | Refills: 3 | Status: SHIPPED | OUTPATIENT
Start: 2020-08-10 | End: 2023-04-11

## 2020-11-02 ENCOUNTER — TELEPHONE (OUTPATIENT)
Dept: HEMATOLOGY/ONCOLOGY | Facility: CLINIC | Age: 70
End: 2020-11-02

## 2020-11-02 ENCOUNTER — PATIENT MESSAGE (OUTPATIENT)
Dept: HEMATOLOGY/ONCOLOGY | Facility: CLINIC | Age: 70
End: 2020-11-02

## 2020-11-02 DIAGNOSIS — D53.9 ANEMIA ASSOCIATED WITH NUTRITIONAL DEFICIENCY: ICD-10-CM

## 2020-11-02 DIAGNOSIS — E55.9 HYPOVITAMINOSIS D: ICD-10-CM

## 2020-11-02 DIAGNOSIS — D51.8 ANEMIA OF DECREASED VITAMIN B12 ABSORPTION: ICD-10-CM

## 2020-11-02 DIAGNOSIS — Z98.890 STATUS POST GASTRIC SURGERY: Primary | ICD-10-CM

## 2020-11-04 ENCOUNTER — LAB VISIT (OUTPATIENT)
Dept: LAB | Facility: HOSPITAL | Age: 70
End: 2020-11-04
Attending: INTERNAL MEDICINE
Payer: MEDICARE

## 2020-11-04 DIAGNOSIS — D51.8 ANEMIA OF DECREASED VITAMIN B12 ABSORPTION: ICD-10-CM

## 2020-11-04 DIAGNOSIS — D53.9 ANEMIA ASSOCIATED WITH NUTRITIONAL DEFICIENCY: ICD-10-CM

## 2020-11-04 DIAGNOSIS — E55.9 HYPOVITAMINOSIS D: ICD-10-CM

## 2020-11-04 DIAGNOSIS — Z98.890 STATUS POST GASTRIC SURGERY: ICD-10-CM

## 2020-11-04 LAB
BASOPHILS # BLD AUTO: 0.05 K/UL (ref 0–0.2)
BASOPHILS NFR BLD: 1 % (ref 0–1.9)
DIFFERENTIAL METHOD: ABNORMAL
EOSINOPHIL # BLD AUTO: 0.1 K/UL (ref 0–0.5)
EOSINOPHIL NFR BLD: 1.6 % (ref 0–8)
ERYTHROCYTE [DISTWIDTH] IN BLOOD BY AUTOMATED COUNT: 13.2 % (ref 11.5–14.5)
HCT VFR BLD AUTO: 43.9 % (ref 37–48.5)
HGB BLD-MCNC: 13.5 G/DL (ref 12–16)
IMM GRANULOCYTES # BLD AUTO: 0.01 K/UL (ref 0–0.04)
IMM GRANULOCYTES NFR BLD AUTO: 0.2 % (ref 0–0.5)
LYMPHOCYTES # BLD AUTO: 1.5 K/UL (ref 1–4.8)
LYMPHOCYTES NFR BLD: 29.8 % (ref 18–48)
MCH RBC QN AUTO: 28.7 PG (ref 27–31)
MCHC RBC AUTO-ENTMCNC: 30.8 G/DL (ref 32–36)
MCV RBC AUTO: 93 FL (ref 82–98)
MONOCYTES # BLD AUTO: 0.5 K/UL (ref 0.3–1)
MONOCYTES NFR BLD: 9.7 % (ref 4–15)
NEUTROPHILS # BLD AUTO: 2.9 K/UL (ref 1.8–7.7)
NEUTROPHILS NFR BLD: 57.7 % (ref 38–73)
NRBC BLD-RTO: 0 /100 WBC
PLATELET # BLD AUTO: 155 K/UL (ref 150–350)
PMV BLD AUTO: 12.9 FL (ref 9.2–12.9)
RBC # BLD AUTO: 4.7 M/UL (ref 4–5.4)
WBC # BLD AUTO: 5.06 K/UL (ref 3.9–12.7)

## 2020-11-04 PROCEDURE — 85025 COMPLETE CBC W/AUTO DIFF WBC: CPT

## 2020-11-04 PROCEDURE — 82306 VITAMIN D 25 HYDROXY: CPT

## 2020-11-04 PROCEDURE — 82607 VITAMIN B-12: CPT

## 2020-11-04 PROCEDURE — 36415 COLL VENOUS BLD VENIPUNCTURE: CPT | Mod: PO

## 2020-11-05 LAB
25(OH)D3+25(OH)D2 SERPL-MCNC: 36 NG/ML (ref 30–96)
VIT B12 SERPL-MCNC: 852 PG/ML (ref 210–950)

## 2020-11-10 ENCOUNTER — OFFICE VISIT (OUTPATIENT)
Dept: HEMATOLOGY/ONCOLOGY | Facility: CLINIC | Age: 70
End: 2020-11-10
Payer: MEDICARE

## 2020-11-10 ENCOUNTER — OFFICE VISIT (OUTPATIENT)
Dept: FAMILY MEDICINE | Facility: CLINIC | Age: 70
End: 2020-11-10
Payer: MEDICARE

## 2020-11-10 VITALS
DIASTOLIC BLOOD PRESSURE: 82 MMHG | RESPIRATION RATE: 18 BRPM | BODY MASS INDEX: 27.82 KG/M2 | WEIGHT: 162.13 LBS | TEMPERATURE: 97 F | HEART RATE: 68 BPM | SYSTOLIC BLOOD PRESSURE: 137 MMHG

## 2020-11-10 VITALS
DIASTOLIC BLOOD PRESSURE: 88 MMHG | OXYGEN SATURATION: 97 % | TEMPERATURE: 98 F | HEART RATE: 81 BPM | SYSTOLIC BLOOD PRESSURE: 132 MMHG | WEIGHT: 162.25 LBS | BODY MASS INDEX: 27.7 KG/M2 | HEIGHT: 64 IN

## 2020-11-10 DIAGNOSIS — Z17.0 MALIGNANT NEOPLASM OF UPPER-OUTER QUADRANT OF LEFT BREAST IN FEMALE, ESTROGEN RECEPTOR POSITIVE: Primary | ICD-10-CM

## 2020-11-10 DIAGNOSIS — C50.412 MALIGNANT NEOPLASM OF UPPER-OUTER QUADRANT OF LEFT BREAST IN FEMALE, ESTROGEN RECEPTOR POSITIVE: Primary | ICD-10-CM

## 2020-11-10 DIAGNOSIS — K21.9 GASTROESOPHAGEAL REFLUX DISEASE, UNSPECIFIED WHETHER ESOPHAGITIS PRESENT: ICD-10-CM

## 2020-11-10 DIAGNOSIS — Z98.890 STATUS POST GASTRIC SURGERY: ICD-10-CM

## 2020-11-10 DIAGNOSIS — Z86.39 HISTORY OF DIABETES MELLITUS, TYPE II: ICD-10-CM

## 2020-11-10 DIAGNOSIS — D22.9 CHANGE IN SKIN MOLE: ICD-10-CM

## 2020-11-10 DIAGNOSIS — D51.8 ANEMIA OF DECREASED VITAMIN B12 ABSORPTION: ICD-10-CM

## 2020-11-10 DIAGNOSIS — Z00.00 WELLNESS EXAMINATION: Primary | ICD-10-CM

## 2020-11-10 DIAGNOSIS — Z98.84 HISTORY OF BARIATRIC SURGERY: ICD-10-CM

## 2020-11-10 DIAGNOSIS — E78.5 HYPERLIPIDEMIA, UNSPECIFIED HYPERLIPIDEMIA TYPE: ICD-10-CM

## 2020-11-10 PROCEDURE — 99999 PR PBB SHADOW E&M-EST. PATIENT-LVL IV: ICD-10-PCS | Mod: PBBFAC,,, | Performed by: FAMILY MEDICINE

## 2020-11-10 PROCEDURE — 99999 PR PBB SHADOW E&M-EST. PATIENT-LVL IV: CPT | Mod: PBBFAC,,, | Performed by: FAMILY MEDICINE

## 2020-11-10 PROCEDURE — 99214 OFFICE O/P EST MOD 30 MIN: CPT | Mod: S$PBB,,, | Performed by: FAMILY MEDICINE

## 2020-11-10 PROCEDURE — 99214 PR OFFICE/OUTPT VISIT, EST, LEVL IV, 30-39 MIN: ICD-10-PCS | Mod: S$PBB,,, | Performed by: FAMILY MEDICINE

## 2020-11-10 PROCEDURE — 99214 PR OFFICE/OUTPT VISIT, EST, LEVL IV, 30-39 MIN: ICD-10-PCS | Mod: S$GLB,,, | Performed by: INTERNAL MEDICINE

## 2020-11-10 PROCEDURE — 99214 OFFICE O/P EST MOD 30 MIN: CPT | Mod: PBBFAC,PO | Performed by: FAMILY MEDICINE

## 2020-11-10 PROCEDURE — 99214 OFFICE O/P EST MOD 30 MIN: CPT | Mod: S$GLB,,, | Performed by: INTERNAL MEDICINE

## 2020-11-10 RX ORDER — OMEPRAZOLE AND SODIUM BICARBONATE 40; 1100 MG/1; MG/1
1 CAPSULE ORAL
Qty: 90 CAPSULE | Refills: 1 | Status: SHIPPED | OUTPATIENT
Start: 2020-11-10 | End: 2020-11-10 | Stop reason: SDUPTHER

## 2020-11-10 RX ORDER — OMEPRAZOLE AND SODIUM BICARBONATE 40; 1100 MG/1; MG/1
1 CAPSULE ORAL
Qty: 90 CAPSULE | Refills: 1 | Status: SHIPPED | OUTPATIENT
Start: 2020-11-10 | End: 2021-05-13 | Stop reason: SDUPTHER

## 2020-11-10 NOTE — PROGRESS NOTES
Subjective:       Patient ID: Marce Hickey is a 70 y.o. female.    Chief Complaint: Annual Exam    HPI     Mrs. Hickey present to clinic for her annual.     Needs refills on her zegrid.     Exercise: Walks a mile 3-4 times a week and weights 3 times a week.      Past Medical History:   Diagnosis Date    Anxiety     Arthritis     Cancer     breast cancer - left    Depression     Diabetes mellitus, type 2     Borderline    Dyslipidemia 2016    Fatty liver disease, nonalcoholic     GERD (gastroesophageal reflux disease)     Hyperlipidemia     Hypertension     Plantar fasciitis of right foot        Past Surgical History:   Procedure Laterality Date    breast reduction      BREAST SURGERY Bilateral     masectomy    BREAST SURGERY Bilateral     implants     SECTION      x 2    CHOLECYSTECTOMY      COLONOSCOPY N/A 2020    Procedure: COLONOSCOPY;  Surgeon: Nupur Leonard MD;  Location: [facility] ENDO;  Service: Endoscopy;  Laterality: N/A;    FOOT SURGERY      left bunionectomy    gastric sleve      HYSTERECTOMY      one ovary intact, adhesions    KNEE ARTHROPLASTY Left 2018    Procedure: ARTHROPLASTY, KNEE;  Surgeon: Christos Montanez MD;  Location: [facility] OR;  Service: Orthopedics;  Laterality: Left;    KNEE ARTHROSCOPY Left     LIPOSUCTION      ROTATOR CUFF REPAIR Right     TONSILLECTOMY         Family History   Problem Relation Age of Onset    Hypertension Mother     Heart disease Mother         pacemaker    Heart attack Mother     Heart disease Father     Psoriasis Father     Cancer Neg Hx        Social History     Tobacco Use    Smoking status: Former Smoker     Quit date: 1993     Years since quittin.9    Smokeless tobacco: Never Used    Tobacco comment: quit    Substance Use Topics    Alcohol use: No     Alcohol/week: 0.0 standard drinks     Frequency: Never     Binge frequency: Never    Drug use: No       Social History      Substance and Sexual Activity   Sexual Activity Yes    Birth control/protection: Surgical          Current Outpatient Medications:     anastrozole (ARIMIDEX) 1 mg Tab, TAKE 1 TABLET DAILY, Disp: 90 tablet, Rfl: 4    calcium carbonate/vitamin D3 (VITAMIN D-3 ORAL), Take 25,000 Units by mouth 2 (two) times a day., Disp: , Rfl:     calcium-vitamin D3 500 mg(1,250mg) -200 unit per tablet, once daily. Patient uses a patch, not oral medication, Disp: , Rfl:     clotrimazole-betamethasone 1-0.05% (LOTRISONE) cream, Apply topically 2 (two) times daily., Disp: 45 g, Rfl: 0    cyanocobalamin, vitamin B-12, (VITAMIN B-12 SL), Place under the tongue., Disp: , Rfl:     FLUAD [id number], 65 YR UP,,PF, 45 mcg (15 mcg x 3)/0.5 mL Syrg, PHARMACIST ADMINISTERED IMMUNIZATION ADMINISTERED AT TIME OF DISPENSING, Disp: , Rfl: 0    lidocaine (LIDODERM) 5 %, Place 1 patch onto the skin once daily. Remove & Discard patch within 12 hours or as directed by MD, Disp: 40 patch, Rfl: 0    multivitamin with minerals tablet, Take 1 tablet by mouth once daily., Disp: , Rfl:     omeprazole-sodium bicarbonate (ZEGERID) 40-1.1 mg-gram per capsule, TAKE 1 CAPSULE BEFORE BREAKFAST, Disp: 90 capsule, Rfl: 1    topiramate (TOPAMAX) 25 MG tablet, TAKE 1 TABLET EVERY 12 HOURS, Disp: 180 tablet, Rfl: 3     Review of patient's allergies indicates:   Allergen Reactions    Nsaids (non-steroidal anti-inflammatory drug) Anaphylaxis            Review of Systems   Constitutional: Negative for chills and fever.   HENT: Negative for congestion and sore throat.    Eyes: Negative for visual disturbance.   Respiratory: Negative for cough and shortness of breath.    Cardiovascular: Negative for chest pain.   Gastrointestinal: Negative for abdominal pain, constipation, diarrhea, nausea and vomiting.   Genitourinary: Negative for dysuria.   Musculoskeletal: Negative for joint swelling.   Skin: Negative for rash and wound.        Has notices some  "changes in her moles.    Neurological: Negative for dizziness and headaches.   Hematological: Does not bruise/bleed easily.           Objective:          Vitals:    11/10/20 1440   BP: 132/88   Pulse: 81   Temp: 97.7 °F (36.5 °C)   TempSrc: Other (see comments)   SpO2: 97%   Weight: 73.6 kg (162 lb 4.1 oz)   Height: 5' 4" (1.626 m)       Physical Exam  Vitals signs reviewed.   Constitutional:       General: She is not in acute distress.     Appearance: Normal appearance. She is well-developed.   HENT:      Head: Normocephalic and atraumatic.      Right Ear: External ear normal.      Left Ear: External ear normal.   Eyes:      Conjunctiva/sclera: Conjunctivae normal.   Cardiovascular:      Rate and Rhythm: Normal rate and regular rhythm.      Pulses: Normal pulses.      Heart sounds: Normal heart sounds.   Pulmonary:      Effort: Pulmonary effort is normal.      Breath sounds: Normal breath sounds.   Abdominal:      General: Bowel sounds are normal.      Palpations: Abdomen is soft.      Tenderness: There is no abdominal tenderness.   Musculoskeletal: Normal range of motion.   Skin:     General: Skin is warm.      Findings: No rash.          Neurological:      General: No focal deficit present.      Mental Status: She is alert.   Psychiatric:         Mood and Affect: Mood normal.         Speech: Speech normal.         Behavior: Behavior normal. Behavior is cooperative.                 Assessment/Plan     Marce was seen today for annual exam.    Diagnoses and all orders for this visit:    Wellness examination    Gastroesophageal reflux disease, unspecified whether esophagitis present  -     omeprazole-sodium bicarbonate (ZEGERID) 40-1.1 mg-gram per capsule; Take 1 capsule by mouth before breakfast.    Change in skin mole  -     Ambulatory referral/consult to Dermatology; Future    History of diabetes mellitus, type II  -     Hemoglobin A1C; Future    Hyperlipidemia, unspecified hyperlipidemia type  -     Lipid " Panel; Future  -     Comprehensive Metabolic Panel; Future    Continue seeing heme/onc for history of cancer.       Follow up in about 6 months (around 5/10/2021) for check up.    Future Appointments   Date Time Provider Department Center   11/18/2020 10:00 AM Myrna Orosco MD [facility] DERM [place]   11/19/2020  9:30 AM LAB, [facility] LAB [place]   5/10/2021 12:00 PM R Cuauhtemoc Luis MD [facility] HEM ONC [facility] [place]   5/10/2021  3:00 PM Edilma Blackburn MD [facility] [place]       Edilma Blackburn MD  [facility]

## 2020-11-16 NOTE — PROGRESS NOTES
"[facility] hematology Oncology In Office Follow Up  encounter progress note  11/10/20      Subjective:      Patient ID:   Marce Hickey  70 y.o. female  1950  MD Ren Watkins MD        Chief Complaint:   Breast cancer evaluation    HPI:  70 y.o. female has a history of breast cancer.  She has asked if I would assume her oncology care  .  She has a history of breast reduction back in 1992.  Recently in March 2017 she had a abnormal mammogram at [facility].  Two adjacent masses were seen at the 12 and 1:00 position of the left breast.  She had bilateral mastectomy, with left sentinel node biopsy in May 2017.  She also had reconstruction with expanders placed and eventually implants paced per Dr. Thomas of Plastic surgery.    Breast cancer tumor markers were normal.  She will continue on adjuvant Arimidex daily for now x's 3 years.  No increased HF sx or joint sx.    She is status post gastric sleeve surgery January 29, 2017.  She has a history of B12 deficiency and has been on B12 sublingually in the past, but has not taken it," in a long time.      B12 is low normal at 290 and  ferritin is normal at 93.  Vitamin D is low at 24 and bone density test shows osteopenia.  On B 12 subl daily, the level is up to 1,600.    Vitamin D is better at 36, on supplements.      She is to follow-up with her primary care for recommendations regarding vitamin-D supplementation and osteopenia/osteoporosis management and prevention while on Arimidex.  Bone Density test 5/14/20 showed osteopenia.    Estimated breast cancer recurrence was reduced from 23% to 12% with adjuvant Arimidex or tamoxifen usage using the breast cancer index reference.  She has been on Arimidex adjuvantly x3 years and this is refilled through [facility].    She complains of pain at her left 2nd distal joint finger area and saw Dr. Schultz  for evaluation.  Was told she had degenerative arthritis at L>R 2nd finger.    She is " status post partial hysterectomy.  She took birth control pills until about age 26.  She had hysterectomy at age 30.  She did not take hormone replacement therapy.    She wears a calcium and vitamin-D and multivitamin patch x2 years.  Hx anaphylaxis to NSAIDS.    For her history includes generalized anxiety disorder, type 2 diabetes, low vitamin-D levels, GERD, fatty liver, dyslipidemia, depression, hypertension, plantar fasciitis of the right foot.    She is status post breast reduction, bilateral mastectomy, breast implant placement, with reconstruction.  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  Other history includes the gastric sleeve surgery, partial hysterectomy, right rotator cuff repair, tonsillectomy, and cholecystectomy.    Her mother had hypertension, heart disease and history of heart attack.  Her father had heart disease and psoriasis.    She was in the [employer] times 26 years till .  She smoked times 19 years, 1 pack per day, and quit in .  She does not drink alcohol with with regularity.  She is allergic to aspirin and nonsteroidal anti inflammatory drugs as manifested with anaphylaxis.    Her mother  in  of old age.  She had a lumpectomy done but it is not clear if she had breast cancer.  Her father had heart disease.  She has a brother alive and well and a cousin with prostate cancer.  She has 2 sons alive and well.    , Antony Magee, IT, [facility].      ROS:   GEN: normal without any fever, night sweats or weight loss  HEENT: normal with no HA's, sore throat, stiff neck, changes in vision  CV: normal with no CP, SOB, PND, FLORES or orthopnea  PULM: normal with no SOB, cough, hemoptysis, sputum or pleuritic pain  GI:  See history of present illness  : normal with no hematuria, dysuria  BREAST:  See history of present illness  SKIN: normal with no rash, erythema, bruising, or swelling     Past Medical  History:   Diagnosis Date    Anxiety     Arthritis     Cancer     breast cancer - left    Depression     Diabetes mellitus, type 2     Borderline    Dyslipidemia 2016    Fatty liver disease, nonalcoholic     GERD (gastroesophageal reflux disease)     Hyperlipidemia     Hypertension     Plantar fasciitis of right foot      Past Surgical History:   Procedure Laterality Date    breast reduction      BREAST SURGERY Bilateral     masectomy    BREAST SURGERY Bilateral     implants     SECTION      x 2    CHOLECYSTECTOMY      COLONOSCOPY N/A 2020    Procedure: COLONOSCOPY;  Surgeon: Nupur Leonard MD;  Location: [facility] ENDO;  Service: Endoscopy;  Laterality: N/A;    FOOT SURGERY      left bunionectomy    gastric sleve      HYSTERECTOMY      one ovary intact, adhesions    KNEE ARTHROPLASTY Left 2018    Procedure: ARTHROPLASTY, KNEE;  Surgeon: Christos Montanez MD;  Location: [facility] OR;  Service: Orthopedics;  Laterality: Left;    KNEE ARTHROSCOPY Left     LIPOSUCTION      ROTATOR CUFF REPAIR Right     TONSILLECTOMY         Review of patient's allergies indicates:   Allergen Reactions    Nsaids (non-steroidal anti-inflammatory drug) Anaphylaxis           Current Outpatient Medications:     anastrozole (ARIMIDEX) 1 mg Tab, TAKE 1 TABLET DAILY, Disp: 90 tablet, Rfl: 4    calcium carbonate/vitamin D3 (VITAMIN D-3 ORAL), Take 25,000 Units by mouth 2 (two) times a day., Disp: , Rfl:     calcium-vitamin D3 500 mg(1,250mg) -200 unit per tablet, once daily. Patient uses a patch, not oral medication, Disp: , Rfl:     clotrimazole-betamethasone 1-0.05% (LOTRISONE) cream, Apply topically 2 (two) times daily., Disp: 45 g, Rfl: 0    cyanocobalamin, vitamin B-12, (VITAMIN B-12 SL), Place under the tongue., Disp: , Rfl:     FLUAD [id number], 65 YR UP,,PF, 45 mcg (15 mcg x 3)/0.5 mL Syrg, PHARMACIST ADMINISTERED IMMUNIZATION ADMINISTERED AT TIME OF DISPENSING, Disp: , Rfl: 0     lidocaine (LIDODERM) 5 %, Place 1 patch onto the skin once daily. Remove & Discard patch within 12 hours or as directed by MD, Disp: 40 patch, Rfl: 0    multivitamin with minerals tablet, Take 1 tablet by mouth once daily., Disp: , Rfl:     topiramate (TOPAMAX) 25 MG tablet, TAKE 1 TABLET EVERY 12 HOURS, Disp: 180 tablet, Rfl: 3    omeprazole-sodium bicarbonate (ZEGERID) 40-1.1 mg-gram per capsule, Take 1 capsule by mouth before breakfast., Disp: 90 capsule, Rfl: 1          Objective:   Vitals:  Blood pressure 137/82, pulse 68, temperature 97.1 °F (36.2 °C), resp. rate 18, weight 73.5 kg (162 lb 1.6 oz).    Physical Examination:   GEN: no apparent distress, comfortable  HEAD: atraumatic and normocephalic  EYES: no pallor, no icterus  ENT: no pharyngeal erythema, external ears WNL; no nasal discharge  NECK: no masses, thyroid normal, trachea midline, no LAD/LN's, supple  CV: RRR with no murmur; normal pulse; normal S1 and S2; no pedal edema  CHEST: Normal respiratory effort; CTAB; normal breath sounds; no wheeze or crackles  ABDOM: nontender and nondistended; soft; normal bowel sounds; no rebound/guarding, liver and spleen were not palpable  MUSC/Skeletal: ROM normal; no crepitus; joints normal; no deformities or arthropathy  EXTREM: no clubbing, cyanosis, inflammation or swelling  SKIN: no rashes, lesions, ulcers, petechiae or subcutaneous nodules  : no cvat  NEURO: grossly intact; motor/sensory WNL;  no tremors  PSYCH: normal mood, affect and behavior  LYMPH: normal cervical, supraclavicular, axillary and groin LN's  BREASTS:  She has extensive postoperative change at the chest wall anteriorly, she does not have palpable mass at the left or right breast, chest area is nontender      Labs:   Lab Results   Component Value Date    WBC 5.06 11/04/2020    HGB 13.5 11/04/2020    HCT 43.9 11/04/2020    MCV 93 11/04/2020     11/04/2020    CMP  Sodium   Date Value Ref Range Status   05/14/2020 141 136 - 145  mmol/L Final     Potassium   Date Value Ref Range Status   05/14/2020 4.1 3.5 - 5.1 mmol/L Final     Chloride   Date Value Ref Range Status   05/14/2020 110 95 - 110 mmol/L Final     CO2   Date Value Ref Range Status   05/14/2020 25 23 - 29 mmol/L Final     Glucose   Date Value Ref Range Status   05/14/2020 84 70 - 110 mg/dL Final     BUN   Date Value Ref Range Status   05/14/2020 19 8 - 23 mg/dL Final     Creatinine   Date Value Ref Range Status   05/14/2020 0.8 0.5 - 1.4 mg/dL Final     Calcium   Date Value Ref Range Status   05/14/2020 9.7 8.7 - 10.5 mg/dL Final     Total Protein   Date Value Ref Range Status   05/14/2020 6.9 6.0 - 8.4 g/dL Final     Albumin   Date Value Ref Range Status   05/14/2020 4.1 3.5 - 5.2 g/dL Final     Total Bilirubin   Date Value Ref Range Status   05/14/2020 0.5 0.1 - 1.0 mg/dL Final     Comment:     For infants and newborns, interpretation of results should be based  on gestational age, weight and in agreement with clinical  observations.  Premature Infant recommended reference ranges:  Up to 24 hours.............<8.0 mg/dL  Up to 48 hours............<12.0 mg/dL  3-5 days..................<15.0 mg/dL  6-29 days.................<15.0 mg/dL       Alkaline Phosphatase   Date Value Ref Range Status   05/14/2020 96 55 - 135 U/L Final     AST   Date Value Ref Range Status   05/14/2020 15 10 - 40 U/L Final     ALT   Date Value Ref Range Status   05/14/2020 15 10 - 44 U/L Final     Anion Gap   Date Value Ref Range Status   05/14/2020 6 (L) 8 - 16 mmol/L Final     eGFR if    Date Value Ref Range Status   05/14/2020 >60 >60 mL/min/1.73 m^2 Final     eGFR if non    Date Value Ref Range Status   05/14/2020 >60 >60 mL/min/1.73 m^2 Final     Comment:     Calculation used to obtain the estimated glomerular filtration  rate (eGFR) is the CKD-EPI equation.            Assessment:   (1) 70 y.o. female with diagnosis of multifocal left breast cancer, clinical stage I  disease, ERP positive,   HER2 Alec negative.    (2) currently on adjuvant Arimidex 1 mg p.o. daily x3 of 5-8 years.  Estimated 10 year recurrence risk is 12% per Oncotype DX testing.    Bone density testing shows osteopenia.  She is to follow-up with her primary care for osteopenia/osteoporosis management while on Arimidex.    She is status post gastric sleeve surgery and has history of B12 deficiency in the past.  Her B12 level returned low normal at 290.  On subl B 12 the level is better at 1,600.    Vitamin D is low at 24.  On calcium and Vitamin D, the level is better at 36.    Tumor marker studies are normal    She will follow-up here in 6 months with CBC, B12, and see me.  She is interested in support group availability.

## 2020-11-17 ENCOUNTER — PATIENT OUTREACH (OUTPATIENT)
Dept: ADMINISTRATIVE | Facility: OTHER | Age: 70
End: 2020-11-17

## 2020-11-17 NOTE — PROGRESS NOTES
Chart was reviewed for overdue Proactive [facility] Encounters (JACKELINE)  topics  Updates were requested from care everywhere  Health Maintenance was unable to be updated  LINKS immunization registry triggered

## 2020-11-18 ENCOUNTER — PATIENT MESSAGE (OUTPATIENT)
Dept: FAMILY MEDICINE | Facility: CLINIC | Age: 70
End: 2020-11-18

## 2020-11-18 ENCOUNTER — OFFICE VISIT (OUTPATIENT)
Dept: DERMATOLOGY | Facility: CLINIC | Age: 70
End: 2020-11-18
Payer: MEDICARE

## 2020-11-18 DIAGNOSIS — L82.1 SEBORRHEIC KERATOSES: Primary | ICD-10-CM

## 2020-11-18 DIAGNOSIS — Z12.83 SKIN CANCER SCREENING: ICD-10-CM

## 2020-11-18 DIAGNOSIS — D23.9 DERMATOFIBROMA: ICD-10-CM

## 2020-11-18 DIAGNOSIS — D18.01 CHERRY ANGIOMA: ICD-10-CM

## 2020-11-18 PROCEDURE — 99999 PR PBB SHADOW E&M-EST. PATIENT-LVL III: ICD-10-PCS | Mod: PBBFAC,,, | Performed by: STUDENT IN AN ORGANIZED HEALTH CARE EDUCATION/TRAINING PROGRAM

## 2020-11-18 PROCEDURE — 99999 PR PBB SHADOW E&M-EST. PATIENT-LVL III: CPT | Mod: PBBFAC,,, | Performed by: STUDENT IN AN ORGANIZED HEALTH CARE EDUCATION/TRAINING PROGRAM

## 2020-11-18 PROCEDURE — 99203 PR OFFICE/OUTPT VISIT, NEW, LEVL III, 30-44 MIN: ICD-10-PCS | Mod: S$PBB,,, | Performed by: STUDENT IN AN ORGANIZED HEALTH CARE EDUCATION/TRAINING PROGRAM

## 2020-11-18 PROCEDURE — 99203 OFFICE O/P NEW LOW 30 MIN: CPT | Mod: S$PBB,,, | Performed by: STUDENT IN AN ORGANIZED HEALTH CARE EDUCATION/TRAINING PROGRAM

## 2020-11-18 PROCEDURE — 99213 OFFICE O/P EST LOW 20 MIN: CPT | Mod: PBBFAC,PO | Performed by: STUDENT IN AN ORGANIZED HEALTH CARE EDUCATION/TRAINING PROGRAM

## 2020-11-18 NOTE — PROGRESS NOTES
Subjective:       Patient ID:  Marce Hickey is a 70 y.o. female who presents for   Chief Complaint   Patient presents with    Skin Check     TBSE     Patient presents for a skin check- She has a spot on her chest and left abdomen/rib area:  *Chest spot:  -x 6 months  -no symptoms   -no tx   *Left Abdomen/Ribs:  -x 2 weeks  -no symptoms   -no tx       Saw oncology and pcp who sent her for a spot check.  Hx of breast cancer (march 2017) s/p double mastectomy, arimidex since then.   TBSE   Denies NMSC       Review of Systems   Constitutional: Negative for fever and chills.   Respiratory: Negative for cough and shortness of breath.    Skin: Positive for activity-related sunscreen use. Negative for sun sensitivity, daily sunscreen use and wears hat.        Objective:    Physical Exam   Constitutional: She appears well-developed and well-nourished. No distress.   Neurological: She is alert and oriented to person, place, and time. She is not disoriented.   Psychiatric: She has a normal mood and affect.   Skin:   Areas Examined (abnormalities noted in diagram):   Scalp / Hair Palpated and Inspected  Head / Face Inspection Performed  Neck Inspection Performed  Chest / Axilla Inspection Performed  Abdomen Inspection Performed  Genitals / Buttocks / Groin Inspection Performed  Back Inspection Performed  RUE Inspected  LUE Inspection Performed  RLE Inspected  LLE Inspection Performed  Nails and Digits Inspection Performed                   Diagram Legend     Erythematous scaling macule/papule c/w actinic keratosis       Vascular papule c/w angioma      Pigmented verrucoid papule/plaque c/w seborrheic keratosis      Yellow umbilicated papule c/w sebaceous hyperplasia      Irregularly shaped tan macule c/w lentigo     1-2 mm smooth white papules consistent with Milia      Movable subcutaneous cyst with punctum c/w epidermal inclusion cyst      Subcutaneous movable cyst c/w pilar cyst      Firm pink to brown papule c/w  dermatofibroma      Pedunculated fleshy papule(s) c/w skin tag(s)      Evenly pigmented macule c/w junctional nevus     Mildly variegated pigmented, slightly irregular-bordered macule c/w mildly atypical nevus      Flesh colored to evenly pigmented papule c/w intradermal nevus       Pink pearly papule/plaque c/w basal cell carcinoma      Erythematous hyperkeratotic cursted plaque c/w SCC      Surgical scar with no sign of skin cancer recurrence      Open and closed comedones      Inflammatory papules and pustules      Verrucoid papule consistent consistent with wart     Erythematous eczematous patches and plaques     Dystrophic onycholytic nail with subungual debris c/w onychomycosis     Umbilicated papule    Erythematous-base heme-crusted tan verrucoid plaque consistent with inflamed seborrheic keratosis     Erythematous Silvery Scaling Plaque c/w Psoriasis     See annotation      Assessment / Plan:        Seborrheic keratoses  -     Ambulatory referral/consult to Dermatology  -These are benign inherited growths without a malignant potential. Reassurance given to patient. No treatment is necessary.     Cherry angioma  -This is a benign vascular lesion. Reassurance given. No treatment required.     Skin cancer screening  -total body skin examination performed today including at least 12 points as noted in physical examination. No lesions suspicious for malignancy noted.  Reassurance provided.  Instructed patient to observe lesion(s) for changes and follow up in clinic if changes are noted. Discussed ABCDE's of moles.    Dermatofibroma  -This is a benign scar-like lesion secondary to minor trauma. No treatment required.            Skin exam every 1-2 years.  No follow-ups on file.

## 2020-11-18 NOTE — LETTER
November 18, 2020      Edilma Blackburn MD  [address]  [facility] [id number] [place] [address], [address] [address]  Phone: [phone]          Patient: Marce Hickey   MR Number: [id number]   YOB: 1950   Date of Visit: 11/18/2020       Dear Dr. Edilma Blackburn:    Thank you for referring Marce Hickey to me for evaluation. Attached you will find relevant portions of my assessment and plan of care.    If you have questions, please do not hesitate to call me. I look forward to following Marce Hickey along with you.    Sincerely,    Myrna Orosco MD    Enclosure  CC:  No Recipients    If you would like to receive this communication electronically, please contact [email or web address] or [phone] to request more information on [facility] Link access.    For providers and/or their staff who would like to refer a patient to [facility], please contact us through our one-stop-shop provider referral line, [facility] , at [phone].    If you feel you have received this communication in error or would no longer like to receive these types of communications, please e-mail [email or web address]

## 2020-11-19 ENCOUNTER — LAB VISIT (OUTPATIENT)
Dept: LAB | Facility: HOSPITAL | Age: 70
End: 2020-11-19
Attending: FAMILY MEDICINE
Payer: MEDICARE

## 2020-11-19 DIAGNOSIS — E78.5 HYPERLIPIDEMIA, UNSPECIFIED HYPERLIPIDEMIA TYPE: ICD-10-CM

## 2020-11-19 DIAGNOSIS — Z86.39 HISTORY OF DIABETES MELLITUS, TYPE II: ICD-10-CM

## 2020-11-19 LAB
ALBUMIN SERPL BCP-MCNC: 4.3 G/DL (ref 3.5–5.2)
ALP SERPL-CCNC: 86 U/L (ref 55–135)
ALT SERPL W/O P-5'-P-CCNC: 13 U/L (ref 10–44)
ANION GAP SERPL CALC-SCNC: 6 MMOL/L (ref 8–16)
AST SERPL-CCNC: 16 U/L (ref 10–40)
BILIRUB SERPL-MCNC: 0.5 MG/DL (ref 0.1–1)
BUN SERPL-MCNC: 24 MG/DL (ref 8–23)
CALCIUM SERPL-MCNC: 9.9 MG/DL (ref 8.7–10.5)
CHLORIDE SERPL-SCNC: 109 MMOL/L (ref 95–110)
CHOLEST SERPL-MCNC: 325 MG/DL (ref 120–199)
CHOLEST/HDLC SERPL: 4.9 {RATIO} (ref 2–5)
CO2 SERPL-SCNC: 27 MMOL/L (ref 23–29)
CREAT SERPL-MCNC: 1 MG/DL (ref 0.5–1.4)
EST. GFR  (AFRICAN AMERICAN): >60 ML/MIN/1.73 M^2
EST. GFR  (NON AFRICAN AMERICAN): 57.6 ML/MIN/1.73 M^2
ESTIMATED AVG GLUCOSE: 105 MG/DL (ref 68–131)
GLUCOSE SERPL-MCNC: 92 MG/DL (ref 70–110)
HBA1C MFR BLD HPLC: 5.3 % (ref 4–5.6)
HDLC SERPL-MCNC: 67 MG/DL (ref 40–75)
HDLC SERPL: 20.6 % (ref 20–50)
LDLC SERPL CALC-MCNC: 224.8 MG/DL (ref 63–159)
NONHDLC SERPL-MCNC: 258 MG/DL
POTASSIUM SERPL-SCNC: 3.9 MMOL/L (ref 3.5–5.1)
PROT SERPL-MCNC: 7.4 G/DL (ref 6–8.4)
SODIUM SERPL-SCNC: 142 MMOL/L (ref 136–145)
TRIGL SERPL-MCNC: 166 MG/DL (ref 30–150)

## 2020-11-19 PROCEDURE — 83036 HEMOGLOBIN GLYCOSYLATED A1C: CPT

## 2020-11-19 PROCEDURE — 80053 COMPREHEN METABOLIC PANEL: CPT

## 2020-11-19 PROCEDURE — 80061 LIPID PANEL: CPT

## 2020-11-19 PROCEDURE — 36415 COLL VENOUS BLD VENIPUNCTURE: CPT | Mod: PO

## 2020-11-22 ENCOUNTER — PATIENT MESSAGE (OUTPATIENT)
Dept: HEMATOLOGY/ONCOLOGY | Facility: CLINIC | Age: 70
End: 2020-11-22

## 2020-11-22 ENCOUNTER — PATIENT MESSAGE (OUTPATIENT)
Dept: FAMILY MEDICINE | Facility: CLINIC | Age: 70
End: 2020-11-22

## 2020-11-25 ENCOUNTER — PATIENT MESSAGE (OUTPATIENT)
Dept: HEMATOLOGY/ONCOLOGY | Facility: CLINIC | Age: 70
End: 2020-11-25

## 2020-11-25 ENCOUNTER — PATIENT MESSAGE (OUTPATIENT)
Dept: FAMILY MEDICINE | Facility: CLINIC | Age: 70
End: 2020-11-25

## 2020-11-25 NOTE — PROGRESS NOTES
Doesn't have diabetes. Kidney function has gotten a little lower. Continue to hydrate with water and we'll continue to monitor this. Cholesterol is worse. I know she was worrying if she was taking too much vit d and this was causing cholesterol issues but I haven't been seeing this in my patients even when they have vit d issues. Can benefit form a statin if she is interested. Sorry for the delay!

## 2020-12-02 ENCOUNTER — TELEPHONE (OUTPATIENT)
Dept: HEMATOLOGY/ONCOLOGY | Facility: HOSPITAL | Age: 70
End: 2020-12-02

## 2020-12-02 ENCOUNTER — PATIENT MESSAGE (OUTPATIENT)
Dept: FAMILY MEDICINE | Facility: CLINIC | Age: 70
End: 2020-12-02

## 2020-12-02 DIAGNOSIS — E78.00 ELEVATED LDL CHOLESTEROL LEVEL: Primary | ICD-10-CM

## 2020-12-02 RX ORDER — ATORVASTATIN CALCIUM 40 MG/1
40 TABLET, FILM COATED ORAL DAILY
Qty: 90 TABLET | Refills: 3 | Status: SHIPPED | OUTPATIENT
Start: 2020-12-02 | End: 2021-02-12

## 2020-12-03 ENCOUNTER — TELEPHONE (OUTPATIENT)
Dept: FAMILY MEDICINE | Facility: CLINIC | Age: 70
End: 2020-12-03

## 2020-12-03 NOTE — TELEPHONE ENCOUNTER
----- Message from Charu Richardson sent at 12/3/2020 11:26 AM CST -----  Contact: pt  Who Called: PT  Regarding: Pt returning a call to the nurse and requesting a callback.  Would the patient rather a call back or a response via [facility]? Call back  Best Call Back Number: [phone]  Additional Information:

## 2020-12-03 NOTE — TELEPHONE ENCOUNTER
Tell Mrs. Hickey that Vitamin D is low NL at 36 now, but it is in NL range.    Tell her I would not expect a low dose of 1000 unit of Vitamin D daily, to increase her cholesterol.  But she can stay off the Vitamin D if she wants, since her level is NL.    In looking at her records, the cholesterol has been increased for several years.    Dr. Blackburn, her PMD, has been managing her vitamin D and her cholesterol.  Tell pt that I expect Dr. Blackburn will give her good advice for this management.    Ask her to please keep her next appt with me as planned for breast cancer condition.

## 2020-12-03 NOTE — TELEPHONE ENCOUNTER
Hey! Yea I started her on the lipitor. I don't think the vit d is causing issues but we'll trend it. Thanks!

## 2020-12-03 NOTE — TELEPHONE ENCOUNTER
Advised patient previous phone call was regarding result notes she was already notified of in patient messages. Patient verbalized understanding.

## 2020-12-22 ENCOUNTER — PATIENT OUTREACH (OUTPATIENT)
Dept: ADMINISTRATIVE | Facility: OTHER | Age: 70
End: 2020-12-22

## 2020-12-22 ENCOUNTER — PATIENT MESSAGE (OUTPATIENT)
Dept: FAMILY MEDICINE | Facility: CLINIC | Age: 70
End: 2020-12-22

## 2020-12-22 DIAGNOSIS — M25.562 LEFT KNEE PAIN, UNSPECIFIED CHRONICITY: Primary | ICD-10-CM

## 2020-12-22 NOTE — PROGRESS NOTES
Chart was reviewed for overdue Proactive [facility] Encounters (JACKELINE)  topics  Updates were requested from care everywhere  Health Maintenance has been updated  LINKS immunization registry triggered

## 2020-12-24 ENCOUNTER — HOSPITAL ENCOUNTER (OUTPATIENT)
Dept: RADIOLOGY | Facility: HOSPITAL | Age: 70
Discharge: HOME | End: 2020-12-24
Attending: ORTHOPAEDIC SURGERY
Payer: MEDICARE

## 2020-12-24 ENCOUNTER — OFFICE VISIT (OUTPATIENT)
Dept: ORTHOPEDICS | Facility: CLINIC | Age: 70
End: 2020-12-24
Payer: MEDICARE

## 2020-12-24 VITALS — HEIGHT: 64 IN | BODY MASS INDEX: 27.7 KG/M2 | WEIGHT: 162.25 LBS | RESPIRATION RATE: 16 BRPM

## 2020-12-24 DIAGNOSIS — M25.562 LEFT KNEE PAIN, UNSPECIFIED CHRONICITY: ICD-10-CM

## 2020-12-24 DIAGNOSIS — M24.662 ARTHROFIBROSIS OF KNEE JOINT, LEFT: ICD-10-CM

## 2020-12-24 DIAGNOSIS — Z96.652 STATUS POST TOTAL KNEE REPLACEMENT USING CEMENT, LEFT: Primary | ICD-10-CM

## 2020-12-24 PROCEDURE — 73564 X-RAY EXAM KNEE 4 OR MORE: CPT | Mod: 26,LT,, | Performed by: RADIOLOGY

## 2020-12-24 PROCEDURE — 99999 PR PBB SHADOW E&M-EST. PATIENT-LVL III: ICD-10-PCS | Mod: PBBFAC,,, | Performed by: ORTHOPAEDIC SURGERY

## 2020-12-24 PROCEDURE — 73564 XR KNEE ORTHO LEFT WITH FLEXION: ICD-10-PCS | Mod: 26,LT,, | Performed by: RADIOLOGY

## 2020-12-24 PROCEDURE — 99213 OFFICE O/P EST LOW 20 MIN: CPT | Mod: PBBFAC,25,PN | Performed by: ORTHOPAEDIC SURGERY

## 2020-12-24 PROCEDURE — 99213 PR OFFICE/OUTPT VISIT, EST, LEVL III, 20-29 MIN: ICD-10-PCS | Mod: S$PBB,,, | Performed by: ORTHOPAEDIC SURGERY

## 2020-12-24 PROCEDURE — 73562 X-RAY EXAM OF KNEE 3: CPT | Mod: 26,RT,, | Performed by: RADIOLOGY

## 2020-12-24 PROCEDURE — 73562 X-RAY EXAM OF KNEE 3: CPT | Mod: TC,PN,RT,59

## 2020-12-24 PROCEDURE — 99213 OFFICE O/P EST LOW 20 MIN: CPT | Mod: S$PBB,,, | Performed by: ORTHOPAEDIC SURGERY

## 2020-12-24 PROCEDURE — 99999 PR PBB SHADOW E&M-EST. PATIENT-LVL III: CPT | Mod: PBBFAC,,, | Performed by: ORTHOPAEDIC SURGERY

## 2020-12-24 PROCEDURE — 73562 XR KNEE ORTHO LEFT WITH FLEXION: ICD-10-PCS | Mod: 26,RT,, | Performed by: RADIOLOGY

## 2020-12-24 NOTE — PROGRESS NOTES
Past Medical History:   Diagnosis Date    Anxiety     Arthritis     Cancer     breast cancer - left    Depression     Diabetes mellitus, type 2     Borderline    Dyslipidemia 2016    Fatty liver disease, nonalcoholic     GERD (gastroesophageal reflux disease)     Hyperlipidemia     Hypertension     Plantar fasciitis of right foot        Past Surgical History:   Procedure Laterality Date    breast reduction      BREAST SURGERY Bilateral     masectomy    BREAST SURGERY Bilateral     implants     SECTION      x 2    CHOLECYSTECTOMY      COLONOSCOPY N/A 2020    Procedure: COLONOSCOPY;  Surgeon: Nupur Leonard MD;  Location: [facility] ENDO;  Service: Endoscopy;  Laterality: N/A;    FOOT SURGERY      left bunionectomy    gastric sleve      HYSTERECTOMY      one ovary intact, adhesions    KNEE ARTHROPLASTY Left 2018    Procedure: ARTHROPLASTY, KNEE;  Surgeon: Christos Montanez MD;  Location: [facility] OR;  Service: Orthopedics;  Laterality: Left;    KNEE ARTHROSCOPY Left     LIPOSUCTION      ROTATOR CUFF REPAIR Right     TONSILLECTOMY         Current Outpatient Medications   Medication Sig    anastrozole (ARIMIDEX) 1 mg Tab TAKE 1 TABLET DAILY    atorvastatin (LIPITOR) 40 MG tablet Take 1 tablet (40 mg total) by mouth once daily.    calcium carbonate/vitamin D3 (VITAMIN D-3 ORAL) Take 25,000 Units by mouth 2 (two) times a day.    calcium-vitamin D3 500 mg(1,250mg) -200 unit per tablet once daily. Patient uses a patch, not oral medication    clotrimazole-betamethasone 1-0.05% (LOTRISONE) cream Apply topically 2 (two) times daily.    cyanocobalamin, vitamin B-12, (VITAMIN B-12 SL) Place under the tongue.    FLUAD [id number], 65 YR UP,,PF, 45 mcg (15 mcg x 3)/0.5 mL Syrg PHARMACIST ADMINISTERED IMMUNIZATION ADMINISTERED AT TIME OF DISPENSING    lidocaine (LIDODERM) 5 % Place 1 patch onto the skin once daily. Remove & Discard patch within 12 hours or as directed by MD     multivitamin with minerals tablet Take 1 tablet by mouth once daily.    omeprazole-sodium bicarbonate (ZEGERID) 40-1.1 mg-gram per capsule Take 1 capsule by mouth before breakfast.    topiramate (TOPAMAX) 25 MG tablet TAKE 1 TABLET EVERY 12 HOURS     No current facility-administered medications for this visit.        Review of patient's allergies indicates:   Allergen Reactions    Nsaids (non-steroidal anti-inflammatory drug) Anaphylaxis       Family History   Problem Relation Age of Onset    Hypertension Mother     Heart disease Mother         pacemaker    Heart attack Mother     Heart disease Father     Psoriasis Father     Cancer Neg Hx     Melanoma Neg Hx     Lupus Neg Hx     Eczema Neg Hx        Social History     Socioeconomic History    Marital status:      Spouse name: Not on file    Number of children: Not on file    Years of education: Not on file    Highest education level: Not on file   Occupational History    Not on file   Social Needs    Financial resource strain: Not very hard    Food insecurity     Worry: Never true     Inability: Never true    Transportation needs     Medical: No     Non-medical: No   Tobacco Use    Smoking status: Former Smoker     Quit date: 1993     Years since quittin.0    Smokeless tobacco: Never Used    Tobacco comment: quit    Substance and Sexual Activity    Alcohol use: No     Alcohol/week: 0.0 standard drinks     Frequency: Never     Binge frequency: Never    Drug use: No    Sexual activity: Yes     Birth control/protection: Surgical   Lifestyle    Physical activity     Days per week: 3 days     Minutes per session: 30 min    Stress: Only a little   Relationships    Social connections     Talks on phone: More than three times a week     Gets together: Three times a week     Attends Oriental orthodox service: Not on file     Active member of club or organization: Yes     Attends meetings of clubs or organizations: More than 4  times per year     Relationship status:    Other Topics Concern    Are you pregnant or think you may be? Not Asked    Breast-feeding Not Asked   Social History Narrative    Not on file       Chief Complaint:   No chief complaint on file.      Date of surgery:  September 25, 2018    History of present illness:  70-year-old female who underwent left total knee arthroplasty.  Patient was doing pretty well until she had an incident getting out of the tub but a week ago.  Had to do some kneeling and it felt like she had some grinding around her kneecap.  It cause some swelling.  This happened 1 more time since then.  Little more pain.  Caused her to limp at times.  She want to get her knee replacement checked out to make sure was okay.    Answers for HPI/ROS submitted by the patient on 10/22/2019   Leg pain  unexpected weight change: No  appetite change : No  sleep disturbance: No  dysphoric mood: No  rash: No  eye redness: No  cough: No  difficulty urinating: No  hematuria: No  chest pain: No  palpitations: No  vomiting: No  diarrhea: No  constipation: No  headaches: No  seizures: No  Pain Chronicity: chronic  History of trauma: No  Onset: 1 to 4 weeks ago  Frequency: daily  Progression since onset: unchanged  Injury mechanism: falling  injury location: at home  pain- numeric: 3/10  pain location: left knee  pain quality: dull  Radiating Pain: No  Aggravating factors: bending, flexion, twisting, walking  fever: No  inability to bear weight: No  itching: No  joint locking: No  limited range of motion: No  stiffness: Yes  tingling: No  Treatments tried: heat, exercise, movement, other  physical therapy: not tried  Improvement on treatment: moderate      Physical Examination:    Vital Signs:    There were no vitals filed for this visit.    There is no height or weight on file to calculate BMI.    This a well-developed, well nourished patient in no acute distress.  They are alert and oriented and cooperative to  examination.  Pt. walks without assistive device.  Walking a little more normally and naturally.    Examination left knee shows  healed surgical incision.  Mild swelling. No erythema or drainage.    Range of motion is about 0° to 105°.  She has got a little bursal swelling or soft tissue swelling along the anterolateral knee.  It is a little tender to touch.  There is no like fluid collection.  Stable to varus and valgus stress.  Negative drawer exam.    X-rays:  Four views of the left knee are ordered and reviewed which show well-aligned total knee arthroplasty components.  Patient does have some postop patella baja.     Assessment::  Status post left total knee arthroplasty with  Stiffness  Patella baja  Bursitis    Plan:    I reviewed the x-rays with her today.  I did see anything concerning.  Recommended some scar massage and some Voltaren gel for the bursitis.  Follow-up annually for total knee check up.    This note was created using [employer] voice recognition software that occasionally misinterpreted phrases or words.

## 2021-02-08 ENCOUNTER — LAB VISIT (OUTPATIENT)
Dept: LAB | Facility: HOSPITAL | Age: 71
End: 2021-02-08
Attending: FAMILY MEDICINE
Payer: MEDICARE

## 2021-02-08 ENCOUNTER — TELEPHONE (OUTPATIENT)
Dept: FAMILY MEDICINE | Facility: CLINIC | Age: 71
End: 2021-02-08

## 2021-02-08 DIAGNOSIS — E78.00 ELEVATED LDL CHOLESTEROL LEVEL: ICD-10-CM

## 2021-02-08 LAB
ALBUMIN SERPL BCP-MCNC: 3.9 G/DL (ref 3.5–5.2)
ALP SERPL-CCNC: 91 U/L (ref 55–135)
ALT SERPL W/O P-5'-P-CCNC: 20 U/L (ref 10–44)
ANION GAP SERPL CALC-SCNC: 7 MMOL/L (ref 8–16)
AST SERPL-CCNC: 20 U/L (ref 10–40)
BILIRUB SERPL-MCNC: 0.6 MG/DL (ref 0.1–1)
BUN SERPL-MCNC: 21 MG/DL (ref 8–23)
CALCIUM SERPL-MCNC: 9.7 MG/DL (ref 8.7–10.5)
CHLORIDE SERPL-SCNC: 112 MMOL/L (ref 95–110)
CHOLEST SERPL-MCNC: 153 MG/DL (ref 120–199)
CHOLEST/HDLC SERPL: 2.6 {RATIO} (ref 2–5)
CO2 SERPL-SCNC: 24 MMOL/L (ref 23–29)
CREAT SERPL-MCNC: 0.8 MG/DL (ref 0.5–1.4)
EST. GFR  (AFRICAN AMERICAN): >60 ML/MIN/1.73 M^2
EST. GFR  (NON AFRICAN AMERICAN): >60 ML/MIN/1.73 M^2
GLUCOSE SERPL-MCNC: 99 MG/DL (ref 70–110)
HDLC SERPL-MCNC: 58 MG/DL (ref 40–75)
HDLC SERPL: 37.9 % (ref 20–50)
LDLC SERPL CALC-MCNC: 72.4 MG/DL (ref 63–159)
NONHDLC SERPL-MCNC: 95 MG/DL
POTASSIUM SERPL-SCNC: 4 MMOL/L (ref 3.5–5.1)
PROT SERPL-MCNC: 6.4 G/DL (ref 6–8.4)
SODIUM SERPL-SCNC: 143 MMOL/L (ref 136–145)
TRIGL SERPL-MCNC: 113 MG/DL (ref 30–150)

## 2021-02-08 PROCEDURE — 80061 LIPID PANEL: CPT

## 2021-02-08 PROCEDURE — 36415 COLL VENOUS BLD VENIPUNCTURE: CPT | Mod: PO

## 2021-02-08 PROCEDURE — 80053 COMPREHEN METABOLIC PANEL: CPT

## 2021-02-12 ENCOUNTER — OFFICE VISIT (OUTPATIENT)
Dept: FAMILY MEDICINE | Facility: CLINIC | Age: 71
End: 2021-02-12
Payer: MEDICARE

## 2021-02-12 VITALS
OXYGEN SATURATION: 99 % | SYSTOLIC BLOOD PRESSURE: 136 MMHG | HEIGHT: 64 IN | TEMPERATURE: 97 F | HEART RATE: 67 BPM | WEIGHT: 162 LBS | DIASTOLIC BLOOD PRESSURE: 78 MMHG | BODY MASS INDEX: 27.66 KG/M2

## 2021-02-12 DIAGNOSIS — E78.5 DYSLIPIDEMIA, GOAL LDL BELOW 100: ICD-10-CM

## 2021-02-12 PROCEDURE — 99999 PR PBB SHADOW E&M-EST. PATIENT-LVL IV: ICD-10-PCS | Mod: PBBFAC,,, | Performed by: PHYSICIAN ASSISTANT

## 2021-02-12 PROCEDURE — 99213 PR OFFICE/OUTPT VISIT, EST, LEVL III, 20-29 MIN: ICD-10-PCS | Mod: S$PBB,,, | Performed by: PHYSICIAN ASSISTANT

## 2021-02-12 PROCEDURE — 99213 OFFICE O/P EST LOW 20 MIN: CPT | Mod: S$PBB,,, | Performed by: PHYSICIAN ASSISTANT

## 2021-02-12 PROCEDURE — 99999 PR PBB SHADOW E&M-EST. PATIENT-LVL IV: CPT | Mod: PBBFAC,,, | Performed by: PHYSICIAN ASSISTANT

## 2021-02-12 PROCEDURE — 99214 OFFICE O/P EST MOD 30 MIN: CPT | Mod: PBBFAC,PO | Performed by: PHYSICIAN ASSISTANT

## 2021-02-12 RX ORDER — ATORVASTATIN CALCIUM 40 MG/1
20 TABLET, FILM COATED ORAL DAILY
Qty: 45 TABLET | Refills: 3
Start: 2021-02-12 | End: 2022-02-12

## 2021-03-22 ENCOUNTER — PATIENT MESSAGE (OUTPATIENT)
Dept: HEMATOLOGY/ONCOLOGY | Facility: CLINIC | Age: 71
End: 2021-03-22

## 2021-03-24 ENCOUNTER — PATIENT OUTREACH (OUTPATIENT)
Dept: ADMINISTRATIVE | Facility: OTHER | Age: 71
End: 2021-03-24

## 2021-03-26 DIAGNOSIS — M25.531 RIGHT WRIST PAIN: Primary | ICD-10-CM

## 2021-03-29 ENCOUNTER — HOSPITAL ENCOUNTER (OUTPATIENT)
Dept: RADIOLOGY | Facility: HOSPITAL | Age: 71
Discharge: HOME | End: 2021-03-29
Attending: ORTHOPAEDIC SURGERY
Payer: MEDICARE

## 2021-03-29 ENCOUNTER — OFFICE VISIT (OUTPATIENT)
Dept: ORTHOPEDICS | Facility: CLINIC | Age: 71
End: 2021-03-29
Payer: MEDICARE

## 2021-03-29 VITALS — RESPIRATION RATE: 16 BRPM | HEIGHT: 64 IN | WEIGHT: 162 LBS | BODY MASS INDEX: 27.66 KG/M2

## 2021-03-29 DIAGNOSIS — M25.531 RIGHT WRIST PAIN: ICD-10-CM

## 2021-03-29 DIAGNOSIS — M25.531 RIGHT WRIST PAIN: Primary | ICD-10-CM

## 2021-03-29 PROCEDURE — 99213 OFFICE O/P EST LOW 20 MIN: CPT | Mod: PBBFAC,25,PN | Performed by: ORTHOPAEDIC SURGERY

## 2021-03-29 PROCEDURE — 73110 X-RAY EXAM OF WRIST: CPT | Mod: 26,RT,, | Performed by: RADIOLOGY

## 2021-03-29 PROCEDURE — 99213 OFFICE O/P EST LOW 20 MIN: CPT | Mod: S$PBB,,, | Performed by: ORTHOPAEDIC SURGERY

## 2021-03-29 PROCEDURE — 99999 PR PBB SHADOW E&M-EST. PATIENT-LVL III: ICD-10-PCS | Mod: PBBFAC,,, | Performed by: ORTHOPAEDIC SURGERY

## 2021-03-29 PROCEDURE — 73110 XR WRIST COMPLETE 3 VIEWS RIGHT: ICD-10-PCS | Mod: 26,RT,, | Performed by: RADIOLOGY

## 2021-03-29 PROCEDURE — 73110 X-RAY EXAM OF WRIST: CPT | Mod: TC,PN,RT

## 2021-03-29 PROCEDURE — 99213 PR OFFICE/OUTPT VISIT, EST, LEVL III, 20-29 MIN: ICD-10-PCS | Mod: S$PBB,,, | Performed by: ORTHOPAEDIC SURGERY

## 2021-03-29 PROCEDURE — 99999 PR PBB SHADOW E&M-EST. PATIENT-LVL III: CPT | Mod: PBBFAC,,, | Performed by: ORTHOPAEDIC SURGERY

## 2021-03-29 RX ORDER — METHYLPREDNISOLONE 4 MG/1
TABLET ORAL
Qty: 1 PACKAGE | Refills: 0 | Status: SHIPPED | OUTPATIENT
Start: 2021-03-29 | End: 2021-04-19

## 2021-04-08 ENCOUNTER — TELEPHONE (OUTPATIENT)
Dept: HEMATOLOGY/ONCOLOGY | Facility: CLINIC | Age: 71
End: 2021-04-08

## 2021-04-08 DIAGNOSIS — Z79.899 ENCOUNTER FOR LONG-TERM (CURRENT) USE OF OTHER MEDICATIONS: ICD-10-CM

## 2021-04-08 DIAGNOSIS — E63.9 DIETARY DEFICIENCY: ICD-10-CM

## 2021-04-08 DIAGNOSIS — C50.412 MALIGNANT NEOPLASM OF UPPER-OUTER QUADRANT OF LEFT BREAST IN FEMALE, ESTROGEN RECEPTOR POSITIVE: Primary | ICD-10-CM

## 2021-04-08 DIAGNOSIS — D51.8 OTHER VITAMIN B12 DEFICIENCY ANEMIA: ICD-10-CM

## 2021-04-08 DIAGNOSIS — Z17.0 MALIGNANT NEOPLASM OF UPPER-OUTER QUADRANT OF LEFT BREAST IN FEMALE, ESTROGEN RECEPTOR POSITIVE: Primary | ICD-10-CM

## 2021-04-13 ENCOUNTER — TELEPHONE (OUTPATIENT)
Dept: HEMATOLOGY/ONCOLOGY | Facility: CLINIC | Age: 71
End: 2021-04-13

## 2021-04-13 ENCOUNTER — PATIENT MESSAGE (OUTPATIENT)
Dept: HEMATOLOGY/ONCOLOGY | Facility: CLINIC | Age: 71
End: 2021-04-13

## 2021-04-13 DIAGNOSIS — E53.8 B12 DEFICIENCY: ICD-10-CM

## 2021-04-13 DIAGNOSIS — D51.8 ANEMIA OF DECREASED VITAMIN B12 ABSORPTION: ICD-10-CM

## 2021-04-13 DIAGNOSIS — C50.412 MALIGNANT NEOPLASM OF UPPER-OUTER QUADRANT OF LEFT BREAST IN FEMALE, ESTROGEN RECEPTOR POSITIVE: Primary | ICD-10-CM

## 2021-04-13 DIAGNOSIS — D51.8 OTHER VITAMIN B12 DEFICIENCY ANEMIA: ICD-10-CM

## 2021-04-13 DIAGNOSIS — Z17.0 MALIGNANT NEOPLASM OF UPPER-OUTER QUADRANT OF LEFT BREAST IN FEMALE, ESTROGEN RECEPTOR POSITIVE: Primary | ICD-10-CM

## 2021-04-14 ENCOUNTER — TELEPHONE (OUTPATIENT)
Dept: HEMATOLOGY/ONCOLOGY | Facility: CLINIC | Age: 71
End: 2021-04-14

## 2021-04-14 DIAGNOSIS — D51.8 ANEMIA OF DECREASED VITAMIN B12 ABSORPTION: Primary | ICD-10-CM

## 2021-04-14 DIAGNOSIS — E55.9 HYPOVITAMINOSIS D: ICD-10-CM

## 2021-05-03 ENCOUNTER — LAB VISIT (OUTPATIENT)
Dept: LAB | Facility: HOSPITAL | Age: 71
End: 2021-05-03
Attending: INTERNAL MEDICINE
Payer: MEDICARE

## 2021-05-03 DIAGNOSIS — Z17.0 MALIGNANT NEOPLASM OF UPPER-OUTER QUADRANT OF LEFT BREAST IN FEMALE, ESTROGEN RECEPTOR POSITIVE: ICD-10-CM

## 2021-05-03 DIAGNOSIS — D51.8 ANEMIA OF DECREASED VITAMIN B12 ABSORPTION: ICD-10-CM

## 2021-05-03 DIAGNOSIS — C50.412 MALIGNANT NEOPLASM OF UPPER-OUTER QUADRANT OF LEFT BREAST IN FEMALE, ESTROGEN RECEPTOR POSITIVE: ICD-10-CM

## 2021-05-03 LAB
BASOPHILS # BLD AUTO: 0.03 K/UL (ref 0–0.2)
BASOPHILS NFR BLD: 0.7 % (ref 0–1.9)
DIFFERENTIAL METHOD: ABNORMAL
EOSINOPHIL # BLD AUTO: 0.1 K/UL (ref 0–0.5)
EOSINOPHIL NFR BLD: 2.5 % (ref 0–8)
ERYTHROCYTE [DISTWIDTH] IN BLOOD BY AUTOMATED COUNT: 13 % (ref 11.5–14.5)
HCT VFR BLD AUTO: 41.6 % (ref 37–48.5)
HGB BLD-MCNC: 13.7 G/DL (ref 12–16)
IMM GRANULOCYTES # BLD AUTO: 0.03 K/UL (ref 0–0.04)
IMM GRANULOCYTES NFR BLD AUTO: 0.7 % (ref 0–0.5)
LYMPHOCYTES # BLD AUTO: 1.3 K/UL (ref 1–4.8)
LYMPHOCYTES NFR BLD: 29.9 % (ref 18–48)
MCH RBC QN AUTO: 29.2 PG (ref 27–31)
MCHC RBC AUTO-ENTMCNC: 32.9 G/DL (ref 32–36)
MCV RBC AUTO: 89 FL (ref 82–98)
MONOCYTES # BLD AUTO: 0.6 K/UL (ref 0.3–1)
MONOCYTES NFR BLD: 13.1 % (ref 4–15)
NEUTROPHILS # BLD AUTO: 2.3 K/UL (ref 1.8–7.7)
NEUTROPHILS NFR BLD: 53.1 % (ref 38–73)
NRBC BLD-RTO: 0 /100 WBC
PLATELET # BLD AUTO: 156 K/UL (ref 150–450)
PMV BLD AUTO: 12.9 FL (ref 9.2–12.9)
RBC # BLD AUTO: 4.69 M/UL (ref 4–5.4)
WBC # BLD AUTO: 4.35 K/UL (ref 3.9–12.7)

## 2021-05-03 PROCEDURE — 85025 COMPLETE CBC W/AUTO DIFF WBC: CPT | Performed by: INTERNAL MEDICINE

## 2021-05-03 PROCEDURE — 36415 COLL VENOUS BLD VENIPUNCTURE: CPT | Mod: PO | Performed by: INTERNAL MEDICINE

## 2021-05-10 ENCOUNTER — OFFICE VISIT (OUTPATIENT)
Dept: HEMATOLOGY/ONCOLOGY | Facility: CLINIC | Age: 71
End: 2021-05-10
Payer: MEDICARE

## 2021-05-10 ENCOUNTER — LAB VISIT (OUTPATIENT)
Dept: LAB | Facility: HOSPITAL | Age: 71
End: 2021-05-10
Attending: PHYSICIAN ASSISTANT
Payer: MEDICARE

## 2021-05-10 VITALS
SYSTOLIC BLOOD PRESSURE: 127 MMHG | HEIGHT: 64 IN | DIASTOLIC BLOOD PRESSURE: 84 MMHG | HEART RATE: 70 BPM | BODY MASS INDEX: 26.63 KG/M2 | RESPIRATION RATE: 18 BRPM | WEIGHT: 156 LBS

## 2021-05-10 DIAGNOSIS — Z17.0 MALIGNANT NEOPLASM OF UPPER-OUTER QUADRANT OF LEFT BREAST IN FEMALE, ESTROGEN RECEPTOR POSITIVE: Primary | ICD-10-CM

## 2021-05-10 DIAGNOSIS — C50.412 MALIGNANT NEOPLASM OF UPPER-OUTER QUADRANT OF LEFT BREAST IN FEMALE, ESTROGEN RECEPTOR POSITIVE: Primary | ICD-10-CM

## 2021-05-10 DIAGNOSIS — D51.8 ANEMIA OF DECREASED VITAMIN B12 ABSORPTION: ICD-10-CM

## 2021-05-10 DIAGNOSIS — E78.5 DYSLIPIDEMIA, GOAL LDL BELOW 100: ICD-10-CM

## 2021-05-10 DIAGNOSIS — Z98.84 HISTORY OF BARIATRIC SURGERY: ICD-10-CM

## 2021-05-10 DIAGNOSIS — E55.9 HYPOVITAMINOSIS D: ICD-10-CM

## 2021-05-10 LAB
ALBUMIN SERPL BCP-MCNC: 4 G/DL (ref 3.5–5.2)
ALP SERPL-CCNC: 98 U/L (ref 55–135)
ALT SERPL W/O P-5'-P-CCNC: 16 U/L (ref 10–44)
ANION GAP SERPL CALC-SCNC: 7 MMOL/L (ref 8–16)
AST SERPL-CCNC: 17 U/L (ref 10–40)
BILIRUB SERPL-MCNC: 0.4 MG/DL (ref 0.1–1)
BUN SERPL-MCNC: 23 MG/DL (ref 8–23)
CALCIUM SERPL-MCNC: 10.4 MG/DL (ref 8.7–10.5)
CHLORIDE SERPL-SCNC: 111 MMOL/L (ref 95–110)
CHOLEST SERPL-MCNC: 164 MG/DL (ref 120–199)
CHOLEST/HDLC SERPL: 2.8 {RATIO} (ref 2–5)
CO2 SERPL-SCNC: 24 MMOL/L (ref 23–29)
CREAT SERPL-MCNC: 0.8 MG/DL (ref 0.5–1.4)
EST. GFR  (AFRICAN AMERICAN): >60 ML/MIN/1.73 M^2
EST. GFR  (NON AFRICAN AMERICAN): >60 ML/MIN/1.73 M^2
GLUCOSE SERPL-MCNC: 97 MG/DL (ref 70–110)
HDLC SERPL-MCNC: 58 MG/DL (ref 40–75)
HDLC SERPL: 35.4 % (ref 20–50)
LDLC SERPL CALC-MCNC: 84.6 MG/DL (ref 63–159)
NONHDLC SERPL-MCNC: 106 MG/DL
POTASSIUM SERPL-SCNC: 4.3 MMOL/L (ref 3.5–5.1)
PROT SERPL-MCNC: 7 G/DL (ref 6–8.4)
SODIUM SERPL-SCNC: 142 MMOL/L (ref 136–145)
TRIGL SERPL-MCNC: 107 MG/DL (ref 30–150)

## 2021-05-10 PROCEDURE — 36415 COLL VENOUS BLD VENIPUNCTURE: CPT | Mod: PO | Performed by: PHYSICIAN ASSISTANT

## 2021-05-10 PROCEDURE — 99214 OFFICE O/P EST MOD 30 MIN: CPT | Mod: S$GLB,,, | Performed by: INTERNAL MEDICINE

## 2021-05-10 PROCEDURE — 80061 LIPID PANEL: CPT | Performed by: PHYSICIAN ASSISTANT

## 2021-05-10 PROCEDURE — 99214 PR OFFICE/OUTPT VISIT, EST, LEVL IV, 30-39 MIN: ICD-10-PCS | Mod: S$GLB,,, | Performed by: INTERNAL MEDICINE

## 2021-05-10 PROCEDURE — 80053 COMPREHEN METABOLIC PANEL: CPT | Performed by: PHYSICIAN ASSISTANT

## 2021-05-11 ENCOUNTER — PATIENT MESSAGE (OUTPATIENT)
Dept: FAMILY MEDICINE | Facility: CLINIC | Age: 71
End: 2021-05-11

## 2021-05-13 ENCOUNTER — OFFICE VISIT (OUTPATIENT)
Dept: FAMILY MEDICINE | Facility: CLINIC | Age: 71
End: 2021-05-13
Payer: MEDICARE

## 2021-05-13 VITALS
HEIGHT: 64 IN | BODY MASS INDEX: 26.24 KG/M2 | WEIGHT: 153.69 LBS | OXYGEN SATURATION: 95 % | SYSTOLIC BLOOD PRESSURE: 138 MMHG | DIASTOLIC BLOOD PRESSURE: 84 MMHG | HEART RATE: 74 BPM | TEMPERATURE: 99 F

## 2021-05-13 DIAGNOSIS — K21.9 GASTROESOPHAGEAL REFLUX DISEASE, UNSPECIFIED WHETHER ESOPHAGITIS PRESENT: ICD-10-CM

## 2021-05-13 DIAGNOSIS — E55.9 HYPOVITAMINOSIS D: Primary | ICD-10-CM

## 2021-05-13 DIAGNOSIS — Z23 NEED FOR PROPHYLACTIC VACCINATION AGAINST STREPTOCOCCUS PNEUMONIAE (PNEUMOCOCCUS): ICD-10-CM

## 2021-05-13 PROCEDURE — 99214 OFFICE O/P EST MOD 30 MIN: CPT | Mod: PBBFAC,PN,25 | Performed by: FAMILY MEDICINE

## 2021-05-13 PROCEDURE — 99999 PR PBB SHADOW E&M-EST. PATIENT-LVL IV: ICD-10-PCS | Mod: PBBFAC,,, | Performed by: FAMILY MEDICINE

## 2021-05-13 PROCEDURE — G0009 ADMIN PNEUMOCOCCAL VACCINE: HCPCS | Mod: PBBFAC,PN

## 2021-05-13 PROCEDURE — 99214 PR OFFICE/OUTPT VISIT, EST, LEVL IV, 30-39 MIN: ICD-10-PCS | Mod: S$PBB,,, | Performed by: FAMILY MEDICINE

## 2021-05-13 PROCEDURE — 90732 PPSV23 VACC 2 YRS+ SUBQ/IM: CPT | Mod: PBBFAC,PN

## 2021-05-13 PROCEDURE — 99999 PR PBB SHADOW E&M-EST. PATIENT-LVL IV: CPT | Mod: PBBFAC,,, | Performed by: FAMILY MEDICINE

## 2021-05-13 PROCEDURE — 99214 OFFICE O/P EST MOD 30 MIN: CPT | Mod: S$PBB,,, | Performed by: FAMILY MEDICINE

## 2021-05-13 RX ORDER — OMEPRAZOLE AND SODIUM BICARBONATE 40; 1100 MG/1; MG/1
1 CAPSULE ORAL
Qty: 90 CAPSULE | Refills: 3 | Status: SHIPPED | OUTPATIENT
Start: 2021-05-13 | End: 2021-12-09 | Stop reason: SDUPTHER

## 2021-05-19 ENCOUNTER — TELEPHONE (OUTPATIENT)
Dept: HEMATOLOGY/ONCOLOGY | Facility: CLINIC | Age: 71
End: 2021-05-19

## 2021-06-14 ENCOUNTER — OFFICE VISIT (OUTPATIENT)
Dept: HEMATOLOGY/ONCOLOGY | Facility: CLINIC | Age: 71
End: 2021-06-14
Payer: MEDICARE

## 2021-06-14 VITALS
HEIGHT: 64 IN | BODY MASS INDEX: 26.8 KG/M2 | HEART RATE: 66 BPM | RESPIRATION RATE: 18 BRPM | DIASTOLIC BLOOD PRESSURE: 83 MMHG | WEIGHT: 157 LBS | SYSTOLIC BLOOD PRESSURE: 143 MMHG

## 2021-06-14 DIAGNOSIS — C50.412 MALIGNANT NEOPLASM OF UPPER-OUTER QUADRANT OF LEFT BREAST IN FEMALE, ESTROGEN RECEPTOR POSITIVE: Primary | ICD-10-CM

## 2021-06-14 DIAGNOSIS — Z79.899 ENCOUNTER FOR LONG-TERM (CURRENT) USE OF OTHER MEDICATIONS: ICD-10-CM

## 2021-06-14 DIAGNOSIS — Z98.84 HISTORY OF BARIATRIC SURGERY: ICD-10-CM

## 2021-06-14 DIAGNOSIS — D51.8 ANEMIA OF DECREASED VITAMIN B12 ABSORPTION: ICD-10-CM

## 2021-06-14 DIAGNOSIS — E55.9 HYPOVITAMINOSIS D: ICD-10-CM

## 2021-06-14 DIAGNOSIS — Z17.0 MALIGNANT NEOPLASM OF UPPER-OUTER QUADRANT OF LEFT BREAST IN FEMALE, ESTROGEN RECEPTOR POSITIVE: Primary | ICD-10-CM

## 2021-06-14 PROCEDURE — 99214 OFFICE O/P EST MOD 30 MIN: CPT | Mod: S$GLB,,, | Performed by: INTERNAL MEDICINE

## 2021-06-14 PROCEDURE — 99214 PR OFFICE/OUTPT VISIT, EST, LEVL IV, 30-39 MIN: ICD-10-PCS | Mod: S$GLB,,, | Performed by: INTERNAL MEDICINE

## 2021-08-17 ENCOUNTER — LAB VISIT (OUTPATIENT)
Dept: LAB | Facility: HOSPITAL | Age: 71
End: 2021-08-17
Attending: OPHTHALMOLOGY
Payer: MEDICARE

## 2021-08-17 DIAGNOSIS — R51.9 TEMPORAL PAIN: Primary | ICD-10-CM

## 2021-08-17 LAB
CRP SERPL-MCNC: 0.03 MG/DL
ERYTHROCYTE [SEDIMENTATION RATE] IN BLOOD BY WESTERGREN METHOD: 2 MM/HR (ref 0–20)

## 2021-08-17 PROCEDURE — 36415 COLL VENOUS BLD VENIPUNCTURE: CPT | Performed by: OPHTHALMOLOGY

## 2021-08-17 PROCEDURE — 85651 RBC SED RATE NONAUTOMATED: CPT | Performed by: OPHTHALMOLOGY

## 2021-08-17 PROCEDURE — 86140 C-REACTIVE PROTEIN: CPT | Performed by: OPHTHALMOLOGY

## 2021-09-03 ENCOUNTER — TELEPHONE (OUTPATIENT)
Dept: UROLOGY | Facility: CLINIC | Age: 71
End: 2021-09-03

## 2021-09-03 ENCOUNTER — PATIENT MESSAGE (OUTPATIENT)
Dept: UROLOGY | Facility: CLINIC | Age: 71
End: 2021-09-03

## 2021-09-03 ENCOUNTER — LAB VISIT (OUTPATIENT)
Dept: LAB | Facility: HOSPITAL | Age: 71
End: 2021-09-03
Attending: UROLOGY
Payer: MEDICARE

## 2021-09-03 ENCOUNTER — TELEPHONE (OUTPATIENT)
Dept: FAMILY MEDICINE | Facility: CLINIC | Age: 71
End: 2021-09-03

## 2021-09-03 DIAGNOSIS — R82.998 CELLS AND CASTS IN URINE: Primary | ICD-10-CM

## 2021-09-03 DIAGNOSIS — R82.998 CELLS AND CASTS IN URINE: ICD-10-CM

## 2021-09-03 LAB
BILIRUB UR QL STRIP: NEGATIVE
CLARITY UR: ABNORMAL
COLOR UR: ABNORMAL
GLUCOSE UR QL STRIP: NEGATIVE
HGB UR QL STRIP: ABNORMAL
KETONES UR QL STRIP: NEGATIVE
LEUKOCYTE ESTERASE UR QL STRIP: ABNORMAL
MICROSCOPIC COMMENT: NORMAL
NITRITE UR QL STRIP: NEGATIVE
PH UR STRIP: 6 [PH] (ref 5–8)
PROT UR QL STRIP: NEGATIVE
RBC #/AREA URNS AUTO: 0 /HPF (ref 0–4)
SP GR UR STRIP: 1 (ref 1–1.03)
URN SPEC COLLECT METH UR: ABNORMAL
WBC #/AREA URNS AUTO: 0 /HPF (ref 0–5)

## 2021-09-03 PROCEDURE — 87088 URINE BACTERIA CULTURE: CPT | Performed by: UROLOGY

## 2021-09-03 PROCEDURE — 81000 URINALYSIS NONAUTO W/SCOPE: CPT | Mod: PO | Performed by: UROLOGY

## 2021-09-03 PROCEDURE — 87186 SC STD MICRODIL/AGAR DIL: CPT | Performed by: UROLOGY

## 2021-09-03 PROCEDURE — 87086 URINE CULTURE/COLONY COUNT: CPT | Performed by: UROLOGY

## 2021-09-03 PROCEDURE — 87077 CULTURE AEROBIC IDENTIFY: CPT | Performed by: UROLOGY

## 2021-09-05 ENCOUNTER — PATIENT MESSAGE (OUTPATIENT)
Dept: UROLOGY | Facility: CLINIC | Age: 71
End: 2021-09-05

## 2021-09-06 ENCOUNTER — PATIENT MESSAGE (OUTPATIENT)
Dept: UROLOGY | Facility: CLINIC | Age: 71
End: 2021-09-06

## 2021-09-06 LAB — BACTERIA UR CULT: ABNORMAL

## 2021-09-06 RX ORDER — NITROFURANTOIN 25; 75 MG/1; MG/1
100 CAPSULE ORAL 2 TIMES DAILY
Qty: 10 CAPSULE | Refills: 0 | Status: SHIPPED | OUTPATIENT
Start: 2021-09-06 | End: 2021-09-11

## 2021-09-06 RX ORDER — SULFAMETHOXAZOLE AND TRIMETHOPRIM 800; 160 MG/1; MG/1
1 TABLET ORAL 2 TIMES DAILY
Qty: 10 TABLET | Refills: 0 | Status: SHIPPED | OUTPATIENT
Start: 2021-09-06 | End: 2021-09-06

## 2021-09-27 ENCOUNTER — LAB VISIT (OUTPATIENT)
Dept: LAB | Facility: HOSPITAL | Age: 71
End: 2021-09-27
Attending: FAMILY MEDICINE
Payer: MEDICARE

## 2021-09-27 ENCOUNTER — PATIENT MESSAGE (OUTPATIENT)
Dept: UROLOGY | Facility: CLINIC | Age: 71
End: 2021-09-27

## 2021-09-27 DIAGNOSIS — N39.0 RECURRENT UTI: Primary | ICD-10-CM

## 2021-09-27 DIAGNOSIS — N39.0 RECURRENT UTI: ICD-10-CM

## 2021-09-27 PROCEDURE — 87086 URINE CULTURE/COLONY COUNT: CPT | Performed by: UROLOGY

## 2021-09-27 PROCEDURE — 81001 URINALYSIS AUTO W/SCOPE: CPT | Performed by: UROLOGY

## 2021-09-28 LAB
BACTERIA #/AREA URNS AUTO: ABNORMAL /HPF
BILIRUB UR QL STRIP: NEGATIVE
CLARITY UR REFRACT.AUTO: ABNORMAL
COLOR UR AUTO: YELLOW
GLUCOSE UR QL STRIP: NEGATIVE
HGB UR QL STRIP: NEGATIVE
KETONES UR QL STRIP: NEGATIVE
LEUKOCYTE ESTERASE UR QL STRIP: ABNORMAL
MICROSCOPIC COMMENT: ABNORMAL
NITRITE UR QL STRIP: NEGATIVE
PH UR STRIP: 5 [PH] (ref 5–8)
PROT UR QL STRIP: NEGATIVE
RBC #/AREA URNS AUTO: 0 /HPF (ref 0–4)
RBC CASTS UR QL COMP ASSIST: 1 /LPF
SP GR UR STRIP: 1.01 (ref 1–1.03)
SQUAMOUS #/AREA URNS AUTO: 1 /HPF
URN SPEC COLLECT METH UR: ABNORMAL
WBC #/AREA URNS AUTO: 49 /HPF (ref 0–5)

## 2021-09-29 LAB
BACTERIA UR CULT: NORMAL
BACTERIA UR CULT: NORMAL

## 2021-10-19 ENCOUNTER — PATIENT MESSAGE (OUTPATIENT)
Dept: FAMILY MEDICINE | Facility: CLINIC | Age: 71
End: 2021-10-19

## 2021-11-08 ENCOUNTER — LAB VISIT (OUTPATIENT)
Dept: LAB | Facility: HOSPITAL | Age: 71
End: 2021-11-08
Attending: FAMILY MEDICINE
Payer: MEDICARE

## 2021-11-08 DIAGNOSIS — K21.9 GASTROESOPHAGEAL REFLUX DISEASE, UNSPECIFIED WHETHER ESOPHAGITIS PRESENT: ICD-10-CM

## 2021-11-08 DIAGNOSIS — E55.9 HYPOVITAMINOSIS D: ICD-10-CM

## 2021-11-08 LAB
25(OH)D3+25(OH)D2 SERPL-MCNC: 59 NG/ML (ref 30–96)
ANION GAP SERPL CALC-SCNC: 6 MMOL/L (ref 8–16)
BUN SERPL-MCNC: 21 MG/DL (ref 8–23)
CALCIUM SERPL-MCNC: 10.4 MG/DL (ref 8.7–10.5)
CHLORIDE SERPL-SCNC: 110 MMOL/L (ref 95–110)
CO2 SERPL-SCNC: 27 MMOL/L (ref 23–29)
CREAT SERPL-MCNC: 1 MG/DL (ref 0.5–1.4)
EST. GFR  (AFRICAN AMERICAN): >60 ML/MIN/1.73 M^2
EST. GFR  (NON AFRICAN AMERICAN): 57.2 ML/MIN/1.73 M^2
GLUCOSE SERPL-MCNC: 98 MG/DL (ref 70–110)
POTASSIUM SERPL-SCNC: 4 MMOL/L (ref 3.5–5.1)
SODIUM SERPL-SCNC: 143 MMOL/L (ref 136–145)

## 2021-11-08 PROCEDURE — 80048 BASIC METABOLIC PNL TOTAL CA: CPT | Performed by: FAMILY MEDICINE

## 2021-11-08 PROCEDURE — 36415 COLL VENOUS BLD VENIPUNCTURE: CPT | Mod: PO | Performed by: FAMILY MEDICINE

## 2021-11-08 PROCEDURE — 82306 VITAMIN D 25 HYDROXY: CPT | Performed by: FAMILY MEDICINE

## 2021-11-15 ENCOUNTER — OFFICE VISIT (OUTPATIENT)
Dept: FAMILY MEDICINE | Facility: CLINIC | Age: 71
End: 2021-11-15
Payer: MEDICARE

## 2021-11-15 VITALS
HEIGHT: 64 IN | DIASTOLIC BLOOD PRESSURE: 82 MMHG | SYSTOLIC BLOOD PRESSURE: 130 MMHG | HEART RATE: 67 BPM | BODY MASS INDEX: 26.95 KG/M2 | OXYGEN SATURATION: 99 %

## 2021-11-15 DIAGNOSIS — E55.9 HYPOVITAMINOSIS D: ICD-10-CM

## 2021-11-15 DIAGNOSIS — F41.1 GAD (GENERALIZED ANXIETY DISORDER): ICD-10-CM

## 2021-11-15 DIAGNOSIS — Z85.3 HISTORY OF BREAST CANCER: ICD-10-CM

## 2021-11-15 DIAGNOSIS — C50.512 MALIGNANT NEOPLASM OF LOWER-OUTER QUADRANT OF LEFT FEMALE BREAST, UNSPECIFIED ESTROGEN RECEPTOR STATUS: ICD-10-CM

## 2021-11-15 DIAGNOSIS — K91.2 POSTSURGICAL MALABSORPTION, NOT ELSEWHERE CLASSIFIED: ICD-10-CM

## 2021-11-15 DIAGNOSIS — K21.9 GASTROESOPHAGEAL REFLUX DISEASE WITHOUT ESOPHAGITIS: Primary | ICD-10-CM

## 2021-11-15 DIAGNOSIS — Z86.010 HISTORY OF COLON POLYPS: ICD-10-CM

## 2021-11-15 PROBLEM — Z86.39 HISTORY OF TYPE 2 DIABETES MELLITUS: Status: RESOLVED | Noted: 2017-02-10 | Resolved: 2021-11-15

## 2021-11-15 PROCEDURE — 99999 PR PBB SHADOW E&M-EST. PATIENT-LVL III: ICD-10-PCS | Mod: PBBFAC,,, | Performed by: FAMILY MEDICINE

## 2021-11-15 PROCEDURE — 99213 OFFICE O/P EST LOW 20 MIN: CPT | Mod: S$PBB,,, | Performed by: FAMILY MEDICINE

## 2021-11-15 PROCEDURE — 99213 OFFICE O/P EST LOW 20 MIN: CPT | Mod: PBBFAC,PO | Performed by: FAMILY MEDICINE

## 2021-11-15 PROCEDURE — 99999 PR PBB SHADOW E&M-EST. PATIENT-LVL III: CPT | Mod: PBBFAC,,, | Performed by: FAMILY MEDICINE

## 2021-11-15 PROCEDURE — 99213 PR OFFICE/OUTPT VISIT, EST, LEVL III, 20-29 MIN: ICD-10-PCS | Mod: S$PBB,,, | Performed by: FAMILY MEDICINE

## 2021-11-18 ENCOUNTER — LAB VISIT (OUTPATIENT)
Dept: LAB | Facility: HOSPITAL | Age: 71
End: 2021-11-18
Attending: FAMILY MEDICINE
Payer: MEDICARE

## 2021-11-18 DIAGNOSIS — C50.512 MALIGNANT NEOPLASM OF LOWER-OUTER QUADRANT OF LEFT FEMALE BREAST, UNSPECIFIED ESTROGEN RECEPTOR STATUS: ICD-10-CM

## 2021-11-18 DIAGNOSIS — E55.9 HYPOVITAMINOSIS D: ICD-10-CM

## 2021-11-18 DIAGNOSIS — Z86.010 HISTORY OF COLON POLYPS: ICD-10-CM

## 2021-11-18 DIAGNOSIS — Z85.3 HISTORY OF BREAST CANCER: ICD-10-CM

## 2021-11-18 DIAGNOSIS — K21.9 GASTROESOPHAGEAL REFLUX DISEASE WITHOUT ESOPHAGITIS: ICD-10-CM

## 2021-11-18 DIAGNOSIS — K91.2 POSTSURGICAL MALABSORPTION, NOT ELSEWHERE CLASSIFIED: ICD-10-CM

## 2021-11-18 DIAGNOSIS — F41.1 GAD (GENERALIZED ANXIETY DISORDER): ICD-10-CM

## 2021-11-18 LAB
BASOPHILS # BLD AUTO: 0.05 K/UL (ref 0–0.2)
BASOPHILS NFR BLD: 1.2 % (ref 0–1.9)
CHOLEST SERPL-MCNC: 176 MG/DL (ref 120–199)
CHOLEST/HDLC SERPL: 2.5 {RATIO} (ref 2–5)
DIFFERENTIAL METHOD: ABNORMAL
EOSINOPHIL # BLD AUTO: 0.1 K/UL (ref 0–0.5)
EOSINOPHIL NFR BLD: 1.7 % (ref 0–8)
ERYTHROCYTE [DISTWIDTH] IN BLOOD BY AUTOMATED COUNT: 13.1 % (ref 11.5–14.5)
HCT VFR BLD AUTO: 42.2 % (ref 37–48.5)
HDLC SERPL-MCNC: 71 MG/DL (ref 40–75)
HDLC SERPL: 40.3 % (ref 20–50)
HGB BLD-MCNC: 13.5 G/DL (ref 12–16)
IMM GRANULOCYTES # BLD AUTO: 0.01 K/UL (ref 0–0.04)
IMM GRANULOCYTES NFR BLD AUTO: 0.2 % (ref 0–0.5)
LDLC SERPL CALC-MCNC: 88.2 MG/DL (ref 63–159)
LYMPHOCYTES # BLD AUTO: 1.1 K/UL (ref 1–4.8)
LYMPHOCYTES NFR BLD: 26.8 % (ref 18–48)
MCH RBC QN AUTO: 28.4 PG (ref 27–31)
MCHC RBC AUTO-ENTMCNC: 32 G/DL (ref 32–36)
MCV RBC AUTO: 89 FL (ref 82–98)
MONOCYTES # BLD AUTO: 0.4 K/UL (ref 0.3–1)
MONOCYTES NFR BLD: 10.8 % (ref 4–15)
NEUTROPHILS # BLD AUTO: 2.4 K/UL (ref 1.8–7.7)
NEUTROPHILS NFR BLD: 59.3 % (ref 38–73)
NONHDLC SERPL-MCNC: 105 MG/DL
NRBC BLD-RTO: 0 /100 WBC
PLATELET # BLD AUTO: 144 K/UL (ref 150–450)
PMV BLD AUTO: 12.9 FL (ref 9.2–12.9)
RBC # BLD AUTO: 4.75 M/UL (ref 4–5.4)
TRIGL SERPL-MCNC: 84 MG/DL (ref 30–150)
TSH SERPL DL<=0.005 MIU/L-ACNC: 2.02 UIU/ML (ref 0.4–4)
WBC # BLD AUTO: 4.07 K/UL (ref 3.9–12.7)

## 2021-11-18 PROCEDURE — 84443 ASSAY THYROID STIM HORMONE: CPT | Performed by: FAMILY MEDICINE

## 2021-11-18 PROCEDURE — 80061 LIPID PANEL: CPT | Performed by: FAMILY MEDICINE

## 2021-11-18 PROCEDURE — 36415 COLL VENOUS BLD VENIPUNCTURE: CPT | Mod: PO | Performed by: FAMILY MEDICINE

## 2021-11-18 PROCEDURE — 85025 COMPLETE CBC W/AUTO DIFF WBC: CPT | Performed by: FAMILY MEDICINE

## 2021-11-19 ENCOUNTER — PATIENT MESSAGE (OUTPATIENT)
Dept: FAMILY MEDICINE | Facility: CLINIC | Age: 71
End: 2021-11-19
Payer: MEDICARE

## 2021-12-09 ENCOUNTER — PATIENT MESSAGE (OUTPATIENT)
Dept: FAMILY MEDICINE | Facility: CLINIC | Age: 71
End: 2021-12-09
Payer: MEDICARE

## 2021-12-09 DIAGNOSIS — K21.9 GASTROESOPHAGEAL REFLUX DISEASE, UNSPECIFIED WHETHER ESOPHAGITIS PRESENT: ICD-10-CM

## 2021-12-09 RX ORDER — OMEPRAZOLE AND SODIUM BICARBONATE 40; 1100 MG/1; MG/1
1 CAPSULE ORAL
Qty: 90 CAPSULE | Refills: 3 | Status: SHIPPED | OUTPATIENT
Start: 2021-12-09 | End: 2023-06-13 | Stop reason: SDUPTHER

## 2021-12-10 ENCOUNTER — PATIENT MESSAGE (OUTPATIENT)
Dept: FAMILY MEDICINE | Facility: CLINIC | Age: 71
End: 2021-12-10
Payer: MEDICARE

## 2021-12-14 ENCOUNTER — OFFICE VISIT (OUTPATIENT)
Dept: HEMATOLOGY/ONCOLOGY | Facility: CLINIC | Age: 71
End: 2021-12-14
Payer: MEDICARE

## 2021-12-14 VITALS
SYSTOLIC BLOOD PRESSURE: 145 MMHG | HEART RATE: 61 BPM | DIASTOLIC BLOOD PRESSURE: 91 MMHG | WEIGHT: 158 LBS | TEMPERATURE: 98 F | BODY MASS INDEX: 27.12 KG/M2

## 2021-12-14 DIAGNOSIS — C50.512 MALIGNANT NEOPLASM OF LOWER-OUTER QUADRANT OF LEFT FEMALE BREAST, UNSPECIFIED ESTROGEN RECEPTOR STATUS: Primary | ICD-10-CM

## 2021-12-14 PROCEDURE — 99214 PR OFFICE/OUTPT VISIT, EST, LEVL IV, 30-39 MIN: ICD-10-PCS | Mod: S$GLB,,, | Performed by: INTERNAL MEDICINE

## 2021-12-14 PROCEDURE — 99214 OFFICE O/P EST MOD 30 MIN: CPT | Mod: S$GLB,,, | Performed by: INTERNAL MEDICINE

## 2022-01-19 ENCOUNTER — HOSPITAL ENCOUNTER (OUTPATIENT)
Dept: RADIOLOGY | Facility: HOSPITAL | Age: 72
Discharge: HOME | End: 2022-01-19
Attending: INTERNAL MEDICINE
Payer: MEDICARE

## 2022-01-19 VITALS — WEIGHT: 160 LBS | BODY MASS INDEX: 27.31 KG/M2 | HEIGHT: 64 IN

## 2022-01-19 DIAGNOSIS — C50.512 MALIGNANT NEOPLASM OF LOWER-OUTER QUADRANT OF LEFT FEMALE BREAST, UNSPECIFIED ESTROGEN RECEPTOR STATUS: ICD-10-CM

## 2022-01-19 LAB — GLUCOSE SERPL-MCNC: 93 MG/DL (ref 70–110)

## 2022-01-19 PROCEDURE — 78815 PET IMAGE W/CT SKULL-THIGH: CPT | Mod: TC,PO,PS

## 2022-01-23 ENCOUNTER — TELEPHONE (OUTPATIENT)
Dept: HEMATOLOGY/ONCOLOGY | Facility: HOSPITAL | Age: 72
End: 2022-01-23
Payer: MEDICARE

## 2022-03-03 ENCOUNTER — PATIENT MESSAGE (OUTPATIENT)
Dept: FAMILY MEDICINE | Facility: CLINIC | Age: 72
End: 2022-03-03
Payer: MEDICARE

## 2022-03-04 ENCOUNTER — TELEPHONE (OUTPATIENT)
Dept: FAMILY MEDICINE | Facility: CLINIC | Age: 72
End: 2022-03-04
Payer: MEDICARE

## 2022-03-04 RX ORDER — ATORVASTATIN CALCIUM 20 MG/1
20 TABLET, FILM COATED ORAL DAILY
Qty: 30 TABLET | Refills: 0 | Status: CANCELLED | OUTPATIENT
Start: 2022-03-04

## 2022-03-04 RX ORDER — ATORVASTATIN CALCIUM 20 MG/1
20 TABLET, FILM COATED ORAL DAILY
COMMUNITY
End: 2022-03-07 | Stop reason: SDUPTHER

## 2022-03-07 ENCOUNTER — PATIENT MESSAGE (OUTPATIENT)
Dept: FAMILY MEDICINE | Facility: CLINIC | Age: 72
End: 2022-03-07
Payer: MEDICARE

## 2022-03-07 NOTE — TELEPHONE ENCOUNTER
No new care gaps identified.  Powered by [employer]. Reference number: [id number].   3/07/2022 10:42:03 AM CST

## 2022-03-08 ENCOUNTER — PATIENT MESSAGE (OUTPATIENT)
Dept: FAMILY MEDICINE | Facility: CLINIC | Age: 72
End: 2022-03-08
Payer: MEDICARE

## 2022-03-08 ENCOUNTER — PATIENT MESSAGE (OUTPATIENT)
Dept: ORTHOPEDICS | Facility: CLINIC | Age: 72
End: 2022-03-08
Payer: MEDICARE

## 2022-03-08 RX ORDER — ATORVASTATIN CALCIUM 20 MG/1
20 TABLET, FILM COATED ORAL DAILY
Qty: 90 TABLET | Refills: 0 | Status: SHIPPED | OUTPATIENT
Start: 2022-03-08 | End: 2022-09-12

## 2022-03-08 NOTE — TELEPHONE ENCOUNTER
Spoke with pt via phone. Advised that I spoke with pharmacy and they have prescription ready. All understanding.

## 2022-05-13 ENCOUNTER — TELEPHONE (OUTPATIENT)
Dept: FAMILY MEDICINE | Facility: CLINIC | Age: 72
End: 2022-05-13
Payer: MEDICARE

## 2022-05-13 NOTE — TELEPHONE ENCOUNTER
----- Message from Naz Schuster sent at 5/13/2022  8:59 AM CDT -----  Regarding: pt called  Name of Who is Calling: JAIDEN ERICKSON [[id number]]      What is the request in detail: pt would like to know if she needs any labs need to be ordered before her appt on Monday. Please advise       Can the clinic reply by [facility]: No      What Number to Call Back if not in [facility]: [phone]

## 2022-05-16 ENCOUNTER — OFFICE VISIT (OUTPATIENT)
Dept: FAMILY MEDICINE | Facility: CLINIC | Age: 72
End: 2022-05-16
Payer: MEDICARE

## 2022-05-16 VITALS
WEIGHT: 158.94 LBS | HEIGHT: 64 IN | HEART RATE: 67 BPM | BODY MASS INDEX: 27.13 KG/M2 | OXYGEN SATURATION: 95 % | DIASTOLIC BLOOD PRESSURE: 76 MMHG | SYSTOLIC BLOOD PRESSURE: 128 MMHG

## 2022-05-16 DIAGNOSIS — I10 HYPERTENSION, UNSPECIFIED TYPE: ICD-10-CM

## 2022-05-16 DIAGNOSIS — K21.9 GASTROESOPHAGEAL REFLUX DISEASE WITHOUT ESOPHAGITIS: ICD-10-CM

## 2022-05-16 DIAGNOSIS — Z00.00 WELLNESS EXAMINATION: ICD-10-CM

## 2022-05-16 DIAGNOSIS — E78.5 HYPERLIPIDEMIA, UNSPECIFIED HYPERLIPIDEMIA TYPE: Primary | ICD-10-CM

## 2022-05-16 DIAGNOSIS — R73.09 ELEVATED HEMOGLOBIN A1C: ICD-10-CM

## 2022-05-16 PROBLEM — C50.919 BREAST CANCER: Status: RESOLVED | Noted: 2017-05-04 | Resolved: 2022-05-16

## 2022-05-16 PROCEDURE — 99214 OFFICE O/P EST MOD 30 MIN: CPT | Mod: S$PBB,,, | Performed by: FAMILY MEDICINE

## 2022-05-16 PROCEDURE — 99214 OFFICE O/P EST MOD 30 MIN: CPT | Mod: PBBFAC,PO | Performed by: FAMILY MEDICINE

## 2022-05-16 PROCEDURE — 99999 PR PBB SHADOW E&M-EST. PATIENT-LVL IV: ICD-10-PCS | Mod: PBBFAC,,, | Performed by: FAMILY MEDICINE

## 2022-05-16 PROCEDURE — 99999 PR PBB SHADOW E&M-EST. PATIENT-LVL IV: CPT | Mod: PBBFAC,,, | Performed by: FAMILY MEDICINE

## 2022-05-16 PROCEDURE — 99214 PR OFFICE/OUTPT VISIT, EST, LEVL IV, 30-39 MIN: ICD-10-PCS | Mod: S$PBB,,, | Performed by: FAMILY MEDICINE

## 2022-05-16 NOTE — PROGRESS NOTES
Subjective:       Patient ID: Marce Hickey is a 72 y.o. female.    Chief Complaint: Follow-up    Here for annual exam. She has been doing well. No concerns today. Would like to know if she can stop her statin as her last several cholesterol checks have been excellent. She is finishing her course of anastrazole soon and has her final f/u with oncology right after.     Review of Systems   Constitutional: Negative for activity change, chills and fever.   HENT: Negative for nasal congestion and sinus pressure/congestion.    Eyes: Negative for itching.   Respiratory: Negative for chest tightness and shortness of breath.    Cardiovascular: Negative for chest pain and palpitations.   Gastrointestinal: Negative for abdominal pain, constipation, nausea and vomiting.   Endocrine: Negative for cold intolerance.   Genitourinary: Negative for difficulty urinating and menstrual problem.   Musculoskeletal: Negative for arthralgias, joint swelling and myalgias.   Integumentary:  Negative for rash.   Allergic/Immunologic: Negative for environmental allergies.   Neurological: Negative for dizziness, weakness and headaches.   Psychiatric/Behavioral: Negative for agitation and confusion.         Objective:      Physical Exam  Vitals and nursing note reviewed.   Constitutional:       Appearance: She is well-developed.   HENT:      Head: Normocephalic and atraumatic.   Eyes:      Pupils: Pupils are equal, round, and reactive to light.   Cardiovascular:      Rate and Rhythm: Normal rate and regular rhythm.      Heart sounds: No murmur heard.  Pulmonary:      Effort: Pulmonary effort is normal. No respiratory distress.      Breath sounds: Normal breath sounds. No wheezing or rales.   Abdominal:      General: There is no distension.      Palpations: Abdomen is soft.      Tenderness: There is no abdominal tenderness. There is no guarding.   Musculoskeletal:         General: Normal range of motion.      Cervical back: Normal range of  motion and neck supple.   Skin:     General: Skin is warm and dry.   Neurological:      Mental Status: She is alert and oriented to person, place, and time.      Deep Tendon Reflexes: Reflexes normal.   Psychiatric:         Behavior: Behavior normal.         Thought Content: Thought content normal.         Judgment: Judgment normal.         Assessment:       Problem List Items Addressed This Visit        GI    GERD (gastroesophageal reflux disease) - Primary      Other Visit Diagnoses     Hyperlipidemia, unspecified hyperlipidemia type        Wellness examination        Hypertension, unspecified type              Plan:       Marce was seen today for follow-up.    Diagnoses and all orders for this visit:    Hyperlipidemia, unspecified hyperlipidemia type  -     CBC Auto Differential; Future  -     Comprehensive Metabolic Panel; Future  -     Hemoglobin A1C; Future  -     Lipid Panel; Future    Gastroesophageal reflux disease without esophagitis    Wellness examination  -     CBC Auto Differential; Future  -     Comprehensive Metabolic Panel; Future  -     Hemoglobin A1C; Future    Hypertension, unspecified type  -     CBC Auto Differential; Future    Elevated hemoglobin A1c  -     Hemoglobin A1C; Future    will check another lipid panel and if still normal can consider d/c statin. 1 year f/u.

## 2022-05-17 ENCOUNTER — LAB VISIT (OUTPATIENT)
Dept: LAB | Facility: HOSPITAL | Age: 72
End: 2022-05-17
Attending: FAMILY MEDICINE
Payer: MEDICARE

## 2022-05-17 DIAGNOSIS — Z00.00 WELLNESS EXAMINATION: ICD-10-CM

## 2022-05-17 DIAGNOSIS — E78.5 HYPERLIPIDEMIA, UNSPECIFIED HYPERLIPIDEMIA TYPE: ICD-10-CM

## 2022-05-17 DIAGNOSIS — I10 HYPERTENSION, UNSPECIFIED TYPE: ICD-10-CM

## 2022-05-17 DIAGNOSIS — R73.09 ELEVATED HEMOGLOBIN A1C: ICD-10-CM

## 2022-05-17 LAB
ALBUMIN SERPL BCP-MCNC: 3.9 G/DL (ref 3.5–5.2)
ALP SERPL-CCNC: 93 U/L (ref 55–135)
ALT SERPL W/O P-5'-P-CCNC: 20 U/L (ref 10–44)
ANION GAP SERPL CALC-SCNC: 6 MMOL/L (ref 8–16)
AST SERPL-CCNC: 18 U/L (ref 10–40)
BASOPHILS # BLD AUTO: 0.04 K/UL (ref 0–0.2)
BASOPHILS NFR BLD: 1 % (ref 0–1.9)
BILIRUB SERPL-MCNC: 0.4 MG/DL (ref 0.1–1)
BUN SERPL-MCNC: 27 MG/DL (ref 8–23)
CALCIUM SERPL-MCNC: 10.3 MG/DL (ref 8.7–10.5)
CHLORIDE SERPL-SCNC: 113 MMOL/L (ref 95–110)
CHOLEST SERPL-MCNC: 165 MG/DL (ref 120–199)
CHOLEST/HDLC SERPL: 2.7 {RATIO} (ref 2–5)
CO2 SERPL-SCNC: 26 MMOL/L (ref 23–29)
CREAT SERPL-MCNC: 0.7 MG/DL (ref 0.5–1.4)
DIFFERENTIAL METHOD: NORMAL
EOSINOPHIL # BLD AUTO: 0.1 K/UL (ref 0–0.5)
EOSINOPHIL NFR BLD: 2.5 % (ref 0–8)
ERYTHROCYTE [DISTWIDTH] IN BLOOD BY AUTOMATED COUNT: 13 % (ref 11.5–14.5)
EST. GFR  (AFRICAN AMERICAN): >60 ML/MIN/1.73 M^2
EST. GFR  (NON AFRICAN AMERICAN): >60 ML/MIN/1.73 M^2
ESTIMATED AVG GLUCOSE: 108 MG/DL (ref 68–131)
GLUCOSE SERPL-MCNC: 107 MG/DL (ref 70–110)
HBA1C MFR BLD: 5.4 % (ref 4–5.6)
HCT VFR BLD AUTO: 40.9 % (ref 37–48.5)
HDLC SERPL-MCNC: 61 MG/DL (ref 40–75)
HDLC SERPL: 37 % (ref 20–50)
HGB BLD-MCNC: 13.3 G/DL (ref 12–16)
IMM GRANULOCYTES # BLD AUTO: 0.02 K/UL (ref 0–0.04)
IMM GRANULOCYTES NFR BLD AUTO: 0.5 % (ref 0–0.5)
LDLC SERPL CALC-MCNC: 87.6 MG/DL (ref 63–159)
LYMPHOCYTES # BLD AUTO: 1 K/UL (ref 1–4.8)
LYMPHOCYTES NFR BLD: 25.8 % (ref 18–48)
MCH RBC QN AUTO: 28.5 PG (ref 27–31)
MCHC RBC AUTO-ENTMCNC: 32.5 G/DL (ref 32–36)
MCV RBC AUTO: 88 FL (ref 82–98)
MONOCYTES # BLD AUTO: 0.5 K/UL (ref 0.3–1)
MONOCYTES NFR BLD: 11.9 % (ref 4–15)
NEUTROPHILS # BLD AUTO: 2.3 K/UL (ref 1.8–7.7)
NEUTROPHILS NFR BLD: 58.3 % (ref 38–73)
NONHDLC SERPL-MCNC: 104 MG/DL
NRBC BLD-RTO: 0 /100 WBC
PLATELET # BLD AUTO: 164 K/UL (ref 150–450)
PMV BLD AUTO: 12.3 FL (ref 9.2–12.9)
POTASSIUM SERPL-SCNC: 5.8 MMOL/L (ref 3.5–5.1)
PROT SERPL-MCNC: 6.8 G/DL (ref 6–8.4)
RBC # BLD AUTO: 4.67 M/UL (ref 4–5.4)
SODIUM SERPL-SCNC: 145 MMOL/L (ref 136–145)
TRIGL SERPL-MCNC: 82 MG/DL (ref 30–150)
WBC # BLD AUTO: 3.96 K/UL (ref 3.9–12.7)

## 2022-05-17 PROCEDURE — 85025 COMPLETE CBC W/AUTO DIFF WBC: CPT | Performed by: FAMILY MEDICINE

## 2022-05-17 PROCEDURE — 36415 COLL VENOUS BLD VENIPUNCTURE: CPT | Mod: PO | Performed by: FAMILY MEDICINE

## 2022-05-17 PROCEDURE — 80061 LIPID PANEL: CPT | Performed by: FAMILY MEDICINE

## 2022-05-17 PROCEDURE — 83036 HEMOGLOBIN GLYCOSYLATED A1C: CPT | Performed by: FAMILY MEDICINE

## 2022-05-17 PROCEDURE — 80053 COMPREHEN METABOLIC PANEL: CPT | Performed by: FAMILY MEDICINE

## 2022-05-19 DIAGNOSIS — E87.5 SERUM POTASSIUM ELEVATED: Primary | ICD-10-CM

## 2022-05-20 ENCOUNTER — PATIENT MESSAGE (OUTPATIENT)
Dept: FAMILY MEDICINE | Facility: CLINIC | Age: 72
End: 2022-05-20
Payer: MEDICARE

## 2022-05-25 ENCOUNTER — IMMUNIZATION (OUTPATIENT)
Dept: PRIMARY CARE CLINIC | Facility: CLINIC | Age: 72
End: 2022-05-25
Payer: MEDICARE

## 2022-05-25 DIAGNOSIS — Z23 NEED FOR VACCINATION: Primary | ICD-10-CM

## 2022-05-25 PROCEDURE — 91306 COVID-19, MRNA, LNP-S, PF, 100 MCG/0.25 ML DOSE VACCINE (MODERNA BOOSTER): CPT | Mod: S$GLB,,, | Performed by: FAMILY MEDICINE

## 2022-05-25 PROCEDURE — 0064A COVID-19, MRNA, LNP-S, PF, 100 MCG/0.25 ML DOSE VACCINE (MODERNA BOOSTER): CPT | Mod: S$GLB,,, | Performed by: FAMILY MEDICINE

## 2022-05-25 PROCEDURE — 91306 COVID-19, MRNA, LNP-S, PF, 100 MCG/0.25 ML DOSE VACCINE (MODERNA BOOSTER): ICD-10-PCS | Mod: S$GLB,,, | Performed by: FAMILY MEDICINE

## 2022-05-25 PROCEDURE — 0064A COVID-19, MRNA, LNP-S, PF, 100 MCG/0.25 ML DOSE VACCINE (MODERNA BOOSTER): ICD-10-PCS | Mod: S$GLB,,, | Performed by: FAMILY MEDICINE

## 2022-05-27 ENCOUNTER — LAB VISIT (OUTPATIENT)
Dept: LAB | Facility: HOSPITAL | Age: 72
End: 2022-05-27
Attending: FAMILY MEDICINE
Payer: MEDICARE

## 2022-05-27 DIAGNOSIS — E87.5 SERUM POTASSIUM ELEVATED: ICD-10-CM

## 2022-05-27 LAB — POTASSIUM SERPL-SCNC: 4.9 MMOL/L (ref 3.5–5.1)

## 2022-05-27 PROCEDURE — 84132 ASSAY OF SERUM POTASSIUM: CPT | Performed by: FAMILY MEDICINE

## 2022-05-27 PROCEDURE — 36415 COLL VENOUS BLD VENIPUNCTURE: CPT | Mod: PO | Performed by: FAMILY MEDICINE

## 2022-05-29 ENCOUNTER — PATIENT MESSAGE (OUTPATIENT)
Dept: FAMILY MEDICINE | Facility: CLINIC | Age: 72
End: 2022-05-29
Payer: MEDICARE

## 2022-05-30 NOTE — PROGRESS NOTES
Patient reviewed results through her portal on yesterday and did n ot post any questions or concerns.

## 2022-05-31 ENCOUNTER — PATIENT MESSAGE (OUTPATIENT)
Dept: FAMILY MEDICINE | Facility: CLINIC | Age: 72
End: 2022-05-31
Payer: MEDICARE

## 2022-06-14 ENCOUNTER — OFFICE VISIT (OUTPATIENT)
Dept: HEMATOLOGY/ONCOLOGY | Facility: CLINIC | Age: 72
End: 2022-06-14
Payer: MEDICARE

## 2022-06-14 VITALS
HEIGHT: 64 IN | BODY MASS INDEX: 27.31 KG/M2 | RESPIRATION RATE: 18 BRPM | HEART RATE: 81 BPM | WEIGHT: 160 LBS | DIASTOLIC BLOOD PRESSURE: 78 MMHG | SYSTOLIC BLOOD PRESSURE: 125 MMHG

## 2022-06-14 DIAGNOSIS — C50.512 MALIGNANT NEOPLASM OF LOWER-OUTER QUADRANT OF LEFT FEMALE BREAST, UNSPECIFIED ESTROGEN RECEPTOR STATUS: Primary | ICD-10-CM

## 2022-06-14 DIAGNOSIS — Z98.890 STATUS POST GASTRIC SURGERY: ICD-10-CM

## 2022-06-14 DIAGNOSIS — D51.8 ANEMIA OF DECREASED VITAMIN B12 ABSORPTION: ICD-10-CM

## 2022-06-14 PROCEDURE — 99214 OFFICE O/P EST MOD 30 MIN: CPT | Mod: S$GLB,,, | Performed by: INTERNAL MEDICINE

## 2022-06-14 PROCEDURE — 99214 PR OFFICE/OUTPT VISIT, EST, LEVL IV, 30-39 MIN: ICD-10-PCS | Mod: S$GLB,,, | Performed by: INTERNAL MEDICINE

## 2022-06-14 RX ORDER — ANASTROZOLE 1 MG/1
TABLET ORAL
COMMUNITY
Start: 2021-07-05 | End: 2022-09-12

## 2022-06-15 NOTE — PROGRESS NOTES
"[facility] hematology Oncology In Office Subsequent Encounter note  6/14/22      Subjective:      Patient ID:   Marce Hickey  72 y.o. female  1950  MD Thomas        Chief Complaint:   Breast cancer evaluation    HPI:  72 y.o. female has a history of L breast cancer.  She had asked if I would assume her oncology care  .  She has a history of breast reduction back in 1992.  Recently in March 2017 she had a abnormal mammogram at [facility].  Two adjacent masses were seen at the 12 and 1:00 position of the left breast.  Both masses returned positive for invasive ductal carcinoma.  ERP was positive, PRP was positive, HER2 Alec was negative, it was grade 1 disease, and sentinel lymph node biopsy was negative.  Clinically this was stage I disease.  She had bilateral mastectomy, with left sentinel node biopsy in May 2017.  She also had reconstruction with expanders placed and eventually implants paced per Dr. Thomas of Plastic surgery.      She will continue on adjuvant Arimidex daily for now x's 5 years.  No increased HF sx or joint sx.  Started 6/2017. Through 6/2022.    She is status post gastric sleeve surgery January 29, 2017.  She has a history of B12 deficiency and has been on B12 sublingually in the past, but has not taken it," in a long time.      B12 is low normal at 290 and  ferritin is normal at 93.  Vitamin D is low at 24 and bone density test shows osteopenia.  On B 12 subl daily, the level is up to 1,600.    Vitamin D is better at 36, on supplements.      She is to follow-up with her primary care for recommendations regarding vitamin-D supplementation and osteopenia/osteoporosis management and prevention while on Arimidex.  Bone Density test 5/14/20 showed osteopenia.    Estimated breast cancer recurrence was reduced from 23% to 12% with adjuvant Arimidex or tamoxifen usage using the breast cancer index reference.  She has been on Arimidex adjuvantly x3 years and this is refilled through the [facility].    She complains of pain at her left 2nd distal joint finger area and saw Dr. Schultz  for evaluation.  Was told she had degenerative arthritis at L>R 2nd finger.    She is status post partial hysterectomy.  She took birth control pills until about age 26.  She had hysterectomy at age 30.  She did not take hormone replacement therapy.    She wears a calcium and vitamin-D and multivitamin patch x2 years.  Hx anaphylaxis to NSAIDS.    For her history includes generalized anxiety disorder, type 2 diabetes, low vitamin-D levels, GERD, fatty liver, dyslipidemia, depression, hypertension, plantar fasciitis of the right foot.    She is status post breast reduction, bilateral mastectomy, breast implant placement, with reconstruction.  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  Other history includes the gastric sleeve surgery, partial hysterectomy, right rotator cuff repair, tonsillectomy, and cholecystectomy.    Her mother had hypertension, heart disease and history of heart attack.  Her father had heart disease and psoriasis.    She was in the [employer] times 26 years till .  She smoked times 19 years, 1 pack per day, and quit in .  She does not drink alcohol with with regularity.  She is allergic to aspirin and nonsteroidal anti inflammatory drugs as manifested with anaphylaxis.    Her mother  in 2017 of old age.  She had a lumpectomy done but it is not clear if she had breast cancer.  Her father had heart disease.  She has a brother alive and well and a cousin with prostate cancer.  She has 2 sons alive and well.    , Antony Hickey, IT, [employer].    Our conversation revealed today that her family origin is from [place], specifically from [place].  She is 100% Askinazi Judaism.    BRCA 1 & 2 returned NEGATIVE.  She does not have the breast cancer gene.    Breast Cancer Index testing supports RR 5-6% after  5 years of hormonal Rx.  Extended hormonal Rx not recommended in trying to reduce RR further.  Stop hormonal Rx at 5 years,  2022.    She has 2 cousins with history of prostate cancer.    PET 2022 DOV.      ROS:   GEN: normal without any fever, night sweats or weight loss  HEENT: normal with no HA's, sore throat, stiff neck, changes in vision  CV: normal with no CP, SOB, PND, FLORES or orthopnea  PULM: normal with no SOB, cough, hemoptysis, sputum or pleuritic pain  GI:  See history of present illness  : normal with no hematuria, dysuria  BREAST:  See history of present illness  SKIN: normal with no rash, erythema, bruising, or swelling     Past Medical History:   Diagnosis Date    Anxiety     Arthritis     Cancer     breast cancer - left    Depression     Diabetes mellitus, type 2     Borderline    Dyslipidemia 2016    Fatty liver disease, nonalcoholic     GERD (gastroesophageal reflux disease)     Hyperlipidemia     Hypertension     Plantar fasciitis of right foot      Past Surgical History:   Procedure Laterality Date    breast reduction      BREAST SURGERY Bilateral     masectomy    BREAST SURGERY Bilateral     implants     SECTION      x 2    CHOLECYSTECTOMY      COLONOSCOPY N/A 2020    Procedure: COLONOSCOPY;  Surgeon: Nupur Leonard MD;  Location: [facility] ENDO;  Service: Endoscopy;  Laterality: N/A;    FOOT SURGERY      left bunionectomy    gastric sleve      HYSTERECTOMY      one ovary intact, adhesions    KNEE ARTHROPLASTY Left 2018    Procedure: ARTHROPLASTY, KNEE;  Surgeon: Christos Montanez MD;  Location: [facility] OR;  Service: Orthopedics;  Laterality: Left;    KNEE ARTHROSCOPY Left     LIPOSUCTION      ROTATOR CUFF REPAIR Right     TONSILLECTOMY         Review of patient's allergies indicates:   Allergen Reactions    Nsaids (non-steroidal anti-inflammatory drug) Anaphylaxis           Current Outpatient Medications:     anastrozole (ARIMIDEX) 1 mg  "Tab, , Disp: , Rfl:     cyanocobalamin, vitamin B-12, (VITAMIN B-12 SL), Place under the tongue., Disp: , Rfl:     ergocalciferol, vitamin D2, (VITAMIN D ORAL), Take 6,000 mg by mouth once daily., Disp: , Rfl:     omeprazole-sodium bicarbonate (ZEGERID) 40-1.1 mg-gram per capsule, Take 1 capsule by mouth before breakfast., Disp: 90 capsule, Rfl: 3    topiramate (TOPAMAX) 25 MG tablet, TAKE 1 TABLET EVERY 12 HOURS, Disp: 180 tablet, Rfl: 3    atorvastatin (LIPITOR) 20 MG tablet, Take 1 tablet (20 mg total) by mouth once daily., Disp: 90 tablet, Rfl: 0          Objective:   Vitals:  Blood pressure 125/78, pulse 81, resp. rate 18, height 5' 4" (1.626 m), weight 72.6 kg (160 lb).    Physical Examination:   GEN: no apparent distress, comfortable  HEAD: atraumatic and normocephalic  EYES: no pallor, no icterus  ENT: no pharyngeal erythema, external ears WNL; no nasal discharge  NECK: no masses, thyroid normal, trachea midline, no LAD/LN's, supple  CV: RRR with no murmur; normal pulse; normal S1 and S2; no pedal edema  CHEST: Normal respiratory effort; CTAB; normal breath sounds; no wheeze or crackles  ABDOM: nontender and nondistended; soft; normal bowel sounds; no rebound/guarding, liver and spleen were not palpable  MUSC/Skeletal: ROM normal; no crepitus; joints normal; no deformities or arthropathy  EXTREM: no clubbing, cyanosis, inflammation or swelling  SKIN: no rashes, lesions, ulcers, petechiae or subcutaneous nodules  : no cvat  NEURO: grossly intact; motor/sensory WNL;  no tremors  PSYCH: normal mood, affect and behavior  LYMPH: normal cervical, supraclavicular, axillary and groin LN's  BREASTS:  She has extensive postoperative change at the chest wall anteriorly, she does not have palpable mass at the left or right breast, chest area is nontender      Labs:   Lab Results   Component Value Date    WBC 3.96 05/17/2022    HGB 13.3 05/17/2022    HCT 40.9 05/17/2022    MCV 88 05/17/2022     05/17/2022 "    CMP  Sodium   Date Value Ref Range Status   05/17/2022 145 136 - 145 mmol/L Final     Potassium   Date Value Ref Range Status   05/27/2022 4.9 3.5 - 5.1 mmol/L Final     Chloride   Date Value Ref Range Status   05/17/2022 113 (H) 95 - 110 mmol/L Final     CO2   Date Value Ref Range Status   05/17/2022 26 23 - 29 mmol/L Final     Glucose   Date Value Ref Range Status   05/17/2022 107 70 - 110 mg/dL Final     BUN   Date Value Ref Range Status   05/17/2022 27 (H) 8 - 23 mg/dL Final     Creatinine   Date Value Ref Range Status   05/17/2022 0.7 0.5 - 1.4 mg/dL Final     Calcium   Date Value Ref Range Status   05/17/2022 10.3 8.7 - 10.5 mg/dL Final     Total Protein   Date Value Ref Range Status   05/17/2022 6.8 6.0 - 8.4 g/dL Final     Albumin   Date Value Ref Range Status   05/17/2022 3.9 3.5 - 5.2 g/dL Final     Total Bilirubin   Date Value Ref Range Status   05/17/2022 0.4 0.1 - 1.0 mg/dL Final     Comment:     For infants and newborns, interpretation of results should be based  on gestational age, weight and in agreement with clinical  observations.    Premature Infant recommended reference ranges:  Up to 24 hours.............<8.0 mg/dL  Up to 48 hours............<12.0 mg/dL  3-5 days..................<15.0 mg/dL  6-29 days.................<15.0 mg/dL       Alkaline Phosphatase   Date Value Ref Range Status   05/17/2022 93 55 - 135 U/L Final     AST   Date Value Ref Range Status   05/17/2022 18 10 - 40 U/L Final     ALT   Date Value Ref Range Status   05/17/2022 20 10 - 44 U/L Final     Anion Gap   Date Value Ref Range Status   05/17/2022 6 (L) 8 - 16 mmol/L Final     eGFR if    Date Value Ref Range Status   05/17/2022 >60.0 >60 mL/min/1.73 m^2 Final     eGFR if non    Date Value Ref Range Status   05/17/2022 >60.0 >60 mL/min/1.73 m^2 Final     Comment:     Calculation used to obtain the estimated glomerular filtration  rate (eGFR) is the CKD-EPI equation.            Assessment:    (1) 72 y.o. female with diagnosis of multifocal left breast cancer, clinical stage I disease, ERP positive,   HER2 Alec negative.    (2) currently on adjuvant Arimidex 1 mg p.o. daily x's  5 years.  Stop Arimidex now 6/14/22.    Bone density testing shows osteopenia.  She is to follow-up with her primary care for osteopenia/osteoporosis management while on Arimidex.    She is status post gastric sleeve surgery and has history of B12 deficiency in the past.  Her B12 level returned low normal at 290.  On subl B 12 the level is better at 1,600.    Vitamin D is low at 24.  On calcium and Vitamin D, the level is better at 36.    BRCA 1 and BRCA 2 testing has returned Negative.    Breast Cancer Index supports 5-6% recurrence at years 5-10, after 5 years of adjuvant hormonal Rx.  Extended hormonal therapy greater than 5 years is not felt to be beneficial here in reducing RR further.    PET 1/2022 DOV.  Observe for now.  RTC 6 months.

## 2022-06-17 ENCOUNTER — PATIENT MESSAGE (OUTPATIENT)
Dept: FAMILY MEDICINE | Facility: CLINIC | Age: 72
End: 2022-06-17
Payer: MEDICARE

## 2022-06-24 ENCOUNTER — PATIENT MESSAGE (OUTPATIENT)
Dept: FAMILY MEDICINE | Facility: CLINIC | Age: 72
End: 2022-06-24
Payer: MEDICARE

## 2022-07-19 ENCOUNTER — OFFICE VISIT (OUTPATIENT)
Dept: URGENT CARE | Facility: CLINIC | Age: 72
End: 2022-07-19
Payer: MEDICARE

## 2022-07-19 VITALS
OXYGEN SATURATION: 98 % | WEIGHT: 165 LBS | SYSTOLIC BLOOD PRESSURE: 127 MMHG | HEIGHT: 64 IN | HEART RATE: 81 BPM | RESPIRATION RATE: 16 BRPM | TEMPERATURE: 98 F | BODY MASS INDEX: 28.17 KG/M2 | DIASTOLIC BLOOD PRESSURE: 81 MMHG

## 2022-07-19 DIAGNOSIS — J01.10 ACUTE NON-RECURRENT FRONTAL SINUSITIS: Primary | ICD-10-CM

## 2022-07-19 PROCEDURE — 99213 PR OFFICE/OUTPT VISIT, EST, LEVL III, 20-29 MIN: ICD-10-PCS | Mod: S$GLB,,, | Performed by: NURSE PRACTITIONER

## 2022-07-19 PROCEDURE — 99213 OFFICE O/P EST LOW 20 MIN: CPT | Mod: S$GLB,,, | Performed by: NURSE PRACTITIONER

## 2022-07-19 RX ORDER — AMOXICILLIN AND CLAVULANATE POTASSIUM 875; 125 MG/1; MG/1
1 TABLET, FILM COATED ORAL EVERY 12 HOURS
Qty: 20 TABLET | Refills: 0 | Status: SHIPPED | OUTPATIENT
Start: 2022-07-19 | End: 2022-07-29

## 2022-07-19 NOTE — PROGRESS NOTES
"Subjective:       Patient ID: Marce Hickey is a 72 y.o. female.    Vitals:  height is 5' 4" (1.626 m) and weight is 74.8 kg (165 lb). Her oral temperature is 98.3 °F (36.8 °C). Her blood pressure is 127/81 and her pulse is 81. Her respiration is 16 and oxygen saturation is 98%.     Chief Complaint: Hoarse    Patient states a couple of days ago started with earache in left ear, traveled to left jaw where her teeth would hurt. Yesterday started with sore throat and woke up this morning with laryngitis. States feels like it is in her chest now.       HENT: Positive for congestion.        Objective:      Physical Exam   Constitutional: She is oriented to person, place, and time.   HENT:   Head: Normocephalic and atraumatic.   Ears:   Right Ear: Tympanic membrane, external ear and ear canal normal.   Left Ear: Tympanic membrane, external ear and ear canal normal.   Nose: Congestion present.   Mouth/Throat: Posterior oropharyngeal erythema present.   Eyes: Conjunctivae are normal.   Neck: Neck supple.   Cardiovascular: Normal rate, regular rhythm, normal heart sounds and normal pulses.   Pulmonary/Chest: Effort normal and breath sounds normal.   Abdominal: Normal appearance. Soft.   Musculoskeletal: Normal range of motion.         General: Normal range of motion.   Neurological: She is alert and oriented to person, place, and time.   Skin: Skin is warm and dry. Capillary refill takes 2 to 3 seconds.   Psychiatric: Her behavior is normal. Mood normal.   Nursing note and vitals reviewed.        Assessment:       1. Acute non-recurrent frontal sinusitis          Plan:       Patient declined covid test. She has sinus pressure, teeth pain and thick, yellow nasal secretions. Will treat with antibiotics for sinusitis.  Acute non-recurrent frontal sinusitis  -     amoxicillin-clavulanate 875-125mg (AUGMENTIN) 875-125 mg per tablet; Take 1 tablet by mouth every 12 (twelve) hours. for 10 days  Dispense: 20 tablet; Refill: " 0    Augmentin 875mg twice daily with food x 10 days  Tylenol as directed for pain  Follow up if your symptoms persist or worsen

## 2022-07-19 NOTE — PATIENT INSTRUCTIONS
Augmentin 875mg twice daily with food x 10 days  Tylenol as directed for pain  Follow up if your symptoms persist or worsen

## 2022-09-10 DIAGNOSIS — M25.562 LEFT KNEE PAIN, UNSPECIFIED CHRONICITY: Primary | ICD-10-CM

## 2022-09-12 ENCOUNTER — HOSPITAL ENCOUNTER (OUTPATIENT)
Dept: RADIOLOGY | Facility: HOSPITAL | Age: 72
Discharge: HOME | End: 2022-09-12
Attending: ORTHOPAEDIC SURGERY
Payer: MEDICARE

## 2022-09-12 ENCOUNTER — OFFICE VISIT (OUTPATIENT)
Dept: ORTHOPEDICS | Facility: CLINIC | Age: 72
End: 2022-09-12
Payer: MEDICARE

## 2022-09-12 VITALS — WEIGHT: 165 LBS | BODY MASS INDEX: 28.17 KG/M2 | HEIGHT: 64 IN | RESPIRATION RATE: 18 BRPM

## 2022-09-12 DIAGNOSIS — M25.562 LEFT KNEE PAIN, UNSPECIFIED CHRONICITY: ICD-10-CM

## 2022-09-12 DIAGNOSIS — Z96.652 STATUS POST TOTAL KNEE REPLACEMENT USING CEMENT, LEFT: ICD-10-CM

## 2022-09-12 DIAGNOSIS — M25.562 LEFT KNEE PAIN, UNSPECIFIED CHRONICITY: Primary | ICD-10-CM

## 2022-09-12 PROCEDURE — 73564 XR KNEE ORTHO LEFT WITH FLEXION: ICD-10-PCS | Mod: 26,LT,, | Performed by: RADIOLOGY

## 2022-09-12 PROCEDURE — 99999 PR PBB SHADOW E&M-EST. PATIENT-LVL III: ICD-10-PCS | Mod: PBBFAC,,, | Performed by: ORTHOPAEDIC SURGERY

## 2022-09-12 PROCEDURE — 99213 PR OFFICE/OUTPT VISIT, EST, LEVL III, 20-29 MIN: ICD-10-PCS | Mod: S$PBB,,, | Performed by: ORTHOPAEDIC SURGERY

## 2022-09-12 PROCEDURE — 73562 X-RAY EXAM OF KNEE 3: CPT | Mod: TC,PN,RT

## 2022-09-12 PROCEDURE — 73562 X-RAY EXAM OF KNEE 3: CPT | Mod: 26,RT,, | Performed by: RADIOLOGY

## 2022-09-12 PROCEDURE — 73562 XR KNEE ORTHO LEFT WITH FLEXION: ICD-10-PCS | Mod: 26,RT,, | Performed by: RADIOLOGY

## 2022-09-12 PROCEDURE — 99999 PR PBB SHADOW E&M-EST. PATIENT-LVL III: CPT | Mod: PBBFAC,,, | Performed by: ORTHOPAEDIC SURGERY

## 2022-09-12 PROCEDURE — 73564 X-RAY EXAM KNEE 4 OR MORE: CPT | Mod: 26,LT,, | Performed by: RADIOLOGY

## 2022-09-12 PROCEDURE — 99213 OFFICE O/P EST LOW 20 MIN: CPT | Mod: S$PBB,,, | Performed by: ORTHOPAEDIC SURGERY

## 2022-09-12 PROCEDURE — 99213 OFFICE O/P EST LOW 20 MIN: CPT | Mod: PBBFAC,PN | Performed by: ORTHOPAEDIC SURGERY

## 2022-09-12 NOTE — PROGRESS NOTES
Past Medical History:   Diagnosis Date    Anxiety     Arthritis     Cancer     breast cancer - left    Depression     Diabetes mellitus, type 2     Borderline    Dyslipidemia 2016    Fatty liver disease, nonalcoholic     GERD (gastroesophageal reflux disease)     Hyperlipidemia     Hypertension     Plantar fasciitis of right foot        Past Surgical History:   Procedure Laterality Date    breast reduction      BREAST SURGERY Bilateral     masectomy    BREAST SURGERY Bilateral     implants     SECTION      x 2    CHOLECYSTECTOMY      COLONOSCOPY N/A 2020    Procedure: COLONOSCOPY;  Surgeon: Nupur Leonard MD;  Location: [facility];  Service: Endoscopy;  Laterality: N/A;    FOOT SURGERY      left bunionectomy    gastric sleve      HYSTERECTOMY      one ovary intact, adhesions    KNEE ARTHROPLASTY Left 2018    Procedure: ARTHROPLASTY, KNEE;  Surgeon: Christos Montanez MD;  Location: [facility];  Service: Orthopedics;  Laterality: Left;    KNEE ARTHROSCOPY Left     LIPOSUCTION      ROTATOR CUFF REPAIR Right     TONSILLECTOMY         Current Outpatient Medications   Medication Sig    anastrozole (ARIMIDEX) 1 mg Tab     atorvastatin (LIPITOR) 20 MG tablet Take 1 tablet (20 mg total) by mouth once daily. (Patient not taking: Reported on 2022)    cyanocobalamin, vitamin B-12, (VITAMIN B-12 SL) Place under the tongue.    ergocalciferol, vitamin D2, (VITAMIN D ORAL) Take 6,000 mg by mouth once daily.    omeprazole-sodium bicarbonate (ZEGERID) 40-1.1 mg-gram per capsule Take 1 capsule by mouth before breakfast. (Patient not taking: Reported on 2022)    topiramate (TOPAMAX) 25 MG tablet TAKE 1 TABLET EVERY 12 HOURS     No current facility-administered medications for this visit.       Review of patient's allergies indicates:   Allergen Reactions    Nsaids (non-steroidal anti-inflammatory drug) Anaphylaxis       Family History   Problem Relation Age of Onset    Hypertension Mother      Heart disease Mother         pacemaker    Heart attack Mother     Heart disease Father     Psoriasis Father     Cancer Neg Hx     Melanoma Neg Hx     Lupus Neg Hx     Eczema Neg Hx        Social History     Socioeconomic History    Marital status:    Tobacco Use    Smoking status: Former     Types: Cigarettes     Quit date: 1993     Years since quittin.8    Smokeless tobacco: Never    Tobacco comments:     quit    Substance and Sexual Activity    Alcohol use: No     Alcohol/week: 0.0 standard drinks    Drug use: No    Sexual activity: Yes     Birth control/protection: Surgical     Social Determinants of Health     Financial Resource Strain: Low Risk     Difficulty of Paying Living Expenses: Not very hard   Food Insecurity: No Food Insecurity    Worried About Running Out of Food in the Last Year: Never true    Ran Out of Food in the Last Year: Never true   Transportation Needs: No Transportation Needs    Lack of Transportation (Medical): No    Lack of Transportation (Non-Medical): No   Physical Activity: Insufficiently Active    Days of Exercise per Week: 3 days    Minutes of Exercise per Session: 30 min   Stress: No Stress Concern Present    Feeling of Stress : Only a little   Social Connections: Unknown    Frequency of Communication with Friends and Family: Three times a week    Frequency of Social Gatherings with Friends and Family: Twice a week    Active Member of Clubs or Organizations: No    Attends Club or Organization Meetings: Patient refused    Marital Status:    Housing Stability: Low Risk     Unable to Pay for Housing in the Last Year: No    Number of Places Lived in the Last Year: 1    Unstable Housing in the Last Year: No       Chief Complaint:   Chief Complaint   Patient presents with    Left Knee - Pain       Date of surgery:  2018    History of present illness:  72-year-old female who underwent left total knee arthroplasty.  she has been having a little more  problems along the lateral knee even adiating up into the hip.  Sounds like IT band.  Pain today is 0 10.  Up to 6/10 which she has shooting pains.    Answers for HPI/ROS submitted by the patient on 10/22/2019   Leg pain  unexpected weight change: No  appetite change : No  sleep disturbance: No  dysphoric mood: No  rash: No  eye redness: No  cough: No  difficulty urinating: No  hematuria: No  chest pain: No  palpitations: No  vomiting: No  diarrhea: No  constipation: No  headaches: No  seizures: No  Pain Chronicity: chronic  History of trauma: No  Onset: 1 to 4 weeks ago  Frequency: daily  Progression since onset: unchanged  Injury mechanism: falling  injury location: at home  pain- numeric: 3/10  pain location: left knee  pain quality: dull  Radiating Pain: No  Aggravating factors: bending, flexion, twisting, walking  fever: No  inability to bear weight: No  itching: No  joint locking: No  limited range of motion: No  stiffness: Yes  tingling: No  Treatments tried: heat, exercise, movement, other  physical therapy: not tried  Improvement on treatment: moderate      Physical Examination:    Vital Signs:    Vitals:    09/12/22 0953   Resp: 18       Body mass index is 28.32 kg/m².    This a well-developed, well nourished patient in no acute distress.  They are alert and oriented and cooperative to examination.  Pt. walks without assistive device.  Walking a little more normally and naturally.    Examination left knee shows  healed surgical incision.  Mild swelling. No erythema or drainage.    Range of motion is about 0° to 105°.  Little pain along the ITB band.  Pain along the anterolateral knee.  It is a little tender to touch.  There is no like fluid collection.  Stable to varus and valgus stress.  Negative drawer exam.    X-rays:  Four views of the left knee are ordered and reviewed which show well-aligned total knee arthroplasty components.  Patient does have some postop patella baja.  No real change from previous  x-rays.     Assessment::  Status post left total knee arthroplasty with  Stiffness  Left IT band syndrome    Plan:    I reviewed the x-rays with her today.  I did see anything concerning.  Recommended some physical therapy for both quad strengthening as well as IT band exercises.  Follow up as needed.    This note was created using M [employer] voice recognition software that occasionally misinterpreted phrases or words.

## 2022-09-15 ENCOUNTER — TELEPHONE (OUTPATIENT)
Dept: ORTHOPEDICS | Facility: CLINIC | Age: 72
End: 2022-09-15
Payer: MEDICARE

## 2022-09-15 NOTE — TELEPHONE ENCOUNTER
----- Message from Gail Boyd LPN sent at 9/14/2022  3:27 PM CDT -----  Regarding: FW: wants to speak to Asa about PT, call pt   Contact: pt     ----- Message -----  From: Zachary Perez  Sent: 9/14/2022   2:49 PM CDT  To: [facility] Staff  Subject: wants to speak to Asa about PT, call pt [phone] #    wants to speak to Asa about PT, call pt

## 2022-09-29 ENCOUNTER — OFFICE VISIT (OUTPATIENT)
Dept: PODIATRY | Facility: CLINIC | Age: 72
End: 2022-09-29
Payer: MEDICARE

## 2022-09-29 VITALS — HEIGHT: 64 IN | OXYGEN SATURATION: 98 % | WEIGHT: 161 LBS | HEART RATE: 69 BPM | BODY MASS INDEX: 27.49 KG/M2

## 2022-09-29 DIAGNOSIS — M21.40 PES PLANUS, UNSPECIFIED LATERALITY: Primary | ICD-10-CM

## 2022-09-29 DIAGNOSIS — M76.822 POSTERIOR TIBIAL TENDON DYSFUNCTION, LEFT: ICD-10-CM

## 2022-09-29 PROCEDURE — 99203 PR OFFICE/OUTPT VISIT, NEW, LEVL III, 30-44 MIN: ICD-10-PCS | Mod: S$GLB,,, | Performed by: PODIATRIST

## 2022-09-29 PROCEDURE — 99203 OFFICE O/P NEW LOW 30 MIN: CPT | Mod: S$GLB,,, | Performed by: PODIATRIST

## 2022-09-29 RX ORDER — TOPIRAMATE 100 MG/1
TABLET, FILM COATED ORAL
COMMUNITY
Start: 2022-08-12 | End: 2022-09-29

## 2022-09-29 NOTE — PROGRESS NOTES
"  [address]  [place] [address]  Phone: [phone]   [phone]    Patient's PCP:Primary Doctor No  Referring Provider: Aaareferral Self    Subjective:      Chief Complaint:: No chief complaint on file.    HPI  Marce Hickey is a 72 y.o. female who presents today with a concern of possible flat feet.  Patient did state that 5 yrs ago today, 2017, she had her left knee replaced.  Lately, past couple of weeks, her left knee felt like it was loose and she started getting shooting pains up leg/thigh.  She was concerned had to do with her knee so she went to see her Orthopedic doctor who took xrays and confirmed nothing was wrong with her left knee and ordered PT. She has been going to PT and the physical therapist informed patient that she has flat feet.  Physical therapist explained to patient that the drop in her arch is causing the knee to feel loose which is then causing the shooting pains up her leg/thigh.  PT taped her foot/ankle in an attempt to relieve the symptoms and she was instructed to see a podiatrist   After taping the foot/ankle, patient states the pain subsided. She kept the tape on her ankle up until 12 hours prior to her returned.  The knee then was painful again.  PT re-taped the ankle and recommended her to see a podiatrist.      She does c/o sometimes during the night and early morning she gets aching pains in the dorsal foot, but does not have any more knee pain or other pains.        Vitals:    22 1552   Pulse: 69   SpO2: 98%   Weight: 73 kg (161 lb)   Height: 5' 4" (1.626 m)   PainSc: 0-No pain      Shoe Size: 9.5    Past Surgical History:   Procedure Laterality Date    breast reduction      BREAST SURGERY Bilateral     masectomy    BREAST SURGERY Bilateral     implants     SECTION      x 2    CHOLECYSTECTOMY      COLONOSCOPY N/A 2020    Procedure: COLONOSCOPY;  Surgeon: Nupur Leonard MD;  Location: [facility];  Service: Endoscopy;  " Laterality: N/A;    FOOT SURGERY      left bunionectomy    gastric sleve      HYSTERECTOMY      one ovary intact, adhesions    KNEE ARTHROPLASTY Left 9/25/2018    Procedure: ARTHROPLASTY, KNEE;  Surgeon: Christos Montanez MD;  Location: [facility];  Service: Orthopedics;  Laterality: Left;    KNEE ARTHROSCOPY Left     LIPOSUCTION      ROTATOR CUFF REPAIR Right     TONSILLECTOMY       Past Medical History:   Diagnosis Date    Anxiety     Arthritis     Cancer     breast cancer - left    Depression     Diabetes mellitus, type 2     Borderline    Dyslipidemia 12/12/2016    Fatty liver disease, nonalcoholic     GERD (gastroesophageal reflux disease)     Hyperlipidemia     Hypertension     Plantar fasciitis of right foot      Family History   Problem Relation Age of Onset    Hypertension Mother     Heart disease Mother         pacemaker    Heart attack Mother     Heart disease Father     Psoriasis Father     Cancer Neg Hx     Melanoma Neg Hx     Lupus Neg Hx     Eczema Neg Hx         Social History:   Marital Status:   Alcohol History:  reports no history of alcohol use.  Tobacco History:  reports that she quit smoking about 28 years ago. She has never used smokeless tobacco.  Drug History:  reports no history of drug use.    Review of patient's allergies indicates:   Allergen Reactions    Nsaids (non-steroidal anti-inflammatory drug) Anaphylaxis       Current Outpatient Medications   Medication Sig Dispense Refill    ergocalciferol, vitamin D2, (VITAMIN D ORAL) Take 6,000 mg by mouth once daily.      topiramate (TOPAMAX) 25 MG tablet TAKE 1 TABLET EVERY 12 HOURS 180 tablet 3    VITAMIN B COMPLEX ORAL Take by mouth.      omeprazole-sodium bicarbonate (ZEGERID) 40-1.1 mg-gram per capsule Take 1 capsule by mouth before breakfast. (Patient not taking: No sig reported) 90 capsule 3     No current facility-administered medications for this visit.       Review of Systems   Constitutional:  Negative for chills, fatigue,  fever and unexpected weight change.   HENT:  Negative for hearing loss and trouble swallowing.    Eyes:  Negative for photophobia and visual disturbance.   Respiratory:  Negative for cough, shortness of breath and wheezing.    Cardiovascular:  Negative for chest pain, palpitations and leg swelling.   Gastrointestinal:  Negative for abdominal pain and nausea.   Genitourinary:  Negative for dysuria and frequency.   Musculoskeletal:  Negative for arthralgias, back pain, gait problem, joint swelling and myalgias.   Skin:  Negative for rash and wound.   Neurological:  Negative for tremors, seizures, weakness, numbness and headaches.   Hematological:  Does not bruise/bleed easily.       Objective:        Physical Exam:   Foot Exam    General  General Appearance: appears stated age and healthy   Orientation: alert and oriented to person, place, and time   Affect: appropriate   Gait: unimpaired       Right Foot/Ankle     Inspection and Palpation  Ecchymosis: none  Tenderness: none   Swelling: none   Arch: normal  Hammertoes: absent  Claw Toes: absent  Hallux valgus: no  Hallux limitus: no  Skin Exam: skin intact;   Neurovascular  Dorsalis pedis: 2+  Posterior tibial: 2+  Capillary Refill: 2+  Saphenous nerve sensation: normal  Tibial nerve sensation: normal  Superficial peroneal nerve sensation: normal  Deep peroneal nerve sensation: normal  Sural nerve sensation: normal    Muscle Strength  Ankle dorsiflexion: 5  Ankle plantar flexion: 5  Ankle inversion: 5  Ankle eversion: 5  Great toe extension: 5  Great toe flexion: 5    Range of Motion    Normal right ankle ROM      Left Foot/Ankle      Inspection and Palpation  Ecchymosis: none  Tenderness: none   Swelling: none   Arch: pes planus (Flexible pes planus)  Hammertoes: absent  Claw toes: absent  Hallux valgus: no  Hallux limitus: no  Skin Exam: skin intact;   Neurovascular  Dorsalis pedis: 2+  Posterior tibial: 2+  Capillary refill: 2+  Saphenous nerve sensation:  normal  Tibial nerve sensation: normal  Superficial peroneal nerve sensation: normal  Deep peroneal nerve sensation: normal  Sural nerve sensation: normal    Muscle Strength  Ankle dorsiflexion: 5  Ankle plantar flexion: 5  Ankle inversion: 5  Ankle eversion: 5  Great toe extension: 5  Great toe flexion: 5    Range of Motion    Normal left ankle ROM    Tests  Too many toes: positive     Physical Exam  Cardiovascular:      Pulses:           Dorsalis pedis pulses are 2+ on the right side and 2+ on the left side.        Posterior tibial pulses are 2+ on the right side and 2+ on the left side.   Musculoskeletal:      Right foot: No bunion.      Left foot: No bunion.        Feet:              Right Ankle/Foot Exam     Range of Motion   The patient has normal right ankle ROM.    Left Ankle/Foot Exam     Range of Motion   The patient has normal left ankle ROM.     Muscle Strength   The patient has normal left ankle strength.      Muscle Strength   Right Lower Extremity   Ankle Dorsiflexion:  5   Plantar flexion:  5/5  Left Lower Extremity   Ankle Dorsiflexion:  5   Plantar flexion:  5/5     Vascular Exam     Right Pulses  Dorsalis Pedis:      2+  Posterior Tibial:      2+        Left Pulses  Dorsalis Pedis:      2+  Posterior Tibial:      2+         Imaging:            Assessment:       1. Pes planus, unspecified laterality    2. Posterior tibial tendon dysfunction, left      Plan:   Pes planus, unspecified laterality    Posterior tibial tendon dysfunction, left    Evaluated patient had discussion about the lateral pes planus sometimes this will put stress on the knee and lower back because of uneven weight-bearing and biomechanics of the lower extremity.  She states she got relief from the taping PT was doing.  I recommend she try the cross trainers by [employer] over-the-counter with running shoes and see if this gives her enough support without having by custom-made orthotics.  I told her if this does not give her adequate  relief but does give her some relief that I can always send her to get custom-made orthotics that could be a lot more expensive.  She will let us know if she is interested in custom-made otherwise she will do the cross trainers by [employer] running shoes and see if this gives her adequate relief.  Return as needed      Procedures          Counseling:     I provided patient education verbally regarding:   Patient diagnosis, treatment options, as well as alternatives, risks, and benefits.     posterior tibial tendon dysfuncton stage 1, 2, 3 ,4 and the long term treatment of each stage and the possible complications of each stage.  I explained the possible need of an MRI of the ankle if the pain continues.     What causes adult-acquired flatfoot deformity?    There are multiple factors contributing to the development of this problem.  Damage to the nerves, ligaments, and/or tendons of the foot can cause subluxation (partial dislocation) of the subtalar or talonavicular joints. Bone fracture is a possible cause. The resulting joint deformity from any of these problems can lead to adult-acquired flatfoot deformity.    Dysfunction of the posterior tibial tendon has always been linked with adult-acquired flatfoot deformity. The loss of active and passive pull of the tendon alters the normal biomechanics of the foot and ankle. The reasons for this can be many and varied as well. Diabetes, high blood pressure, and prolonged use of steroids are some of the more common causes of adult-acquired flatfoot deformity brought on by impairment of the posterior tibialis tendon. Overstretching or rupture of the tendon results in tendon and muscle imbalance in the foot leading to adult-acquired flatfoot deformity.    Rheumatoid arthritis is one of the more common causes. About half of all adults with this type of arthritis will develop adult flatfoot deformity over time. In such cases, the condition is gradual and progressive.    Obesity  has been linked with this condition. Loss of blood supply for any reason in the area of the posterior tibialis tendon is another factor. Other possible causes include bone fracture or dislocation, a torn or stretched tendon, or a neurologic condition causing weakness.           Nonsurgical Treatment    Conservative (nonoperative) care is advised at first. A simple modification to your shoe may be all thats needed. Sometimes purchasing shoes with a good arch support is sufficient. For other patients, an off-the-shelf (prefabricated) shoe insert works well.    The orthotic is designed specifically to position your foot in good alignment. Like the shoe insert, the orthotic fits inside the shoe. These work well for mild deformity or symptoms.    Over-the-counter pain relievers or antiinflammatory drugs such as ibuprofen may be helpful. If symptoms are very severe, a removable boot or cast may be used to rest, support, and stabilize the foot and ankle while still allowing function. Patients with longer duration of symptoms or greater deformity may need a customized brace. The brace provides support and limits ankle motion. After several months, the brace is replaced with a foot orthotic.    A physical therapy program of exercise to stretch and strengthen the foot and leg muscles is important. The therapist will also show you how to improve motor control and proprioception (joint sense of position). These added features help prevent and reduce injuries.   This note was created using Dragon voice recognition software that occasionally misinterpreted phrases or words.

## 2022-12-13 ENCOUNTER — OFFICE VISIT (OUTPATIENT)
Dept: HEMATOLOGY/ONCOLOGY | Facility: CLINIC | Age: 72
End: 2022-12-13
Payer: MEDICARE

## 2022-12-13 VITALS
SYSTOLIC BLOOD PRESSURE: 160 MMHG | TEMPERATURE: 97 F | DIASTOLIC BLOOD PRESSURE: 94 MMHG | HEART RATE: 65 BPM | HEIGHT: 64 IN | RESPIRATION RATE: 16 BRPM | BODY MASS INDEX: 27.45 KG/M2 | WEIGHT: 160.81 LBS

## 2022-12-13 DIAGNOSIS — D51.8 ANEMIA OF DECREASED VITAMIN B12 ABSORPTION: ICD-10-CM

## 2022-12-13 DIAGNOSIS — Z98.890 STATUS POST GASTRIC SURGERY: ICD-10-CM

## 2022-12-13 DIAGNOSIS — E55.9 HYPOVITAMINOSIS D: ICD-10-CM

## 2022-12-13 DIAGNOSIS — C50.512 MALIGNANT NEOPLASM OF LOWER-OUTER QUADRANT OF LEFT FEMALE BREAST, UNSPECIFIED ESTROGEN RECEPTOR STATUS: Primary | ICD-10-CM

## 2022-12-13 PROCEDURE — 99214 OFFICE O/P EST MOD 30 MIN: CPT | Mod: S$GLB,,, | Performed by: INTERNAL MEDICINE

## 2022-12-13 PROCEDURE — 99214 PR OFFICE/OUTPT VISIT, EST, LEVL IV, 30-39 MIN: ICD-10-PCS | Mod: S$GLB,,, | Performed by: INTERNAL MEDICINE

## 2022-12-27 NOTE — PROGRESS NOTES
"[facility] hematology Oncology In Office Subsequent Encounter note  12/13/22      Subjective:      Patient ID:   Marce Hickey  72 y.o. female  1950  MD Thomas        Chief Complaint:   Breast cancer evaluation    HPI:  72 y.o. female has a history of L breast cancer.  She had asked if I would assume her oncology care  .  She has a history of breast reduction back in 1992.  Recently in March 2017 she had a abnormal mammogram at [facility].  Two adjacent masses were seen at the 12 and 1:00 position of the left breast.  Both masses returned positive for invasive ductal carcinoma.  ERP was positive, PRP was positive, HER2 Alec was negative, it was grade 1 disease, and sentinel lymph node biopsy was negative.  Clinically this was stage I disease.  She had bilateral mastectomy, with left sentinel node biopsy in May 2017.  She also had reconstruction with expanders placed and eventually implants paced per Dr. Thomas of Plastic surgery.      She will continue on adjuvant Arimidex daily for now x's 5 years.  No increased HF sx or joint sx.  Started 6/2017. Through 6/2022.    She is status post gastric sleeve surgery January 29, 2017.  She has a history of B12 deficiency and has been on B12 sublingually in the past, but has not taken it," in a long time.      B12 is low normal at 290 and  ferritin is normal at 93.  Vitamin D is low at 24 and bone density test shows osteopenia.  On B 12 subl daily, the level is up to 852, she will continue B 12 subl TIW.    Vitamin D is better at 36, on supplements. 6000 unit per day.      She is to follow-up with her primary care for recommendations regarding vitamin-D supplementation and osteopenia/osteoporosis management and prevention while on Arimidex.  Bone Density test 5/14/20 showed osteopenia.    Estimated breast cancer recurrence was reduced from 23% to 12% with adjuvant Arimidex or tamoxifen usage using the breast cancer index reference.    She complains of pain at " her left 2nd distal joint finger area and saw Dr. Schultz  for evaluation.  Was told she had degenerative arthritis at L>R 2nd finger.    She is status post partial hysterectomy.  She took birth control pills until about age 26.  She had hysterectomy at age 30.  She did not take hormone replacement therapy.    She wears a calcium and vitamin-D and multivitamin patch x2 years.  Hx anaphylaxis to NSAIDS.    For her history includes generalized anxiety disorder, type 2 diabetes, low vitamin-D levels, GERD, fatty liver, dyslipidemia, depression, hypertension, plantar fasciitis of the right foot.    She is status post breast reduction, bilateral mastectomy, breast implant placement, with reconstruction.  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  Other history includes the gastric sleeve surgery, partial hysterectomy, right rotator cuff repair, tonsillectomy, and cholecystectomy.    Her mother had hypertension, heart disease and history of heart attack.  Her father had heart disease and psoriasis.    She was in the [employer] times 26 years till .  She smoked times 19 years, 1 pack per day, and quit in .  She does not drink alcohol with with regularity.  She is allergic to aspirin and nonsteroidal anti inflammatory drugs as manifested with anaphylaxis.    Her mother  in 2017 of old age.  She had a lumpectomy done but it is not clear if she had breast cancer.  Her father had heart disease.  She has a brother alive and well and a cousin with prostate cancer.  She has 2 sons alive and well.    , Antony Hickey, IT, [employer].    Our conversation revealed today that her family origin is from [place], specifically from [place].  She is 100% Askinazi Tenriism.    BRCA 1 & 2 returned NEGATIVE.  She does not have the breast cancer gene.    Breast Cancer Index testing supports RR 5-6% after 5 years of hormonal Rx.  Extended hormonal Rx  not recommended in trying to reduce RR further.  Stop hormonal Rx at 5 years,  2022.    She has 2 cousins with history of prostate cancer.    PET 2022 DOV.      ROS:   GEN: normal without any fever, night sweats or weight loss  HEENT: normal with no HA's, sore throat, stiff neck, changes in vision  CV: normal with no CP, SOB, PND, FLORES or orthopnea  PULM: normal with no SOB, cough, hemoptysis, sputum or pleuritic pain  GI:  See history of present illness  : normal with no hematuria, dysuria  BREAST:  See history of present illness  SKIN: normal with no rash, erythema, bruising, or swelling     Past Medical History:   Diagnosis Date    Anxiety     Arthritis     Cancer     breast cancer - left    Depression     Diabetes mellitus, type 2     Borderline    Dyslipidemia 2016    Fatty liver disease, nonalcoholic     GERD (gastroesophageal reflux disease)     Hyperlipidemia     Hypertension     Plantar fasciitis of right foot      Past Surgical History:   Procedure Laterality Date    breast reduction      BREAST SURGERY Bilateral     masectomy    BREAST SURGERY Bilateral     implants     SECTION      x 2    CHOLECYSTECTOMY      COLONOSCOPY N/A 2020    Procedure: COLONOSCOPY;  Surgeon: Nupur Leonard MD;  Location: [facility] ENDO;  Service: Endoscopy;  Laterality: N/A;    FOOT SURGERY      left bunionectomy    gastric sleve      HYSTERECTOMY      one ovary intact, adhesions    KNEE ARTHROPLASTY Left 2018    Procedure: ARTHROPLASTY, KNEE;  Surgeon: Christos Montanez MD;  Location: [facility] OR;  Service: Orthopedics;  Laterality: Left;    KNEE ARTHROSCOPY Left     LIPOSUCTION      ROTATOR CUFF REPAIR Right     TONSILLECTOMY         Review of patient's allergies indicates:   Allergen Reactions    Nsaids (non-steroidal anti-inflammatory drug) Anaphylaxis           Current Outpatient Medications:     ergocalciferol, vitamin D2, (VITAMIN D ORAL), Take 6,000 mg by mouth once daily., Disp: , Rfl:      "topiramate (TOPAMAX) 25 MG tablet, TAKE 1 TABLET EVERY 12 HOURS, Disp: 180 tablet, Rfl: 3    VITAMIN B COMPLEX ORAL, Take by mouth., Disp: , Rfl:     omeprazole-sodium bicarbonate (ZEGERID) 40-1.1 mg-gram per capsule, Take 1 capsule by mouth before breakfast. (Patient not taking: Reported on 7/19/2022), Disp: 90 capsule, Rfl: 3          Objective:   Vitals:  Blood pressure (!) 160/94, pulse 65, temperature 97.4 °F (36.3 °C), resp. rate 16, height 5' 4" (1.626 m), weight 72.9 kg (160 lb 12.8 oz).    Physical Examination:   GEN: no apparent distress, comfortable  HEAD: atraumatic and normocephalic  EYES: no pallor, no icterus  ENT: no pharyngeal erythema, external ears WNL; no nasal discharge  NECK: no masses, thyroid normal, trachea midline, no LAD/LN's, supple  CV: RRR with no murmur; normal pulse; normal S1 and S2; no pedal edema  CHEST: Normal respiratory effort; CTAB; normal breath sounds; no wheeze or crackles  ABDOM: nontender and nondistended; soft; normal bowel sounds; no rebound/guarding, liver and spleen were not palpable  MUSC/Skeletal: ROM normal; no crepitus; joints normal; no deformities or arthropathy  EXTREM: no clubbing, cyanosis, inflammation or swelling  SKIN: no rashes, lesions, ulcers, petechiae or subcutaneous nodules  : no cvat  NEURO: grossly intact; motor/sensory WNL;  no tremors  PSYCH: normal mood, affect and behavior  LYMPH: normal cervical, supraclavicular, axillary and groin LN's  BREASTS:  She has extensive postoperative change at the chest wall anteriorly, she does not have palpable mass at the left or right breast, chest area is nontender      Labs:   Lab Results   Component Value Date    WBC 3.96 05/17/2022    HGB 13.3 05/17/2022    HCT 40.9 05/17/2022    MCV 88 05/17/2022     05/17/2022    CMP  Sodium   Date Value Ref Range Status   05/17/2022 145 136 - 145 mmol/L Final     Potassium   Date Value Ref Range Status   05/27/2022 4.9 3.5 - 5.1 mmol/L Final     Chloride   Date " Value Ref Range Status   05/17/2022 113 (H) 95 - 110 mmol/L Final     CO2   Date Value Ref Range Status   05/17/2022 26 23 - 29 mmol/L Final     Glucose   Date Value Ref Range Status   05/17/2022 107 70 - 110 mg/dL Final     BUN   Date Value Ref Range Status   05/17/2022 27 (H) 8 - 23 mg/dL Final     Creatinine   Date Value Ref Range Status   05/17/2022 0.7 0.5 - 1.4 mg/dL Final     Calcium   Date Value Ref Range Status   05/17/2022 10.3 8.7 - 10.5 mg/dL Final     Total Protein   Date Value Ref Range Status   05/17/2022 6.8 6.0 - 8.4 g/dL Final     Albumin   Date Value Ref Range Status   05/17/2022 3.9 3.5 - 5.2 g/dL Final     Total Bilirubin   Date Value Ref Range Status   05/17/2022 0.4 0.1 - 1.0 mg/dL Final     Comment:     For infants and newborns, interpretation of results should be based  on gestational age, weight and in agreement with clinical  observations.    Premature Infant recommended reference ranges:  Up to 24 hours.............<8.0 mg/dL  Up to 48 hours............<12.0 mg/dL  3-5 days..................<15.0 mg/dL  6-29 days.................<15.0 mg/dL       Alkaline Phosphatase   Date Value Ref Range Status   05/17/2022 93 55 - 135 U/L Final     AST   Date Value Ref Range Status   05/17/2022 18 10 - 40 U/L Final     ALT   Date Value Ref Range Status   05/17/2022 20 10 - 44 U/L Final     Anion Gap   Date Value Ref Range Status   05/17/2022 6 (L) 8 - 16 mmol/L Final     eGFR if    Date Value Ref Range Status   05/17/2022 >60.0 >60 mL/min/1.73 m^2 Final     eGFR if non    Date Value Ref Range Status   05/17/2022 >60.0 >60 mL/min/1.73 m^2 Final     Comment:     Calculation used to obtain the estimated glomerular filtration  rate (eGFR) is the CKD-EPI equation.            Assessment:   (1) 72 y.o. female with diagnosis of multifocal left breast cancer, clinical stage I disease, ERP positive,   HER2 Alec negative.    (2) currently on adjuvant Arimidex 1 mg p.o. daily x's  5  years.  Stop Arimidex now 6/14/22.    Bone density testing shows osteopenia.  She is to follow-up with her primary care for osteopenia/osteoporosis management while on Arimidex.    She is status post gastric sleeve surgery and has history of B12 deficiency in the past.  Her B12 level returned low normal at 290.  On subl B 12 the level is better at 1,600.    Vitamin D is low at 24.  On calcium and Vitamin D, the level is better at 36.    BRCA 1 and BRCA 2 testing has returned Negative.    Breast Cancer Index supports 5-6% recurrence at years 5-10, after 5 years of adjuvant hormonal Rx.  Extended hormonal therapy greater than 5 years is not felt to be beneficial here in reducing RR further.    PET 1/2022 DOV.  Observe for now.  RTC 6 months.

## 2023-02-15 DIAGNOSIS — K21.9 GASTROESOPHAGEAL REFLUX DISEASE, UNSPECIFIED WHETHER ESOPHAGITIS PRESENT: ICD-10-CM

## 2023-02-15 RX ORDER — OMEPRAZOLE AND SODIUM BICARBONATE 40; 1100 MG/1; MG/1
CAPSULE ORAL
Qty: 90 CAPSULE | Refills: 3 | OUTPATIENT
Start: 2023-02-15

## 2023-03-29 NOTE — INTERVAL H&P NOTE
EMERGENCY DEPARTMENT ENCOUNTER      NAME: Adolfo Delarosa  AGE: 26 year old female  YOB: 1997  MRN: [id number]  EVALUATION DATE & TIME: 3/29/2023  4:14 PM    PCP: [facility] ([facility], [place])    ED PROVIDER: Gina Aguilar PA-C      Chief Complaint   Patient presents with     Headache         FINAL IMPRESSION:  1. Headache    2. Pseudotumor cerebri          MEDICAL DECISION MAKING:    Pertinent Labs & Imaging studies reviewed. (See chart for details)  26 year old female presents to the Emergency Department for evaluation of headache.  The patient has a history of pseudotumor cerebri for which she was treated for several years ago while she was in high school.  She has needed several spinal taps to decrease the fluid with improvement of her symptoms.  Fortunately, she has not had severe symptoms up until a few months ago.  She was seen in the emergency department at [place] and was discharged home with topiramate and referred to neurology for continued treatment.  Unfortunately, she did not have any health insurance until now and the topiramate was not helping with her symptoms.  She has been having headaches for the past week which she wakes up with that are mild but continued to worsen throughout the day.  Last night she had a severe headache and today presented to the emergency department for management of symptoms.  On my evaluation, the patient was hypertensive and 150/86 but otherwise vitally stable.  Neuro exam was unremarkable with 5 out of 5 strength and sensation in bilateral upper and lower extremities.  Normal finger-nose-finger.  Pupils equal round and reactive to light and extraocular movements intact.  Cranial nerves III through XII grossly intact.  Heart was in regular rate and rhythm and lungs were clear to auscultation bilaterally.  Abdomen was soft, nontender without any rebound or guarding.  Differential diagnosis included, but is not limited to, migraine,  The patient has been examined and the H&P has been reviewed:    I concur with the findings and no changes have occurred since H&P was written.        There are no hospital problems to display for this patient.     electrolyte derangement, anemia, illness, symptoms from pseudotumor cerebri.    At this time, patient is having normal neurologic exam and headache is not as severe and she declined treatment here in the emergency department as she needs to drive home and cannot be tired.  With her symptoms and normal neurologic exam and no current vision changes, I did not feel emergent lumbar puncture was indicated at this time and patient was in agreement and understanding.  I did speak with the on-call neurologist Dr. Leija who recommended either starting Diamox or increasing the dose of topiramate.  I discussed options with patient and that I can send her home with both prescriptions however, she should only use 1 at a time.  She can start with the topiramate at the increased dose and if this is not working she can switch to the Diamox.  I discussed possible side effects with the Diamox including tingling and metallic taste in the mouth.  Ultimately, patient needs to follow-up with ophthalmology and neurology and referrals were placed.  I will also discharge her home with medications to help with her headaches including Reglan and Benadryl.  We discussed taking Tylenol 1000 mg and ibuprofen 600 mg every 6 hours as needed for pain as well.  Patient is in agreement and understand with the plan of care and all questions were answered the best my ability.  I feel comfortable discharging her home to follow-up closely with ophthalmology and neurology.  At this time, with normal neurologic exam I do not feel advanced imaging including CT or MRI of the head is indicated at this time.  She denies any infectious symptoms, uncontrolled vomiting, diarrhea or any other concerning symptoms that would warrant CBC, BMP or other laboratory evaluation.  Patient remained vitally stable here in the emergency department without any significant hypertension and remained neurologically intact.  He was discharged from the emergency department in  stable condition.    Medical Decision Making    History:    Supplemental history from: Documented in chart, if applicable    External Record(s) reviewed: Documented in chart, if applicable.    Work Up:    Chart documentation includes differential considered and any EKGs or imaging independently interpreted by provider, where specified.    In additional to work up documented, I considered the following work up: Documented in chart, if applicable.    External consultation:    Discussion of management with another provider: Documented in chart, if applicable    Complicating factors:    Care impacted by chronic illness: Other: Pseudotumor cerebri    Care affected by social determinants of health: Access to Medical Care    Disposition considerations: Discharge. I prescribed additional prescription strength medication(s) as charted. See documentation for any additional details.         ED COURSE:  4:34 PM I met with the patient, obtained history, performed an initial exam, and discussed options and plan for diagnostics and treatment here in the ED.    4:45 PM I staffed the patient with Dr. Gina Echols.    5:00 PM I spoke with Dr. Leija, neurologist.    5:12 PM Patient discharged after being provided with extensive anticipatory guidance and given return precautions, importance of PCP follow-up emphasized.    At the conclusion of the encounter I discussed the results of all of the tests and the disposition. The questions were answered. The patient or family acknowledged understanding and was agreeable with the care plan.     MEDICATIONS GIVEN IN THE EMERGENCY:  Medications - No data to display    NEW PRESCRIPTIONS STARTED AT TODAY'S ER VISIT  Discharge Medication List as of 3/29/2023  5:33 PM      START taking these medications    Details   diphenhydrAMINE (BENADRYL) 25 MG capsule Take 1 capsule (25 mg) by mouth every 6 hours as needed (take as needed with reglan for headaches and nausea), Disp-15 capsule, R-0, Local  Print      metoclopramide (REGLAN) 10 MG tablet Take 1 tablet (10 mg) by mouth 4 times daily as needed (headache, nausea), Disp-15 tablet, R-0, Local Print      topiramate (TOPAMAX) 50 MG tablet Take 1 tablet (50 mg) by mouth 2 times daily for 30 days, Disp-60 tablet, R-0, Local Print                  =================================================================    HPI:    Patient information was obtained from: Patient    Use of Interpretor: N/A        Adolfo RUSTY Delarosa is a 26 year old female with a pertinent history of pseudotumor cerebri, Factor V leiden, priro DVT who presents to this ED by walk in for evaluation of headaches. Patient has a longstanding history of headaches for which she states she has had lumbar taps in the past with neurology, but reports she has not had issues with her headaches for years. However, patient states that recently, she started to get intermittent headaches that she states are persistent for a couple of days then resolve on their own. She states her headaches are normally resolved with sleep and then get progressively worse throughout the day. Patient states her headache yesterday was particularly severe. Today, her headache is currently not as severe and she states her pain is at a 7/10.    Patient reports associated photophobia and sonophobia with her headaches. She also reports her vision occasionally becomes blurry when her headaches are severe. Patient was previously nauseated earlier this morning, but states this has since resolved. She normally takes ibuprofen for her headaches and states she took 1200 mg (patient states this is how much she normally takes) earlier this morning at ~11 AM without significant relief. Patient also states she took the medications which were prescribed to her during her previous ED visit on 01/09/23 and also took an unknown medication of her mother's cousin which she states also did not improve her pain.    Of note, patient states she was  seen in the ED a couple of months ago and did not have significant relief with the medications given at that time then. Since then, she states she has been unable to follow up with neurology as she has not had insurance until recently. She otherwise denies any other symptoms including current nausea, vomiting, diarrhea, black or bloody stools, chest pain, shortness of breath, fevers, diffuse body aches, cough or other URI symptoms, unilateral weakness, a facial droop, numbness or tingling, speech difficulty. She denies any other symptoms.    Per chart review, patient was seen [facility] ED on 01/09/23. Patient initially reported to the ED with 1 week of headaches with occasional right sided blurred vision. Neurology was consulted at the time and they recommended initial treatments with medications and would consider an LP in the future if all other treatments failed. Neurology recommended Topamax 25 mg BID and IV medications. She was ordered Toradol, Reglan, and fluids, but the patient declined all medication treatments in the ED and elected for discharge. She was discharged with a prescription for Topamax 25 mg BID.    REVIEW OF SYSTEMS:  Review of Systems   Constitutional: Negative for chills and fever.   HENT:        Positive for sonophobia.   Eyes: Positive for photophobia and visual disturbance (occasional blurred vision, currently not present).   Respiratory: Negative for cough and shortness of breath.    Cardiovascular: Negative for chest pain.   Gastrointestinal: Negative for blood in stool, nausea (resolved) and vomiting.   Musculoskeletal: Negative for myalgias (diffuse body aches).   Neurological: Positive for headaches. Negative for facial asymmetry, speech difficulty, weakness and numbness.        Denies tingling.   All other systems reviewed and are negative.        PAST MEDICAL HISTORY:  Past Medical History:   Diagnosis Date     Deep vein thrombosis (H)      Factor V Leiden (H)       Pulmonary embolism (H)        PAST SURGICAL HISTORY:  Past Surgical History:   Procedure Laterality Date     CHOLECYSTECTOMY      age 13 gallbladder removed     LUMBAR PUNCTURE FLUORO GUIDED DIAGNOSTIC  7/6/2020           CURRENT MEDICATIONS:    No current facility-administered medications for this encounter.    Current Outpatient Medications:      acetaZOLAMIDE (DIAMOX) 250 MG tablet, Take 2 tablets (500 mg) by mouth 2 times daily for 5 days, THEN 4 tablets (1,000 mg) 2 times daily for 20 days., Disp: 180 tablet, Rfl: 0     diphenhydrAMINE (BENADRYL) 25 MG capsule, Take 1 capsule (25 mg) by mouth every 6 hours as needed (take as needed with reglan for headaches and nausea), Disp: 15 capsule, Rfl: 0     metoclopramide (REGLAN) 10 MG tablet, Take 1 tablet (10 mg) by mouth 4 times daily as needed (headache, nausea), Disp: 15 tablet, Rfl: 0     topiramate (TOPAMAX) 50 MG tablet, Take 1 tablet (50 mg) by mouth 2 times daily for 30 days, Disp: 60 tablet, Rfl: 0     naproxen (NAPROSYN) 500 MG tablet, [NAPROXEN (NAPROSYN) 500 MG TABLET] Take 1 tablet (500 mg total) by mouth 2 (two) times a day with meals., Disp: 15 tablet, Rfl: 0     prochlorperazine (COMPAZINE) 5 MG tablet, [PROCHLORPERAZINE (COMPAZINE) 5 MG TABLET] 1-2 tabs po q8 hours as needed for nausea, Disp: 10 tablet, Rfl: 0     rivaroxaban (XARELTO) 15 mg Tab, [RIVAROXABAN (XARELTO) 15 MG TAB] Take 1 tablet (15 mg total) by mouth 2 (two) times a day with meals., Disp: 42 tablet, Rfl: 0      ALLERGIES:  No Known Allergies    FAMILY HISTORY:  Family History   Problem Relation Age of Onset     Cancer Mother        SOCIAL HISTORY:   Social History     Socioeconomic History     Marital status: Single   Tobacco Use     Smoking status: Every Day     Packs/day: 0.50     Types: Cigarettes   Substance and Sexual Activity     Alcohol use: No     Drug use: No     Sexual activity: Never     Partners: Female       VITALS:  Patient Vitals for the past 24 hrs:   BP Temp Temp src  "Pulse Resp SpO2 Height Weight   03/29/23 1730 (!) 157/82 -- -- 67 18 99 % -- --   03/29/23 1715 (!) 145/70 -- -- 70 -- 99 % -- --   03/29/23 1700 (!) 162/86 -- -- 63 -- 99 % -- --   03/29/23 1646 (!) 156/80 -- -- 67 -- 98 % -- --   03/29/23 1617 (!) 171/83 -- -- 68 18 99 % -- --   03/29/23 1417 (!) 150/86 98.3  F (36.8  C) Oral 75 18 99 % 1.803 m (5' 11\") (!) 154.2 kg (340 lb)       PHYSICAL EXAM    Constitutional: Well developed, Well nourished, NAD  HENT: Normocephalic, Atraumatic, Bilateral external ears normal, Oropharynx normal, mucous membranes moist, Nose normal.   Neck: Normal range of motion, No tenderness, Supple, No stridor.  Eyes: PERRL, EOMI, Conjunctiva normal, No discharge.   Respiratory: Normal breath sounds, No respiratory distress, No wheezing, Speaks full sentences easily. No cough.  Cardiovascular: Normal heart rate, Regular rhythm, No murmurs, No rubs, No gallops. Chest wall nontender.  GI: Soft, No tenderness, No masses, No flank tenderness. No rebound or guarding.  Musculoskeletal: No edema. No cyanosis, No clubbing. Good range of motion in all major joints. No tenderness to palpation or major deformities noted. No tenderness of the CTLS spine.   Integument: Warm, Dry, No erythema, No rash. No petechiae.  Neurologic: GCS 15.  5 out of 5 strength and sensation in bilateral upper and lower extremities.  Cranial nerves II through XII grossly intact.  Alert & oriented x 3, Normal motor function, Normal sensory function, No focal deficits noted. Normal gait.  Psychiatric: Affect normal, Judgment normal, Mood normal. Cooperative.    LAB:  All pertinent labs reviewed and interpreted.  No results found for this or any previous visit (from the past 24 hour(s)).      RADIOLOGY:  Reviewed all pertinent imaging. Please see official radiology report.  No orders to display       PROCEDURES:   None.      I, Nakul Berger, am serving as a scribe to document services personally performed by Gina" MIRIAM Aguilar based on my observation and the provider's statements to me. I, Gina Aguilar PA-C attest that Nakul Berger is acting in a scribe capacity, has observed my performance of the services and has documented them in accordance with my direction.    Gina Aguilar PA-C  Emergency Medicine  [facility]  3/29/2023       Gina Aguilar PA-C  03/29/23 3681

## 2023-04-03 ENCOUNTER — OFFICE VISIT (OUTPATIENT)
Dept: DERMATOLOGY | Facility: CLINIC | Age: 73
End: 2023-04-03
Payer: MEDICARE

## 2023-04-03 VITALS — WEIGHT: 160 LBS | BODY MASS INDEX: 27.31 KG/M2 | HEIGHT: 64 IN

## 2023-04-03 DIAGNOSIS — L82.0 SEBORRHEIC KERATOSES, INFLAMED: Primary | ICD-10-CM

## 2023-04-03 PROCEDURE — 99213 OFFICE O/P EST LOW 20 MIN: CPT | Mod: PBBFAC,PO | Performed by: STUDENT IN AN ORGANIZED HEALTH CARE EDUCATION/TRAINING PROGRAM

## 2023-04-03 PROCEDURE — 99499 NO LOS: ICD-10-PCS | Mod: S$PBB,,, | Performed by: STUDENT IN AN ORGANIZED HEALTH CARE EDUCATION/TRAINING PROGRAM

## 2023-04-03 PROCEDURE — 99999 PR PBB SHADOW E&M-EST. PATIENT-LVL III: ICD-10-PCS | Mod: PBBFAC,,, | Performed by: STUDENT IN AN ORGANIZED HEALTH CARE EDUCATION/TRAINING PROGRAM

## 2023-04-03 PROCEDURE — 17110 DESTRUCTION B9 LES UP TO 14: CPT | Mod: S$PBB,,, | Performed by: STUDENT IN AN ORGANIZED HEALTH CARE EDUCATION/TRAINING PROGRAM

## 2023-04-03 PROCEDURE — 17110 DESTRUCTION B9 LES UP TO 14: CPT | Mod: PBBFAC,PO | Performed by: STUDENT IN AN ORGANIZED HEALTH CARE EDUCATION/TRAINING PROGRAM

## 2023-04-03 PROCEDURE — 99499 UNLISTED E&M SERVICE: CPT | Mod: S$PBB,,, | Performed by: STUDENT IN AN ORGANIZED HEALTH CARE EDUCATION/TRAINING PROGRAM

## 2023-04-03 PROCEDURE — 17110 PR DESTRUCTION BENIGN LESIONS UP TO 14: ICD-10-PCS | Mod: S$PBB,,, | Performed by: STUDENT IN AN ORGANIZED HEALTH CARE EDUCATION/TRAINING PROGRAM

## 2023-04-03 PROCEDURE — 99999 PR PBB SHADOW E&M-EST. PATIENT-LVL III: CPT | Mod: PBBFAC,,, | Performed by: STUDENT IN AN ORGANIZED HEALTH CARE EDUCATION/TRAINING PROGRAM

## 2023-04-03 NOTE — PROGRESS NOTES
Subjective:      Patient ID:  Marce Hickey is a 72 y.o. female who presents for   Chief Complaint   Patient presents with    Spot     Mole on left side abd     LOV- 11/18/20    Here today for a mole on the left side of her stomach x 6 months. Flaky, getting more raised.     Has no hx of NMSC  Has no fhx of MM      Review of Systems   Constitutional:  Negative for fever and chills.   Skin:  Positive for daily sunscreen use and wears hat. Negative for itching, rash and activity-related sunscreen use.   Hematologic/Lymphatic: Does not bruise/bleed easily.     Objective:   Physical Exam   Constitutional: She appears well-developed and well-nourished. No distress.   Neurological: She is alert and oriented to person, place, and time. She is not disoriented.   Psychiatric: She has a normal mood and affect.   Skin:   Areas Examined (abnormalities noted in diagram):   Abdomen Inspection Performed          Diagram Legend     Erythematous scaling macule/papule c/w actinic keratosis       Vascular papule c/w angioma      Pigmented verrucoid papule/plaque c/w seborrheic keratosis      Yellow umbilicated papule c/w sebaceous hyperplasia      Irregularly shaped tan macule c/w lentigo     1-2 mm smooth white papules consistent with Milia      Movable subcutaneous cyst with punctum c/w epidermal inclusion cyst      Subcutaneous movable cyst c/w pilar cyst      Firm pink to brown papule c/w dermatofibroma      Pedunculated fleshy papule(s) c/w skin tag(s)      Evenly pigmented macule c/w junctional nevus     Mildly variegated pigmented, slightly irregular-bordered macule c/w mildly atypical nevus      Flesh colored to evenly pigmented papule c/w intradermal nevus       Pink pearly papule/plaque c/w basal cell carcinoma      Erythematous hyperkeratotic cursted plaque c/w SCC      Surgical scar with no sign of skin cancer recurrence      Open and closed comedones      Inflammatory papules and pustules      Verrucoid papule  consistent consistent with wart     Erythematous eczematous patches and plaques     Dystrophic onycholytic nail with subungual debris c/w onychomycosis     Umbilicated papule    Erythematous-base heme-crusted tan verrucoid plaque consistent with inflamed seborrheic keratosis     Erythematous Silvery Scaling Plaque c/w Psoriasis     See annotation      Assessment / Plan:        Seborrheic keratoses, inflamed  Cryosurgery procedure note:    Verbal consent from the patient is obtained. Liquid nitrogen cryosurgery is applied to 1 lesions to produce a freeze injury. The patient is aware that blisters may form and is instructed on wound care with gentle cleansing and use of vaseline ointment to keep moist until healed. The patient is supplied a handout on cryosurgery and is instructed to call if lesions do not completely resolve.             No follow-ups on file.

## 2023-04-03 NOTE — PATIENT INSTRUCTIONS

## 2023-04-11 ENCOUNTER — OFFICE VISIT (OUTPATIENT)
Dept: FAMILY MEDICINE | Facility: CLINIC | Age: 73
End: 2023-04-11
Payer: MEDICARE

## 2023-04-11 VITALS
HEIGHT: 64 IN | WEIGHT: 164.19 LBS | BODY MASS INDEX: 28.03 KG/M2 | SYSTOLIC BLOOD PRESSURE: 152 MMHG | OXYGEN SATURATION: 96 % | DIASTOLIC BLOOD PRESSURE: 84 MMHG | RESPIRATION RATE: 15 BRPM

## 2023-04-11 DIAGNOSIS — K21.9 GASTROESOPHAGEAL REFLUX DISEASE WITHOUT ESOPHAGITIS: ICD-10-CM

## 2023-04-11 DIAGNOSIS — Z98.84 BARIATRIC SURGERY STATUS: ICD-10-CM

## 2023-04-11 DIAGNOSIS — D53.9 NUTRITIONAL ANEMIA, UNSPECIFIED: ICD-10-CM

## 2023-04-11 DIAGNOSIS — Z13.6 ENCOUNTER FOR LIPID SCREENING FOR CARDIOVASCULAR DISEASE: ICD-10-CM

## 2023-04-11 DIAGNOSIS — R03.0 ELEVATED BLOOD PRESSURE READING IN OFFICE WITHOUT DIAGNOSIS OF HYPERTENSION: ICD-10-CM

## 2023-04-11 DIAGNOSIS — Z13.220 ENCOUNTER FOR LIPID SCREENING FOR CARDIOVASCULAR DISEASE: ICD-10-CM

## 2023-04-11 DIAGNOSIS — Z85.3 HISTORY OF BREAST CANCER: ICD-10-CM

## 2023-04-11 DIAGNOSIS — F33.0 MAJOR DEPRESSIVE DISORDER, RECURRENT, MILD: ICD-10-CM

## 2023-04-11 DIAGNOSIS — Z13.1 SCREENING FOR DIABETES MELLITUS: ICD-10-CM

## 2023-04-11 DIAGNOSIS — D53.9 NUTRITIONAL ANEMIA: ICD-10-CM

## 2023-04-11 DIAGNOSIS — F41.1 GAD (GENERALIZED ANXIETY DISORDER): Primary | ICD-10-CM

## 2023-04-11 DIAGNOSIS — K91.2 POSTSURGICAL MALABSORPTION, NOT ELSEWHERE CLASSIFIED: ICD-10-CM

## 2023-04-11 PROCEDURE — 99999 PR PBB SHADOW E&M-EST. PATIENT-LVL III: CPT | Mod: PBBFAC,,, | Performed by: STUDENT IN AN ORGANIZED HEALTH CARE EDUCATION/TRAINING PROGRAM

## 2023-04-11 PROCEDURE — 99214 PR OFFICE/OUTPT VISIT, EST, LEVL IV, 30-39 MIN: ICD-10-PCS | Mod: S$PBB,,, | Performed by: STUDENT IN AN ORGANIZED HEALTH CARE EDUCATION/TRAINING PROGRAM

## 2023-04-11 PROCEDURE — 99999 PR PBB SHADOW E&M-EST. PATIENT-LVL III: ICD-10-PCS | Mod: PBBFAC,,, | Performed by: STUDENT IN AN ORGANIZED HEALTH CARE EDUCATION/TRAINING PROGRAM

## 2023-04-11 PROCEDURE — 99213 OFFICE O/P EST LOW 20 MIN: CPT | Mod: PBBFAC,PN | Performed by: STUDENT IN AN ORGANIZED HEALTH CARE EDUCATION/TRAINING PROGRAM

## 2023-04-11 PROCEDURE — 99214 OFFICE O/P EST MOD 30 MIN: CPT | Mod: S$PBB,,, | Performed by: STUDENT IN AN ORGANIZED HEALTH CARE EDUCATION/TRAINING PROGRAM

## 2023-04-11 NOTE — ASSESSMENT & PLAN NOTE
She has tried multiple other meds.  omprazole with sodium baicarbonate is her only relief.  continue

## 2023-04-11 NOTE — PROGRESS NOTES
Subjective:       Patient ID: Marce Hickey is a 72 y.o. female.    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  omeprazole-sodium bicarbonate (ZEGERID) 40-1.1 mg-gram per capsule, Take 1 capsule by mouth before breakfast.  VITAMIN B COMPLEX ORAL, Take by mouth.    No current facility-administered medications for this visit.        Review of Systems   Constitutional:  Negative for activity change, appetite change, fatigue and unexpected weight change.   HENT:  Negative for trouble swallowing.    Eyes:  Negative for visual disturbance.   Respiratory:  Negative for shortness of breath.    Cardiovascular:  Negative for chest pain and leg swelling.   Gastrointestinal:  Negative for abdominal pain, blood in stool, change in bowel habit and change in bowel habit.   Endocrine: Negative for cold intolerance, heat intolerance, polydipsia and polyuria.   Genitourinary:  Negative for dysuria and hematuria.   Musculoskeletal:  Negative for arthralgias, back pain and gait problem.   Integumentary:  Negative for rash and wound.   Neurological:  Negative for dizziness, weakness and headaches.   Psychiatric/Behavioral:  Negative for dysphoric mood and sleep disturbance. The patient is not nervous/anxious.        Objective:      Physical Exam  Constitutional:       General: She is not in acute distress.     Appearance: Normal appearance. She is not ill-appearing.   Eyes:      Conjunctiva/sclera: Conjunctivae normal.   Cardiovascular:      Rate and  Rhythm: Normal rate and regular rhythm.      Pulses: Normal pulses.      Heart sounds: Normal heart sounds. No murmur heard.  Pulmonary:      Effort: Pulmonary effort is normal. No respiratory distress.      Breath sounds: Normal breath sounds. No wheezing.   Abdominal:      Palpations: Abdomen is soft.   Musculoskeletal:      Right lower leg: No edema.      Left lower leg: No edema.   Skin:     General: Skin is warm and dry.      Findings: No rash.   Neurological:      Mental Status: She is alert. Mental status is at baseline.      Gait: Gait normal.   Psychiatric:         Mood and Affect: Mood normal.         Behavior: Behavior normal.         Thought Content: Thought content normal.         Judgment: Judgment normal.       Assessment:       1. CEASAR (generalized anxiety disorder)    2. History of breast cancer    3. Gastroesophageal reflux disease without esophagitis    4. Elevated blood pressure reading in office without diagnosis of hypertension    5. Encounter for lipid screening for cardiovascular disease    6. Screening for diabetes mellitus    7. Bariatric surgery status    8. Nutritional anemia    9. Nutritional anemia, unspecified    10. Postsurgical malabsorption, not elsewhere classified    11. Major depressive disorder, recurrent, mild        Plan:       Problem List Items Addressed This Visit          Psychiatric    CEASAR (generalized anxiety disorder) - Primary     Stable            Major depressive disorder, recurrent, mild     Stable. No issues              Oncology    History of breast cancer     6 years out. Follows with oncology              GI    GERD (gastroesophageal reflux disease)     She has tried multiple other meds.  omprazole with sodium baicarbonate is her only relief.  continue            Other Visit Diagnoses       Elevated blood pressure reading in office without diagnosis of hypertension        Relevant Orders    Comprehensive Metabolic Panel    CBC Auto Differential    TSH     Encounter for lipid screening for cardiovascular disease        Relevant Orders    Lipid Panel    Screening for diabetes mellitus        Relevant Orders    Hemoglobin A1C    Bariatric surgery status        Relevant Orders    Vitamin B12    Ferritin    Iron and TIBC    Calcitriol    PTH, intact    Vitamin B1    Folate    Nutritional anemia        Nutritional anemia, unspecified        Relevant Orders    Hemoglobin A1C    TSH    Vitamin B12    Ferritin    Iron and TIBC    Folate    Postsurgical malabsorption, not elsewhere classified        Relevant Orders    Hemoglobin A1C

## 2023-04-12 ENCOUNTER — LAB VISIT (OUTPATIENT)
Dept: LAB | Facility: HOSPITAL | Age: 73
End: 2023-04-12
Attending: STUDENT IN AN ORGANIZED HEALTH CARE EDUCATION/TRAINING PROGRAM
Payer: MEDICARE

## 2023-04-12 DIAGNOSIS — Z98.84 BARIATRIC SURGERY STATUS: ICD-10-CM

## 2023-04-12 DIAGNOSIS — Z13.220 ENCOUNTER FOR LIPID SCREENING FOR CARDIOVASCULAR DISEASE: ICD-10-CM

## 2023-04-12 DIAGNOSIS — R03.0 ELEVATED BLOOD PRESSURE READING IN OFFICE WITHOUT DIAGNOSIS OF HYPERTENSION: ICD-10-CM

## 2023-04-12 DIAGNOSIS — K91.2 POSTSURGICAL MALABSORPTION, NOT ELSEWHERE CLASSIFIED: ICD-10-CM

## 2023-04-12 DIAGNOSIS — D53.9 NUTRITIONAL ANEMIA, UNSPECIFIED: ICD-10-CM

## 2023-04-12 DIAGNOSIS — Z13.6 ENCOUNTER FOR LIPID SCREENING FOR CARDIOVASCULAR DISEASE: ICD-10-CM

## 2023-04-12 DIAGNOSIS — Z13.1 SCREENING FOR DIABETES MELLITUS: ICD-10-CM

## 2023-04-12 LAB
ALBUMIN SERPL BCP-MCNC: 3.7 G/DL (ref 3.5–5.2)
ALP SERPL-CCNC: 88 U/L (ref 55–135)
ALT SERPL W/O P-5'-P-CCNC: 16 U/L (ref 10–44)
ANION GAP SERPL CALC-SCNC: 10 MMOL/L (ref 8–16)
AST SERPL-CCNC: 15 U/L (ref 10–40)
BASOPHILS # BLD AUTO: 0.04 K/UL (ref 0–0.2)
BASOPHILS NFR BLD: 0.9 % (ref 0–1.9)
BILIRUB SERPL-MCNC: 0.3 MG/DL (ref 0.1–1)
BUN SERPL-MCNC: 18 MG/DL (ref 8–23)
CALCIUM SERPL-MCNC: 9.8 MG/DL (ref 8.7–10.5)
CHLORIDE SERPL-SCNC: 109 MMOL/L (ref 95–110)
CHOLEST SERPL-MCNC: 247 MG/DL (ref 120–199)
CHOLEST/HDLC SERPL: 4 {RATIO} (ref 2–5)
CO2 SERPL-SCNC: 26 MMOL/L (ref 23–29)
CREAT SERPL-MCNC: 0.8 MG/DL (ref 0.5–1.4)
DIFFERENTIAL METHOD: NORMAL
EOSINOPHIL # BLD AUTO: 0.2 K/UL (ref 0–0.5)
EOSINOPHIL NFR BLD: 3.5 % (ref 0–8)
ERYTHROCYTE [DISTWIDTH] IN BLOOD BY AUTOMATED COUNT: 12.6 % (ref 11.5–14.5)
EST. GFR  (NO RACE VARIABLE): >60 ML/MIN/1.73 M^2
ESTIMATED AVG GLUCOSE: 108 MG/DL (ref 68–131)
GLUCOSE SERPL-MCNC: 91 MG/DL (ref 70–110)
HBA1C MFR BLD: 5.4 % (ref 4–5.6)
HCT VFR BLD AUTO: 41.5 % (ref 37–48.5)
HDLC SERPL-MCNC: 62 MG/DL (ref 40–75)
HDLC SERPL: 25.1 % (ref 20–50)
HGB BLD-MCNC: 13.3 G/DL (ref 12–16)
IMM GRANULOCYTES # BLD AUTO: 0.02 K/UL (ref 0–0.04)
IMM GRANULOCYTES NFR BLD AUTO: 0.5 % (ref 0–0.5)
IRON SERPL-MCNC: 96 UG/DL (ref 30–160)
LDLC SERPL CALC-MCNC: 151.2 MG/DL (ref 63–159)
LYMPHOCYTES # BLD AUTO: 1.3 K/UL (ref 1–4.8)
LYMPHOCYTES NFR BLD: 30.9 % (ref 18–48)
MCH RBC QN AUTO: 28.9 PG (ref 27–31)
MCHC RBC AUTO-ENTMCNC: 32 G/DL (ref 32–36)
MCV RBC AUTO: 90 FL (ref 82–98)
MONOCYTES # BLD AUTO: 0.5 K/UL (ref 0.3–1)
MONOCYTES NFR BLD: 11.4 % (ref 4–15)
NEUTROPHILS # BLD AUTO: 2.3 K/UL (ref 1.8–7.7)
NEUTROPHILS NFR BLD: 52.8 % (ref 38–73)
NONHDLC SERPL-MCNC: 185 MG/DL
NRBC BLD-RTO: 0 /100 WBC
PLATELET # BLD AUTO: 152 K/UL (ref 150–450)
PMV BLD AUTO: 12 FL (ref 9.2–12.9)
POTASSIUM SERPL-SCNC: 3.9 MMOL/L (ref 3.5–5.1)
PROT SERPL-MCNC: 6.8 G/DL (ref 6–8.4)
PTH-INTACT SERPL-MCNC: 98 PG/ML (ref 9–77)
RBC # BLD AUTO: 4.6 M/UL (ref 4–5.4)
SATURATED IRON: 26 % (ref 20–50)
SODIUM SERPL-SCNC: 145 MMOL/L (ref 136–145)
TOTAL IRON BINDING CAPACITY: 374 UG/DL (ref 250–450)
TRANSFERRIN SERPL-MCNC: 253 MG/DL (ref 200–375)
TRIGL SERPL-MCNC: 169 MG/DL (ref 30–150)
TSH SERPL DL<=0.005 MIU/L-ACNC: 3.52 UIU/ML (ref 0.4–4)
WBC # BLD AUTO: 4.31 K/UL (ref 3.9–12.7)

## 2023-04-12 PROCEDURE — 82652 VIT D 1 25-DIHYDROXY: CPT | Performed by: STUDENT IN AN ORGANIZED HEALTH CARE EDUCATION/TRAINING PROGRAM

## 2023-04-12 PROCEDURE — 84443 ASSAY THYROID STIM HORMONE: CPT | Performed by: STUDENT IN AN ORGANIZED HEALTH CARE EDUCATION/TRAINING PROGRAM

## 2023-04-12 PROCEDURE — 85025 COMPLETE CBC W/AUTO DIFF WBC: CPT | Performed by: STUDENT IN AN ORGANIZED HEALTH CARE EDUCATION/TRAINING PROGRAM

## 2023-04-12 PROCEDURE — 80053 COMPREHEN METABOLIC PANEL: CPT | Performed by: STUDENT IN AN ORGANIZED HEALTH CARE EDUCATION/TRAINING PROGRAM

## 2023-04-12 PROCEDURE — 36415 COLL VENOUS BLD VENIPUNCTURE: CPT | Performed by: STUDENT IN AN ORGANIZED HEALTH CARE EDUCATION/TRAINING PROGRAM

## 2023-04-12 PROCEDURE — 82607 VITAMIN B-12: CPT | Performed by: STUDENT IN AN ORGANIZED HEALTH CARE EDUCATION/TRAINING PROGRAM

## 2023-04-12 PROCEDURE — 80061 LIPID PANEL: CPT | Performed by: STUDENT IN AN ORGANIZED HEALTH CARE EDUCATION/TRAINING PROGRAM

## 2023-04-12 PROCEDURE — 83036 HEMOGLOBIN GLYCOSYLATED A1C: CPT | Performed by: STUDENT IN AN ORGANIZED HEALTH CARE EDUCATION/TRAINING PROGRAM

## 2023-04-12 PROCEDURE — 82728 ASSAY OF FERRITIN: CPT | Performed by: STUDENT IN AN ORGANIZED HEALTH CARE EDUCATION/TRAINING PROGRAM

## 2023-04-12 PROCEDURE — 82746 ASSAY OF FOLIC ACID SERUM: CPT | Performed by: STUDENT IN AN ORGANIZED HEALTH CARE EDUCATION/TRAINING PROGRAM

## 2023-04-12 PROCEDURE — 83970 ASSAY OF PARATHORMONE: CPT | Performed by: STUDENT IN AN ORGANIZED HEALTH CARE EDUCATION/TRAINING PROGRAM

## 2023-04-12 PROCEDURE — 84466 ASSAY OF TRANSFERRIN: CPT | Performed by: STUDENT IN AN ORGANIZED HEALTH CARE EDUCATION/TRAINING PROGRAM

## 2023-04-12 PROCEDURE — 84425 ASSAY OF VITAMIN B-1: CPT | Performed by: STUDENT IN AN ORGANIZED HEALTH CARE EDUCATION/TRAINING PROGRAM

## 2023-04-13 ENCOUNTER — PATIENT MESSAGE (OUTPATIENT)
Dept: FAMILY MEDICINE | Facility: CLINIC | Age: 73
End: 2023-04-13
Payer: MEDICARE

## 2023-04-13 LAB
FERRITIN SERPL-MCNC: 77 NG/ML (ref 20–300)
FOLATE SERPL-MCNC: 4.7 NG/ML (ref 4–24)
VIT B12 SERPL-MCNC: 1037 PG/ML (ref 210–950)

## 2023-04-17 LAB — 1,25(OH)2D3 SERPL-MCNC: 86 PG/ML (ref 20–79)

## 2023-04-18 ENCOUNTER — PATIENT MESSAGE (OUTPATIENT)
Dept: FAMILY MEDICINE | Facility: CLINIC | Age: 73
End: 2023-04-18
Payer: MEDICARE

## 2023-04-18 LAB — VIT B1 BLD-MCNC: 74 UG/L (ref 38–122)

## 2023-04-21 ENCOUNTER — LAB VISIT (OUTPATIENT)
Dept: LAB | Facility: HOSPITAL | Age: 73
End: 2023-04-21
Attending: STUDENT IN AN ORGANIZED HEALTH CARE EDUCATION/TRAINING PROGRAM
Payer: MEDICARE

## 2023-04-21 ENCOUNTER — OFFICE VISIT (OUTPATIENT)
Dept: FAMILY MEDICINE | Facility: CLINIC | Age: 73
End: 2023-04-21
Payer: MEDICARE

## 2023-04-21 VITALS
SYSTOLIC BLOOD PRESSURE: 146 MMHG | HEART RATE: 62 BPM | HEIGHT: 64 IN | DIASTOLIC BLOOD PRESSURE: 86 MMHG | RESPIRATION RATE: 15 BRPM | WEIGHT: 161.81 LBS | BODY MASS INDEX: 27.63 KG/M2 | OXYGEN SATURATION: 99 %

## 2023-04-21 DIAGNOSIS — R79.89 ELEVATED PARATHYROID HORMONE: Primary | ICD-10-CM

## 2023-04-21 DIAGNOSIS — R79.89 ELEVATED PARATHYROID HORMONE: ICD-10-CM

## 2023-04-21 DIAGNOSIS — R03.0 ELEVATED BLOOD PRESSURE READING IN OFFICE WITHOUT DIAGNOSIS OF HYPERTENSION: ICD-10-CM

## 2023-04-21 DIAGNOSIS — Z98.890 STATUS POST GASTRIC SURGERY: ICD-10-CM

## 2023-04-21 DIAGNOSIS — C50.512 MALIGNANT NEOPLASM OF LOWER-OUTER QUADRANT OF LEFT FEMALE BREAST, UNSPECIFIED ESTROGEN RECEPTOR STATUS: ICD-10-CM

## 2023-04-21 LAB
CA-I BLDV-SCNC: 1.37 MMOL/L (ref 1.06–1.42)
PTH-INTACT SERPL-MCNC: 68 PG/ML (ref 9–77)

## 2023-04-21 PROCEDURE — 82330 ASSAY OF CALCIUM: CPT | Performed by: STUDENT IN AN ORGANIZED HEALTH CARE EDUCATION/TRAINING PROGRAM

## 2023-04-21 PROCEDURE — 83970 ASSAY OF PARATHORMONE: CPT | Performed by: STUDENT IN AN ORGANIZED HEALTH CARE EDUCATION/TRAINING PROGRAM

## 2023-04-21 PROCEDURE — 99213 OFFICE O/P EST LOW 20 MIN: CPT | Mod: PBBFAC,PN | Performed by: STUDENT IN AN ORGANIZED HEALTH CARE EDUCATION/TRAINING PROGRAM

## 2023-04-21 PROCEDURE — 99999 PR PBB SHADOW E&M-EST. PATIENT-LVL III: ICD-10-PCS | Mod: PBBFAC,,, | Performed by: STUDENT IN AN ORGANIZED HEALTH CARE EDUCATION/TRAINING PROGRAM

## 2023-04-21 PROCEDURE — 36415 COLL VENOUS BLD VENIPUNCTURE: CPT | Performed by: STUDENT IN AN ORGANIZED HEALTH CARE EDUCATION/TRAINING PROGRAM

## 2023-04-21 PROCEDURE — 99999 PR PBB SHADOW E&M-EST. PATIENT-LVL III: CPT | Mod: PBBFAC,,, | Performed by: STUDENT IN AN ORGANIZED HEALTH CARE EDUCATION/TRAINING PROGRAM

## 2023-04-21 PROCEDURE — 99214 OFFICE O/P EST MOD 30 MIN: CPT | Mod: S$PBB,,, | Performed by: STUDENT IN AN ORGANIZED HEALTH CARE EDUCATION/TRAINING PROGRAM

## 2023-04-21 PROCEDURE — 99214 PR OFFICE/OUTPT VISIT, EST, LEVL IV, 30-39 MIN: ICD-10-PCS | Mod: S$PBB,,, | Performed by: STUDENT IN AN ORGANIZED HEALTH CARE EDUCATION/TRAINING PROGRAM

## 2023-04-21 NOTE — PROGRESS NOTES
Subjective:       Patient ID: Marce Hickey is a 72 y.o. female.    Chief Complaint: Follow-up (Lab review)    Lab review   PTH elevated but CA normal and Vitamin D normal  Cholesterol a bit elevated    Lab Results       Component                Value               Date                       CHOL                     247 (H)             04/12/2023                 CHOL                     165                 05/17/2022                 CHOL                     176                 11/18/2021            Lab Results       Component                Value               Date                       HDL                      62                  04/12/2023                 HDL                      61                  05/17/2022                 HDL                      71                  11/18/2021            Lab Results       Component                Value               Date                       LDLCALC                  151.2               04/12/2023                 LDLCALC                  87.6                05/17/2022                 LDLCALC                  88.2                11/18/2021            No results found for: DLDL  Lab Results       Component                Value               Date                       TRIG                     169 (H)             04/12/2023                 TRIG                     82                  05/17/2022                 TRIG                     84                  11/18/2021              f1 Lab Results       Component                Value               Date                       CHOLHDL                  25.1                04/12/2023                 CHOLHDL                  37.0                05/17/2022                 CHOLHDL                  40.3                11/18/2021              The 10-year ASCVD risk score (Jaymie HOFFMAN, et al., 2019) is: 13.9%    Values used to calculate the score:      Age: 72 years      Sex: Female      Is Non- : No      Diabetic:  No      Tobacco smoker: No      Systolic Blood Pressure: 146 mmHg      Is BP treated: No      HDL Cholesterol: 62 mg/dL      Total Cholesterol: 247 mg/dL      BP remains a bit elevated  BP Readings from Last 6 Encounters:  04/21/23 : (!) 146/86  04/11/23 : (!) 152/84  12/13/22 : (!) 160/94  07/19/22 : 127/81  06/14/22 : 125/78  05/16/22 : 128/76    She is working on diet changes and has lost a few lbs  Wt Readings from Last 3 Encounters:  04/21/23 : 73.4 kg (161 lb 12.8 oz)  04/11/23 : 74.5 kg (164 lb 3.2 oz)  04/03/23 : 72.6 kg (160 lb)          Review of Systems   Constitutional:  Negative for activity change and appetite change.   Respiratory:  Negative for shortness of breath.    Cardiovascular:  Negative for chest pain.   Gastrointestinal:  Negative for abdominal pain.   Genitourinary:  Negative for dysuria.   Integumentary:  Negative for rash.   Psychiatric/Behavioral:  Negative for dysphoric mood and sleep disturbance. The patient is not nervous/anxious.        Objective:      Physical Exam  Constitutional:       General: She is not in acute distress.     Appearance: Normal appearance. She is not ill-appearing.   Eyes:      Conjunctiva/sclera: Conjunctivae normal.   Cardiovascular:      Rate and Rhythm: Normal rate and regular rhythm.      Heart sounds: Normal heart sounds. No murmur heard.  Pulmonary:      Effort: Pulmonary effort is normal. No respiratory distress.      Breath sounds: Normal breath sounds.   Musculoskeletal:      Right lower leg: No edema.      Left lower leg: No edema.   Skin:     General: Skin is warm and dry.   Neurological:      Mental Status: She is alert. Mental status is at baseline.      Gait: Gait normal.   Psychiatric:         Mood and Affect: Mood normal.         Behavior: Behavior normal.         Thought Content: Thought content normal.         Judgment: Judgment normal.       Assessment:       1. Elevated parathyroid hormone    2. Malignant neoplasm of lower-outer quadrant of  left female breast, unspecified estrogen receptor status    3. Status post gastric surgery    4. Elevated blood pressure reading in office without diagnosis of hypertension        Plan:       Problem List Items Addressed This Visit          Oncology    Malignant neoplasm of lower-outer quadrant of left female breast, unspecified estrogen receptor status     Stable. Continue current medications and regular followup.                GI    Status post gastric surgery     Resolved her diabetes  Lab Results   Component Value Date    HGBA1C 5.4 04/12/2023               Other Visit Diagnoses       Elevated parathyroid hormone    -  Primary    differential includes her gastric sleeve, calcium loss in urine, primary hyperparathryoid    Relevant Orders    Calcium, Timed Urine [facility]; 24 Hours    PTH, Intact    Calcium, ionized    Elevated blood pressure reading in office without diagnosis of hypertension        Remains elevated. recheck manual. she is working on diet changes. Monitor and May need medication

## 2023-04-24 ENCOUNTER — PATIENT MESSAGE (OUTPATIENT)
Dept: FAMILY MEDICINE | Facility: CLINIC | Age: 73
End: 2023-04-24
Payer: MEDICARE

## 2023-04-24 ENCOUNTER — LAB VISIT (OUTPATIENT)
Dept: LAB | Facility: HOSPITAL | Age: 73
End: 2023-04-24
Attending: STUDENT IN AN ORGANIZED HEALTH CARE EDUCATION/TRAINING PROGRAM
Payer: MEDICARE

## 2023-04-24 DIAGNOSIS — R79.89 ELEVATED PARATHYROID HORMONE: ICD-10-CM

## 2023-04-24 DIAGNOSIS — R79.89 ELEVATED PARATHYROID HORMONE: Primary | ICD-10-CM

## 2023-04-24 PROCEDURE — 82340 ASSAY OF CALCIUM IN URINE: CPT | Performed by: STUDENT IN AN ORGANIZED HEALTH CARE EDUCATION/TRAINING PROGRAM

## 2023-04-25 DIAGNOSIS — R79.89 ELEVATED PARATHYROID HORMONE: ICD-10-CM

## 2023-04-25 DIAGNOSIS — Z98.890 STATUS POST GASTRIC SURGERY: Primary | ICD-10-CM

## 2023-04-25 DIAGNOSIS — E55.9 VITAMIN D DEFICIENCY: ICD-10-CM

## 2023-04-25 LAB
CALCIUM 24H UR-MRATE: 10 MG/HR (ref 4–12)
CALCIUM UR-MCNC: 17.5 MG/DL (ref 0–15)
CALCIUM URINE (MG/SPEC): 245 MG/SPEC
URINE COLLECTION DURATION: 24 HR
URINE VOLUME: 1400 ML

## 2023-04-26 ENCOUNTER — TELEPHONE (OUTPATIENT)
Dept: FAMILY MEDICINE | Facility: CLINIC | Age: 73
End: 2023-04-26
Payer: MEDICARE

## 2023-04-26 NOTE — TELEPHONE ENCOUNTER
----- Message from Jade Brewster MD sent at 4/25/2023 10:20 AM CDT -----  Please get her set up for her vitamin D test.

## 2023-04-28 ENCOUNTER — LAB VISIT (OUTPATIENT)
Dept: LAB | Facility: HOSPITAL | Age: 73
End: 2023-04-28
Attending: STUDENT IN AN ORGANIZED HEALTH CARE EDUCATION/TRAINING PROGRAM
Payer: MEDICARE

## 2023-04-28 DIAGNOSIS — R79.89 ELEVATED PARATHYROID HORMONE: ICD-10-CM

## 2023-04-28 DIAGNOSIS — Z98.890 STATUS POST GASTRIC SURGERY: ICD-10-CM

## 2023-04-28 DIAGNOSIS — E55.9 VITAMIN D DEFICIENCY: ICD-10-CM

## 2023-04-28 LAB — 25(OH)D3+25(OH)D2 SERPL-MCNC: 62 NG/ML (ref 30–96)

## 2023-04-28 PROCEDURE — 36415 COLL VENOUS BLD VENIPUNCTURE: CPT | Performed by: STUDENT IN AN ORGANIZED HEALTH CARE EDUCATION/TRAINING PROGRAM

## 2023-04-28 PROCEDURE — 82306 VITAMIN D 25 HYDROXY: CPT | Performed by: STUDENT IN AN ORGANIZED HEALTH CARE EDUCATION/TRAINING PROGRAM

## 2023-05-02 ENCOUNTER — PATIENT MESSAGE (OUTPATIENT)
Dept: FAMILY MEDICINE | Facility: CLINIC | Age: 73
End: 2023-05-02
Payer: MEDICARE

## 2023-05-12 ENCOUNTER — PATIENT MESSAGE (OUTPATIENT)
Dept: FAMILY MEDICINE | Facility: CLINIC | Age: 73
End: 2023-05-12
Payer: MEDICARE

## 2023-05-12 DIAGNOSIS — I10 ESSENTIAL HYPERTENSION: Primary | ICD-10-CM

## 2023-05-12 RX ORDER — LOSARTAN POTASSIUM 25 MG/1
25 TABLET ORAL DAILY
Qty: 90 TABLET | Refills: 3 | Status: SHIPPED | OUTPATIENT
Start: 2023-05-12 | End: 2023-05-26

## 2023-05-18 ENCOUNTER — PATIENT MESSAGE (OUTPATIENT)
Dept: FAMILY MEDICINE | Facility: CLINIC | Age: 73
End: 2023-05-18
Payer: MEDICARE

## 2023-05-18 DIAGNOSIS — I10 ESSENTIAL HYPERTENSION: ICD-10-CM

## 2023-05-26 RX ORDER — LOSARTAN POTASSIUM 50 MG/1
50 TABLET ORAL DAILY
Qty: 90 TABLET | Refills: 3 | Status: SHIPPED | OUTPATIENT
Start: 2023-05-26 | End: 2023-06-26 | Stop reason: SDUPTHER

## 2023-06-13 ENCOUNTER — OFFICE VISIT (OUTPATIENT)
Dept: HEMATOLOGY/ONCOLOGY | Facility: CLINIC | Age: 73
End: 2023-06-13
Payer: MEDICARE

## 2023-06-13 VITALS
TEMPERATURE: 97 F | RESPIRATION RATE: 18 BRPM | DIASTOLIC BLOOD PRESSURE: 92 MMHG | HEART RATE: 70 BPM | HEIGHT: 64 IN | WEIGHT: 168.19 LBS | SYSTOLIC BLOOD PRESSURE: 178 MMHG | BODY MASS INDEX: 28.71 KG/M2

## 2023-06-13 DIAGNOSIS — D51.8 ANEMIA OF DECREASED VITAMIN B12 ABSORPTION: ICD-10-CM

## 2023-06-13 DIAGNOSIS — Z85.3 HISTORY OF BREAST CANCER: Primary | ICD-10-CM

## 2023-06-13 DIAGNOSIS — K21.9 GASTROESOPHAGEAL REFLUX DISEASE, UNSPECIFIED WHETHER ESOPHAGITIS PRESENT: ICD-10-CM

## 2023-06-13 PROCEDURE — 99214 PR OFFICE/OUTPT VISIT, EST, LEVL IV, 30-39 MIN: ICD-10-PCS | Mod: S$GLB,,, | Performed by: INTERNAL MEDICINE

## 2023-06-13 PROCEDURE — 99214 OFFICE O/P EST MOD 30 MIN: CPT | Mod: S$GLB,,, | Performed by: INTERNAL MEDICINE

## 2023-06-13 RX ORDER — OMEPRAZOLE AND SODIUM BICARBONATE 40; 1100 MG/1; MG/1
1 CAPSULE ORAL
Qty: 90 CAPSULE | Refills: 3 | Status: SHIPPED | OUTPATIENT
Start: 2023-06-13 | End: 2023-11-13

## 2023-06-14 NOTE — PROGRESS NOTES
"[facility] hematology Oncology In Office Subsequent Encounter note  6/13/23      Subjective:      Patient ID:   Marce Hickey  73 y.o. female  1950  MD [place]        Chief Complaint:   Breast cancer evaluation    HPI:  73 y.o. female has a history of L breast cancer.  She had asked if I would assume her oncology care  .  She has a history of breast reduction back in 1992.  Recently in March 2017 she had a abnormal mammogram at [facility].  Two adjacent masses were seen at the 12 and 1:00 position of the left breast.  Both masses returned positive for invasive ductal carcinoma.  ERP was positive, PRP was positive, HER2 Alec was negative, it was grade 1 disease, and sentinel lymph node biopsy was negative.  Clinically this was stage I disease.  She had bilateral mastectomy, with left sentinel node biopsy in May 2017.  She also had reconstruction with expanders placed and eventually implants paced per Dr. Thomas of Plastic surgery.      She will continue on adjuvant Arimidex daily for now x's 5 years.  No increased HF sx or joint sx.  Started 6/2017. Through 6/14/2022.    She is status post gastric sleeve surgery January 29, 2017.  She has a history of B12 deficiency and has been on B12 sublingually in the past, but has not taken it," in a long time.  B 12 MWF now.     B12 is low normal at 290 and  ferritin is normal at 93.  Vitamin D is low at 24 and bone density test shows osteopenia.  On B 12 subl daily, the level is up to 852, she will continue B 12 subl TIW.    Vitamin D is better at 36, on supplements. 6000 unit per day.      She is to follow-up with her primary care for recommendations regarding vitamin-D supplementation and osteopenia/osteoporosis management and prevention while on Arimidex.  Bone Density test 5/14/20 showed osteopenia.    Estimated breast cancer recurrence was reduced from 23% to 12% with adjuvant Arimidex or tamoxifen usage using the breast cancer index reference.    She " complains of pain at her left 2nd distal joint finger area and saw Dr. Schultz  for evaluation.  Was told she had degenerative arthritis at L>R 2nd finger.    She is status post partial hysterectomy.  She took birth control pills until about age 26.  She had hysterectomy at age 30.  She did not take hormone replacement therapy.    She wears a calcium and vitamin-D and multivitamin patch x2 years.  Hx anaphylaxis to NSAIDS.    For her history includes generalized anxiety disorder, type 2 diabetes, low vitamin-D levels, GERD, fatty liver, dyslipidemia, depression, hypertension, plantar fasciitis of the right foot.    She is status post breast reduction, bilateral mastectomy, breast implant placement, with reconstruction.  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  Other history includes the gastric sleeve surgery, partial hysterectomy, right rotator cuff repair, tonsillectomy, and cholecystectomy.    Her mother had hypertension, heart disease and history of heart attack.  Her father had heart disease and psoriasis.    She was in the [employer] times 26 years till .  She smoked times 19 years, 1 pack per day, and quit in .  She does not drink alcohol with with regularity.  She is allergic to aspirin and nonsteroidal anti inflammatory drugs as manifested with anaphylaxis.    Her mother  in 2017 of old age.  She had a lumpectomy done but it is not clear if she had breast cancer.  Her father had heart disease.  She has a brother alive and well and a cousin with prostate cancer.  She has 2 sons alive and well.    , Antony Hickey, IT, [facility].    Our conversation revealed today that her family origin is from [place], specifically from [place].  She is 100% Askinazi Jehovah's witness.    BRCA 1 & 2 returned NEGATIVE.  She does not have the breast cancer gene.    Breast Cancer Index testing supports RR 5-6% after 5 years of hormonal  Rx.  Extended hormonal Rx not recommended in trying to reduce RR further.  Stop hormonal Rx at 5 years,  2022.    She has 2 cousins with history of prostate cancer.    PET 2022 DOV.      ROS:   GEN: normal without any fever, night sweats or weight loss  HEENT: normal with no HA's, sore throat, stiff neck, changes in vision  CV: normal with no CP, SOB, PND, FLORES or orthopnea  PULM: normal with no SOB, cough, hemoptysis, sputum or pleuritic pain  GI:  See history of present illness  : normal with no hematuria, dysuria  BREAST:  See history of present illness  SKIN: normal with no rash, erythema, bruising, or swelling     Past Medical History:   Diagnosis Date    Anxiety     Arthritis     Cancer     breast cancer - left    Depression     Diabetes mellitus, type 2     Borderline    Dyslipidemia 2016    Fatty liver disease, nonalcoholic     GERD (gastroesophageal reflux disease)     Hyperlipidemia     Hypertension     Plantar fasciitis of right foot      Past Surgical History:   Procedure Laterality Date    ADENOIDECTOMY  1970    APPENDECTOMY  2007    breast reduction      BREAST SURGERY Bilateral     masectomy    BREAST SURGERY Bilateral     implants     SECTION      x 2    CHOLECYSTECTOMY      COLONOSCOPY N/A 2020    Procedure: COLONOSCOPY;  Surgeon: Nupur Leonard MD;  Location: [facility] ENDO;  Service: Endoscopy;  Laterality: N/A;    COSMETIC SURGERY  May 4, 2017    Reconstruction due to mastectomy    EYE SURGERY      Lasik    FOOT SURGERY      left bunionectomy    gastric sleve      HYSTERECTOMY      one ovary intact, adhesions    JOINT REPLACEMENT  2017    KNEE ARTHROPLASTY Left 2018    Procedure: ARTHROPLASTY, KNEE;  Surgeon: Christos Montanez MD;  Location: [facility] OR;  Service: Orthopedics;  Laterality: Left;    KNEE ARTHROSCOPY Left     LIPOSUCTION      ROTATOR CUFF REPAIR Right     TONSILLECTOMY      TUBAL LIGATION  1977       Review of patient's allergies  "indicates:   Allergen Reactions    Nsaids (non-steroidal anti-inflammatory drug) Anaphylaxis           Current Outpatient Medications:     ergocalciferol, vitamin D2, (VITAMIN D ORAL), Take 6,000 mg by mouth once daily., Disp: , Rfl:     losartan (COZAAR) 50 MG tablet, Take 1 tablet (50 mg total) by mouth once daily., Disp: 90 tablet, Rfl: 3    VITAMIN B COMPLEX ORAL, Take by mouth., Disp: , Rfl:     omeprazole-sodium bicarbonate (ZEGERID) 40-1.1 mg-gram per capsule, Take 1 capsule by mouth before breakfast., Disp: 90 capsule, Rfl: 3          Objective:   Vitals:  Blood pressure (!) 178/92, pulse 70, temperature 97.4 °F (36.3 °C), resp. rate 18, height 5' 4" (1.626 m), weight 76.3 kg (168 lb 3.2 oz).    Physical Examination:   GEN: no apparent distress, comfortable  HEAD: atraumatic and normocephalic  EYES: no pallor, no icterus  ENT: no pharyngeal erythema, external ears WNL; no nasal discharge  NECK: no masses, thyroid normal, trachea midline, no LAD/LN's, supple  CV: RRR with no murmur; normal pulse; normal S1 and S2; no pedal edema  CHEST: Normal respiratory effort; CTAB; normal breath sounds; no wheeze or crackles  ABDOM: nontender and nondistended; soft; normal bowel sounds; no rebound/guarding, liver and spleen were not palpable  MUSC/Skeletal: ROM normal; no crepitus; joints normal; no deformities or arthropathy  EXTREM: no clubbing, cyanosis, inflammation or swelling  SKIN: no rashes, lesions, ulcers, petechiae or subcutaneous nodules  : no cvat  NEURO: grossly intact; motor/sensory WNL;  no tremors  PSYCH: normal mood, affect and behavior  LYMPH: normal cervical, supraclavicular, axillary and groin LN's  BREASTS:  She has extensive postoperative change at the chest wall anteriorly, she does not have palpable mass at the left or right breast, chest area is nontender      Labs:   Lab Results   Component Value Date    WBC 4.31 04/12/2023    HGB 13.3 04/12/2023    HCT 41.5 04/12/2023    MCV 90 04/12/2023 "     04/12/2023    CMP  Sodium   Date Value Ref Range Status   04/12/2023 145 136 - 145 mmol/L Final     Potassium   Date Value Ref Range Status   04/12/2023 3.9 3.5 - 5.1 mmol/L Final     Chloride   Date Value Ref Range Status   04/12/2023 109 95 - 110 mmol/L Final     CO2   Date Value Ref Range Status   04/12/2023 26 23 - 29 mmol/L Final     Glucose   Date Value Ref Range Status   04/12/2023 91 70 - 110 mg/dL Final     BUN   Date Value Ref Range Status   04/12/2023 18 8 - 23 mg/dL Final     Creatinine   Date Value Ref Range Status   04/12/2023 0.8 0.5 - 1.4 mg/dL Final     Calcium   Date Value Ref Range Status   04/12/2023 9.8 8.7 - 10.5 mg/dL Final     Total Protein   Date Value Ref Range Status   04/12/2023 6.8 6.0 - 8.4 g/dL Final     Albumin   Date Value Ref Range Status   04/12/2023 3.7 3.5 - 5.2 g/dL Final     Total Bilirubin   Date Value Ref Range Status   04/12/2023 0.3 0.1 - 1.0 mg/dL Final     Comment:     For infants and newborns, interpretation of results should be based  on gestational age, weight and in agreement with clinical  observations.    Premature Infant recommended reference ranges:  Up to 24 hours.............<8.0 mg/dL  Up to 48 hours............<12.0 mg/dL  3-5 days..................<15.0 mg/dL  6-29 days.................<15.0 mg/dL       Alkaline Phosphatase   Date Value Ref Range Status   04/12/2023 88 55 - 135 U/L Final     AST   Date Value Ref Range Status   04/12/2023 15 10 - 40 U/L Final     ALT   Date Value Ref Range Status   04/12/2023 16 10 - 44 U/L Final     Anion Gap   Date Value Ref Range Status   04/12/2023 10 8 - 16 mmol/L Final     eGFR if    Date Value Ref Range Status   05/17/2022 >60.0 >60 mL/min/1.73 m^2 Final     eGFR if non    Date Value Ref Range Status   05/17/2022 >60.0 >60 mL/min/1.73 m^2 Final     Comment:     Calculation used to obtain the estimated glomerular filtration  rate (eGFR) is the CKD-EPI equation.        Folic acid  4.7, increase po vegetable intake, she does not agree to po vitamin, nauseates her.  Hgb 13.3, cholesterol 247.    Assessment:   (1) 73 y.o. female with diagnosis of multifocal left breast cancer, clinical stage I disease, ERP positive,   HER2 Alec negative.    (2) currently on adjuvant Arimidex 1 mg p.o. daily x's  5 years.  Stop Arimidex now 6/14/22.    Bone density testing shows osteopenia.  She is to follow-up with her primary care for osteopenia/osteoporosis management while on Arimidex.    She is status post gastric sleeve surgery and has history of B12 deficiency in the past.  Her B12 level returned low normal at 290.  On subl B 12 the level is better at 1,600.    Vitamin D is low at 24.  On calcium and Vitamin D, the level is better at 36.    BRCA 1 and BRCA 2 testing has returned Negative.    Breast Cancer Index supports 5-6% recurrence at years 5-10, after 5 years of adjuvant hormonal Rx.  Extended hormonal therapy greater than 5 years is not felt to be beneficial here in reducing RR further.    PET 1/2022 DOV.  Observe for now.  RTC 6 months with CBC, B 12, folate

## 2023-06-23 ENCOUNTER — PATIENT MESSAGE (OUTPATIENT)
Dept: FAMILY MEDICINE | Facility: CLINIC | Age: 73
End: 2023-06-23
Payer: MEDICARE

## 2023-06-26 ENCOUNTER — PATIENT MESSAGE (OUTPATIENT)
Dept: FAMILY MEDICINE | Facility: CLINIC | Age: 73
End: 2023-06-26
Payer: MEDICARE

## 2023-06-26 VITALS — SYSTOLIC BLOOD PRESSURE: 137 MMHG | DIASTOLIC BLOOD PRESSURE: 70 MMHG

## 2023-06-26 DIAGNOSIS — I10 ESSENTIAL HYPERTENSION: ICD-10-CM

## 2023-06-26 RX ORDER — LOSARTAN POTASSIUM 100 MG/1
100 TABLET ORAL DAILY
Qty: 90 TABLET | Refills: 3 | Status: SHIPPED | OUTPATIENT
Start: 2023-06-26 | End: 2024-06-25

## 2023-06-26 NOTE — TELEPHONE ENCOUNTER
Hypertension Medications               losartan (COZAAR) 50 MG tablet Take 1 tablet (50 mg total) by mouth once daily.          BP Readings from Last 6 Encounters:   06/26/23 137/70   06/13/23 (!) 178/92   04/21/23 (!) 146/86   04/11/23 (!) 152/84   12/13/22 (!) 160/94   07/19/22 127/81     Increase losartan for mostly bps in 140s-150s.  Some 130s.

## 2023-07-07 ENCOUNTER — PATIENT MESSAGE (OUTPATIENT)
Dept: FAMILY MEDICINE | Facility: CLINIC | Age: 73
End: 2023-07-07
Payer: MEDICARE

## 2023-07-07 VITALS — SYSTOLIC BLOOD PRESSURE: 136 MMHG | DIASTOLIC BLOOD PRESSURE: 78 MMHG

## 2023-07-31 ENCOUNTER — PATIENT MESSAGE (OUTPATIENT)
Dept: FAMILY MEDICINE | Facility: CLINIC | Age: 73
End: 2023-07-31
Payer: MEDICARE

## 2023-07-31 VITALS — SYSTOLIC BLOOD PRESSURE: 131 MMHG | DIASTOLIC BLOOD PRESSURE: 71 MMHG

## 2023-09-21 DIAGNOSIS — Z78.0 MENOPAUSE: ICD-10-CM

## 2023-09-28 ENCOUNTER — TELEPHONE (OUTPATIENT)
Dept: FAMILY MEDICINE | Facility: CLINIC | Age: 73
End: 2023-09-28
Payer: MEDICARE

## 2023-09-28 ENCOUNTER — PATIENT MESSAGE (OUTPATIENT)
Dept: FAMILY MEDICINE | Facility: CLINIC | Age: 73
End: 2023-09-28
Payer: MEDICARE

## 2023-09-30 ENCOUNTER — HOSPITAL ENCOUNTER (OUTPATIENT)
Dept: RADIOLOGY | Facility: HOSPITAL | Age: 73
Discharge: HOME | End: 2023-09-30
Attending: STUDENT IN AN ORGANIZED HEALTH CARE EDUCATION/TRAINING PROGRAM
Payer: MEDICARE

## 2023-09-30 DIAGNOSIS — Z78.0 MENOPAUSE: ICD-10-CM

## 2023-09-30 PROCEDURE — 77080 DXA BONE DENSITY AXIAL: CPT | Mod: TC

## 2023-09-30 PROCEDURE — 77080 DXA BONE DENSITY AXIAL: CPT | Mod: 26,,, | Performed by: RADIOLOGY

## 2023-09-30 PROCEDURE — 77080 DXA BONE DENSITY AXIAL SKELETON 1 OR MORE SITES: ICD-10-PCS | Mod: 26,,, | Performed by: RADIOLOGY

## 2023-10-03 ENCOUNTER — OFFICE VISIT (OUTPATIENT)
Dept: FAMILY MEDICINE | Facility: CLINIC | Age: 73
End: 2023-10-03
Payer: MEDICARE

## 2023-10-03 VITALS
RESPIRATION RATE: 16 BRPM | WEIGHT: 168.81 LBS | DIASTOLIC BLOOD PRESSURE: 100 MMHG | OXYGEN SATURATION: 97 % | HEART RATE: 73 BPM | BODY MASS INDEX: 28.82 KG/M2 | HEIGHT: 64 IN | SYSTOLIC BLOOD PRESSURE: 150 MMHG

## 2023-10-03 DIAGNOSIS — E78.5 HYPERLIPIDEMIA, UNSPECIFIED HYPERLIPIDEMIA TYPE: ICD-10-CM

## 2023-10-03 DIAGNOSIS — H53.9 VISION CHANGES: ICD-10-CM

## 2023-10-03 DIAGNOSIS — M85.89 OSTEOPENIA OF MULTIPLE SITES: ICD-10-CM

## 2023-10-03 DIAGNOSIS — I10 ESSENTIAL HYPERTENSION: ICD-10-CM

## 2023-10-03 DIAGNOSIS — R51.9 MORNING HEADACHE: Primary | ICD-10-CM

## 2023-10-03 PROCEDURE — 3044F PR MOST RECENT HEMOGLOBIN A1C LEVEL <7.0%: ICD-10-PCS | Mod: CPTII,S$GLB,, | Performed by: STUDENT IN AN ORGANIZED HEALTH CARE EDUCATION/TRAINING PROGRAM

## 2023-10-03 PROCEDURE — 3077F PR MOST RECENT SYSTOLIC BLOOD PRESSURE >= 140 MM HG: ICD-10-PCS | Mod: CPTII,S$GLB,, | Performed by: STUDENT IN AN ORGANIZED HEALTH CARE EDUCATION/TRAINING PROGRAM

## 2023-10-03 PROCEDURE — 1101F PR PT FALLS ASSESS DOC 0-1 FALLS W/OUT INJ PAST YR: ICD-10-PCS | Mod: CPTII,S$GLB,, | Performed by: STUDENT IN AN ORGANIZED HEALTH CARE EDUCATION/TRAINING PROGRAM

## 2023-10-03 PROCEDURE — 3044F HG A1C LEVEL LT 7.0%: CPT | Mod: CPTII,S$GLB,, | Performed by: STUDENT IN AN ORGANIZED HEALTH CARE EDUCATION/TRAINING PROGRAM

## 2023-10-03 PROCEDURE — 1126F AMNT PAIN NOTED NONE PRSNT: CPT | Mod: CPTII,S$GLB,, | Performed by: STUDENT IN AN ORGANIZED HEALTH CARE EDUCATION/TRAINING PROGRAM

## 2023-10-03 PROCEDURE — 99999 PR PBB SHADOW E&M-EST. PATIENT-LVL IV: CPT | Mod: PBBFAC,,, | Performed by: STUDENT IN AN ORGANIZED HEALTH CARE EDUCATION/TRAINING PROGRAM

## 2023-10-03 PROCEDURE — 4010F PR ACE/ARB THEARPY RXD/TAKEN: ICD-10-PCS | Mod: CPTII,S$GLB,, | Performed by: STUDENT IN AN ORGANIZED HEALTH CARE EDUCATION/TRAINING PROGRAM

## 2023-10-03 PROCEDURE — 3008F BODY MASS INDEX DOCD: CPT | Mod: CPTII,S$GLB,, | Performed by: STUDENT IN AN ORGANIZED HEALTH CARE EDUCATION/TRAINING PROGRAM

## 2023-10-03 PROCEDURE — 4010F ACE/ARB THERAPY RXD/TAKEN: CPT | Mod: CPTII,S$GLB,, | Performed by: STUDENT IN AN ORGANIZED HEALTH CARE EDUCATION/TRAINING PROGRAM

## 2023-10-03 PROCEDURE — 99999 PR PBB SHADOW E&M-EST. PATIENT-LVL IV: ICD-10-PCS | Mod: PBBFAC,,, | Performed by: STUDENT IN AN ORGANIZED HEALTH CARE EDUCATION/TRAINING PROGRAM

## 2023-10-03 PROCEDURE — 1101F PT FALLS ASSESS-DOCD LE1/YR: CPT | Mod: CPTII,S$GLB,, | Performed by: STUDENT IN AN ORGANIZED HEALTH CARE EDUCATION/TRAINING PROGRAM

## 2023-10-03 PROCEDURE — 3008F PR BODY MASS INDEX (BMI) DOCUMENTED: ICD-10-PCS | Mod: CPTII,S$GLB,, | Performed by: STUDENT IN AN ORGANIZED HEALTH CARE EDUCATION/TRAINING PROGRAM

## 2023-10-03 PROCEDURE — 3080F DIAST BP >= 90 MM HG: CPT | Mod: CPTII,S$GLB,, | Performed by: STUDENT IN AN ORGANIZED HEALTH CARE EDUCATION/TRAINING PROGRAM

## 2023-10-03 PROCEDURE — 99214 PR OFFICE/OUTPT VISIT, EST, LEVL IV, 30-39 MIN: ICD-10-PCS | Mod: S$GLB,,, | Performed by: STUDENT IN AN ORGANIZED HEALTH CARE EDUCATION/TRAINING PROGRAM

## 2023-10-03 PROCEDURE — 3080F PR MOST RECENT DIASTOLIC BLOOD PRESSURE >= 90 MM HG: ICD-10-PCS | Mod: CPTII,S$GLB,, | Performed by: STUDENT IN AN ORGANIZED HEALTH CARE EDUCATION/TRAINING PROGRAM

## 2023-10-03 PROCEDURE — 1126F PR PAIN SEVERITY QUANTIFIED, NO PAIN PRESENT: ICD-10-PCS | Mod: CPTII,S$GLB,, | Performed by: STUDENT IN AN ORGANIZED HEALTH CARE EDUCATION/TRAINING PROGRAM

## 2023-10-03 PROCEDURE — 3077F SYST BP >= 140 MM HG: CPT | Mod: CPTII,S$GLB,, | Performed by: STUDENT IN AN ORGANIZED HEALTH CARE EDUCATION/TRAINING PROGRAM

## 2023-10-03 PROCEDURE — 1159F PR MEDICATION LIST DOCUMENTED IN MEDICAL RECORD: ICD-10-PCS | Mod: CPTII,S$GLB,, | Performed by: STUDENT IN AN ORGANIZED HEALTH CARE EDUCATION/TRAINING PROGRAM

## 2023-10-03 PROCEDURE — 3288F PR FALLS RISK ASSESSMENT DOCUMENTED: ICD-10-PCS | Mod: CPTII,S$GLB,, | Performed by: STUDENT IN AN ORGANIZED HEALTH CARE EDUCATION/TRAINING PROGRAM

## 2023-10-03 PROCEDURE — 3288F FALL RISK ASSESSMENT DOCD: CPT | Mod: CPTII,S$GLB,, | Performed by: STUDENT IN AN ORGANIZED HEALTH CARE EDUCATION/TRAINING PROGRAM

## 2023-10-03 PROCEDURE — 99214 OFFICE O/P EST MOD 30 MIN: CPT | Mod: S$GLB,,, | Performed by: STUDENT IN AN ORGANIZED HEALTH CARE EDUCATION/TRAINING PROGRAM

## 2023-10-03 PROCEDURE — 1159F MED LIST DOCD IN RCRD: CPT | Mod: CPTII,S$GLB,, | Performed by: STUDENT IN AN ORGANIZED HEALTH CARE EDUCATION/TRAINING PROGRAM

## 2023-10-03 RX ORDER — AMLODIPINE BESYLATE 5 MG/1
5 TABLET ORAL DAILY
Qty: 30 TABLET | Refills: 11 | Status: SHIPPED | OUTPATIENT
Start: 2023-10-03 | End: 2023-11-17 | Stop reason: SDUPTHER

## 2023-10-03 NOTE — PROGRESS NOTES
Subjective:       Patient ID: Marce Hickey is a 73 y.o. female.    Chief Complaint: Follow-up (Hypertension/Took pt's bp with her auto bp machine 158/95/Manual bp was 150/100/)    Blood pressure has been elevated the last few weeks  She is having headache when waking up  She is able to do all of her activities and is going to the gym        Review of Systems   Constitutional:  Negative for activity change and appetite change.   Eyes:  Positive for visual disturbance.   Respiratory:  Negative for shortness of breath.    Cardiovascular:  Negative for chest pain.   Gastrointestinal:  Negative for anal bleeding.   Genitourinary:  Negative for dysuria.   Integumentary:  Negative for rash.   Neurological:  Positive for headaches.   Psychiatric/Behavioral:  Negative for sleep disturbance.          Objective:      Physical Exam  Constitutional:       General: She is not in acute distress.     Appearance: Normal appearance. She is not ill-appearing.   Eyes:      Conjunctiva/sclera: Conjunctivae normal.   Cardiovascular:      Rate and Rhythm: Normal rate and regular rhythm.      Heart sounds: Normal heart sounds. No murmur heard.  Pulmonary:      Effort: Pulmonary effort is normal. No respiratory distress.      Breath sounds: Normal breath sounds.   Musculoskeletal:      Right lower leg: No edema.      Left lower leg: No edema.   Skin:     General: Skin is warm and dry.   Neurological:      Mental Status: She is alert. Mental status is at baseline.      Gait: Gait normal.   Psychiatric:         Mood and Affect: Mood normal.         Behavior: Behavior normal.         Thought Content: Thought content normal.         Judgment: Judgment normal.         Assessment:       1. Morning headache    2. Vision changes    3. Hyperlipidemia, unspecified hyperlipidemia type    4. Essential hypertension    5. Osteopenia of multiple sites        Plan:       Problem List Items Addressed This Visit          Cardiac/Vascular     Hyperlipidemia    Relevant Orders    Lipid Panel    Essential hypertension     Add amlodipine to her losartan and repeat in 2 weeks         Relevant Medications    amLODIPine (NORVASC) 5 MG tablet       Orthopedic    Osteopenia of multiple sites     She is doing weight bearing   Start ca/d  Consider medications in two years.          Other Visit Diagnoses       Morning headache    -  Primary    Relevant Orders    CT Head Without Contrast    Vision changes        Relevant Orders    CT Head Without Contrast

## 2023-10-03 NOTE — PATIENT INSTRUCTIONS

## 2023-10-04 ENCOUNTER — PATIENT MESSAGE (OUTPATIENT)
Dept: FAMILY MEDICINE | Facility: CLINIC | Age: 73
End: 2023-10-04
Payer: MEDICARE

## 2023-10-04 ENCOUNTER — HOSPITAL ENCOUNTER (OUTPATIENT)
Dept: RADIOLOGY | Facility: HOSPITAL | Age: 73
Discharge: HOME | End: 2023-10-04
Attending: STUDENT IN AN ORGANIZED HEALTH CARE EDUCATION/TRAINING PROGRAM
Payer: MEDICARE

## 2023-10-04 DIAGNOSIS — H53.9 VISION CHANGES: ICD-10-CM

## 2023-10-04 DIAGNOSIS — R51.9 MORNING HEADACHE: ICD-10-CM

## 2023-10-04 PROCEDURE — 70450 CT HEAD/BRAIN W/O DYE: CPT | Mod: 26,,, | Performed by: RADIOLOGY

## 2023-10-04 PROCEDURE — 70450 CT HEAD/BRAIN W/O DYE: CPT | Mod: TC

## 2023-10-04 PROCEDURE — 70450 CT HEAD WITHOUT CONTRAST: ICD-10-PCS | Mod: 26,,, | Performed by: RADIOLOGY

## 2023-10-11 ENCOUNTER — PATIENT MESSAGE (OUTPATIENT)
Dept: FAMILY MEDICINE | Facility: CLINIC | Age: 73
End: 2023-10-11
Payer: MEDICARE

## 2023-10-11 ENCOUNTER — LAB VISIT (OUTPATIENT)
Dept: LAB | Facility: HOSPITAL | Age: 73
End: 2023-10-11
Attending: STUDENT IN AN ORGANIZED HEALTH CARE EDUCATION/TRAINING PROGRAM
Payer: MEDICARE

## 2023-10-11 DIAGNOSIS — E78.5 HYPERLIPIDEMIA, UNSPECIFIED HYPERLIPIDEMIA TYPE: ICD-10-CM

## 2023-10-11 LAB
CHOLEST SERPL-MCNC: 277 MG/DL (ref 120–199)
CHOLEST/HDLC SERPL: 4.6 {RATIO} (ref 2–5)
HDLC SERPL-MCNC: 60 MG/DL (ref 40–75)
HDLC SERPL: 21.7 % (ref 20–50)
LDLC SERPL CALC-MCNC: 177.4 MG/DL (ref 63–159)
NONHDLC SERPL-MCNC: 217 MG/DL
TRIGL SERPL-MCNC: 198 MG/DL (ref 30–150)

## 2023-10-11 PROCEDURE — 36415 COLL VENOUS BLD VENIPUNCTURE: CPT | Performed by: STUDENT IN AN ORGANIZED HEALTH CARE EDUCATION/TRAINING PROGRAM

## 2023-10-11 PROCEDURE — 80061 LIPID PANEL: CPT | Performed by: STUDENT IN AN ORGANIZED HEALTH CARE EDUCATION/TRAINING PROGRAM

## 2023-10-12 ENCOUNTER — TELEPHONE (OUTPATIENT)
Dept: FAMILY MEDICINE | Facility: CLINIC | Age: 73
End: 2023-10-12
Payer: MEDICARE

## 2023-10-12 ENCOUNTER — PATIENT MESSAGE (OUTPATIENT)
Dept: FAMILY MEDICINE | Facility: CLINIC | Age: 73
End: 2023-10-12
Payer: MEDICARE

## 2023-10-12 DIAGNOSIS — E78.2 MIXED HYPERLIPIDEMIA: Primary | ICD-10-CM

## 2023-10-12 RX ORDER — ATORVASTATIN CALCIUM 10 MG/1
10 TABLET, FILM COATED ORAL DAILY
Qty: 90 TABLET | Refills: 3 | Status: SHIPPED | OUTPATIENT
Start: 2023-10-12 | End: 2023-10-12 | Stop reason: SDUPTHER

## 2023-10-12 RX ORDER — ATORVASTATIN CALCIUM 20 MG/1
20 TABLET, FILM COATED ORAL DAILY
Qty: 90 TABLET | Refills: 3 | Status: SHIPPED | OUTPATIENT
Start: 2023-10-12 | End: 2023-11-17 | Stop reason: SDUPTHER

## 2023-10-12 NOTE — TELEPHONE ENCOUNTER
----- Message from Emi Murcia sent at 10/12/2023  8:14 AM CDT -----  Regarding: advice  Contact: patient  Type: Needs Medical Advice  Who Called:  patient  Symptoms (please be specific):    How long has patient had these symptoms:    Pharmacy name and phone #:      [id number] - [place], [place] - [address]  [address] [place] [address]  Phone: [phone] Fax: [phone]  Best Call Back Number: [phone] (mobile)    Additional Information: Patient is calling regarding her results of high cholesterol. She would like to go ahead with a prescription and any thing else the doctor suggests. Please call patient to advise .Thanks!

## 2023-10-12 NOTE — TELEPHONE ENCOUNTER
Please place orders for cholesterol medication that was discussed when results were reviewed. Pt agrees if you still suggest this

## 2023-10-13 ENCOUNTER — PATIENT MESSAGE (OUTPATIENT)
Dept: FAMILY MEDICINE | Facility: CLINIC | Age: 73
End: 2023-10-13
Payer: MEDICARE

## 2023-10-20 ENCOUNTER — OFFICE VISIT (OUTPATIENT)
Dept: FAMILY MEDICINE | Facility: CLINIC | Age: 73
End: 2023-10-20
Payer: MEDICARE

## 2023-10-20 VITALS
DIASTOLIC BLOOD PRESSURE: 72 MMHG | OXYGEN SATURATION: 98 % | RESPIRATION RATE: 16 BRPM | HEIGHT: 64 IN | BODY MASS INDEX: 28.31 KG/M2 | WEIGHT: 165.81 LBS | HEART RATE: 57 BPM | SYSTOLIC BLOOD PRESSURE: 120 MMHG

## 2023-10-20 DIAGNOSIS — I10 ESSENTIAL HYPERTENSION: ICD-10-CM

## 2023-10-20 DIAGNOSIS — E78.5 HYPERLIPIDEMIA, UNSPECIFIED HYPERLIPIDEMIA TYPE: ICD-10-CM

## 2023-10-20 PROCEDURE — 3078F DIAST BP <80 MM HG: CPT | Mod: CPTII,S$GLB,, | Performed by: STUDENT IN AN ORGANIZED HEALTH CARE EDUCATION/TRAINING PROGRAM

## 2023-10-20 PROCEDURE — 1159F PR MEDICATION LIST DOCUMENTED IN MEDICAL RECORD: ICD-10-PCS | Mod: CPTII,S$GLB,, | Performed by: STUDENT IN AN ORGANIZED HEALTH CARE EDUCATION/TRAINING PROGRAM

## 2023-10-20 PROCEDURE — 3008F BODY MASS INDEX DOCD: CPT | Mod: CPTII,S$GLB,, | Performed by: STUDENT IN AN ORGANIZED HEALTH CARE EDUCATION/TRAINING PROGRAM

## 2023-10-20 PROCEDURE — 3288F FALL RISK ASSESSMENT DOCD: CPT | Mod: CPTII,S$GLB,, | Performed by: STUDENT IN AN ORGANIZED HEALTH CARE EDUCATION/TRAINING PROGRAM

## 2023-10-20 PROCEDURE — 3074F PR MOST RECENT SYSTOLIC BLOOD PRESSURE < 130 MM HG: ICD-10-PCS | Mod: CPTII,S$GLB,, | Performed by: STUDENT IN AN ORGANIZED HEALTH CARE EDUCATION/TRAINING PROGRAM

## 2023-10-20 PROCEDURE — 99214 PR OFFICE/OUTPT VISIT, EST, LEVL IV, 30-39 MIN: ICD-10-PCS | Mod: S$GLB,,, | Performed by: STUDENT IN AN ORGANIZED HEALTH CARE EDUCATION/TRAINING PROGRAM

## 2023-10-20 PROCEDURE — 3008F PR BODY MASS INDEX (BMI) DOCUMENTED: ICD-10-PCS | Mod: CPTII,S$GLB,, | Performed by: STUDENT IN AN ORGANIZED HEALTH CARE EDUCATION/TRAINING PROGRAM

## 2023-10-20 PROCEDURE — 99214 OFFICE O/P EST MOD 30 MIN: CPT | Mod: S$GLB,,, | Performed by: STUDENT IN AN ORGANIZED HEALTH CARE EDUCATION/TRAINING PROGRAM

## 2023-10-20 PROCEDURE — 4010F ACE/ARB THERAPY RXD/TAKEN: CPT | Mod: CPTII,S$GLB,, | Performed by: STUDENT IN AN ORGANIZED HEALTH CARE EDUCATION/TRAINING PROGRAM

## 2023-10-20 PROCEDURE — 1101F PR PT FALLS ASSESS DOC 0-1 FALLS W/OUT INJ PAST YR: ICD-10-PCS | Mod: CPTII,S$GLB,, | Performed by: STUDENT IN AN ORGANIZED HEALTH CARE EDUCATION/TRAINING PROGRAM

## 2023-10-20 PROCEDURE — 4010F PR ACE/ARB THEARPY RXD/TAKEN: ICD-10-PCS | Mod: CPTII,S$GLB,, | Performed by: STUDENT IN AN ORGANIZED HEALTH CARE EDUCATION/TRAINING PROGRAM

## 2023-10-20 PROCEDURE — 1126F PR PAIN SEVERITY QUANTIFIED, NO PAIN PRESENT: ICD-10-PCS | Mod: CPTII,S$GLB,, | Performed by: STUDENT IN AN ORGANIZED HEALTH CARE EDUCATION/TRAINING PROGRAM

## 2023-10-20 PROCEDURE — 3074F SYST BP LT 130 MM HG: CPT | Mod: CPTII,S$GLB,, | Performed by: STUDENT IN AN ORGANIZED HEALTH CARE EDUCATION/TRAINING PROGRAM

## 2023-10-20 PROCEDURE — 3078F PR MOST RECENT DIASTOLIC BLOOD PRESSURE < 80 MM HG: ICD-10-PCS | Mod: CPTII,S$GLB,, | Performed by: STUDENT IN AN ORGANIZED HEALTH CARE EDUCATION/TRAINING PROGRAM

## 2023-10-20 PROCEDURE — 99999 PR PBB SHADOW E&M-EST. PATIENT-LVL III: ICD-10-PCS | Mod: PBBFAC,,, | Performed by: STUDENT IN AN ORGANIZED HEALTH CARE EDUCATION/TRAINING PROGRAM

## 2023-10-20 PROCEDURE — 1101F PT FALLS ASSESS-DOCD LE1/YR: CPT | Mod: CPTII,S$GLB,, | Performed by: STUDENT IN AN ORGANIZED HEALTH CARE EDUCATION/TRAINING PROGRAM

## 2023-10-20 PROCEDURE — 3288F PR FALLS RISK ASSESSMENT DOCUMENTED: ICD-10-PCS | Mod: CPTII,S$GLB,, | Performed by: STUDENT IN AN ORGANIZED HEALTH CARE EDUCATION/TRAINING PROGRAM

## 2023-10-20 PROCEDURE — 3044F HG A1C LEVEL LT 7.0%: CPT | Mod: CPTII,S$GLB,, | Performed by: STUDENT IN AN ORGANIZED HEALTH CARE EDUCATION/TRAINING PROGRAM

## 2023-10-20 PROCEDURE — 3044F PR MOST RECENT HEMOGLOBIN A1C LEVEL <7.0%: ICD-10-PCS | Mod: CPTII,S$GLB,, | Performed by: STUDENT IN AN ORGANIZED HEALTH CARE EDUCATION/TRAINING PROGRAM

## 2023-10-20 PROCEDURE — 1126F AMNT PAIN NOTED NONE PRSNT: CPT | Mod: CPTII,S$GLB,, | Performed by: STUDENT IN AN ORGANIZED HEALTH CARE EDUCATION/TRAINING PROGRAM

## 2023-10-20 PROCEDURE — 1159F MED LIST DOCD IN RCRD: CPT | Mod: CPTII,S$GLB,, | Performed by: STUDENT IN AN ORGANIZED HEALTH CARE EDUCATION/TRAINING PROGRAM

## 2023-10-20 PROCEDURE — 99999 PR PBB SHADOW E&M-EST. PATIENT-LVL III: CPT | Mod: PBBFAC,,, | Performed by: STUDENT IN AN ORGANIZED HEALTH CARE EDUCATION/TRAINING PROGRAM

## 2023-10-20 NOTE — ASSESSMENT & PLAN NOTE
Stable. Continue current medications and regular followup.  Hypertension Medications             amLODIPine (NORVASC) 5 MG tablet Take 1 tablet (5 mg total) by mouth once daily.    losartan (COZAAR) 100 MG tablet Take 1 tablet (100 mg total) by mouth once daily.        BP Readings from Last 3 Encounters:   10/20/23 120/72   10/03/23 (!) 150/100   07/31/23 131/71

## 2023-10-20 NOTE — PROGRESS NOTES
Subjective:       Patient ID: Marce Hickey is a 73 y.o. female.    Chief Complaint: Follow-up    Essential hypertension: Blood pressures have been well controlled at home.  BP Readings from Last 3 Encounters:  10/20/23 : 120/72  10/03/23 : (!) 150/100  07/31/23 : 131/71     Hypertension Medications             amLODIPine (NORVASC) 5 MG tablet Take 1 tablet (5 mg total) by mouth once   daily.    losartan (COZAAR) 100 MG tablet Take 1 tablet (100 mg total) by mouth   once daily.       Patient denies headache, chest pain, palpitations, shortness of breath.       She is exercising and doing very well.      Review of Systems   Constitutional:  Negative for activity change and appetite change.   Respiratory:  Negative for shortness of breath.    Cardiovascular:  Negative for chest pain.   Gastrointestinal:  Negative for abdominal pain and anal bleeding.   Genitourinary:  Negative for dysuria.   Integumentary:  Negative for rash.   Psychiatric/Behavioral:  Negative for dysphoric mood and sleep disturbance. The patient is not nervous/anxious.          Objective:      Physical Exam  Constitutional:       General: She is not in acute distress.     Appearance: Normal appearance. She is not ill-appearing.   Eyes:      Conjunctiva/sclera: Conjunctivae normal.   Cardiovascular:      Rate and Rhythm: Normal rate and regular rhythm.      Heart sounds: Normal heart sounds. No murmur heard.  Pulmonary:      Effort: Pulmonary effort is normal. No respiratory distress.      Breath sounds: Normal breath sounds.   Musculoskeletal:      Right lower leg: No edema.      Left lower leg: No edema.   Skin:     General: Skin is warm and dry.   Neurological:      Mental Status: She is alert. Mental status is at baseline.      Gait: Gait normal.   Psychiatric:         Mood and Affect: Mood normal.         Behavior: Behavior normal.         Thought Content: Thought content normal.         Judgment: Judgment normal.         Assessment:        1. Essential hypertension    2. Hyperlipidemia, unspecified hyperlipidemia type        Plan:       Problem List Items Addressed This Visit          Cardiac/Vascular    Hyperlipidemia     Doing low fat diet and exercise and loosing weight         Essential hypertension     Stable. Continue current medications and regular followup.  Hypertension Medications               amLODIPine (NORVASC) 5 MG tablet Take 1 tablet (5 mg total) by mouth once daily.    losartan (COZAAR) 100 MG tablet Take 1 tablet (100 mg total) by mouth once daily.          BP Readings from Last 3 Encounters:   10/20/23 120/72   10/03/23 (!) 150/100   07/31/23 131/71

## 2023-11-13 ENCOUNTER — PATIENT MESSAGE (OUTPATIENT)
Dept: FAMILY MEDICINE | Facility: CLINIC | Age: 73
End: 2023-11-13
Payer: MEDICARE

## 2023-11-13 VITALS — SYSTOLIC BLOOD PRESSURE: 123 MMHG | DIASTOLIC BLOOD PRESSURE: 86 MMHG

## 2023-11-17 DIAGNOSIS — E78.2 MIXED HYPERLIPIDEMIA: ICD-10-CM

## 2023-11-17 DIAGNOSIS — I10 ESSENTIAL HYPERTENSION: ICD-10-CM

## 2023-11-17 NOTE — TELEPHONE ENCOUNTER
No care due was identified.  [facility] Embedded Care Due Messages. Reference number: [id number].   11/17/2023 3:20:16 PM CST

## 2023-11-18 RX ORDER — ATORVASTATIN CALCIUM 20 MG/1
20 TABLET, FILM COATED ORAL DAILY
Qty: 90 TABLET | Refills: 3 | Status: SHIPPED | OUTPATIENT
Start: 2023-11-18

## 2023-11-18 RX ORDER — AMLODIPINE BESYLATE 5 MG/1
5 TABLET ORAL DAILY
Qty: 90 TABLET | Refills: 3 | Status: SHIPPED | OUTPATIENT
Start: 2023-11-18

## 2023-11-18 NOTE — TELEPHONE ENCOUNTER
Refill Routing Note   Medication(s) are not appropriate for processing by [facility] for the following reason(s):      New or recently adjusted medication    [facility] action(s):  Defer Care Due:  None identified            Appointments  past 12m or future 3m with PCP    Date Provider   Last Visit   10/20/2023 Jade Brewster MD   Next Visit   5/3/2024 Jade Brewster MD   ED visits in past 90 days: 0        Note composed:9:46 AM 11/18/2023

## 2023-12-08 ENCOUNTER — TELEPHONE (OUTPATIENT)
Dept: HEMATOLOGY/ONCOLOGY | Facility: CLINIC | Age: 73
End: 2023-12-08

## 2023-12-11 ENCOUNTER — LAB VISIT (OUTPATIENT)
Dept: LAB | Facility: HOSPITAL | Age: 73
End: 2023-12-11
Attending: STUDENT IN AN ORGANIZED HEALTH CARE EDUCATION/TRAINING PROGRAM
Payer: MEDICARE

## 2023-12-11 DIAGNOSIS — D51.8 ANEMIA OF DECREASED VITAMIN B12 ABSORPTION: ICD-10-CM

## 2023-12-11 DIAGNOSIS — Z85.3 HISTORY OF BREAST CANCER: ICD-10-CM

## 2023-12-11 LAB
ALBUMIN SERPL BCP-MCNC: 3.8 G/DL (ref 3.5–5.2)
ALP SERPL-CCNC: 94 U/L (ref 55–135)
ALT SERPL W/O P-5'-P-CCNC: 18 U/L (ref 10–44)
ANION GAP SERPL CALC-SCNC: 7 MMOL/L (ref 8–16)
AST SERPL-CCNC: 14 U/L (ref 10–40)
BASOPHILS # BLD AUTO: 0.03 K/UL (ref 0–0.2)
BASOPHILS NFR BLD: 0.4 % (ref 0–1.9)
BILIRUB SERPL-MCNC: 0.5 MG/DL (ref 0.1–1)
BUN SERPL-MCNC: 23 MG/DL (ref 8–23)
CALCIUM SERPL-MCNC: 9.9 MG/DL (ref 8.7–10.5)
CHLORIDE SERPL-SCNC: 110 MMOL/L (ref 95–110)
CO2 SERPL-SCNC: 25 MMOL/L (ref 23–29)
CREAT SERPL-MCNC: 0.8 MG/DL (ref 0.5–1.4)
DIFFERENTIAL METHOD: NORMAL
EOSINOPHIL # BLD AUTO: 0.1 K/UL (ref 0–0.5)
EOSINOPHIL NFR BLD: 1.3 % (ref 0–8)
ERYTHROCYTE [DISTWIDTH] IN BLOOD BY AUTOMATED COUNT: 12.7 % (ref 11.5–14.5)
EST. GFR  (NO RACE VARIABLE): >60 ML/MIN/1.73 M^2
FOLATE SERPL-MCNC: 6.1 NG/ML (ref 4–24)
GLUCOSE SERPL-MCNC: 105 MG/DL (ref 70–110)
HCT VFR BLD AUTO: 41.4 % (ref 37–48.5)
HGB BLD-MCNC: 13.3 G/DL (ref 12–16)
IMM GRANULOCYTES # BLD AUTO: 0.03 K/UL (ref 0–0.04)
IMM GRANULOCYTES NFR BLD AUTO: 0.4 % (ref 0–0.5)
LYMPHOCYTES # BLD AUTO: 1.3 K/UL (ref 1–4.8)
LYMPHOCYTES NFR BLD: 18.5 % (ref 18–48)
MCH RBC QN AUTO: 29 PG (ref 27–31)
MCHC RBC AUTO-ENTMCNC: 32.1 G/DL (ref 32–36)
MCV RBC AUTO: 90 FL (ref 82–98)
MONOCYTES # BLD AUTO: 0.5 K/UL (ref 0.3–1)
MONOCYTES NFR BLD: 7.4 % (ref 4–15)
NEUTROPHILS # BLD AUTO: 5 K/UL (ref 1.8–7.7)
NEUTROPHILS NFR BLD: 72 % (ref 38–73)
NRBC BLD-RTO: 0 /100 WBC
PLATELET # BLD AUTO: 186 K/UL (ref 150–450)
PMV BLD AUTO: 11.5 FL (ref 9.2–12.9)
POTASSIUM SERPL-SCNC: 4.5 MMOL/L (ref 3.5–5.1)
PROT SERPL-MCNC: 6.8 G/DL (ref 6–8.4)
RBC # BLD AUTO: 4.58 M/UL (ref 4–5.4)
SODIUM SERPL-SCNC: 142 MMOL/L (ref 136–145)
VIT B12 SERPL-MCNC: 1263 PG/ML (ref 210–950)
WBC # BLD AUTO: 6.91 K/UL (ref 3.9–12.7)

## 2023-12-11 PROCEDURE — 82746 ASSAY OF FOLIC ACID SERUM: CPT | Performed by: INTERNAL MEDICINE

## 2023-12-11 PROCEDURE — 36415 COLL VENOUS BLD VENIPUNCTURE: CPT | Performed by: INTERNAL MEDICINE

## 2023-12-11 PROCEDURE — 86300 IMMUNOASSAY TUMOR CA 15-3: CPT | Performed by: INTERNAL MEDICINE

## 2023-12-11 PROCEDURE — 85025 COMPLETE CBC W/AUTO DIFF WBC: CPT | Performed by: INTERNAL MEDICINE

## 2023-12-11 PROCEDURE — 86300 IMMUNOASSAY TUMOR CA 15-3: CPT | Mod: 91 | Performed by: INTERNAL MEDICINE

## 2023-12-11 PROCEDURE — 82607 VITAMIN B-12: CPT | Performed by: INTERNAL MEDICINE

## 2023-12-11 PROCEDURE — 80053 COMPREHEN METABOLIC PANEL: CPT | Performed by: INTERNAL MEDICINE

## 2023-12-12 ENCOUNTER — OFFICE VISIT (OUTPATIENT)
Dept: HEMATOLOGY/ONCOLOGY | Facility: CLINIC | Age: 73
End: 2023-12-12
Payer: MEDICARE

## 2023-12-12 VITALS
TEMPERATURE: 98 F | BODY MASS INDEX: 29.42 KG/M2 | SYSTOLIC BLOOD PRESSURE: 141 MMHG | WEIGHT: 171.38 LBS | DIASTOLIC BLOOD PRESSURE: 91 MMHG | HEART RATE: 66 BPM | RESPIRATION RATE: 16 BRPM

## 2023-12-12 DIAGNOSIS — D51.8 DIETARY VITAMIN B12 DEFICIENCY ANEMIA: ICD-10-CM

## 2023-12-12 DIAGNOSIS — Z85.3 HISTORY OF BREAST CANCER: Primary | ICD-10-CM

## 2023-12-12 DIAGNOSIS — E53.8 FOLATE DEFICIENCY: ICD-10-CM

## 2023-12-12 PROCEDURE — 3288F PR FALLS RISK ASSESSMENT DOCUMENTED: ICD-10-PCS | Mod: CPTII,S$GLB,, | Performed by: INTERNAL MEDICINE

## 2023-12-12 PROCEDURE — 3080F DIAST BP >= 90 MM HG: CPT | Mod: CPTII,S$GLB,, | Performed by: INTERNAL MEDICINE

## 2023-12-12 PROCEDURE — 99214 OFFICE O/P EST MOD 30 MIN: CPT | Mod: S$GLB,,, | Performed by: INTERNAL MEDICINE

## 2023-12-12 PROCEDURE — 1159F MED LIST DOCD IN RCRD: CPT | Mod: CPTII,S$GLB,, | Performed by: INTERNAL MEDICINE

## 2023-12-12 PROCEDURE — 3008F BODY MASS INDEX DOCD: CPT | Mod: CPTII,S$GLB,, | Performed by: INTERNAL MEDICINE

## 2023-12-12 PROCEDURE — 1126F AMNT PAIN NOTED NONE PRSNT: CPT | Mod: CPTII,S$GLB,, | Performed by: INTERNAL MEDICINE

## 2023-12-12 PROCEDURE — 4010F ACE/ARB THERAPY RXD/TAKEN: CPT | Mod: CPTII,S$GLB,, | Performed by: INTERNAL MEDICINE

## 2023-12-12 PROCEDURE — 3077F PR MOST RECENT SYSTOLIC BLOOD PRESSURE >= 140 MM HG: ICD-10-PCS | Mod: CPTII,S$GLB,, | Performed by: INTERNAL MEDICINE

## 2023-12-12 PROCEDURE — 1126F PR PAIN SEVERITY QUANTIFIED, NO PAIN PRESENT: ICD-10-PCS | Mod: CPTII,S$GLB,, | Performed by: INTERNAL MEDICINE

## 2023-12-12 PROCEDURE — 3008F PR BODY MASS INDEX (BMI) DOCUMENTED: ICD-10-PCS | Mod: CPTII,S$GLB,, | Performed by: INTERNAL MEDICINE

## 2023-12-12 PROCEDURE — 3080F PR MOST RECENT DIASTOLIC BLOOD PRESSURE >= 90 MM HG: ICD-10-PCS | Mod: CPTII,S$GLB,, | Performed by: INTERNAL MEDICINE

## 2023-12-12 PROCEDURE — 3044F HG A1C LEVEL LT 7.0%: CPT | Mod: CPTII,S$GLB,, | Performed by: INTERNAL MEDICINE

## 2023-12-12 PROCEDURE — 1101F PR PT FALLS ASSESS DOC 0-1 FALLS W/OUT INJ PAST YR: ICD-10-PCS | Mod: CPTII,S$GLB,, | Performed by: INTERNAL MEDICINE

## 2023-12-12 PROCEDURE — 1101F PT FALLS ASSESS-DOCD LE1/YR: CPT | Mod: CPTII,S$GLB,, | Performed by: INTERNAL MEDICINE

## 2023-12-12 PROCEDURE — 1159F PR MEDICATION LIST DOCUMENTED IN MEDICAL RECORD: ICD-10-PCS | Mod: CPTII,S$GLB,, | Performed by: INTERNAL MEDICINE

## 2023-12-12 PROCEDURE — 3044F PR MOST RECENT HEMOGLOBIN A1C LEVEL <7.0%: ICD-10-PCS | Mod: CPTII,S$GLB,, | Performed by: INTERNAL MEDICINE

## 2023-12-12 PROCEDURE — 3077F SYST BP >= 140 MM HG: CPT | Mod: CPTII,S$GLB,, | Performed by: INTERNAL MEDICINE

## 2023-12-12 PROCEDURE — 99214 PR OFFICE/OUTPT VISIT, EST, LEVL IV, 30-39 MIN: ICD-10-PCS | Mod: S$GLB,,, | Performed by: INTERNAL MEDICINE

## 2023-12-12 PROCEDURE — 3288F FALL RISK ASSESSMENT DOCD: CPT | Mod: CPTII,S$GLB,, | Performed by: INTERNAL MEDICINE

## 2023-12-12 PROCEDURE — 4010F PR ACE/ARB THEARPY RXD/TAKEN: ICD-10-PCS | Mod: CPTII,S$GLB,, | Performed by: INTERNAL MEDICINE

## 2023-12-13 LAB
CANCER AG15-3 SERPL IA-ACNC: 16 U/ML
CANCER AG27-29 SERPL-ACNC: 16.2 U/ML

## 2023-12-13 NOTE — PATIENT INSTRUCTIONS
"[facility] hematology Oncology In Office Subsequent Encounter note    12/12/23      Subjective:      Patient ID:   Marce Hickey  73 y.o. female  1950  MD Narcisa        Chief Complaint:   Breast cancer evaluation    HPI:  73 y.o. female has a history of L breast cancer.  She had asked if I would assume her oncology care  .  She has a history of breast reduction back in 1992.  Recently in March 2017 she had a abnormal mammogram at [facility].  Two adjacent masses were seen at the 12 and 1:00 position of the left breast.  Both masses returned positive for invasive ductal carcinoma.  ERP was positive, PRP was positive, HER2 Alec was negative, it was grade 1 disease, and sentinel lymph node biopsy was negative.  Clinically this was stage I disease.  She had bilateral mastectomy, with left sentinel node biopsy in May 2017.  She also had reconstruction with expanders placed and eventually implants paced per Dr. Thomas of Plastic surgery.      She will continue on adjuvant Arimidex daily for now x's 5 years.  No increased HF sx or joint sx.  Started 6/2017. Through 6/14/2022.    She is status post gastric sleeve surgery January 29, 2017.  She has a history of B12 deficiency and has been on B12 sublingually in the past, but has not taken it," in a long time.  B 12 MWF now.     B12 is low normal at 290 and  ferritin is normal at 93.  Vitamin D is low at 24 and bone density test shows osteopenia.  On B 12 subl daily, the level is up to 852, she will continue B 12 subl TIW.    Vitamin D is better at 36, on supplements. 6000 unit per day.      She is to follow-up with her primary care for recommendations regarding vitamin-D supplementation and osteopenia/osteoporosis management and prevention while on Arimidex.  Bone Density test 5/14/20 showed osteopenia.    Estimated breast cancer recurrence was reduced from 23% to 12% with adjuvant Arimidex or tamoxifen usage using the breast cancer index reference.    She " complains of pain at her left 2nd distal joint finger area and saw Dr. Schultz  for evaluation.  Was told she had degenerative arthritis at L>R 2nd finger.    She is status post partial hysterectomy.  She took birth control pills until about age 26.  She had hysterectomy at age 30.  She did not take hormone replacement therapy.    She wears a calcium and vitamin-D and multivitamin patch x2 years.  Hx anaphylaxis to NSAIDS.    For her history includes generalized anxiety disorder, type 2 diabetes, low vitamin-D levels, GERD, fatty liver, dyslipidemia, depression, hypertension, plantar fasciitis of the right foot.    She is status post breast reduction, bilateral mastectomy, breast implant placement, with reconstruction.  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  Other history includes the gastric sleeve surgery, partial hysterectomy, right rotator cuff repair, tonsillectomy, and cholecystectomy.    Her mother had hypertension, heart disease and history of heart attack.  Her father had heart disease and psoriasis.    She was in the [employer] times 26 years till .  She smoked times 19 years, 1 pack per day, and quit in .  She does not drink alcohol with with regularity.  She is allergic to aspirin and nonsteroidal anti inflammatory drugs as manifested with anaphylaxis.    Her mother  in 2017 of old age.  She had a lumpectomy done but it is not clear if she had breast cancer.  Her father had heart disease.  She has a brother alive and well and a cousin with prostate cancer.  She has 2 sons alive and well.    , Antony Hickey, IT, [employer].    Our conversation revealed today that her family origin is from [place], specifically from [place].  She is 100% Askinazi Tenriism.    BRCA 1 & 2 returned NEGATIVE.  She does not have the breast cancer gene.    Breast Cancer Index testing supports RR 5-6% after 5 years of hormonal  Rx.  Extended hormonal Rx not recommended in trying to reduce RR further.  Stop hormonal Rx at 5 years,  2022.    She has 2 cousins with history of prostate cancer.    PET 2022 DOV.      ROS:   GEN: normal without any fever, night sweats or weight loss  HEENT: normal with no HA's, sore throat, stiff neck, changes in vision  CV: normal with no CP, SOB, PND, FLORES or orthopnea  PULM: normal with no SOB, cough, hemoptysis, sputum or pleuritic pain  GI:  See history of present illness  : normal with no hematuria, dysuria  BREAST:  See history of present illness  SKIN: normal with no rash, erythema, bruising, or swelling     Past Medical History:   Diagnosis Date    Anxiety     Arthritis     Cancer     breast cancer - left    Depression     Diabetes mellitus, type 2     Borderline    Dyslipidemia 2016    Fatty liver disease, nonalcoholic     GERD (gastroesophageal reflux disease)     Hyperlipidemia     Hypertension     Plantar fasciitis of right foot      Past Surgical History:   Procedure Laterality Date    ADENOIDECTOMY  1970    APPENDECTOMY  2007    breast reduction      BREAST SURGERY Bilateral     masectomy    BREAST SURGERY Bilateral     implants     SECTION      x 2    CHOLECYSTECTOMY      COLONOSCOPY N/A 2020    Procedure: COLONOSCOPY;  Surgeon: Nupur Leonard MD;  Location: [facility];  Service: Endoscopy;  Laterality: N/A;    COSMETIC SURGERY  May 4, 2017    Reconstruction due to mastectomy    EYE SURGERY      Lasik    FOOT SURGERY      left bunionectomy    gastric sleve      HYSTERECTOMY      one ovary intact, adhesions    JOINT REPLACEMENT  2017    KNEE ARTHROPLASTY Left 2018    Procedure: ARTHROPLASTY, KNEE;  Surgeon: Christos Montanez MD;  Location: [facility] OR;  Service: Orthopedics;  Laterality: Left;    KNEE ARTHROSCOPY Left     LIPOSUCTION      ROTATOR CUFF REPAIR Right     TONSILLECTOMY      TUBAL LIGATION  1977       Review of patient's allergies  indicates:   Allergen Reactions    Nsaids (non-steroidal anti-inflammatory drug) Anaphylaxis           Current Outpatient Medications:     amLODIPine (NORVASC) 5 MG tablet, Take 1 tablet (5 mg total) by mouth once daily., Disp: 90 tablet, Rfl: 3    atorvastatin (LIPITOR) 20 MG tablet, Take 1 tablet (20 mg total) by mouth once daily., Disp: 90 tablet, Rfl: 3    ergocalciferol, vitamin D2, (VITAMIN D ORAL), Take 6,000 mg by mouth once daily., Disp: , Rfl:     losartan (COZAAR) 100 MG tablet, Take 1 tablet (100 mg total) by mouth once daily., Disp: 90 tablet, Rfl: 3    VITAMIN B COMPLEX ORAL, Take by mouth., Disp: , Rfl:           Objective:   Vitals:  Blood pressure (!) 141/91, pulse 66, temperature 97.5 °F (36.4 °C), resp. rate 16, weight 77.7 kg (171 lb 6.4 oz).    Physical Examination:   GEN: no apparent distress, comfortable  HEAD: atraumatic and normocephalic  EYES: no pallor, no icterus  ENT: no pharyngeal erythema, external ears WNL; no nasal discharge  NECK: no masses, thyroid normal, trachea midline, no LAD/LN's, supple  CV: RRR with no murmur; normal pulse; normal S1 and S2; no pedal edema  CHEST: Normal respiratory effort; CTAB; normal breath sounds; no wheeze or crackles  ABDOM: nontender and nondistended; soft; normal bowel sounds; no rebound/guarding, liver and spleen were not palpable  MUSC/Skeletal: ROM normal; no crepitus; joints normal; no deformities or arthropathy  EXTREM: no clubbing, cyanosis, inflammation or swelling  SKIN: no rashes, lesions, ulcers, petechiae or subcutaneous nodules  : no cvat  NEURO: grossly intact; motor/sensory WNL;  no tremors  PSYCH: normal mood, affect and behavior  LYMPH: normal cervical, supraclavicular, axillary and groin LN's  BREASTS:  She has extensive postoperative change at the chest wall anteriorly, she does not have palpable mass at the left or right breast, chest area is nontender      Labs:   Lab Results   Component Value Date    WBC 6.91 12/11/2023     HGB 13.3 12/11/2023    HCT 41.4 12/11/2023    MCV 90 12/11/2023     12/11/2023    CMP  Sodium   Date Value Ref Range Status   12/11/2023 142 136 - 145 mmol/L Final     Potassium   Date Value Ref Range Status   12/11/2023 4.5 3.5 - 5.1 mmol/L Final     Chloride   Date Value Ref Range Status   12/11/2023 110 95 - 110 mmol/L Final     CO2   Date Value Ref Range Status   12/11/2023 25 23 - 29 mmol/L Final     Glucose   Date Value Ref Range Status   12/11/2023 105 70 - 110 mg/dL Final     BUN   Date Value Ref Range Status   12/11/2023 23 8 - 23 mg/dL Final     Creatinine   Date Value Ref Range Status   12/11/2023 0.8 0.5 - 1.4 mg/dL Final     Calcium   Date Value Ref Range Status   12/11/2023 9.9 8.7 - 10.5 mg/dL Final     Total Protein   Date Value Ref Range Status   12/11/2023 6.8 6.0 - 8.4 g/dL Final     Albumin   Date Value Ref Range Status   12/11/2023 3.8 3.5 - 5.2 g/dL Final     Total Bilirubin   Date Value Ref Range Status   12/11/2023 0.5 0.1 - 1.0 mg/dL Final     Comment:     For infants and newborns, interpretation of results should be based  on gestational age, weight and in agreement with clinical  observations.    Premature Infant recommended reference ranges:  Up to 24 hours.............<8.0 mg/dL  Up to 48 hours............<12.0 mg/dL  3-5 days..................<15.0 mg/dL  6-29 days.................<15.0 mg/dL       Alkaline Phosphatase   Date Value Ref Range Status   12/11/2023 94 55 - 135 U/L Final     AST   Date Value Ref Range Status   12/11/2023 14 10 - 40 U/L Final     ALT   Date Value Ref Range Status   12/11/2023 18 10 - 44 U/L Final     Anion Gap   Date Value Ref Range Status   12/11/2023 7 (L) 8 - 16 mmol/L Final     eGFR if    Date Value Ref Range Status   05/17/2022 >60.0 >60 mL/min/1.73 m^2 Final     eGFR if non    Date Value Ref Range Status   05/17/2022 >60.0 >60 mL/min/1.73 m^2 Final     Comment:     Calculation used to obtain the estimated  glomerular filtration  rate (eGFR) is the CKD-EPI equation.        Folic acid 4.7, increase po vegetable intake, she does not agree to po vitamin, nauseates her.  Hgb 13.3, cholesterol 247.    Assessment:   (1) 73 y.o. female with diagnosis of multifocal left breast cancer, clinical stage I disease, ERP positive,   HER2 Alec negative.    (2) currently on adjuvant Arimidex 1 mg p.o. daily x's  5 years.  Stop Arimidex now 6/14/22.    Bone density testing shows osteopenia.  She is to follow-up with her primary care for osteopenia/osteoporosis management while on Arimidex.    She is status post gastric sleeve surgery and has history of B12 deficiency in the past.  Her B12 level returned low normal at 290.  On subl B 12 the level is better at 1,600.    Vitamin D is low at 24.  On calcium and Vitamin D, the level is better at 36.    BRCA 1 and BRCA 2 testing has returned Negative.    Breast Cancer Index supports 5-6% recurrence at years 5-10, after 5 years of adjuvant hormonal Rx.  Extended hormonal therapy greater than 5 years is not felt to be beneficial here in reducing RR further.    PET 1/2022 DOV.  Observe for now.  RTC 6 months with CBC, B 12, folate

## 2023-12-13 NOTE — PROGRESS NOTES
"[facility] hematology Oncology In Office Subsequent Encounter note    12/12/23      Subjective:      Patient ID:   Marce Hickey  73 y.o. female  1950  MD Narcisa        Chief Complaint:   Breast cancer evaluation    HPI:  73 y.o. female has a history of L breast cancer.   .  She has a history of breast reduction back in 1992.  Recently in March 2017 she had a abnormal mammogram at [facility].  Two adjacent masses were seen at the 12 and 1:00 position of the left breast.  Both masses returned positive for invasive ductal carcinoma.  ERP was positive, PRP was positive, HER2 Alec was negative, it was grade 1 disease, and sentinel lymph node biopsy was negative.  Clinically this was stage I disease.  She had bilateral mastectomy, with left sentinel node biopsy in May 2017.  She also had reconstruction with expanders placed and eventually implants paced per Dr. Thomas of Plastic surgery.      She will continue on adjuvant Arimidex daily for x's 5 years.  No increased HF sx or joint sx.  Started 6/2017. Through 6/14/2022.    She is status post gastric sleeve surgery January 29, 2017.  She has a history of B12 deficiency and has been on B12 sublingually in the past, but has not taken it," in a long time.  B 12 MWF now.     B12 is low normal at 290 and  ferritin is normal at 93.  Vitamin D is low at 24 and bone density test shows osteopenia.  On B 12 subl daily, the level is up to 1263, she will continue B 12 subl 1/wk or BIW.    Vitamin D is better at 36, on supplements. 6000 unit per day.      She is to follow-up with her primary care for recommendations regarding vitamin-D supplementation and osteopenia/osteoporosis management and prevention while on Arimidex.  Bone Density test 5/14/20 showed osteopenia.    Estimated breast cancer recurrence was reduced from 23% to 12% with adjuvant Arimidex or tamoxifen usage using the breast cancer index reference.    She complains of pain at her left 2nd distal joint " finger area and saw Dr. Schultz  for evaluation.  Was told she had degenerative arthritis at L>R 2nd finger.    She is status post partial hysterectomy.  She took birth control pills until about age 26.  She had hysterectomy at age 30.  She did not take hormone replacement therapy.    She wears a calcium and vitamin-D and multivitamin patch x2 years.  Hx anaphylaxis to NSAIDS.    For her history includes generalized anxiety disorder, type 2 diabetes, low vitamin-D levels, GERD, fatty liver, dyslipidemia, depression, hypertension, plantar fasciitis of the right foot.    She is status post breast reduction, bilateral mastectomy, breast implant placement, with reconstruction.  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  Other history includes the gastric sleeve surgery, partial hysterectomy, right rotator cuff repair, tonsillectomy, and cholecystectomy.    Her mother had hypertension, heart disease and history of heart attack.  Her father had heart disease and psoriasis.    She was in the [employer] times 26 years till .  She smoked times 19 years, 1 pack per day, and quit in .  She does not drink alcohol with with regularity.  She is allergic to aspirin and nonsteroidal anti inflammatory drugs as manifested with anaphylaxis.    Her mother  in 2017 of old age.  She had a lumpectomy done but it is not clear if she had breast cancer.  Her father had heart disease.  She has a brother alive and well and a cousin with prostate cancer.  She has 2 sons alive and well.    , Antony Hickey, [employer], [employer].    Our conversation revealed today that her family origin is from [place], specifically from [place].  She is 100% Askinazi Islam.    BRCA 1 & 2 returned NEGATIVE.  She does not have the breast cancer gene.    Breast Cancer Index testing supports RR 5-6% after 5 years of hormonal Rx.  Extended hormonal Rx not recommended in trying  to reduce RR further.  Stop hormonal Rx at 5 years,  2022.    She has 2 cousins with history of prostate cancer.    PET 2022 DOV.      ROS:   GEN: normal without any fever, night sweats or weight loss  HEENT: normal with no HA's, sore throat, stiff neck, changes in vision  CV: normal with no CP, SOB, PND, FLORES or orthopnea  PULM: normal with no SOB, cough, hemoptysis, sputum or pleuritic pain  GI:  See history of present illness  : normal with no hematuria, dysuria  BREAST:  See history of present illness  SKIN: normal with no rash, erythema, bruising, or swelling     Past Medical History:   Diagnosis Date    Anxiety     Arthritis     Cancer     breast cancer - left    Depression     Diabetes mellitus, type 2     Borderline    Dyslipidemia 2016    Fatty liver disease, nonalcoholic     GERD (gastroesophageal reflux disease)     Hyperlipidemia     Hypertension     Plantar fasciitis of right foot      Past Surgical History:   Procedure Laterality Date    ADENOIDECTOMY  1970    APPENDECTOMY  2007    breast reduction      BREAST SURGERY Bilateral     masectomy    BREAST SURGERY Bilateral     implants     SECTION      x 2    CHOLECYSTECTOMY      COLONOSCOPY N/A 2020    Procedure: COLONOSCOPY;  Surgeon: Nupur Leonard MD;  Location: [facility];  Service: Endoscopy;  Laterality: N/A;    COSMETIC SURGERY  May 4, 2017    Reconstruction due to mastectomy    EYE SURGERY      Lasik    FOOT SURGERY      left bunionectomy    gastric sleve      HYSTERECTOMY      one ovary intact, adhesions    JOINT REPLACEMENT  2017    KNEE ARTHROPLASTY Left 2018    Procedure: ARTHROPLASTY, KNEE;  Surgeon: Christos Montanez MD;  Location: [facility] OR;  Service: Orthopedics;  Laterality: Left;    KNEE ARTHROSCOPY Left     LIPOSUCTION      ROTATOR CUFF REPAIR Right     TONSILLECTOMY      TUBAL LIGATION  1977       Review of patient's allergies indicates:   Allergen Reactions    Nsaids  (non-steroidal anti-inflammatory drug) Anaphylaxis           Current Outpatient Medications:     amLODIPine (NORVASC) 5 MG tablet, Take 1 tablet (5 mg total) by mouth once daily., Disp: 90 tablet, Rfl: 3    atorvastatin (LIPITOR) 20 MG tablet, Take 1 tablet (20 mg total) by mouth once daily., Disp: 90 tablet, Rfl: 3    ergocalciferol, vitamin D2, (VITAMIN D ORAL), Take 6,000 mg by mouth once daily., Disp: , Rfl:     losartan (COZAAR) 100 MG tablet, Take 1 tablet (100 mg total) by mouth once daily., Disp: 90 tablet, Rfl: 3    VITAMIN B COMPLEX ORAL, Take by mouth., Disp: , Rfl:           Objective:   Vitals:  Blood pressure (!) 141/91, pulse 66, temperature 97.5 °F (36.4 °C), resp. rate 16, weight 77.7 kg (171 lb 6.4 oz).    Physical Examination:   GEN: no apparent distress, comfortable  HEAD: atraumatic and normocephalic  EYES: no pallor, no icterus  ENT: no pharyngeal erythema, external ears WNL; no nasal discharge  NECK: no masses, thyroid normal, trachea midline, no LAD/LN's, supple  CV: RRR with no murmur; normal pulse; normal S1 and S2; no pedal edema  CHEST: Normal respiratory effort; CTAB; normal breath sounds; no wheeze or crackles  ABDOM: nontender and nondistended; soft; normal bowel sounds; no rebound/guarding, liver and spleen were not palpable  MUSC/Skeletal: ROM normal; no crepitus; joints normal; no deformities or arthropathy  EXTREM: no clubbing, cyanosis, inflammation or swelling  SKIN: no rashes, lesions, ulcers, petechiae or subcutaneous nodules  : no cvat  NEURO: grossly intact; motor/sensory WNL;  no tremors  PSYCH: normal mood, affect and behavior  LYMPH: normal cervical, supraclavicular, axillary and groin LN's  BREASTS:  She has extensive postoperative change at the chest wall anteriorly, she does not have palpable mass at the left or right breast, chest area is nontender      Labs:   Lab Results   Component Value Date    WBC 6.91 12/11/2023    HGB 13.3 12/11/2023    HCT 41.4 12/11/2023     MCV 90 12/11/2023     12/11/2023    CMP  Sodium   Date Value Ref Range Status   12/11/2023 142 136 - 145 mmol/L Final     Potassium   Date Value Ref Range Status   12/11/2023 4.5 3.5 - 5.1 mmol/L Final     Chloride   Date Value Ref Range Status   12/11/2023 110 95 - 110 mmol/L Final     CO2   Date Value Ref Range Status   12/11/2023 25 23 - 29 mmol/L Final     Glucose   Date Value Ref Range Status   12/11/2023 105 70 - 110 mg/dL Final     BUN   Date Value Ref Range Status   12/11/2023 23 8 - 23 mg/dL Final     Creatinine   Date Value Ref Range Status   12/11/2023 0.8 0.5 - 1.4 mg/dL Final     Calcium   Date Value Ref Range Status   12/11/2023 9.9 8.7 - 10.5 mg/dL Final     Total Protein   Date Value Ref Range Status   12/11/2023 6.8 6.0 - 8.4 g/dL Final     Albumin   Date Value Ref Range Status   12/11/2023 3.8 3.5 - 5.2 g/dL Final     Total Bilirubin   Date Value Ref Range Status   12/11/2023 0.5 0.1 - 1.0 mg/dL Final     Comment:     For infants and newborns, interpretation of results should be based  on gestational age, weight and in agreement with clinical  observations.    Premature Infant recommended reference ranges:  Up to 24 hours.............<8.0 mg/dL  Up to 48 hours............<12.0 mg/dL  3-5 days..................<15.0 mg/dL  6-29 days.................<15.0 mg/dL       Alkaline Phosphatase   Date Value Ref Range Status   12/11/2023 94 55 - 135 U/L Final     AST   Date Value Ref Range Status   12/11/2023 14 10 - 40 U/L Final     ALT   Date Value Ref Range Status   12/11/2023 18 10 - 44 U/L Final     Anion Gap   Date Value Ref Range Status   12/11/2023 7 (L) 8 - 16 mmol/L Final     eGFR if    Date Value Ref Range Status   05/17/2022 >60.0 >60 mL/min/1.73 m^2 Final     eGFR if non    Date Value Ref Range Status   05/17/2022 >60.0 >60 mL/min/1.73 m^2 Final     Comment:     Calculation used to obtain the estimated glomerular filtration  rate (eGFR) is the CKD-EPI  equation.            Assessment:   (1) 73 y.o. female with diagnosis of multifocal left breast cancer, clinical stage I disease, ERP positive,   HER2 Alec negative.    (2) Adjuvant Arimidex 1 mg p.o. daily x's  5 years.  Stop Arimidex  6/14/22.    Bone density testing shows osteopenia.  She is to follow-up with her primary care for osteopenia/osteoporosis management while on Arimidex.    She is status post gastric sleeve surgery and has history of B12 deficiency in the past.  Her B12 level returned low normal at 290.  On subl B 12 the level is better at 1,250.  Decrease subl B 12 to 1 weekly or 1 BIW.    Vitamin D is low at 24.  On calcium and Vitamin D, the level is better at 36.    BRCA 1 and BRCA 2 testing has returned Negative.    Breast Cancer Index supports 5-6% recurrence at years 5-10, after 5 years of adjuvant hormonal Rx.  Extended hormonal therapy greater than 5 years is not felt to be beneficial here in reducing RR further.    PET 1/2022 DOV.  Observe for now.  RTC 6 months with CBC, B 12, folate

## 2024-02-09 ENCOUNTER — LAB VISIT (OUTPATIENT)
Dept: LAB | Facility: HOSPITAL | Age: 74
End: 2024-02-09
Attending: NURSE PRACTITIONER
Payer: MEDICARE

## 2024-02-09 ENCOUNTER — OFFICE VISIT (OUTPATIENT)
Dept: FAMILY MEDICINE | Facility: CLINIC | Age: 74
End: 2024-02-09
Payer: MEDICARE

## 2024-02-09 ENCOUNTER — PATIENT MESSAGE (OUTPATIENT)
Dept: FAMILY MEDICINE | Facility: CLINIC | Age: 74
End: 2024-02-09

## 2024-02-09 VITALS
OXYGEN SATURATION: 98 % | HEART RATE: 80 BPM | DIASTOLIC BLOOD PRESSURE: 94 MMHG | SYSTOLIC BLOOD PRESSURE: 140 MMHG | WEIGHT: 168.31 LBS | BODY MASS INDEX: 28.89 KG/M2

## 2024-02-09 DIAGNOSIS — R19.7 DIARRHEA OF PRESUMED INFECTIOUS ORIGIN: Primary | ICD-10-CM

## 2024-02-09 DIAGNOSIS — Z98.890 STATUS POST GASTRIC SURGERY: ICD-10-CM

## 2024-02-09 DIAGNOSIS — R19.7 DIARRHEA OF PRESUMED INFECTIOUS ORIGIN: ICD-10-CM

## 2024-02-09 DIAGNOSIS — Z90.49 HISTORY OF CHOLECYSTECTOMY: ICD-10-CM

## 2024-02-09 LAB
ALBUMIN SERPL BCP-MCNC: 3.8 G/DL (ref 3.5–5.2)
ALP SERPL-CCNC: 110 U/L (ref 55–135)
ALT SERPL W/O P-5'-P-CCNC: 24 U/L (ref 10–44)
ANION GAP SERPL CALC-SCNC: 11 MMOL/L (ref 8–16)
AST SERPL-CCNC: 20 U/L (ref 10–40)
BASOPHILS # BLD AUTO: 0.03 K/UL (ref 0–0.2)
BASOPHILS NFR BLD: 0.8 % (ref 0–1.9)
BILIRUB SERPL-MCNC: 0.4 MG/DL (ref 0.1–1)
BUN SERPL-MCNC: 27 MG/DL (ref 8–23)
CALCIUM SERPL-MCNC: 9.7 MG/DL (ref 8.7–10.5)
CHLORIDE SERPL-SCNC: 112 MMOL/L (ref 95–110)
CO2 SERPL-SCNC: 18 MMOL/L (ref 23–29)
CREAT SERPL-MCNC: 0.8 MG/DL (ref 0.5–1.4)
DIFFERENTIAL METHOD BLD: ABNORMAL
EOSINOPHIL # BLD AUTO: 0.1 K/UL (ref 0–0.5)
EOSINOPHIL NFR BLD: 2 % (ref 0–8)
ERYTHROCYTE [DISTWIDTH] IN BLOOD BY AUTOMATED COUNT: 13.2 % (ref 11.5–14.5)
EST. GFR  (NO RACE VARIABLE): >60 ML/MIN/1.73 M^2
GLUCOSE SERPL-MCNC: 95 MG/DL (ref 70–110)
HCT VFR BLD AUTO: 40.8 % (ref 37–48.5)
HGB BLD-MCNC: 13.4 G/DL (ref 12–16)
IMM GRANULOCYTES # BLD AUTO: 0.04 K/UL (ref 0–0.04)
IMM GRANULOCYTES NFR BLD AUTO: 1 % (ref 0–0.5)
LYMPHOCYTES # BLD AUTO: 0.8 K/UL (ref 1–4.8)
LYMPHOCYTES NFR BLD: 20.1 % (ref 18–48)
MCH RBC QN AUTO: 29.3 PG (ref 27–31)
MCHC RBC AUTO-ENTMCNC: 32.8 G/DL (ref 32–36)
MCV RBC AUTO: 89 FL (ref 82–98)
MONOCYTES # BLD AUTO: 0.7 K/UL (ref 0.3–1)
MONOCYTES NFR BLD: 18.6 % (ref 4–15)
NEUTROPHILS # BLD AUTO: 2.3 K/UL (ref 1.8–7.7)
NEUTROPHILS NFR BLD: 57.5 % (ref 38–73)
NRBC BLD-RTO: 0 /100 WBC
PLATELET # BLD AUTO: 206 K/UL (ref 150–450)
PMV BLD AUTO: 12.4 FL (ref 9.2–12.9)
POTASSIUM SERPL-SCNC: 4 MMOL/L (ref 3.5–5.1)
PROT SERPL-MCNC: 6.9 G/DL (ref 6–8.4)
RBC # BLD AUTO: 4.58 M/UL (ref 4–5.4)
SODIUM SERPL-SCNC: 141 MMOL/L (ref 136–145)
WBC # BLD AUTO: 3.93 K/UL (ref 3.9–12.7)

## 2024-02-09 PROCEDURE — 80053 COMPREHEN METABOLIC PANEL: CPT | Performed by: NURSE PRACTITIONER

## 2024-02-09 PROCEDURE — 1126F AMNT PAIN NOTED NONE PRSNT: CPT | Mod: CPTII,S$GLB,, | Performed by: NURSE PRACTITIONER

## 2024-02-09 PROCEDURE — 3080F DIAST BP >= 90 MM HG: CPT | Mod: CPTII,S$GLB,, | Performed by: NURSE PRACTITIONER

## 2024-02-09 PROCEDURE — 1101F PT FALLS ASSESS-DOCD LE1/YR: CPT | Mod: CPTII,S$GLB,, | Performed by: NURSE PRACTITIONER

## 2024-02-09 PROCEDURE — 99999 PR PBB SHADOW E&M-EST. PATIENT-LVL III: CPT | Mod: PBBFAC,,, | Performed by: NURSE PRACTITIONER

## 2024-02-09 PROCEDURE — 36415 COLL VENOUS BLD VENIPUNCTURE: CPT | Performed by: NURSE PRACTITIONER

## 2024-02-09 PROCEDURE — 1159F MED LIST DOCD IN RCRD: CPT | Mod: CPTII,S$GLB,, | Performed by: NURSE PRACTITIONER

## 2024-02-09 PROCEDURE — 99213 OFFICE O/P EST LOW 20 MIN: CPT | Mod: S$GLB,,, | Performed by: NURSE PRACTITIONER

## 2024-02-09 PROCEDURE — 3008F BODY MASS INDEX DOCD: CPT | Mod: CPTII,S$GLB,, | Performed by: NURSE PRACTITIONER

## 2024-02-09 PROCEDURE — 85025 COMPLETE CBC W/AUTO DIFF WBC: CPT | Performed by: NURSE PRACTITIONER

## 2024-02-09 PROCEDURE — 3288F FALL RISK ASSESSMENT DOCD: CPT | Mod: CPTII,S$GLB,, | Performed by: NURSE PRACTITIONER

## 2024-02-09 PROCEDURE — 3077F SYST BP >= 140 MM HG: CPT | Mod: CPTII,S$GLB,, | Performed by: NURSE PRACTITIONER

## 2024-02-09 NOTE — PROGRESS NOTES
"Subjective:       Patient ID: Marce Hickey is a 73 y.o. female.    Chief Complaint: Diarrhea and Nausea (Light beige watery stools since Tuesday.)    Marce Hickey (Fran) presents to the clinic today for new onset diarrhea  Established patient within the clinic, new patient to myself  Under the care of the following:  PCP: Dr. Brewster  Hematology/Oncology: Dr. 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 Felt ill.  Thursday- continued to eat left over brisket- diarrhea worse  Liquid stool- beige in color  Diarrhea last episode this morning. Reports 6-7 episodes daily since starting- all liquid- denies mucus. Denies any nausea, vomiting, fever, abdominal pain, shakes/chills.       Review of Systems    Patient Active Problem List   Diagnosis    GERD (gastroesophageal reflux disease)    CEASAR (generalized anxiety disorder)    Hyperlipidemia    Status post gastric surgery    Breast asymmetry    History of breast cancer    Status post DJO total knee replacement using cement, left 9/25/18    Arthrofibrosis of knee joint, left    History of colon polyps    Major depressive disorder, recurrent, mild    " Malignant neoplasm of lower-outer quadrant of left female breast, unspecified estrogen receptor status    Osteopenia of multiple sites    Essential hypertension       Objective:      Physical Exam  Constitutional:       General: She is not in acute distress.     Appearance: Normal appearance.   Cardiovascular:      Rate and Rhythm: Normal rate and regular rhythm.      Heart sounds: Normal heart sounds. No murmur heard.  Pulmonary:      Effort: Pulmonary effort is normal. No respiratory distress.      Breath sounds: Normal breath sounds. No wheezing.   Abdominal:      General: Bowel sounds are increased. There is no distension.      Tenderness: There is no abdominal tenderness.   Skin:     General: Skin is warm and dry.      Coloration: Skin is not jaundiced.      Findings: No rash.   Neurological:      General: No focal deficit present.      Mental Status: She is alert and oriented to person, place, and time.   Psychiatric:         Mood and Affect: Mood normal.         Behavior: Behavior normal.         Lab Results   Component Value Date    WBC 6.91 12/11/2023    HGB 13.3 12/11/2023    HCT 41.4 12/11/2023     12/11/2023    CHOL 277 (H) 10/11/2023    TRIG 198 (H) 10/11/2023    HDL 60 10/11/2023    ALT 18 12/11/2023    AST 14 12/11/2023     12/11/2023    K 4.5 12/11/2023     12/11/2023    CREATININE 0.8 12/11/2023    BUN 23 12/11/2023    CO2 25 12/11/2023    TSH 3.519 04/12/2023    INR 1.0 09/13/2018    GLUF 137 (H) 03/31/2016    HGBA1C 5.4 04/12/2023     The 10-year ASCVD risk score (Jaymie HOFFMAN, et al., 2019) is: 19.3%    Values used to calculate the score:      Age: 73 years      Sex: Female      Is Non- : No      Diabetic: No      Tobacco smoker: No      Systolic Blood Pressure: 140 mmHg      Is BP treated: Yes      HDL Cholesterol: 60 mg/dL      Total Cholesterol: 277 mg/dL  Visit Vitals  BP (!) 140/94 (BP Location: Right arm, Patient Position: Sitting, BP Method: Medium  (Manual))   Pulse 80   Wt 76.3 kg (168 lb 4.8 oz)   SpO2 98%   BMI 28.89 kg/m²      Assessment:       1. Diarrhea of presumed infectious origin    2. Status post gastric surgery    3. History of cholecystectomy        Plan:       1. Diarrhea of presumed infectious origin  -     Comprehensive Metabolic Panel; Future; Expected date: 02/09/2024  -     CBC Auto Differential; Future; Expected date: 02/09/2024  BRAT diet advance as tolerated   Maintain hydration  Good handwashing   Avoid heavy/greasy foods- encourage food journal  Unable to hold liquids/worsening s/sx- ER   2. Status post gastric surgery  Overview:  Gastric sleeve      3. History of cholecystectomy       No follow-ups on file.      Future Appointments       Date Provider Specialty Appt Notes    5/3/2024 Jade Brewster MD Family Medicine 6m f/u

## 2024-02-14 NOTE — DISCHARGE INSTRUCTIONS
Keep area clean and dry.  See your primary care provider in one week.  For worsening symptoms, chest pain, shortness of breath, increased abdominal pain, high grade fever, stroke or stroke like symptoms, immediately go to the nearest Emergency Room or call 911 as soon as possible.     
Negative

## 2024-02-21 ENCOUNTER — OFFICE VISIT (OUTPATIENT)
Dept: PLASTIC SURGERY | Facility: CLINIC | Age: 74
End: 2024-02-21
Payer: MEDICARE

## 2024-02-21 DIAGNOSIS — Z42.8 ENCOUNTER FOR OTHER PLASTIC AND RECONSTRUCTIVE SURGERY FOLLOWING MEDICAL PROCEDURE OR HEALED INJURY: Primary | ICD-10-CM

## 2024-02-21 DIAGNOSIS — Z85.3 PERSONAL HISTORY OF BREAST CANCER: Primary | ICD-10-CM

## 2024-02-21 PROCEDURE — 1101F PT FALLS ASSESS-DOCD LE1/YR: CPT | Mod: CPTII,S$GLB,, | Performed by: SURGERY

## 2024-02-21 PROCEDURE — 3288F FALL RISK ASSESSMENT DOCD: CPT | Mod: CPTII,S$GLB,, | Performed by: SURGERY

## 2024-02-21 PROCEDURE — 99999 PR PBB SHADOW E&M-EST. PATIENT-LVL II: CPT | Mod: PBBFAC,,, | Performed by: SURGERY

## 2024-02-21 PROCEDURE — 1159F MED LIST DOCD IN RCRD: CPT | Mod: CPTII,S$GLB,, | Performed by: SURGERY

## 2024-02-21 PROCEDURE — 99212 OFFICE O/P EST SF 10 MIN: CPT | Mod: S$GLB,,, | Performed by: SURGERY

## 2024-02-22 ENCOUNTER — PATIENT MESSAGE (OUTPATIENT)
Dept: FAMILY MEDICINE | Facility: CLINIC | Age: 74
End: 2024-02-22
Payer: MEDICARE

## 2024-02-22 ENCOUNTER — TELEPHONE (OUTPATIENT)
Dept: FAMILY MEDICINE | Facility: CLINIC | Age: 74
End: 2024-02-22
Payer: MEDICARE

## 2024-02-22 NOTE — TELEPHONE ENCOUNTER
----- Message from Shanthi Hickey sent at 2/21/2024  3:48 PM CST -----  Contact: PT  Type:  Patient Returning Call    Who Called:  PT  Who Left Message for Patient:  May  Does the patient know what this is regarding?:  yes  Best Call Back Number:  [phone]  Additional Information:

## 2024-02-23 ENCOUNTER — PATIENT MESSAGE (OUTPATIENT)
Dept: PLASTIC SURGERY | Facility: CLINIC | Age: 74
End: 2024-02-23
Payer: MEDICARE

## 2024-02-26 NOTE — PROGRESS NOTES
Patient presents [facility] surgery [facility] in his well-known to me.  Patient had bilateral breast reconstruction approximately 4 years ago and has done very very well.  She presents today for re-evaluation.  I believe the patient complains that the right breast slightly harder than left.  Physical examination looks like she has a very nice result.  She does not want to be any bigger.  Her implants are in good position.  She does not need any surgical intervention.

## 2024-03-18 ENCOUNTER — HOSPITAL ENCOUNTER (OUTPATIENT)
Dept: RADIOLOGY | Facility: HOSPITAL | Age: 74
Discharge: HOME | End: 2024-03-18
Attending: SURGERY
Payer: MEDICARE

## 2024-03-18 DIAGNOSIS — Z42.8 ENCOUNTER FOR OTHER PLASTIC AND RECONSTRUCTIVE SURGERY FOLLOWING MEDICAL PROCEDURE OR HEALED INJURY: ICD-10-CM

## 2024-03-18 PROCEDURE — 77047 MRI BREAST C- BILATERAL: CPT | Mod: TC,PO

## 2024-04-08 ENCOUNTER — LAB VISIT (OUTPATIENT)
Dept: LAB | Facility: HOSPITAL | Age: 74
End: 2024-04-08
Attending: STUDENT IN AN ORGANIZED HEALTH CARE EDUCATION/TRAINING PROGRAM
Payer: MEDICARE

## 2024-04-08 ENCOUNTER — OFFICE VISIT (OUTPATIENT)
Dept: FAMILY MEDICINE | Facility: CLINIC | Age: 74
End: 2024-04-08
Payer: MEDICARE

## 2024-04-08 VITALS
BODY MASS INDEX: 29.62 KG/M2 | HEIGHT: 64 IN | HEART RATE: 74 BPM | SYSTOLIC BLOOD PRESSURE: 142 MMHG | WEIGHT: 173.5 LBS | RESPIRATION RATE: 18 BRPM | OXYGEN SATURATION: 94 % | DIASTOLIC BLOOD PRESSURE: 80 MMHG

## 2024-04-08 DIAGNOSIS — R79.89 OTHER SPECIFIED ABNORMAL FINDINGS OF BLOOD CHEMISTRY: ICD-10-CM

## 2024-04-08 DIAGNOSIS — E78.2 MIXED HYPERLIPIDEMIA: ICD-10-CM

## 2024-04-08 DIAGNOSIS — Z98.890 STATUS POST GASTRIC SURGERY: ICD-10-CM

## 2024-04-08 DIAGNOSIS — C50.512 MALIGNANT NEOPLASM OF LOWER-OUTER QUADRANT OF LEFT FEMALE BREAST, UNSPECIFIED ESTROGEN RECEPTOR STATUS: ICD-10-CM

## 2024-04-08 DIAGNOSIS — M85.89 OSTEOPENIA OF MULTIPLE SITES: ICD-10-CM

## 2024-04-08 DIAGNOSIS — E55.9 VITAMIN D DEFICIENCY: ICD-10-CM

## 2024-04-08 DIAGNOSIS — E66.3 OVERWEIGHT (BMI 25.0-29.9): ICD-10-CM

## 2024-04-08 DIAGNOSIS — F33.1 MODERATE EPISODE OF RECURRENT MAJOR DEPRESSIVE DISORDER: ICD-10-CM

## 2024-04-08 DIAGNOSIS — M48.00 CENTRAL STENOSIS OF SPINAL CANAL: Primary | ICD-10-CM

## 2024-04-08 DIAGNOSIS — I10 ESSENTIAL HYPERTENSION: ICD-10-CM

## 2024-04-08 DIAGNOSIS — K21.9 GASTROESOPHAGEAL REFLUX DISEASE WITHOUT ESOPHAGITIS: ICD-10-CM

## 2024-04-08 DIAGNOSIS — F41.1 GAD (GENERALIZED ANXIETY DISORDER): ICD-10-CM

## 2024-04-08 DIAGNOSIS — D53.9 NUTRITIONAL ANEMIA: ICD-10-CM

## 2024-04-08 LAB
25(OH)D3+25(OH)D2 SERPL-MCNC: 36 NG/ML (ref 30–96)
ALBUMIN SERPL BCP-MCNC: 4.1 G/DL (ref 3.5–5.2)
ALP SERPL-CCNC: 98 U/L (ref 55–135)
ALT SERPL W/O P-5'-P-CCNC: 20 U/L (ref 10–44)
ANION GAP SERPL CALC-SCNC: 9 MMOL/L (ref 8–16)
AST SERPL-CCNC: 15 U/L (ref 10–40)
BASOPHILS # BLD AUTO: 0.04 K/UL (ref 0–0.2)
BASOPHILS NFR BLD: 0.8 % (ref 0–1.9)
BILIRUB SERPL-MCNC: 0.6 MG/DL (ref 0.1–1)
BUN SERPL-MCNC: 20 MG/DL (ref 8–23)
CALCIUM SERPL-MCNC: 10.4 MG/DL (ref 8.7–10.5)
CHLORIDE SERPL-SCNC: 109 MMOL/L (ref 95–110)
CHOLEST SERPL-MCNC: 186 MG/DL (ref 120–199)
CHOLEST/HDLC SERPL: 3 {RATIO} (ref 2–5)
CO2 SERPL-SCNC: 24 MMOL/L (ref 23–29)
CREAT SERPL-MCNC: 0.8 MG/DL (ref 0.5–1.4)
DIFFERENTIAL METHOD BLD: ABNORMAL
EOSINOPHIL # BLD AUTO: 0.1 K/UL (ref 0–0.5)
EOSINOPHIL NFR BLD: 2.8 % (ref 0–8)
ERYTHROCYTE [DISTWIDTH] IN BLOOD BY AUTOMATED COUNT: 12.8 % (ref 11.5–14.5)
EST. GFR  (NO RACE VARIABLE): >60 ML/MIN/1.73 M^2
ESTIMATED AVG GLUCOSE: 111 MG/DL (ref 68–131)
FERRITIN SERPL-MCNC: 78 NG/ML (ref 20–300)
FOLATE SERPL-MCNC: 6.3 NG/ML (ref 4–24)
GLUCOSE SERPL-MCNC: 104 MG/DL (ref 70–110)
HBA1C MFR BLD: 5.5 % (ref 4–5.6)
HCT VFR BLD AUTO: 42.6 % (ref 37–48.5)
HDLC SERPL-MCNC: 61 MG/DL (ref 40–75)
HDLC SERPL: 32.8 % (ref 20–50)
HGB BLD-MCNC: 13.6 G/DL (ref 12–16)
IMM GRANULOCYTES # BLD AUTO: 0.01 K/UL (ref 0–0.04)
IMM GRANULOCYTES NFR BLD AUTO: 0.2 % (ref 0–0.5)
IRON SERPL-MCNC: 92 UG/DL (ref 30–160)
LDLC SERPL CALC-MCNC: 96.8 MG/DL (ref 63–159)
LYMPHOCYTES # BLD AUTO: 1.3 K/UL (ref 1–4.8)
LYMPHOCYTES NFR BLD: 25.3 % (ref 18–48)
MCH RBC QN AUTO: 28.9 PG (ref 27–31)
MCHC RBC AUTO-ENTMCNC: 31.9 G/DL (ref 32–36)
MCV RBC AUTO: 90 FL (ref 82–98)
MONOCYTES # BLD AUTO: 0.5 K/UL (ref 0.3–1)
MONOCYTES NFR BLD: 10.7 % (ref 4–15)
NEUTROPHILS # BLD AUTO: 3 K/UL (ref 1.8–7.7)
NEUTROPHILS NFR BLD: 60.2 % (ref 38–73)
NONHDLC SERPL-MCNC: 125 MG/DL
NRBC BLD-RTO: 0 /100 WBC
PLATELET # BLD AUTO: 165 K/UL (ref 150–450)
PMV BLD AUTO: 11.6 FL (ref 9.2–12.9)
POTASSIUM SERPL-SCNC: 4.3 MMOL/L (ref 3.5–5.1)
PROT SERPL-MCNC: 6.8 G/DL (ref 6–8.4)
RBC # BLD AUTO: 4.71 M/UL (ref 4–5.4)
SATURATED IRON: 23 % (ref 20–50)
SODIUM SERPL-SCNC: 142 MMOL/L (ref 136–145)
TOTAL IRON BINDING CAPACITY: 398 UG/DL (ref 250–450)
TRANSFERRIN SERPL-MCNC: 269 MG/DL (ref 200–375)
TRIGL SERPL-MCNC: 141 MG/DL (ref 30–150)
TSH SERPL DL<=0.005 MIU/L-ACNC: 2.25 UIU/ML (ref 0.4–4)
VIT B12 SERPL-MCNC: 756 PG/ML (ref 210–950)
WBC # BLD AUTO: 4.94 K/UL (ref 3.9–12.7)

## 2024-04-08 PROCEDURE — 99214 OFFICE O/P EST MOD 30 MIN: CPT | Mod: S$GLB,,, | Performed by: STUDENT IN AN ORGANIZED HEALTH CARE EDUCATION/TRAINING PROGRAM

## 2024-04-08 PROCEDURE — 80053 COMPREHEN METABOLIC PANEL: CPT | Performed by: STUDENT IN AN ORGANIZED HEALTH CARE EDUCATION/TRAINING PROGRAM

## 2024-04-08 PROCEDURE — 1159F MED LIST DOCD IN RCRD: CPT | Mod: CPTII,S$GLB,, | Performed by: STUDENT IN AN ORGANIZED HEALTH CARE EDUCATION/TRAINING PROGRAM

## 2024-04-08 PROCEDURE — 3008F BODY MASS INDEX DOCD: CPT | Mod: CPTII,S$GLB,, | Performed by: STUDENT IN AN ORGANIZED HEALTH CARE EDUCATION/TRAINING PROGRAM

## 2024-04-08 PROCEDURE — 3288F FALL RISK ASSESSMENT DOCD: CPT | Mod: CPTII,S$GLB,, | Performed by: STUDENT IN AN ORGANIZED HEALTH CARE EDUCATION/TRAINING PROGRAM

## 2024-04-08 PROCEDURE — 3079F DIAST BP 80-89 MM HG: CPT | Mod: CPTII,S$GLB,, | Performed by: STUDENT IN AN ORGANIZED HEALTH CARE EDUCATION/TRAINING PROGRAM

## 2024-04-08 PROCEDURE — 4010F ACE/ARB THERAPY RXD/TAKEN: CPT | Mod: CPTII,S$GLB,, | Performed by: STUDENT IN AN ORGANIZED HEALTH CARE EDUCATION/TRAINING PROGRAM

## 2024-04-08 PROCEDURE — 83540 ASSAY OF IRON: CPT | Performed by: STUDENT IN AN ORGANIZED HEALTH CARE EDUCATION/TRAINING PROGRAM

## 2024-04-08 PROCEDURE — 82306 VITAMIN D 25 HYDROXY: CPT | Performed by: STUDENT IN AN ORGANIZED HEALTH CARE EDUCATION/TRAINING PROGRAM

## 2024-04-08 PROCEDURE — 84425 ASSAY OF VITAMIN B-1: CPT | Performed by: STUDENT IN AN ORGANIZED HEALTH CARE EDUCATION/TRAINING PROGRAM

## 2024-04-08 PROCEDURE — 1126F AMNT PAIN NOTED NONE PRSNT: CPT | Mod: CPTII,S$GLB,, | Performed by: STUDENT IN AN ORGANIZED HEALTH CARE EDUCATION/TRAINING PROGRAM

## 2024-04-08 PROCEDURE — 82607 VITAMIN B-12: CPT | Performed by: STUDENT IN AN ORGANIZED HEALTH CARE EDUCATION/TRAINING PROGRAM

## 2024-04-08 PROCEDURE — 36415 COLL VENOUS BLD VENIPUNCTURE: CPT | Performed by: STUDENT IN AN ORGANIZED HEALTH CARE EDUCATION/TRAINING PROGRAM

## 2024-04-08 PROCEDURE — 83036 HEMOGLOBIN GLYCOSYLATED A1C: CPT | Performed by: STUDENT IN AN ORGANIZED HEALTH CARE EDUCATION/TRAINING PROGRAM

## 2024-04-08 PROCEDURE — 84443 ASSAY THYROID STIM HORMONE: CPT | Performed by: STUDENT IN AN ORGANIZED HEALTH CARE EDUCATION/TRAINING PROGRAM

## 2024-04-08 PROCEDURE — 1101F PT FALLS ASSESS-DOCD LE1/YR: CPT | Mod: CPTII,S$GLB,, | Performed by: STUDENT IN AN ORGANIZED HEALTH CARE EDUCATION/TRAINING PROGRAM

## 2024-04-08 PROCEDURE — 85025 COMPLETE CBC W/AUTO DIFF WBC: CPT | Performed by: STUDENT IN AN ORGANIZED HEALTH CARE EDUCATION/TRAINING PROGRAM

## 2024-04-08 PROCEDURE — 80061 LIPID PANEL: CPT | Performed by: STUDENT IN AN ORGANIZED HEALTH CARE EDUCATION/TRAINING PROGRAM

## 2024-04-08 PROCEDURE — 3077F SYST BP >= 140 MM HG: CPT | Mod: CPTII,S$GLB,, | Performed by: STUDENT IN AN ORGANIZED HEALTH CARE EDUCATION/TRAINING PROGRAM

## 2024-04-08 PROCEDURE — 99999 PR PBB SHADOW E&M-EST. PATIENT-LVL IV: CPT | Mod: PBBFAC,,, | Performed by: STUDENT IN AN ORGANIZED HEALTH CARE EDUCATION/TRAINING PROGRAM

## 2024-04-08 PROCEDURE — 82728 ASSAY OF FERRITIN: CPT | Performed by: STUDENT IN AN ORGANIZED HEALTH CARE EDUCATION/TRAINING PROGRAM

## 2024-04-08 PROCEDURE — 82746 ASSAY OF FOLIC ACID SERUM: CPT | Performed by: STUDENT IN AN ORGANIZED HEALTH CARE EDUCATION/TRAINING PROGRAM

## 2024-04-08 RX ORDER — AMLODIPINE BESYLATE 10 MG/1
10 TABLET ORAL DAILY
Qty: 90 TABLET | Refills: 3 | Status: SHIPPED | OUTPATIENT
Start: 2024-04-08

## 2024-04-08 NOTE — ASSESSMENT & PLAN NOTE
MRI done at [place]  Severe spinal canal stenosis at L2/L3, L3/L4, L4/L5  Referral to spinal surgery

## 2024-04-08 NOTE — PROGRESS NOTES
Subjective:       Patient ID: Marce Hickey is a 73 y.o. female.    Chief Complaint: Follow-up (Discuss overall health, and sinus )    Patient Active Problem List:     GERD (gastroesophageal reflux disease)     CEASAR (generalized anxiety disorder)     Hyperlipidemia     Status post gastric surgery     Breast asymmetry     History of breast cancer     Status post DJO total knee replacement using cement, left 9/25/18     Arthrofibrosis of knee joint, left     History of colon polyps     Moderate episode of recurrent major depressive disorder     Malignant neoplasm of lower-outer quadrant of left female breast, unspecified estrogen receptor status     Osteopenia of multiple sites     Essential hypertension     Central stenosis of spinal canal    Current Outpatient Medications:  amLODIPine (NORVASC) 5 MG tablet, Take 1 tablet (5 mg total) by mouth once daily.  atorvastatin (LIPITOR) 20 MG tablet, Take 1 tablet (20 mg total) by mouth once daily.  ergocalciferol, vitamin D2, (VITAMIN D ORAL), Take 6,000 mg by mouth once daily.  losartan (COZAAR) 100 MG tablet, Take 1 tablet (100 mg total) by mouth once daily.  VITAMIN B COMPLEX ORAL, Take by mouth.    No current facility-administered medications for this visit.         Review of Systems   Constitutional:  Negative for activity change and appetite change.   Respiratory:  Negative for shortness of breath.    Cardiovascular:  Negative for chest pain.   Gastrointestinal:  Negative for abdominal pain.   Genitourinary:  Negative for dysuria.   Integumentary:  Negative for rash.   Psychiatric/Behavioral:  Positive for dysphoric mood. Negative for sleep disturbance. The patient is not nervous/anxious.          Objective:      Physical Exam  Constitutional:       General: She is not in acute distress.     Appearance: Normal appearance. She is not ill-appearing.   Eyes:      Conjunctiva/sclera: Conjunctivae normal.   Cardiovascular:      Rate and Rhythm: Normal rate and  regular rhythm.      Heart sounds: Normal heart sounds. No murmur heard.  Pulmonary:      Effort: Pulmonary effort is normal. No respiratory distress.      Breath sounds: Normal breath sounds.   Musculoskeletal:      Right lower leg: No edema.      Left lower leg: No edema.   Skin:     General: Skin is warm and dry.   Neurological:      Mental Status: She is alert. Mental status is at baseline.      Gait: Gait normal.   Psychiatric:         Mood and Affect: Mood normal.         Behavior: Behavior normal.         Thought Content: Thought content normal.         Judgment: Judgment normal.         Assessment:       1. Central stenosis of spinal canal    2. Moderate episode of recurrent major depressive disorder    3. CEASAR (generalized anxiety disorder)    4. Mixed hyperlipidemia    5. Essential hypertension    6. Malignant neoplasm of lower-outer quadrant of left female breast, unspecified estrogen receptor status    7. Gastroesophageal reflux disease without esophagitis    8. Status post gastric surgery    9. Osteopenia of multiple sites    10. Nutritional anemia    11. Vitamin D deficiency    12. Overweight (BMI 25.0-29.9)    13. Other specified abnormal findings of blood chemistry        Plan:       Problem List Items Addressed This Visit          Neuro    Central stenosis of spinal canal - Primary     MRI done at [place]  Severe spinal canal stenosis at L2/L3, L3/L4, L4/L5  Referral to spinal surgery            Psychiatric    CEASAR (generalized anxiety disorder)     Stable. Continue current medications and regular followup.           Moderate episode of recurrent major depressive disorder     Worsening and feeling down due to health conditions.  Stable. Continue current medications and regular followup.              Cardiac/Vascular    Hyperlipidemia     Stable. Continue current medications and regular followup.  Hyperlipidemia Medications               atorvastatin (LIPITOR) 20 MG tablet Take 1 tablet (20 mg  total) by mouth once daily.                 Relevant Orders    Lipid panel    Essential hypertension     Stable. Continue current medications and regular followup.  Hypertension Medications               amLODIPine (NORVASC) 5 MG tablet Take 1 tablet (5 mg total) by mouth once daily.    losartan (COZAAR) 100 MG tablet Take 1 tablet (100 mg total) by mouth once daily.          BP Readings from Last 3 Encounters:   04/08/24 (!) 142/80   02/09/24 (!) 140/94   12/12/23 (!) 141/91   Repeat and consider adding medications.         Relevant Medications    amLODIPine (NORVASC) 10 MG tablet       Oncology    Malignant neoplasm of lower-outer quadrant of left female breast, unspecified estrogen receptor status     Stable. Continue current medications and regular followup.              GI    GERD (gastroesophageal reflux disease)     Stable. Continue current medications and regular followup.           Status post gastric surgery     Recheck vitamin levels.          Relevant Orders    CBC auto differential    Comprehensive metabolic panel    Vitamin B12    Ferritin    Iron and TIBC    Vitamin B1    Folate    Hemoglobin A1C    TSH       Orthopedic    Osteopenia of multiple sites     She is doing weight bearing   Start ca/d  Consider medications in two years.  Dexa done in 2023  There is a 18.7% risk of a major osteoporotic fracture and a 3.8% risk of hip fracture in the next 10 years (FRAX).  Impression:Osteopenia.    Repeat in 2025          Other Visit Diagnoses       Nutritional anemia        Relevant Orders    CBC auto differential    Vitamin B12    Folate    Hemoglobin A1C    TSH    Vitamin D deficiency        Relevant Orders    Vitamin D    Overweight (BMI 25.0-29.9)        Relevant Orders    Hemoglobin A1C    Other specified abnormal findings of blood chemistry        Relevant Orders    Hemoglobin A1C

## 2024-04-08 NOTE — ASSESSMENT & PLAN NOTE
Worsening and feeling down due to health conditions.  Stable. Continue current medications and regular followup.

## 2024-04-08 NOTE — ASSESSMENT & PLAN NOTE
She is doing weight bearing   Start ca/d  Consider medications in two years.  Dexa done in 2023  There is a 18.7% risk of a major osteoporotic fracture and a 3.8% risk of hip fracture in the next 10 years (FRAX).  Impression:Osteopenia.    Repeat in 2025

## 2024-04-08 NOTE — ASSESSMENT & PLAN NOTE
Stable. Continue current medications and regular followup.  Hypertension Medications               amLODIPine (NORVASC) 5 MG tablet Take 1 tablet (5 mg total) by mouth once daily.    losartan (COZAAR) 100 MG tablet Take 1 tablet (100 mg total) by mouth once daily.          BP Readings from Last 3 Encounters:   04/08/24 (!) 142/80   02/09/24 (!) 140/94   12/12/23 (!) 141/91     Repeat and consider adding medications.

## 2024-04-12 LAB — VIT B1 BLD-MCNC: 58 UG/L (ref 38–122)

## 2024-04-22 ENCOUNTER — PATIENT MESSAGE (OUTPATIENT)
Dept: ADMINISTRATIVE | Facility: HOSPITAL | Age: 74
End: 2024-04-22
Payer: MEDICARE

## 2024-05-01 DIAGNOSIS — M54.51 VERTEBROGENIC LOW BACK PAIN: Primary | ICD-10-CM

## 2024-05-01 DIAGNOSIS — M47.896 OTHER SPONDYLOSIS, LUMBAR REGION: ICD-10-CM

## 2024-05-06 ENCOUNTER — PATIENT OUTREACH (OUTPATIENT)
Dept: ADMINISTRATIVE | Facility: HOSPITAL | Age: 74
End: 2024-05-06
Payer: MEDICARE

## 2024-05-06 VITALS — SYSTOLIC BLOOD PRESSURE: 129 MMHG | DIASTOLIC BLOOD PRESSURE: 83 MMHG

## 2024-05-06 NOTE — PROGRESS NOTES
Population Health Chart Review & Patient Outreach Details      Additional [facility] Notes:               Updates Requested / Reviewed:      Updated Care Coordination Note, Care Everywhere, and Immunizations Reconciliation Completed or Queried: [facility] Topics Overdue:      [facility] Score: 1     Uncontrolled BP    Shingles/Zoster Vaccine  RSV Vaccine                  Health Maintenance Topic(s) Outreach Outcomes & Actions Taken:    Blood Pressure - Outreach Outcomes & Actions Taken  : Remote Blood Pressure Reading Captured

## 2024-05-13 ENCOUNTER — PATIENT MESSAGE (OUTPATIENT)
Dept: ADMINISTRATIVE | Facility: HOSPITAL | Age: 74
End: 2024-05-13
Payer: MEDICARE

## 2024-05-16 ENCOUNTER — CLINICAL SUPPORT (OUTPATIENT)
Dept: REHABILITATION | Facility: HOSPITAL | Age: 74
End: 2024-05-16
Payer: MEDICARE

## 2024-05-16 DIAGNOSIS — M47.896 OTHER SPONDYLOSIS, LUMBAR REGION: ICD-10-CM

## 2024-05-16 DIAGNOSIS — M54.51 VERTEBROGENIC LOW BACK PAIN: ICD-10-CM

## 2024-05-16 DIAGNOSIS — Z74.09 IMPAIRED FUNCTIONAL MOBILITY AND ACTIVITY TOLERANCE: Primary | ICD-10-CM

## 2024-05-16 PROCEDURE — 97110 THERAPEUTIC EXERCISES: CPT | Mod: PN

## 2024-05-16 PROCEDURE — 97161 PT EVAL LOW COMPLEX 20 MIN: CPT | Mod: PN

## 2024-05-16 NOTE — PLAN OF CARE
[facility]   Physical Therapy Initial Evaluation      Name: Marce Hickey  Clinic Number: [id number]    Therapy Diagnosis:   Encounter Diagnoses   Name Primary?    Vertebrogenic low back pain     Other spondylosis, lumbar region     Impaired functional mobility and activity tolerance Yes        Physician: Sherrie Centeno NP    Physician Orders: PT Eval and Treat   Medical Diagnosis from Referral:   M54.51 (ICD-10-CM) - Vertebrogenic low back pain   M47.896 (ICD-10-CM) - Other spondylosis, lumbar region       Evaluation Date: 5/16/2024  Authorization Period Expiration: 12/31/2024  Plan of Care Expiration: 8/16/2024  Progress Note Due: 6/16/2024  Date of Surgery: n/a  Visit # / Visits authorized: 1/ 1   FOTO: 1/ 3    Precautions: Standard     Time In: 7:00 am   Time Out: 8:00 am   Total Billable Time: 60 minutes    Subjective     Date of onset: chronic back pain for over 6 years - worse in past several months.     History of current condition - Jaciel reports: She has had back pain for over 6 years with bilateral LE sciatic symptoms.  She had her left knee replaced about 6 years ago - had extensive therapy, but developed early onset of scar tissue and fibrosis that resulted in restriction in knee flexion to 100 degrees. Jaciel stated that she has been seeing a chiropractor over these same years, bought a decompression type table to try to relieve her back pain - stated that she did get some relief from this.   MRI of lumbar spine indicated multilevel Severe Stenosis at L2/3 through L4/5 with mild foraminal stenosis and mild disc bulging at all levels.    Jaciel stated that she started going to the gym 3 days per week - group classes 2 days - movement/stretching and circuit work one day; she also walks her dog about 20-25 mins at least 4-5x/week.  She noticed that her pain started to improve with the gym activity.  She felt good; she was sleeping better. She discussed her activity with  her chiropractor who told her that this was all the wrong type of exercises for her back.  She quit going to the gym, went to MD at [facility] at the end of April and was referred here to PT.  Jaciel stated that since she stopped going to the gym, her back has started hurting again, she feels awful; having more leg pain.   Enjoyed the gym, but knew that she was not always doing things like she should - wants to learn proper way to do exercises so that she doesn't hurt herself, also feels like she should probably be doing more to lessen her symptoms.     Falls: No falls     Imaging: MRI studies: April MRI of lumbar spine - see copy scanned into MEDIA.     Prior Therapy: had therapy for her left TKA 6 years ago   Social History: lives with ,    Occupation: retired from [employer]   Prior Level of Function: Independent; back and BLE pain - improved when she was going to the gym, walking dog, doing yard work - all with less pain;   Current Level of Function: currently not at gym for past several weeks secondary to advice from YANDY, stated that she has noted increased pain, she does walk her dog 20-25 mins 4-5xweek; Limited left knee flexion from TKA - has about 100 deg flexion with hard end feel.     Pain:  Current 2-3/10, worst 6-8/10, best 2/10   Location: bilateral  lower back into legs; right leg more than left - travels posterior/lateral   Description: Aching, Grabbing, Tight, and Sharp  Aggravating Factors: Standing, Walking, Night Time, and Lifting  Easing Factors: takes anti-inflammatory; not narcotics; was feeling so much better with regular exercise at the gym, but wasn't sure she was doing things the right way or what was best for her.  Wants to learn how to better help herself.     Patients goals: To improve her well being, reduce pain, educate herself on proper exercise/form.      Medical History:   Past Medical History:   Diagnosis Date    Anxiety     Arthritis     Cancer     breast cancer - left     Depression     Diabetes mellitus, type 2     Borderline    Dyslipidemia 2016    Fatty liver disease, nonalcoholic     GERD (gastroesophageal reflux disease)     Hyperlipidemia     Hypertension     Plantar fasciitis of right foot        Surgical History:   Marce Hickey  has a past surgical history that includes Cholecystectomy; Tonsillectomy;  section; Foot surgery; Liposuction; breast reduction; Hysterectomy; gastric sleve; Breast surgery (Bilateral); Breast surgery (Bilateral); Rotator cuff repair (Right); Knee arthroscopy (Left); Knee Arthroplasty (Left, 2018); Colonoscopy (N/A, 2020); Adenoidectomy (); Appendectomy (); Eye surgery (); Tubal ligation (1977); Joint replacement (2017); and Cosmetic surgery (May 4, 2017).    Medications:   Marce has a current medication list which includes the following prescription(s): amlodipine, atorvastatin, ergocalciferol (vitamin d2), losartan, and vitamin b complex.    Allergies:   Review of patient's allergies indicates:   Allergen Reactions    Nsaids (non-steroidal anti-inflammatory drug) Anaphylaxis        Objective      Observation: Jaciel is alert/oriented x 4; She is not in acute distress at this time.  Pleasant, cooperative, motivated to improve     Posture:    -       Rounded shoulders  -       Forward head  -       Anterior pelvic tilt  -       tightness bilateral Heel cords - left greater than right     Gait: No obvious gait deficits noted; does have restriction in left knee flexion.     Lumbar Range of Motion:    Active  Range Pain   Flexion Hands to floor  - does have some hinging at T/L junction   Just tightness,    Extension Standing - functional; Prone - can get into JACKELINE  Discomfort across L/S junction    Left Side Bending Hand to mid thigh  Some increased right leg pain    Right Side Bending Hand to mid thigh     Left rotation 40    Right Rotation 40       Lower Extremity Strength   Left Right  "  Knee extension: 4+/5 5/5   Knee flexion: 4+/5 5/5   Hip flexion: 5/5 5/5   Hip extension:  4/5 4/5   Hip abduction: 4+/5 5/5   Hip adduction: 4+/5 4+/5   Ankle dorsiflexion: 5/5 5/5   Ankle plantarflexion: 5/5 5/5   Upper abdominals 3+/5    Lower abdominals 3+/5    Back extensors 3/5      Special Tests:    Repeated Flexion Negative   Repeated Extension Negative   Prone Instability Not tested    Straight Leg Raise Negative   Slump Negative                 Joint Mobility:   Lumbar: functional motion into flexion, but does have hinging at T/L junction with loss of full segmental lumbar flexion - reduced ability to reverse lumbar curve; Able to perform functional lumbar extension but reduced "Normal" range; can get to JACKELINE which is ok for person with level of stenosis in her back.      Thoracic: restricted - postural restriction into extension     Sacral Evaluation - no imbalance noted; leg length equal; does have bilateral anterior pelvic tilt.       Palpation: minimal/ moderate  tenderness to palpation at lumbar paraspinals; piriformis; has tightness in bilateral hip flexors/psoas     Sensation: intact to light touch BLE's     Flexibility:     90/90 SLR = R minimal restriction, L minimal restriction   Ely's test: R no restriction, L moderate restriction   Milka's test: R minimal restriction, L minimal restriction   Franc test: R moderate restriction, L moderate restriction    PT Evaluation Completed: Yes  Discussed Plan of Care with patient: Yes    Intake Outcome Measure for FOTO Lumbar  Survey    Therapist reviewed FOTO scores for Marce Hickey on 5/16/2024.   FOTO report - see Media section or FOTO account episode details.    Intake Score: 31%         Treatment     Total Treatment time (time-based codes) separate from Evaluation: 20 minutes     Jaciel received the treatments listed below:      therapeutic exercises to develop flexibility and posture for 20 minutes including:    Seated H/S stretching on stool - " "minimum 30 sec hold time  Achilles stretch on wedge - minimum 30 sec - better 60 sec hold time   Posterior pelvic tilts - 5" hold x 10  Cat/Camel stretch   Seated Lumbar flexion with swiss ball        Patient Education and Home Exercises     Education provided:   - Role of PT; POC; importance of core mm strengthening and flexibility to reduce symptoms from her stenosis; movement is good     Written Home Exercises Provided: yes. Exercises were reviewed and Jaciel was able to demonstrate them prior to the end of the session.  Jaciel demonstrated good  understanding of the education provided. See EMR under Patient Instructions for exercises provided during therapy sessions.    Assessment     Marce is a 74 y.o. female referred to outpatient Physical Therapy with a medical diagnosis of vertebrogenic low back pain, spondylosis lumbar region . Patient presents with chronic lumbar pain/LE radicular Sx - better with activity, worse without.  Patient demonstrates functional lumbar AROM, but does have hinging at T/L junction with some weakness on return to neutral position, reduced ability to reverse lumbar curve, tightness in hamstrings,achilles, mild weakness in core/gluteal and hip mm that reinforce lumbar compression and increase pain.  Patient will benefit from Skilled physical therapy intervention to address deficits noted above to maximize her return of improved independent function.  Educated patient regarding importance of movement, flexibility/strength in reducing effects of lumbar stenosis.  She acknowledged understanding.     Patient prognosis is Good.   Patient will benefit from skilled outpatient Physical Therapy to address the deficits stated above and in the chart below, provide patient /family education, and to maximize patientt's level of independence.     Plan of care discussed with patient: Yes  Patient's spiritual, cultural and educational needs considered and patient is agreeable to the plan of care and " goals as stated below:     Anticipated Barriers for therapy: none     Medical Necessity is demonstrated by the following  History  Co-morbidities and personal factors that may impact the plan of care [x] LOW: no personal factors / co-morbidities  [] MODERATE: 1-2 personal factors / co-morbidities  [] HIGH: 3+ personal factors / co-morbidities    Moderate / High Support Documentation:   Co-morbidities affecting plan of care: n/a    Personal Factors:   no deficits     Examination  Body Structures and Functions, activity limitations and participation restrictions that may impact the plan of care [x] LOW: addressing 1-2 elements  [] MODERATE: 3+ elements  [] HIGH: 4+ elements (please support below)    Moderate / High Support Documentation: n/a     Clinical Presentation [x] LOW: stable  [] MODERATE: Evolving  [] HIGH: Unstable     Decision Making/ Complexity Score: low       Goals:  Short Term Goals: 3 weeks   Demonstrate improvement in recent symptoms to progress toward long term goals   Correct sitting/standing postural deficits to reduce pain and promote postural awareness for injury prevention.   Demonstrate compliance with initial exercise program.     Long Term Goals: 6 weeks   Able to perform household tasks with no limitations  Able to perform work related duties with no limitations.   Able to ambulate community required distances with no limitation   Able to ascend/descend 10 steps with no increase in symptoms.   Able to perform transfers from all surfaces with no limitation   FOTO score improvement to > 61   Independent with HEP for continued improvement in function.     Plan     Plan of care Certification: 5/16/2024 to 8/16/2024.    Outpatient Physical Therapy 1-2 times weekly for 6 weeks to include the following interventions: Manual Therapy, Neuromuscular Re-ed, Patient Education, Therapeutic Activities, and Therapeutic Exercise.     Kate Shannon, PT        Physician's Signature:  _________________________________________ Date: ________________

## 2024-05-20 ENCOUNTER — CLINICAL SUPPORT (OUTPATIENT)
Dept: REHABILITATION | Facility: HOSPITAL | Age: 74
End: 2024-05-20
Payer: MEDICARE

## 2024-05-20 DIAGNOSIS — Z74.09 IMPAIRED FUNCTIONAL MOBILITY AND ACTIVITY TOLERANCE: Primary | ICD-10-CM

## 2024-05-20 PROCEDURE — 97110 THERAPEUTIC EXERCISES: CPT | Mod: PN

## 2024-05-20 PROCEDURE — 97140 MANUAL THERAPY 1/> REGIONS: CPT | Mod: PN

## 2024-05-20 PROCEDURE — 97530 THERAPEUTIC ACTIVITIES: CPT | Mod: PN

## 2024-05-20 NOTE — PROGRESS NOTES
[facility]   Physical Therapy Treatment Note      Name: Marce Hickey  Clinic Number: [id number]    Therapy Diagnosis:   Encounter Diagnosis   Name Primary?    Impaired functional mobility and activity tolerance Yes     Physician: Sherrie Centeno NP    Visit Date: 5/20/2024    Physician Orders: PT Eval and Treat   Medical Diagnosis from Referral:   M54.51 (ICD-10-CM) - Vertebrogenic low back pain   M47.896 (ICD-10-CM) - Other spondylosis, lumbar region         Evaluation Date: 5/16/2024  Authorization Period Expiration: 12/31/2024  Plan of Care Expiration: 8/16/2024  Progress Note Due: 6/16/2024  Date of Surgery: n/a  Visit # / Visits authorized: 1/ 12  FOTO: 1/ 3     Precautions: Standard      Time In: 7:00 am   Time Out: 8:00 am   Total Billable Time: 60 minutes     PTA Visit #: 0/5       Subjective     Patient reports: I'm doing well this morning; a little pain on the right side of her back .  She was compliant with home exercise program.  Response to previous treatment: eval   Functional change: none     Pain: 2/10  Location:  right side of lumbar spine - pulled it yesterday doing some work around the house      Objective      Objective Measures updated at progress report unless specified.     Treatment     Jaciel received the treatments listed below:      therapeutic exercises to develop strength, endurance, and ROM for 15  minutes including:  Nu-Step - Level 5 x 10 mins   Cable column:   Scapular retraction - waist level - 7# x 15   Lat Pulldowns  7# x 12  Paloff presses: 3# x 12 each side     manual therapy techniques: Joint mobilizations were applied to the: lumbar spine  for 10 minutes, including:  S/L on left - lumbar segmental flexion with blocking of segment above for increased facet movement; Paraspinal skin rolling, and longitudinal stretch to promote improved fascia motion.     neuromuscular re-education activities to improve:  for  minutes. The following activities  "were included:      therapeutic activities to improve functional performance for 30  minutes, including:  Supine: quadratus stretch - 30 sec x 2 on each side   Supine: piriformis stretch - 30 sec x 2 each side   Supine: post pelvic tilt - 5" hold x 20  Segmental bridging - black band around thighs - cueing for technique x 15   S/L Clams: black band around thighs x 15 each side   Seated Lumbar flexion with SB - cueing for segmental forward/backward flexion - 3 way stretches x 3 mins       Patient Education and Home Exercises       Education provided:   - Need to improve segmental motion in lumbar spine to decrease compression -    Written Home Exercises Provided: Patient instructed to cont prior HEP. Exercises were reviewed and Jaciel was able to demonstrate them prior to the end of the session.  Jaciel demonstrated good  understanding of the education provided. See Electronic Medical Record under Patient Instructions for exercises provided during therapy sessions    Assessment     Patient demonstrating good response to manual tx and lumbar fascial stretching today; Reports feeling much better with movement; Active at local gym, but needs/wants a better understanding of what she should be doing to address her stenosis/arthritic changes in her lumbar spine.      Jaciel Is progressing well towards her goals.   Patient prognosis is Good.     Patient will continue to benefit from skilled outpatient physical therapy to address the deficits listed in the problem list box on initial evaluation, provide pt/family education and to maximize pt's level of independence in the home and community environment.     Patient's spiritual, cultural and educational needs considered and pt agreeable to plan of care and goals.     Anticipated barriers to physical therapy: none    Goals:   Short Term Goals: 3 weeks   Demonstrate improvement in recent symptoms to progress toward long term goals   Correct sitting/standing postural deficits to " reduce pain and promote postural awareness for injury prevention.   Demonstrate compliance with initial exercise program.      Long Term Goals: 6 weeks   Able to perform household tasks with no limitations  Able to perform work related duties with no limitations.   Able to ambulate community required distances with no limitation   Able to ascend/descend 10 steps with no increase in symptoms.   Able to perform transfers from all surfaces with no limitation   FOTO score improvement to > 61   Independent with HEP for continued improvement in function.        Plan     Plan of care Certification: 5/16/2024 to 8/16/2024.     Outpatient Physical Therapy 1-2 times weekly for 6 weeks to include the following interventions: Manual Therapy, Neuromuscular Re-ed, Patient Education, Therapeutic Activities, and Therapeutic Exercise.     Kate Shannon, PT

## 2024-05-22 ENCOUNTER — CLINICAL SUPPORT (OUTPATIENT)
Dept: REHABILITATION | Facility: HOSPITAL | Age: 74
End: 2024-05-22
Payer: MEDICARE

## 2024-05-22 DIAGNOSIS — Z74.09 IMPAIRED FUNCTIONAL MOBILITY AND ACTIVITY TOLERANCE: Primary | ICD-10-CM

## 2024-05-22 PROCEDURE — 97140 MANUAL THERAPY 1/> REGIONS: CPT | Mod: PN

## 2024-05-22 PROCEDURE — 97530 THERAPEUTIC ACTIVITIES: CPT | Mod: PN

## 2024-05-22 NOTE — PROGRESS NOTES
[facility]   Physical Therapy Treatment Note      Name: Marce Hickey  Clinic Number: [id number]    Therapy Diagnosis:   Encounter Diagnosis   Name Primary?    Impaired functional mobility and activity tolerance Yes     Physician: Sherrie Centeno NP    Visit Date: 5/22/2024    Physician Orders: PT Eval and Treat   Medical Diagnosis from Referral:   M54.51 (ICD-10-CM) - Vertebrogenic low back pain   M47.896 (ICD-10-CM) - Other spondylosis, lumbar region         Evaluation Date: 5/16/2024  Authorization Period Expiration: 12/31/2024  Plan of Care Expiration: 8/16/2024  Progress Note Due: 6/16/2024  Date of Surgery: n/a  Visit # / Visits authorized: 2/ 12  FOTO: 1/ 3     Precautions: Standard      Time In: 7:00 am   Time Out: 7:45  am   Total Billable Time: 45  minutes     PTA Visit #: 0/5       Subjective     Patient reports: I didn't sleep well last night - the dog kept us up; Still having some right sided lumbar pain today, but less  She was compliant with home exercise program.  Response to previous treatment: good   Functional change: moving better, still has some right sided back pain     Pain: 2/10  Location:  right side of lumbar spine - pulled it yesterday doing some work around the house      Objective      Objective Measures updated at progress report unless specified.     Treatment     Jaciel received the treatments listed below:      therapeutic exercises to develop strength, endurance, and ROM for 15  minutes including:  Nu-Step - Level 5 x 10 mins   Cable column:   Scapular retraction - waist level - 7# x 15   Lat Pulldowns  7# x 12  Paloff presses: 3# x 12 each side     manual therapy techniques: Joint mobilizations were applied to the: lumbar spine  for 15 minutes, including:  Prone - IASTM to right/left sided lumbar fascia; cross hand longitudinal stretch of tissue for fascia release; lamina release lumbar spine; gentle mobilization into extension thoracic/lumbar  "SP's;   Piriformis STM bilateral with release of TrPs.      neuromuscular re-education activities to improve:  for  minutes. The following activities were included:      therapeutic activities to improve functional performance for 30  minutes, including:  Prone: alternate arm and leg x 10   Supine: same side UE/LE elongation for spinal decompression   Supine: quadratus stretch - 30 sec x 2 on each side   Supine: piriformis stretch - 30 sec x 2 each side   Supine: post pelvic tilt - 5" hold x 20  Segmental bridging - black band around thighs - cueing for technique x 15   S/L Clams: black band around thighs x 15 each side   Seated Lumbar flexion with SB - cueing for segmental forward/backward flexion - 3 way stretches x 3 mins       Patient Education and Home Exercises       Education provided:   - Need to improve segmental motion in lumbar spine to decrease compression -    Written Home Exercises Provided: Patient instructed to cont prior HEP. Exercises were reviewed and Jaciel was able to demonstrate them prior to the end of the session.  Jaciel demonstrated good  understanding of the education provided. See Electronic Medical Record under Patient Instructions for exercises provided during therapy sessions    Assessment     Patient demonstrating good response to manual tx and lumbar fascial stretching today; Reports feeling much better with movement; Active at local gym, but needs/wants a better understanding of what she should be doing to address her stenosis/arthritic changes in her lumbar spine.      Jaicel Is progressing well towards her goals.   Patient prognosis is Good.     Patient will continue to benefit from skilled outpatient physical therapy to address the deficits listed in the problem list box on initial evaluation, provide pt/family education and to maximize pt's level of independence in the home and community environment.     Patient's spiritual, cultural and educational needs considered and pt agreeable " to plan of care and goals.     Anticipated barriers to physical therapy: none    Goals:   Short Term Goals: 3 weeks   Demonstrate improvement in recent symptoms to progress toward long term goals   Correct sitting/standing postural deficits to reduce pain and promote postural awareness for injury prevention.   Demonstrate compliance with initial exercise program.      Long Term Goals: 6 weeks   Able to perform household tasks with no limitations  Able to perform work related duties with no limitations.   Able to ambulate community required distances with no limitation   Able to ascend/descend 10 steps with no increase in symptoms.   Able to perform transfers from all surfaces with no limitation   FOTO score improvement to > 61   Independent with HEP for continued improvement in function.        Plan     Plan of care Certification: 5/16/2024 to 8/16/2024.     Outpatient Physical Therapy 1-2 times weekly for 6 weeks to include the following interventions: Manual Therapy, Neuromuscular Re-ed, Patient Education, Therapeutic Activities, and Therapeutic Exercise.     Kate Shannon, PT

## 2024-05-27 ENCOUNTER — CLINICAL SUPPORT (OUTPATIENT)
Dept: REHABILITATION | Facility: HOSPITAL | Age: 74
End: 2024-05-27
Payer: MEDICARE

## 2024-05-27 DIAGNOSIS — Z74.09 IMPAIRED FUNCTIONAL MOBILITY AND ACTIVITY TOLERANCE: Primary | ICD-10-CM

## 2024-05-27 PROCEDURE — 97140 MANUAL THERAPY 1/> REGIONS: CPT | Mod: PN

## 2024-05-27 PROCEDURE — 97530 THERAPEUTIC ACTIVITIES: CPT | Mod: PN

## 2024-05-27 NOTE — PROGRESS NOTES
[facility]   Physical Therapy Treatment Note      Name: Marce Hickey  Clinic Number: [id number]    Therapy Diagnosis:   Encounter Diagnosis   Name Primary?    Impaired functional mobility and activity tolerance Yes     Physician: Sherrie Centeno NP    Visit Date: 5/27/2024    Physician Orders: PT Eval and Treat   Medical Diagnosis from Referral:   M54.51 (ICD-10-CM) - Vertebrogenic low back pain   M47.896 (ICD-10-CM) - Other spondylosis, lumbar region         Evaluation Date: 5/16/2024  Authorization Period Expiration: 12/31/2024  Plan of Care Expiration: 8/16/2024  Progress Note Due: 6/16/2024  Date of Surgery: n/a  Visit # / Visits authorized: 3/ 12  FOTO: 1/ 3     Precautions: Standard      Time In: 7:00 am   Time Out: 7:55  am   Total Billable Time: 45  minutes     PTA Visit #: 0/5       Subjective     Patient reports: I'm having some more tingling down the back of my legs   She was compliant with home exercise program.  Response to previous treatment: good   Functional change: moving better, still has some right sided back pain     Pain: 2/10  Location:  right side of lumbar spine - pulled it yesterday doing some work around the house      Objective      Objective Measures updated at progress report unless specified.     Treatment     Jaciel received the treatments listed below:      therapeutic exercises to develop strength, endurance, and ROM for  0  minutes including:  Nu-Step - Level 5 x 10 mins   Cable column:   Scapular retraction - waist level - 7# x 15   Lat Pulldowns  7# x 12  Paloff presses: 3# x 12 each side     manual therapy techniques: Joint mobilizations were applied to the: lumbar spine  for 10  minutes, including:  Prone - extension mobilization of sacrum to encourage flexion in lumbar spine; cross hand MFR lumbar fascia;     neuromuscular re-education activities to improve:  for  minutes. The following activities were included:      therapeutic activities to  "improve functional performance for 35  minutes, including:  Prone: alternate arm and leg x 10  - needs 3 pillows under hips   Supine: same side UE/LE elongation for spinal decompression   Supine: quadratus stretch - 30 sec x 2 on each side   Supine: piriformis stretch - 30 sec x 2 each side   Supine: Knee outs with TrAb x 10   Supine: post pelvic tilt - 5" hold x 20  Segmental bridging - black band around thighs - cueing for technique x 15   S/L Clams: black band around thighs x 15 each side   Quadruped - cat > neutral x 6   Seated Lumbar flexion with SB - cueing for segmental flexion with return to neutral forward/backward flexion - 3 way stretches x 3 mins   Supine - 90/90 resting position x 3 mins - then performed H/S stretch with strap - 60 sec on each leg       Patient Education and Home Exercises       Education provided:   - Need to improve segmental motion in lumbar spine to decrease compression -    Written Home Exercises Provided: Patient instructed to cont prior HEP. Exercises were reviewed and Jaciel was able to demonstrate them prior to the end of the session.  Jaciel demonstrated good  understanding of the education provided. See Electronic Medical Record under Patient Instructions for exercises provided during therapy sessions    Assessment     Patient demonstrating good response to manual tx and lumbar fascial stretching today; Having some increased tingling in bilateral LE's with prone activities, changing lumbar position helpful; Improvement inactivation of TrAbs noted today;  Reports feeling much better with movement;    Jaciel Is progressing well towards her goals.   Patient prognosis is Good.     Patient will continue to benefit from skilled outpatient physical therapy to address the deficits listed in the problem list box on initial evaluation, provide pt/family education and to maximize pt's level of independence in the home and community environment.     Patient's spiritual, cultural and " educational needs considered and pt agreeable to plan of care and goals.     Anticipated barriers to physical therapy: none    Goals:   Short Term Goals: 3 weeks   Demonstrate improvement in recent symptoms to progress toward long term goals  > Progressing   Correct sitting/standing postural deficits to reduce pain and promote postural awareness for injury prevention.  > Progressing   Demonstrate compliance with initial exercise program. > Progressing      Long Term Goals: 6 weeks   Able to perform household tasks with no limitations  Able to perform work related duties with no limitations.   Able to ambulate community required distances with no limitation   Able to ascend/descend 10 steps with no increase in symptoms.   Able to perform transfers from all surfaces with no limitation   FOTO score improvement to > 61   Independent with HEP for continued improvement in function.        Plan     Plan of care Certification: 5/16/2024 to 8/16/2024.     Outpatient Physical Therapy 1-2 times weekly for 6 weeks to include the following interventions: Manual Therapy, Neuromuscular Re-ed, Patient Education, Therapeutic Activities, and Therapeutic Exercise.     Kate Shannon, PT

## 2024-05-30 ENCOUNTER — CLINICAL SUPPORT (OUTPATIENT)
Dept: REHABILITATION | Facility: HOSPITAL | Age: 74
End: 2024-05-30
Payer: MEDICARE

## 2024-05-30 DIAGNOSIS — Z74.09 IMPAIRED FUNCTIONAL MOBILITY AND ACTIVITY TOLERANCE: Primary | ICD-10-CM

## 2024-05-30 DIAGNOSIS — I10 ESSENTIAL HYPERTENSION: ICD-10-CM

## 2024-05-30 PROCEDURE — 97110 THERAPEUTIC EXERCISES: CPT | Mod: PN

## 2024-05-30 PROCEDURE — 97530 THERAPEUTIC ACTIVITIES: CPT | Mod: PN

## 2024-05-30 RX ORDER — LOSARTAN POTASSIUM 100 MG/1
TABLET ORAL
Qty: 90 TABLET | Refills: 3 | Status: SHIPPED | OUTPATIENT
Start: 2024-05-30

## 2024-05-30 NOTE — TELEPHONE ENCOUNTER
No care due was identified.  Health [facility] Embedded Care Due Messages. Reference number: [id number].   5/30/2024 11:12:00 AM CDT

## 2024-05-30 NOTE — TELEPHONE ENCOUNTER
Refill Decision Note   Marce Hickey  is requesting a refill authorization.  Brief Assessment and Rationale for Refill:  Approve     Medication Therapy Plan:        Comments:     Note composed:11:30 AM 05/30/2024

## 2024-05-30 NOTE — PROGRESS NOTES
[facility]   Physical Therapy Treatment Note      Name: Marce Hickey  Clinic Number: [id number]    Therapy Diagnosis:   Encounter Diagnosis   Name Primary?    Impaired functional mobility and activity tolerance Yes     Physician: Sherrie Centeno NP    Visit Date: 5/30/2024    Physician Orders: PT Eval and Treat   Medical Diagnosis from Referral:   M54.51 (ICD-10-CM) - Vertebrogenic low back pain   M47.896 (ICD-10-CM) - Other spondylosis, lumbar region         Evaluation Date: 5/16/2024  Authorization Period Expiration: 12/31/2024  Plan of Care Expiration: 8/16/2024  Progress Note Due: 6/16/2024  Date of Surgery: n/a  Visit # / Visits authorized: 4/ 12  FOTO: 1/ 3     Precautions: Standard      Time In: 7:45 am   Time Out: 8:35  am   Total Billable Time: 45  minutes     PTA Visit #: 0/5       Subjective     Patient reports: The tingling down my legs is much less now   She was compliant with home exercise program.  Response to previous treatment: good   Functional change: moving better, still has some right sided back pain     Pain: 2/10  Location:  right side of lumbar spine     Objective      Objective Measures updated at progress report unless specified.     Treatment     Jaciel received the treatments listed below:      therapeutic exercises to develop strength, endurance, and ROM for  20  minutes including:  Nu-Step - Level 5 x 10 mins   Cable column:   Scapular retraction - waist level - 7# x 15   Lat Pulldowns  7# x 12  Paloff presses: 3# x 12 each side     manual therapy techniques: Joint mobilizations were applied to the: lumbar spine  for 0  minutes, including:  Prone - extension mobilization of sacrum to encourage flexion in lumbar spine; cross hand MFR lumbar fascia;     neuromuscular re-education activities to improve:  for  minutes. The following activities were included:      therapeutic activities to improve functional performance for 25  minutes, including:  Prone:  "alternate arm and leg x 10  - needs 3 pillows under hips   Supine: same side UE/LE elongation for spinal decompression   Supine: quadratus stretch - 30 sec x 2 on each side   Supine: piriformis stretch - 30 sec x 2 each side   Supine: Knee outs with TrAb x 10  - blue band around thigh   Supine: post pelvic tilt - 5" hold x 20  Segmental bridging - black band around thighs - cueing for technique x 15   S/L Clams: black band around thighs x 15 each side   Quadruped - cat > neutral x 6   Seated Lumbar flexion with SB - cueing for segmental flexion with return to neutral forward/backward flexion - 3 way stretches x 3 mins   Supine - 90/90 resting position x 3 mins - then performed H/S stretch with strap - 60 sec on each leg       Patient Education and Home Exercises       Education provided:   - Need to improve segmental motion in lumbar spine to decrease compression -    Written Home Exercises Provided: Patient instructed to cont prior HEP. Exercises were reviewed and Jaciel was able to demonstrate them prior to the end of the session.  Jaciel demonstrated good  understanding of the education provided. See Electronic Medical Record under Patient Instructions for exercises provided during therapy sessions    Assessment     Patient demonstrating reduction in LE rdicular symptoms since initiating therapy;  Improvement in ability to activate and hole TrAb mm contraction with LE motion;   Reports feeling much better with movement;    Jaciel Is progressing well towards her goals.   Patient prognosis is Good.     Patient will continue to benefit from skilled outpatient physical therapy to address the deficits listed in the problem list box on initial evaluation, provide pt/family education and to maximize pt's level of independence in the home and community environment.     Patient's spiritual, cultural and educational needs considered and pt agreeable to plan of care and goals.     Anticipated barriers to physical therapy: " none    Goals:   Short Term Goals: 3 weeks   Demonstrate improvement in recent symptoms to progress toward long term goals  > Progressing   Correct sitting/standing postural deficits to reduce pain and promote postural awareness for injury prevention.  > Progressing   Demonstrate compliance with initial exercise program. > Progressing      Long Term Goals: 6 weeks   Able to perform household tasks with no limitations  Able to perform work related duties with no limitations.   Able to ambulate community required distances with no limitation   Able to ascend/descend 10 steps with no increase in symptoms.   Able to perform transfers from all surfaces with no limitation   FOTO score improvement to > 61   Independent with HEP for continued improvement in function.        Plan     Plan of care Certification: 5/16/2024 to 8/16/2024.     Outpatient Physical Therapy 1-2 times weekly for 6 weeks to include the following interventions: Manual Therapy, Neuromuscular Re-ed, Patient Education, Therapeutic Activities, and Therapeutic Exercise.     Kate Shannon, PT

## 2024-06-03 ENCOUNTER — CLINICAL SUPPORT (OUTPATIENT)
Dept: REHABILITATION | Facility: HOSPITAL | Age: 74
End: 2024-06-03
Payer: MEDICARE

## 2024-06-03 DIAGNOSIS — Z74.09 IMPAIRED FUNCTIONAL MOBILITY AND ACTIVITY TOLERANCE: Primary | ICD-10-CM

## 2024-06-03 PROCEDURE — 97110 THERAPEUTIC EXERCISES: CPT | Mod: PN,CQ

## 2024-06-03 PROCEDURE — 97530 THERAPEUTIC ACTIVITIES: CPT | Mod: PN,CQ

## 2024-06-03 NOTE — PROGRESS NOTES
[facility]   Physical Therapy Treatment Note      Name: Marce Hickey  Clinic Number: [id number]    Therapy Diagnosis:   Encounter Diagnosis   Name Primary?    Impaired functional mobility and activity tolerance Yes     Physician: Sherrie Centeno NP    Visit Date: 6/3/2024    Physician Orders: PT Eval and Treat   Medical Diagnosis from Referral:   M54.51 (ICD-10-CM) - Vertebrogenic low back pain   M47.896 (ICD-10-CM) - Other spondylosis, lumbar region         Evaluation Date: 5/16/2024  Authorization Period Expiration: 12/31/2024  Plan of Care Expiration: 8/16/2024  Progress Note Due: 6/16/2024  Date of Surgery: n/a  Visit # / Visits authorized: 4/ 12  FOTO: 1/ 3     Precautions: Standard      Time In: 7:40 AM  Time Out: 8:30 AM  Total Billable Time: 45 minutes     PTA Visit #: 1/5       Subjective     Patient reports: No new c/o's.   She was compliant with home exercise program.  Response to previous treatment: good   Functional change: moving better, still has some right sided back pain     Pain: 0/10  Location:  right side of lumbar spine     Objective      Objective Measures updated at progress report unless specified.     Treatment     Jaciel received the treatments listed below:      therapeutic exercises to develop strength, endurance, and ROM for  20  minutes including:  Nu-Step - Level 5 x 10 mins   Cable column:   Scapular retraction - waist level - 7# x 15   Lat Pulldowns  7# x 12  Paloff presses: 3# x 12 each side     manual therapy techniques: Joint mobilizations were applied to the: lumbar spine  for 0  minutes, including:  Prone - extension mobilization of sacrum to encourage flexion in lumbar spine; cross hand MFR lumbar fascia;     neuromuscular re-education activities to improve:  for  minutes. The following activities were included:      therapeutic activities to improve functional performance for 25  minutes, including:  Prone: alternate arm and leg x 10  -  "needs 3 pillows under hips   Supine: same side UE/LE elongation for spinal decompression   Supine: quadratus stretch - 30 sec x 2 on each side   Supine: piriformis stretch - 30 sec x 2 each side   Supine: Knee outs with TrAb x 10  - blue band around thigh   Supine: post pelvic tilt - 5" hold x 20  Segmental bridging - black band around thighs - cueing for technique x 15   S/L Clams: black band around thighs x 15 each side   Quadruped - cat > neutral x 6   Seated Lumbar flexion with SB - cueing for segmental flexion with return to neutral forward/backward flexion - 3 way stretches x 3 mins   Supine - 90/90 resting position x 3 mins - then performed H/S stretch with strap - 60 sec on each leg       Patient Education and Home Exercises       Education provided:   - Need to improve segmental motion in lumbar spine to decrease compression -    Written Home Exercises Provided: Patient instructed to cont prior HEP. Exercises were reviewed and Jaciel was able to demonstrate them prior to the end of the session.  Jaciel demonstrated good  understanding of the education provided. See Electronic Medical Record under Patient Instructions for exercises provided during therapy sessions    Assessment     Patient demonstrating reduction in LE rdicular symptoms since initiating therapy;  Improvement in ability to activate and hole TrAb mm contraction with LE motion;   Reports feeling much better with movement;    Jaciel Is progressing well towards her goals.   Patient prognosis is Good.     Patient will continue to benefit from skilled outpatient physical therapy to address the deficits listed in the problem list box on initial evaluation, provide pt/family education and to maximize pt's level of independence in the home and community environment.     Patient's spiritual, cultural and educational needs considered and pt agreeable to plan of care and goals.     Anticipated barriers to physical therapy: none    Goals:   Short Term Goals: 3 " weeks   Demonstrate improvement in recent symptoms to progress toward long term goals  > Progressing   Correct sitting/standing postural deficits to reduce pain and promote postural awareness for injury prevention.  > Progressing   Demonstrate compliance with initial exercise program. > Progressing      Long Term Goals: 6 weeks   Able to perform household tasks with no limitations  Able to perform work related duties with no limitations.   Able to ambulate community required distances with no limitation   Able to ascend/descend 10 steps with no increase in symptoms.   Able to perform transfers from all surfaces with no limitation   FOTO score improvement to > 61   Independent with HEP for continued improvement in function.        Plan     Plan of care Certification: 5/16/2024 to 8/16/2024.     Outpatient Physical Therapy 1-2 times weekly for 6 weeks to include the following interventions: Manual Therapy, Neuromuscular Re-ed, Patient Education, Therapeutic Activities, and Therapeutic Exercise.     Jonathan Favre, PTA

## 2024-06-06 ENCOUNTER — CLINICAL SUPPORT (OUTPATIENT)
Dept: REHABILITATION | Facility: HOSPITAL | Age: 74
End: 2024-06-06
Payer: MEDICARE

## 2024-06-06 DIAGNOSIS — Z74.09 IMPAIRED FUNCTIONAL MOBILITY AND ACTIVITY TOLERANCE: Primary | ICD-10-CM

## 2024-06-06 PROCEDURE — 97530 THERAPEUTIC ACTIVITIES: CPT | Mod: KX,PN,CQ

## 2024-06-06 PROCEDURE — 97110 THERAPEUTIC EXERCISES: CPT | Mod: KX,PN,CQ

## 2024-06-06 NOTE — PROGRESS NOTES
[facility]   Physical Therapy Treatment Note      Name: Marce Hickey  Clinic Number: [id number]    Therapy Diagnosis:   Encounter Diagnosis   Name Primary?    Impaired functional mobility and activity tolerance Yes     Physician: Sherrie Centeno NP    Visit Date: 6/6/2024    Physician Orders: PT Eval and Treat   Medical Diagnosis from Referral:   M54.51 (ICD-10-CM) - Vertebrogenic low back pain   M47.896 (ICD-10-CM) - Other spondylosis, lumbar region         Evaluation Date: 5/16/2024  Authorization Period Expiration: 12/31/2024  Plan of Care Expiration: 8/16/2024  Progress Note Due: 6/16/2024  Date of Surgery: n/a  Visit # / Visits authorized: 5 / 12  FOTO: 1/ 3     Precautions: Standard      Time In: 7:40 AM  Time Out: 8:40 AM  Total Billable Time: 45 minutes     PTA Visit #: 2/5       Subjective     Patient reports: My back is a little sore today. I think it is that I have just been going and going.   She was compliant with home exercise program.  Response to previous treatment: good   Functional change: moving better, still has some right sided back pain     Pain: 4/10  Location:  right side of lumbar spine     Objective      Objective Measures updated at progress report unless specified.     Treatment     Jaciel received the treatments listed below:      therapeutic exercises to develop strength, endurance, and ROM for 20 minutes including:  Nu-Step - Level 6 x 10 mins   Cable column:   Scapular retraction - waist level - 7# x 15   Rows 7# x 15  Lat Pulldowns  7# x 1  Paloff presses: 3# x 12 each side     manual therapy techniques: Joint mobilizations were applied to the: lumbar spine  for 0  minutes, including:  Prone - extension mobilization of sacrum to encourage flexion in lumbar spine; cross hand MFR lumbar fascia;     neuromuscular re-education activities to improve:  for  minutes. The following activities were included:      therapeutic activities to improve functional  "performance for 25  minutes, including:  Prone: alternate arm and leg x 10  - needs 3 pillows under hips   Supine: same side UE/LE elongation for spinal decompression   Supine: quadratus stretch - 30 sec x 2 on each side   Supine: piriformis stretch - 30 sec x 2 each side   Supine: Knee outs with TrAb x 15  - black band around thigh   Supine: post pelvic tilt - 5" hold x 20  Segmental bridging - black band around thighs - cueing for technique x 15   S/L Clams: black band around thighs x 15 each side   Quadruped - cat > neutral x 10   Seated Lumbar flexion with SB - cueing for segmental flexion with return to neutral forward/backward flexion - 3 way stretches x 3 mins   Supine - 90/90 resting position x 3 mins - then performed H/S stretch with strap - 60 sec on each leg       Patient Education and Home Exercises       Education provided:   - Need to improve segmental motion in lumbar spine to decrease compression -    Written Home Exercises Provided: Patient instructed to cont prior HEP. Exercises were reviewed and Jaciel was able to demonstrate them prior to the end of the session.  Jaciel demonstrated good  understanding of the education provided. See Electronic Medical Record under Patient Instructions for exercises provided during therapy sessions    Assessment     Patient demonstrating reduction in LE radicular symptoms since initiating therapy;  Improvement in ability to activate and hole TrAb mm contraction with LE motion;   Reports feeling much better with movement;    Jaciel Is progressing well towards her goals.   Patient prognosis is Good.     Patient will continue to benefit from skilled outpatient physical therapy to address the deficits listed in the problem list box on initial evaluation, provide pt/family education and to maximize pt's level of independence in the home and community environment.     Patient's spiritual, cultural and educational needs considered and pt agreeable to plan of care and goals.   "   Anticipated barriers to physical therapy: none    Goals:   Short Term Goals: 3 weeks   Demonstrate improvement in recent symptoms to progress toward long term goals  > Progressing   Correct sitting/standing postural deficits to reduce pain and promote postural awareness for injury prevention.  > Progressing   Demonstrate compliance with initial exercise program. > Progressing      Long Term Goals: 6 weeks   Able to perform household tasks with no limitations  Able to perform work related duties with no limitations.   Able to ambulate community required distances with no limitation   Able to ascend/descend 10 steps with no increase in symptoms.   Able to perform transfers from all surfaces with no limitation   FOTO score improvement to > 61   Independent with HEP for continued improvement in function.        Plan     Plan of care Certification: 5/16/2024 to 8/16/2024.     Outpatient Physical Therapy 1-2 times weekly for 6 weeks to include the following interventions: Manual Therapy, Neuromuscular Re-ed, Patient Education, Therapeutic Activities, and Therapeutic Exercise.     Jonathan Favre, PTA

## 2024-06-10 ENCOUNTER — OFFICE VISIT (OUTPATIENT)
Dept: URGENT CARE | Facility: CLINIC | Age: 74
End: 2024-06-10
Payer: MEDICARE

## 2024-06-10 ENCOUNTER — CLINICAL SUPPORT (OUTPATIENT)
Dept: REHABILITATION | Facility: HOSPITAL | Age: 74
End: 2024-06-10
Payer: MEDICARE

## 2024-06-10 VITALS
HEIGHT: 64 IN | DIASTOLIC BLOOD PRESSURE: 88 MMHG | TEMPERATURE: 98 F | RESPIRATION RATE: 18 BRPM | OXYGEN SATURATION: 96 % | WEIGHT: 176.5 LBS | BODY MASS INDEX: 30.13 KG/M2 | SYSTOLIC BLOOD PRESSURE: 127 MMHG | HEART RATE: 75 BPM

## 2024-06-10 DIAGNOSIS — Z74.09 IMPAIRED FUNCTIONAL MOBILITY AND ACTIVITY TOLERANCE: Primary | ICD-10-CM

## 2024-06-10 DIAGNOSIS — H10.9 CONJUNCTIVITIS OF RIGHT EYE, UNSPECIFIED CONJUNCTIVITIS TYPE: Primary | ICD-10-CM

## 2024-06-10 PROCEDURE — 97110 THERAPEUTIC EXERCISES: CPT | Mod: KX,PN,CQ

## 2024-06-10 PROCEDURE — 99213 OFFICE O/P EST LOW 20 MIN: CPT | Mod: S$GLB,,, | Performed by: NURSE PRACTITIONER

## 2024-06-10 PROCEDURE — 97530 THERAPEUTIC ACTIVITIES: CPT | Mod: KX,PN,CQ

## 2024-06-10 RX ORDER — GENTAMICIN SULFATE 3 MG/ML
2 SOLUTION/ DROPS OPHTHALMIC EVERY 4 HOURS
Qty: 10 ML | Refills: 0 | Status: SHIPPED | OUTPATIENT
Start: 2024-06-10

## 2024-06-10 NOTE — PROGRESS NOTES
[facility]   Physical Therapy Treatment Note      Name: Marce Hickey  Clinic Number: [id number]    Therapy Diagnosis:   Encounter Diagnosis   Name Primary?    Impaired functional mobility and activity tolerance Yes     Physician: Sherrie Centeno NP    Visit Date: 6/10/2024    Physician Orders: PT Eval and Treat   Medical Diagnosis from Referral:   M54.51 (ICD-10-CM) - Vertebrogenic low back pain   M47.896 (ICD-10-CM) - Other spondylosis, lumbar region         Evaluation Date: 5/16/2024  Authorization Period Expiration: 12/31/2024  Plan of Care Expiration: 8/16/2024  Progress Note Due: 6/16/2024  Date of Surgery: n/a  Visit # / Visits authorized: 6 / 12  FOTO: 1/ 3     Precautions: Standard      Time In: 7:50 AM  Time Out: 8:40 AM  Total Billable Time: 45 minutes     PTA Visit #: 3/5       Subjective     Patient reports: I woke up kind of achy.   She was compliant with home exercise program.  Response to previous treatment: good   Functional change: moving better, still has some right sided back pain     Pain: 2/10  Location:  right side of lumbar spine     Objective      Objective Measures updated at progress report unless specified.     Treatment     Jaciel received the treatments listed below:      therapeutic exercises to develop strength, endurance, and ROM for 20 minutes including:  Nu-Step - Level 6 x 10 mins   Cable column:   Scapular retraction - waist level - 7# x 20  Rows 7# x 20  Lat Pulldowns  7# x 20  Paloff presses: 3# x 20 each side     manual therapy techniques: Joint mobilizations were applied to the: lumbar spine  for 0  minutes, including:  Prone - extension mobilization of sacrum to encourage flexion in lumbar spine; cross hand MFR lumbar fascia;     neuromuscular re-education activities to improve:  for  minutes. The following activities were included:    therapeutic activities to improve functional performance for 25  minutes, including:  Prone: alternate arm  "and leg x 10  - needs 3 pillows under hips   Supine: same side UE/LE elongation for spinal decompression x 10  Supine: quadratus stretch - 30 sec x 2 on each side   Supine: piriformis stretch - 30 sec x 2 each side   Supine: Knee outs with TrAb x 20  - black band around thigh   Supine: post pelvic tilt - 5" hold x 20  Segmental bridging - black band around thighs - cueing for technique x 20   S/L Clams: black band around thighs x 20 each side   Quadruped - cat > neutral x 10   Seated Lumbar flexion with SB - cueing for segmental flexion with return to neutral forward/backward flexion - 3 way stretches x 3 mins   H/S stretch w/ strap 3 x 30 sec      Patient Education and Home Exercises       Education provided:   - Need to improve segmental motion in lumbar spine to decrease compression -    Written Home Exercises Provided: Patient instructed to cont prior HEP. Exercises were reviewed and Jaciel was able to demonstrate them prior to the end of the session.  Jaciel demonstrated good  understanding of the education provided. See Electronic Medical Record under Patient Instructions for exercises provided during therapy sessions    Assessment     Patient demonstrating reduction in LE radicular symptoms since initiating therapy;  Improvement in ability to activate and hole TrAb mm contraction with LE motion;   Reports feeling much better with movement;    Jaciel Is progressing well towards her goals.   Patient prognosis is Good.     Patient will continue to benefit from skilled outpatient physical therapy to address the deficits listed in the problem list box on initial evaluation, provide pt/family education and to maximize pt's level of independence in the home and community environment.     Patient's spiritual, cultural and educational needs considered and pt agreeable to plan of care and goals.     Anticipated barriers to physical therapy: none    Goals:   Short Term Goals: 3 weeks   Demonstrate improvement in recent " symptoms to progress toward long term goals  > Progressing   Correct sitting/standing postural deficits to reduce pain and promote postural awareness for injury prevention.  > Progressing   Demonstrate compliance with initial exercise program. > Progressing      Long Term Goals: 6 weeks   Able to perform household tasks with no limitations  Able to perform work related duties with no limitations.   Able to ambulate community required distances with no limitation   Able to ascend/descend 10 steps with no increase in symptoms.   Able to perform transfers from all surfaces with no limitation   FOTO score improvement to > 61   Independent with HEP for continued improvement in function.        Plan     Plan of care Certification: 5/16/2024 to 8/16/2024.     Outpatient Physical Therapy 1-2 times weekly for 6 weeks to include the following interventions: Manual Therapy, Neuromuscular Re-ed, Patient Education, Therapeutic Activities, and Therapeutic Exercise.     Jonathan Favre, PTA

## 2024-06-14 ENCOUNTER — CLINICAL SUPPORT (OUTPATIENT)
Dept: REHABILITATION | Facility: HOSPITAL | Age: 74
End: 2024-06-14
Payer: MEDICARE

## 2024-06-14 DIAGNOSIS — Z74.09 IMPAIRED FUNCTIONAL MOBILITY AND ACTIVITY TOLERANCE: Primary | ICD-10-CM

## 2024-06-14 PROCEDURE — 97530 THERAPEUTIC ACTIVITIES: CPT | Mod: PN

## 2024-06-14 PROCEDURE — 97110 THERAPEUTIC EXERCISES: CPT | Mod: PN

## 2024-06-14 NOTE — PROGRESS NOTES
[facility]   Physical Therapy Treatment Note / Progress Report      Name: Marce Hickey  Clinic Number: [id number]    Therapy Diagnosis:   Encounter Diagnosis   Name Primary?    Impaired functional mobility and activity tolerance Yes     Physician: Sherrie Centeno NP    Visit Date: 6/14/2024    Physician Orders: PT Eval and Treat   Medical Diagnosis from Referral:   M54.51 (ICD-10-CM) - Vertebrogenic low back pain   M47.896 (ICD-10-CM) - Other spondylosis, lumbar region         Evaluation Date: 5/16/2024  Authorization Period Expiration: 12/31/2024  Plan of Care Expiration: 8/16/2024  Progress Note Due: 7/14/2024  Date of Surgery: n/a  Visit # / Visits authorized: 7 / 12  FOTO: 2/ 3     Precautions: Standard      Time In: 7:50 AM  Time Out: 8:45 AM  Total Billable Time: 45 minutes     PTA Visit #: 0/5      Subjective     Patient reports: I'm doing very well;  I feel great when I leave here.   She was compliant with home exercise program.  Response to previous treatment: good   Functional change: moving better, still has some right sided back pain     Pain: 1-2 / 10   Location:  right side of lumbar spine     Objective      Objective Measures updated at progress report unless specified.     Lumbar Range of Motion:     Active  Range Pain   Flexion Hands to floor  - easily,   No symptoms    Extension Standing - functional; Prone - can get into JACKELINE  No symptoms     Left Side Bending Hand to mid thigh  Some increased   Tightness    Right Side Bending Hand to mid thigh      Left rotation 40     Right Rotation 40             Treatment     Jaciel received the treatments listed below:      therapeutic exercises to develop strength, endurance, and ROM for 20 minutes including:  Nu-Step - Level 6 x 10 mins   Cable column:   Scapular retraction - waist level - 7# x 20  Rows 7# x 20  Lat Pulldowns  7# x 20  Paloff presses: 3# x 20 each side     manual therapy techniques: Joint mobilizations  "were applied to the: lumbar spine  for 0  minutes, including:  Prone - extension mobilization of sacrum to encourage flexion in lumbar spine; cross hand MFR lumbar fascia;     neuromuscular re-education activities to improve:  for  minutes. The following activities were included:    therapeutic activities to improve functional performance for 25  minutes, including:  Prone: alternate arm and leg x 10  - needs 3 pillows under hips   Supine: same side UE/LE elongation for spinal decompression x 10  Supine: quadratus stretch - 20 sec x 2 on each side   Supine: piriformis stretch - 30 sec x 2 each side   Supine: Knee outs with TrAb x 20  - black band around thigh   Supine: post pelvic tilt - 5" hold x 20  Segmental bridging - black band around thighs - cueing for technique x 20   S/L Clams: black band around thighs x 20 each side   Quadruped - cat > neutral x 10   Seated Lumbar flexion with SB - cueing for segmental flexion with return to neutral forward/backward flexion - 3 way stretches x 3 mins   H/S stretch w/ strap 3 x 30 sec      Patient Education and Home Exercises       Education provided:   - Need to improve segmental motion in lumbar spine to decrease compression -    Written Home Exercises Provided: Patient instructed to cont prior HEP. Exercises were reviewed and Jaciel was able to demonstrate them prior to the end of the session.  Jaciel demonstrated good  understanding of the education provided. See Electronic Medical Record under Patient Instructions for exercises provided during therapy sessions    Assessment     Patient demonstrating reduction in LE radicular symptoms since initiating therapy;  Improvement in ability to activate and hole TrAb mm contraction with LE motion;   Reports feeling much better with movement;    Jaciel Is progressing well towards her goals.   Patient prognosis is Good.     Patient will continue to benefit from skilled outpatient physical therapy to address the deficits listed in the " problem list box on initial evaluation, provide pt/family education and to maximize pt's level of independence in the home and community environment.     Patient's spiritual, cultural and educational needs considered and pt agreeable to plan of care and goals.     Anticipated barriers to physical therapy: none    Goals:   Short Term Goals: 3 weeks   Demonstrate improvement in recent symptoms to progress toward long term goals  > Progressing   Correct sitting/standing postural deficits to reduce pain and promote postural awareness for injury prevention.  > Progressing   Demonstrate compliance with initial exercise program. > Progressing      Long Term Goals: 6 weeks   Able to perform household tasks with no limitations  Able to perform work related duties with no limitations.   Able to ambulate community required distances with no limitation   Able to ascend/descend 10 steps with no increase in symptoms.   Able to perform transfers from all surfaces with no limitation   FOTO score improvement to > 61   Independent with HEP for continued improvement in function.        Plan     Plan of care Certification: 5/16/2024 to 8/16/2024.     Outpatient Physical Therapy 1-2 times weekly for 6 weeks to include the following interventions: Manual Therapy, Neuromuscular Re-ed, Patient Education, Therapeutic Activities, and Therapeutic Exercise.     Kate Shannon, PT

## 2024-06-17 ENCOUNTER — CLINICAL SUPPORT (OUTPATIENT)
Dept: REHABILITATION | Facility: HOSPITAL | Age: 74
End: 2024-06-17
Payer: MEDICARE

## 2024-06-17 DIAGNOSIS — Z74.09 IMPAIRED FUNCTIONAL MOBILITY AND ACTIVITY TOLERANCE: Primary | ICD-10-CM

## 2024-06-17 PROCEDURE — 97110 THERAPEUTIC EXERCISES: CPT | Mod: KX,PN

## 2024-06-17 PROCEDURE — 97140 MANUAL THERAPY 1/> REGIONS: CPT | Mod: KX,PN

## 2024-06-17 PROCEDURE — 97530 THERAPEUTIC ACTIVITIES: CPT | Mod: KX,PN

## 2024-06-17 NOTE — PROGRESS NOTES
[facility]PAT[facility] THERAPY AND WELLNESS   Physical Therapy Treatment Note      Name: Marce Hickey  Clinic Number: [id number]    Therapy Diagnosis:   Encounter Diagnosis   Name Primary?    Impaired functional mobility and activity tolerance Yes     Physician: Sherrie Centeno NP    Visit Date: 6/17/2024    Physician Orders: PT Eval and Treat   Medical Diagnosis from Referral:   M54.51 (ICD-10-CM) - Vertebrogenic low back pain   M47.896 (ICD-10-CM) - Other spondylosis, lumbar region         Evaluation Date: 5/16/2024  Authorization Period Expiration: 12/31/2024  Plan of Care Expiration: 8/16/2024  Progress Note Due: 7/14/2024  Date of Surgery: n/a  Visit # / Visits authorized: 9 / 12  FOTO: 2/ 3     Precautions: Standard      Time In: 7:55 AM  Time Out: 8:45 AM  Total Billable Time: 45 minutes     PTA Visit #: 0/5      Subjective     Patient reports: I'm hurting today - not sure why, it might be the weather.   She was compliant with home exercise program.  Response to previous treatment: good   Functional change: overall continues to move with less pain;     Pain:  4-5/ 10   Location:  right side of lumbar spine     Objective      Objective Measures updated at progress report unless specified.      Treatment     Jaciel received the treatments listed below:      therapeutic exercises to develop strength, endurance, and ROM for 10 minutes including:  Nu-Step - Level 6 x 10 mins   Cable column:   Scapular retraction - waist level - 7# x 20  Rows 7# x 20  Lat Pulldowns  7# x 20  Paloff presses: 3# x 20 each side     manual therapy techniques: Joint mobilizations were applied to the: lumbar spine  for 15  minutes, including:  Prone - extension mobilization of sacrum to encourage flexion in lumbar spine; cross hand MFR lumbar fascia;  IASTM to lumbar fascial and posterior pelvic rim to address fascia tightness.   S/B to lumbar spine in prone to facilitate facet opeing.     neuromuscular re-education activities to  "improve:  for  minutes. The following activities were included:    therapeutic activities to improve functional performance for 20  minutes, including:  Prone: alternate arm and leg x 10  - needs 3 pillows under hips   Supine: same side UE/LE elongation for spinal decompression x 10  Supine: quadratus stretch - 20 sec x 2 on each side   Supine: piriformis stretch - 30 sec x 2 each side   Supine: Knee outs with TrAb x 20  - black band around thigh   Supine: post pelvic tilt - 5" hold x 20  Segmental bridging - black band around thighs - cueing for technique x 20   S/L Clams: black band around thighs x 20 each side   Quadruped - cat > neutral x 10   Seated Lumbar flexion with SB - cueing for segmental flexion with return to neutral forward/backward flexion - 3 way stretches x 3 mins   H/S stretch w/ strap 3 x 30 sec      Patient Education and Home Exercises       Education provided:   - Need to improve segmental motion in lumbar spine to decrease compression -    Written Home Exercises Provided: Patient instructed to cont prior HEP. Exercises were reviewed and Jaciel was able to demonstrate them prior to the end of the session.  Jaciel demonstrated good  understanding of the education provided. See Electronic Medical Record under Patient Instructions for exercises provided during therapy sessions    Assessment     Patient demonstrating reduction in LE radicular symptoms since initiating therapy;  Improvement in ability to activate and hole TrAb mm contraction with LE motion;   Responded well to manual tx today in reduction of lower back pain c/o today.     Jaciel Is progressing well towards her goals.   Patient prognosis is Good.     Patient will continue to benefit from skilled outpatient physical therapy to address the deficits listed in the problem list box on initial evaluation, provide pt/family education and to maximize pt's level of independence in the home and community environment.     Patient's spiritual, cultural " and educational needs considered and pt agreeable to plan of care and goals.     Anticipated barriers to physical therapy: none    Goals:   Short Term Goals: 3 weeks   Demonstrate improvement in recent symptoms to progress toward long term goals  > MET  Correct sitting/standing postural deficits to reduce pain and promote postural awareness for injury prevention.  > MET  Demonstrate compliance with initial exercise program. > MET      Long Term Goals: 6 weeks   Able to perform household tasks with no limitations > Progressing   Able to perform work related duties with no limitations.  > Progressing   Able to ambulate community required distances with no limitation  > MET   Able to ascend/descend 10 steps with no increase in symptoms.  > MET   Able to perform transfers from all surfaces with no limitation  > MET   FOTO score improvement to > 61   Independent with HEP for continued improvement in function.        Plan     Plan of care Certification: 5/16/2024 to 8/16/2024.     Outpatient Physical Therapy 1-2 times weekly for 6 weeks to include the following interventions: Manual Therapy, Neuromuscular Re-ed, Patient Education, Therapeutic Activities, and Therapeutic Exercise.     Kate Shannon, PT

## 2024-06-21 ENCOUNTER — CLINICAL SUPPORT (OUTPATIENT)
Dept: REHABILITATION | Facility: HOSPITAL | Age: 74
End: 2024-06-21
Payer: MEDICARE

## 2024-06-21 DIAGNOSIS — Z74.09 IMPAIRED FUNCTIONAL MOBILITY AND ACTIVITY TOLERANCE: Primary | ICD-10-CM

## 2024-06-21 PROCEDURE — 97530 THERAPEUTIC ACTIVITIES: CPT | Mod: KX,PN

## 2024-06-21 PROCEDURE — 97110 THERAPEUTIC EXERCISES: CPT | Mod: KX,PN

## 2024-06-21 NOTE — PROGRESS NOTES
[facility]   Physical Therapy Treatment Note      Name: Marce Hickey  Clinic Number: [id number]    Therapy Diagnosis:   Encounter Diagnosis   Name Primary?    Impaired functional mobility and activity tolerance Yes     Physician: Sherrie Centeno NP    Visit Date: 6/21/2024    Physician Orders: PT Eval and Treat   Medical Diagnosis from Referral:   M54.51 (ICD-10-CM) - Vertebrogenic low back pain   M47.896 (ICD-10-CM) - Other spondylosis, lumbar region         Evaluation Date: 5/16/2024  Authorization Period Expiration: 12/31/2024  Plan of Care Expiration: 8/16/2024  Progress Note Due: 7/14/2024  Date of Surgery: n/a  Visit # / Visits authorized: 10 / 12  FOTO: 2/ 3     Precautions: Standard      Time In: 7:55 AM  Time Out: 8:45 AM  Total Billable Time: 45 minutes     PTA Visit #: 0/5      Subjective     Patient reports: I'm doing well this morning   She was compliant with home exercise program.  Response to previous treatment: good   Functional change: overall continues to move with less pain;     Pain:  0-1/ 10   Location:  right side of lumbar spine     Objective      Objective Measures updated at progress report unless specified.      Treatment     Jaciel received the treatments listed below:      therapeutic exercises to develop strength, endurance, and ROM for 10 minutes including:  Nu-Step - Level 6 x 10 mins   Cable column:   Scapular retraction - waist level - 7# x 20  Rows 7# x 20  Lat Pulldowns  7# x 20  Paloff presses: 3# x 20 each side     manual therapy techniques: Joint mobilizations were applied to the: lumbar spine  for 0  minutes, including:  Prone - extension mobilization of sacrum to encourage flexion in lumbar spine; cross hand MFR lumbar fascia;  IASTM to lumbar fascial and posterior pelvic rim to address fascia tightness.   S/B to lumbar spine in prone to facilitate facet opeing.     neuromuscular re-education activities to improve:  for  minutes. The  "following activities were included:    therapeutic activities to improve functional performance for 25  minutes, including:  Prone: alternate arm and leg x 10  - needs 3 pillows under hips   Supine: same side UE/LE elongation for spinal decompression x 10  Supine: quadratus stretch - 20 sec x 2 on each side   Supine: piriformis stretch - 30 sec x 2 each side   Supine: Knee outs with TrAb x 20  - black band around thigh   Supine: post pelvic tilt - 5" hold x 20  Segmental bridging - black band around thighs - cueing for technique x 20   S/L Clams: black band around thighs x 20 each side   Quadruped - cat > neutral x 10   Seated Lumbar flexion with SB - cueing for segmental flexion with return to neutral forward/backward flexion - 3 way stretches x 3 mins   H/S stretch w/ strap 3 x 30 sec      Patient Education and Home Exercises       Education provided:   - Need to improve segmental motion in lumbar spine to decrease compression -    Written Home Exercises Provided: Patient instructed to cont prior HEP. Exercises were reviewed and Jaciel was able to demonstrate them prior to the end of the session.  Jaciel demonstrated good  understanding of the education provided. See Electronic Medical Record under Patient Instructions for exercises provided during therapy sessions    Assessment     Patient demonstrating reduction in LE radicular symptoms since initiating therapy;  Improvement in ability to activate and hold TrAb mm contraction with LE motion;   Well versed in all of her exercises and performs them with good technique.     Jaciel Is progressing well towards her goals.   Patient prognosis is Good.     Patient will continue to benefit from skilled outpatient physical therapy to address the deficits listed in the problem list box on initial evaluation, provide pt/family education and to maximize pt's level of independence in the home and community environment.     Patient's spiritual, cultural and educational needs " considered and pt agreeable to plan of care and goals.     Anticipated barriers to physical therapy: none    Goals:   Short Term Goals: 3 weeks   Demonstrate improvement in recent symptoms to progress toward long term goals  > MET  Correct sitting/standing postural deficits to reduce pain and promote postural awareness for injury prevention.  > MET  Demonstrate compliance with initial exercise program. > MET      Long Term Goals: 6 weeks   Able to perform household tasks with no limitations > Progressing   Able to perform work related duties with no limitations.  > Progressing   Able to ambulate community required distances with no limitation  > MET   Able to ascend/descend 10 steps with no increase in symptoms.  > MET   Able to perform transfers from all surfaces with no limitation  > MET   FOTO score improvement to > 61   Independent with HEP for continued improvement in function.        Plan     Plan of care Certification: 5/16/2024 to 8/16/2024.     Outpatient Physical Therapy 1-2 times weekly for 6 weeks to include the following interventions: Manual Therapy, Neuromuscular Re-ed, Patient Education, Therapeutic Activities, and Therapeutic Exercise.     Kate Shannon, PT

## 2024-06-24 ENCOUNTER — CLINICAL SUPPORT (OUTPATIENT)
Dept: REHABILITATION | Facility: HOSPITAL | Age: 74
End: 2024-06-24
Payer: MEDICARE

## 2024-06-24 DIAGNOSIS — Z74.09 IMPAIRED FUNCTIONAL MOBILITY AND ACTIVITY TOLERANCE: Primary | ICD-10-CM

## 2024-06-24 PROCEDURE — 97110 THERAPEUTIC EXERCISES: CPT | Mod: KX,PN,CQ

## 2024-06-24 PROCEDURE — 97530 THERAPEUTIC ACTIVITIES: CPT | Mod: KX,PN,CQ

## 2024-06-24 NOTE — PROGRESS NOTES
[facility]   Physical Therapy Treatment Note      Name: Marce Hickey  Clinic Number: [id number]    Therapy Diagnosis:   Encounter Diagnosis   Name Primary?    Impaired functional mobility and activity tolerance Yes     Physician: Sherrie Centeno NP    Visit Date: 6/24/2024    Physician Orders: PT Eval and Treat   Medical Diagnosis from Referral:   M54.51 (ICD-10-CM) - Vertebrogenic low back pain   M47.896 (ICD-10-CM) - Other spondylosis, lumbar region         Evaluation Date: 5/16/2024  Authorization Period Expiration: 12/31/2024  Plan of Care Expiration: 8/16/2024  Progress Note Due: 7/14/2024  Date of Surgery: n/a  Visit # / Visits authorized: 11 / 12  FOTO: 2/ 3     Precautions: Standard      Time In: 7:55 AM  Time Out: 8:50 AM  Total Billable Time: 40 minutes     PTA Visit #: 1/5      Subjective     Patient reports: I woke up with about 4/10 pain in my back, but as I move, it's loosening up.   She was compliant with home exercise program.  Response to previous treatment: good   Functional change: overall continues to move with less pain;     Pain:  4/ 10   Location:  right side of lumbar spine     Objective      Objective Measures updated at progress report unless specified.      Treatment     Jaciel received the treatments listed below:      therapeutic exercises to develop strength, endurance, and ROM for 15 minutes including:  Nu-Step - Level 6 x 10 mins   Cable column:   Scapular retraction - waist level - 7# x 20  Rows 7# x 20  Lat Pulldowns  7# x 20  Paloff presses: 3# x 20 each side     manual therapy techniques: Joint mobilizations were applied to the: lumbar spine  for 0  minutes, including:  Prone - extension mobilization of sacrum to encourage flexion in lumbar spine; cross hand MFR lumbar fascia;  IASTM to lumbar fascial and posterior pelvic rim to address fascia tightness.   S/B to lumbar spine in prone to facilitate facet opeing.     neuromuscular re-education  "activities to improve:  for  minutes. The following activities were included:    therapeutic activities to improve functional performance for 25  minutes, including:  Prone: alternate arm and leg x 10  - needs 3 pillows under hips   Supine: LE elongation x 10  Supine: quadratus stretch - 30 sec x 2 on each side   Supine: piriformis stretch - 30 sec x 2 each side   Supine: Knee outs with TrAb x 20  - black band around thigh   Supine: post pelvic tilt - 5" hold x 20  Segmental bridging - black band around thighs - cueing for technique x 20   S/L Clams: black band around thighs x 20 each side   Quadruped - cat > neutral x 10   Seated Lumbar flexion with SB - cueing for segmental flexion with return to neutral forward/backward flexion - 3 way stretches x 3 mins   H/S stretch w/ strap 3 x 30 sec      Patient Education and Home Exercises       Education provided:   - Need to improve segmental motion in lumbar spine to decrease compression -    Written Home Exercises Provided: Patient instructed to cont prior HEP. Exercises were reviewed and Jaciel was able to demonstrate them prior to the end of the session.  Jaciel demonstrated good  understanding of the education provided. See Electronic Medical Record under Patient Instructions for exercises provided during therapy sessions    Assessment     Patient demonstrating reduction in LE radicular symptoms since initiating therapy;  Improvement in ability to activate and hold TrAb mm contraction with LE motion;   Well versed in all of her exercises and performs them with good technique.     Jaciel Is progressing well towards her goals.   Patient prognosis is Good.     Patient will continue to benefit from skilled outpatient physical therapy to address the deficits listed in the problem list box on initial evaluation, provide pt/family education and to maximize pt's level of independence in the home and community environment.     Patient's spiritual, cultural and educational needs " considered and pt agreeable to plan of care and goals.     Anticipated barriers to physical therapy: none    Goals:   Short Term Goals: 3 weeks   Demonstrate improvement in recent symptoms to progress toward long term goals  > MET  Correct sitting/standing postural deficits to reduce pain and promote postural awareness for injury prevention.  > MET  Demonstrate compliance with initial exercise program. > MET      Long Term Goals: 6 weeks   Able to perform household tasks with no limitations > Progressing   Able to perform work related duties with no limitations.  > Progressing   Able to ambulate community required distances with no limitation  > MET   Able to ascend/descend 10 steps with no increase in symptoms.  > MET   Able to perform transfers from all surfaces with no limitation  > MET   FOTO score improvement to > 61   Independent with HEP for continued improvement in function.        Plan     Plan of care Certification: 5/16/2024 to 8/16/2024.     Outpatient Physical Therapy 1-2 times weekly for 6 weeks to include the following interventions: Manual Therapy, Neuromuscular Re-ed, Patient Education, Therapeutic Activities, and Therapeutic Exercise.     Jonathan Favre, PTA

## 2024-06-27 ENCOUNTER — CLINICAL SUPPORT (OUTPATIENT)
Dept: REHABILITATION | Facility: HOSPITAL | Age: 74
End: 2024-06-27
Payer: MEDICARE

## 2024-06-27 DIAGNOSIS — Z74.09 IMPAIRED FUNCTIONAL MOBILITY AND ACTIVITY TOLERANCE: Primary | ICD-10-CM

## 2024-06-27 PROCEDURE — 97530 THERAPEUTIC ACTIVITIES: CPT | Mod: KX,PN

## 2024-06-27 PROCEDURE — 97110 THERAPEUTIC EXERCISES: CPT | Mod: KX,PN

## 2024-06-27 NOTE — PROGRESS NOTES
[facility]   Physical Therapy Treatment Note      Name: Marce Hickey  [facility] Number: [id number]    Therapy Diagnosis:   Encounter Diagnosis   Name Primary?    Impaired functional mobility and activity tolerance Yes     Physician: Sherrie Centeno NP    Visit Date: 6/27/2024    Physician Orders: PT Eval and Treat   Medical Diagnosis from Referral:   M54.51 (ICD-10-CM) - Vertebrogenic low back pain   M47.896 (ICD-10-CM) - Other spondylosis, lumbar region         Evaluation Date: 5/16/2024  Authorization Period Expiration: 12/31/2024  Plan of Care Expiration: 8/16/2024  Progress Note Due: 7/14/2024  Date of Surgery: n/a  Visit # / Visits authorized: 12 / 12  FOTO: 2/ 3     Precautions: Standard      Time In: 7:55 AM  Time Out: 8:50 AM  Total Billable Time: 40 minutes     PTA Visit #: 0/5      Subjective     Patient reports: This has really helped me - more than anything I have done for my back; Jaciel returns to referring NP next week, will plan to continue with therapy.   She was compliant with home exercise program.  Response to previous treatment: good   Functional change: overall continues to move with less pain;     Pain:  3/ 10   Location:  right side of lumbar spine     Objective      Objective Measures updated at progress report unless specified.      Treatment     Jaciel received the treatments listed below:      therapeutic exercises to develop strength, endurance, and ROM for 15 minutes including:  Nu-Step - Level 6 x 10 mins   Cable column:   Scapular retraction - waist level - 7# x 20  Rows 7# x 20  Lat Pulldowns  7# x 20  Paloff presses: 3# x 20 each side     manual therapy techniques: Joint mobilizations were applied to the: lumbar spine  for 0  minutes, including:  Prone - extension mobilization of sacrum to encourage flexion in lumbar spine; cross hand MFR lumbar fascia;  IASTM to lumbar fascial and posterior pelvic rim to address fascia tightness.   S/B to lumbar spine  "in prone to facilitate facet opeing.     neuromuscular re-education activities to improve:  for  minutes. The following activities were included:    therapeutic activities to improve functional performance for 25  minutes, including:  Prone: alternate arm and leg x 10  - needs 3 pillows under hips   Supine: LE elongation x 10  Supine: quadratus stretch - 30 sec x 2 on each side   Supine: piriformis stretch - 30 sec x 2 each side   Supine: Knee outs with TrAb x 20  - black band around thigh   Supine: post pelvic tilt - 5" hold x 20  Segmental bridging - black band around thighs - cueing for technique x 20   S/L Clams: black band around thighs x 20 each side   Quadruped - cat > neutral x 10   Seated Lumbar flexion with SB - cueing for segmental flexion with return to neutral forward/backward flexion - 3 way stretches x 3 mins   H/S stretch w/ strap 3 x 30 sec      Patient Education and Home Exercises       Education provided:   - Need to improve segmental motion in lumbar spine to decrease compression -    Written Home Exercises Provided: Patient instructed to cont prior HEP. Exercises were reviewed and Jaciel was able to demonstrate them prior to the end of the session.  Jaciel demonstrated good  understanding of the education provided. See Electronic Medical Record under Patient Instructions for exercises provided during therapy sessions    Assessment     Patient demonstrating reduction in LE radicular symptoms since initiating therapy;  Improvement in ability to activate and hold TrAb mm contraction with LE motion;   Well versed in all of her exercises and performs them with good technique.  Feel patient would benefit from short continued course of PT to advance exercises to address lumbar pain and mobility     Jaciel Is progressing well towards her goals.   Patient prognosis is Good.     Patient will continue to benefit from skilled outpatient physical therapy to address the deficits listed in the problem list box on " initial evaluation, provide pt/family education and to maximize pt's level of independence in the home and community environment.     Patient's spiritual, cultural and educational needs considered and pt agreeable to plan of care and goals.     Anticipated barriers to physical therapy: none    Goals:   Short Term Goals: 3 weeks   Demonstrate improvement in recent symptoms to progress toward long term goals  > MET  Correct sitting/standing postural deficits to reduce pain and promote postural awareness for injury prevention.  > MET  Demonstrate compliance with initial exercise program. > MET      Long Term Goals: 6 weeks   Able to perform household tasks with no limitations > Progressing   Able to perform work related duties with no limitations.  > Progressing   Able to ambulate community required distances with no limitation  > MET   Able to ascend/descend 10 steps with no increase in symptoms.  > MET   Able to perform transfers from all surfaces with no limitation  > MET   FOTO score improvement to > 61   Independent with HEP for continued improvement in function.        Plan     Plan of care Certification: 5/16/2024 to 8/16/2024.     Outpatient Physical Therapy 1-2 times weekly for 6 weeks to include the following interventions: Manual Therapy, Neuromuscular Re-ed, Patient Education, Therapeutic Activities, and Therapeutic Exercise.     Kate Shannon, PT

## 2024-07-09 ENCOUNTER — CLINICAL SUPPORT (OUTPATIENT)
Dept: REHABILITATION | Facility: HOSPITAL | Age: 74
End: 2024-07-09
Payer: MEDICARE

## 2024-07-09 ENCOUNTER — OFFICE VISIT (OUTPATIENT)
Dept: FAMILY MEDICINE | Facility: CLINIC | Age: 74
End: 2024-07-09
Payer: MEDICARE

## 2024-07-09 VITALS
OXYGEN SATURATION: 98 % | HEART RATE: 65 BPM | BODY MASS INDEX: 30.71 KG/M2 | WEIGHT: 178.88 LBS | SYSTOLIC BLOOD PRESSURE: 135 MMHG | DIASTOLIC BLOOD PRESSURE: 88 MMHG

## 2024-07-09 DIAGNOSIS — M19.049 HAND ARTHRITIS: ICD-10-CM

## 2024-07-09 DIAGNOSIS — Z74.09 IMPAIRED FUNCTIONAL MOBILITY AND ACTIVITY TOLERANCE: Primary | ICD-10-CM

## 2024-07-09 DIAGNOSIS — Z98.890 STATUS POST GASTRIC SURGERY: ICD-10-CM

## 2024-07-09 DIAGNOSIS — I10 ESSENTIAL HYPERTENSION: ICD-10-CM

## 2024-07-09 DIAGNOSIS — F41.1 GAD (GENERALIZED ANXIETY DISORDER): Primary | ICD-10-CM

## 2024-07-09 DIAGNOSIS — G89.29 CHRONIC MIDLINE LOW BACK PAIN WITHOUT SCIATICA: ICD-10-CM

## 2024-07-09 DIAGNOSIS — F33.1 MODERATE EPISODE OF RECURRENT MAJOR DEPRESSIVE DISORDER: ICD-10-CM

## 2024-07-09 DIAGNOSIS — M54.50 CHRONIC MIDLINE LOW BACK PAIN WITHOUT SCIATICA: ICD-10-CM

## 2024-07-09 PROCEDURE — 99214 OFFICE O/P EST MOD 30 MIN: CPT | Mod: S$GLB,,, | Performed by: STUDENT IN AN ORGANIZED HEALTH CARE EDUCATION/TRAINING PROGRAM

## 2024-07-09 PROCEDURE — 99999 PR PBB SHADOW E&M-EST. PATIENT-LVL III: CPT | Mod: PBBFAC,,, | Performed by: STUDENT IN AN ORGANIZED HEALTH CARE EDUCATION/TRAINING PROGRAM

## 2024-07-09 PROCEDURE — 97530 THERAPEUTIC ACTIVITIES: CPT | Mod: KX,PN

## 2024-07-09 PROCEDURE — 4010F ACE/ARB THERAPY RXD/TAKEN: CPT | Mod: CPTII,S$GLB,, | Performed by: STUDENT IN AN ORGANIZED HEALTH CARE EDUCATION/TRAINING PROGRAM

## 2024-07-09 PROCEDURE — 97110 THERAPEUTIC EXERCISES: CPT | Mod: KX,PN

## 2024-07-09 PROCEDURE — 3008F BODY MASS INDEX DOCD: CPT | Mod: CPTII,S$GLB,, | Performed by: STUDENT IN AN ORGANIZED HEALTH CARE EDUCATION/TRAINING PROGRAM

## 2024-07-09 PROCEDURE — 3079F DIAST BP 80-89 MM HG: CPT | Mod: CPTII,S$GLB,, | Performed by: STUDENT IN AN ORGANIZED HEALTH CARE EDUCATION/TRAINING PROGRAM

## 2024-07-09 PROCEDURE — 3075F SYST BP GE 130 - 139MM HG: CPT | Mod: CPTII,S$GLB,, | Performed by: STUDENT IN AN ORGANIZED HEALTH CARE EDUCATION/TRAINING PROGRAM

## 2024-07-09 PROCEDURE — 3044F HG A1C LEVEL LT 7.0%: CPT | Mod: CPTII,S$GLB,, | Performed by: STUDENT IN AN ORGANIZED HEALTH CARE EDUCATION/TRAINING PROGRAM

## 2024-07-09 RX ORDER — HYDROCHLOROTHIAZIDE 12.5 MG/1
12.5 CAPSULE ORAL DAILY
Qty: 30 CAPSULE | Refills: 11 | Status: SHIPPED | OUTPATIENT
Start: 2024-07-09 | End: 2025-07-09

## 2024-07-09 NOTE — PROGRESS NOTES
[facility]   Physical Therapy Treatment Note      Name: Marce Hickey  Clinic Number: [id number]    Therapy Diagnosis:   Encounter Diagnosis   Name Primary?    Impaired functional mobility and activity tolerance Yes     Physician: Sherrie Centeno NP    Visit Date: 7/9/2024    Physician Orders: PT Eval and Treat   Medical Diagnosis from Referral:   M54.51 (ICD-10-CM) - Vertebrogenic low back pain   M47.896 (ICD-10-CM) - Other spondylosis, lumbar region         Evaluation Date: 5/16/2024  Authorization Period Expiration: 12/31/2024  Plan of Care Expiration: 8/16/2024  Progress Note Due: 7/14/2024  Date of Surgery: n/a  Visit # / Visits authorized: 13 / 20  FOTO: 2/ 3     Precautions: Standard      Time In: 8:25 AM  Time Out: 9:10 am   Total Billable Time: 40 minutes     PTA Visit #: 0/5      Subjective     Patient reports: This has really helped me - more than anything I have done for my back; Went back to see NP - pleased with her progress,   She was compliant with home exercise program.  Response to previous treatment: good   Functional change: overall continues to move with less pain;  Did try some water aerobics - stated that she went twice - had back pain after the activities.     Pain:  3/ 10   Location:  right side of lumbar spine     Objective      Objective Measures updated at progress report unless specified.      Treatment     Jaciel received the treatments listed below:      therapeutic exercises to develop strength, endurance, and ROM for 15 minutes including:  Nu-Step - Level 6 x 10 mins   Cable column:   Scapular retraction - waist level - 7# x 20  Rows 7# x 20  Lat Pulldowns  7# x 20  Paloff presses: 7# x 20 each side     manual therapy techniques: Joint mobilizations were applied to the: lumbar spine  for 0  minutes, including:  Prone - extension mobilization of sacrum to encourage flexion in lumbar spine; cross hand MFR lumbar fascia;  IASTM to lumbar fascial and  "posterior pelvic rim to address fascia tightness.   S/B to lumbar spine in prone to facilitate facet opeing.     neuromuscular re-education activities to improve:  for  minutes. The following activities were included:    therapeutic activities to improve functional performance for 25  minutes, including:  Prone: alternate arm and leg x 10  - needs 3 pillows under hips   Supine: LE elongation x 10  Supine: quadratus stretch - 30 sec x 2 on each side   Supine: piriformis stretch - 30 sec x 2 each side   Supine: Knee outs with TrAb x 20  - black band around thigh   Supine: post pelvic tilt - 5" hold x 20  Segmental bridging - black band around thighs - cueing for technique x 20   S/L Clams: black band around thighs x 20 each side   Quadruped - cat > neutral x 10   Seated Lumbar flexion with SB - cueing for segmental flexion with return to neutral forward/backward flexion - 3 way stretches x 3 mins   H/S stretch w/ strap 3 x 30 sec      Patient Education and Home Exercises       Education provided:   - Need to improve segmental motion in lumbar spine to decrease compression -    Written Home Exercises Provided: Patient instructed to cont prior HEP. Exercises were reviewed and Jaciel was able to demonstrate them prior to the end of the session.  Jaciel demonstrated good  understanding of the education provided. See Electronic Medical Record under Patient Instructions for exercises provided during therapy sessions    Assessment     Patient demonstrating reduction in LE radicular symptoms since initiating therapy;  Improvement in ability to activate and hold TrAb mm contraction with LE motion;  Improved standing tolerance - 20 mins without difficulty;  improved walking tolerance;  Well versed in all of her exercises and performs them with good technique.  Feel patient would benefit from short continued course of PT to advance exercises to address lumbar pain and mobility  Will progress exercises for more core mm activation. "     Jaciel Is progressing well towards her goals.   Patient prognosis is Good.     Patient will continue to benefit from skilled outpatient physical therapy to address the deficits listed in the problem list box on initial evaluation, provide pt/family education and to maximize pt's level of independence in the home and community environment.     Patient's spiritual, cultural and educational needs considered and pt agreeable to plan of care and goals.     Anticipated barriers to physical therapy: none    Goals:   Short Term Goals: 3 weeks   Demonstrate improvement in recent symptoms to progress toward long term goals  > MET  Correct sitting/standing postural deficits to reduce pain and promote postural awareness for injury prevention.  > MET  Demonstrate compliance with initial exercise program. > MET      Long Term Goals: 6 weeks   Able to perform household tasks with no limitations > Progressing   Able to perform work related duties with no limitations.  > Progressing   Able to ambulate community required distances with no limitation  > MET   Able to ascend/descend 10 steps with no increase in symptoms.  > MET   Able to perform transfers from all surfaces with no limitation  > MET   FOTO score improvement to > 61   Independent with HEP for continued improvement in function.        Plan     Plan of care Certification: 5/16/2024 to 8/16/2024.     Outpatient Physical Therapy 1-2 times weekly for 6 weeks to include the following interventions: Manual Therapy, Neuromuscular Re-ed, Patient Education, Therapeutic Activities, and Therapeutic Exercise.     Kate Shannon, PT

## 2024-07-09 NOTE — ASSESSMENT & PLAN NOTE
Discussed changing from her sweet snacks to sugar free options and try to wean that down and replace with different options.

## 2024-07-09 NOTE — PROGRESS NOTES
Subjective:       Patient ID: Marce Hickey is a 74 y.o. female.    Chief Complaint: Obesity (/)    Ms Hickey is following up for   Chronic back pain that is unchanged.  She is doing therapy and exercises and seeing [facility].    She is seeing therapy but not always a good fit for her.  She is having issues with food addition/emotional connection and feels she cannot change her diet well or long term due to this.      She has some elevations of her bp  She is having some swelling of her ankles    Also with chronic hand arthritis  She is using lidocaine patches for this and voltaren gel.        .  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    Review of Systems   Constitutional:  Negative for activity change and appetite change.   Respiratory:  Negative for shortness of breath.    Cardiovascular:  Negative for chest pain.   Gastrointestinal:  Negative for abdominal pain.   Genitourinary:  Negative for dysuria.   Integumentary:  Negative for rash.   Psychiatric/Behavioral:  Positive for dysphoric mood. Negative for sleep disturbance. The patient is nervous/anxious.          Health  Maintenance Due   Topic Date Due    RSV Vaccine (Age 60+ and Pregnant patients) (1 - 1-dose 60+ series) Never done    Shingles Vaccine (1 of 2) 11/27/2015    COVID-19 Vaccine (6 - 2023-24 season) 09/01/2023      Health Maintenance reviewed and discussed- Stable. Continue current medications and regular followup..     Objective:      Physical Exam  Constitutional:       General: She is not in acute distress.     Appearance: Normal appearance. She is not ill-appearing.   Eyes:      Conjunctiva/sclera: Conjunctivae normal.   Cardiovascular:      Rate and Rhythm: Normal rate and regular rhythm.      Heart sounds: Normal heart sounds. No murmur heard.  Pulmonary:      Effort: Pulmonary effort is normal. No respiratory distress.      Breath sounds: Normal breath sounds.   Abdominal:      Palpations: Abdomen is soft.   Musculoskeletal:      Right lower leg: No edema.      Left lower leg: No edema.   Skin:     General: Skin is warm and dry.   Neurological:      Mental Status: She is alert. Mental status is at baseline.      Gait: Gait normal.   Psychiatric:         Mood and Affect: Mood normal.         Behavior: Behavior normal.         Assessment:       1. CEASAR (generalized anxiety disorder)    2. Moderate episode of recurrent major depressive disorder    3. Essential hypertension    4. Chronic midline low back pain without sciatica    5. Hand arthritis    6. Status post gastric surgery        Plan:       1. CEASAR (generalized anxiety disorder)  Assessment & Plan:  Stable. Continue current medications and regular followup.      Orders:  -     Ambulatory referral/consult to Primary [facility] Behavioral [facility] (Non-Opioids); Future; Expected date: 07/16/2024    2. Moderate episode of recurrent major depressive disorder  Assessment & Plan:  Will try to get her a better plan for therapy      3. Essential hypertension  Assessment & Plan:  D/c norvasc and start hctz along with her losartan  BP Readings from Last 3 Encounters:   07/09/24  135/88   06/10/24 127/88   05/06/24 129/83     Monitor    Orders:  -     hydroCHLOROthiazide (MICROZIDE) 12.5 mg capsule; Take 1 capsule (12.5 mg total) by mouth once daily.  Dispense: 30 capsule; Refill: 11    4. Chronic midline low back pain without sciatica  Comments:  Please continue to see ortho, pt    5. Hand arthritis  Comments:  use voltaren as needed. discussed coq10, glucosamine/chondrointin and paraffin wax therapy    6. Status post gastric surgery  Overview:  Gastric sleeve    Assessment & Plan:  Discussed changing from her sweet snacks to sugar free options and try to wean that down and replace with different options.

## 2024-07-09 NOTE — ASSESSMENT & PLAN NOTE
D/c norvasc and start hctz along with her losartan  BP Readings from Last 3 Encounters:   07/09/24 135/88   06/10/24 127/88   05/06/24 129/83     Monitor

## 2024-07-10 ENCOUNTER — TELEPHONE (OUTPATIENT)
Dept: FAMILY MEDICINE | Facility: CLINIC | Age: 74
End: 2024-07-10
Payer: MEDICARE

## 2024-07-10 NOTE — TELEPHONE ENCOUNTER
Spoke with pt in regards to medication.   D/c norvasc and start hctz along with her losartan   Pt voiced understanding.

## 2024-07-10 NOTE — TELEPHONE ENCOUNTER
----- Message from Sherin Palomino sent at 7/10/2024  7:46 AM CDT -----  Contact: pt  Type: Needs Medical Advice         Who Called: pT  Best Call Back Number:[phone]  Additional Information: Requesting a call back regarding Pt is needing office to call her. Pt was told to stop taking a rx yesterday and she can not remember which one.   Please Advise- Thank you

## 2024-07-11 ENCOUNTER — PATIENT MESSAGE (OUTPATIENT)
Dept: PSYCHIATRY | Facility: CLINIC | Age: 74
End: 2024-07-11
Payer: MEDICARE

## 2024-07-11 ENCOUNTER — CLINICAL SUPPORT (OUTPATIENT)
Dept: REHABILITATION | Facility: HOSPITAL | Age: 74
End: 2024-07-11
Payer: MEDICARE

## 2024-07-11 DIAGNOSIS — Z74.09 IMPAIRED FUNCTIONAL MOBILITY AND ACTIVITY TOLERANCE: Primary | ICD-10-CM

## 2024-07-11 PROCEDURE — 97530 THERAPEUTIC ACTIVITIES: CPT | Mod: KX,PN

## 2024-07-11 PROCEDURE — 97110 THERAPEUTIC EXERCISES: CPT | Mod: KX,PN

## 2024-07-11 NOTE — PROGRESS NOTES
[facility]   Physical Therapy Treatment Note      Name: Marce Hickey  Clinic Number: [id number]    Therapy Diagnosis:   Encounter Diagnosis   Name Primary?    Impaired functional mobility and activity tolerance Yes     Physician: Sherrie Centeno NP    Visit Date: 7/11/2024    Physician Orders: PT Eval and Treat   Medical Diagnosis from Referral:   M54.51 (ICD-10-CM) - Vertebrogenic low back pain   M47.896 (ICD-10-CM) - Other spondylosis, lumbar region         Evaluation Date: 5/16/2024  Authorization Period Expiration: 12/31/2024  Plan of Care Expiration: 8/16/2024  Progress Note Due: 7/14/2024  Date of Surgery: n/a  Visit # / Visits authorized: 14 / 20  FOTO: 2/ 3     Precautions: Standard      Time In: 8:45 AM  Time Out: 9:30   am   Total Billable Time: 40 minutes     PTA Visit #: 0/5      Subjective     Patient reports: Patient continues to report improvement in her pain and overall function; very pleased with her progress from therapy   She was compliant with home exercise program.  Response to previous treatment: good   Functional change: overall continues to move with less pain, feels stronger     Pain:  1/ 10   Location:  right side of lumbar spine     Objective      Objective Measures updated at progress report unless specified.      Treatment     Jaciel received the treatments listed below:      therapeutic exercises to develop strength, endurance, and ROM for 15 minutes including:  Nu-Step - Level 6 x 10 mins   Cable column:   Scapular retraction - waist level - 7# x 20  Rows 7# x 20  Lat Pulldowns  7# x 20 x 2  Paloff presses: 3# x 20 each side   High to Low diagonals - 7# x 15 each side     manual therapy techniques: Joint mobilizations were applied to the: lumbar spine  for 0  minutes, including:  Prone - extension mobilization of sacrum to encourage flexion in lumbar spine; cross hand MFR lumbar fascia;  IASTM to lumbar fascial and posterior pelvic rim to address  "fascia tightness.   S/B to lumbar spine in prone to facilitate facet opeing.     neuromuscular re-education activities to improve:  for  minutes. The following activities were included:    therapeutic activities to improve functional performance for 25  minutes, including:  Prone: alternate arm and leg x 10  - needs 3 pillows under hips   Supine: LE elongation x 10  Supine: quadratus stretch - 30 sec x 2 on each side   Supine: piriformis stretch - 30 sec x 2 each side   Supine: Knee outs with TrAb x 20  - black band around thigh   Supine: post pelvic tilt - 5" hold x 20  Segmental bridging - black band around thighs - cueing for technique x 20   S/L Clams: black band around thighs x 20 each side   Quadruped - cat > neutral x 10   Seated Lumbar flexion with SB - cueing for segmental flexion with return to neutral forward/backward flexion - 3 way stretches x 3 mins   H/S stretch w/ strap 3 x 30 sec      Patient Education and Home Exercises       Education provided:   - Need to improve segmental motion in lumbar spine to decrease compression -    Written Home Exercises Provided: Patient instructed to cont prior HEP. Exercises were reviewed and Jaciel was able to demonstrate them prior to the end of the session.  Jaciel demonstrated good  understanding of the education provided. See Electronic Medical Record under Patient Instructions for exercises provided during therapy sessions    Assessment     Patient demonstrating reduction in LE radicular symptoms since initiating therapy; When she does have buttock pain, changing her lumbar posture will improve this - slight flexion reduces her symptoms;  Improvement in ability to activate and hold TrAb mm contraction with LE motion;  Improved standing tolerance - 20 mins without difficulty;  improved walking tolerance;  Well versed in all of her exercises and performs them with good technique.      Jaciel Is progressing well towards her goals.   Patient prognosis is Good.     Patient " will continue to benefit from skilled outpatient physical therapy to address the deficits listed in the problem list box on initial evaluation, provide pt/family education and to maximize pt's level of independence in the home and community environment.     Patient's spiritual, cultural and educational needs considered and pt agreeable to plan of care and goals.     Anticipated barriers to physical therapy: none    Goals:   Short Term Goals: 3 weeks   Demonstrate improvement in recent symptoms to progress toward long term goals  > MET  Correct sitting/standing postural deficits to reduce pain and promote postural awareness for injury prevention.  > MET  Demonstrate compliance with initial exercise program. > MET      Long Term Goals: 6 weeks   Able to perform household tasks with no limitations > Progressing   Able to perform work related duties with no limitations.  > Progressing   Able to ambulate community required distances with no limitation  > MET   Able to ascend/descend 10 steps with no increase in symptoms.  > MET   Able to perform transfers from all surfaces with no limitation  > MET   FOTO score improvement to > 61   Independent with HEP for continued improvement in function.        Plan     Plan of care Certification: 5/16/2024 to 8/16/2024.     Outpatient Physical Therapy 1-2 times weekly for 6 weeks to include the following interventions: Manual Therapy, Neuromuscular Re-ed, Patient Education, Therapeutic Activities, and Therapeutic Exercise.     Kate Shannon, PT

## 2024-07-15 ENCOUNTER — TELEPHONE (OUTPATIENT)
Dept: PSYCHIATRY | Facility: CLINIC | Age: 74
End: 2024-07-15
Payer: MEDICARE

## 2024-07-16 ENCOUNTER — CLINICAL SUPPORT (OUTPATIENT)
Dept: REHABILITATION | Facility: HOSPITAL | Age: 74
End: 2024-07-16
Payer: MEDICARE

## 2024-07-16 ENCOUNTER — OFFICE VISIT (OUTPATIENT)
Facility: CLINIC | Age: 74
End: 2024-07-16
Payer: MEDICARE

## 2024-07-16 VITALS
WEIGHT: 176.31 LBS | SYSTOLIC BLOOD PRESSURE: 146 MMHG | RESPIRATION RATE: 18 BRPM | HEIGHT: 64 IN | TEMPERATURE: 97 F | DIASTOLIC BLOOD PRESSURE: 84 MMHG | HEART RATE: 73 BPM | BODY MASS INDEX: 30.1 KG/M2

## 2024-07-16 DIAGNOSIS — C50.512 MALIGNANT NEOPLASM OF LOWER-OUTER QUADRANT OF LEFT FEMALE BREAST, UNSPECIFIED ESTROGEN RECEPTOR STATUS: Primary | ICD-10-CM

## 2024-07-16 DIAGNOSIS — D50.8 OTHER IRON DEFICIENCY ANEMIA: ICD-10-CM

## 2024-07-16 DIAGNOSIS — Z74.09 IMPAIRED FUNCTIONAL MOBILITY AND ACTIVITY TOLERANCE: Primary | ICD-10-CM

## 2024-07-16 DIAGNOSIS — D51.8 DIETARY VITAMIN B12 DEFICIENCY ANEMIA: ICD-10-CM

## 2024-07-16 PROCEDURE — 1159F MED LIST DOCD IN RCRD: CPT | Mod: CPTII,S$GLB,, | Performed by: INTERNAL MEDICINE

## 2024-07-16 PROCEDURE — 3077F SYST BP >= 140 MM HG: CPT | Mod: CPTII,S$GLB,, | Performed by: INTERNAL MEDICINE

## 2024-07-16 PROCEDURE — 97110 THERAPEUTIC EXERCISES: CPT | Mod: KX,PN,CQ

## 2024-07-16 PROCEDURE — 3288F FALL RISK ASSESSMENT DOCD: CPT | Mod: CPTII,S$GLB,, | Performed by: INTERNAL MEDICINE

## 2024-07-16 PROCEDURE — 1101F PT FALLS ASSESS-DOCD LE1/YR: CPT | Mod: CPTII,S$GLB,, | Performed by: INTERNAL MEDICINE

## 2024-07-16 PROCEDURE — 99999 PR PBB SHADOW E&M-EST. PATIENT-LVL III: CPT | Mod: PBBFAC,,, | Performed by: INTERNAL MEDICINE

## 2024-07-16 PROCEDURE — 99215 OFFICE O/P EST HI 40 MIN: CPT | Mod: S$GLB,,, | Performed by: INTERNAL MEDICINE

## 2024-07-16 PROCEDURE — G2211 COMPLEX E/M VISIT ADD ON: HCPCS | Mod: S$GLB,,, | Performed by: INTERNAL MEDICINE

## 2024-07-16 PROCEDURE — 3008F BODY MASS INDEX DOCD: CPT | Mod: CPTII,S$GLB,, | Performed by: INTERNAL MEDICINE

## 2024-07-16 PROCEDURE — 3044F HG A1C LEVEL LT 7.0%: CPT | Mod: CPTII,S$GLB,, | Performed by: INTERNAL MEDICINE

## 2024-07-16 PROCEDURE — 1126F AMNT PAIN NOTED NONE PRSNT: CPT | Mod: CPTII,S$GLB,, | Performed by: INTERNAL MEDICINE

## 2024-07-16 PROCEDURE — 97530 THERAPEUTIC ACTIVITIES: CPT | Mod: KX,PN,CQ

## 2024-07-16 PROCEDURE — 4010F ACE/ARB THERAPY RXD/TAKEN: CPT | Mod: CPTII,S$GLB,, | Performed by: INTERNAL MEDICINE

## 2024-07-16 PROCEDURE — 3079F DIAST BP 80-89 MM HG: CPT | Mod: CPTII,S$GLB,, | Performed by: INTERNAL MEDICINE

## 2024-07-16 NOTE — PROGRESS NOTES
[facility]   Physical Therapy Treatment Note      Name: Marce Hickey  Clinic Number: [id number]    Therapy Diagnosis:   Encounter Diagnosis   Name Primary?    Impaired functional mobility and activity tolerance Yes     Physician: Sherrie Centeno NP    Visit Date: 7/16/2024    Physician Orders: PT Eval and Treat   Medical Diagnosis from Referral:   M54.51 (ICD-10-CM) - Vertebrogenic low back pain   M47.896 (ICD-10-CM) - Other spondylosis, lumbar region         Evaluation Date: 5/16/2024  Authorization Period Expiration: 12/31/2024  Plan of Care Expiration: 8/16/2024  Progress Note Due: 7/14/2024  Date of Surgery: n/a  Visit # / Visits authorized: 15 / 20  FOTO: 2/ 3     Precautions: Standard      Time In: 7:55 AM  Time Out: 8:40 AM  Total Billable Time: 40 minutes     PTA Visit #: 1/5      Subjective     Patient reports: Patient continues to report improvement in her pain and overall function; very pleased with her progress from therapy   She was compliant with home exercise program.  Response to previous treatment: good   Functional change: overall continues to move with less pain, feels stronger     Pain:  1/ 10   Location:  right side of lumbar spine     Objective      Objective Measures updated at progress report unless specified.      Treatment     Jaciel received the treatments listed below:      therapeutic exercises to develop strength, endurance, and ROM for 15 minutes including:  Nu-Step - Level 6 x 10 mins   Cable column:   Scapular retraction - waist level - 7# x 20  Rows 7# x 20  Lat Pulldowns  7# x 20 x 2  Paloff presses: 3# x 20 each side   High to Low diagonals - 7# x 15 each side     manual therapy techniques: Joint mobilizations were applied to the: lumbar spine  for 0  minutes, including:  Prone - extension mobilization of sacrum to encourage flexion in lumbar spine; cross hand MFR lumbar fascia;  IASTM to lumbar fascial and posterior pelvic rim to address fascia  "tightness.   S/B to lumbar spine in prone to facilitate facet opeing.     neuromuscular re-education activities to improve:  for  minutes. The following activities were included:    therapeutic activities to improve functional performance for 25  minutes, including:  Prone: alternate arm and leg x 10  - needs 3 pillows under hips   Supine: LE elongation x 10  Supine: quadratus stretch - 30 sec x 2 on each side   Supine: piriformis stretch - 30 sec x 2 each side   Supine: Knee outs with TrAb x 20  - black band around thigh   Supine: post pelvic tilt - 5" hold x 20  Segmental bridging - black band around thighs - cueing for technique x 20   S/L Clams: black band around thighs x 20 each side   Quadruped - cat > neutral x 10   Seated Lumbar flexion with SB - cueing for segmental flexion with return to neutral forward/backward flexion - 3 way stretches x 3 mins   H/S stretch w/ strap 3 x 30 sec      Patient Education and Home Exercises       Education provided:   - Need to improve segmental motion in lumbar spine to decrease compression -    Written Home Exercises Provided: Patient instructed to cont prior HEP. Exercises were reviewed and Jaciel was able to demonstrate them prior to the end of the session.  Jaciel demonstrated good  understanding of the education provided. See Electronic Medical Record under Patient Instructions for exercises provided during therapy sessions    Assessment     Patient demonstrating reduction in LE radicular symptoms since initiating therapy; When she does have buttock pain, changing her lumbar posture will improve this - slight flexion reduces her symptoms;  Improvement in ability to activate and hold TrAb mm contraction with LE motion;  Improved standing tolerance - 20 mins without difficulty;  improved walking tolerance;  Well versed in all of her exercises and performs them with good technique.      Jaciel Is progressing well towards her goals.   Patient prognosis is Good.     Patient will " continue to benefit from skilled outpatient physical therapy to address the deficits listed in the problem list box on initial evaluation, provide pt/family education and to maximize pt's level of independence in the home and community environment.     Patient's spiritual, cultural and educational needs considered and pt agreeable to plan of care and goals.     Anticipated barriers to physical therapy: none    Goals:   Short Term Goals: 3 weeks   Demonstrate improvement in recent symptoms to progress toward long term goals  > MET  Correct sitting/standing postural deficits to reduce pain and promote postural awareness for injury prevention.  > MET  Demonstrate compliance with initial exercise program. > MET      Long Term Goals: 6 weeks   Able to perform household tasks with no limitations > Progressing   Able to perform work related duties with no limitations.  > Progressing   Able to ambulate community required distances with no limitation  > MET   Able to ascend/descend 10 steps with no increase in symptoms.  > MET   Able to perform transfers from all surfaces with no limitation  > MET   FOTO score improvement to > 61   Independent with HEP for continued improvement in function.        Plan     Plan of care Certification: 5/16/2024 to 8/16/2024.     Outpatient Physical Therapy 1-2 times weekly for 6 weeks to include the following interventions: Manual Therapy, Neuromuscular Re-ed, Patient Education, Therapeutic Activities, and Therapeutic Exercise.     Jonathan Favre, PTA

## 2024-07-16 NOTE — PROGRESS NOTES
"[facility] [address] In Office Subsequent Hematology Oncology Note    7/16/24      Subjective:      Patient ID:   Marce Hickey  74 y.o. female  1950  MD [place]        Chief Complaint:   Breast cancer evaluation    HPI:  74 y.o. female has a history of L breast cancer.   .  She has a history of breast reduction back in 1992.  Recently in March 2017 she had a abnormal mammogram at [facility].  Two adjacent masses were seen at the 12 and 1:00 position of the left breast.  Both masses returned positive for invasive ductal carcinoma.  ERP was positive, PRP was positive, HER2 Alec was negative, it was grade 1 disease, and sentinel lymph node biopsy was negative.  Clinically this was stage I disease.  She had bilateral mastectomy, with left sentinel node biopsy in May 2017.  She also had reconstruction with expanders placed and eventually implants paced per Dr. Thomas of Plastic surgery.      She will continue on adjuvant Arimidex daily for x's 5 years.  No increased HF sx or joint sx.  Started 6/2017. Through 6/14/2022.    She is status post gastric sleeve surgery January 29, 2017.  She has a history of B12 deficiency and has been on B12 sublingually in the past, but has not taken it," in a long time.  B 12 MWF now.     B12 is low normal at 290 and  ferritin is normal at 93.  Vitamin D is low at 24 and bone density test shows osteopenia.  On B 12 subl daily, the level is up to 1263, she will continue B 12 subl 1/wk or BIW.    Vitamin D is better at 36, on supplements. 6000 unit per day.      She is to follow-up with her primary care for recommendations regarding vitamin-D supplementation and osteopenia/osteoporosis management and prevention while on Arimidex.  Bone Density test 5/14/20 showed osteopenia.    Estimated breast cancer recurrence was reduced from 23% to 12% with adjuvant Arimidex or tamoxifen usage using the breast cancer index reference.    She complains of pain at her left 2nd distal joint finger " [facility] and saw Dr. Schultz  for evaluation.  Was told she had degenerative arthritis at L>R 2nd finger.    She is status post partial hysterectomy.  She took birth control pills until about age 26.  She had hysterectomy at age 30.  She did not take hormone replacement therapy.    She wears a calcium and vitamin-D and multivitamin patch x2 years.  Hx anaphylaxis to NSAIDS.    For her history includes generalized anxiety disorder, type 2 diabetes, low vitamin-D levels, GERD, fatty liver, dyslipidemia, depression, hypertension, plantar fasciitis of the right foot.    She is status post breast reduction, bilateral mastectomy, breast implant placement, with reconstruction.  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  Other history includes the gastric sleeve surgery, partial hysterectomy, right rotator cuff repair, tonsillectomy, and cholecystectomy.    Her mother had hypertension, heart disease and history of heart attack.  Her father had heart disease and psoriasis.    She was in the [employer] times 26 years till .  She smoked times 19 years, 1 pack per day, and quit in .  She does not drink alcohol with with regularity.  She is allergic to aspirin and nonsteroidal anti inflammatory drugs as manifested with anaphylaxis.    Her mother  in 2017 of old age.  She had a lumpectomy done but it is not clear if she had breast cancer.  Her father had heart disease.  She has a brother alive and well and a cousin with prostate cancer.  She has 2 sons alive and well.    , Antony Hickey, ANNIA, [facility].    Our conversation revealed today that her family origin is from [place], specifically from [place].  She is 100% Askinazi Evangel[employer].    BRCA 1 & 2 returned NEGATIVE.  She does not have the breast cancer gene.    Breast Cancer Index testing supports RR 5-6% after 5 years of hormonal Rx.  Extended hormonal Rx not recommended in trying to  reduce RR further.  Stop hormonal Rx at 5 years,  2022.    She has 2 cousins with history of prostate cancer.    PET 2022 DOV.  She has degenerative disc disease, spinal stenosis, and chronic back pain symptoms.  She is status post Arimidex adjuvant therapy.  Follow-up lab work including tumor markers will be done in 2024 at [facility].  She will follow up with myself in 2025.  MRI from 2024 at [facility] was negative for malignancy.         ROS:   GEN: normal without any fever, night sweats or weight loss  HEENT: normal with no HA's, sore throat, stiff neck, changes in vision  CV: normal with no CP, SOB, PND, FLORES or orthopnea  PULM: normal with no SOB, cough, hemoptysis, sputum or pleuritic pain  GI:  See history of present illness  : normal with no hematuria, dysuria  BREAST:  See history of present illness  SKIN: normal with no rash, erythema, bruising, or swelling     Past Medical History:   Diagnosis Date    Anxiety     Arthritis     Cancer     breast cancer - left    Depression     Diabetes mellitus, type 2     Borderline    Dyslipidemia 2016    Fatty liver disease, nonalcoholic     GERD (gastroesophageal reflux disease)     Hyperlipidemia     Hypertension     Plantar fasciitis of right foot      Past Surgical History:   Procedure Laterality Date    ADENOIDECTOMY  1970    APPENDECTOMY  2007    breast reduction      BREAST SURGERY Bilateral     masectomy    BREAST SURGERY Bilateral     implants     SECTION      x 2    CHOLECYSTECTOMY      COLONOSCOPY N/A 2020    Procedure: COLONOSCOPY;  Surgeon: Nupur Leonard MD;  Location: [facility];  Service: Endoscopy;  Laterality: N/A;    COSMETIC SURGERY  May 4, 2017    Reconstruction due to mastectomy    EYE SURGERY      Lasik    FOOT SURGERY      left bunionectomy    gastric sleve      HYSTERECTOMY      one ovary intact, adhesions    JOINT REPLACEMENT  2017    KNEE ARTHROPLASTY Left 2018     "Procedure: ARTHROPLASTY, KNEE;  Surgeon: Christos Montanez MD;  Location: [facility];  Service: Orthopedics;  Laterality: Left;    KNEE ARTHROSCOPY Left     LIPOSUCTION      ROTATOR CUFF REPAIR Right     TONSILLECTOMY      TUBAL LIGATION  January 28, 1977       Review of patient's allergies indicates:   Allergen Reactions    Nsaids (non-steroidal anti-inflammatory drug) Anaphylaxis           Current Outpatient Medications:     atorvastatin (LIPITOR) 20 MG tablet, Take 1 tablet (20 mg total) by mouth once daily., Disp: 90 tablet, Rfl: 3    ergocalciferol, vitamin D2, (VITAMIN D ORAL), Take 6,000 mg by mouth once daily., Disp: , Rfl:     hydroCHLOROthiazide (MICROZIDE) 12.5 mg capsule, Take 1 capsule (12.5 mg total) by mouth once daily., Disp: 30 capsule, Rfl: 11    losartan (COZAAR) 100 MG tablet, Take 1 tablet (100 mg total) by mouth once daily., Disp: 90 tablet, Rfl: 3    VITAMIN B COMPLEX ORAL, Take by mouth., Disp: , Rfl:     amLODIPine (NORVASC) 10 MG tablet, Take 1 tablet (10 mg total) by mouth once daily. (Patient not taking: Reported on 7/16/2024), Disp: 90 tablet, Rfl: 3    gentamicin (GARAMYCIN) 0.3 % ophthalmic solution, Place 2 drops into both eyes every 4 (four) hours., Disp: 10 mL, Rfl: 0          Objective:   Vitals:  Blood pressure (!) 146/84, pulse 73, temperature 97.4 °F (36.3 °C), resp. rate 18, height 5' 4" (1.626 m), weight 80 kg (176 lb 4.8 oz).    Physical Examination:   GEN: no apparent distress, comfortable  HEAD: atraumatic and normocephalic  EYES: no pallor, no icterus  ENT: no pharyngeal erythema, external ears WNL; no nasal discharge  NECK: no masses, thyroid normal, trachea midline, no LAD/LN's, supple  CV: RRR with no murmur; normal pulse; normal S1 and S2; no pedal edema  CHEST: Normal respiratory effort; CTAB; normal breath sounds; no wheeze or crackles  ABDOM: nontender and nondistended; soft; normal bowel sounds; no rebound/guarding, liver and spleen were not " palpable  MUSC/Skeletal: ROM normal; no crepitus; joints normal; no deformities or arthropathy  EXTREM: no clubbing, cyanosis, inflammation or swelling  SKIN: no rashes, lesions, ulcers, petechiae or subcutaneous nodules  : no cvat  NEURO: grossly intact; motor/sensory WNL;  no tremors  PSYCH: normal mood, affect and behavior  LYMPH: normal cervical, supraclavicular, axillary and groin LN's  BREASTS:  She has extensive postoperative change at the chest wall anteriorly, she does not have palpable mass at the left or right breast, chest area is nontender      Labs:   Lab Results   Component Value Date    WBC 4.94 04/08/2024    HGB 13.6 04/08/2024    HCT 42.6 04/08/2024    MCV 90 04/08/2024     04/08/2024    CMP  Sodium   Date Value Ref Range Status   04/08/2024 142 136 - 145 mmol/L Final     Potassium   Date Value Ref Range Status   04/08/2024 4.3 3.5 - 5.1 mmol/L Final     Chloride   Date Value Ref Range Status   04/08/2024 109 95 - 110 mmol/L Final     CO2   Date Value Ref Range Status   04/08/2024 24 23 - 29 mmol/L Final     Glucose   Date Value Ref Range Status   04/08/2024 104 70 - 110 mg/dL Final     BUN   Date Value Ref Range Status   04/08/2024 20 8 - 23 mg/dL Final     Creatinine   Date Value Ref Range Status   04/08/2024 0.8 0.5 - 1.4 mg/dL Final     Calcium   Date Value Ref Range Status   04/08/2024 10.4 8.7 - 10.5 mg/dL Final     Total Protein   Date Value Ref Range Status   04/08/2024 6.8 6.0 - 8.4 g/dL Final     Albumin   Date Value Ref Range Status   04/08/2024 4.1 3.5 - 5.2 g/dL Final     Total Bilirubin   Date Value Ref Range Status   04/08/2024 0.6 0.1 - 1.0 mg/dL Final     Comment:     For infants and newborns, interpretation of results should be based  on gestational age, weight and in agreement with clinical  observations.    Premature Infant recommended reference ranges:  Up to 24 hours.............<8.0 mg/dL  Up to 48 hours............<12.0 mg/dL  3-5 days..................<15.0  mg/dL  6-29 days.................<15.0 mg/dL       Alkaline Phosphatase   Date Value Ref Range Status   04/08/2024 98 55 - 135 U/L Final     AST   Date Value Ref Range Status   04/08/2024 15 10 - 40 U/L Final     ALT   Date Value Ref Range Status   04/08/2024 20 10 - 44 U/L Final     Anion Gap   Date Value Ref Range Status   04/08/2024 9 8 - 16 mmol/L Final     eGFR if    Date Value Ref Range Status   05/17/2022 >60.0 >60 mL/min/1.73 m^2 Final     eGFR if non    Date Value Ref Range Status   05/17/2022 >60.0 >60 mL/min/1.73 m^2 Final     Comment:     Calculation used to obtain the estimated glomerular filtration  rate (eGFR) is the CKD-EPI equation.            Assessment:   (1) 74 y.o. female with diagnosis of multifocal left breast cancer, clinical stage I disease, ERP positive,   HER2 Alec negative.    (2) Adjuvant Arimidex 1 mg p.o. daily x's  5 years.  Stop Arimidex  6/14/22.    Bone density testing shows osteopenia.  She is to follow-up with her primary care for osteopenia/osteoporosis management while on Arimidex.    She is status post gastric sleeve surgery and has history of B12 deficiency in the past.  Her B12 level returned low normal at 290.  On subl B 12 the level is better at 1,250.  Decrease subl B 12 to 1 weekly or 1 BIW.    Vitamin D is low at 24.  On calcium and Vitamin D, the level is better at 36.    BRCA 1 and BRCA 2 testing has returned Negative.    Breast Cancer Index supports 5-6% recurrence at years 5-10, after 5 years of adjuvant hormonal Rx.  Extended hormonal therapy greater than 5 years is not felt to be beneficial here in reducing RR further.    PET 1/2022 DOV.  Observe for now.  RTC 6 months with CBC, B 12, folate

## 2024-07-16 NOTE — LETTER
July 18, 2024        Jade Brewster MD  [address]  [address]  [place] [address]             [facility]  [address]  [address]  [facility] [address]  Phone: [phone]  Fax: [phone]   Patient: Marce Hickey   MR Number: [id number]   YOB: 1950   Date of Visit: 7/16/2024       Dear Dr. Brewster:    Thank you for referring Marce Hickey to me for evaluation. Below are the relevant portions of my assessment and plan of care.            If you have questions, please do not hesitate to call me. I look forward to following Marce along with you.    Sincerely,      RENUKA Luis MD           CC    No Recipients

## 2024-07-18 ENCOUNTER — CLINICAL SUPPORT (OUTPATIENT)
Dept: REHABILITATION | Facility: HOSPITAL | Age: 74
End: 2024-07-18
Payer: MEDICARE

## 2024-07-18 DIAGNOSIS — Z74.09 IMPAIRED FUNCTIONAL MOBILITY AND ACTIVITY TOLERANCE: Primary | ICD-10-CM

## 2024-07-18 PROCEDURE — 97530 THERAPEUTIC ACTIVITIES: CPT | Mod: KX,PN

## 2024-07-18 PROCEDURE — 97140 MANUAL THERAPY 1/> REGIONS: CPT | Mod: KX,PN

## 2024-07-18 PROCEDURE — 97110 THERAPEUTIC EXERCISES: CPT | Mod: KX,PN

## 2024-07-18 NOTE — PROGRESS NOTES
[facility]   Physical Therapy Treatment Note      Name: Marce Hickey  Clinic Number: [id number]    Therapy Diagnosis:   Encounter Diagnosis   Name Primary?    Impaired functional mobility and activity tolerance Yes     Physician: Sherrie Centeno NP    Visit Date: 7/18/2024    Physician Orders: PT Eval and Treat   Medical Diagnosis from Referral:   M54.51 (ICD-10-CM) - Vertebrogenic low back pain   M47.896 (ICD-10-CM) - Other spondylosis, lumbar region         Evaluation Date: 5/16/2024  Authorization Period Expiration: 12/31/2024  Plan of Care Expiration: 8/16/2024  Progress Note Due: 8/16/2025  Date of Surgery: n/a  Visit # / Visits authorized: 16 / 20  FOTO: 2/ 3     Precautions: Standard      Time In: 8:35 AM  Time Out:  9:30  AM  Total Billable Time: 45 minutes     PTA Visit #: 0/5      Subjective     Patient reports: Patient continues to report improvement in her pain and overall function; very pleased with her progress from therapy ; does have some left sacral and buttock pain at times with standing activities.   She was compliant with home exercise program.  Response to previous treatment: good   Functional change: overall continues to move with less pain, feels stronger     Pain:  1/ 10   Location:  right side of lumbar spine     Objective      Objective Measures updated at progress report unless specified.      Treatment     Jaciel received the treatments listed below:      therapeutic exercises to develop strength, endurance, and ROM for 10 minutes including:  Nu-Step - Level 6 x 10 mins   Cable column:   Scapular retraction - waist level - 7# x 20  Rows 7# x 20  Lat Pulldowns  7# x 20 x 2  Paloff presses: 3# x 20 each side   High to Low diagonals - 7# x 15 each side     manual therapy techniques: Joint mobilizations were applied to the: lumbar spine  for 20  minutes, including:  Prone - extension mobilization of sacrum to encourage flexion in lumbar spine; cross hand  "MFR lumbar fascia;  IASTM to lumbar fascial and posterior pelvic rim to address fascia tightness.   S/B to lumbar spine in prone to facilitate facet opeing.  STM left piriformis; mobilization of left SI joint - springing of ligaments to improve mobility.  Applied k-tape for facilitation of left lumbar fascia to support SI joint and lower lumbar spine - Standing, slight forward flexion with right SB: I-strips from PSIS to L1; Left piriformis over Left SI joint across lumbar spine and anchored on right at L1-2 side.     Instructed patient in care of tape.     neuromuscular re-education activities to improve:  for  minutes. The following activities were included:    therapeutic activities to improve functional performance for 25  minutes, including:  Prone: alternate arm and leg x 10  - needs 3 pillows under hips   Supine: LE elongation x 10  Supine: quadratus stretch - 30 sec x 2 on each side   Supine: piriformis stretch - 30 sec x 2 each side   Supine: Knee outs with TrAb x 20  - black band around thigh   Supine: post pelvic tilt - 5" hold x 20  Segmental bridging - black band around thighs - cueing for technique x 20   S/L Clams: black band around thighs x 20 each side   Quadruped - cat > neutral x 10   Seated Lumbar flexion with SB - cueing for segmental flexion with return to neutral forward/backward flexion - 3 way stretches x 3 mins   H/S stretch w/ strap 3 x 30 sec      Patient Education and Home Exercises       Education provided:   - Need to improve segmental motion in lumbar spine to decrease compression -    Written Home Exercises Provided: Patient instructed to cont prior HEP. Exercises were reviewed and Jaciel was able to demonstrate them prior to the end of the session.  Jaciel demonstrated good  understanding of the education provided. See Electronic Medical Record under Patient Instructions for exercises provided during therapy sessions    Assessment     Patient demonstrating reduction in LE radicular " symptoms since initiating therapy; When she does have buttock pain, changing her lumbar posture will improve this - slight flexion reduces her symptoms;  Provided manual tx to left side - piriformis, left SIJ and lumbar spine with application of K-tape for facilitation of mm and reduction of pain. Improvement in ability to activate and hold TrAb mm contraction with LE motion;  Improved standing tolerance - 20 mins without difficulty;  improved walking tolerance;  Well versed in all of her exercises and performs them with good technique.      Jaciel Is progressing well towards her goals.   Patient prognosis is Good.     Patient will continue to benefit from skilled outpatient physical therapy to address the deficits listed in the problem list box on initial evaluation, provide pt/family education and to maximize pt's level of independence in the home and community environment.     Patient's spiritual, cultural and educational needs considered and pt agreeable to plan of care and goals.     Anticipated barriers to physical therapy: none    Goals:   Short Term Goals: 3 weeks   Demonstrate improvement in recent symptoms to progress toward long term goals  > MET  Correct sitting/standing postural deficits to reduce pain and promote postural awareness for injury prevention.  > MET  Demonstrate compliance with initial exercise program. > MET      Long Term Goals: 6 weeks   Able to perform household tasks with no limitations > Progressing   Able to perform work related duties with no limitations.  > Progressing   Able to ambulate community required distances with no limitation  > MET   Able to ascend/descend 10 steps with no increase in symptoms.  > MET   Able to perform transfers from all surfaces with no limitation  > MET   FOTO score improvement to > 61   Independent with HEP for continued improvement in function.        Plan     Plan of care Certification: 5/16/2024 to 8/16/2024.     Outpatient Physical Therapy 1-2 times  weekly for 6 weeks to include the following interventions: Manual Therapy, Neuromuscular Re-ed, Patient Education, Therapeutic Activities, and Therapeutic Exercise.     Kate Shannon, PT

## 2024-07-22 ENCOUNTER — CLINICAL SUPPORT (OUTPATIENT)
Dept: REHABILITATION | Facility: HOSPITAL | Age: 74
End: 2024-07-22
Payer: MEDICARE

## 2024-07-22 DIAGNOSIS — Z74.09 IMPAIRED FUNCTIONAL MOBILITY AND ACTIVITY TOLERANCE: Primary | ICD-10-CM

## 2024-07-22 PROCEDURE — 97110 THERAPEUTIC EXERCISES: CPT | Mod: KX,PN

## 2024-07-22 PROCEDURE — 97530 THERAPEUTIC ACTIVITIES: CPT | Mod: KX,PN

## 2024-07-22 NOTE — PROGRESS NOTES
[facility]   Physical Therapy Treatment Note      Name: Marce Hickey  Clinic Number: [id number]    Therapy Diagnosis:   Encounter Diagnosis   Name Primary?    Impaired functional mobility and activity tolerance Yes     Physician: Sherrie Centeno NP    Visit Date: 7/22/2024    Physician Orders: PT Eval and Treat   Medical Diagnosis from Referral:   M54.51 (ICD-10-CM) - Vertebrogenic low back pain   M47.896 (ICD-10-CM) - Other spondylosis, lumbar region         Evaluation Date: 5/16/2024  Authorization Period Expiration: 12/31/2024  Plan of Care Expiration: 8/16/2024  Progress Note Due: 8/16/2025  Date of Surgery: n/a  Visit # / Visits authorized: 17 / 20  FOTO: 2/ 3     Precautions: Standard      Time In: 7:45 AM  Time Out:  8:42  AM  Total Billable Time: 30 minutes Split billing with another Medicare patient      PTA Visit #: 0/5      Subjective     Patient reports: Patient continues to report improvement in her pain and overall function; very pleased with her progress from therapy ; does have some left sacral and buttock pain at times with standing activities.  Today is a solid 4 in left buttock today.   She was compliant with home exercise program.  Response to previous treatment: good   Functional change: overall continues to move with less pain, feels stronger     Pain:  4/ 10   Location:  left side of lumbar spine     Objective      Objective Measures updated at progress report unless specified.      Treatment     Jaciel received the treatments listed below:      therapeutic exercises to develop strength, endurance, and ROM for 10 minutes including:  Nu-Step - Level 6 x 10 mins   Cable column:   Scapular retraction - waist level - 7# x 20  Rows 7# x 20  Lat Pulldowns  7# x 20 x 2  Paloff presses: 3# x 20 each side   High to Low diagonals - 7# x 15 each side     manual therapy techniques: Joint mobilizations were applied to the: lumbar spine  for 0  minutes, including:  Prone  "- extension mobilization of sacrum to encourage flexion in lumbar spine; cross hand MFR lumbar fascia;  IASTM to lumbar fascial and posterior pelvic rim to address fascia tightness.   S/B to lumbar spine in prone to facilitate facet opeing.  STM left piriformis; mobilization of left SI joint - springing of ligaments to improve mobility.  Applied k-tape for facilitation of left lumbar fascia to support SI joint and lower lumbar spine - Standing, slight forward flexion with right SB: I-strips from PSIS to L1; Left piriformis over Left SI joint across lumbar spine and anchored on right at L1-2 side.       neuromuscular re-education activities to improve:  for  minutes. The following activities were included:    therapeutic activities to improve functional performance for 25  minutes, including:  Prone: alternate arm and leg x 10  - needs 3 pillows under hips   Supine: LE elongation x 10  Supine: quadratus stretch - 30 sec x 2 on each side   Supine: piriformis stretch - 30 sec x 2 each side   Supine: Knee outs with TrAb x 20  - black band around thigh   Supine: post pelvic tilt - 5" hold x 20  Segmental bridging - black band around thighs - cueing for technique x 20   S/L Clams: black band around thighs x 20 each side   Quadruped - cat > neutral x 10   Seated Lumbar flexion with SB - cueing for segmental flexion with return to neutral forward/backward flexion - 3 way stretches x 3 mins   H/S stretch w/ strap 3 x 30 sec      Patient Education and Home Exercises       Education provided:   - Need to improve segmental motion in lumbar spine to decrease compression -    Written Home Exercises Provided: Patient instructed to cont prior HEP. Exercises were reviewed and Jaciel was able to demonstrate them prior to the end of the session.  Jaciel demonstrated good  understanding of the education provided. See Electronic Medical Record under Patient Instructions for exercises provided during therapy sessions    Assessment "     Patient demonstrating reduction in LE radicular symptoms since initiating therapy; When she does have buttock pain, changing her lumbar posture will improve this - slight flexion reduces her symptoms;  Improvement in ability to activate and hold TrAb mm contraction with LE motion;  Improved standing tolerance - 20 mins without difficulty;  improved walking tolerance;  Well versed in all of her exercises and performs them with good technique.      Jaciel Is progressing well towards her goals.   Patient prognosis is Good.     Patient will continue to benefit from skilled outpatient physical therapy to address the deficits listed in the problem list box on initial evaluation, provide pt/family education and to maximize pt's level of independence in the home and community environment.     Patient's spiritual, cultural and educational needs considered and pt agreeable to plan of care and goals.     Anticipated barriers to physical therapy: none    Goals:   Short Term Goals: 3 weeks   Demonstrate improvement in recent symptoms to progress toward long term goals  > MET  Correct sitting/standing postural deficits to reduce pain and promote postural awareness for injury prevention.  > MET  Demonstrate compliance with initial exercise program. > MET      Long Term Goals: 6 weeks   Able to perform household tasks with no limitations > Progressing   Able to perform work related duties with no limitations.  > Progressing   Able to ambulate community required distances with no limitation  > MET   Able to ascend/descend 10 steps with no increase in symptoms.  > MET   Able to perform transfers from all surfaces with no limitation  > MET   FOTO score improvement to > 61   Independent with HEP for continued improvement in function.        Plan     Plan of care Certification: 5/16/2024 to 8/16/2024.     Outpatient Physical Therapy 1-2 times weekly for 6 weeks to include the following interventions: Manual Therapy, Neuromuscular  Re-ed, Patient Education, Therapeutic Activities, and Therapeutic Exercise.     Kate Shannon, PT

## 2024-07-30 ENCOUNTER — CLINICAL SUPPORT (OUTPATIENT)
Dept: REHABILITATION | Facility: HOSPITAL | Age: 74
End: 2024-07-30
Payer: MEDICARE

## 2024-07-30 DIAGNOSIS — Z74.09 IMPAIRED FUNCTIONAL MOBILITY AND ACTIVITY TOLERANCE: Primary | ICD-10-CM

## 2024-07-30 PROCEDURE — 97110 THERAPEUTIC EXERCISES: CPT | Mod: KX,PN,CQ

## 2024-07-30 PROCEDURE — 97530 THERAPEUTIC ACTIVITIES: CPT | Mod: KX,PN,CQ

## 2024-07-30 NOTE — PROGRESS NOTES
[facility]   Physical Therapy Treatment Note      Name: Marce Hickey  Clinic Number: [id number]    Therapy Diagnosis:   Encounter Diagnosis   Name Primary?    Impaired functional mobility and activity tolerance Yes     Physician: Sherrie Centeno NP    Visit Date: 7/30/2024    Physician Orders: PT Eval and Treat   Medical Diagnosis from Referral:   M54.51 (ICD-10-CM) - Vertebrogenic low back pain   M47.896 (ICD-10-CM) - Other spondylosis, lumbar region         Evaluation Date: 5/16/2024  Authorization Period Expiration: 12/31/2024  Plan of Care Expiration: 8/16/2024  Progress Note Due: 8/16/2025  Date of Surgery: n/a  Visit # / Visits authorized: 18 / 20  FOTO: 2/ 3     Precautions: Standard      Time In: 9:10 AM  Time Out:  9:55 AM  Total Billable Time: 40 minutes      PTA Visit #: 1/5      Subjective     Patient reports: Patient continues to report improvement in her pain and overall function; very pleased with her progress from therapy.   She was compliant with home exercise program.  Response to previous treatment: good   Functional change: overall continues to move with less pain, feels stronger     Pain:  0 / 10   Location:  left side of lumbar spine     Objective      Objective Measures updated at progress report unless specified.      Treatment     Jaciel received the treatments listed below:      therapeutic exercises to develop strength, endurance, and ROM for 15 minutes including:  Nu-Step - Level 6 x 10 mins   Cable column:   Scapular retraction - waist level - 7# x 20  Rows 7# x 20  Lat Pulldowns  7# x 20 x 2  Paloff presses: 3# x 20 each side   High to Low diagonals - 7# x 15 each side     manual therapy techniques: Joint mobilizations were applied to the: lumbar spine  for 0  minutes, including:  Prone - extension mobilization of sacrum to encourage flexion in lumbar spine; cross hand MFR lumbar fascia;  IASTM to lumbar fascial and posterior pelvic rim to address  "fascia tightness.   S/B to lumbar spine in prone to facilitate facet opeing.  STM left piriformis; mobilization of left SI joint - springing of ligaments to improve mobility.  Applied k-tape for facilitation of left lumbar fascia to support SI joint and lower lumbar spine - Standing, slight forward flexion with right SB: I-strips from PSIS to L1; Left piriformis over Left SI joint across lumbar spine and anchored on right at L1-2 side.       neuromuscular re-education activities to improve:  for  minutes. The following activities were included:    therapeutic activities to improve functional performance for 25  minutes, including:  Prone: alternate arm and leg x 10  - needs 3 pillows under hips   Supine: LE elongation x 10  Supine: quadratus stretch - 30 sec x 2 on each side   Supine: piriformis stretch - 30 sec x 2 each side   Supine: Knee outs with TrAb x 20  - black band around thigh   Supine: post pelvic tilt - 5" hold x 20  Segmental bridging - black band around thighs - cueing for technique x 20   S/L Clams: black band around thighs x 20 each side   Quadruped - cat > neutral x 10   Seated Lumbar flexion with SB - cueing for segmental flexion with return to neutral forward/backward flexion - 3 way stretches x 3 mins   H/S stretch w/ strap 3 x 30 sec      Patient Education and Home Exercises       Education provided:   - Need to improve segmental motion in lumbar spine to decrease compression -    Written Home Exercises Provided: Patient instructed to cont prior HEP. Exercises were reviewed and Jaciel was able to demonstrate them prior to the end of the session.  Jaciel demonstrated good  understanding of the education provided. See Electronic Medical Record under Patient Instructions for exercises provided during therapy sessions    Assessment     Patient demonstrating reduction in LE radicular symptoms since initiating therapy; When she does have buttock pain, changing her lumbar posture will improve this - slight " flexion reduces her symptoms;  Improvement in ability to activate and hold TrAb mm contraction with LE motion;  Improved standing tolerance - 20 mins without difficulty;  improved walking tolerance;  Well versed in all of her exercises and performs them with good technique.      Jaciel Is progressing well towards her goals.   Patient prognosis is Good.     Patient will continue to benefit from skilled outpatient physical therapy to address the deficits listed in the problem list box on initial evaluation, provide pt/family education and to maximize pt's level of independence in the home and community environment.     Patient's spiritual, cultural and educational needs considered and pt agreeable to plan of care and goals.     Anticipated barriers to physical therapy: none    Goals:   Short Term Goals: 3 weeks   Demonstrate improvement in recent symptoms to progress toward long term goals  > MET  Correct sitting/standing postural deficits to reduce pain and promote postural awareness for injury prevention.  > MET  Demonstrate compliance with initial exercise program. > MET      Long Term Goals: 6 weeks   Able to perform household tasks with no limitations > Progressing   Able to perform work related duties with no limitations.  > Progressing   Able to ambulate community required distances with no limitation  > MET   Able to ascend/descend 10 steps with no increase in symptoms.  > MET   Able to perform transfers from all surfaces with no limitation  > MET   FOTO score improvement to > 61   Independent with HEP for continued improvement in function.        Plan     Plan of care Certification: 5/16/2024 to 8/16/2024.     Outpatient Physical Therapy 1-2 times weekly for 6 weeks to include the following interventions: Manual Therapy, Neuromuscular Re-ed, Patient Education, Therapeutic Activities, and Therapeutic Exercise.     Jonathan Favre, PTA

## 2024-08-01 ENCOUNTER — CLINICAL SUPPORT (OUTPATIENT)
Dept: REHABILITATION | Facility: HOSPITAL | Age: 74
End: 2024-08-01
Payer: MEDICARE

## 2024-08-01 DIAGNOSIS — Z74.09 IMPAIRED FUNCTIONAL MOBILITY AND ACTIVITY TOLERANCE: Primary | ICD-10-CM

## 2024-08-01 PROCEDURE — 97530 THERAPEUTIC ACTIVITIES: CPT | Mod: KX,PN,CQ

## 2024-08-01 PROCEDURE — 97110 THERAPEUTIC EXERCISES: CPT | Mod: KX,PN,CQ

## 2024-08-01 NOTE — PROGRESS NOTES
[facility]   Physical Therapy Treatment Note      Name: Marce Hickey  Clinic Number: [id number]    Therapy Diagnosis:   Encounter Diagnosis   Name Primary?    Impaired functional mobility and activity tolerance Yes     Physician: Sherrie Centeno NP    Visit Date: 8/1/2024    Physician Orders: PT Eval and Treat   Medical Diagnosis from Referral:   M54.51 (ICD-10-CM) - Vertebrogenic low back pain   M47.896 (ICD-10-CM) - Other spondylosis, lumbar region         Evaluation Date: 5/16/2024  Authorization Period Expiration: 12/31/2024  Plan of Care Expiration: 8/16/2024  Progress Note Due: 8/16/2025  Date of Surgery: n/a  Visit # / Visits authorized: 19 / 20  FOTO: 2/ 3     Precautions: Standard      Time In: 7:50 AM  Time Out:  8:40 AM  Total Billable Time: 40 minutes      PTA Visit #: 2/5      Subjective     Patient reports: Patient continues to report improvement in her pain and overall function; very pleased with her progress from therapy.   She was compliant with home exercise program.  Response to previous treatment: good   Functional change: overall continues to move with less pain, feels stronger     Pain:  0 / 10   Location:  left side of lumbar spine     Objective      Objective Measures updated at progress report unless specified.      Treatment     Jaciel received the treatments listed below:      therapeutic exercises to develop strength, endurance, and ROM for 15 minutes including:  Nu-Step - Level 6 x 10 mins   Cable column:   Scapular retraction - waist level - 7# x 20  Rows 7# x 20  Lat Pulldowns  7# x 20   Paloff presses: 7# x 20 each side   High to Low diagonals - 7# x 15 each side     manual therapy techniques: Joint mobilizations were applied to the: lumbar spine  for 0  minutes, including:  Prone - extension mobilization of sacrum to encourage flexion in lumbar spine; cross hand MFR lumbar fascia;  IASTM to lumbar fascial and posterior pelvic rim to address fascia  "tightness.   S/B to lumbar spine in prone to facilitate facet opeing.  STM left piriformis; mobilization of left SI joint - springing of ligaments to improve mobility.  Applied k-tape for facilitation of left lumbar fascia to support SI joint and lower lumbar spine - Standing, slight forward flexion with right SB: I-strips from PSIS to L1; Left piriformis over Left SI joint across lumbar spine and anchored on right at L1-2 side.       neuromuscular re-education activities to improve:  for  minutes. The following activities were included:    therapeutic activities to improve functional performance for 25  minutes, including:  Prone: alternate arm and leg x 10  - needs 3 pillows under hips   Supine: LE elongation x 10  Supine: quadratus stretch - 30 sec x 2 on each side   Supine: piriformis stretch - 30 sec x 2 each side   Supine: Knee outs with TrAb x 20  - black band around thigh   Supine: post pelvic tilt - 5" hold x 20  Segmental bridging - black band around thighs - cueing for technique x 20   S/L Clams: black band around thighs x 20 each side   Quadruped - cat > neutral x 10   Seated Lumbar flexion with SB - cueing for segmental flexion with return to neutral forward/backward flexion - 3 way stretches x 3 mins   H/S stretch w/ strap 3 x 30 sec      Patient Education and Home Exercises       Education provided:   - Need to improve segmental motion in lumbar spine to decrease compression -    Written Home Exercises Provided: Patient instructed to cont prior HEP. Exercises were reviewed and Jaciel was able to demonstrate them prior to the end of the session.  Jaciel demonstrated good  understanding of the education provided. See Electronic Medical Record under Patient Instructions for exercises provided during therapy sessions    Assessment     Patient demonstrating reduction in LE radicular symptoms since initiating therapy; When she does have buttock pain, changing her lumbar posture will improve this - slight " flexion reduces her symptoms;  Improvement in ability to activate and hold TrAb mm contraction with LE motion;  Improved standing tolerance - 20 mins without difficulty;  improved walking tolerance;  Well versed in all of her exercises and performs them with good technique.      Jaciel Is progressing well towards her goals.   Patient prognosis is Good.     Patient will continue to benefit from skilled outpatient physical therapy to address the deficits listed in the problem list box on initial evaluation, provide pt/family education and to maximize pt's level of independence in the home and community environment.     Patient's spiritual, cultural and educational needs considered and pt agreeable to plan of care and goals.     Anticipated barriers to physical therapy: none    Goals:   Short Term Goals: 3 weeks   Demonstrate improvement in recent symptoms to progress toward long term goals  > MET  Correct sitting/standing postural deficits to reduce pain and promote postural awareness for injury prevention.  > MET  Demonstrate compliance with initial exercise program. > MET      Long Term Goals: 6 weeks   Able to perform household tasks with no limitations > Progressing   Able to perform work related duties with no limitations.  > Progressing   Able to ambulate community required distances with no limitation  > MET   Able to ascend/descend 10 steps with no increase in symptoms.  > MET   Able to perform transfers from all surfaces with no limitation  > MET   FOTO score improvement to > 61   Independent with HEP for continued improvement in function.        Plan     Plan of care Certification: 5/16/2024 to 8/16/2024.     Outpatient Physical Therapy 1-2 times weekly for 6 weeks to include the following interventions: Manual Therapy, Neuromuscular Re-ed, Patient Education, Therapeutic Activities, and Therapeutic Exercise.     Jonathan Favre, PTA

## 2024-08-09 ENCOUNTER — CLINICAL SUPPORT (OUTPATIENT)
Dept: REHABILITATION | Facility: HOSPITAL | Age: 74
End: 2024-08-09
Payer: MEDICARE

## 2024-08-09 DIAGNOSIS — Z74.09 IMPAIRED FUNCTIONAL MOBILITY AND ACTIVITY TOLERANCE: Primary | ICD-10-CM

## 2024-08-09 PROCEDURE — 97110 THERAPEUTIC EXERCISES: CPT | Mod: KX,PN,CQ

## 2024-08-09 PROCEDURE — 97530 THERAPEUTIC ACTIVITIES: CPT | Mod: KX,PN,CQ

## 2024-08-14 ENCOUNTER — TELEPHONE (OUTPATIENT)
Dept: DERMATOLOGY | Facility: CLINIC | Age: 74
End: 2024-08-14
Payer: MEDICARE

## 2024-08-14 NOTE — TELEPHONE ENCOUNTER
"----- Message from Sherin Palomino sent at 8/14/2024  9:15 AM CDT -----  Contact: pt  Type: Needs Medical Advice    Who Called: pt    Best Call Back Number:[phone]    Additional Information: Requesting a call back regarding Pt has a Dark Brown Spot on left hand that cam out of nowhere. Pt said it's swollen and raised and about a 1/4"  in diameter.  Pt is worried due to she has never had anything like this before.  Pt declined any other symptoms. Pt requesting to be seen. Pt declined next available in December and only wants to see Dr Orosco.       Please Advise- Thank you  "

## 2024-08-16 ENCOUNTER — PATIENT MESSAGE (OUTPATIENT)
Dept: PSYCHIATRY | Facility: CLINIC | Age: 74
End: 2024-08-16
Payer: MEDICARE

## 2024-08-19 ENCOUNTER — OFFICE VISIT (OUTPATIENT)
Dept: FAMILY MEDICINE | Facility: CLINIC | Age: 74
End: 2024-08-19
Payer: MEDICARE

## 2024-08-19 VITALS
SYSTOLIC BLOOD PRESSURE: 129 MMHG | HEART RATE: 65 BPM | OXYGEN SATURATION: 97 % | DIASTOLIC BLOOD PRESSURE: 84 MMHG | RESPIRATION RATE: 16 BRPM | WEIGHT: 176.38 LBS | HEIGHT: 64 IN | BODY MASS INDEX: 30.11 KG/M2
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
## 2024-08-19 DIAGNOSIS — L98.9 SKIN LESION: ICD-10-CM

## 2024-08-19 DIAGNOSIS — S30.860A TICK BITE OF LOWER BACK, INITIAL ENCOUNTER: Primary | ICD-10-CM

## 2024-08-19 PROCEDURE — 99213 OFFICE O/P EST LOW 20 MIN: CPT | Mod: S$GLB,,, | Performed by: STUDENT IN AN ORGANIZED HEALTH CARE EDUCATION/TRAINING PROGRAM

## 2024-08-19 PROCEDURE — 1101F PT FALLS ASSESS-DOCD LE1/YR: CPT | Mod: CPTII,S$GLB,, | Performed by: STUDENT IN AN ORGANIZED HEALTH CARE EDUCATION/TRAINING PROGRAM

## 2024-08-19 PROCEDURE — 3288F FALL RISK ASSESSMENT DOCD: CPT | Mod: CPTII,S$GLB,, | Performed by: STUDENT IN AN ORGANIZED HEALTH CARE EDUCATION/TRAINING PROGRAM

## 2024-08-19 PROCEDURE — 1159F MED LIST DOCD IN RCRD: CPT | Mod: CPTII,S$GLB,, | Performed by: STUDENT IN AN ORGANIZED HEALTH CARE EDUCATION/TRAINING PROGRAM

## 2024-08-19 PROCEDURE — 3074F SYST BP LT 130 MM HG: CPT | Mod: CPTII,S$GLB,, | Performed by: STUDENT IN AN ORGANIZED HEALTH CARE EDUCATION/TRAINING PROGRAM

## 2024-08-19 PROCEDURE — 3079F DIAST BP 80-89 MM HG: CPT | Mod: CPTII,S$GLB,, | Performed by: STUDENT IN AN ORGANIZED HEALTH CARE EDUCATION/TRAINING PROGRAM

## 2024-08-19 PROCEDURE — 3044F HG A1C LEVEL LT 7.0%: CPT | Mod: CPTII,S$GLB,, | Performed by: STUDENT IN AN ORGANIZED HEALTH CARE EDUCATION/TRAINING PROGRAM

## 2024-08-19 PROCEDURE — 99999 PR PBB SHADOW E&M-EST. PATIENT-LVL IV: CPT | Mod: PBBFAC,,, | Performed by: STUDENT IN AN ORGANIZED HEALTH CARE EDUCATION/TRAINING PROGRAM

## 2024-08-19 PROCEDURE — 4010F ACE/ARB THERAPY RXD/TAKEN: CPT | Mod: CPTII,S$GLB,, | Performed by: STUDENT IN AN ORGANIZED HEALTH CARE EDUCATION/TRAINING PROGRAM

## 2024-08-19 PROCEDURE — 3008F BODY MASS INDEX DOCD: CPT | Mod: CPTII,S$GLB,, | Performed by: STUDENT IN AN ORGANIZED HEALTH CARE EDUCATION/TRAINING PROGRAM

## 2024-08-19 PROCEDURE — 1126F AMNT PAIN NOTED NONE PRSNT: CPT | Mod: CPTII,S$GLB,, | Performed by: STUDENT IN AN ORGANIZED HEALTH CARE EDUCATION/TRAINING PROGRAM

## 2024-08-19 NOTE — PROGRESS NOTES
Subjective:       Patient ID: Marce Hickey is a 74 y.o. female.    Chief Complaint: dark spot on hand (Started on Thursday, swelling and dry skin )    Left hand lesion-  Has a seborrhic keratosis.   Small area of swelling overlying it and then this resolved and the skin cracked  Seeing dermatology for this.  See photo    Tick bite  She found it on her back on Saturday  She has two dogs who are on prevention.  The tick had bit but was not engorged.  It did leave a small hole and was cleaned        .  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    Review of Systems   Constitutional:  Negative for activity change and appetite change.   Respiratory:  Negative for shortness of breath.    Cardiovascular:  Negative for chest pain.   Gastrointestinal:  Negative for abdominal pain.   Genitourinary:  Negative for dysuria.   Integumentary:  Positive for mole/lesion. Negative for rash.   Psychiatric/Behavioral:  Negative for sleep disturbance.          Health Maintenance Due   Topic Date Due    RSV Vaccine (Age 60+ and Pregnant patients) (1 - 1-dose 60+ series) Never done    Shingles Vaccine (1 of 2)  11/27/2015    COVID-19 Vaccine (6 - 2023-24 season) 09/01/2023      Health Maintenance reviewed and discussed- encouraged vaccines.     Objective:      Physical Exam  Constitutional:       General: She is not in acute distress.     Appearance: Normal appearance. She is not ill-appearing.   Eyes:      Conjunctiva/sclera: Conjunctivae normal.   Cardiovascular:      Rate and Rhythm: Normal rate and regular rhythm.      Heart sounds: Normal heart sounds. No murmur heard.  Pulmonary:      Effort: Pulmonary effort is normal. No respiratory distress.      Breath sounds: Normal breath sounds.   Musculoskeletal:      Right lower leg: No edema.      Left lower leg: No edema.   Skin:     General: Skin is warm and dry.      Findings: Lesion present.   Neurological:      Mental Status: She is alert. Mental status is at baseline.      Gait: Gait normal.   Psychiatric:         Mood and Affect: Mood normal.         Behavior: Behavior normal.         Thought Content: Thought content normal.         Judgment: Judgment normal.                 Assessment:       1. Tick bite of lower back, initial encounter    2. Skin lesion        Plan:       1. Tick bite of lower back, initial encounter  Comments:  never encourged. no need for prophylaxis. nsaids if painful. monitor for fever    2. Skin lesion  Comments:  seeing dermatology. see photo

## 2024-08-19 NOTE — PROGRESS NOTES
Tick left a very small hole on her back.  Pt got bit by a tick on her back. Saturday August 17 2024  Anh Capps MA 08/19/2024 8:02 AM

## 2024-09-17 ENCOUNTER — OFFICE VISIT (OUTPATIENT)
Dept: DERMATOLOGY | Facility: CLINIC | Age: 74
End: 2024-09-17
Payer: MEDICARE

## 2024-09-17 DIAGNOSIS — L82.1 SEBORRHEIC KERATOSIS: ICD-10-CM

## 2024-09-17 DIAGNOSIS — L82.0 SEBORRHEIC KERATOSES, INFLAMED: Primary | ICD-10-CM

## 2024-09-17 PROCEDURE — 1126F AMNT PAIN NOTED NONE PRSNT: CPT | Mod: CPTII,S$GLB,, | Performed by: STUDENT IN AN ORGANIZED HEALTH CARE EDUCATION/TRAINING PROGRAM

## 2024-09-17 PROCEDURE — 1160F RVW MEDS BY RX/DR IN RCRD: CPT | Mod: CPTII,S$GLB,, | Performed by: STUDENT IN AN ORGANIZED HEALTH CARE EDUCATION/TRAINING PROGRAM

## 2024-09-17 PROCEDURE — 4010F ACE/ARB THERAPY RXD/TAKEN: CPT | Mod: CPTII,S$GLB,, | Performed by: STUDENT IN AN ORGANIZED HEALTH CARE EDUCATION/TRAINING PROGRAM

## 2024-09-17 PROCEDURE — 3288F FALL RISK ASSESSMENT DOCD: CPT | Mod: CPTII,S$GLB,, | Performed by: STUDENT IN AN ORGANIZED HEALTH CARE EDUCATION/TRAINING PROGRAM

## 2024-09-17 PROCEDURE — 99212 OFFICE O/P EST SF 10 MIN: CPT | Mod: 25,S$GLB,, | Performed by: STUDENT IN AN ORGANIZED HEALTH CARE EDUCATION/TRAINING PROGRAM

## 2024-09-17 PROCEDURE — 1159F MED LIST DOCD IN RCRD: CPT | Mod: CPTII,S$GLB,, | Performed by: STUDENT IN AN ORGANIZED HEALTH CARE EDUCATION/TRAINING PROGRAM

## 2024-09-17 PROCEDURE — 3044F HG A1C LEVEL LT 7.0%: CPT | Mod: CPTII,S$GLB,, | Performed by: STUDENT IN AN ORGANIZED HEALTH CARE EDUCATION/TRAINING PROGRAM

## 2024-09-17 PROCEDURE — 1101F PT FALLS ASSESS-DOCD LE1/YR: CPT | Mod: CPTII,S$GLB,, | Performed by: STUDENT IN AN ORGANIZED HEALTH CARE EDUCATION/TRAINING PROGRAM

## 2024-09-17 PROCEDURE — 17110 DESTRUCTION B9 LES UP TO 14: CPT | Mod: S$GLB,,, | Performed by: STUDENT IN AN ORGANIZED HEALTH CARE EDUCATION/TRAINING PROGRAM

## 2024-09-17 NOTE — PATIENT INSTRUCTIONS

## 2024-09-17 NOTE — PROGRESS NOTES
Subjective:      Patient ID:  Marce Hickey is a 74 y.o. female who presents for   Chief Complaint   Patient presents with    Spot     Left hand and chest     LOV 04/03/2023    Patient is coming in today with complaints of a dark spot on her left hand and has a spot on her chest that she is concerned about. Patient states that the spot on her hand popped up 2 months ago and it was raised and red but in now flat. The spot on her chest she noticed 2-3 weeks ago.     Derm Hx  Denies Phx NMSC  Denies Fhx MM        Review of Systems   Constitutional:  Negative for fever and chills.   Skin:  Positive for wears hat. Negative for itching, rash, daily sunscreen use and activity-related sunscreen use.   Hematologic/Lymphatic: Does not bruise/bleed easily.       Objective:   Physical Exam   Constitutional: She appears well-developed and well-nourished.   Neurological: She is alert and oriented to person, place, and time.   Psychiatric: She has a normal mood and affect.   Skin:   Areas Examined (abnormalities noted in diagram):   Chest / Axilla Inspection Performed  RUE Inspected            Diagram Legend     Erythematous scaling macule/papule c/w actinic keratosis       Vascular papule c/w angioma      Pigmented verrucoid papule/plaque c/w seborrheic keratosis      Yellow umbilicated papule c/w sebaceous hyperplasia      Irregularly shaped tan macule c/w lentigo     1-2 mm smooth white papules consistent with Milia      Movable subcutaneous cyst with punctum c/w epidermal inclusion cyst      Subcutaneous movable cyst c/w pilar cyst      Firm pink to brown papule c/w dermatofibroma      Pedunculated fleshy papule(s) c/w skin tag(s)      Evenly pigmented macule c/w junctional nevus     Mildly variegated pigmented, slightly irregular-bordered macule c/w mildly atypical nevus      Flesh colored to evenly pigmented papule c/w intradermal nevus       Pink pearly papule/plaque c/w basal cell carcinoma      Erythematous  hyperkeratotic cursted plaque c/w SCC      Surgical scar with no sign of skin cancer recurrence      Open and closed comedones      Inflammatory papules and pustules      Verrucoid papule consistent consistent with wart     Erythematous eczematous patches and plaques     Dystrophic onycholytic nail with subungual debris c/w onychomycosis     Umbilicated papule    Erythematous-base heme-crusted tan verrucoid plaque consistent with inflamed seborrheic keratosis     Erythematous Silvery Scaling Plaque c/w Psoriasis     See annotation      Assessment / Plan:        Seborrheic keratoses, inflamed  Cryosurgery procedure note:    Verbal consent from the patient is obtained. Liquid nitrogen cryosurgery is applied to 1 lesions to produce a freeze injury. The patient is aware that blisters may form and is instructed on wound care with gentle cleansing and use of vaseline ointment to keep moist until healed. The patient is supplied a handout on cryosurgery and is instructed to call if lesions do not completely resolve.    Seborrheic keratosis  These are benign inherited growths without a malignant potential. Reassurance given to patient. No treatment is necessary.              No follow-ups on file.

## 2024-09-19 DIAGNOSIS — K21.9 GASTROESOPHAGEAL REFLUX DISEASE, UNSPECIFIED WHETHER ESOPHAGITIS PRESENT: ICD-10-CM

## 2024-09-19 RX ORDER — OMEPRAZOLE AND SODIUM BICARBONATE 40; 1100 MG/1; MG/1
1 CAPSULE ORAL
Qty: 90 CAPSULE | Refills: 3 | OUTPATIENT
Start: 2024-09-19

## 2024-09-19 NOTE — TELEPHONE ENCOUNTER
No care due was identified.  [facility] Embedded Care Due Messages. Reference number: [id number].   9/19/2024 9:27:29 AM CDT

## 2024-09-20 NOTE — TELEPHONE ENCOUNTER
Refill Decision Note   Marce Hickey  is requesting a refill authorization.  Brief Assessment and Rationale for Refill:  Quick Discontinue     Medication Therapy Plan: Zegerid dc'kun by pcp on 11/13/23      Comments:     Note composed:8:42 PM 09/19/2024

## 2024-09-30 ENCOUNTER — PATIENT MESSAGE (OUTPATIENT)
Dept: FAMILY MEDICINE | Facility: CLINIC | Age: 74
End: 2024-09-30
Payer: MEDICARE

## 2024-09-30 DIAGNOSIS — K21.9 GASTROESOPHAGEAL REFLUX DISEASE, UNSPECIFIED WHETHER ESOPHAGITIS PRESENT: Primary | ICD-10-CM

## 2024-09-30 RX ORDER — OMEPRAZOLE AND SODIUM BICARBONATE 40; 1100 MG/1; MG/1
1 CAPSULE ORAL
Qty: 90 CAPSULE | Refills: 1 | Status: SHIPPED | OUTPATIENT
Start: 2024-09-30 | End: 2025-09-30

## 2024-10-15 ENCOUNTER — PATIENT MESSAGE (OUTPATIENT)
Dept: FAMILY MEDICINE | Facility: CLINIC | Age: 74
End: 2024-10-15
Payer: MEDICARE

## 2024-10-23 ENCOUNTER — OFFICE VISIT (OUTPATIENT)
Dept: FAMILY MEDICINE | Facility: CLINIC | Age: 74
End: 2024-10-23
Payer: MEDICARE

## 2024-10-23 VITALS
WEIGHT: 175.38 LBS | BODY MASS INDEX: 29.94 KG/M2 | DIASTOLIC BLOOD PRESSURE: 82 MMHG | HEART RATE: 98 BPM | OXYGEN SATURATION: 67 % | RESPIRATION RATE: 16 BRPM | SYSTOLIC BLOOD PRESSURE: 122 MMHG | HEIGHT: 64 IN

## 2024-10-23 DIAGNOSIS — Z85.3 HISTORY OF BREAST CANCER: ICD-10-CM

## 2024-10-23 DIAGNOSIS — I10 ESSENTIAL HYPERTENSION: Primary | ICD-10-CM

## 2024-10-23 PROBLEM — C50.512 MALIGNANT NEOPLASM OF LOWER-OUTER QUADRANT OF LEFT FEMALE BREAST, UNSPECIFIED ESTROGEN RECEPTOR STATUS: Status: RESOLVED | Noted: 2023-04-21 | Resolved: 2024-10-23

## 2024-10-23 PROCEDURE — 99999 PR PBB SHADOW E&M-EST. PATIENT-LVL III: CPT | Mod: PBBFAC,,, | Performed by: STUDENT IN AN ORGANIZED HEALTH CARE EDUCATION/TRAINING PROGRAM

## 2024-10-23 PROCEDURE — 1101F PT FALLS ASSESS-DOCD LE1/YR: CPT | Mod: CPTII,S$GLB,, | Performed by: STUDENT IN AN ORGANIZED HEALTH CARE EDUCATION/TRAINING PROGRAM

## 2024-10-23 PROCEDURE — 3044F HG A1C LEVEL LT 7.0%: CPT | Mod: CPTII,S$GLB,, | Performed by: STUDENT IN AN ORGANIZED HEALTH CARE EDUCATION/TRAINING PROGRAM

## 2024-10-23 PROCEDURE — 4010F ACE/ARB THERAPY RXD/TAKEN: CPT | Mod: CPTII,S$GLB,, | Performed by: STUDENT IN AN ORGANIZED HEALTH CARE EDUCATION/TRAINING PROGRAM

## 2024-10-23 PROCEDURE — 3288F FALL RISK ASSESSMENT DOCD: CPT | Mod: CPTII,S$GLB,, | Performed by: STUDENT IN AN ORGANIZED HEALTH CARE EDUCATION/TRAINING PROGRAM

## 2024-10-23 PROCEDURE — 3008F BODY MASS INDEX DOCD: CPT | Mod: CPTII,S$GLB,, | Performed by: STUDENT IN AN ORGANIZED HEALTH CARE EDUCATION/TRAINING PROGRAM

## 2024-10-23 PROCEDURE — 3079F DIAST BP 80-89 MM HG: CPT | Mod: CPTII,S$GLB,, | Performed by: STUDENT IN AN ORGANIZED HEALTH CARE EDUCATION/TRAINING PROGRAM

## 2024-10-23 PROCEDURE — 1126F AMNT PAIN NOTED NONE PRSNT: CPT | Mod: CPTII,S$GLB,, | Performed by: STUDENT IN AN ORGANIZED HEALTH CARE EDUCATION/TRAINING PROGRAM

## 2024-10-23 PROCEDURE — 1159F MED LIST DOCD IN RCRD: CPT | Mod: CPTII,S$GLB,, | Performed by: STUDENT IN AN ORGANIZED HEALTH CARE EDUCATION/TRAINING PROGRAM

## 2024-10-23 PROCEDURE — 99214 OFFICE O/P EST MOD 30 MIN: CPT | Mod: S$GLB,,, | Performed by: STUDENT IN AN ORGANIZED HEALTH CARE EDUCATION/TRAINING PROGRAM

## 2024-10-23 PROCEDURE — 3074F SYST BP LT 130 MM HG: CPT | Mod: CPTII,S$GLB,, | Performed by: STUDENT IN AN ORGANIZED HEALTH CARE EDUCATION/TRAINING PROGRAM

## 2024-10-23 NOTE — ASSESSMENT & PLAN NOTE
Wt Readings from Last 10 Encounters:   10/23/24 79.6 kg (175 lb 6.4 oz)   08/19/24 80 kg (176 lb 6.4 oz)   07/16/24 80 kg (176 lb 4.8 oz)   07/09/24 81.1 kg (178 lb 14.4 oz)   06/10/24 80 kg (176 lb 7.7 oz)   04/08/24 78.7 kg (173 lb 8 oz)   02/09/24 76.3 kg (168 lb 4.8 oz)   12/12/23 77.7 kg (171 lb 6.4 oz)   10/20/23 75.2 kg (165 lb 12.8 oz)   10/03/23 76.6 kg (168 lb 12.8 oz)     She is working on portion control and smart choices.  Doing well and staying active

## 2024-10-23 NOTE — PROGRESS NOTES
Subjective:       Patient ID: Marce Hickey is a 74 y.o. female.    Chief Complaint: Follow-up    Ms Hickey is doing very well  Compliant with her medications  No acute complaints  Eating well, sleeping well  No falls.          .  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    Review of Systems   Constitutional:  Negative for activity change and appetite change.   Respiratory:  Negative for shortness of breath.    Cardiovascular:  Negative for chest pain.   Gastrointestinal:  Negative for abdominal pain.   Genitourinary:  Negative for dysuria.   Integumentary:  Negative for rash.   Psychiatric/Behavioral:  Negative for depressed mood and sleep disturbance. The patient is not nervous/anxious.          Health Maintenance Due   Topic Date Due    RSV Vaccine (Age 60+ and Pregnant patients) (1 - Risk 60-74 years 1-dose series) Never done    COVID-19 Vaccine (6 - 2024-25 season) 09/01/2024    Colorectal Cancer Screening  01/22/2025      Health Maintenance reviewed and discussed- encourage vaccines, colon cancer screening comes due in Jan.     Objective:      Physical Exam  Constitutional:       General: She is not in acute distress.     Appearance: Normal appearance. She is not  ill-appearing.   Eyes:      Conjunctiva/sclera: Conjunctivae normal.   Cardiovascular:      Rate and Rhythm: Normal rate and regular rhythm.      Heart sounds: Normal heart sounds. No murmur heard.  Pulmonary:      Effort: Pulmonary effort is normal. No respiratory distress.      Breath sounds: Normal breath sounds. No wheezing or rales.   Musculoskeletal:      Right lower leg: No edema.      Left lower leg: No edema.   Skin:     General: Skin is warm and dry.   Neurological:      Mental Status: She is alert. Mental status is at baseline.      Gait: Gait normal.   Psychiatric:         Mood and Affect: Mood normal.         Behavior: Behavior normal.         Thought Content: Thought content normal.         Judgment: Judgment normal.         Assessment:       1. Essential hypertension    2. History of breast cancer    3. BMI 30.0-30.9,adult        Plan:       1. Essential hypertension  Assessment & Plan:  Stable. Continue current medications and regular followup.  Hypertension Medications               amLODIPine (NORVASC) 10 MG tablet Take 1 tablet (10 mg total) by mouth once daily.    losartan (COZAAR) 100 MG tablet Take 1 tablet (100 mg total) by mouth once daily.          BP Readings from Last 3 Encounters:   10/23/24 122/82   08/19/24 129/84   07/16/24 (!) 146/84     Stable. Continue current medications and regular followup.        2. History of breast cancer  Assessment & Plan:  6 years out. Follows with oncology      3. BMI 30.0-30.9,adult  Assessment & Plan:  Wt Readings from Last 10 Encounters:   10/23/24 79.6 kg (175 lb 6.4 oz)   08/19/24 80 kg (176 lb 6.4 oz)   07/16/24 80 kg (176 lb 4.8 oz)   07/09/24 81.1 kg (178 lb 14.4 oz)   06/10/24 80 kg (176 lb 7.7 oz)   04/08/24 78.7 kg (173 lb 8 oz)   02/09/24 76.3 kg (168 lb 4.8 oz)   12/12/23 77.7 kg (171 lb 6.4 oz)   10/20/23 75.2 kg (165 lb 12.8 oz)   10/03/23 76.6 kg (168 lb 12.8 oz)     She is working on portion control and smart choices.  Doing well and  staying active

## 2024-10-23 NOTE — ASSESSMENT & PLAN NOTE
Stable. Continue current medications and regular followup.  Hypertension Medications               amLODIPine (NORVASC) 10 MG tablet Take 1 tablet (10 mg total) by mouth once daily.    losartan (COZAAR) 100 MG tablet Take 1 tablet (100 mg total) by mouth once daily.          BP Readings from Last 3 Encounters:   10/23/24 122/82   08/19/24 129/84   07/16/24 (!) 146/84     Stable. Continue current medications and regular followup.

## 2024-11-19 NOTE — TELEPHONE ENCOUNTER
----- Message from Maria Guadalupe Cadet sent at 9/28/2023 12:09 PM CDT -----  Type:  Sooner Appointment Request    Caller is requesting a sooner appointment.  Caller declined first available appointment listed below.  Caller will not accept being placed on the waitlist and is requesting a message be sent to doctor.    Name of Caller:  pt   When is the first available appointment?  Nov 28  Symptoms:  f/u  Best Call Back Number:  [phone]    Additional Information:  please advise       
Appt scheduled. Pt voiced understanding.   
Negative

## 2024-12-01 ENCOUNTER — OFFICE VISIT (OUTPATIENT)
Dept: URGENT CARE | Facility: CLINIC | Age: 74
End: 2024-12-01
Payer: MEDICARE

## 2024-12-01 VITALS
BODY MASS INDEX: 30.73 KG/M2 | SYSTOLIC BLOOD PRESSURE: 138 MMHG | TEMPERATURE: 98 F | OXYGEN SATURATION: 98 % | DIASTOLIC BLOOD PRESSURE: 80 MMHG | HEIGHT: 64 IN | RESPIRATION RATE: 17 BRPM | HEART RATE: 70 BPM | WEIGHT: 180 LBS

## 2024-12-01 DIAGNOSIS — J02.9 SORE THROAT: ICD-10-CM

## 2024-12-01 DIAGNOSIS — J06.9 VIRAL URI: Primary | ICD-10-CM

## 2024-12-01 LAB
CTP QC/QA: YES
SARS-COV-2 AG RESP QL IA.RAPID: NEGATIVE

## 2024-12-01 PROCEDURE — 87811 SARS-COV-2 COVID19 W/OPTIC: CPT | Mod: QW,S$GLB,, | Performed by: NURSE PRACTITIONER

## 2024-12-01 PROCEDURE — 99213 OFFICE O/P EST LOW 20 MIN: CPT | Mod: S$GLB,,, | Performed by: NURSE PRACTITIONER

## 2024-12-01 NOTE — PROGRESS NOTES
"Subjective:      Patient ID: Marce Hickey is a 74 y.o. female.    Vitals:  height is 5' 4" (1.626 m) and weight is 81.6 kg (180 lb). Her oral temperature is 98.2 °F (36.8 °C). Her blood pressure is 138/80 and her pulse is 70. Her respiration is 17 and oxygen saturation is 98%.     Chief Complaint: Sore Throat    This is a 74 y.o. female who presents today with a chief complaint of nasal congestion.   Patient presents with a sore throat that began three days ago. Patient has a hx of HTN so she has not tried anything OTC, does not like to self medicate. Patient also complains of a slight cough, chest congestion, and headache- whenever she swallows she does have some pain that goes up into her left ear.     Sore Throat   This is a new problem. The current episode started in the past 7 days. The problem has been gradually worsening. Neither side of throat is experiencing more pain than the other. There has been no fever. The patient is experiencing no pain. Associated symptoms include congestion, coughing and headaches. Pertinent negatives include no shortness of breath.       Constitution: Negative.   HENT:  Positive for congestion, sinus pressure and sore throat.    Respiratory:  Positive for cough. Negative for shortness of breath.    Neurological:  Positive for headaches.      Objective:     Physical Exam   Constitutional: She is oriented to person, place, and time. She appears well-developed. She is cooperative.  Non-toxic appearance. She does not appear ill. No distress.   HENT:   Head: Normocephalic and atraumatic.   Ears:   Right Ear: Hearing, tympanic membrane, external ear and ear canal normal.   Left Ear: Hearing, tympanic membrane, external ear and ear canal normal.   Nose: Nose normal. No mucosal edema, rhinorrhea or nasal deformity. No epistaxis. Right sinus exhibits no maxillary sinus tenderness and no frontal sinus tenderness. Left sinus exhibits no maxillary sinus tenderness and no frontal sinus " tenderness.   Mouth/Throat: Uvula is midline and mucous membranes are normal. No trismus in the jaw. Normal dentition. No uvula swelling. Posterior oropharyngeal erythema (mild) present. No oropharyngeal exudate or posterior oropharyngeal edema.   Eyes: Conjunctivae and lids are normal. No scleral icterus.   Neck: Trachea normal and phonation normal. Neck supple. No edema present. No erythema present. No neck rigidity present.   Cardiovascular: Normal rate, regular rhythm, normal heart sounds and normal pulses.   Pulmonary/Chest: Effort normal and breath sounds normal. No respiratory distress. She has no decreased breath sounds. She has no rhonchi.   Abdominal: Normal appearance.   Musculoskeletal: Normal range of motion.         General: No deformity. Normal range of motion.   Neurological: She is alert and oriented to person, place, and time. She exhibits normal muscle tone. Coordination normal.   Skin: Skin is warm, dry, intact, not diaphoretic and not pale.   Psychiatric: Her speech is normal and behavior is normal. Judgment and thought content normal.   Nursing note and vitals reviewed.    Results for orders placed or performed in visit on 12/01/24   SARS Coronavirus 2 Antigen, POCT Manual Read    Collection Time: 12/01/24  9:53 AM   Result Value Ref Range    SARS Coronavirus 2 Antigen Negative Negative     Acceptable Yes      *Note: Due to a large number of results and/or encounters for the requested time period, some results have not been displayed. A complete set of results can be found in Results Review.      Assessment:       1. Viral URI    2. Sore throat        Plan:       Viral URI    Sore throat  -     SARS Coronavirus 2 Antigen, POCT Manual Read            Patient Instructions   Please return here or go to the Emergency Department for any concerns or worsening of condition.  Please drink plenty of fluids.  Please get plenty of rest.  If you do have Hypertension or palpitations, it is  safe to take Coricidin HBP for relief of sinus symptoms.  Please follow up with your primary care doctor or return to clinic if no improvement in symptoms.     If you  smoke, please stop smoking.          Tarik Brooke, RENEP-C   Medical Decision Making:   Urgent Care Management:  Patient's exam negative in the clinic and symptoms present for less than 3 days, therefore I recommend supportive treatment with medication for cough and congestion OTC and patient will monitor symptoms and return to clinic in if no improvement or for any worsening of symptoms.

## 2024-12-01 NOTE — PATIENT INSTRUCTIONS
Please return here or go to the Emergency Department for any concerns or worsening of condition.  Please drink plenty of fluids.  Please get plenty of rest.  If you do have Hypertension or palpitations, it is safe to take Coricidin HBP for relief of sinus symptoms.  Please follow up with your primary care doctor or return to clinic if no improvement in symptoms.     If you  smoke, please stop smoking.

## 2024-12-10 DIAGNOSIS — E78.2 MIXED HYPERLIPIDEMIA: ICD-10-CM

## 2024-12-10 RX ORDER — ATORVASTATIN CALCIUM 20 MG/1
20 TABLET, FILM COATED ORAL
Qty: 90 TABLET | Refills: 1 | Status: SHIPPED | OUTPATIENT
Start: 2024-12-10

## 2024-12-10 NOTE — TELEPHONE ENCOUNTER
Refill Decision Note   Marce Hickey  is requesting a refill authorization.  Brief Assessment and Rationale for Refill:  Approve     Medication Therapy Plan:         Comments:     Note composed:12:12 PM 12/10/2024

## 2024-12-10 NOTE — TELEPHONE ENCOUNTER
No care due was identified.  [facility] Embedded Care Due Messages. Reference number: [id number].   12/10/2024 11:01:34 AM CST

## 2025-01-10 ENCOUNTER — TELEPHONE (OUTPATIENT)
Facility: CLINIC | Age: 75
End: 2025-01-10
Payer: MEDICARE

## 2025-01-10 DIAGNOSIS — D51.8 DIETARY VITAMIN B12 DEFICIENCY ANEMIA: ICD-10-CM

## 2025-01-10 DIAGNOSIS — D50.8 OTHER IRON DEFICIENCY ANEMIA: ICD-10-CM

## 2025-01-10 DIAGNOSIS — C50.512 MALIGNANT NEOPLASM OF LOWER-OUTER QUADRANT OF LEFT FEMALE BREAST, UNSPECIFIED ESTROGEN RECEPTOR STATUS: Primary | ICD-10-CM

## 2025-01-10 DIAGNOSIS — E53.8 FOLATE DEFICIENCY: ICD-10-CM

## 2025-01-20 ENCOUNTER — LAB VISIT (OUTPATIENT)
Dept: LAB | Facility: HOSPITAL | Age: 75
End: 2025-01-20
Attending: INTERNAL MEDICINE
Payer: MEDICARE

## 2025-01-20 DIAGNOSIS — D50.8 OTHER IRON DEFICIENCY ANEMIA: ICD-10-CM

## 2025-01-20 DIAGNOSIS — C50.512 MALIGNANT NEOPLASM OF LOWER-OUTER QUADRANT OF LEFT FEMALE BREAST, UNSPECIFIED ESTROGEN RECEPTOR STATUS: ICD-10-CM

## 2025-01-20 DIAGNOSIS — D51.8 DIETARY VITAMIN B12 DEFICIENCY ANEMIA: ICD-10-CM

## 2025-01-20 DIAGNOSIS — E53.8 FOLATE DEFICIENCY: ICD-10-CM

## 2025-01-20 LAB
BASOPHILS # BLD AUTO: 0.02 K/UL (ref 0–0.2)
BASOPHILS NFR BLD: 0.4 % (ref 0–1.9)
DIFFERENTIAL METHOD BLD: NORMAL
EOSINOPHIL # BLD AUTO: 0.1 K/UL (ref 0–0.5)
EOSINOPHIL NFR BLD: 2.1 % (ref 0–8)
ERYTHROCYTE [DISTWIDTH] IN BLOOD BY AUTOMATED COUNT: 12.8 % (ref 11.5–14.5)
FOLATE SERPL-MCNC: 6.9 NG/ML (ref 4–24)
HCT VFR BLD AUTO: 41.7 % (ref 37–48.5)
HGB BLD-MCNC: 13.6 G/DL (ref 12–16)
IMM GRANULOCYTES # BLD AUTO: 0.02 K/UL (ref 0–0.04)
IMM GRANULOCYTES NFR BLD AUTO: 0.4 % (ref 0–0.5)
LYMPHOCYTES # BLD AUTO: 1.5 K/UL (ref 1–4.8)
LYMPHOCYTES NFR BLD: 31.1 % (ref 18–48)
MCH RBC QN AUTO: 28.6 PG (ref 27–31)
MCHC RBC AUTO-ENTMCNC: 32.6 G/DL (ref 32–36)
MCV RBC AUTO: 88 FL (ref 82–98)
MONOCYTES # BLD AUTO: 0.6 K/UL (ref 0.3–1)
MONOCYTES NFR BLD: 12 % (ref 4–15)
NEUTROPHILS # BLD AUTO: 2.6 K/UL (ref 1.8–7.7)
NEUTROPHILS NFR BLD: 54 % (ref 38–73)
NRBC BLD-RTO: 0 /100 WBC
PLATELET # BLD AUTO: 168 K/UL (ref 150–450)
PMV BLD AUTO: 11.2 FL (ref 9.2–12.9)
RBC # BLD AUTO: 4.75 M/UL (ref 4–5.4)
VIT B12 SERPL-MCNC: 1210 PG/ML (ref 210–950)
WBC # BLD AUTO: 4.76 K/UL (ref 3.9–12.7)

## 2025-01-20 PROCEDURE — 85025 COMPLETE CBC W/AUTO DIFF WBC: CPT | Performed by: INTERNAL MEDICINE

## 2025-01-20 PROCEDURE — 36415 COLL VENOUS BLD VENIPUNCTURE: CPT | Performed by: INTERNAL MEDICINE

## 2025-01-20 PROCEDURE — 82607 VITAMIN B-12: CPT | Performed by: INTERNAL MEDICINE

## 2025-01-20 PROCEDURE — 82746 ASSAY OF FOLIC ACID SERUM: CPT | Performed by: INTERNAL MEDICINE

## 2025-01-24 ENCOUNTER — OFFICE VISIT (OUTPATIENT)
Facility: CLINIC | Age: 75
End: 2025-01-24
Payer: MEDICARE

## 2025-01-24 VITALS
TEMPERATURE: 97 F | DIASTOLIC BLOOD PRESSURE: 81 MMHG | BODY MASS INDEX: 30.9 KG/M2 | RESPIRATION RATE: 16 BRPM | HEIGHT: 64 IN | SYSTOLIC BLOOD PRESSURE: 138 MMHG | HEART RATE: 64 BPM | WEIGHT: 181 LBS

## 2025-01-24 DIAGNOSIS — M85.89 OSTEOPENIA OF MULTIPLE SITES: ICD-10-CM

## 2025-01-24 DIAGNOSIS — Z98.890 STATUS POST GASTRIC SURGERY: ICD-10-CM

## 2025-01-24 DIAGNOSIS — F33.1 MODERATE EPISODE OF RECURRENT MAJOR DEPRESSIVE DISORDER: ICD-10-CM

## 2025-01-24 DIAGNOSIS — Z86.0100 HISTORY OF COLON POLYPS: ICD-10-CM

## 2025-01-24 DIAGNOSIS — K21.9 GASTROESOPHAGEAL REFLUX DISEASE, UNSPECIFIED WHETHER ESOPHAGITIS PRESENT: ICD-10-CM

## 2025-01-24 DIAGNOSIS — Z85.3 HISTORY OF BREAST CANCER: Primary | ICD-10-CM

## 2025-01-24 PROCEDURE — 99215 OFFICE O/P EST HI 40 MIN: CPT | Mod: S$GLB,,, | Performed by: INTERNAL MEDICINE

## 2025-01-24 PROCEDURE — 3079F DIAST BP 80-89 MM HG: CPT | Mod: CPTII,S$GLB,, | Performed by: INTERNAL MEDICINE

## 2025-01-24 PROCEDURE — G2211 COMPLEX E/M VISIT ADD ON: HCPCS | Mod: S$GLB,,, | Performed by: INTERNAL MEDICINE

## 2025-01-24 PROCEDURE — 1101F PT FALLS ASSESS-DOCD LE1/YR: CPT | Mod: CPTII,S$GLB,, | Performed by: INTERNAL MEDICINE

## 2025-01-24 PROCEDURE — 99999 PR PBB SHADOW E&M-EST. PATIENT-LVL III: CPT | Mod: PBBFAC,,, | Performed by: INTERNAL MEDICINE

## 2025-01-24 PROCEDURE — 1126F AMNT PAIN NOTED NONE PRSNT: CPT | Mod: CPTII,S$GLB,, | Performed by: INTERNAL MEDICINE

## 2025-01-24 PROCEDURE — 3008F BODY MASS INDEX DOCD: CPT | Mod: CPTII,S$GLB,, | Performed by: INTERNAL MEDICINE

## 2025-01-24 PROCEDURE — 3288F FALL RISK ASSESSMENT DOCD: CPT | Mod: CPTII,S$GLB,, | Performed by: INTERNAL MEDICINE

## 2025-01-24 PROCEDURE — 1159F MED LIST DOCD IN RCRD: CPT | Mod: CPTII,S$GLB,, | Performed by: INTERNAL MEDICINE

## 2025-01-24 PROCEDURE — 3075F SYST BP GE 130 - 139MM HG: CPT | Mod: CPTII,S$GLB,, | Performed by: INTERNAL MEDICINE

## 2025-01-24 NOTE — PROGRESS NOTES
"[facility] [address] In Office Subsequent Hematology Oncology Note    1/24/25      Subjective:      Patient ID:   Marce Hickey  74 y.o. female  1950  MD Narcisa        Chief Complaint:   Breast cancer evaluation    HPI:  74 y.o. female has a history of L breast cancer.   .  She has a history of breast reduction back in 1992.  Recently in March 2017 she had a abnormal mammogram at [facility].  Two adjacent masses were seen at the 12 and 1:00 position of the left breast.  Both masses returned positive for invasive ductal carcinoma.  ERP was positive, PRP was positive, HER2 Alec was negative, it was grade 1 disease, and sentinel lymph node biopsy was negative.  Clinically this was stage I disease.  She had bilateral mastectomy, with left sentinel node biopsy in May 2017.  She also had reconstruction with expanders placed and eventually implants paced per Dr. Thomsa of Plastic surgery.      She will continue on adjuvant Arimidex daily for x's 5 years.  No increased HF sx or joint sx.  Started 6/2017. Through 6/14/2022.    She is status post gastric sleeve surgery January 29, 2017.  She has a history of B12 deficiency and has been on B12 sublingually in the past, but has not taken it," in a long time.  B 12 MWF now.     B12 is low normal at 290 and  ferritin is normal at 93.  Vitamin D is low at 24 and bone density test shows osteopenia.  On B 12 subl daily, the level is up to 1263, she will continue B 12 subl 1/wk or BIW.    Vitamin D is better at 36, on supplements. 6000 unit per day.      She is to follow-up with her primary care for recommendations regarding vitamin-D supplementation and osteopenia/osteoporosis management and prevention while on Arimidex.  Bone Density test 5/14/20 showed osteopenia.    Estimated breast cancer recurrence was reduced from 23% to 12% with adjuvant Arimidex or tamoxifen usage using the breast cancer index reference.    She complains of pain at her left 2nd distal joint finger " [facility] and saw Dr. Schultz  for evaluation.  Was told she had degenerative arthritis at L>R 2nd finger.    She is status post partial hysterectomy.  She took birth control pills until about age 26.  She had hysterectomy at age 30.  She did not take hormone replacement therapy.    She wears a calcium and vitamin-D and multivitamin patch x2 years.  Hx anaphylaxis to NSAIDS.    For her history includes generalized anxiety disorder, type 2 diabetes, low vitamin-D levels, GERD, fatty liver, dyslipidemia, depression, hypertension, plantar fasciitis of the right foot.    She is status post breast reduction, bilateral mastectomy, breast implant placement, with reconstruction.  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  Other history includes the gastric sleeve surgery, partial hysterectomy, right rotator cuff repair, tonsillectomy, and cholecystectomy.    Her mother had hypertension, heart disease and history of heart attack.  Her father had heart disease and psoriasis.    She was in the [employer] times 26 years till .  She smoked times 19 years, 1 pack per day, and quit in .  She does not drink alcohol with with regularity.  She is allergic to aspirin and nonsteroidal anti inflammatory drugs as manifested with anaphylaxis.    Her mother  in 2017 of old age.  She had a lumpectomy done but it is not clear if she had breast cancer.  Her father had heart disease.  She has a brother alive and well and a cousin with prostate cancer.  She has 2 sons alive and well.    , Antony Hickey, ANNIA, [facility].    Our conversation revealed today that her family origin is from [place], specifically from [place].  She is 100% Askinazi Roman Catholic.    BRCA 1 & 2 returned NEGATIVE.  She does not have the breast cancer gene.    Breast Cancer Index testing supports RR 5-6% after 5 years of hormonal Rx.  Extended hormonal Rx not recommended in trying to  reduce RR further.  Stop hormonal Rx at 5 years,  2022.    She has 2 cousins with history of prostate cancer.    PET 2022 DOV.  She has degenerative disc disease, spinal stenosis, and chronic back pain symptoms.  She is status post Arimidex adjuvant therapy.  Follow-up lab work including tumor markers will be done in 2024 at [facility].      ROS:   GEN: normal without any fever, night sweats or weight loss  HEENT: normal with no HA's, sore throat, stiff neck, changes in vision  CV: normal with no CP, SOB, PND, FLORES or orthopnea  PULM: normal with no SOB, cough, hemoptysis, sputum or pleuritic pain  GI:  See history of present illness  : normal with no hematuria, dysuria  BREAST:  See history of present illness  SKIN: normal with no rash, erythema, bruising, or swelling     Past Medical History:   Diagnosis Date    Anxiety     Arthritis     Cancer     breast cancer - left    Depression     Diabetes mellitus, type 2     Borderline    Dyslipidemia 2016    Fatty liver disease, nonalcoholic     GERD (gastroesophageal reflux disease)     Hyperlipidemia     Hypertension     Malignant neoplasm of lower-outer quadrant of left female breast, unspecified estrogen receptor status 2023    Plantar fasciitis of right foot      Past Surgical History:   Procedure Laterality Date    ADENOIDECTOMY  1970    APPENDECTOMY  2007    breast reduction      BREAST SURGERY Bilateral     masectomy    BREAST SURGERY Bilateral     implants     SECTION      x 2    CHOLECYSTECTOMY      COLONOSCOPY N/A 2020    Procedure: COLONOSCOPY;  Surgeon: Nupur Leonard MD;  Location: [facility];  Service: Endoscopy;  Laterality: N/A;    COSMETIC SURGERY  May 4, 2017    Reconstruction due to mastectomy    EYE SURGERY      Lasik    FOOT SURGERY      left bunionectomy    gastric sleve      HYSTERECTOMY      one ovary intact, adhesions    JOINT REPLACEMENT  2017    KNEE ARTHROPLASTY Left 2018     "Procedure: ARTHROPLASTY, KNEE;  Surgeon: Christos Montanez MD;  [facility];  Service: Orthopedics;  Laterality: Left;    KNEE ARTHROSCOPY Left     LIPOSUCTION      ROTATOR CUFF REPAIR Right     TONSILLECTOMY      TUBAL LIGATION  January 28, 1977       Review of patient's allergies indicates:   Allergen Reactions    Nsaids (non-steroidal anti-inflammatory drug) Anaphylaxis           Current Outpatient Medications:     amLODIPine (NORVASC) 10 MG tablet, Take 1 tablet (10 mg total) by mouth once daily., Disp: 90 tablet, Rfl: 3    atorvastatin (LIPITOR) 20 MG tablet, TAKE 1 TABLET BY MOUTH EVERY DAY, Disp: 90 tablet, Rfl: 1    ergocalciferol, vitamin D2, (VITAMIN D ORAL), Take 6,000 mg by mouth once daily., Disp: , Rfl:     losartan (COZAAR) 100 MG tablet, Take 1 tablet (100 mg total) by mouth once daily., Disp: 90 tablet, Rfl: 3    omeprazole-sodium bicarbonate (ZEGERID) 40-1.1 mg-gram per capsule, Take 1 capsule by mouth before breakfast., Disp: 90 capsule, Rfl: 1    VITAMIN B COMPLEX ORAL, Take 1 tablet by mouth 3 (three) times a week., Disp: , Rfl:           Objective:   Vitals:  Blood pressure 138/81, pulse 64, temperature 97.2 °F (36.2 °C), temperature source Temporal, resp. rate 16, height 5' 4" (1.626 m), weight 82.1 kg (181 lb).    Physical Examination:   GEN: no apparent distress, comfortable  HEAD: atraumatic and normocephalic  EYES: no pallor, no icterus  ENT: no pharyngeal erythema, external ears WNL; no nasal discharge  NECK: no masses, thyroid normal, trachea midline, no LAD/LN's, supple  CV: RRR with no murmur; normal pulse; normal S1 and S2; no pedal edema  CHEST: Normal respiratory effort; CTAB; normal breath sounds; no wheeze or crackles  ABDOM: nontender and nondistended; soft; normal bowel sounds; no rebound/guarding, liver and spleen were not palpable  MUSC/Skeletal: ROM normal; no crepitus; joints normal; no deformities or arthropathy  EXTREM: no clubbing, cyanosis, inflammation or " swelling  SKIN: no rashes, lesions, ulcers, petechiae or subcutaneous nodules  : no cvat  NEURO: grossly intact; motor/sensory WNL;  no tremors  PSYCH: normal mood, affect and behavior  LYMPH: normal cervical, supraclavicular, axillary and groin LN's  BREASTS:  She has extensive postoperative change at the chest wall anteriorly, she does not have palpable mass at the left or right breast, chest area is nontender      Labs:   Lab Results   Component Value Date    WBC 4.76 01/20/2025    HGB 13.6 01/20/2025    HCT 41.7 01/20/2025    MCV 88 01/20/2025     01/20/2025    CMP  Sodium   Date Value Ref Range Status   04/08/2024 142 136 - 145 mmol/L Final     Potassium   Date Value Ref Range Status   04/08/2024 4.3 3.5 - 5.1 mmol/L Final     Chloride   Date Value Ref Range Status   04/08/2024 109 95 - 110 mmol/L Final     CO2   Date Value Ref Range Status   04/08/2024 24 23 - 29 mmol/L Final     Glucose   Date Value Ref Range Status   04/08/2024 104 70 - 110 mg/dL Final     BUN   Date Value Ref Range Status   04/08/2024 20 8 - 23 mg/dL Final     Creatinine   Date Value Ref Range Status   04/08/2024 0.8 0.5 - 1.4 mg/dL Final     Calcium   Date Value Ref Range Status   04/08/2024 10.4 8.7 - 10.5 mg/dL Final     Total Protein   Date Value Ref Range Status   04/08/2024 6.8 6.0 - 8.4 g/dL Final     Albumin   Date Value Ref Range Status   04/08/2024 4.1 3.5 - 5.2 g/dL Final     Total Bilirubin   Date Value Ref Range Status   04/08/2024 0.6 0.1 - 1.0 mg/dL Final     Comment:     For infants and newborns, interpretation of results should be based  on gestational age, weight and in agreement with clinical  observations.    Premature Infant recommended reference ranges:  Up to 24 hours.............<8.0 mg/dL  Up to 48 hours............<12.0 mg/dL  3-5 days..................<15.0 mg/dL  6-29 days.................<15.0 mg/dL       Alkaline Phosphatase   Date Value Ref Range Status   04/08/2024 98 55 - 135 U/L Final     AST    Date Value Ref Range Status   04/08/2024 15 10 - 40 U/L Final     ALT   Date Value Ref Range Status   04/08/2024 20 10 - 44 U/L Final     Anion Gap   Date Value Ref Range Status   04/08/2024 9 8 - 16 mmol/L Final     eGFR if    Date Value Ref Range Status   05/17/2022 >60.0 >60 mL/min/1.73 m^2 Final     eGFR if non    Date Value Ref Range Status   05/17/2022 >60.0 >60 mL/min/1.73 m^2 Final     Comment:     Calculation used to obtain the estimated glomerular filtration  rate (eGFR) is the CKD-EPI equation.        Hgb 13.6, B 12 NL    Assessment:   (1) 74 y.o. female with diagnosis of multifocal left breast cancer, clinical stage I disease, ERP positive,   HER2 Alec negative.    (2) Adjuvant Arimidex 1 mg p.o. daily x's  5 years.  Stop Arimidex  6/14/22.    Bone density testing shows osteopenia.  She is to follow-up with her primary care for osteopenia/osteoporosis management while on Arimidex.    She is status post gastric sleeve surgery and has history of B12 deficiency in the past.  Her B12 level returned low normal at 290.  On subl B 12 the level is better at 1,250.  Decrease subl B 12 to 1 weekly or 1 BIW.    Vitamin D is low at 24.  On calcium and Vitamin D, the level is better at 36.    BRCA 1 and BRCA 2 testing has returned Negative.    Breast Cancer Index supports 5-6% recurrence at years 5-10, after 5 years of adjuvant hormonal Rx.  Extended hormonal therapy greater than 5 years is not felt to be beneficial here in reducing RR further.    PET 1/2022 DOV.  Observe for now.  RTC 6 months with CBC, B 12, folate

## 2025-01-24 NOTE — LETTER
January 25, 2025        Jade Brewster MD  [address]  [address]  [place] [address]             [facility]ology [facility]  [address]  [address]  [place] [facility] [address]  Phone: [phone]  Fax: [phone]   Patient: Marce Hickey   MR Number: [id number]   YOB: 1950   Date of Visit: 1/24/2025       Dear Dr. Brewster:    Thank you for referring Marce Hickey to me for evaluation. Below are the relevant portions of my assessment and plan of care.            If you have questions, please do not hesitate to call me. I look forward to following Marce along with you.    Sincerely,      RENUKA Luis MD           CC  No Recipients

## 2025-04-11 ENCOUNTER — OFFICE VISIT (OUTPATIENT)
Dept: FAMILY MEDICINE | Facility: CLINIC | Age: 75
End: 2025-04-11
Payer: MEDICARE

## 2025-04-11 VITALS
SYSTOLIC BLOOD PRESSURE: 127 MMHG | DIASTOLIC BLOOD PRESSURE: 85 MMHG | BODY MASS INDEX: 31.07 KG/M2 | HEART RATE: 62 BPM | RESPIRATION RATE: 16 BRPM | OXYGEN SATURATION: 98 % | HEIGHT: 64 IN

## 2025-04-11 DIAGNOSIS — I10 ESSENTIAL HYPERTENSION: ICD-10-CM

## 2025-04-11 DIAGNOSIS — G89.29 CHRONIC MIDLINE LOW BACK PAIN WITHOUT SCIATICA: Primary | ICD-10-CM

## 2025-04-11 DIAGNOSIS — M48.00 CENTRAL STENOSIS OF SPINAL CANAL: ICD-10-CM

## 2025-04-11 DIAGNOSIS — M54.50 CHRONIC MIDLINE LOW BACK PAIN WITHOUT SCIATICA: Primary | ICD-10-CM

## 2025-04-11 PROCEDURE — 99999 PR PBB SHADOW E&M-EST. PATIENT-LVL III: CPT | Mod: PBBFAC,,, | Performed by: STUDENT IN AN ORGANIZED HEALTH CARE EDUCATION/TRAINING PROGRAM

## 2025-04-11 RX ORDER — HYDROCHLOROTHIAZIDE 12.5 MG/1
12.5 CAPSULE ORAL DAILY
COMMUNITY
Start: 2025-03-16

## 2025-04-11 NOTE — ASSESSMENT & PLAN NOTE
Stable. Continue current medications and regular followup.  Hypertension Medications               amLODIPine (NORVASC) 10 MG tablet Take 1 tablet (10 mg total) by mouth once daily.    losartan (COZAAR) 100 MG tablet Take 1 tablet (100 mg total) by mouth once daily.          BP Readings from Last 3 Encounters:   04/11/25 127/85   01/24/25 138/81   12/01/24 138/80     Stable. Continue current medications and regular followup.

## 2025-04-11 NOTE — PROGRESS NOTES
Subjective:       Patient ID: Marce Hiceky is a 74 y.o. female.    Chief Complaint: Back Pain    History of Present Illness    HPI:  Ms. Hickey presents with chronic sciatica and spinal stenosis, seeking referral for physical therapy to manage pain and improve sleep. Ms. Hickey reports a history of sciatica issues that began after a knee replacement surgery in 2017. She has not slept a full night in about 6.5 years due to the pain, which has significantly impacted her mood, causing irritability. The sciatica initially developed after repeatedly hitting her leg on a sharp wooden object at the foot of her bed.    Following her knee replacement, patient developed severe sciatica and consulted a chiropractor who referred her to an orthopedist in [place]. Diagnostic tests and imaging revealed spinal stenosis, which Dr. Hilario from [facility] described as severe and involving multiple levels. Dr. Hilario advised against surgery due to the extent of the stenosis, stating it was unlikely to be beneficial.    Ms. Hickey was referred for physical therapy, which provided significant relief. After the first day of physical therapy, she slept through the entire night for the first time in years. She completed the 12 visits covered by Medicare and continued to pay out of pocket for additional sessions, finding the controlled environment and tables beneficial for her exercises.    Ms. Hickey reports difficulty performing exercises on the floor due to limited mobility in her knee from scar tissue. She has been taking extra-strength Tylenol for pain management but limits her intake due to concerns about liver damage. She expresses a desire to return to physical therapy for ongoing management of her condition.    Ms. Hickey is allergic to NSAIDs and has declined spinal injections, expressing aversion to needles and reluctance to start a routine of spinal injections.    MEDICATIONS:  Ms. Hickey is on Tylenol (acetaminophen) Extra  Strength, taking 2 tablets as needed but not nightly, for pain related to spinal stenosis and sciatica.    MEDICAL HISTORY:  Ms. Hickey has a history of sciatica since 2017, which developed following knee replacement surgery. She also has spinal stenosis, described as severe by her orthopedist. She reports allergies to NSAIDs.    SURGICAL HISTORY:  Ms. Hickey underwent knee replacement surgery in 2017 on the same leg affected by sciatica issues.    ALLERGIES:  Ms. Hickey is allergic to NSAIDs, though the specific reaction was not mentioned.    SOCIAL HISTORY:  Marital status:        Review of Systems   Constitutional:  Negative for activity change and appetite change.   Respiratory:  Negative for shortness of breath.    Cardiovascular:  Negative for chest pain.   Gastrointestinal:  Negative for abdominal pain.   Genitourinary:  Negative for dysuria.   Musculoskeletal:  Positive for back pain.   Integumentary:  Negative for rash.   Psychiatric/Behavioral:  Negative for sleep disturbance.       Problem List[1]  Marce has a current medication list which includes the following prescription(s): atorvastatin, ergocalciferol (vitamin d2), hydrochlorothiazide, losartan, omeprazole-sodium bicarbonate, and vitamin b complex.        Health Maintenance Due   Topic Date Due    RSV Vaccine (Age 60+ and Pregnant patients) (1 - Risk 60-74 years 1-dose series) Never done    COVID-19 Vaccine (6 - 2024-25 season) 09/01/2024    Colorectal Cancer Screening  01/22/2025      Health Maintenance reviewed and discussed- Colon ordered.     Objective:      Physical Exam  Constitutional:       General: She is not in acute distress.     Appearance: Normal appearance. She is not ill-appearing.   Eyes:      Conjunctiva/sclera: Conjunctivae normal.   Cardiovascular:      Rate and Rhythm: Normal rate and regular rhythm.      Heart sounds: Normal heart sounds. No murmur heard.  Pulmonary:      Effort: Pulmonary effort is normal. No respiratory  distress.      Breath sounds: Normal breath sounds. No wheezing or rales.   Musculoskeletal:      Right lower leg: No edema.      Left lower leg: No edema.   Skin:     General: Skin is warm and dry.   Neurological:      Mental Status: She is alert. Mental status is at baseline.      Gait: Gait normal.   Psychiatric:         Mood and Affect: Mood normal.         Behavior: Behavior normal.         Thought Content: Thought content normal.         Judgment: Judgment normal.             Assessment:       Assessment & Plan    1. Assessed chronic pain issues, including sciatica and spinal stenosis.  2. Considered history of knee replacement and its potential impact on current symptoms.  3. Noted allergy to NSAIDs, limiting medication options.  4. Evaluated effectiveness of previous physical therapy in managing symptoms.           Plan:       1. Chronic midline low back pain without sciatica  -     Ambulatory Referral/Consult to Physical Therapy; Future; Expected date: 04/18/2025    2. Central stenosis of spinal canal  Assessment & Plan:  MRI done at [place]  Severe spinal canal stenosis at L2/L3, L3/L4, L4/L5        3. Essential hypertension  Assessment & Plan:  Stable. Continue current medications and regular followup.  Hypertension Medications               amLODIPine (NORVASC) 10 MG tablet Take 1 tablet (10 mg total) by mouth once daily.    losartan (COZAAR) 100 MG tablet Take 1 tablet (100 mg total) by mouth once daily.          BP Readings from Last 3 Encounters:   04/11/25 127/85   01/24/25 138/81   12/01/24 138/80     Stable. Continue current medications and regular followup.             This note was generated with the assistance of ambient listening technology. Verbal consent was obtained by the patient and accompanying visitor(s) for the recording of patient appointment to facilitate this note. I attest to having reviewed and edited the generated note for accuracy, though some syntax or spelling errors may  persist. Please contact the author of this note for any clarification.                    [1]   Patient Active Problem List  Diagnosis    GERD (gastroesophageal reflux disease)    BMI 30.0-30.9,adult    CEASAR (generalized anxiety disorder)    Hyperlipidemia    Status post gastric surgery    Breast asymmetry    History of breast cancer    Status post DJO total knee replacement using cement, left 9/25/18    Arthrofibrosis of knee joint, left    History of colon polyps    Moderate episode of recurrent major depressive disorder    Osteopenia of multiple sites    Essential hypertension    Central stenosis of spinal canal    Impaired functional mobility and activity tolerance

## 2025-04-17 ENCOUNTER — PATIENT MESSAGE (OUTPATIENT)
Dept: FAMILY MEDICINE | Facility: CLINIC | Age: 75
End: 2025-04-17
Payer: MEDICARE

## 2025-04-17 DIAGNOSIS — M54.50 CHRONIC MIDLINE LOW BACK PAIN WITHOUT SCIATICA: Primary | ICD-10-CM

## 2025-04-17 DIAGNOSIS — M54.16 RADICULOPATHY, LUMBAR REGION: ICD-10-CM

## 2025-04-17 DIAGNOSIS — G89.29 CHRONIC MIDLINE LOW BACK PAIN WITHOUT SCIATICA: Primary | ICD-10-CM

## 2025-04-23 ENCOUNTER — CLINICAL SUPPORT (OUTPATIENT)
Dept: REHABILITATION | Facility: HOSPITAL | Age: 75
End: 2025-04-23
Attending: STUDENT IN AN ORGANIZED HEALTH CARE EDUCATION/TRAINING PROGRAM
Payer: MEDICARE

## 2025-04-23 DIAGNOSIS — G89.29 CHRONIC MIDLINE LOW BACK PAIN WITHOUT SCIATICA: Primary | ICD-10-CM

## 2025-04-23 DIAGNOSIS — Z74.09 IMPAIRED FUNCTIONAL MOBILITY AND ACTIVITY TOLERANCE: ICD-10-CM

## 2025-04-23 DIAGNOSIS — M54.50 CHRONIC MIDLINE LOW BACK PAIN WITHOUT SCIATICA: Primary | ICD-10-CM

## 2025-04-23 PROCEDURE — 97140 MANUAL THERAPY 1/> REGIONS: CPT | Mod: PN

## 2025-04-23 PROCEDURE — 97014 ELECTRIC STIMULATION THERAPY: CPT | Mod: PN

## 2025-04-23 PROCEDURE — 97161 PT EVAL LOW COMPLEX 20 MIN: CPT | Mod: PN

## 2025-04-24 NOTE — PROGRESS NOTES
Outpatient Rehab    Physical Therapy Evaluation    Patient Name: Jaciel Hickey  MRN: [id number]  YOB: 1950  Encounter Date: 4/23/2025    Therapy Diagnosis:   Encounter Diagnoses   Name Primary?    Chronic midline low back pain without sciatica Yes    Impaired functional mobility and activity tolerance      Physician: Jade Brewster MD    Physician Orders: Eval and Treat  Medical Diagnosis: Chronic midline low back pain without sciatica    Visit # / Visits Authorized:  1 / 1  Insurance Authorization Period: 4/11/2025 to 4/11/2026  Date of Evaluation: 4/23/2025  Plan of Care Certification: 4/23/2025 to 7/23/2025     Time In: 0900   Time Out: 1005  Total Time: 65   Total Billable Time: 60    Intake Outcome Measure for FOTO Survey    Therapist reviewed FOTO scores for Jaciel Hickey on 4/23/2025.   FOTO report - see Media section or FOTO account episode details.     Intake Score: 53%         Subjective   History of Present Illness  Jaciel is a 74 y.o. female who reports to physical therapy with a chief concern of I have gotten worse since I quit coming on the [employer] program, I thought I could save $ 35/ money and do my exercsies at home, but that did not work out well..     The patient reports a medical diagnosis of M54.50,G89.29 (ICD-10-CM) - Chronic midline low back pain without sciatica. The patient has experienced this issue since 04/11/25.           Dominant Hand: Left  History of Present Condition/Illness: Jaciel reports progress increase in her lower back and LLE pain for past 6 months.  She stated that nights are the worst time with increased Left leg pain, difficulty sleeping due to pain; She notes increased pain with standing > 10 mins; does have some pain with walking - walks the dogs daily anywhere between 20 - 40 mins; Able to grocery shop, but leans on the cart and sometimes has to just go sit in the car while her  finishes the shopping; Jaciel stated that she continues to  perform all of her usual activities - she gardens, does yard and house work as usual, but has pain.  She can no longer get down and back up fro the floor as result of her back/leg pain.  Asked Dr Brewster for a referral back to PT.  Also asked her for referral to acupuncture as she has read that this might be helpful for her pain. Discussed trial of Functional Dry Needling here in PT - we did not do this when she was here for therapy last year as her pain quickly resolved with exercise.     Activities of Daily Living  Social history was obtained from Patient.    General Prior Level of Function Comments: Independent - was participating in Stay-[employer] program to keep her back pain at bay and improve overall health.  General Current Level of Function Comments: Indepenent, walks dogs 20-40 mins daily; active during the day, but no longer performing dedicated exercises.  Patient Responsibilities: Community mobility, Driving, Shopping, Personal ADL, Yard work, Meal prep, Home management, Health management, Laundry    Previously independent with activities of daily living? Yes     Currently independent with activities of daily living? Yes              Pain     Patient reports a current pain level of 3/10. Pain at best is reported as 2/10. Pain at worst is reported as 5/10.   Location: lower back > left buttock into posterior thigh and lateral lower leg; rarely in her foot  Clinical Progression (since onset): Stable  Pain Qualities: Cramping, Needle-like, Radiating, Tightness, Tenderness  Pain-Relieving Factors: Change in position, Medications - over-the-counter, Walking, Relaxation, Rest, Sitting  Pain-Aggravating Factors: Bending, Lying down, Sleeping, Walking, Stair climbing, Movement         Living Arrangements  Living Situation  Housing: Home independently  Living Arrangements: Spouse/significant other  Support Systems: Spouse/significant other, Friends/neighbors    Home Setup  Type of Structure: House  Home Access: Level  entry  Number of Levels in Home: One level          Past Medical History/Physical Systems Review:   Marce Hickey  has a past medical history of Anxiety, Arthritis, Cancer, Depression, Diabetes mellitus, type 2, Dyslipidemia, Fatty liver disease, nonalcoholic, GERD (gastroesophageal reflux disease), Hyperlipidemia, Hypertension, Malignant neoplasm of lower-outer quadrant of left female breast, unspecified estrogen receptor status, and Plantar fasciitis of right foot.    Marce Hickey  has a past surgical history that includes Cholecystectomy; Tonsillectomy;  section; Foot surgery; Liposuction; breast reduction; Hysterectomy; gastric sleve; Breast surgery (Bilateral); Breast surgery (Bilateral); Rotator cuff repair (Right); Knee arthroscopy (Left); Knee Arthroplasty (Left, 2018); Colonoscopy (N/A, 2020); Adenoidectomy (); Appendectomy (); Eye surgery (); Tubal ligation (1977); Joint replacement (2017); and Cosmetic surgery (May 4, 2017).    Marce has a current medication list which includes the following prescription(s): atorvastatin, ergocalciferol (vitamin d2), hydrochlorothiazide, losartan, omeprazole-sodium bicarbonate, and vitamin b complex.    Review of patient's allergies indicates:   Allergen Reactions    Nsaids (non-steroidal anti-inflammatory drug) Anaphylaxis        Objective   Posture                 Slight forward head/rounded shoulders; increased lumbar lordosis with anterior pelvic tilt     Spinal Muscle Palpation             Tenderness noted paraspinal mm lumbar spine - both left and right side; myofascial tightness/tenderness left piriformis; tenderness left proximal fibular head.         Lumbar Range of Motion   Active (deg) Passive (deg) Pain   Flexion 30       Extension 10   Yes   Right Lateral Flexion 20   Yes   Right Rotation 40       Left Lateral Flexion 20   Yes   Left Rotation 40                Hip Range of Motion    Bilateral  hip AROM is WFL; no increased pain with motion ; Limited left knee flexion secondary to TKA - flexion to about 90 degrees.         Thoracic Trunk Strength  3+.5    Lumbar Strength   Strength Pain   Flexion 3+     Extension 3+ Yes   Right Lateral Flexion 3+ Yes   Left Lateral Flexion 3+ Yes   Right Rotation 3+     Left Rotation 3+     Transverse Abdominus Strength Testing: able to demonstrate fair + activation of TrAbs; reduced hold time noted            Hip Strength - Planes of Motion   Right Strength Right Pain Left Strength Left  Pain   Flexion (L2) 4+   4+     Extension 4-   3+     ABduction 4-   3+     ADduction 4+   4-     Internal Rotation 4-   3+     External Rotation 4+   4         Knee Strength   Right Strength Right Pain Left Strength Left  Pain   Flexion (S2) 4   3+     Prone Flexion           Extension (L3) 4+   4+            Ankle/Foot Strength - Planes of Motion   Right Strength Right Pain Left Strength Left  Pain   Dorsiflexion (L4) 4+   4+     Plantar Flexion (S1) 4   4     Inversion 4         Eversion 4   4     Great Toe Flexion 4   4     Great Toe Extension (L5) 4   4     Lesser Toes Flexion           Lesser Toes Extension                  Lumbar/Pelvic Girdle Special Tests  Lumbar Tests - Repeated  Positive: Extension  Negative: Flexion, Right Side Glide, and Left Side Glide  immediate pain with lumbar extension    Lumbar Tests - SLR and Tension  Negative: Left Passive Straight Leg Raise, Left Crossed Straight Leg Raise, Left Sural Nerve Bias Straight Leg Raise, Left Tibial Nerve Bias Straight Leg Raise, and Left Femoral Nerve Tension       Other Lumbar Tests  Positive: Left Quadrant  Negative: Left Peroneal Nerve Tension              Hip Special Tests  Other Hip Special Tests  Negative: Left Femoral Nerve Tension                Treatment:  Manual Therapy  MT 1: S/L on right - segmental flexion of left vertebral segments with blocking of segment above; Prone - IASTM to lumbar fasica;  MT 2:  Consent signed for Functional Dry Needling and scanned into media; Patient positioned in prone with proper head, lumbar and ankle support; LF-ES per protocol for lumbar radiculopathy: utilized 60 mm needles at 1/5 fb lateral to L3, L4, L5 and S1 on right/left sides; 75 mm needles to left piriformis in trigger points; Clockwise winding of needles for increased tissue graps.  Intensity of stimulation adjusted to patient tolerance - light rhythmical stimulation noted.  Needles left in situ x 12 mins.  Removed without adverse effects.  MT 3: Educated patient on effects of dry needling; given written H/O to take home; answered all questions.      Time Entry(in minutes):  PT Evaluation (Low) Time Entry: 35  E-Stim (Unattended) Time Entry: 12  Manual Therapy Time Entry: 20  Needle Insertions Time Entry: 10    Assessment & Plan   Assessment  Jaciel presents with a condition of Low complexity.   Presentation of Symptoms: Stable  Will Comorbidities Impact Care: No       Functional Limitations: Activity tolerance, Functional mobility, Performing household chores, Participating in leisure activities, Painful locomotion/ambulation, Standing tolerance  Impairments: Activity intolerance, Impaired physical strength, Pain with functional activity    Patient Goal for Therapy (PT): To reduce pain and be able to perform usual daily and leisure activities without any limitations/pain  Prognosis: Good  Assessment Details: Jaciel is 73 yo with long history of lumbar pain, previous diagnosis of lumbar stenosis per MRI a year ago.  Patient had successful outcomes with physical therapy last year and continued to do well for several months after discharge as she remained compliant with her HEP.  Jaciel reports less compliance with exercises over past 6 months and now pain has returned - in her lower back and now into LLE.  She demonstrates good lumbar AROM, but now with pain during lateral flexion R/L and lumbar extension; Radicular LLE pain into  buttock and left posterior thigh to knee - at times into left foot.  She has limited activity tolerance and interrupted sleeping as result of her pain.  Patient will benefit from Skilled physical therapy to address deficits noted in evaluation - myofascial pain and tissue tightness, painful ROM in lumbar spine and activity intolerance as a result.      Plan  From a physical therapy perspective, the patient would benefit from: Skilled Rehab Services    Planned therapy interventions include: Therapeutic exercise, Therapeutic activities, Neuromuscular re-education, Manual therapy, Group therapy, and Other (Comment). Functional Dry Needling   Planned modalities to include: Electrical stimulation - passive/unattended.        Visit Frequency: 2 times Per Week for 6 Weeks.       This plan was discussed with Patient and Therapy assistant.   Discussion participants: Agreed Upon Plan of Care             Patient's spiritual, cultural, and educational needs considered and patient agreeable to plan of care and goals.     Education  Education was done with Patient. The patient's learning style includes Demonstration, Listening, and Pictures/video. The patient Verbalizes understanding and Demonstrates understanding.         Role of outpatient PT; Functional Dry Needling - purpose, procedures, what to expect after;        Goals:   Active       LTG's        Able to perform all household activities with no limitation        Start:  04/23/25    Expected End:  06/06/25            Able to perform all leisure activities with no limitation        Start:  04/23/25    Expected End:  06/06/25            Able to sleep through the night without waking from pain        Start:  04/23/25    Expected End:  06/06/25            Ambulate community required distances with no limitations        Start:  04/23/25    Expected End:  06/06/25            Independent with HEP for continued improvement in function.        Start:  04/23/25    Expected End:   06/06/25               STG's        Demonstrate improvement in recent symptoms to progress toward long term goals        Start:  04/23/25    Expected End:  05/14/25            Correct postural deficits to reduce pain and promote improvement in postural awareness for injury prevention        Start:  04/23/25    Expected End:  05/14/25            Demonstrate compliance with initial exercise program        Start:  04/23/25    Expected End:  05/14/25                Kate Shannon, PT

## 2025-04-30 ENCOUNTER — CLINICAL SUPPORT (OUTPATIENT)
Dept: REHABILITATION | Facility: HOSPITAL | Age: 75
End: 2025-04-30
Payer: MEDICARE

## 2025-04-30 DIAGNOSIS — Z74.09 IMPAIRED FUNCTIONAL MOBILITY AND ACTIVITY TOLERANCE: Primary | ICD-10-CM

## 2025-04-30 PROCEDURE — 97140 MANUAL THERAPY 1/> REGIONS: CPT | Mod: PN

## 2025-04-30 PROCEDURE — 97014 ELECTRIC STIMULATION THERAPY: CPT | Mod: PN

## 2025-04-30 PROCEDURE — 97530 THERAPEUTIC ACTIVITIES: CPT | Mod: PN

## 2025-04-30 NOTE — PROGRESS NOTES
"  Outpatient Rehab    Physical Therapy Visit    Patient Name: Jaciel Hickey  MRN: [id number]  YOB: 1950  Encounter Date: 4/30/2025    Therapy Diagnosis:   Encounter Diagnosis   Name Primary?    Impaired functional mobility and activity tolerance Yes     Physician: Jade Brewster MD    Physician Orders: Eval and Treat  Medical Diagnosis: Chronic midline low back pain without sciatica    Visit # / Visits Authorized:  1 / 16  Insurance Authorization Period: 4/24/2025 to 7/31/2025  Date of Evaluation: 4/23/2025  Plan of Care Certification: 4/23/2025 to 7/23/2025     PT/PTA:  1   Number of PTA visits since last PT visit: 0  Time In: 0700   Time Out: 0815  Total Time: 75   Total Billable Time: 45    FOTO:  Intake Score:  %  Survey Score 1:  %  Survey Score 2:  %         Subjective   Ok this morning, pain about a 2/10.    it's at night that the pain gets bad - around an 8/10 - back into LLE to mid calf    Objective            Treatment:  Manual Therapy  MT 1: S/L on right - segmental flexion of left vertebral segments with blocking of segment above; Prone - IASTM to lumbar fasica; lamina release in lumbar spine.  MT 2: Patient positioned in prone with proper head, lumbar and ankle support; LF-ES per protocol for lumbar radiculopathy: utilized 60 mm needles at 1.5 fb lateral to L3, L4, L5 and S1 on right/left sides; 75 mm needles to left piriformis in trigger points; Clockwise winding of needles for increased tissue graps.  Intensity of stimulation adjusted to patient tolerance - light rhythmical stimulation noted.  Needles left in situ x 20 mins.  Removed without adverse effects.  Balance/Neuromuscular Re-Education  NMR 1: Supine: H/S stretching with strap 30 sec hold - 3 directions - perform on each leg  NMR 2: Calf stretch on wedge - 30 sec x 2  Therapeutic Activity  TA 1: SUpine: Activation of TrAb x 5"h x 6  TA 2: Supine - posterior pelvic tilt - 5" h x 10  TA 3: Supine: bridges x 15  TA 4: S/L " clams with green band around thighs x 12  TA 5: S/L hip abuction x 12  TA 6: Nu-step Level 5 x 10 mins    Time Entry(in minutes):  E-Stim (Unattended) Time Entry: 20  Manual Therapy Time Entry: 30  Therapeutic Activity Time Entry: 15  Needle Insertions Time Entry: 20    Assessment & Plan   Assessment: Patient reported several day reduction in pain following the Dry Needling last week; Will be starting Accupuncture to compliment her current therapy; Good tolerance of exercises today; Increased time with dry needling for lower back pain.  Evaluation/Treatment Tolerance: Patient tolerated treatment well    Patient will continue to benefit from skilled outpatient physical therapy to address the deficits listed in the problem list box on initial evaluation, provide pt/family education and to maximize pt's level of independence in the home and community environment.     Patient's spiritual, cultural, and educational needs considered and patient agreeable to plan of care and goals.           Plan: Continue with Skilled physical therapy 2x/week x 6 weeks to address lower back pain.    Goals:   Active       LTG's        Able to perform all household activities with no limitation        Start:  04/23/25    Expected End:  06/06/25            Able to perform all leisure activities with no limitation        Start:  04/23/25    Expected End:  06/06/25            Able to sleep through the night without waking from pain        Start:  04/23/25    Expected End:  06/06/25            Ambulate community required distances with no limitations        Start:  04/23/25    Expected End:  06/06/25            Independent with HEP for continued improvement in function.        Start:  04/23/25    Expected End:  06/06/25               STG's        Demonstrate improvement in recent symptoms to progress toward long term goals  (Progressing)       Start:  04/23/25    Expected End:  05/14/25            Correct postural deficits to reduce pain and  promote improvement in postural awareness for injury prevention        Start:  04/23/25    Expected End:  05/14/25            Demonstrate compliance with initial exercise program        Start:  04/23/25    Expected End:  05/14/25                Kate Shannon PT

## 2025-05-01 DIAGNOSIS — I10 ESSENTIAL HYPERTENSION: ICD-10-CM

## 2025-05-02 ENCOUNTER — CLINICAL SUPPORT (OUTPATIENT)
Dept: REHABILITATION | Facility: HOSPITAL | Age: 75
End: 2025-05-02
Payer: MEDICARE

## 2025-05-02 DIAGNOSIS — Z74.09 IMPAIRED FUNCTIONAL MOBILITY AND ACTIVITY TOLERANCE: Primary | ICD-10-CM

## 2025-05-02 PROCEDURE — 97530 THERAPEUTIC ACTIVITIES: CPT | Mod: PN,CQ

## 2025-05-02 PROCEDURE — 97112 NEUROMUSCULAR REEDUCATION: CPT | Mod: PN,CQ

## 2025-05-02 NOTE — PROGRESS NOTES
"  Outpatient Rehab    Physical Therapy Visit    Patient Name: Jaciel Hickey  MRN: [id number]  YOB: 1950  Encounter Date: 5/2/2025    Therapy Diagnosis:   Encounter Diagnosis   Name Primary?    Impaired functional mobility and activity tolerance Yes     Physician: Jade Brewster MD    Physician Orders: Eval and Treat  Medical Diagnosis: Chronic midline low back pain without sciatica    Visit # / Visits Authorized:  2 / 16  Insurance Authorization Period: 4/24/2025 to 7/31/2025  Date of Evaluation: 4/23/2025  Plan of Care Certification: 4/23/2025 to 7/23/2025     PT/PTA:     Number of PTA visits since last PT visit:  1  Time In:   7:00 AM  Time Out:   7:50 AM  Total Time:   50 Minutes  Total Billable Time:   45 Minutes    FOTO:  Intake Score:  %  Survey Score 1:  %  Survey Score 2:  %         Subjective   No new c/o's.  Pain reported as 1/10. Low Back    Objective            Treatment:  Balance/Neuromuscular Re-Education  NMR 1: Supine: H/S stretching with strap 3 x 30 sec hold - 3 directions - perform on each leg  NMR 2: Calf stretch on wedge - 30 sec x 2  NMR 3: SLR x  10  NMR 4: S/L open books x 10  NMR 5: DKC w/ PB x 2 min  NMR 6: LTR w/ PB x 2 min  Therapeutic Activity  TA 1: TrAb w/ PB x 15  TA 2: Supine - posterior pelvic tilt - 5" h x 20  TA 3: Supine: bridges x 15  TA 4: S/L clams with green band around thighs x 15  TA 5: S/L hip abuction x 10  TA 6: Nu-step Level 5 x 12 mins    Time Entry(in minutes):  Neuromuscular Re-Education Time Entry: 15  Therapeutic Activity Time Entry: 30    Assessment & Plan   Assessment: Patient did well with exercises; no exacerbation of pain       Patient will continue to benefit from skilled outpatient physical therapy to address the deficits listed in the problem list box on initial evaluation, provide pt/family education and to maximize pt's level of independence in the home and community environment.     Patient's spiritual, cultural, and educational " needs considered and patient agreeable to plan of care and goals.           Plan: Continue with Skilled physical therapy 2x/week x 6 weeks to address lower back pain.    Goals:   Active       LTG's        Able to perform all household activities with no limitation  (Progressing)       Start:  04/23/25    Expected End:  06/06/25            Able to perform all leisure activities with no limitation  (Progressing)       Start:  04/23/25    Expected End:  06/06/25            Able to sleep through the night without waking from pain  (Progressing)       Start:  04/23/25    Expected End:  06/06/25            Ambulate community required distances with no limitations  (Progressing)       Start:  04/23/25    Expected End:  06/06/25            Independent with HEP for continued improvement in function.  (Progressing)       Start:  04/23/25    Expected End:  06/06/25               STG's        Demonstrate improvement in recent symptoms to progress toward long term goals  (Progressing)       Start:  04/23/25    Expected End:  05/14/25            Correct postural deficits to reduce pain and promote improvement in postural awareness for injury prevention  (Progressing)       Start:  04/23/25    Expected End:  05/14/25            Demonstrate compliance with initial exercise program  (Progressing)       Start:  04/23/25    Expected End:  05/14/25                Jonathan Favre, PTA

## 2025-05-05 ENCOUNTER — CLINICAL SUPPORT (OUTPATIENT)
Facility: HOSPITAL | Age: 75
End: 2025-05-05
Attending: FAMILY MEDICINE
Payer: MEDICARE

## 2025-05-05 DIAGNOSIS — M54.16 RADICULOPATHY, LUMBAR REGION: Primary | Chronic | ICD-10-CM

## 2025-05-05 DIAGNOSIS — M54.50 CHRONIC MIDLINE LOW BACK PAIN WITHOUT SCIATICA: ICD-10-CM

## 2025-05-05 DIAGNOSIS — G89.29 CHRONIC MIDLINE LOW BACK PAIN WITHOUT SCIATICA: ICD-10-CM

## 2025-05-05 PROCEDURE — 97811 ACUP 1/> W/O ESTIM EA ADD 15: CPT | Mod: PO

## 2025-05-05 PROCEDURE — 97810 ACUP 1/> WO ESTIM 1ST 15 MIN: CPT | Mod: PO

## 2025-05-06 ENCOUNTER — CLINICAL SUPPORT (OUTPATIENT)
Dept: REHABILITATION | Facility: HOSPITAL | Age: 75
End: 2025-05-06
Payer: MEDICARE

## 2025-05-06 DIAGNOSIS — Z74.09 IMPAIRED FUNCTIONAL MOBILITY AND ACTIVITY TOLERANCE: Primary | ICD-10-CM

## 2025-05-06 PROCEDURE — 97530 THERAPEUTIC ACTIVITIES: CPT | Mod: PN

## 2025-05-06 PROCEDURE — 97140 MANUAL THERAPY 1/> REGIONS: CPT | Mod: PN

## 2025-05-06 PROCEDURE — 97112 NEUROMUSCULAR REEDUCATION: CPT | Mod: PN

## 2025-05-06 NOTE — PROGRESS NOTES
"  Outpatient Rehab    Physical Therapy Visit    Patient Name: Jaciel Hickey  MRN: [id number]  YOB: 1950  Encounter Date: 5/6/2025    Therapy Diagnosis:   Encounter Diagnosis   Name Primary?    Impaired functional mobility and activity tolerance Yes     Physician: Jade Brewster MD    Physician Orders: Eval and Treat  Medical Diagnosis: Chronic midline low back pain without sciatica    Visit # / Visits Authorized:  3 / 16  Insurance Authorization Period: 4/24/2025 to 7/31/2025  Date of Evaluation: 4/23/2025  Plan of Care Certification: 4/23/2025 to 7/23/2025     PT/PTA:     Number of PTA visits since last PT visit:   Time In: 0700   Time Out: 0800  Total Time (in minutes): 60   Total Billable Time (in minutes): 55    FOTO:  Intake Score:  %  Survey Score 2:  %  Survey Score 3:  %         Subjective   I feel pretty good this morning, I went for the acupuncture yesterday, it felt different than what you do, but I think it was okay..  Pain reported as 1/10. Low Back    Objective            Treatment:  Manual Therapy  MT 1: S/L on right - segmental flexion of left vertebral segments with blocking of segment above; Prone - IASTM to lumbar fasica; lamina release in lumbar spine.  Balance/Neuromuscular Re-Education  NMR 1: Supine: H/S stretching with strap 3 x 30 sec hold - 3 directions - perform on each leg  NMR 2: Calf stretch on wedge - 30 sec x 2  NMR 3: SLR x  10  NMR 4: S/L open books x 10  NMR 5: DKC w/ PB x 2 min  NMR 6: LTR w/ PB x 2 min  Therapeutic Activity  TA 1: TrAb w/ PB x 15  TA 2: Supine - posterior pelvic tilt - 5" h x 20  TA 3: Supine: bridges x 15  TA 4: S/L clams with green band around thighs x 15  TA 5: S/L hip abuction x 10  TA 6: Nu-step Level 5 x 12 mins    Time Entry(in minutes):  Manual Therapy Time Entry: 15  Neuromuscular Re-Education Time Entry: 20  Therapeutic Activity Time Entry: 25    Assessment & Plan   Assessment: Patient did well with exercises - had some left " buttock pain with a few of the exercises, but cueing for better TrAb mm engagement with posterior tilt was effective in reducing this; Started Accupuncture yesterday - will be receiving this 1x/week, will continue with FDN in PT on an as needed basis based on patient symptoms.  Evaluation/Treatment Tolerance: Patient tolerated treatment well    Patient will continue to benefit from skilled outpatient physical therapy to address the deficits listed in the problem list box on initial evaluation, provide pt/family education and to maximize pt's level of independence in the home and community environment.     Patient's spiritual, cultural, and educational needs considered and patient agreeable to plan of care and goals.           Plan: Continue with Skilled physical therapy 2x/week x 6 weeks to address lower back pain.    Goals:   Active       LTG's        Able to perform all household activities with no limitation  (Progressing)       Start:  04/23/25    Expected End:  06/06/25            Able to perform all leisure activities with no limitation  (Progressing)       Start:  04/23/25    Expected End:  06/06/25            Able to sleep through the night without waking from pain  (Progressing)       Start:  04/23/25    Expected End:  06/06/25            Ambulate community required distances with no limitations  (Progressing)       Start:  04/23/25    Expected End:  06/06/25            Independent with HEP for continued improvement in function.  (Progressing)       Start:  04/23/25    Expected End:  06/06/25               STG's        Demonstrate improvement in recent symptoms to progress toward long term goals  (Progressing)       Start:  04/23/25    Expected End:  05/14/25            Correct postural deficits to reduce pain and promote improvement in postural awareness for injury prevention  (Progressing)       Start:  04/23/25    Expected End:  05/14/25            Demonstrate compliance with initial exercise program   (Met)       Start:  04/23/25    Expected End:  05/14/25    Resolved:  05/06/25             Kate Shannon PT

## 2025-05-07 NOTE — PROGRESS NOTES
Acupuncture Evaluation Note     Name: Marce Hickey  [facility] Number: [id number]    Traditional Chinese Medicine (TCM) Diagnosis: Qi Stagnation  Medical Diagnosis:   Encounter Diagnoses   Name Primary?    Radiculopathy, lumbar region Yes    Chronic midline low back pain without sciatica         Evaluation Date: 5/5/25    Visit #/Visits authorized: 1/12    Precautions: Standard    Subjective     Chief Concern: Low back pain, Radiculopathy, chronic; pain and stiffness    Medical necessity is demonstrated by the following IMPAIRMENTS: Medical Necessity: Decreased mobility limits day to day activities, social, and emergent situations              Aggravating Factors:  movement and pressure   Relieving Factors:  rest    Symptom Description:     Quality:  Aching  Severity:  5 but varies  Frequency:  continuously    Previous Treatments Tried:  Medication    HEENT:  not noted    Chest:  not noted    Digestion:     Diet: normal   Fluids: normal  Taste/Appetite: normal   Symptoms: no blood in stool    Sleep: could improve    Energy Levels:  could improve    Psychological Symptoms: not noted    Other Symptoms: not noted    GYN Symptoms: not noted    Objective     Observation: move Qi    Tongue:      Body:  normal   Color:  pink   Coating:  thin,     Pulse:        wiry       New Findings:  none    Treatment     Treatment Principles:  move Qi    Acupuncture points used:  K3, UB 62, SI3/4, TB 5, GB 20  Needles In: 10  Needles Out: 10  Needles W/O STIM placed: 11am  Needles W/O STIM removed: 11:30am      Other Traditional Chinese Medicine Modalities - none  Assessment     After treatment, patient felt more relaxed and less strain     Patient prognosis is Good.     Patient will continue to benefit from acupuncture treatment to address the deficits listed in the problem list box on initial evaluation, provide patient family education and to maximize pt's level of independence in the home and community environment.      Patient's spiritual, cultural and educational needs considered and pt agreeable to plan of care and goals.     Anticipated barriers to treatment: none    Plan     Recommend every week or two for 12 with midway re-assess.      Education:  Patient is aware of cumulative benefit of acupuncture

## 2025-05-09 ENCOUNTER — CLINICAL SUPPORT (OUTPATIENT)
Dept: REHABILITATION | Facility: HOSPITAL | Age: 75
End: 2025-05-09
Payer: MEDICARE

## 2025-05-09 DIAGNOSIS — Z74.09 IMPAIRED FUNCTIONAL MOBILITY AND ACTIVITY TOLERANCE: Primary | ICD-10-CM

## 2025-05-09 PROCEDURE — 97112 NEUROMUSCULAR REEDUCATION: CPT | Mod: PN,CQ

## 2025-05-09 PROCEDURE — 97530 THERAPEUTIC ACTIVITIES: CPT | Mod: PN,CQ

## 2025-05-09 NOTE — PROGRESS NOTES
"  Outpatient Rehab    Physical Therapy Visit    Patient Name: Jaciel Hickey  MRN: [id number]  YOB: 1950  Encounter Date: 5/9/2025    Therapy Diagnosis:   Encounter Diagnosis   Name Primary?    Impaired functional mobility and activity tolerance Yes     Physician: Jade Brewster MD    Physician Orders: Eval and Treat  Medical Diagnosis: Chronic midline low back pain without sciatica    Visit # / Visits Authorized:  4 / 16  Insurance Authorization Period: 4/24/2025 to 7/31/2025  Date of Evaluation: 4/23/2025  Plan of Care Certification: 4/23/2025 to 7/23/2025     PT/PTA:     Number of PTA visits since last PT visit:  1  Time In:   7:55 AM  Time Out:   8:55 AM  Total Time (in minutes):   60 Minutes  Total Billable Time (in minutes):   45 Minutes    FOTO:  Intake Score:  %  Survey Score 2:  %  Survey Score 3:  %         Subjective   No new c/o's.  Pain reported as 1/10. Low Back    Objective            Treatment:  Balance/Neuromuscular Re-Education  NMR 1: Supine: H/S stretching with strap 3 x 30 sec hold - 3 directions - perform on each leg  NMR 2: Calf stretch on wedge - 30 sec x 2  NMR 3: SLR x  10  NMR 4: S/L open books x 10  NMR 5: DKC w/ PB x 2 min  NMR 6: LTR w/ PB x 2 min  Therapeutic Activity  TA 1: TrAb w/ PB x 15  TA 2: Supine - posterior pelvic tilt - 5" h x 20  TA 3: Supine: bridges x 15  TA 4: S/L clams with green band around thighs x 15  TA 5: S/L hip abuction x 10  TA 6: Nu-step Level 5 x 11 mins    Time Entry(in minutes):  Neuromuscular Re-Education Time Entry: 20  Therapeutic Activity Time Entry: 25    Assessment & Plan   Assessment: Patient did well with exercises; no exacerbation of pain; responding well to current treatment       Patient will continue to benefit from skilled outpatient physical therapy to address the deficits listed in the problem list box on initial evaluation, provide pt/family education and to maximize pt's level of independence in the home and community " environment.     Patient's spiritual, cultural, and educational needs considered and patient agreeable to plan of care and goals.           Plan: Continue with Skilled physical therapy 2x/week x 6 weeks to address lower back pain.    Goals:   Active       LTG's        Able to perform all household activities with no limitation  (Progressing)       Start:  04/23/25    Expected End:  06/06/25            Able to perform all leisure activities with no limitation  (Progressing)       Start:  04/23/25    Expected End:  06/06/25            Able to sleep through the night without waking from pain  (Progressing)       Start:  04/23/25    Expected End:  06/06/25            Ambulate community required distances with no limitations  (Progressing)       Start:  04/23/25    Expected End:  06/06/25            Independent with HEP for continued improvement in function.  (Progressing)       Start:  04/23/25    Expected End:  06/06/25               STG's        Demonstrate improvement in recent symptoms to progress toward long term goals  (Progressing)       Start:  04/23/25    Expected End:  05/14/25            Correct postural deficits to reduce pain and promote improvement in postural awareness for injury prevention  (Progressing)       Start:  04/23/25    Expected End:  05/14/25            Demonstrate compliance with initial exercise program  (Met)       Start:  04/23/25    Expected End:  05/14/25    Resolved:  05/06/25             Jonathan Favre, PTA

## 2025-05-12 ENCOUNTER — CLINICAL SUPPORT (OUTPATIENT)
Facility: HOSPITAL | Age: 75
End: 2025-05-12
Payer: MEDICARE

## 2025-05-12 DIAGNOSIS — Z74.09 IMPAIRED FUNCTIONAL MOBILITY AND ACTIVITY TOLERANCE: Primary | ICD-10-CM

## 2025-05-12 PROCEDURE — 97811 ACUP 1/> W/O ESTIM EA ADD 15: CPT | Mod: PO

## 2025-05-12 PROCEDURE — 97810 ACUP 1/> WO ESTIM 1ST 15 MIN: CPT | Mod: PO

## 2025-05-14 ENCOUNTER — CLINICAL SUPPORT (OUTPATIENT)
Dept: REHABILITATION | Facility: HOSPITAL | Age: 75
End: 2025-05-14
Payer: MEDICARE

## 2025-05-14 DIAGNOSIS — Z74.09 IMPAIRED FUNCTIONAL MOBILITY AND ACTIVITY TOLERANCE: Primary | ICD-10-CM

## 2025-05-14 PROCEDURE — 97530 THERAPEUTIC ACTIVITIES: CPT | Mod: PN,CQ

## 2025-05-14 PROCEDURE — 97112 NEUROMUSCULAR REEDUCATION: CPT | Mod: PN,CQ

## 2025-05-14 NOTE — PROGRESS NOTES
"  Outpatient Rehab    Physical Therapy Visit    Patient Name: Jaciel Hickey  MRN: [id number]  YOB: 1950  Encounter Date: 5/14/2025    Therapy Diagnosis:   Encounter Diagnosis   Name Primary?    Impaired functional mobility and activity tolerance Yes     Physician: Jade Brewster MD    Physician Orders: Eval and Treat  Medical Diagnosis: Chronic midline low back pain without sciatica    Visit # / Visits Authorized:  5 / 16  Insurance Authorization Period: 4/24/2025 to 7/31/2025  Date of Evaluation: 4/11/2025  Plan of Care Certification: 4/23/2025 to 7/23/2025      PT/PTA:     Number of PTA visits since last PT visit:  2  Time In:   8:00 AM  Time Out:   9:00 AM  Total Time (in minutes):   60 Minutes  Total Billable Time (in minutes):   30 Minutes split billing     FOTO:  Intake Score:  %  Survey Score 2:  %  Survey Score 3:  %    Precautions:       Subjective   I was hurting yesterday after doing a lot of work around the house, but today is better.  Pain reported as 1/10. Low Back    Objective            Treatment:  Balance/Neuromuscular Re-Education  NMR 1: Supine: H/S stretching with strap 3 x 30 sec hold - 3 directions - perform on each leg  NMR 2: Calf stretch on wedge - 30 sec x 2  NMR 3: SLR 2 x 10  NMR 4: S/L open books x 10  NMR 5: DKC w/ PB x 2 min  NMR 6: LTR w/ PB x 2 min  Therapeutic Activity  TA 1: TrAb w/ PB x 15  TA 2: Supine - posterior pelvic tilt - 5" h x 20  TA 3: Supine: bridges x 15 w/ pink TB loop  TA 4: S/L clams with green band around thighs x 15  TA 5: S/L hip abuction x 10  TA 6: Nu-step Level 5 x 12 mins    Time Entry(in minutes):  Neuromuscular Re-Education Time Entry: 15  Therapeutic Activity Time Entry: 15    Assessment & Plan   Assessment: Patient did well with exercises; no exacerbation of pain; responding well to current treatment       Patient will continue to benefit from skilled outpatient physical therapy to address the deficits listed in the problem list box " on initial evaluation, provide pt/family education and to maximize pt's level of independence in the home and community environment.     Patient's spiritual, cultural, and educational needs considered and patient agreeable to plan of care and goals.           Plan: Continue with Skilled physical therapy 2x/week x 6 weeks to address lower back pain.    Goals:   Active       LTG's        Able to perform all household activities with no limitation  (Progressing)       Start:  04/23/25    Expected End:  06/06/25            Able to perform all leisure activities with no limitation  (Progressing)       Start:  04/23/25    Expected End:  06/06/25            Able to sleep through the night without waking from pain  (Progressing)       Start:  04/23/25    Expected End:  06/06/25            Ambulate community required distances with no limitations  (Progressing)       Start:  04/23/25    Expected End:  06/06/25            Independent with HEP for continued improvement in function.  (Progressing)       Start:  04/23/25    Expected End:  06/06/25               STG's        Demonstrate improvement in recent symptoms to progress toward long term goals  (Progressing)       Start:  04/23/25    Expected End:  05/14/25            Correct postural deficits to reduce pain and promote improvement in postural awareness for injury prevention  (Progressing)       Start:  04/23/25    Expected End:  05/14/25            Demonstrate compliance with initial exercise program  (Met)       Start:  04/23/25    Expected End:  05/14/25    Resolved:  05/06/25             Jonathan Favre, PTA

## 2025-05-16 ENCOUNTER — CLINICAL SUPPORT (OUTPATIENT)
Dept: REHABILITATION | Facility: HOSPITAL | Age: 75
End: 2025-05-16
Payer: MEDICARE

## 2025-05-16 DIAGNOSIS — Z74.09 IMPAIRED FUNCTIONAL MOBILITY AND ACTIVITY TOLERANCE: Primary | ICD-10-CM

## 2025-05-16 PROCEDURE — 97530 THERAPEUTIC ACTIVITIES: CPT | Mod: PN,CQ

## 2025-05-16 PROCEDURE — 97112 NEUROMUSCULAR REEDUCATION: CPT | Mod: PN,CQ

## 2025-05-16 NOTE — PROGRESS NOTES
"  Outpatient Rehab    Physical Therapy Visit    Patient Name: Jaciel Hickey  MRN: [id number]  YOB: 1950  Encounter Date: 5/16/2025    Therapy Diagnosis:   Encounter Diagnosis   Name Primary?    Impaired functional mobility and activity tolerance Yes     Physician: Jade Brewster MD    Physician Orders: Eval and Treat  Medical Diagnosis: Chronic midline low back pain without sciatica    Visit # / Visits Authorized:  6 / 16  Insurance Authorization Period: 4/24/2025 to 7/31/2025  Date of Evaluation: 4/11/2025  Plan of Care Certification: 4/23/2025 to 7/23/2025      PT/PTA:     Number of PTA visits since last PT visit:  3  Time In:   7:50 AM  Time Out:   8:40 AM  Total Time (in minutes):   50 Minutes  Total Billable Time (in minutes):   45 Minutes    FOTO:  Intake Score:  %  Survey Score 2:  %  Survey Score 3:  %    Precautions:       Subjective   No new c/o's; feeling good today.  Pain reported as 0/10. Low Back    Objective            Treatment:  Balance/Neuromuscular Re-Education  NMR 1: Supine: H/S stretching with strap 3 x 30 sec hold - 3 directions - perform on each leg  NMR 2: Calf stretch on wedge - 30 sec x 2  NMR 3: SLR 2 x 10  NMR 4: S/L open books x 10  NMR 5: DKC w/ PB x 2 min  NMR 6: LTR w/ PB x 2 min  Therapeutic Activity  TA 1: TrAb w/ PB x 15  TA 2: Supine - posterior pelvic tilt - 5" h x 20  TA 3: Supine: bridges x 20 w/ green TB loop  TA 4: S/L clams with green band around thighs x 20  TA 5: S/L hip abuction 2 x 10  TA 6: Nu-step Level 5 x 12 mins    Time Entry(in minutes):  Neuromuscular Re-Education Time Entry: 20  Therapeutic Activity Time Entry: 25    Assessment & Plan   Assessment: Patient did well with exercises; no exacerbation of pain; responding well to current treatment       Patient will continue to benefit from skilled outpatient physical therapy to address the deficits listed in the problem list box on initial evaluation, provide pt/family education and to maximize " pt's level of independence in the home and community environment.     Patient's spiritual, cultural, and educational needs considered and patient agreeable to plan of care and goals.           Plan: Continue with Skilled physical therapy 2x/week x 6 weeks to address lower back pain.    Goals:   Active       LTG's        Able to perform all household activities with no limitation  (Progressing)       Start:  04/23/25    Expected End:  06/06/25            Able to perform all leisure activities with no limitation  (Progressing)       Start:  04/23/25    Expected End:  06/06/25            Able to sleep through the night without waking from pain  (Progressing)       Start:  04/23/25    Expected End:  06/06/25            Ambulate community required distances with no limitations  (Progressing)       Start:  04/23/25    Expected End:  06/06/25            Independent with HEP for continued improvement in function.  (Progressing)       Start:  04/23/25    Expected End:  06/06/25               STG's        Demonstrate improvement in recent symptoms to progress toward long term goals  (Progressing)       Start:  04/23/25    Expected End:  05/14/25            Correct postural deficits to reduce pain and promote improvement in postural awareness for injury prevention  (Progressing)       Start:  04/23/25    Expected End:  05/14/25            Demonstrate compliance with initial exercise program  (Met)       Start:  04/23/25    Expected End:  05/14/25    Resolved:  05/06/25             Jonathan Favre, PTA

## 2025-05-18 NOTE — PROGRESS NOTES
Acupuncture Evaluation Note     Name: Marce Hickey  [facility] Number: [id number]    Traditional Chinese Medicine (TCM) Diagnosis: Qi Stagnation  Medical Diagnosis:   Encounter Diagnosis   Name Primary?    Impaired functional mobility and activity tolerance Yes        Evaluation Date: 5/12/25    Visit #/Visits authorized: 2/12    Precautions: Standard    Subjective     Chief Concern: Low back pain, Radiculopathy, chronic; pain and stiffness    Medical necessity is demonstrated by the following IMPAIRMENTS: Medical Necessity: Decreased mobility limits day to day activities, social, and emergent situations              Aggravating Factors:  movement and pressure   Relieving Factors:  rest    Symptom Description:     Quality:  Aching  Severity:  5 but varies  Frequency:  continuously    Previous Treatments Tried:  Medication    HEENT:  not noted    Chest:  not noted    Digestion:     Diet: normal   Fluids: normal  Taste/Appetite: normal   Symptoms: no blood in stool    Sleep: could improve    Energy Levels:  could improve    Psychological Symptoms: not noted    Other Symptoms: not noted    GYN Symptoms: not noted    Objective     Observation: move Qi    Tongue:      Body:  normal   Color:  pink   Coating:  thin,     Pulse:        wiry       New Findings:  none    Treatment     Treatment Principles:  move Qi    Acupuncture points used:  K3, UB 62, SI3/4, TB 5, GB 20  Needles In: 10  Needles Out: 10  Needles W/O STIM placed: 3:30pm  [place] W/O STIM removed: 4pm      Other Traditional Chinese Medicine Modalities - none  Assessment     After treatment, patient felt more relaxed and less strain     Patient prognosis is Good.     Patient will continue to benefit from acupuncture treatment to address the deficits listed in the problem list box on initial evaluation, provide patient family education and to maximize pt's level of independence in the home and community environment.     Patient's spiritual, cultural and  educational needs considered and pt agreeable to plan of care and goals.     Anticipated barriers to treatment: none    Plan     Recommend every week or two for 12 with midway re-assess.      Education:  Patient is aware of cumulative benefit of acupuncture

## 2025-05-20 ENCOUNTER — CLINICAL SUPPORT (OUTPATIENT)
Dept: REHABILITATION | Facility: HOSPITAL | Age: 75
End: 2025-05-20
Payer: MEDICARE

## 2025-05-20 DIAGNOSIS — Z74.09 IMPAIRED FUNCTIONAL MOBILITY AND ACTIVITY TOLERANCE: Primary | ICD-10-CM

## 2025-05-20 PROCEDURE — 97112 NEUROMUSCULAR REEDUCATION: CPT | Mod: PN

## 2025-05-20 PROCEDURE — 97014 ELECTRIC STIMULATION THERAPY: CPT | Mod: PN

## 2025-05-20 PROCEDURE — 97140 MANUAL THERAPY 1/> REGIONS: CPT | Mod: PN

## 2025-05-21 NOTE — PROGRESS NOTES
Outpatient Rehab    Physical Therapy Visit    Patient Name: Jaciel Hickey  MRN: [id number]  YOB: 1950  Encounter Date: 5/20/2025    Therapy Diagnosis:   Encounter Diagnosis   Name Primary?    Impaired functional mobility and activity tolerance Yes     Physician: Jade Brewster MD    Physician Orders: Eval and Treat  Medical Diagnosis: Chronic midline low back pain without sciatica    Visit # / Visits Authorized:  7 / 16  Insurance Authorization Period: 4/24/2025 to 7/31/2025  Date of Evaluation: 4/11/2025  Plan of Care Certification: 4/23/2025 to 7/23/2025      PT/PTA:     Number of PTA visits since last PT visit:   Time In: 0800   Time Out: 0900  Total Time (in minutes): 60   Total Billable Time (in minutes): 30 split billing     FOTO:  Intake Score:  %  Survey Score 2:  %  Survey Score 3:  %    Precautions:       Subjective   I pulled my back out again - didn't really do anything, just woke up this morning with horrible pain;.  Pain reported as 8/10. lower back into left leg and down to foot; also traveling into my shoulder as well.    Objective            Treatment:  Manual Therapy  MT 1: S/L on right - segmental flexion of left vertebral segments with blocking of segment above; Prone - IASTM to lumbar fasica; lamina release in lumbar spine.  MT 2: Patient positioned in prone with proper head, lumbar and ankle support; LF-ES per protocol for lumbar radiculopathy: utilized 60 mm needles at 1.5 fb lateral to L3, L4, L5 and S1 on right/left sides; 75 mm needles to left piriformis in trigger points; Clockwise winding of needles for increased tissue graps.  Intensity of stimulation adjusted to patient tolerance - light rhythmical stimulation noted.  Needles left in situ x 10 mins.  Removed without adverse effects.  MT 3: Had patient positioned in supine with hips/knees at 90/90 - peformed posterior pelvic tilts in this position; small range knee to chest in this position; Patient with decrease  "in lumbar and LLE pain while in 90/90.  Balance/Neuromuscular Re-Education  NMR 1: Supine: H/S stretching with strap 3 x 30 sec hold - 3 directions - perform on each leg  NMR 5: DKC w/ PB x 2 min  NMR 6: LTR w/ PB x 2 min  Therapeutic Activity  TA 1: TrAb w/ PB x 15  TA 2: Supine - posterior pelvic tilt - 5" h x 20  TA 6: Nu-step Level 3 x 10 mins    Time Entry(in minutes):  E-Stim (Unattended) Time Entry: 10  Manual Therapy Time Entry: 25  Neuromuscular Re-Education Time Entry: 12  Needle Insertions Time Entry: 10    Assessment & Plan   Assessment: Patient with exacerbation of lower back and LLE pain with AM; insidious onset - got up i nthe middle of the night and started to have pain.   Good tolerance of manual tx and dry needling; Advised patient to take it easy over the next few days.  Use TENS/heat at home to help reduce mm tightness/pain.  Evaluation/Treatment Tolerance: Patient tolerated treatment well    Patient will continue to benefit from skilled outpatient physical therapy to address the deficits listed in the problem list box on initial evaluation, provide pt/family education and to maximize pt's level of independence in the home and community environment.     Patient's spiritual, cultural, and educational needs considered and patient agreeable to plan of care and goals.           Plan: Continue with Skilled physical therapy 2x/week x 4 weeks to address lower back pain.    Goals:   Active       LTG's        Able to perform all household activities with no limitation  (Progressing)       Start:  04/23/25    Expected End:  06/06/25            Able to perform all leisure activities with no limitation  (Progressing)       Start:  04/23/25    Expected End:  06/06/25            Able to sleep through the night without waking from pain  (Progressing)       Start:  04/23/25    Expected End:  06/06/25            Ambulate community required distances with no limitations  (Progressing)       Start:  04/23/25    " Expected End:  06/06/25            Independent with HEP for continued improvement in function.  (Progressing)       Start:  04/23/25    Expected End:  06/06/25               STG's        Demonstrate improvement in recent symptoms to progress toward long term goals  (Progressing)       Start:  04/23/25    Expected End:  05/30/25            Correct postural deficits to reduce pain and promote improvement in postural awareness for injury prevention  (Progressing)       Start:  04/23/25    Expected End:  05/30/25            Demonstrate compliance with initial exercise program  (Met)       Start:  04/23/25    Expected End:  05/14/25    Resolved:  05/06/25             Kate Shannon, PT

## 2025-05-23 ENCOUNTER — CLINICAL SUPPORT (OUTPATIENT)
Dept: REHABILITATION | Facility: HOSPITAL | Age: 75
End: 2025-05-23
Payer: MEDICARE

## 2025-05-23 DIAGNOSIS — Z74.09 IMPAIRED FUNCTIONAL MOBILITY AND ACTIVITY TOLERANCE: Primary | ICD-10-CM

## 2025-05-23 PROCEDURE — 97530 THERAPEUTIC ACTIVITIES: CPT | Mod: PN,CQ

## 2025-05-23 PROCEDURE — 97112 NEUROMUSCULAR REEDUCATION: CPT | Mod: PN,CQ

## 2025-05-23 NOTE — PROGRESS NOTES
"  Outpatient Rehab    Physical Therapy Visit    Patient Name: Jaciel Hickey  MRN: [id number]  YOB: 1950  Encounter Date: 5/23/2025    Therapy Diagnosis:   Encounter Diagnosis   Name Primary?    Impaired functional mobility and activity tolerance Yes     Physician: Jade Brewster MD    Physician Orders: Eval and Treat  Medical Diagnosis: Chronic midline low back pain without sciatica    Visit # / Visits Authorized:  8 / 16  Insurance Authorization Period: 4/24/2025 to 7/31/2025  Date of Evaluation: 4/11/2025  Plan of Care Certification: 4/23/2025 to 7/23/2025      PT/PTA:     Number of PTA visits since last PT visit:  1  Time In:   8:00 AM  Time Out:   8:55 AM  Total Time (in minutes):   55 Minutes  Total Billable Time (in minutes):   45 Minutes    FOTO:  Intake Score:  %  Survey Score 2:  %  Survey Score 3:  %    Precautions:       Subjective   Feeling a little bit better than the other day.  Pain reported as 4/10. Low Back down into LLE    Objective            Treatment:  Balance/Neuromuscular Re-Education  NMR 1: Supine: H/S stretching with strap 3 x 30 sec hold - 3 directions - perform on each leg  NMR 2: Calf stretch on wedge - 30 sec x 2  NMR 3: SLR 2 x 10  NMR 4: S/L open books x 10  NMR 5: DKC w/ PB x 2 min  NMR 6: LTR w/ PB x 2 min  Therapeutic Activity  TA 1: TrAb w/ PB x 15  TA 2: Supine - posterior pelvic tilt - 5" h x 20  TA 3: Supine: bridges x 20 w/ green TB loop  TA 4: Supine Clams w/ GTB x 20  TA 5: S/L hip abuction 2 x 10  TA 6: Nu-step Level 3 x 10 mins  TA 7: Supine Piriformis Stretch 2 x 30 sec    Time Entry(in minutes):  Neuromuscular Re-Education Time Entry: 15  Therapeutic Activity Time Entry: 30    Assessment & Plan   Assessment: Patient did well with exercises; reported relief at the end of session       The patient will continue to benefit from skilled outpatient physical therapy in order to address the deficits listed in the problem list on the initial evaluation, " provide patient and family education, and maximize the patients level of independence in the home and community environments.     The patient's spiritual, cultural, and educational needs were considered, and the patient is agreeable to the plan of care and goals.           Plan: Continue with Skilled physical therapy 2x/week x 4 weeks to address lower back pain.    Goals:   Active       LTG's        Able to perform all household activities with no limitation  (Progressing)       Start:  04/23/25    Expected End:  06/06/25            Able to perform all leisure activities with no limitation  (Progressing)       Start:  04/23/25    Expected End:  06/06/25            Able to sleep through the night without waking from pain  (Progressing)       Start:  04/23/25    Expected End:  06/06/25            Ambulate community required distances with no limitations  (Progressing)       Start:  04/23/25    Expected End:  06/06/25            Independent with HEP for continued improvement in function.  (Progressing)       Start:  04/23/25    Expected End:  06/06/25               STG's        Demonstrate improvement in recent symptoms to progress toward long term goals  (Progressing)       Start:  04/23/25    Expected End:  05/30/25            Correct postural deficits to reduce pain and promote improvement in postural awareness for injury prevention  (Progressing)       Start:  04/23/25    Expected End:  05/30/25            Demonstrate compliance with initial exercise program  (Met)       Start:  04/23/25    Expected End:  05/14/25    Resolved:  05/06/25             Jonathan Favre, PTA

## 2025-05-26 DIAGNOSIS — I10 ESSENTIAL HYPERTENSION: ICD-10-CM

## 2025-05-26 RX ORDER — LOSARTAN POTASSIUM 100 MG/1
100 TABLET ORAL
Qty: 90 TABLET | Refills: 0 | Status: SHIPPED | OUTPATIENT
Start: 2025-05-26

## 2025-05-26 NOTE — TELEPHONE ENCOUNTER
Refill Routing Note   Medication(s) are not appropriate for processing by [facility] for the following reason(s):        Required labs outdated    [facility] action(s):  Defer   Requires labs : Yes cmp and lipid panel            Appointments  past 12m or future 3m with PCP    Date Provider   Last Visit   4/11/2025 Jade Brewster MD   Next Visit   10/13/2025 Jade Brewster MD   ED visits in past 90 days: 0        Note composed:8:38 AM 05/26/2025

## 2025-05-26 NOTE — TELEPHONE ENCOUNTER
Care Due:                  Date            Visit Type   Department     Provider  --------------------------------------------------------------------------------                                EP -                              PRIMARY      [facility] FAMILY  Last Visit: 04-      CARE ([facility])   MEDICINE       Jade Brewster                               -                              PRIMARY      [facility] FAMILY  Next Visit: 10-      CARE ([facility])   MEDICINE       Jade Brewster                                                            Last  Test          Frequency    Reason                     Performed    Due Date  --------------------------------------------------------------------------------    CMP.........  12 months..  atorvastatin, losartan...  04- 04-    Lipid Panel.  12 months..  atorvastatin.............  04- 04-    [facility] Embedded Care Due Messages. Reference number: [id number].   5/26/2025 2:18:58 AM CDT

## 2025-05-27 ENCOUNTER — CLINICAL SUPPORT (OUTPATIENT)
Dept: REHABILITATION | Facility: HOSPITAL | Age: 75
End: 2025-05-27
Payer: MEDICARE

## 2025-05-27 DIAGNOSIS — Z74.09 IMPAIRED FUNCTIONAL MOBILITY AND ACTIVITY TOLERANCE: Primary | ICD-10-CM

## 2025-05-27 PROCEDURE — 97112 NEUROMUSCULAR REEDUCATION: CPT | Mod: PN

## 2025-05-27 PROCEDURE — 97530 THERAPEUTIC ACTIVITIES: CPT | Mod: PN

## 2025-05-27 PROCEDURE — 97140 MANUAL THERAPY 1/> REGIONS: CPT | Mod: PN

## 2025-05-28 NOTE — PROGRESS NOTES
"  Outpatient Rehab    Physical Therapy Visit    Patient Name: Jaciel Hickey  MRN: [id number]  YOB: 1950  Encounter Date: 5/27/2025    Therapy Diagnosis:   Encounter Diagnosis   Name Primary?    Impaired functional mobility and activity tolerance Yes     Physician: Jade Brewster MD    Physician Orders: Eval and Treat  Medical Diagnosis: Chronic midline low back pain without sciatica    Visit # / Visits Authorized:  9 / 16  Insurance Authorization Period: 4/24/2025 to 7/31/2025  Date of Evaluation: 4/11/2025  Plan of Care Certification: 4/23/2025 to 7/23/2025      PT/PTA: PT   Number of PTA visits since last PT visit:0  Time In: 1300   Time Out: 1400  Total Time (in minutes): 60   Total Billable Time (in minutes): 60    FOTO:  Intake Score:  %  Survey Score 2:  %  Survey Score 3:  %    Precautions:       Subjective   Continues to report improvement in back and LLE pain; just has some discomfort left calf/lateral leg.  Pain reported as 3/10. Low Back down into LLE    Objective            Treatment:  Manual Therapy  MT 1: S/L on right - segmental flexion of left vertebral segments with blocking of segment above; Prone - IASTM to lumbar fasica; lamina release in lumbar spine.  Balance/Neuromuscular Re-Education  NMR 1: Supine: H/S stretching with strap 3 x 30 sec hold - 3 directions - perform on each leg  NMR 2: Calf stretch on wedge - 30 sec x 2  NMR 3: SLR 2 x 10  NMR 4: S/L open books x 10  NMR 5: DKC w/ PB x 2 min  NMR 6: LTR w/ PB x 2 min  Therapeutic Activity  TA 1: TrAb w/ PB x 15  TA 2: Supine - posterior pelvic tilt - 5" h x 20  TA 3: Supine: bridges x 20 w/ green TB loop  TA 4: Supine Clams w/ GTB x 20  TA 5: S/L hip abuction 2 x 10  TA 6: Nu-step Level 3 x 10 mins  TA 7: Supine Piriformis Stretch 2 x 30 sec    Time Entry(in minutes):  Manual Therapy Time Entry: 15  Neuromuscular Re-Education Time Entry: 15  Therapeutic Activity Time Entry: 30    Assessment & Plan   Assessment: Jaciel" continues to improve with activity, previous exacerbation of pain is beginning to resolve; Patient is good with HEP, watching her body mechanics with daily activities.  Evaluation/Treatment Tolerance: Patient tolerated treatment well    The patient will continue to benefit from skilled outpatient physical therapy in order to address the deficits listed in the problem list on the initial evaluation, provide patient and family education, and maximize the patients level of independence in the home and community environments.     The patient's spiritual, cultural, and educational needs were considered, and the patient is agreeable to the plan of care and goals.           Plan: Continue with Skilled physical therapy 2x/week x 4 weeks to address lower back pain.    Goals:   Active       LTG's        Able to perform all household activities with no limitation  (Progressing)       Start:  04/23/25    Expected End:  06/06/25            Able to perform all leisure activities with no limitation  (Progressing)       Start:  04/23/25    Expected End:  06/06/25            Able to sleep through the night without waking from pain  (Progressing)       Start:  04/23/25    Expected End:  06/06/25            Ambulate community required distances with no limitations  (Progressing)       Start:  04/23/25    Expected End:  06/06/25            Independent with HEP for continued improvement in function.  (Progressing)       Start:  04/23/25    Expected End:  06/06/25              Resolved       STG's        Demonstrate improvement in recent symptoms to progress toward long term goals  (Met)       Start:  04/23/25    Expected End:  05/30/25    Resolved:  05/27/25         Correct postural deficits to reduce pain and promote improvement in postural awareness for injury prevention  (Met)       Start:  04/23/25    Expected End:  05/30/25    Resolved:  05/27/25         Demonstrate compliance with initial exercise program  (Met)       Start:   04/23/25    Expected End:  05/14/25    Resolved:  05/06/25             Kate Shannon, PT

## 2025-05-30 ENCOUNTER — CLINICAL SUPPORT (OUTPATIENT)
Dept: REHABILITATION | Facility: HOSPITAL | Age: 75
End: 2025-05-30
Payer: MEDICARE

## 2025-05-30 DIAGNOSIS — Z74.09 IMPAIRED FUNCTIONAL MOBILITY AND ACTIVITY TOLERANCE: Primary | ICD-10-CM

## 2025-05-30 PROCEDURE — 97140 MANUAL THERAPY 1/> REGIONS: CPT | Mod: PN

## 2025-05-30 PROCEDURE — 97530 THERAPEUTIC ACTIVITIES: CPT | Mod: PN

## 2025-05-31 NOTE — PROGRESS NOTES
"  Outpatient Rehab    Physical Therapy Visit    Patient Name: Jaciel Hickey  MRN: [id number]  YOB: 1950  Encounter Date: 5/30/2025    Therapy Diagnosis:   Encounter Diagnosis   Name Primary?    Impaired functional mobility and activity tolerance Yes     Physician: Jade Brewster MD    Physician Orders: Eval and Treat  Medical Diagnosis: Chronic midline low back pain without sciatica    Visit # / Visits Authorized:  10 / 16  Insurance Authorization Period: 4/24/2025 to 7/31/2025  Date of Evaluation: 4/11/2025  Plan of Care Certification: 4/23/2025 to 7/23/2025      PT/PTA: PT   Number of PTA visits since last PT visit:0  Time In: 0850   Time Out: 1000  Total Time (in minutes): 70   Total Billable Time (in minutes): 55    FOTO:  Intake Score:  %  Survey Score 2:  %  Survey Score 3:  %    Precautions:       Subjective   Reports no pain, but tingling down her Left leg;.  Pain reported as 1/10. Low Back down into LLE    Objective            Treatment:  Manual Therapy  MT 1: S/L on right - segmental flexion of left vertebral segments with blocking of segment above; Prone - IASTM to lumbar fasica; lamina release in lumbar spine.  Balance/Neuromuscular Re-Education  NMR 1: Supine: H/S stretching with strap 3 x 30 sec hold - 3 directions - perform on each leg  NMR 2: Calf stretch on wedge - 30 sec x 2  NMR 3: SLR 2 x 10  NMR 4: S/L open books x 10  NMR 5: DKC w/ PB x 2 min  NMR 6: LTR w/ PB x 2 min  Therapeutic Activity  TA 1: TrAb w/ PB x 15  TA 2: Supine - posterior pelvic tilt - 5" h x 20  TA 3: Supine: bridges x 20 w/ green TB loop  TA 4: Supine Clams w/ GTB x 20  TA 5: S/L hip abuction 2 x 10  TA 6: Nu-step Level 3 x 10 mins  TA 7: Supine Piriformis Stretch 2 x 30 sec    Time Entry(in minutes):  Manual Therapy Time Entry: 10  Therapeutic Activity Time Entry: 30    Assessment & Plan   Assessment: Jaciel is no longer having exacerbation of her lower back pain; just some tingling into LE; No " exacerbation of symptoms with exercises today.  Evaluation/Treatment Tolerance: Patient tolerated treatment well    The patient will continue to benefit from skilled outpatient physical therapy in order to address the deficits listed in the problem list on the initial evaluation, provide patient and family education, and maximize the patients level of independence in the home and community environments.     The patient's spiritual, cultural, and educational needs were considered, and the patient is agreeable to the plan of care and goals.           Plan: Continue with Skilled physical therapy 2x/week x 4 weeks to address lower back pain.    Goals:   Active       LTG's        Able to perform all household activities with no limitation  (Progressing)       Start:  04/23/25    Expected End:  06/06/25            Able to perform all leisure activities with no limitation  (Progressing)       Start:  04/23/25    Expected End:  06/06/25            Able to sleep through the night without waking from pain  (Progressing)       Start:  04/23/25    Expected End:  06/06/25            Ambulate community required distances with no limitations  (Progressing)       Start:  04/23/25    Expected End:  06/06/25            Independent with HEP for continued improvement in function.  (Progressing)       Start:  04/23/25    Expected End:  06/06/25              Resolved       STG's        Demonstrate improvement in recent symptoms to progress toward long term goals  (Met)       Start:  04/23/25    Expected End:  05/30/25    Resolved:  05/27/25         Correct postural deficits to reduce pain and promote improvement in postural awareness for injury prevention  (Met)       Start:  04/23/25    Expected End:  05/30/25    Resolved:  05/27/25         Demonstrate compliance with initial exercise program  (Met)       Start:  04/23/25    Expected End:  05/14/25    Resolved:  05/06/25             Kate Shannon PT

## 2025-06-03 ENCOUNTER — CLINICAL SUPPORT (OUTPATIENT)
Dept: REHABILITATION | Facility: HOSPITAL | Age: 75
End: 2025-06-03
Payer: MEDICARE

## 2025-06-03 DIAGNOSIS — Z74.09 IMPAIRED FUNCTIONAL MOBILITY AND ACTIVITY TOLERANCE: Primary | ICD-10-CM

## 2025-06-03 PROCEDURE — 97112 NEUROMUSCULAR REEDUCATION: CPT | Mod: PN,CQ

## 2025-06-03 PROCEDURE — 97530 THERAPEUTIC ACTIVITIES: CPT | Mod: PN,CQ

## 2025-06-04 ENCOUNTER — OFFICE VISIT (OUTPATIENT)
Dept: URGENT CARE | Facility: CLINIC | Age: 75
End: 2025-06-04
Payer: MEDICARE

## 2025-06-04 VITALS
HEART RATE: 75 BPM | TEMPERATURE: 99 F | WEIGHT: 181 LBS | DIASTOLIC BLOOD PRESSURE: 73 MMHG | BODY MASS INDEX: 30.9 KG/M2 | SYSTOLIC BLOOD PRESSURE: 139 MMHG | RESPIRATION RATE: 20 BRPM | OXYGEN SATURATION: 98 % | HEIGHT: 64 IN

## 2025-06-04 DIAGNOSIS — L23.7 POISON IVY DERMATITIS: Primary | ICD-10-CM

## 2025-06-04 PROCEDURE — 99214 OFFICE O/P EST MOD 30 MIN: CPT | Mod: S$GLB,,,

## 2025-06-04 RX ORDER — MUPIROCIN 20 MG/G
OINTMENT TOPICAL 3 TIMES DAILY
Qty: 22 G | Refills: 2 | Status: SHIPPED | OUTPATIENT
Start: 2025-06-04

## 2025-06-04 RX ORDER — CYCLOSPORINE 0.5 MG/ML
EMULSION OPHTHALMIC
COMMUNITY
Start: 2025-05-01

## 2025-06-04 RX ORDER — PREDNISONE 20 MG/1
TABLET ORAL
Qty: 6 TABLET | Refills: 0 | Status: SHIPPED | OUTPATIENT
Start: 2025-06-04

## 2025-06-06 ENCOUNTER — CLINICAL SUPPORT (OUTPATIENT)
Dept: REHABILITATION | Facility: HOSPITAL | Age: 75
End: 2025-06-06
Payer: MEDICARE

## 2025-06-06 DIAGNOSIS — Z74.09 IMPAIRED FUNCTIONAL MOBILITY AND ACTIVITY TOLERANCE: Primary | ICD-10-CM

## 2025-06-06 PROCEDURE — 97530 THERAPEUTIC ACTIVITIES: CPT | Mod: PN

## 2025-06-07 NOTE — PROGRESS NOTES
"  Outpatient Rehab    Physical Therapy Visit    Patient Name: Jaciel Hickey  MRN: [id number]  YOB: 1950  Encounter Date: 6/6/2025    Therapy Diagnosis:   Encounter Diagnosis   Name Primary?    Impaired functional mobility and activity tolerance Yes     Physician: Jade Brewster MD    Physician Orders: Eval and Treat  Medical Diagnosis: Chronic midline low back pain without sciatica  Surgical Diagnosis: Not applicable for this Episode   Surgical Date: Not applicable for this Episode    Visit # / Visits Authorized:  12 / 16  Insurance Authorization Period: 4/24/2025 to 7/31/2025  Date of Evaluation: 4/11/2025  Plan of Care Certification: 4/23/2025 to 7/23/2025      PT/PTA: PT   Number of PTA visits since last PT visit:0  Time In: 0850   Time Out: 1000  Total Time (in minutes): 70   Total Billable Time (in minutes): 30 - split billing     FOTO:  Intake Score:  %  Survey Score 2:  %  Survey Score 3:  %    Precautions:       Subjective   This is the only thing that really works for me to keep my back pain in control.  When I'm finished with PT, I'll join the [employer] again as I just don't do these at home.  Pain reported as 1/10. Low Back down into LLE    Objective            Treatment:  Balance/Neuromuscular Re-Education  NMR 1: Supine: H/S stretching with strap 3 x 30 sec hold - 3 directions - perform on each leg  NMR 2: Calf stretch on wedge - 30 sec x 2  NMR 3: SLR 2 x 10  NMR 4: S/L open books x 10  NMR 5: DKC w/ PB x 2 min  NMR 6: LTR w/ PB x 2 min  Therapeutic Activity  TA 1: TrAb w/ PB x 15  TA 2: Supine - posterior pelvic tilt - 5" h x 20  TA 3: Supine: bridges x 20 w/ green TB loop  TA 4: Supine Clams w/ GTB x 20  TA 5: S/L hip abuction 2 x 10  TA 6: Nu-step Level 3 x 10 mins  TA 7: Supine Piriformis Stretch 2 x 30 sec    Time Entry(in minutes):  Therapeutic Activity Time Entry: 30    Assessment & Plan   Assessment: Patient did well with exercises; no exacerbation of pain noted; needs " cueing at times just to improve performance/results of exercsies.  Evaluation/Treatment Tolerance: Patient tolerated treatment well    The patient will continue to benefit from skilled outpatient physical therapy in order to address the deficits listed in the problem list on the initial evaluation, provide patient and family education, and maximize the patients level of independence in the home and community environments.     The patient's spiritual, cultural, and educational needs were considered, and the patient is agreeable to the plan of care and goals.           Plan: Continue with Skilled physical therapy 2x/week x 2 weeks to address lower back pain.; Will discharge following completion of POC.    Goals:   Active       LTG's        Able to perform all household activities with no limitation  (Progressing)       Start:  04/23/25    Expected End:  06/20/25            Able to perform all leisure activities with no limitation  (Met)       Start:  04/23/25    Expected End:  06/20/25    Resolved:  06/07/25         Able to sleep through the night without waking from pain  (Progressing)       Start:  04/23/25    Expected End:  06/20/25            Ambulate community required distances with no limitations  (Met)       Start:  04/23/25    Expected End:  06/20/25    Resolved:  06/07/25         Independent with HEP for continued improvement in function.  (Progressing)       Start:  04/23/25    Expected End:  06/20/25              Resolved       STG's        Demonstrate improvement in recent symptoms to progress toward long term goals  (Met)       Start:  04/23/25    Expected End:  05/30/25    Resolved:  05/27/25         Correct postural deficits to reduce pain and promote improvement in postural awareness for injury prevention  (Met)       Start:  04/23/25    Expected End:  05/30/25    Resolved:  05/27/25         Demonstrate compliance with initial exercise program  (Met)       Start:  04/23/25    Expected End:  05/14/25     Resolved:  05/06/25             Kate Shannon, PT

## 2025-06-10 ENCOUNTER — CLINICAL SUPPORT (OUTPATIENT)
Dept: REHABILITATION | Facility: HOSPITAL | Age: 75
End: 2025-06-10
Payer: MEDICARE

## 2025-06-10 DIAGNOSIS — Z74.09 IMPAIRED FUNCTIONAL MOBILITY AND ACTIVITY TOLERANCE: Primary | ICD-10-CM

## 2025-06-10 PROCEDURE — 97530 THERAPEUTIC ACTIVITIES: CPT | Mod: PN

## 2025-06-10 PROCEDURE — 97112 NEUROMUSCULAR REEDUCATION: CPT | Mod: PN

## 2025-06-10 NOTE — PROGRESS NOTES
"  Outpatient Rehab    Physical Therapy Visit    Patient Name: Jaciel Hickey  MRN: [id number]  YOB: 1950  Encounter Date: 6/10/2025    Therapy Diagnosis:   Encounter Diagnosis   Name Primary?    Impaired functional mobility and activity tolerance Yes     Physician: Jade Brewster MD    Physician Orders: Eval and Treat  Medical Diagnosis: Chronic midline low back pain without sciatica  Surgical Diagnosis: Not applicable for this Episode   Surgical Date: Not applicable for this Episode    Visit # / Visits Authorized:  13 / 16  Insurance Authorization Period: 4/24/2025 to 7/31/2025  Date of Evaluation: 4/11/2025  Plan of Care Certification: 4/23/2025 to 7/23/2025      PT/PTA: PT   Number of PTA visits since last PT visit:0  Time In: 0700   Time Out: 0800  Total Time (in minutes): 60   Total Billable Time (in minutes): 30    FOTO:  Intake Score:  %  Survey Score 2:  %  Survey Score 3:  %    Precautions:       Subjective   Patient with no new c/o's today; "I'm just tired, I stayed up too late playing [employer] with my grandson and daughter in-law"  Reports back pain ok this morning..  Pain reported as 1/10. Low Back down into LLE    Objective            Treatment:  Balance/Neuromuscular Re-Education  NMR 1: Supine: H/S stretching with strap 3 x 30 sec hold - 3 directions - perform on each leg  NMR 2: Calf stretch on wedge - 30 sec x 2  NMR 3: SLR 2 x 10  NMR 4: S/L open books x 10  NMR 5: DKC w/ PB x 2 min  NMR 6: LTR w/ PB x 2 min  Therapeutic Activity  TA 1: TrAb w/ PB x 15  TA 2: Supine - posterior pelvic tilt - 5" h x 20  TA 3: Supine: bridges x 20 w/ green TB loop  TA 4: Supine Clams w/ GTB x 20  TA 5: S/L hip abuction 2 x 10  TA 6: Nu-step Level 3 x 10 mins  TA 7: Supine Piriformis Stretch 2 x 30 sec  TA 8: Seated lumbar flexion with PB x 3 mins    Time Entry(in minutes):  Neuromuscular Re-Education Time Entry: 10  Therapeutic Activity Time Entry: 25    Assessment & Plan   Assessment: " Patient did well with exercises; no exacerbation of pain noted; needs cueing at times just to improve performance/results of exercsies.  Evaluation/Treatment Tolerance: Patient tolerated treatment well    The patient will continue to benefit from skilled outpatient physical therapy in order to address the deficits listed in the problem list on the initial evaluation, provide patient and family education, and maximize the patients level of independence in the home and community environments.     The patient's spiritual, cultural, and educational needs were considered, and the patient is agreeable to the plan of care and goals.           Plan: Continue with Skilled physical therapy 2x/week x 2 weeks to address lower back pain.; Will discharge following completion of POC.    Goals:   Active       LTG's        Able to perform all household activities with no limitation  (Progressing)       Start:  04/23/25    Expected End:  06/20/25            Able to perform all leisure activities with no limitation  (Met)       Start:  04/23/25    Expected End:  06/20/25    Resolved:  06/07/25         Able to sleep through the night without waking from pain  (Progressing)       Start:  04/23/25    Expected End:  06/20/25            Ambulate community required distances with no limitations  (Met)       Start:  04/23/25    Expected End:  06/20/25    Resolved:  06/07/25         Independent with HEP for continued improvement in function.  (Progressing)       Start:  04/23/25    Expected End:  06/20/25              Resolved       STG's        Demonstrate improvement in recent symptoms to progress toward long term goals  (Met)       Start:  04/23/25    Expected End:  05/30/25    Resolved:  05/27/25         Correct postural deficits to reduce pain and promote improvement in postural awareness for injury prevention  (Met)       Start:  04/23/25    Expected End:  05/30/25    Resolved:  05/27/25         Demonstrate compliance with initial  exercise program  (Met)       Start:  04/23/25    Expected End:  05/14/25    Resolved:  05/06/25             Kate Shannon PT

## 2025-06-12 ENCOUNTER — CLINICAL SUPPORT (OUTPATIENT)
Dept: REHABILITATION | Facility: HOSPITAL | Age: 75
End: 2025-06-12
Payer: MEDICARE

## 2025-06-12 DIAGNOSIS — E78.2 MIXED HYPERLIPIDEMIA: ICD-10-CM

## 2025-06-12 DIAGNOSIS — Z74.09 IMPAIRED FUNCTIONAL MOBILITY AND ACTIVITY TOLERANCE: Primary | ICD-10-CM

## 2025-06-12 DIAGNOSIS — I10 ESSENTIAL (PRIMARY) HYPERTENSION: ICD-10-CM

## 2025-06-12 PROCEDURE — 97530 THERAPEUTIC ACTIVITIES: CPT | Mod: PN

## 2025-06-12 PROCEDURE — 97112 NEUROMUSCULAR REEDUCATION: CPT | Mod: PN

## 2025-06-12 RX ORDER — HYDROCHLOROTHIAZIDE 12.5 MG/1
12.5 CAPSULE ORAL
Qty: 90 CAPSULE | Refills: 0 | Status: SHIPPED | OUTPATIENT
Start: 2025-06-12

## 2025-06-12 RX ORDER — ATORVASTATIN CALCIUM 20 MG/1
20 TABLET, FILM COATED ORAL
Qty: 90 TABLET | Refills: 0 | Status: SHIPPED | OUTPATIENT
Start: 2025-06-12

## 2025-06-12 NOTE — PROGRESS NOTES
"  Outpatient Rehab    Physical Therapy Visit    Patient Name: Jaciel Hickey  MRN: [id number]  YOB: 1950  Encounter Date: 6/12/2025    Therapy Diagnosis:   Encounter Diagnosis   Name Primary?    Impaired functional mobility and activity tolerance Yes     Physician: Jade Brewster MD    Physician Orders: Eval and Treat  Medical Diagnosis: Chronic midline low back pain without sciatica  Surgical Diagnosis: Not applicable for this Episode   Surgical Date: Not applicable for this Episode    Visit # / Visits Authorized:  14 / 16  Insurance Authorization Period: 4/24/2025 to 7/31/2025  Date of Evaluation: 4/11/2025  Plan of Care Certification: 4/23/2025 to 7/23/2025      PT/PTA: PT   Number of PTA visits since last PT visit:0  Time In: 0900   Time Out: 1000  Total Time (in minutes): 60   Total Billable Time (in minutes): 55    FOTO:  Intake Score:  %  Survey Score 2:  %  Survey Score 3:  %    Precautions:       Subjective   Patient reports "feeling good today".  Pain reported as 1/10. Low Back down into LLE    Objective            Treatment:  Balance/Neuromuscular Re-Education  NMR 1: Supine: H/S stretching with strap 3 x 30 sec hold - 3 directions - perform on each leg  NMR 2: Calf stretch on wedge - 30 sec x 2  NMR 3: SLR 2 x 10  NMR 4: S/L open books x 10  NMR 5: DKC w/ PB x 2 min  NMR 6: LTR w/ PB x 2 min  Therapeutic Activity  TA 1: TrAb w/ PB x 15  TA 2: Supine - posterior pelvic tilt - 5" h x 20  TA 3: Supine: bridges x 20 w/ green TB loop  TA 4: Supine Clams w/ GTB x 20  TA 5: S/L hip abuction 2 x 10  TA 6: Nu-step Level 3 x 10 mins  TA 7: Supine Piriformis Stretch 2 x 30 sec  TA 8: Seated lumbar flexion with PB x 3 mins    Time Entry(in minutes):  Neuromuscular Re-Education Time Entry: 25  Therapeutic Activity Time Entry: 30    Assessment & Plan   Assessment: Patient did well with exercises; no exacerbation of pain noted; needs cueing at times just to improve performance/results of " exercsies.       The patient will continue to benefit from skilled outpatient physical therapy in order to address the deficits listed in the problem list on the initial evaluation, provide patient and family education, and maximize the patients level of independence in the home and community environments.     The patient's spiritual, cultural, and educational needs were considered, and the patient is agreeable to the plan of care and goals.           Plan: Continue with Skilled physical therapy 2x/week x 1 weeks to address lower back pain.; Will discharge following completion of POC.    Goals:   Active       LTG's        Able to perform all household activities with no limitation  (Progressing)       Start:  04/23/25    Expected End:  06/20/25            Able to perform all leisure activities with no limitation  (Met)       Start:  04/23/25    Expected End:  06/20/25    Resolved:  06/07/25         Able to sleep through the night without waking from pain  (Progressing)       Start:  04/23/25    Expected End:  06/20/25            Ambulate community required distances with no limitations  (Met)       Start:  04/23/25    Expected End:  06/20/25    Resolved:  06/07/25         Independent with HEP for continued improvement in function.  (Progressing)       Start:  04/23/25    Expected End:  06/20/25              Resolved       STG's        Demonstrate improvement in recent symptoms to progress toward long term goals  (Met)       Start:  04/23/25    Expected End:  05/30/25    Resolved:  05/27/25         Correct postural deficits to reduce pain and promote improvement in postural awareness for injury prevention  (Met)       Start:  04/23/25    Expected End:  05/30/25    Resolved:  05/27/25         Demonstrate compliance with initial exercise program  (Met)       Start:  04/23/25    Expected End:  05/14/25    Resolved:  05/06/25             Kate Shannon, PT

## 2025-06-12 NOTE — TELEPHONE ENCOUNTER
No care due was identified.  [facility] Embedded Care Due Messages. Reference number: [id number].   6/12/2025 12:18:57 AM CDT

## 2025-06-18 ENCOUNTER — CLINICAL SUPPORT (OUTPATIENT)
Dept: REHABILITATION | Facility: HOSPITAL | Age: 75
End: 2025-06-18
Payer: MEDICARE

## 2025-06-18 DIAGNOSIS — Z74.09 IMPAIRED FUNCTIONAL MOBILITY AND ACTIVITY TOLERANCE: Primary | ICD-10-CM

## 2025-06-18 PROCEDURE — 97530 THERAPEUTIC ACTIVITIES: CPT | Mod: PN,CQ

## 2025-06-18 PROCEDURE — 97112 NEUROMUSCULAR REEDUCATION: CPT | Mod: PN,CQ

## 2025-06-18 NOTE — PROGRESS NOTES
"  Outpatient Rehab    Physical Therapy Visit    Patient Name: Jaciel Hickey  MRN: [id number]  YOB: 1950  Encounter Date: 6/18/2025    Therapy Diagnosis:   Encounter Diagnosis   Name Primary?    Impaired functional mobility and activity tolerance Yes     Physician: Jade Brewster MD    Physician Orders: Eval and Treat  Medical Diagnosis: Chronic midline low back pain without sciatica  Surgical Diagnosis: Not applicable for this Episode   Surgical Date: Not applicable for this Episode  Days Since Last Surgery: Not applicable for this Episode    Visit # / Visits Authorized:  15 / 16  Insurance Authorization Period: 4/24/2025 to 7/31/2025  Date of Evaluation: 4/11/2025  Plan of Care Certification: 4/23/2025 to 7/23/2025      PT/PTA: PTA   Number of PTA visits since last PT visit:1  Time In:   7:50 AM  Time Out:   8:50 AM  Total Time (in minutes):   55 Minutes  Total Billable Time (in minutes):   55 Minutes    FOTO:  Intake Score:  %  Survey Score 2:  %  Survey Score 3:  %    Precautions:       Subjective   No new c/o's.  Pain reported as 0/10. Low Back down into LLE    Objective            Treatment:  Balance/Neuromuscular Re-Education  NMR 1: Supine: H/S stretching with strap 3 x 30 sec hold - 3 directions - perform on each leg  NMR 2: Calf stretch on wedge - 30 sec x 2  NMR 3: SLR 2 x 10  NMR 4: S/L open books x 10  NMR 5: DKC w/ PB x 2 min  NMR 6: LTR w/ PB x 2 min  Therapeutic Activity  TA 1: TrAb w/ PB x 15  TA 2: Supine - posterior pelvic tilt - 5" h x 20  TA 3: Supine: bridges x 20 w/ green TB loop  TA 4: Supine Clams w/ GTB x 20  TA 5: S/L hip abuction 2 x 10  TA 6: Nu-step Level 5 x 10 mins  TA 7: Supine Piriformis Stretch 2 x 30 sec  TA 8: Seated lumbar flexion with PB x 3 mins    Time Entry(in minutes):  Neuromuscular Re-Education Time Entry: 25  Therapeutic Activity Time Entry: 30    Assessment & Plan   Assessment: Patient did well with exercises; no exacerbation of pain noted; needs " cueing at times just to improve performance/results of exercsies.       The patient will continue to benefit from skilled outpatient physical therapy in order to address the deficits listed in the problem list on the initial evaluation, provide patient and family education, and maximize the patients level of independence in the home and community environments.     The patient's spiritual, cultural, and educational needs were considered, and the patient is agreeable to the plan of care and goals.           Plan: Continue with Skilled physical therapy 2x/week x 1 weeks to address lower back pain.; Will discharge following completion of POC.    Goals:   Active       LTG's        Able to perform all household activities with no limitation  (Progressing)       Start:  04/23/25    Expected End:  06/20/25            Able to perform all leisure activities with no limitation  (Met)       Start:  04/23/25    Expected End:  06/20/25    Resolved:  06/07/25         Able to sleep through the night without waking from pain  (Progressing)       Start:  04/23/25    Expected End:  06/20/25            Ambulate community required distances with no limitations  (Met)       Start:  04/23/25    Expected End:  06/20/25    Resolved:  06/07/25         Independent with HEP for continued improvement in function.  (Progressing)       Start:  04/23/25    Expected End:  06/20/25              Resolved       STG's        Demonstrate improvement in recent symptoms to progress toward long term goals  (Met)       Start:  04/23/25    Expected End:  05/30/25    Resolved:  05/27/25         Correct postural deficits to reduce pain and promote improvement in postural awareness for injury prevention  (Met)       Start:  04/23/25    Expected End:  05/30/25    Resolved:  05/27/25         Demonstrate compliance with initial exercise program  (Met)       Start:  04/23/25    Expected End:  05/14/25    Resolved:  05/06/25             Jonathan Favre, PTA

## 2025-06-20 ENCOUNTER — CLINICAL SUPPORT (OUTPATIENT)
Dept: REHABILITATION | Facility: HOSPITAL | Age: 75
End: 2025-06-20
Payer: MEDICARE

## 2025-06-20 DIAGNOSIS — Z74.09 IMPAIRED FUNCTIONAL MOBILITY AND ACTIVITY TOLERANCE: Primary | ICD-10-CM

## 2025-06-20 PROCEDURE — 97530 THERAPEUTIC ACTIVITIES: CPT | Mod: PN,CQ

## 2025-06-20 PROCEDURE — 97112 NEUROMUSCULAR REEDUCATION: CPT | Mod: PN,CQ

## 2025-06-20 NOTE — PROGRESS NOTES
"  Outpatient Rehab    Physical Therapy Visit    Patient Name: Jaciel Hickey  MRN: [id number]  YOB: 1950  Encounter Date: 6/20/2025    Therapy Diagnosis:   Encounter Diagnosis   Name Primary?    Impaired functional mobility and activity tolerance Yes     Physician: Jade Brewster MD    Physician Orders: Eval and Treat  Medical Diagnosis: Chronic midline low back pain without sciatica  Surgical Diagnosis: Not applicable for this Episode   Surgical Date: Not applicable for this Episode  Days Since Last Surgery: Not applicable for this Episode    Visit # / Visits Authorized:  16 / 16  Insurance Authorization Period: 4/24/2025 to 7/31/2025  Date of Evaluation: 4/11/2025  Plan of Care Certification: 4/23/2025 to 7/23/2025      PT/PTA: PTA   Number of PTA visits since last PT visit:2  Time In:   8:00 AM  Time Out:   8:50 AM  Total Time (in minutes):   50 Minutes  Total Billable Time (in minutes):   30 Minutes split billing    FOTO:  Intake Score:  %  Survey Score 2:  %  Survey Score 3:  %    Precautions:       Subjective   No new c/o's.  Pain reported as 0/10. Low Back down into LLE    Objective            Treatment:  Balance/Neuromuscular Re-Education  NMR 1: Supine: H/S stretching with strap 3 x 30 sec hold - 3 directions - perform on each leg  NMR 2: Calf stretch on wedge - 30 sec x 2  NMR 3: SLR 2 x 10  NMR 4: S/L open books x 10  NMR 5: DKC w/ PB x 2 min  NMR 6: LTR w/ PB x 2 min  Therapeutic Activity  TA 1: TrAb w/ PB x 15  TA 2: Supine - posterior pelvic tilt - 5" h x 20  TA 3: Supine: bridges x 20 w/ green TB loop  TA 4: Supine Clams w/ GTB x 20  TA 5: S/L hip abuction 2 x 10  TA 6: Nu-step Level 5 x 10 mins  TA 7: Supine Piriformis Stretch 2 x 30 sec  TA 8: Seated lumbar flexion with PB x 3 mins    Time Entry(in minutes):       Assessment & Plan   Assessment: Patient has done well since starting therapy- good progress with strength and mobility; ready to transition to independent exercise " program       The patient will continue to benefit from skilled outpatient physical therapy in order to address the deficits listed in the problem list on the initial evaluation, provide patient and family education, and maximize the patients level of independence in the home and community environments.     The patient's spiritual, cultural, and educational needs were considered, and the patient is agreeable to the plan of care and goals.           Plan: Recommend D/C from skilled PT at this time    Goals:   Resolved       LTG's        Able to perform all household activities with no limitation  (Met)       Start:  04/23/25    Expected End:  06/20/25    Resolved:  06/20/25         Able to perform all leisure activities with no limitation  (Met)       Start:  04/23/25    Expected End:  06/20/25    Resolved:  06/07/25         Able to sleep through the night without waking from pain  (Met)       Start:  04/23/25    Expected End:  06/20/25    Resolved:  06/20/25         Ambulate community required distances with no limitations  (Met)       Start:  04/23/25    Expected End:  06/20/25    Resolved:  06/07/25         Independent with HEP for continued improvement in function.  (Met)       Start:  04/23/25    Expected End:  06/20/25    Resolved:  06/20/25            STG's        Demonstrate improvement in recent symptoms to progress toward long term goals  (Met)       Start:  04/23/25    Expected End:  05/30/25    Resolved:  05/27/25         Correct postural deficits to reduce pain and promote improvement in postural awareness for injury prevention  (Met)       Start:  04/23/25    Expected End:  05/30/25    Resolved:  05/27/25         Demonstrate compliance with initial exercise program  (Met)       Start:  04/23/25    Expected End:  05/14/25    Resolved:  05/06/25             Jonathan Favre, PTA

## 2025-07-16 ENCOUNTER — TELEPHONE (OUTPATIENT)
Facility: CLINIC | Age: 75
End: 2025-07-16
Payer: MEDICARE

## 2025-07-16 DIAGNOSIS — D51.8 DIETARY VITAMIN B12 DEFICIENCY ANEMIA: ICD-10-CM

## 2025-07-16 DIAGNOSIS — E53.8 FOLATE DEFICIENCY: ICD-10-CM

## 2025-07-16 DIAGNOSIS — C50.512 MALIGNANT NEOPLASM OF LOWER-OUTER QUADRANT OF LEFT FEMALE BREAST, UNSPECIFIED ESTROGEN RECEPTOR STATUS: Primary | ICD-10-CM

## 2025-07-21 ENCOUNTER — LAB VISIT (OUTPATIENT)
Dept: LAB | Facility: HOSPITAL | Age: 75
End: 2025-07-21
Attending: INTERNAL MEDICINE
Payer: MEDICARE

## 2025-07-21 DIAGNOSIS — D51.8 DIETARY VITAMIN B12 DEFICIENCY ANEMIA: ICD-10-CM

## 2025-07-21 DIAGNOSIS — E53.8 FOLATE DEFICIENCY: ICD-10-CM

## 2025-07-21 DIAGNOSIS — C50.512 MALIGNANT NEOPLASM OF LOWER-OUTER QUADRANT OF LEFT FEMALE BREAST, UNSPECIFIED ESTROGEN RECEPTOR STATUS: ICD-10-CM

## 2025-07-21 LAB
ABSOLUTE EOSINOPHIL (OHS): 0.13 K/UL
ABSOLUTE MONOCYTE (OHS): 0.54 K/UL (ref 0.3–1)
ABSOLUTE NEUTROPHIL COUNT (OHS): 3.21 K/UL (ref 1.8–7.7)
BASOPHILS # BLD AUTO: 0.05 K/UL
BASOPHILS NFR BLD AUTO: 0.9 %
ERYTHROCYTE [DISTWIDTH] IN BLOOD BY AUTOMATED COUNT: 12.8 % (ref 11.5–14.5)
FERRITIN SERPL-MCNC: 60 NG/ML (ref 20–300)
HCT VFR BLD AUTO: 40.5 % (ref 37–48.5)
HGB BLD-MCNC: 13.1 GM/DL (ref 12–16)
IMM GRANULOCYTES # BLD AUTO: 0.04 K/UL (ref 0–0.04)
IMM GRANULOCYTES NFR BLD AUTO: 0.7 % (ref 0–0.5)
LYMPHOCYTES # BLD AUTO: 1.51 K/UL (ref 1–4.8)
MCH RBC QN AUTO: 29.2 PG (ref 27–31)
MCHC RBC AUTO-ENTMCNC: 32.3 G/DL (ref 32–36)
MCV RBC AUTO: 90 FL (ref 82–98)
NUCLEATED RBC (/100WBC) (OHS): 0 /100 WBC
PLATELET # BLD AUTO: 171 K/UL (ref 150–450)
PMV BLD AUTO: 12 FL (ref 9.2–12.9)
RBC # BLD AUTO: 4.49 M/UL (ref 4–5.4)
RELATIVE EOSINOPHIL (OHS): 2.4 %
RELATIVE LYMPHOCYTE (OHS): 27.6 % (ref 18–48)
RELATIVE MONOCYTE (OHS): 9.9 % (ref 4–15)
RELATIVE NEUTROPHIL (OHS): 58.5 % (ref 38–73)
VIT B12 SERPL-MCNC: 1297 PG/ML (ref 210–950)
WBC # BLD AUTO: 5.48 K/UL (ref 3.9–12.7)

## 2025-07-21 PROCEDURE — 82728 ASSAY OF FERRITIN: CPT

## 2025-07-21 PROCEDURE — 85025 COMPLETE CBC W/AUTO DIFF WBC: CPT

## 2025-07-21 PROCEDURE — 36415 COLL VENOUS BLD VENIPUNCTURE: CPT

## 2025-07-21 PROCEDURE — 82607 VITAMIN B-12: CPT

## 2025-07-28 ENCOUNTER — OFFICE VISIT (OUTPATIENT)
Facility: CLINIC | Age: 75
End: 2025-07-28
Payer: MEDICARE

## 2025-07-28 VITALS
DIASTOLIC BLOOD PRESSURE: 85 MMHG | RESPIRATION RATE: 17 BRPM | HEART RATE: 88 BPM | SYSTOLIC BLOOD PRESSURE: 140 MMHG | WEIGHT: 177.63 LBS | OXYGEN SATURATION: 99 % | BODY MASS INDEX: 30.48 KG/M2

## 2025-07-28 DIAGNOSIS — M85.9 DISORDER OF BONE DENSITY AND STRUCTURE, UNSPECIFIED: ICD-10-CM

## 2025-07-28 DIAGNOSIS — Z85.3 HISTORY OF BREAST CANCER: Primary | ICD-10-CM

## 2025-07-28 DIAGNOSIS — Z86.39 HISTORY OF VITAMIN D DEFICIENCY: ICD-10-CM

## 2025-07-28 DIAGNOSIS — Z86.39 H/O NON ANEMIC VITAMIN B12 DEFICIENCY: ICD-10-CM

## 2025-07-28 DIAGNOSIS — M85.89 OSTEOPENIA OF MULTIPLE SITES: ICD-10-CM

## 2025-07-28 PROCEDURE — 3288F FALL RISK ASSESSMENT DOCD: CPT | Mod: CPTII,S$GLB,, | Performed by: INTERNAL MEDICINE

## 2025-07-28 PROCEDURE — G2211 COMPLEX E/M VISIT ADD ON: HCPCS | Mod: S$GLB,,, | Performed by: INTERNAL MEDICINE

## 2025-07-28 PROCEDURE — 3008F BODY MASS INDEX DOCD: CPT | Mod: CPTII,S$GLB,, | Performed by: INTERNAL MEDICINE

## 2025-07-28 PROCEDURE — 1126F AMNT PAIN NOTED NONE PRSNT: CPT | Mod: CPTII,S$GLB,, | Performed by: INTERNAL MEDICINE

## 2025-07-28 PROCEDURE — 99215 OFFICE O/P EST HI 40 MIN: CPT | Mod: S$GLB,,, | Performed by: INTERNAL MEDICINE

## 2025-07-28 PROCEDURE — 99999 PR PBB SHADOW E&M-EST. PATIENT-LVL III: CPT | Mod: PBBFAC,,, | Performed by: INTERNAL MEDICINE

## 2025-07-28 PROCEDURE — 1101F PT FALLS ASSESS-DOCD LE1/YR: CPT | Mod: CPTII,S$GLB,, | Performed by: INTERNAL MEDICINE

## 2025-07-28 PROCEDURE — 3077F SYST BP >= 140 MM HG: CPT | Mod: CPTII,S$GLB,, | Performed by: INTERNAL MEDICINE

## 2025-07-28 PROCEDURE — 4010F ACE/ARB THERAPY RXD/TAKEN: CPT | Mod: CPTII,S$GLB,, | Performed by: INTERNAL MEDICINE

## 2025-07-28 PROCEDURE — 1159F MED LIST DOCD IN RCRD: CPT | Mod: CPTII,S$GLB,, | Performed by: INTERNAL MEDICINE

## 2025-07-28 PROCEDURE — 3079F DIAST BP 80-89 MM HG: CPT | Mod: CPTII,S$GLB,, | Performed by: INTERNAL MEDICINE

## 2025-07-28 NOTE — PROGRESS NOTES
"[facility] [address] In Office Subsequent Hematology Oncology Note    7/28/25      Subjective:      Patient ID:   Marce Hickey  75 y.o. female  1950  MD Narcisa        Chief Complaint:   Breast cancer hx    HPI:  75 y.o. female has a history of L breast cancer.   .  She has a history of breast reduction back in 1992.  Recently in March 2017 she had a abnormal mammogram at [facility].  Two adjacent masses were seen at the 12 and 1:00 position of the left breast.  Both masses returned positive for invasive ductal carcinoma.  ERP was positive, PRP was positive, HER2 Alec was negative, it was grade 1 disease, and sentinel lymph node biopsy was negative.  Clinically this was stage I disease.  She had bilateral mastectomy, with left sentinel node biopsy in May 2017.  She also had reconstruction with expanders placed and eventually implants paced per Dr. Thomas of Plastic surgery.      She  continued on adjuvant Arimidex daily for x's 5 years.  No increased HF sx or joint sx.  Started 6/2017. Through 6/14/2022.    She is status post gastric sleeve surgery January 29, 2017.  She has a history of B12 deficiency and has been on B12 sublingually in the past, but has not taken it," in a long time.  B 12 MWF now.     B12 is low normal at 290 and  ferritin is normal at 93.  Vitamin D is low at 24 and bone density test shows osteopenia.  On B 12 subl daily, the level is up to 1297, she will continue B 12 subl 1/wk or BIW.    Vitamin D is better at 36, on supplements. 6000 unit per day.      She is to follow-up with her primary care for recommendations regarding vitamin-D supplementation and osteopenia/osteoporosis management and prevention while on Arimidex.  Bone Density test 5/14/20 showed osteopenia.  Repeat bone density testing 9/2023 osteopenia.    Estimated breast cancer recurrence was reduced from 23% to 12% with adjuvant Arimidex or tamoxifen usage.    She has hx of f pain at her left 2nd distal joint finger area " and saw Dr. Schultz  for evaluation.  Was told she had degenerative arthritis at L>R 2nd finger.    She is status post partial hysterectomy.  She took birth control pills until about age 26.  She had hysterectomy at age 30.  She did not take hormone replacement therapy.    She wears a calcium and vitamin-D and multivitamin patch x2 years.  For osteopenia management per her PMD  Hx anaphylaxis to NSAIDS.    For her history includes generalized anxiety disorder, type 2 diabetes, low vitamin-D levels, GERD, fatty liver, dyslipidemia, depression, hypertension, plantar fasciitis of the right foot.    She is status post breast reduction, bilateral mastectomy, breast implant placement, with reconstruction.  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  Other history includes the gastric sleeve surgery, partial hysterectomy, right rotator cuff repair, tonsillectomy, and cholecystectomy.    Her mother had hypertension, heart disease and history of heart attack.  Her father had heart disease and psoriasis.    She was in the [employer] times 26 years till .  She smoked times 19 years, 1 pack per day, and quit in .  She does not drink alcohol with with regularity.  She is allergic to aspirin and nonsteroidal anti inflammatory drugs as manifested with anaphylaxis.    Her mother  in 2017 of old age.  She had a lumpectomy done but it is not clear if she had breast cancer.  Her father had heart disease.  She has a brother alive and well and a cousin with prostate cancer.  She has 2 sons alive and well.    , Antony Hickey, IT, [employer].    Our conversation revealed previously  that her family origin is from [place], specifically from [place].  She is 100% Askinazi Mu-ism.    BRCA 1 & 2 returned NEGATIVE.  She does not have the breast cancer gene.    Breast Cancer Index testing supports RR 5-6% after 5 years of hormonal Rx.  Extended  hormonal Rx not recommended in trying to reduce RR further.  Stop hormonal Rx at 5 years,  2022.    She has 2 cousins with history of prostate cancer.    PET 2022 DOV.  She has degenerative disc disease, spinal stenosis, and chronic back pain symptoms.  She is status post Arimidex adjuvant therapy.    ROS:   GEN: normal without any fever, night sweats or weight loss  HEENT: normal with no HA's, sore throat, stiff neck, changes in vision  CV: normal with no CP, SOB, PND, FLORES or orthopnea  PULM: normal with no SOB, cough, hemoptysis, sputum or pleuritic pain  GI:  See history of present illness  : normal with no hematuria, dysuria  BREAST:  See history of present illness  SKIN: normal with no rash, erythema, bruising, or swelling     Past Medical History:   Diagnosis Date    Anxiety     Arthritis     Cancer     breast cancer - left    Depression     Diabetes mellitus, type 2     Borderline    Dyslipidemia 2016    Fatty liver disease, nonalcoholic     GERD (gastroesophageal reflux disease)     Hyperlipidemia     Hypertension     Malignant neoplasm of lower-outer quadrant of left female breast, unspecified estrogen receptor status 2023    Plantar fasciitis of right foot      Past Surgical History:   Procedure Laterality Date    ADENOIDECTOMY  1970    APPENDECTOMY  2007    breast reduction      BREAST SURGERY Bilateral     masectomy    BREAST SURGERY Bilateral     implants     SECTION      x 2    CHOLECYSTECTOMY      COLONOSCOPY N/A 2020    Procedure: COLONOSCOPY;  Surgeon: Nupur Leonard MD;  Location: [facility];  Service: Endoscopy;  Laterality: N/A;    COSMETIC SURGERY  May 4, 2017    Reconstruction due to mastectomy    EYE SURGERY      Lasik    FOOT SURGERY      left bunionectomy    gastric sleve      HYSTERECTOMY      one ovary intact, adhesions    JOINT REPLACEMENT  2017    KNEE ARTHROPLASTY Left 2018    Procedure: ARTHROPLASTY, KNEE;  Surgeon: Christos Browne  "MD Tarik;  Location: [facility];  Service: Orthopedics;  Laterality: Left;    KNEE ARTHROSCOPY Left     LIPOSUCTION      ROTATOR CUFF REPAIR Right     TONSILLECTOMY      TUBAL LIGATION  January 28, 1977       Review of patient's allergies indicates:   Allergen Reactions    Nsaids (non-steroidal anti-inflammatory drug) Anaphylaxis           Current Outpatient Medications:     atorvastatin (LIPITOR) 20 MG tablet, TAKE 1 TABLET BY MOUTH EVERY DAY, Disp: 90 tablet, Rfl: 0    ergocalciferol, vitamin D2, (VITAMIN D ORAL), Take 6,000 mg by mouth once daily., Disp: , Rfl:     hydroCHLOROthiazide (MICROZIDE) 12.5 mg capsule, TAKE 1 CAPSULE BY MOUTH ONCE DAILY, Disp: 90 capsule, Rfl: 0    losartan (COZAAR) 100 MG tablet, TAKE 1 TABLET DAILY, Disp: 90 tablet, Rfl: 0    omeprazole-sodium bicarbonate (ZEGERID) 40-1.1 mg-gram per capsule, Take 1 capsule by mouth before breakfast., Disp: 90 capsule, Rfl: 1    VITAMIN B COMPLEX ORAL, Take 1 tablet by mouth 3 (three) times a week., Disp: , Rfl:     cycloSPORINE (RESTASIS) 0.05 % ophthalmic emulsion, Apply to eye., Disp: , Rfl:     mupirocin (BACTROBAN) 2 % ointment, Apply topically 3 (three) times daily., Disp: 22 g, Rfl: 2    polyvinyl alcohol-povidon,PF, 1.4-0.6 % Dpet, Apply to eye., Disp: , Rfl:           Objective:   Vitals:  Blood pressure (!) 140/85, pulse 88, temperature (P) 98 °F (36.7 °C), temperature source (P) Temporal, resp. rate 17, height (P) 5' 4" (1.626 m), weight 80.6 kg (177 lb 9.6 oz), SpO2 99%.    Physical Examination:   GEN: no apparent distress, comfortable  HEAD: atraumatic and normocephalic  EYES: no pallor, no icterus  ENT: no pharyngeal erythema, external ears WNL; no nasal discharge  NECK: no masses, thyroid normal, trachea midline, no LAD/LN's, supple  CV: RRR with no murmur; normal pulse; normal S1 and S2; no pedal edema  CHEST: Normal respiratory effort; CTAB; normal breath sounds; no wheeze or crackles  ABDOM: nontender and nondistended; soft; normal " bowel sounds; no rebound/guarding, liver and spleen were not palpable  MUSC/Skeletal: ROM normal; no crepitus; joints normal; no deformities or arthropathy  EXTREM: no clubbing, cyanosis, inflammation or swelling  SKIN: no rashes, lesions, ulcers, petechiae or subcutaneous nodules  : no cvat  NEURO: grossly intact; motor/sensory WNL;  no tremors  PSYCH: normal mood, affect and behavior  LYMPH: normal cervical, supraclavicular, axillary and groin LN's  BREASTS:  She has extensive postoperative change at the chest wall anteriorly, she does not have palpable mass at the left or right breast, chest area is nontender      Labs:   Lab Results   Component Value Date    WBC 5.48 07/21/2025    HGB 13.1 07/21/2025    HCT 40.5 07/21/2025    MCV 90 07/21/2025     07/21/2025    CMP  Sodium   Date Value Ref Range Status   04/08/2024 142 136 - 145 mmol/L Final     Potassium   Date Value Ref Range Status   04/08/2024 4.3 3.5 - 5.1 mmol/L Final     Chloride   Date Value Ref Range Status   04/08/2024 109 95 - 110 mmol/L Final     CO2   Date Value Ref Range Status   04/08/2024 24 23 - 29 mmol/L Final     Glucose   Date Value Ref Range Status   04/08/2024 104 70 - 110 mg/dL Final     BUN   Date Value Ref Range Status   04/08/2024 20 8 - 23 mg/dL Final     Creatinine   Date Value Ref Range Status   04/08/2024 0.8 0.5 - 1.4 mg/dL Final     Calcium   Date Value Ref Range Status   04/08/2024 10.4 8.7 - 10.5 mg/dL Final     Total Protein   Date Value Ref Range Status   04/08/2024 6.8 6.0 - 8.4 g/dL Final     Albumin   Date Value Ref Range Status   04/08/2024 4.1 3.5 - 5.2 g/dL Final     Total Bilirubin   Date Value Ref Range Status   04/08/2024 0.6 0.1 - 1.0 mg/dL Final     Comment:     For infants and newborns, interpretation of results should be based  on gestational age, weight and in agreement with clinical  observations.    Premature Infant recommended reference ranges:  Up to 24 hours.............<8.0 mg/dL  Up to 48  hours............<12.0 mg/dL  3-5 days..................<15.0 mg/dL  6-29 days.................<15.0 mg/dL       Alkaline Phosphatase   Date Value Ref Range Status   04/08/2024 98 55 - 135 U/L Final     AST   Date Value Ref Range Status   04/08/2024 15 10 - 40 U/L Final     ALT   Date Value Ref Range Status   04/08/2024 20 10 - 44 U/L Final     Anion Gap   Date Value Ref Range Status   04/08/2024 9 8 - 16 mmol/L Final     eGFR if    Date Value Ref Range Status   05/17/2022 >60.0 >60 mL/min/1.73 m^2 Final     eGFR if non    Date Value Ref Range Status   05/17/2022 >60.0 >60 mL/min/1.73 m^2 Final     Comment:     Calculation used to obtain the estimated glomerular filtration  rate (eGFR) is the CKD-EPI equation.        Hgb 13.6, B 12 1297, ferritin 60    Assessment:   (1) 75 y.o. female with history  of multifocal left breast cancer, clinical stage I disease, ERP positive,   HER2 Alec negative.    S/P Adjuvant Arimidex 1 mg p.o. daily x's  5 years.  Stop Arimidex  6/14/22.    DOV, follow with observation.  See me 6 months.    (2)Bone density testing shows osteopenia.  She is to follow-up with her primary care for osteopenia/osteoporosis management, she is on Vit D and Calcium. Dr. Brewster.    (3)B12 deficiency Hx 2nd to decreased absorption 2nd to gastric sleave surgery..  She is status post gastric sleeve surgery and has history of B12 deficiency in the past.  Her B12 level returned low normal at 290.  On subl B 12 the level is better at 1,290.  Decrease subl B 12 to 1 weekly or 1 BIW.  I will recheck in 6 months tie B 12 level.    (4)Vitamin D is low at 24.  On calcium and Vitamin D, the level is better at 36.  Dr. Brewster monitors Vit D management, osteopenia.    BRCA 1 and BRCA 2 testing has returned Negative.    Breast Cancer Index supports 5-6% recurrence at years 5-10, after 5 years of adjuvant hormonal Rx.  Extended hormonal therapy greater than 5 years is not felt to be  beneficial here in reducing RR further.      RTC 6 months with CBC, B 12, tumor markers. Vit D., CMP.    Cuauhtemoc Matos MD  [facility]  7/28/25.

## 2025-07-28 NOTE — LETTER
July 28, 2025        Jade Brewster MD  [address]  [address]  [place] [address]             [facility]  [address]  [address]  [place] [address]  Phone: [phone]  Fax: [phone]   Patient: Marce Hickey   MR Number: [id number]   YOB: 1950   Date of Visit: 7/28/2025       Dear Dr. Brewster:    Thank you for referring Marce Hickey to me for evaluation. Below are the relevant portions of my assessment and plan of care.            If you have questions, please do not hesitate to call me. I look forward to following Marce along with you.    Sincerely,      RENUKA Luis MD           CC  No Recipients

## 2025-07-30 DIAGNOSIS — K21.9 GASTROESOPHAGEAL REFLUX DISEASE, UNSPECIFIED WHETHER ESOPHAGITIS PRESENT: ICD-10-CM

## 2025-07-30 NOTE — TELEPHONE ENCOUNTER
Care Due:                  Date            Visit Type   Department     Provider  --------------------------------------------------------------------------------                                EP -                              [facility]  Last Visit: 04-      CARE ([facility])   MEDICINE       Jade Brewster                               -                              [facility]  Next Visit: 10-      CARE ([facility])   MEDICINE       Jade Brewster                                                            Last  Test          Frequency    Reason                     Performed    Due Date  --------------------------------------------------------------------------------    CMP.........  12 months..  atorvastatin,              04- 04-                             hydroCHLOROthiazide,                             losartan.................    Lipid Panel.  12 months..  atorvastatin.............  04- 04-    [facility] Embedded Care Due Messages. Reference number: [id number].   7/30/2025 5:19:53 PM CDT

## 2025-07-30 NOTE — TELEPHONE ENCOUNTER
Refill Routing Note   Medication(s) are not appropriate for processing by [facility] for the following reason(s):        Outside of protocol    [facility] action(s):  Route   Requires labs : Yes             Appointments  past 12m or future 3m with PCP    Date Provider   Last Visit   4/11/2025 Jade Brewster MD   Next Visit   10/13/2025 Jade Brewster MD   ED visits in past 90 days: 0        Note composed:5:43 PM 07/30/2025

## 2025-07-31 RX ORDER — OMEPRAZOLE AND SODIUM BICARBONATE 40; 1100 MG/1; MG/1
1 CAPSULE ORAL
Qty: 90 CAPSULE | Refills: 3 | Status: SHIPPED | OUTPATIENT
Start: 2025-07-31

## 2025-08-25 DIAGNOSIS — I10 ESSENTIAL HYPERTENSION: ICD-10-CM

## 2025-08-27 ENCOUNTER — LAB VISIT (OUTPATIENT)
Dept: LAB | Facility: HOSPITAL | Age: 75
End: 2025-08-27
Attending: STUDENT IN AN ORGANIZED HEALTH CARE EDUCATION/TRAINING PROGRAM
Payer: MEDICARE

## 2025-08-27 DIAGNOSIS — Z85.3 HISTORY OF BREAST CANCER: ICD-10-CM

## 2025-08-27 DIAGNOSIS — M85.89 OSTEOPENIA OF MULTIPLE SITES: ICD-10-CM

## 2025-08-27 DIAGNOSIS — Z86.39 HISTORY OF VITAMIN D DEFICIENCY: ICD-10-CM

## 2025-08-27 DIAGNOSIS — Z86.39 H/O NON ANEMIC VITAMIN B12 DEFICIENCY: ICD-10-CM

## 2025-08-27 DIAGNOSIS — M85.9 DISORDER OF BONE DENSITY AND STRUCTURE, UNSPECIFIED: ICD-10-CM

## 2025-08-27 LAB
ABSOLUTE EOSINOPHIL (OHS): 0.11 K/UL
ABSOLUTE MONOCYTE (OHS): 0.53 K/UL (ref 0.3–1)
ABSOLUTE NEUTROPHIL COUNT (OHS): 2.82 K/UL (ref 1.8–7.7)
ALBUMIN SERPL BCP-MCNC: 3.9 G/DL (ref 3.5–5.2)
ALP SERPL-CCNC: 86 UNIT/L (ref 40–150)
ALT SERPL W/O P-5'-P-CCNC: 21 UNIT/L (ref 10–44)
ANION GAP (OHS): 7 MMOL/L (ref 8–16)
AST SERPL-CCNC: 31 UNIT/L (ref 11–45)
BASOPHILS # BLD AUTO: 0.04 K/UL
BASOPHILS NFR BLD AUTO: 0.8 %
BILIRUB SERPL-MCNC: 0.4 MG/DL (ref 0.1–1)
BUN SERPL-MCNC: 19 MG/DL (ref 8–23)
CALCIUM SERPL-MCNC: 10.1 MG/DL (ref 8.7–10.5)
CHLORIDE SERPL-SCNC: 108 MMOL/L (ref 95–110)
CO2 SERPL-SCNC: 27 MMOL/L (ref 23–29)
CREAT SERPL-MCNC: 0.9 MG/DL (ref 0.5–1.4)
ERYTHROCYTE [DISTWIDTH] IN BLOOD BY AUTOMATED COUNT: 12.7 % (ref 11.5–14.5)
GFR SERPLBLD CREATININE-BSD FMLA CKD-EPI: >60 ML/MIN/1.73/M2
GLUCOSE SERPL-MCNC: 96 MG/DL (ref 70–110)
HCT VFR BLD AUTO: 40.9 % (ref 37–48.5)
HGB BLD-MCNC: 13.2 GM/DL (ref 12–16)
IMM GRANULOCYTES # BLD AUTO: 0.04 K/UL (ref 0–0.04)
IMM GRANULOCYTES NFR BLD AUTO: 0.8 % (ref 0–0.5)
LYMPHOCYTES # BLD AUTO: 1.44 K/UL (ref 1–4.8)
MCH RBC QN AUTO: 28.9 PG (ref 27–31)
MCHC RBC AUTO-ENTMCNC: 32.3 G/DL (ref 32–36)
MCV RBC AUTO: 90 FL (ref 82–98)
NUCLEATED RBC (/100WBC) (OHS): 0 /100 WBC
PLATELET # BLD AUTO: 169 K/UL (ref 150–450)
PMV BLD AUTO: 12.1 FL (ref 9.2–12.9)
POTASSIUM SERPL-SCNC: 4.5 MMOL/L (ref 3.5–5.1)
PROT SERPL-MCNC: 6.8 GM/DL (ref 6–8.4)
RBC # BLD AUTO: 4.56 M/UL (ref 4–5.4)
RELATIVE EOSINOPHIL (OHS): 2.2 %
RELATIVE LYMPHOCYTE (OHS): 28.9 % (ref 18–48)
RELATIVE MONOCYTE (OHS): 10.6 % (ref 4–15)
RELATIVE NEUTROPHIL (OHS): 56.7 % (ref 38–73)
SODIUM SERPL-SCNC: 142 MMOL/L (ref 136–145)
WBC # BLD AUTO: 4.98 K/UL (ref 3.9–12.7)

## 2025-08-27 PROCEDURE — 86300 IMMUNOASSAY TUMOR CA 15-3: CPT | Mod: 59

## 2025-08-27 PROCEDURE — 82607 VITAMIN B-12: CPT

## 2025-08-27 PROCEDURE — 85025 COMPLETE CBC W/AUTO DIFF WBC: CPT

## 2025-08-27 PROCEDURE — 36415 COLL VENOUS BLD VENIPUNCTURE: CPT

## 2025-08-27 PROCEDURE — 86300 IMMUNOASSAY TUMOR CA 15-3: CPT

## 2025-08-27 PROCEDURE — 82306 VITAMIN D 25 HYDROXY: CPT

## 2025-08-27 PROCEDURE — 82565 ASSAY OF CREATININE: CPT

## 2025-08-27 RX ORDER — LOSARTAN POTASSIUM 100 MG/1
100 TABLET ORAL
Qty: 90 TABLET | Refills: 0 | OUTPATIENT
Start: 2025-08-27

## 2025-08-28 ENCOUNTER — PATIENT MESSAGE (OUTPATIENT)
Dept: FAMILY MEDICINE | Facility: CLINIC | Age: 75
End: 2025-08-28
Payer: MEDICARE

## 2025-08-28 LAB
25(OH)D3+25(OH)D2 SERPL-MCNC: 35 NG/ML (ref 30–96)
VIT B12 SERPL-MCNC: 1660 PG/ML (ref 210–950)

## 2025-08-28 RX ORDER — LOSARTAN POTASSIUM 100 MG/1
100 TABLET ORAL DAILY
Qty: 90 TABLET | Refills: 1 | Status: SHIPPED | OUTPATIENT
Start: 2025-08-28

## 2025-08-29 DIAGNOSIS — I10 ESSENTIAL (PRIMARY) HYPERTENSION: ICD-10-CM

## 2025-08-29 DIAGNOSIS — E78.2 MIXED HYPERLIPIDEMIA: ICD-10-CM

## 2025-08-29 LAB
CANCER AG15-3 SERPL IA-ACNC: 18 U/ML
CANCER AG27-29 SERPL-ACNC: 21 U/ML

## 2025-08-29 RX ORDER — ATORVASTATIN CALCIUM 20 MG/1
20 TABLET, FILM COATED ORAL
Qty: 90 TABLET | Refills: 3 | Status: SHIPPED | OUTPATIENT
Start: 2025-08-29

## 2025-08-29 RX ORDER — HYDROCHLOROTHIAZIDE 12.5 MG/1
12.5 CAPSULE ORAL
Qty: 90 CAPSULE | Refills: 3 | Status: SHIPPED | OUTPATIENT
Start: 2025-08-29

## (undated) DEVICE — BLADE SURG CARBON STEEL SZ11

## (undated) DEVICE — SUT 2-0 VICRYL / CT-1

## (undated) DEVICE — LINER SUCTION 3000CC

## (undated) DEVICE — SOL 9P NACL IRR PIC IL

## (undated) DEVICE — GLOVE PROTEXIS PI SYN SURG 7.5

## (undated) DEVICE — SUT SILK 3-0 SH 18IN BLACK

## (undated) DEVICE — SPONGE DERMACEA GAUZE 4X4

## (undated) DEVICE — SEE MEDLINE ITEM 157117

## (undated) DEVICE — SYR 50CC LL

## (undated) DEVICE — NEOGUARD COVER 4X30CM STERILE

## (undated) DEVICE — CLIP ENDO LIGATION LARGE CLIPS

## (undated) DEVICE — ELECTRODE REM PLYHSV RETURN 9

## (undated) DEVICE — PACK CUSTOM UNIV BASIN SLI

## (undated) DEVICE — PENCIL EDGE SMOKE ROCKER

## (undated) DEVICE — SUT ETHILON 4-0 PC5 18IN

## (undated) DEVICE — DRESSING MEPORE 3.6 X 10

## (undated) DEVICE — BLADE SAG DUAL 18MMX1.27MMX90M

## (undated) DEVICE — ALCOHOL 70% ISOP RUBBING 4OZ

## (undated) DEVICE — PACK BASIC

## (undated) DEVICE — SEE MEDLINE ITEM 152622

## (undated) DEVICE — APPLICATOR CHLORAPREP ORN 26ML

## (undated) DEVICE — NDL HYPO REG 25G X 1 1/2

## (undated) DEVICE — SEE MEDLINE ITEM 146372

## (undated) DEVICE — STAPLER SKIN ROTATING HEAD

## (undated) DEVICE — BANDAGE ESMARK 6X12

## (undated) DEVICE — SUT CTD VICRYL 4-0 UND PS-1

## (undated) DEVICE — TRAY MINOR GEN SURG

## (undated) DEVICE — SPONGE LAP 18X18 PREWASHED

## (undated) DEVICE — BOVIE SUCTION

## (undated) DEVICE — SUT MCRYL PLUS 4-0 PS2 27IN

## (undated) DEVICE — SUT 3-0 VICRYL / SH (J416)

## (undated) DEVICE — SCALPEL #11 BLADE STRL DISP

## (undated) DEVICE — SET FLUID TRANSFER ASEPTIC

## (undated) DEVICE — SUT SILK 2-0 PS 18IN BLACK

## (undated) DEVICE — TRAY FOLEY 16FR INFECTION CONT

## (undated) DEVICE — STAPLER ECHELON FLEX 60MM 44CM

## (undated) DEVICE — DRESSING XEROFORM FOIL PK 1X8

## (undated) DEVICE — DRAPE LAP TIBURON 77X122IN

## (undated) DEVICE — TRANSFER MATTRES LATERAL 34

## (undated) DEVICE — SET DECANTER MEDICHOICE

## (undated) DEVICE — SCRUB 10% POVIDONE IODINE 4OZ

## (undated) DEVICE — GLOVE SURG PLYSPHRN ORTH SZ7.5

## (undated) DEVICE — SYR 10CC LUER LOCK

## (undated) DEVICE — PAD CAST SPECIALIST STRL 6

## (undated) DEVICE — DRAPE STERI INSTRUMENT 1018

## (undated) DEVICE — SEE MEDLINE ITEM 152512

## (undated) DEVICE — PENCIL ROCKER SWITCH 10FT CORD

## (undated) DEVICE — PAD ABD 8X10 STERILE

## (undated) DEVICE — SEE MEDLINE ITEM 152742

## (undated) DEVICE — IRRIGATOR SUCTION W/TIP

## (undated) DEVICE — DRAIN CHANNEL ROUND 15FR

## (undated) DEVICE — BLADE SURG #15 CARBON STEEL

## (undated) DEVICE — KIT FIBRIN SEALANT EVICEL 5 ML

## (undated) DEVICE — SYR MEDALLION WHITE 1ML

## (undated) DEVICE — TROCAR KII FIOS 12MM X 100MM

## (undated) DEVICE — COVER SURG LIGHT HANDLE

## (undated) DEVICE — KIT SEAL HEMSTAT EVICEL 35CM

## (undated) DEVICE — SYR SLIP TIP 10ML SHIELD

## (undated) DEVICE — SEE MEDLINE ITEM 146417

## (undated) DEVICE — BRA SURGICAL MED 36-38

## (undated) DEVICE — Device

## (undated) DEVICE — SOL NS 1000CC

## (undated) DEVICE — SUT PDS II 2-0 CT1

## (undated) DEVICE — SUT PROLENE 5-0 PS-2 18

## (undated) DEVICE — SKINMARKER & RULER REGULAR X-F

## (undated) DEVICE — PACK UNIVERSAL SPLIT II

## (undated) DEVICE — RELOAD ECHELON FLEX BLU 60MM

## (undated) DEVICE — SEE MEDLINE ITEM 152572

## (undated) DEVICE — SYS REVOLVE FAT PROCESSING

## (undated) DEVICE — BRA SURGICAL XL 40-42

## (undated) DEVICE — ADHESIVE DERMABOND ADVANCED

## (undated) DEVICE — CLOSURE SKIN STERI STRIP 1/2X4

## (undated) DEVICE — GLOVE SURG ULTRA TOUCH 8

## (undated) DEVICE — NDL SPINAL 18GX3.5 SPINOCAN

## (undated) DEVICE — SYR 30CC LUER LOCK

## (undated) DEVICE — DRESSING XEROFORM 1X8IN

## (undated) DEVICE — SEE MEDLINE ITEM 153151

## (undated) DEVICE — SEE MEDLINE ITEM 157116

## (undated) DEVICE — TUBING 8FT HI VACLIPOSUCTION

## (undated) DEVICE — SUT 0 VICRYL / UR6 (J603)

## (undated) DEVICE — SUT 3-0 VICRYL SH CR/8 18

## (undated) DEVICE — EVACUATOR WOUND BULB 100CC

## (undated) DEVICE — SPACESUIT TOGA T5 ZIPPER PEEL

## (undated) DEVICE — DRAPE INCISE IOBAN 2 23X17IN

## (undated) DEVICE — SEE MEDLINE ITEM 146313

## (undated) DEVICE — APPLIER CLIP LIAGCLIP 9.375IN

## (undated) DEVICE — CONTAINER SPECIMEN STRL 4OZ

## (undated) DEVICE — DRAPE ABDOMINAL TIBURON 14X11

## (undated) DEVICE — UNDERGLOVES BIOGEL PI SIZE 7.5

## (undated) DEVICE — SOL CLEARIFY VISUALIZATION LAP

## (undated) DEVICE — DRESSING MEPILEX BORDR AG 4X10

## (undated) DEVICE — SEE MEDLINE ITEM 152764

## (undated) DEVICE — SUT MONOCRYL 3-0 PS-2 UND

## (undated) DEVICE — SUT MONOCRYL 4-0 SH UND MON

## (undated) DEVICE — SYS PRINEO SKIN CLOSURE

## (undated) DEVICE — GAUZE DERMACEA LOW PLY 2X4YRDS

## (undated) DEVICE — SUT SILK 2.0 BLK 18

## (undated) DEVICE — INTERPULSE SET

## (undated) DEVICE — GLOVE SURGEONS ULTRA TOUCH 6.5

## (undated) DEVICE — DRAIN SINGLE ROUND 3/16 15F

## (undated) DEVICE — SUT STRATAFIX PDS 1 CTX 18IN

## (undated) DEVICE — BLADE SURG CARBON STEEL #10

## (undated) DEVICE — ELECTRODE BLADE INSULATED 1 IN

## (undated) DEVICE — SUT STRATAFIX PDS 2 CT-1 14IN

## (undated) DEVICE — GLOVE SURG ULTRA TOUCH 7.5

## (undated) DEVICE — SEE MEDLINE ITEM 152487

## (undated) DEVICE — SEE MEDLINE ITEM 152680

## (undated) DEVICE — SLEEVE SCD EXPRESS CALF MEDIUM

## (undated) DEVICE — SCISSOR CURVED ENDOPATH 5MM

## (undated) DEVICE — GAUZE SPONGE BULKEE 6X6.75IN

## (undated) DEVICE — GOWN SURGICAL X-LARGE

## (undated) DEVICE — SOL IRR NACL .9% 3000ML

## (undated) DEVICE — GOWN AERO CHROME W/ TOWEL XL

## (undated) DEVICE — SUT MONO 3-0 PS-2 18 PLST

## (undated) DEVICE — TUBING PNEUMO

## (undated) DEVICE — NDL SAFETY 21G X 1 1/2 ECLPSE

## (undated) DEVICE — DRESSING TRANS 4X4 TEGADERM

## (undated) DEVICE — ELECTRODE EXTENDED BLADE

## (undated) DEVICE — NDL SPINAL SPINOCAN 22GX3.5

## (undated) DEVICE — SUT 2/0 30IN SILK BLK BRAI

## (undated) DEVICE — SUT CTD VICRYL 3-0 PS-1

## (undated) DEVICE — SUT 2-0 ETHILON 18 FS

## (undated) DEVICE — GLOVE SURG PLYSPHRN ORTH SZ 8

## (undated) DEVICE — SOL NACL 0.9% INJ PF/100 150

## (undated) DEVICE — TOURNIQUET SB QC DP 34X4IN

## (undated) DEVICE — SUT VICRYL 2-0 CT-1 18 CR

## (undated) DEVICE — CUBE COLD THERAPY POLAR CARE

## (undated) DEVICE — BANDAGE ADHESIVE

## (undated) DEVICE — SEE MEDLINE ITEM 157131

## (undated) DEVICE — TUBING INFILTRATION STRL DISP

## (undated) DEVICE — SEE MEDLINE ITEM 146271

## (undated) DEVICE — RELOAD ENDO LINEAR RELOD GR 60

## (undated) DEVICE — SUT 2-0 12-18IN SILK

## (undated) DEVICE — SEE MEDLINE ITEM 146292

## (undated) DEVICE — KIT PROCEDURE STER INLET CLOSU

## (undated) DEVICE — INSTRUMENT POOLE ST

## (undated) DEVICE — GLOVE PROTEXIS PI SYN SURG 8.0

## (undated) DEVICE — SUT 3-0 VICRYL / LIGAPACK

## (undated) DEVICE — GAUZE SPONGE 4X4 12PLY

## (undated) DEVICE — SEE MEDLINE ITEM 157128

## (undated) DEVICE — PERISTRIPS DRY STAPLE LINE

## (undated) DEVICE — SHEARS HS LONG 5MM CURVED

## (undated) DEVICE — MANIFOLD 4 PORT

## (undated) DEVICE — PACK LOWER EXTREMITY

## (undated) DEVICE — SPONGE IV DRAIN 4X4 STERILE

## (undated) DEVICE — GOWN TOGA SYS PEELWY ZIP 2 XL

## (undated) DEVICE — SEE MEDLINE ITEM 152530

## (undated) DEVICE — SUT MONOCRYL 4-0 PS-2

## (undated) DEVICE — STRAP OR TABLE 5IN X 72IN

## (undated) DEVICE — UNDERGLOVES BIOGEL PI SIZE 8

## (undated) DEVICE — SEE MEDLINE ITEM 157166

## (undated) DEVICE — SUT ETHILON 5-0 18IN PLAIN

## (undated) DEVICE — APPLIER LIGACLIP SM 9.38IN

## (undated) DEVICE — GLOVE BIOGEL 7.0

## (undated) DEVICE — TROCAR KII FIOS 5MM X 100MM

## (undated) DEVICE — RETRACTOR RADIALUX LIGHTED

## (undated) DEVICE — SYR 50ML CATH TIP

## (undated) DEVICE — KNEE IMMB UNV FOAM/MESH 20IN

## (undated) DEVICE — GOWN X-LG STERILE BACK

## (undated) DEVICE — DRAPE STERI U-SHAPED 47X51IN

## (undated) DEVICE — BLADE SAW SGTL DL STR 25X1.27X

## (undated) DEVICE — DRESSING N ADH OIL EMUL 3X8 3S